# Patient Record
Sex: MALE | Race: WHITE | NOT HISPANIC OR LATINO | Employment: OTHER | URBAN - METROPOLITAN AREA
[De-identification: names, ages, dates, MRNs, and addresses within clinical notes are randomized per-mention and may not be internally consistent; named-entity substitution may affect disease eponyms.]

---

## 2022-03-11 ENCOUNTER — APPOINTMENT (OUTPATIENT)
Dept: GENERAL RADIOLOGY | Facility: HOSPITAL | Age: 77
End: 2022-03-11

## 2022-03-11 ENCOUNTER — HOSPITAL ENCOUNTER (EMERGENCY)
Facility: HOSPITAL | Age: 77
Discharge: NURSING, REHAB OR LONG-TERM CARE | End: 2022-03-12
Attending: EMERGENCY MEDICINE | Admitting: EMERGENCY MEDICINE

## 2022-03-11 DIAGNOSIS — Z00.00 NORMAL EXAM: Primary | ICD-10-CM

## 2022-03-11 PROCEDURE — 99283 EMERGENCY DEPT VISIT LOW MDM: CPT

## 2022-03-11 PROCEDURE — 73070 X-RAY EXAM OF ELBOW: CPT

## 2022-03-12 VITALS
OXYGEN SATURATION: 93 % | HEIGHT: 72 IN | RESPIRATION RATE: 18 BRPM | SYSTOLIC BLOOD PRESSURE: 160 MMHG | DIASTOLIC BLOOD PRESSURE: 92 MMHG | BODY MASS INDEX: 35.21 KG/M2 | HEART RATE: 60 BPM | TEMPERATURE: 98.2 F | WEIGHT: 260 LBS

## 2022-03-12 NOTE — ED NOTES
0724- S/W Ben at [facility] she called asking if pt still need a ride. I confirmed that he did, he stated that she did not have a  on an ETA.

## 2022-03-12 NOTE — ED NOTES
Patient waiting on taxi at this time. Attempted to call family to transport. No answer and most live in [place]. Patient did not meet medical nec. To travel back to facility [facility] trans states can  in am if not able to find ride.

## 2022-03-12 NOTE — ED PROVIDER NOTES
Subjective   Patient is a 76-year-old male who had a PICC line placed in his right arm for IV antibiotics.  He was at a rehab facility.  The patient apparently had pulled it out in the facilities concerned that the catheter may have broken a portion still in the patient's arm.  Patient offers no complaints.          Review of Systems  For arm pain chest pain shortness of breath or other complaint  No past medical history on file.    No Known Allergies    No past surgical history on file.    No family history on file.    Social History     Socioeconomic History   • Marital status:            Objective   Physical Exam  Strength exam shows the patient and the puncture site to his right upper arm from PICC line site.  Patient has no palpable foreign body.  Has 2+ pulses and is neurovascular tact  Procedures           ED Course          XR Elbow 2 View Right    Result Date: 3/11/2022  1. Negative for opaque foreign body.  Electronically Signed By-Nicci Jean-Baptiste MD On:3/11/2022 10:30 PM This report was finalized on [id number] by  Nicci Jean-Baptiste MD.                                            MDM  Number of Diagnoses or Management Options  Diagnosis management comments: X-ray shows no evidence of opaque foreign body.  Patient will be discharged.  They will see the MD for recheck as needed.       Amount and/or Complexity of Data Reviewed  Tests in the radiology section of CPT®: reviewed    Risk of Complications, Morbidity, and/or Mortality  Presenting problems: moderate  Diagnostic procedures: moderate  Management options: moderate    Patient Progress  Patient progress: stable      Final diagnoses:   Normal exam       ED Disposition  ED Disposition     ED Disposition   Discharge    Condition   Stable    Comment   --             No follow-up provider specified.       Medication List      No changes were made to your prescriptions during this visit.          Best Buckley MD  03/11/22 7725

## 2022-03-29 ENCOUNTER — APPOINTMENT (OUTPATIENT)
Dept: GENERAL RADIOLOGY | Facility: HOSPITAL | Age: 77
End: 2022-03-29

## 2022-03-29 ENCOUNTER — HOSPITAL ENCOUNTER (INPATIENT)
Facility: HOSPITAL | Age: 77
LOS: 8 days | Discharge: NURSING, REHAB OR LONG-TERM CARE | End: 2022-04-06
Attending: EMERGENCY MEDICINE | Admitting: INTERNAL MEDICINE

## 2022-03-29 ENCOUNTER — APPOINTMENT (OUTPATIENT)
Dept: CARDIOLOGY | Facility: HOSPITAL | Age: 77
End: 2022-03-29

## 2022-03-29 DIAGNOSIS — I50.9 ACUTE ON CHRONIC CONGESTIVE HEART FAILURE, UNSPECIFIED HEART FAILURE TYPE: Primary | ICD-10-CM

## 2022-03-29 DIAGNOSIS — N39.0 ACUTE UTI: ICD-10-CM

## 2022-03-29 DIAGNOSIS — Z20.822 LAB TEST NEGATIVE FOR COVID-19 VIRUS: ICD-10-CM

## 2022-03-29 PROBLEM — N17.9 ACUTE KIDNEY FAILURE: Status: ACTIVE | Noted: 2022-02-28

## 2022-03-29 PROBLEM — I63.9 CEREBROVASCULAR ACCIDENT: Status: ACTIVE | Noted: 2022-02-28

## 2022-03-29 PROBLEM — E11.21 TYPE 2 DIABETES MELLITUS WITH DIABETIC NEPHROPATHY: Status: ACTIVE | Noted: 2022-02-28

## 2022-03-29 LAB
ALBUMIN SERPL-MCNC: 3.1 G/DL (ref 3.5–5.2)
ALBUMIN/GLOB SERPL: 0.8 G/DL
ALP SERPL-CCNC: 88 U/L (ref 39–117)
ALT SERPL W P-5'-P-CCNC: 10 U/L (ref 1–41)
ANION GAP SERPL CALCULATED.3IONS-SCNC: 11 MMOL/L (ref 5–15)
APTT PPP: 32 SECONDS (ref 61–76.5)
AST SERPL-CCNC: 9 U/L (ref 1–40)
B PARAPERT DNA SPEC QL NAA+PROBE: NOT DETECTED
B PERT DNA SPEC QL NAA+PROBE: NOT DETECTED
BACTERIA UR QL AUTO: ABNORMAL /HPF
BASOPHILS # BLD AUTO: 0 10*3/MM3 (ref 0–0.2)
BASOPHILS NFR BLD AUTO: 0.5 % (ref 0–1.5)
BILIRUB SERPL-MCNC: 0.5 MG/DL (ref 0–1.2)
BILIRUB UR QL STRIP: NEGATIVE
BUN SERPL-MCNC: 25 MG/DL (ref 8–23)
BUN/CREAT SERPL: 19.2 (ref 7–25)
C PNEUM DNA NPH QL NAA+NON-PROBE: NOT DETECTED
CALCIUM SPEC-SCNC: 8.8 MG/DL (ref 8.6–10.5)
CHLORIDE SERPL-SCNC: 105 MMOL/L (ref 98–107)
CLARITY UR: CLEAR
CO2 SERPL-SCNC: 28 MMOL/L (ref 22–29)
COLOR UR: YELLOW
CREAT SERPL-MCNC: 1.3 MG/DL (ref 0.76–1.27)
DEPRECATED RDW RBC AUTO: 53.8 FL (ref 37–54)
EGFRCR SERPLBLD CKD-EPI 2021: 56.9 ML/MIN/1.73
EOSINOPHIL # BLD AUTO: 0.2 10*3/MM3 (ref 0–0.4)
EOSINOPHIL NFR BLD AUTO: 4.9 % (ref 0.3–6.2)
ERYTHROCYTE [DISTWIDTH] IN BLOOD BY AUTOMATED COUNT: 18.6 % (ref 12.3–15.4)
FLUAV SUBTYP SPEC NAA+PROBE: NOT DETECTED
FLUBV RNA ISLT QL NAA+PROBE: NOT DETECTED
GLOBULIN UR ELPH-MCNC: 3.7 GM/DL
GLUCOSE BLDC GLUCOMTR-MCNC: 101 MG/DL (ref 70–105)
GLUCOSE SERPL-MCNC: 111 MG/DL (ref 65–99)
GLUCOSE UR STRIP-MCNC: NEGATIVE MG/DL
HADV DNA SPEC NAA+PROBE: NOT DETECTED
HCOV 229E RNA SPEC QL NAA+PROBE: NOT DETECTED
HCOV HKU1 RNA SPEC QL NAA+PROBE: NOT DETECTED
HCOV NL63 RNA SPEC QL NAA+PROBE: NOT DETECTED
HCOV OC43 RNA SPEC QL NAA+PROBE: NOT DETECTED
HCT VFR BLD AUTO: 28.6 % (ref 37.5–51)
HCT VFR BLD AUTO: 29.4 % (ref 37.5–51)
HCT VFR BLD AUTO: 30.3 % (ref 37.5–51)
HGB BLD-MCNC: 9.3 G/DL (ref 13–17.7)
HGB BLD-MCNC: 9.7 G/DL (ref 13–17.7)
HGB BLD-MCNC: 9.7 G/DL (ref 13–17.7)
HGB UR QL STRIP.AUTO: ABNORMAL
HMPV RNA NPH QL NAA+NON-PROBE: NOT DETECTED
HOLD SPECIMEN: NORMAL
HOLD SPECIMEN: NORMAL
HPIV1 RNA ISLT QL NAA+PROBE: NOT DETECTED
HPIV2 RNA SPEC QL NAA+PROBE: NOT DETECTED
HPIV3 RNA NPH QL NAA+PROBE: NOT DETECTED
HPIV4 P GENE NPH QL NAA+PROBE: NOT DETECTED
HYALINE CASTS UR QL AUTO: ABNORMAL /LPF
INR PPP: 1.13 (ref 0.93–1.1)
KETONES UR QL STRIP: NEGATIVE
LEUKOCYTE ESTERASE UR QL STRIP.AUTO: ABNORMAL
LYMPHOCYTES # BLD AUTO: 1.2 10*3/MM3 (ref 0.7–3.1)
LYMPHOCYTES NFR BLD AUTO: 26.8 % (ref 19.6–45.3)
M PNEUMO IGG SER IA-ACNC: NOT DETECTED
MCH RBC QN AUTO: 27.4 PG (ref 26.6–33)
MCHC RBC AUTO-ENTMCNC: 33 G/DL (ref 31.5–35.7)
MCV RBC AUTO: 83.1 FL (ref 79–97)
MONOCYTES # BLD AUTO: 0.4 10*3/MM3 (ref 0.1–0.9)
MONOCYTES NFR BLD AUTO: 9.3 % (ref 5–12)
NEUTROPHILS NFR BLD AUTO: 2.6 10*3/MM3 (ref 1.7–7)
NEUTROPHILS NFR BLD AUTO: 58.5 % (ref 42.7–76)
NITRITE UR QL STRIP: POSITIVE
NRBC BLD AUTO-RTO: 0.1 /100 WBC (ref 0–0.2)
NT-PROBNP SERPL-MCNC: 4400 PG/ML (ref 0–1800)
PH UR STRIP.AUTO: 7 [PH] (ref 5–8)
PLATELET # BLD AUTO: 153 10*3/MM3 (ref 140–450)
PMV BLD AUTO: 8.2 FL (ref 6–12)
POTASSIUM SERPL-SCNC: 3.8 MMOL/L (ref 3.5–5.2)
PROT SERPL-MCNC: 6.8 G/DL (ref 6–8.5)
PROT UR QL STRIP: NEGATIVE
PROTHROMBIN TIME: 12.4 SECONDS (ref 9.6–11.7)
RBC # BLD AUTO: 3.54 10*6/MM3 (ref 4.14–5.8)
RBC # UR STRIP: ABNORMAL /HPF
REF LAB TEST METHOD: ABNORMAL
RHINOVIRUS RNA SPEC NAA+PROBE: NOT DETECTED
RSV RNA NPH QL NAA+NON-PROBE: NOT DETECTED
SARS-COV-2 RNA NPH QL NAA+NON-PROBE: NOT DETECTED
SODIUM SERPL-SCNC: 144 MMOL/L (ref 136–145)
SP GR UR STRIP: 1.01 (ref 1–1.03)
SQUAMOUS #/AREA URNS HPF: ABNORMAL /HPF
TROPONIN T SERPL-MCNC: 0.03 NG/ML (ref 0–0.03)
TROPONIN T SERPL-MCNC: 0.04 NG/ML (ref 0–0.03)
UROBILINOGEN UR QL STRIP: ABNORMAL
WBC # UR STRIP: ABNORMAL /HPF
WBC NRBC COR # BLD: 4.4 10*3/MM3 (ref 3.4–10.8)
WHOLE BLOOD HOLD SPECIMEN: NORMAL
WHOLE BLOOD HOLD SPECIMEN: NORMAL

## 2022-03-29 PROCEDURE — 93005 ELECTROCARDIOGRAM TRACING: CPT | Performed by: EMERGENCY MEDICINE

## 2022-03-29 PROCEDURE — 87086 URINE CULTURE/COLONY COUNT: CPT | Performed by: EMERGENCY MEDICINE

## 2022-03-29 PROCEDURE — 25010000002 ENOXAPARIN PER 10 MG: Performed by: NURSE PRACTITIONER

## 2022-03-29 PROCEDURE — 71045 X-RAY EXAM CHEST 1 VIEW: CPT

## 2022-03-29 PROCEDURE — G0378 HOSPITAL OBSERVATION PER HR: HCPCS

## 2022-03-29 PROCEDURE — 99222 1ST HOSP IP/OBS MODERATE 55: CPT | Performed by: INTERNAL MEDICINE

## 2022-03-29 PROCEDURE — 25010000002 MEROPENEM PER 100 MG: Performed by: EMERGENCY MEDICINE

## 2022-03-29 PROCEDURE — 85610 PROTHROMBIN TIME: CPT | Performed by: EMERGENCY MEDICINE

## 2022-03-29 PROCEDURE — 0202U NFCT DS 22 TRGT SARS-COV-2: CPT | Performed by: EMERGENCY MEDICINE

## 2022-03-29 PROCEDURE — 82962 GLUCOSE BLOOD TEST: CPT

## 2022-03-29 PROCEDURE — 87077 CULTURE AEROBIC IDENTIFY: CPT | Performed by: EMERGENCY MEDICINE

## 2022-03-29 PROCEDURE — 25010000002 FUROSEMIDE PER 20 MG: Performed by: EMERGENCY MEDICINE

## 2022-03-29 PROCEDURE — 85014 HEMATOCRIT: CPT | Performed by: NURSE PRACTITIONER

## 2022-03-29 PROCEDURE — 99233 SBSQ HOSP IP/OBS HIGH 50: CPT | Performed by: HOSPITALIST

## 2022-03-29 PROCEDURE — 84484 ASSAY OF TROPONIN QUANT: CPT | Performed by: EMERGENCY MEDICINE

## 2022-03-29 PROCEDURE — 80053 COMPREHEN METABOLIC PANEL: CPT | Performed by: EMERGENCY MEDICINE

## 2022-03-29 PROCEDURE — 85730 THROMBOPLASTIN TIME PARTIAL: CPT | Performed by: EMERGENCY MEDICINE

## 2022-03-29 PROCEDURE — 25010000002 FUROSEMIDE PER 20 MG: Performed by: NURSE PRACTITIONER

## 2022-03-29 PROCEDURE — 87186 SC STD MICRODIL/AGAR DIL: CPT | Performed by: EMERGENCY MEDICINE

## 2022-03-29 PROCEDURE — 81001 URINALYSIS AUTO W/SCOPE: CPT | Performed by: EMERGENCY MEDICINE

## 2022-03-29 PROCEDURE — 87102 FUNGUS ISOLATION CULTURE: CPT | Performed by: EMERGENCY MEDICINE

## 2022-03-29 PROCEDURE — 85018 HEMOGLOBIN: CPT | Performed by: NURSE PRACTITIONER

## 2022-03-29 PROCEDURE — 87181 SC STD AGAR DILUTION PER AGT: CPT | Performed by: EMERGENCY MEDICINE

## 2022-03-29 PROCEDURE — 63710000001 INSULIN GLARGINE PER 5 UNITS: Performed by: NURSE PRACTITIONER

## 2022-03-29 PROCEDURE — 36415 COLL VENOUS BLD VENIPUNCTURE: CPT | Performed by: EMERGENCY MEDICINE

## 2022-03-29 PROCEDURE — 85025 COMPLETE CBC W/AUTO DIFF WBC: CPT | Performed by: EMERGENCY MEDICINE

## 2022-03-29 PROCEDURE — 83880 ASSAY OF NATRIURETIC PEPTIDE: CPT | Performed by: EMERGENCY MEDICINE

## 2022-03-29 PROCEDURE — 99284 EMERGENCY DEPT VISIT MOD MDM: CPT

## 2022-03-29 PROCEDURE — 25010000002 CEFTRIAXONE PER 250 MG: Performed by: EMERGENCY MEDICINE

## 2022-03-29 PROCEDURE — 25010000002 FUROSEMIDE PER 20 MG: Performed by: HOSPITALIST

## 2022-03-29 RX ORDER — ACETAMINOPHEN 325 MG/1
650 TABLET ORAL EVERY 4 HOURS PRN
COMMUNITY

## 2022-03-29 RX ORDER — DILTIAZEM HYDROCHLORIDE EXTENDED-RELEASE TABLETS 180 MG/1
180 TABLET, EXTENDED RELEASE ORAL DAILY
COMMUNITY
End: 2022-04-06 | Stop reason: HOSPADM

## 2022-03-29 RX ORDER — ACETAMINOPHEN 160 MG/5ML
650 SOLUTION ORAL EVERY 4 HOURS PRN
Status: DISCONTINUED | OUTPATIENT
Start: 2022-03-29 | End: 2022-04-06 | Stop reason: HOSPADM

## 2022-03-29 RX ORDER — CARVEDILOL 6.25 MG/1
12.5 TABLET ORAL 2 TIMES DAILY WITH MEALS
Status: DISCONTINUED | OUTPATIENT
Start: 2022-03-29 | End: 2022-03-31

## 2022-03-29 RX ORDER — INSULIN GLARGINE 100 [IU]/ML
10 INJECTION, SOLUTION SUBCUTANEOUS DAILY
Status: DISCONTINUED | OUTPATIENT
Start: 2022-03-29 | End: 2022-04-06 | Stop reason: HOSPADM

## 2022-03-29 RX ORDER — FUROSEMIDE 10 MG/ML
60 INJECTION INTRAMUSCULAR; INTRAVENOUS EVERY 8 HOURS
Status: DISCONTINUED | OUTPATIENT
Start: 2022-03-29 | End: 2022-03-31

## 2022-03-29 RX ORDER — INSULIN GLARGINE 100 [IU]/ML
10 INJECTION, SOLUTION SUBCUTANEOUS EVERY MORNING
COMMUNITY
End: 2022-09-16

## 2022-03-29 RX ORDER — ALBUTEROL SULFATE 90 UG/1
2 AEROSOL, METERED RESPIRATORY (INHALATION) EVERY 4 HOURS PRN
COMMUNITY

## 2022-03-29 RX ORDER — ACETAMINOPHEN 650 MG/1
650 SUPPOSITORY RECTAL EVERY 4 HOURS PRN
Status: DISCONTINUED | OUTPATIENT
Start: 2022-03-29 | End: 2022-04-06 | Stop reason: HOSPADM

## 2022-03-29 RX ORDER — ASPIRIN 81 MG/1
324 TABLET, CHEWABLE ORAL ONCE
Status: COMPLETED | OUTPATIENT
Start: 2022-03-29 | End: 2022-03-29

## 2022-03-29 RX ORDER — BUMETANIDE 1 MG/1
1 TABLET ORAL 2 TIMES DAILY
COMMUNITY
End: 2022-04-06 | Stop reason: HOSPADM

## 2022-03-29 RX ORDER — FUROSEMIDE 10 MG/ML
40 INJECTION INTRAMUSCULAR; INTRAVENOUS 2 TIMES DAILY
COMMUNITY
End: 2022-04-06 | Stop reason: HOSPADM

## 2022-03-29 RX ORDER — SODIUM CHLORIDE 0.9 % (FLUSH) 0.9 %
10 SYRINGE (ML) INJECTION AS NEEDED
Status: DISCONTINUED | OUTPATIENT
Start: 2022-03-29 | End: 2022-04-06 | Stop reason: HOSPADM

## 2022-03-29 RX ORDER — ONDANSETRON 4 MG/1
4 TABLET, FILM COATED ORAL EVERY 6 HOURS PRN
Status: DISCONTINUED | OUTPATIENT
Start: 2022-03-29 | End: 2022-04-06 | Stop reason: HOSPADM

## 2022-03-29 RX ORDER — ISOSORBIDE MONONITRATE 30 MG/1
30 TABLET, EXTENDED RELEASE ORAL 2 TIMES DAILY
COMMUNITY
End: 2022-09-19 | Stop reason: HOSPADM

## 2022-03-29 RX ORDER — SODIUM CHLORIDE 0.9 % (FLUSH) 0.9 %
10 SYRINGE (ML) INJECTION EVERY 12 HOURS SCHEDULED
Status: DISCONTINUED | OUTPATIENT
Start: 2022-03-29 | End: 2022-04-06 | Stop reason: HOSPADM

## 2022-03-29 RX ORDER — ESCITALOPRAM OXALATE 10 MG/1
10 TABLET ORAL DAILY
Status: DISCONTINUED | OUTPATIENT
Start: 2022-03-29 | End: 2022-04-06 | Stop reason: HOSPADM

## 2022-03-29 RX ORDER — TAMSULOSIN HYDROCHLORIDE 0.4 MG/1
0.4 CAPSULE ORAL DAILY
Status: DISCONTINUED | OUTPATIENT
Start: 2022-03-29 | End: 2022-04-06 | Stop reason: HOSPADM

## 2022-03-29 RX ORDER — HYDRALAZINE HYDROCHLORIDE 50 MG/1
50 TABLET, FILM COATED ORAL EVERY 8 HOURS
COMMUNITY
End: 2022-04-06 | Stop reason: HOSPADM

## 2022-03-29 RX ORDER — ACETAMINOPHEN 325 MG/1
650 TABLET ORAL EVERY 4 HOURS PRN
Status: DISCONTINUED | OUTPATIENT
Start: 2022-03-29 | End: 2022-04-06 | Stop reason: HOSPADM

## 2022-03-29 RX ORDER — ESCITALOPRAM OXALATE 10 MG/1
10 TABLET ORAL DAILY
COMMUNITY

## 2022-03-29 RX ORDER — GABAPENTIN 300 MG/1
300 CAPSULE ORAL 3 TIMES DAILY
Status: DISCONTINUED | OUTPATIENT
Start: 2022-03-29 | End: 2022-04-06 | Stop reason: HOSPADM

## 2022-03-29 RX ORDER — FINASTERIDE 5 MG/1
5 TABLET, FILM COATED ORAL DAILY
COMMUNITY

## 2022-03-29 RX ORDER — TAMSULOSIN HYDROCHLORIDE 0.4 MG/1
1 CAPSULE ORAL DAILY
COMMUNITY

## 2022-03-29 RX ORDER — FINASTERIDE 5 MG/1
5 TABLET, FILM COATED ORAL DAILY
Status: DISCONTINUED | OUTPATIENT
Start: 2022-03-29 | End: 2022-04-06 | Stop reason: HOSPADM

## 2022-03-29 RX ORDER — ATORVASTATIN CALCIUM 20 MG/1
20 TABLET, FILM COATED ORAL NIGHTLY
Status: DISCONTINUED | OUTPATIENT
Start: 2022-03-29 | End: 2022-04-06 | Stop reason: HOSPADM

## 2022-03-29 RX ORDER — DILTIAZEM HYDROCHLORIDE 180 MG/1
180 CAPSULE, COATED, EXTENDED RELEASE ORAL
Status: DISCONTINUED | OUTPATIENT
Start: 2022-03-29 | End: 2022-03-31

## 2022-03-29 RX ORDER — FUROSEMIDE 10 MG/ML
40 INJECTION INTRAMUSCULAR; INTRAVENOUS ONCE
Status: COMPLETED | OUTPATIENT
Start: 2022-03-29 | End: 2022-03-29

## 2022-03-29 RX ORDER — CARVEDILOL 25 MG/1
25 TABLET ORAL 2 TIMES DAILY
COMMUNITY
End: 2022-09-19 | Stop reason: HOSPADM

## 2022-03-29 RX ORDER — FUROSEMIDE 10 MG/ML
40 INJECTION INTRAMUSCULAR; INTRAVENOUS EVERY 8 HOURS
Status: DISCONTINUED | OUTPATIENT
Start: 2022-03-29 | End: 2022-03-29

## 2022-03-29 RX ORDER — AMLODIPINE BESYLATE 5 MG/1
10 TABLET ORAL NIGHTLY
Status: DISCONTINUED | OUTPATIENT
Start: 2022-03-29 | End: 2022-03-31

## 2022-03-29 RX ORDER — ONDANSETRON 2 MG/ML
4 INJECTION INTRAMUSCULAR; INTRAVENOUS EVERY 6 HOURS PRN
Status: DISCONTINUED | OUTPATIENT
Start: 2022-03-29 | End: 2022-04-06 | Stop reason: HOSPADM

## 2022-03-29 RX ORDER — AMLODIPINE BESYLATE 10 MG/1
10 TABLET ORAL
COMMUNITY
End: 2022-04-06 | Stop reason: HOSPADM

## 2022-03-29 RX ORDER — HYDRALAZINE HYDROCHLORIDE 25 MG/1
50 TABLET, FILM COATED ORAL 3 TIMES DAILY
Status: DISCONTINUED | OUTPATIENT
Start: 2022-03-29 | End: 2022-04-01

## 2022-03-29 RX ORDER — ISOSORBIDE MONONITRATE 30 MG/1
30 TABLET, EXTENDED RELEASE ORAL
Status: DISCONTINUED | OUTPATIENT
Start: 2022-03-29 | End: 2022-04-06 | Stop reason: HOSPADM

## 2022-03-29 RX ORDER — GABAPENTIN 300 MG/1
300 CAPSULE ORAL 3 TIMES DAILY
COMMUNITY

## 2022-03-29 RX ORDER — FUROSEMIDE 10 MG/ML
60 INJECTION INTRAMUSCULAR; INTRAVENOUS EVERY 8 HOURS
Status: DISCONTINUED | OUTPATIENT
Start: 2022-03-29 | End: 2022-03-29

## 2022-03-29 RX ORDER — POLYETHYLENE GLYCOL 3350
17 POWDER (GRAM) MISCELLANEOUS NIGHTLY
COMMUNITY

## 2022-03-29 RX ORDER — SIMVASTATIN 40 MG
40 TABLET ORAL
COMMUNITY

## 2022-03-29 RX ADMIN — FUROSEMIDE 40 MG: 10 INJECTION, SOLUTION INTRAMUSCULAR; INTRAVENOUS at 11:44

## 2022-03-29 RX ADMIN — TAMSULOSIN HYDROCHLORIDE 0.4 MG: 0.4 CAPSULE ORAL at 11:44

## 2022-03-29 RX ADMIN — GABAPENTIN 300 MG: 300 CAPSULE ORAL at 11:44

## 2022-03-29 RX ADMIN — MEROPENEM 500 MG: 500 INJECTION, POWDER, FOR SOLUTION INTRAVENOUS at 17:00

## 2022-03-29 RX ADMIN — Medication 10 ML: at 11:43

## 2022-03-29 RX ADMIN — ISOSORBIDE MONONITRATE 30 MG: 30 TABLET, EXTENDED RELEASE ORAL at 17:01

## 2022-03-29 RX ADMIN — CARVEDILOL 12.5 MG: 6.25 TABLET, FILM COATED ORAL at 11:44

## 2022-03-29 RX ADMIN — ENOXAPARIN SODIUM 40 MG: 40 INJECTION SUBCUTANEOUS at 21:35

## 2022-03-29 RX ADMIN — HYDRALAZINE HYDROCHLORIDE 50 MG: 25 TABLET, FILM COATED ORAL at 21:36

## 2022-03-29 RX ADMIN — HYDRALAZINE HYDROCHLORIDE 50 MG: 25 TABLET, FILM COATED ORAL at 17:01

## 2022-03-29 RX ADMIN — GABAPENTIN 300 MG: 300 CAPSULE ORAL at 17:01

## 2022-03-29 RX ADMIN — HYDRALAZINE HYDROCHLORIDE 50 MG: 25 TABLET, FILM COATED ORAL at 11:44

## 2022-03-29 RX ADMIN — INSULIN GLARGINE 10 UNITS: 100 INJECTION, SOLUTION SUBCUTANEOUS at 11:44

## 2022-03-29 RX ADMIN — MEROPENEM 500 MG: 500 INJECTION, POWDER, FOR SOLUTION INTRAVENOUS at 11:51

## 2022-03-29 RX ADMIN — LIDOCAINE HYDROCHLORIDE: 20 SOLUTION ORAL; TOPICAL at 19:21

## 2022-03-29 RX ADMIN — GABAPENTIN 300 MG: 300 CAPSULE ORAL at 21:36

## 2022-03-29 RX ADMIN — ATORVASTATIN CALCIUM 20 MG: 20 TABLET, FILM COATED ORAL at 21:36

## 2022-03-29 RX ADMIN — DILTIAZEM HYDROCHLORIDE 180 MG: 180 CAPSULE, COATED, EXTENDED RELEASE ORAL at 11:43

## 2022-03-29 RX ADMIN — MEROPENEM 500 MG: 500 INJECTION, POWDER, FOR SOLUTION INTRAVENOUS at 05:37

## 2022-03-29 RX ADMIN — NITROGLYCERIN 1 INCH: 20 OINTMENT TOPICAL at 01:41

## 2022-03-29 RX ADMIN — MEROPENEM 500 MG: 500 INJECTION, POWDER, FOR SOLUTION INTRAVENOUS at 23:14

## 2022-03-29 RX ADMIN — Medication 10 ML: at 21:36

## 2022-03-29 RX ADMIN — ISOSORBIDE MONONITRATE 30 MG: 30 TABLET, EXTENDED RELEASE ORAL at 11:43

## 2022-03-29 RX ADMIN — CEFTRIAXONE 1 G: 10 INJECTION, POWDER, FOR SOLUTION INTRAVENOUS at 03:43

## 2022-03-29 RX ADMIN — FINASTERIDE 5 MG: 5 TABLET, FILM COATED ORAL at 11:44

## 2022-03-29 RX ADMIN — FUROSEMIDE 40 MG: 10 INJECTION, SOLUTION INTRAMUSCULAR; INTRAVENOUS at 01:42

## 2022-03-29 RX ADMIN — AMLODIPINE BESYLATE 10 MG: 5 TABLET ORAL at 21:36

## 2022-03-29 RX ADMIN — CARVEDILOL 12.5 MG: 6.25 TABLET, FILM COATED ORAL at 17:01

## 2022-03-29 RX ADMIN — ESCITALOPRAM OXALATE 10 MG: 10 TABLET ORAL at 11:43

## 2022-03-29 RX ADMIN — ASPIRIN 324 MG: 81 TABLET, CHEWABLE ORAL at 03:13

## 2022-03-29 RX ADMIN — FUROSEMIDE 60 MG: 10 INJECTION, SOLUTION INTRAMUSCULAR; INTRAVENOUS at 19:21

## 2022-03-30 LAB
HCT VFR BLD AUTO: 28.9 % (ref 37.5–51)
HGB BLD-MCNC: 9.1 G/DL (ref 13–17.7)
QT INTERVAL: 442 MS

## 2022-03-30 PROCEDURE — 99232 SBSQ HOSP IP/OBS MODERATE 35: CPT | Performed by: HOSPITALIST

## 2022-03-30 PROCEDURE — 97162 PT EVAL MOD COMPLEX 30 MIN: CPT

## 2022-03-30 PROCEDURE — 85014 HEMATOCRIT: CPT | Performed by: NURSE PRACTITIONER

## 2022-03-30 PROCEDURE — 25010000002 MEROPENEM PER 100 MG: Performed by: EMERGENCY MEDICINE

## 2022-03-30 PROCEDURE — 99232 SBSQ HOSP IP/OBS MODERATE 35: CPT | Performed by: INTERNAL MEDICINE

## 2022-03-30 PROCEDURE — G0378 HOSPITAL OBSERVATION PER HR: HCPCS

## 2022-03-30 PROCEDURE — 63710000001 INSULIN GLARGINE PER 5 UNITS: Performed by: NURSE PRACTITIONER

## 2022-03-30 PROCEDURE — 85018 HEMOGLOBIN: CPT | Performed by: NURSE PRACTITIONER

## 2022-03-30 PROCEDURE — 25010000002 FUROSEMIDE PER 20 MG: Performed by: HOSPITALIST

## 2022-03-30 PROCEDURE — 25010000002 ENOXAPARIN PER 10 MG: Performed by: NURSE PRACTITIONER

## 2022-03-30 RX ORDER — AMOXICILLIN 250 MG
2 CAPSULE ORAL 2 TIMES DAILY
Status: DISCONTINUED | OUTPATIENT
Start: 2022-03-30 | End: 2022-04-06 | Stop reason: HOSPADM

## 2022-03-30 RX ADMIN — GABAPENTIN 300 MG: 300 CAPSULE ORAL at 17:11

## 2022-03-30 RX ADMIN — ISOSORBIDE MONONITRATE 30 MG: 30 TABLET, EXTENDED RELEASE ORAL at 17:11

## 2022-03-30 RX ADMIN — MEROPENEM 500 MG: 500 INJECTION, POWDER, FOR SOLUTION INTRAVENOUS at 17:11

## 2022-03-30 RX ADMIN — Medication 10 ML: at 21:04

## 2022-03-30 RX ADMIN — ESCITALOPRAM OXALATE 10 MG: 10 TABLET ORAL at 08:48

## 2022-03-30 RX ADMIN — MEROPENEM 500 MG: 500 INJECTION, POWDER, FOR SOLUTION INTRAVENOUS at 23:30

## 2022-03-30 RX ADMIN — INSULIN GLARGINE 10 UNITS: 100 INJECTION, SOLUTION SUBCUTANEOUS at 09:46

## 2022-03-30 RX ADMIN — ATORVASTATIN CALCIUM 20 MG: 20 TABLET, FILM COATED ORAL at 21:04

## 2022-03-30 RX ADMIN — HYDRALAZINE HYDROCHLORIDE 50 MG: 25 TABLET, FILM COATED ORAL at 08:45

## 2022-03-30 RX ADMIN — FUROSEMIDE 60 MG: 10 INJECTION, SOLUTION INTRAMUSCULAR; INTRAVENOUS at 04:16

## 2022-03-30 RX ADMIN — ISOSORBIDE MONONITRATE 30 MG: 30 TABLET, EXTENDED RELEASE ORAL at 08:45

## 2022-03-30 RX ADMIN — MEROPENEM 500 MG: 500 INJECTION, POWDER, FOR SOLUTION INTRAVENOUS at 11:30

## 2022-03-30 RX ADMIN — FUROSEMIDE 60 MG: 10 INJECTION, SOLUTION INTRAMUSCULAR; INTRAVENOUS at 11:40

## 2022-03-30 RX ADMIN — TAMSULOSIN HYDROCHLORIDE 0.4 MG: 0.4 CAPSULE ORAL at 08:45

## 2022-03-30 RX ADMIN — HYDRALAZINE HYDROCHLORIDE 50 MG: 25 TABLET, FILM COATED ORAL at 17:11

## 2022-03-30 RX ADMIN — Medication 10 ML: at 08:48

## 2022-03-30 RX ADMIN — HYDRALAZINE HYDROCHLORIDE 50 MG: 25 TABLET, FILM COATED ORAL at 21:04

## 2022-03-30 RX ADMIN — CARVEDILOL 12.5 MG: 6.25 TABLET, FILM COATED ORAL at 17:11

## 2022-03-30 RX ADMIN — CARVEDILOL 12.5 MG: 6.25 TABLET, FILM COATED ORAL at 08:45

## 2022-03-30 RX ADMIN — FINASTERIDE 5 MG: 5 TABLET, FILM COATED ORAL at 08:45

## 2022-03-30 RX ADMIN — GABAPENTIN 300 MG: 300 CAPSULE ORAL at 21:04

## 2022-03-30 RX ADMIN — SENNOSIDES AND DOCUSATE SODIUM 2 TABLET: 50; 8.6 TABLET ORAL at 18:19

## 2022-03-30 RX ADMIN — DILTIAZEM HYDROCHLORIDE 180 MG: 180 CAPSULE, COATED, EXTENDED RELEASE ORAL at 08:45

## 2022-03-30 RX ADMIN — ENOXAPARIN SODIUM 40 MG: 40 INJECTION SUBCUTANEOUS at 21:04

## 2022-03-30 RX ADMIN — GABAPENTIN 300 MG: 300 CAPSULE ORAL at 08:45

## 2022-03-30 RX ADMIN — MEROPENEM 500 MG: 500 INJECTION, POWDER, FOR SOLUTION INTRAVENOUS at 04:16

## 2022-03-30 RX ADMIN — AMLODIPINE BESYLATE 10 MG: 5 TABLET ORAL at 21:04

## 2022-03-30 RX ADMIN — FUROSEMIDE 60 MG: 10 INJECTION, SOLUTION INTRAMUSCULAR; INTRAVENOUS at 21:03

## 2022-03-31 ENCOUNTER — APPOINTMENT (OUTPATIENT)
Dept: CARDIOLOGY | Facility: HOSPITAL | Age: 77
End: 2022-03-31

## 2022-03-31 ENCOUNTER — APPOINTMENT (OUTPATIENT)
Dept: GENERAL RADIOLOGY | Facility: HOSPITAL | Age: 77
End: 2022-03-31

## 2022-03-31 LAB
ANION GAP SERPL CALCULATED.3IONS-SCNC: 10 MMOL/L (ref 5–15)
ARTERIAL PATENCY WRIST A: POSITIVE
ATMOSPHERIC PRESS: ABNORMAL MM[HG]
BASE EXCESS BLDA CALC-SCNC: 5 MMOL/L (ref 0–3)
BASOPHILS # BLD AUTO: 0 10*3/MM3 (ref 0–0.2)
BASOPHILS NFR BLD AUTO: 0.6 % (ref 0–1.5)
BDY SITE: ABNORMAL
BH CV ECHO MEAS - ACS: 2.16 CM
BH CV ECHO MEAS - AO MAX PG: 4.5 MMHG
BH CV ECHO MEAS - AO MEAN PG: 2.46 MMHG
BH CV ECHO MEAS - AO ROOT DIAM: 3.4 CM
BH CV ECHO MEAS - AO V2 MAX: 106.6 CM/SEC
BH CV ECHO MEAS - AO V2 VTI: 23.5 CM
BH CV ECHO MEAS - AVA(I,D): 4.7 CM2
BH CV ECHO MEAS - EDV(CUBED): 134.7 ML
BH CV ECHO MEAS - EDV(MOD-SP4): 123.7 ML
BH CV ECHO MEAS - EF(MOD-SP4): 51.9 %
BH CV ECHO MEAS - ESV(CUBED): 58.6 ML
BH CV ECHO MEAS - ESV(MOD-SP4): 59.5 ML
BH CV ECHO MEAS - FS: 24.2 %
BH CV ECHO MEAS - IVS/LVPW: 1.16 CM
BH CV ECHO MEAS - IVSD: 1.87 CM
BH CV ECHO MEAS - LA DIMENSION(2D): 5.7 CM
BH CV ECHO MEAS - LV DIASTOLIC VOL/BSA (35-75): 46.6 CM2
BH CV ECHO MEAS - LV MASS(C)D: 418.7 GRAMS
BH CV ECHO MEAS - LV MAX PG: 3.8 MMHG
BH CV ECHO MEAS - LV MEAN PG: 2.45 MMHG
BH CV ECHO MEAS - LV SYSTOLIC VOL/BSA (12-30): 22.4 CM2
BH CV ECHO MEAS - LV V1 MAX: 98 CM/SEC
BH CV ECHO MEAS - LV V1 VTI: 26.6 CM
BH CV ECHO MEAS - LVIDD: 5.1 CM
BH CV ECHO MEAS - LVIDS: 3.9 CM
BH CV ECHO MEAS - LVOT AREA: 4.2 CM2
BH CV ECHO MEAS - LVOT DIAM: 2.3 CM
BH CV ECHO MEAS - LVPWD: 1.61 CM
BH CV ECHO MEAS - MV MAX PG: 6.6 MMHG
BH CV ECHO MEAS - MV MEAN PG: 3.4 MMHG
BH CV ECHO MEAS - MV V2 VTI: 20.5 CM
BH CV ECHO MEAS - MVA(VTI): 5.4 CM2
BH CV ECHO MEAS - PA V2 MAX: 66.6 CM/SEC
BH CV ECHO MEAS - RAP SYSTOLE: 3 MMHG
BH CV ECHO MEAS - RVDD: 2.6 CM
BH CV ECHO MEAS - RVSP: 14.9 MMHG
BH CV ECHO MEAS - SI(MOD-SP4): 24.2 ML/M2
BH CV ECHO MEAS - SV(LVOT): 111 ML
BH CV ECHO MEAS - SV(MOD-SP4): 64.2 ML
BH CV ECHO MEAS - TR MAX PG: 11.9 MMHG
BH CV ECHO MEAS - TR MAX VEL: 169.1 CM/SEC
BUN SERPL-MCNC: 36 MG/DL (ref 8–23)
BUN/CREAT SERPL: 19.7 (ref 7–25)
CALCIUM SPEC-SCNC: 8.5 MG/DL (ref 8.6–10.5)
CHLORIDE SERPL-SCNC: 106 MMOL/L (ref 98–107)
CO2 BLDA-SCNC: 34.5 MMOL/L (ref 22–29)
CO2 SERPL-SCNC: 29 MMOL/L (ref 22–29)
CREAT SERPL-MCNC: 1.83 MG/DL (ref 0.76–1.27)
D-LACTATE SERPL-SCNC: 0.7 MMOL/L (ref 0.5–2)
DEPRECATED RDW RBC AUTO: 56 FL (ref 37–54)
EGFRCR SERPLBLD CKD-EPI 2021: 37.8 ML/MIN/1.73
EOSINOPHIL # BLD AUTO: 0.1 10*3/MM3 (ref 0–0.4)
EOSINOPHIL NFR BLD AUTO: 2.9 % (ref 0.3–6.2)
ERYTHROCYTE [DISTWIDTH] IN BLOOD BY AUTOMATED COUNT: 19.3 % (ref 12.3–15.4)
GLUCOSE SERPL-MCNC: 147 MG/DL (ref 65–99)
HBA1C MFR BLD: 6.4 % (ref 3.5–5.6)
HCO3 BLDA-SCNC: 32.6 MMOL/L (ref 21–28)
HCT VFR BLD AUTO: 28.6 % (ref 37.5–51)
HEMODILUTION: NO
HGB BLD-MCNC: 9.4 G/DL (ref 13–17.7)
INHALED O2 CONCENTRATION: 40 %
LYMPHOCYTES # BLD AUTO: 1.1 10*3/MM3 (ref 0.7–3.1)
LYMPHOCYTES NFR BLD AUTO: 27.2 % (ref 19.6–45.3)
MAGNESIUM SERPL-MCNC: 2.2 MG/DL (ref 1.6–2.4)
MAXIMAL PREDICTED HEART RATE: 144 BPM
MCH RBC QN AUTO: 27.6 PG (ref 26.6–33)
MCHC RBC AUTO-ENTMCNC: 32.9 G/DL (ref 31.5–35.7)
MCV RBC AUTO: 83.7 FL (ref 79–97)
MODALITY: ABNORMAL
MONOCYTES # BLD AUTO: 0.5 10*3/MM3 (ref 0.1–0.9)
MONOCYTES NFR BLD AUTO: 12.3 % (ref 5–12)
NEUTROPHILS NFR BLD AUTO: 2.3 10*3/MM3 (ref 1.7–7)
NEUTROPHILS NFR BLD AUTO: 57 % (ref 42.7–76)
NRBC BLD AUTO-RTO: 0.2 /100 WBC (ref 0–0.2)
PCO2 BLDA: 62.9 MM HG (ref 35–48)
PH BLDA: 7.32 PH UNITS (ref 7.35–7.45)
PHOSPHATE SERPL-MCNC: 4.3 MG/DL (ref 2.5–4.5)
PLATELET # BLD AUTO: 122 10*3/MM3 (ref 140–450)
PMV BLD AUTO: 8.1 FL (ref 6–12)
PO2 BLDA: 81.4 MM HG (ref 83–108)
POTASSIUM SERPL-SCNC: 4.2 MMOL/L (ref 3.5–5.2)
RBC # BLD AUTO: 3.42 10*6/MM3 (ref 4.14–5.8)
SAO2 % BLDCOA: 94.5 % (ref 94–98)
SODIUM SERPL-SCNC: 145 MMOL/L (ref 136–145)
STRESS TARGET HR: 122 BPM
T4 FREE SERPL-MCNC: 1.2 NG/DL (ref 0.93–1.7)
TSH SERPL DL<=0.05 MIU/L-ACNC: 0.7 UIU/ML (ref 0.27–4.2)
WBC NRBC COR # BLD: 4.1 10*3/MM3 (ref 3.4–10.8)

## 2022-03-31 PROCEDURE — 93306 TTE W/DOPPLER COMPLETE: CPT

## 2022-03-31 PROCEDURE — 94799 UNLISTED PULMONARY SVC/PX: CPT

## 2022-03-31 PROCEDURE — 71046 X-RAY EXAM CHEST 2 VIEWS: CPT

## 2022-03-31 PROCEDURE — 84100 ASSAY OF PHOSPHORUS: CPT | Performed by: HOSPITALIST

## 2022-03-31 PROCEDURE — 83036 HEMOGLOBIN GLYCOSYLATED A1C: CPT | Performed by: HOSPITALIST

## 2022-03-31 PROCEDURE — 63710000001 INSULIN GLARGINE PER 5 UNITS: Performed by: NURSE PRACTITIONER

## 2022-03-31 PROCEDURE — 84443 ASSAY THYROID STIM HORMONE: CPT | Performed by: HOSPITALIST

## 2022-03-31 PROCEDURE — 80048 BASIC METABOLIC PNL TOTAL CA: CPT | Performed by: HOSPITALIST

## 2022-03-31 PROCEDURE — 25010000002 FUROSEMIDE PER 20 MG: Performed by: HOSPITALIST

## 2022-03-31 PROCEDURE — 83605 ASSAY OF LACTIC ACID: CPT | Performed by: HOSPITALIST

## 2022-03-31 PROCEDURE — G0378 HOSPITAL OBSERVATION PER HR: HCPCS

## 2022-03-31 PROCEDURE — 25010000002 CEFTRIAXONE SODIUM-DEXTROSE 1-3.74 GM-%(50ML) RECONSTITUTED SOLUTION: Performed by: INTERNAL MEDICINE

## 2022-03-31 PROCEDURE — 83735 ASSAY OF MAGNESIUM: CPT | Performed by: HOSPITALIST

## 2022-03-31 PROCEDURE — 99232 SBSQ HOSP IP/OBS MODERATE 35: CPT | Performed by: INTERNAL MEDICINE

## 2022-03-31 PROCEDURE — 93010 ELECTROCARDIOGRAM REPORT: CPT | Performed by: INTERNAL MEDICINE

## 2022-03-31 PROCEDURE — 99232 SBSQ HOSP IP/OBS MODERATE 35: CPT | Performed by: HOSPITALIST

## 2022-03-31 PROCEDURE — 25010000002 FUROSEMIDE PER 20 MG: Performed by: INTERNAL MEDICINE

## 2022-03-31 PROCEDURE — 94660 CPAP INITIATION&MGMT: CPT

## 2022-03-31 PROCEDURE — 25010000002 ENOXAPARIN PER 10 MG: Performed by: NURSE PRACTITIONER

## 2022-03-31 PROCEDURE — 84439 ASSAY OF FREE THYROXINE: CPT | Performed by: INTERNAL MEDICINE

## 2022-03-31 PROCEDURE — 85025 COMPLETE CBC W/AUTO DIFF WBC: CPT | Performed by: HOSPITALIST

## 2022-03-31 PROCEDURE — 93306 TTE W/DOPPLER COMPLETE: CPT | Performed by: INTERNAL MEDICINE

## 2022-03-31 PROCEDURE — 82803 BLOOD GASES ANY COMBINATION: CPT

## 2022-03-31 PROCEDURE — 93005 ELECTROCARDIOGRAM TRACING: CPT | Performed by: HOSPITALIST

## 2022-03-31 PROCEDURE — 36600 WITHDRAWAL OF ARTERIAL BLOOD: CPT

## 2022-03-31 PROCEDURE — 25010000002 SULFUR HEXAFLUORIDE MICROSPH 60.7-25 MG RECONSTITUTED SUSPENSION: Performed by: HOSPITALIST

## 2022-03-31 RX ORDER — FUROSEMIDE 40 MG/1
40 TABLET ORAL DAILY
Status: DISCONTINUED | OUTPATIENT
Start: 2022-03-31 | End: 2022-03-31

## 2022-03-31 RX ORDER — CEFTRIAXONE 1 G/50ML
1 INJECTION, SOLUTION INTRAVENOUS EVERY 24 HOURS
Status: DISCONTINUED | OUTPATIENT
Start: 2022-03-31 | End: 2022-03-31

## 2022-03-31 RX ADMIN — TAMSULOSIN HYDROCHLORIDE 0.4 MG: 0.4 CAPSULE ORAL at 09:16

## 2022-03-31 RX ADMIN — CEFTRIAXONE 1 G: 1 INJECTION, SOLUTION INTRAVENOUS at 17:52

## 2022-03-31 RX ADMIN — FINASTERIDE 5 MG: 5 TABLET, FILM COATED ORAL at 09:16

## 2022-03-31 RX ADMIN — FUROSEMIDE 40 MG: 40 TABLET ORAL at 09:16

## 2022-03-31 RX ADMIN — FUROSEMIDE 10 MG/HR: 10 INJECTION INTRAMUSCULAR; INTRAVENOUS at 18:24

## 2022-03-31 RX ADMIN — CARVEDILOL 12.5 MG: 6.25 TABLET, FILM COATED ORAL at 09:16

## 2022-03-31 RX ADMIN — FUROSEMIDE 60 MG: 10 INJECTION, SOLUTION INTRAMUSCULAR; INTRAVENOUS at 04:48

## 2022-03-31 RX ADMIN — DILTIAZEM HYDROCHLORIDE 180 MG: 180 CAPSULE, COATED, EXTENDED RELEASE ORAL at 09:15

## 2022-03-31 RX ADMIN — ENOXAPARIN SODIUM 40 MG: 40 INJECTION SUBCUTANEOUS at 20:40

## 2022-03-31 RX ADMIN — GABAPENTIN 300 MG: 300 CAPSULE ORAL at 16:47

## 2022-03-31 RX ADMIN — HYDRALAZINE HYDROCHLORIDE 50 MG: 25 TABLET, FILM COATED ORAL at 16:47

## 2022-03-31 RX ADMIN — ISOSORBIDE MONONITRATE 30 MG: 30 TABLET, EXTENDED RELEASE ORAL at 17:52

## 2022-03-31 RX ADMIN — ATORVASTATIN CALCIUM 20 MG: 20 TABLET, FILM COATED ORAL at 20:40

## 2022-03-31 RX ADMIN — ESCITALOPRAM OXALATE 10 MG: 10 TABLET ORAL at 09:16

## 2022-03-31 RX ADMIN — SULFUR HEXAFLUORIDE 3 ML: KIT at 14:44

## 2022-03-31 RX ADMIN — SENNOSIDES AND DOCUSATE SODIUM 2 TABLET: 50; 8.6 TABLET ORAL at 09:15

## 2022-03-31 RX ADMIN — GABAPENTIN 300 MG: 300 CAPSULE ORAL at 09:16

## 2022-03-31 RX ADMIN — HYDRALAZINE HYDROCHLORIDE 50 MG: 25 TABLET, FILM COATED ORAL at 09:15

## 2022-03-31 RX ADMIN — SENNOSIDES AND DOCUSATE SODIUM 2 TABLET: 50; 8.6 TABLET ORAL at 20:40

## 2022-03-31 RX ADMIN — GABAPENTIN 300 MG: 300 CAPSULE ORAL at 20:40

## 2022-03-31 RX ADMIN — ISOSORBIDE MONONITRATE 30 MG: 30 TABLET, EXTENDED RELEASE ORAL at 09:15

## 2022-03-31 RX ADMIN — INSULIN GLARGINE 10 UNITS: 100 INJECTION, SOLUTION SUBCUTANEOUS at 09:28

## 2022-03-31 RX ADMIN — Medication 10 ML: at 21:15

## 2022-03-31 RX ADMIN — Medication 10 ML: at 17:52

## 2022-04-01 ENCOUNTER — APPOINTMENT (OUTPATIENT)
Dept: CARDIOLOGY | Facility: HOSPITAL | Age: 77
End: 2022-04-01

## 2022-04-01 PROBLEM — I50.9 ACUTE ON CHRONIC CONGESTIVE HEART FAILURE, UNSPECIFIED HEART FAILURE TYPE: Status: ACTIVE | Noted: 2022-04-01

## 2022-04-01 LAB
ALBUMIN SERPL-MCNC: 3.3 G/DL (ref 3.5–5.2)
AMMONIA BLD-SCNC: 29 UMOL/L (ref 16–60)
ANION GAP SERPL CALCULATED.3IONS-SCNC: 11 MMOL/L (ref 5–15)
BACTERIA SPEC AEROBE CULT: ABNORMAL
BH CV LOWER VASCULAR LEFT COMMON FEMORAL AUGMENT: NORMAL
BH CV LOWER VASCULAR LEFT COMMON FEMORAL COMPETENT: NORMAL
BH CV LOWER VASCULAR LEFT COMMON FEMORAL COMPRESS: NORMAL
BH CV LOWER VASCULAR LEFT COMMON FEMORAL PHASIC: NORMAL
BH CV LOWER VASCULAR LEFT COMMON FEMORAL SPONT: NORMAL
BH CV LOWER VASCULAR LEFT DISTAL FEMORAL SPONT: NORMAL
BH CV LOWER VASCULAR LEFT GASTRONEMIUS COMPRESS: NORMAL
BH CV LOWER VASCULAR LEFT GREATER SAPH AK COMPRESS: NORMAL
BH CV LOWER VASCULAR LEFT GREATER SAPH BK COMPRESS: NORMAL
BH CV LOWER VASCULAR LEFT LESSER SAPH COMPRESS: NORMAL
BH CV LOWER VASCULAR LEFT MID FEMORAL AUGMENT: NORMAL
BH CV LOWER VASCULAR LEFT MID FEMORAL COMPETENT: NORMAL
BH CV LOWER VASCULAR LEFT MID FEMORAL COMPRESS: NORMAL
BH CV LOWER VASCULAR LEFT MID FEMORAL PHASIC: NORMAL
BH CV LOWER VASCULAR LEFT MID FEMORAL SPONT: NORMAL
BH CV LOWER VASCULAR LEFT PERONEAL COMPRESS: NORMAL
BH CV LOWER VASCULAR LEFT POPLITEAL AUGMENT: NORMAL
BH CV LOWER VASCULAR LEFT POPLITEAL COMPETENT: NORMAL
BH CV LOWER VASCULAR LEFT POPLITEAL COMPRESS: NORMAL
BH CV LOWER VASCULAR LEFT POPLITEAL PHASIC: NORMAL
BH CV LOWER VASCULAR LEFT POPLITEAL SPONT: NORMAL
BH CV LOWER VASCULAR LEFT POSTERIOR TIBIAL COMPRESS: NORMAL
BH CV LOWER VASCULAR LEFT PROXIMAL FEMORAL COMPRESS: NORMAL
BH CV LOWER VASCULAR LEFT SAPHENOFEMORAL JUNCTION COMPRESS: NORMAL
BH CV LOWER VASCULAR RIGHT COMMON FEMORAL AUGMENT: NORMAL
BH CV LOWER VASCULAR RIGHT COMMON FEMORAL COMPETENT: NORMAL
BH CV LOWER VASCULAR RIGHT COMMON FEMORAL COMPRESS: NORMAL
BH CV LOWER VASCULAR RIGHT COMMON FEMORAL PHASIC: NORMAL
BH CV LOWER VASCULAR RIGHT COMMON FEMORAL SPONT: NORMAL
BH CV LOWER VASCULAR RIGHT DISTAL FEMORAL SPONT: NORMAL
BH CV LOWER VASCULAR RIGHT GASTRONEMIUS COMPRESS: NORMAL
BH CV LOWER VASCULAR RIGHT GREATER SAPH AK COMPRESS: NORMAL
BH CV LOWER VASCULAR RIGHT GREATER SAPH BK COMPRESS: NORMAL
BH CV LOWER VASCULAR RIGHT LESSER SAPH COMPRESS: NORMAL
BH CV LOWER VASCULAR RIGHT MID FEMORAL AUGMENT: NORMAL
BH CV LOWER VASCULAR RIGHT MID FEMORAL COMPETENT: NORMAL
BH CV LOWER VASCULAR RIGHT MID FEMORAL COMPRESS: NORMAL
BH CV LOWER VASCULAR RIGHT MID FEMORAL PHASIC: NORMAL
BH CV LOWER VASCULAR RIGHT MID FEMORAL SPONT: NORMAL
BH CV LOWER VASCULAR RIGHT PERONEAL COMPRESS: NORMAL
BH CV LOWER VASCULAR RIGHT POPLITEAL AUGMENT: NORMAL
BH CV LOWER VASCULAR RIGHT POPLITEAL COMPETENT: NORMAL
BH CV LOWER VASCULAR RIGHT POPLITEAL COMPRESS: NORMAL
BH CV LOWER VASCULAR RIGHT POPLITEAL PHASIC: NORMAL
BH CV LOWER VASCULAR RIGHT POPLITEAL SPONT: NORMAL
BH CV LOWER VASCULAR RIGHT POSTERIOR TIBIAL COMPRESS: NORMAL
BH CV LOWER VASCULAR RIGHT PROXIMAL FEMORAL COMPRESS: NORMAL
BH CV LOWER VASCULAR RIGHT SAPHENOFEMORAL JUNCTION COMPRESS: NORMAL
BUN SERPL-MCNC: 33 MG/DL (ref 8–23)
BUN/CREAT SERPL: 21 (ref 7–25)
CALCIUM SPEC-SCNC: 8.7 MG/DL (ref 8.6–10.5)
CHLORIDE SERPL-SCNC: 105 MMOL/L (ref 98–107)
CO2 SERPL-SCNC: 30 MMOL/L (ref 22–29)
CREAT SERPL-MCNC: 1.57 MG/DL (ref 0.76–1.27)
DEPRECATED RDW RBC AUTO: 56.9 FL (ref 37–54)
EGFRCR SERPLBLD CKD-EPI 2021: 45.4 ML/MIN/1.73
ERYTHROCYTE [DISTWIDTH] IN BLOOD BY AUTOMATED COUNT: 19.2 % (ref 12.3–15.4)
GLUCOSE SERPL-MCNC: 98 MG/DL (ref 65–99)
HCT VFR BLD AUTO: 31.1 % (ref 37.5–51)
HGB BLD-MCNC: 10 G/DL (ref 13–17.7)
MAXIMAL PREDICTED HEART RATE: 144 BPM
MCH RBC QN AUTO: 26.9 PG (ref 26.6–33)
MCHC RBC AUTO-ENTMCNC: 32.1 G/DL (ref 31.5–35.7)
MCV RBC AUTO: 83.9 FL (ref 79–97)
PHOSPHATE SERPL-MCNC: 4.4 MG/DL (ref 2.5–4.5)
PLATELET # BLD AUTO: 157 10*3/MM3 (ref 140–450)
PMV BLD AUTO: 8.2 FL (ref 6–12)
POTASSIUM SERPL-SCNC: 3.8 MMOL/L (ref 3.5–5.2)
RBC # BLD AUTO: 3.71 10*6/MM3 (ref 4.14–5.8)
SODIUM SERPL-SCNC: 146 MMOL/L (ref 136–145)
STRESS TARGET HR: 122 BPM
WBC NRBC COR # BLD: 3.9 10*3/MM3 (ref 3.4–10.8)

## 2022-04-01 PROCEDURE — 63710000001 INSULIN GLARGINE PER 5 UNITS: Performed by: NURSE PRACTITIONER

## 2022-04-01 PROCEDURE — 93970 EXTREMITY STUDY: CPT

## 2022-04-01 PROCEDURE — 82140 ASSAY OF AMMONIA: CPT | Performed by: HOSPITALIST

## 2022-04-01 PROCEDURE — 92610 EVALUATE SWALLOWING FUNCTION: CPT

## 2022-04-01 PROCEDURE — 94799 UNLISTED PULMONARY SVC/PX: CPT

## 2022-04-01 PROCEDURE — 25010000002 MEROPENEM PER 100 MG: Performed by: HOSPITALIST

## 2022-04-01 PROCEDURE — 25010000002 FUROSEMIDE PER 20 MG: Performed by: INTERNAL MEDICINE

## 2022-04-01 PROCEDURE — 25010000002 ENOXAPARIN PER 10 MG: Performed by: NURSE PRACTITIONER

## 2022-04-01 PROCEDURE — 97530 THERAPEUTIC ACTIVITIES: CPT

## 2022-04-01 PROCEDURE — 99232 SBSQ HOSP IP/OBS MODERATE 35: CPT | Performed by: HOSPITALIST

## 2022-04-01 PROCEDURE — 85027 COMPLETE CBC AUTOMATED: CPT | Performed by: INTERNAL MEDICINE

## 2022-04-01 PROCEDURE — 80069 RENAL FUNCTION PANEL: CPT | Performed by: INTERNAL MEDICINE

## 2022-04-01 PROCEDURE — 94660 CPAP INITIATION&MGMT: CPT

## 2022-04-01 RX ORDER — HYDRALAZINE HYDROCHLORIDE 25 MG/1
25 TABLET, FILM COATED ORAL 3 TIMES DAILY
Status: DISCONTINUED | OUTPATIENT
Start: 2022-04-01 | End: 2022-04-06 | Stop reason: HOSPADM

## 2022-04-01 RX ORDER — ACETAMINOPHEN 325 MG/1
650 TABLET ORAL EVERY 6 HOURS PRN
COMMUNITY
End: 2022-08-16 | Stop reason: HOSPADM

## 2022-04-01 RX ADMIN — MEROPENEM 500 MG: 500 INJECTION, POWDER, FOR SOLUTION INTRAVENOUS at 00:17

## 2022-04-01 RX ADMIN — ATORVASTATIN CALCIUM 20 MG: 20 TABLET, FILM COATED ORAL at 22:12

## 2022-04-01 RX ADMIN — ISOSORBIDE MONONITRATE 30 MG: 30 TABLET, EXTENDED RELEASE ORAL at 09:34

## 2022-04-01 RX ADMIN — HYDRALAZINE HYDROCHLORIDE 50 MG: 25 TABLET, FILM COATED ORAL at 09:34

## 2022-04-01 RX ADMIN — HYDRALAZINE HYDROCHLORIDE 25 MG: 25 TABLET, FILM COATED ORAL at 16:45

## 2022-04-01 RX ADMIN — GABAPENTIN 300 MG: 300 CAPSULE ORAL at 16:45

## 2022-04-01 RX ADMIN — HYDRALAZINE HYDROCHLORIDE 25 MG: 25 TABLET, FILM COATED ORAL at 22:12

## 2022-04-01 RX ADMIN — Medication 10 ML: at 22:12

## 2022-04-01 RX ADMIN — MEROPENEM 500 MG: 500 INJECTION, POWDER, FOR SOLUTION INTRAVENOUS at 09:33

## 2022-04-01 RX ADMIN — FINASTERIDE 5 MG: 5 TABLET, FILM COATED ORAL at 09:34

## 2022-04-01 RX ADMIN — ENOXAPARIN SODIUM 40 MG: 40 INJECTION SUBCUTANEOUS at 22:12

## 2022-04-01 RX ADMIN — SENNOSIDES AND DOCUSATE SODIUM 2 TABLET: 50; 8.6 TABLET ORAL at 09:34

## 2022-04-01 RX ADMIN — MEROPENEM 500 MG: 500 INJECTION, POWDER, FOR SOLUTION INTRAVENOUS at 16:45

## 2022-04-01 RX ADMIN — ESCITALOPRAM OXALATE 10 MG: 10 TABLET ORAL at 09:34

## 2022-04-01 RX ADMIN — Medication 10 ML: at 09:35

## 2022-04-01 RX ADMIN — INSULIN GLARGINE 10 UNITS: 100 INJECTION, SOLUTION SUBCUTANEOUS at 09:47

## 2022-04-01 RX ADMIN — GABAPENTIN 300 MG: 300 CAPSULE ORAL at 09:34

## 2022-04-01 RX ADMIN — MEROPENEM 500 MG: 500 INJECTION, POWDER, FOR SOLUTION INTRAVENOUS at 22:30

## 2022-04-01 RX ADMIN — FUROSEMIDE 10 MG/HR: 10 INJECTION INTRAMUSCULAR; INTRAVENOUS at 16:45

## 2022-04-01 RX ADMIN — GABAPENTIN 300 MG: 300 CAPSULE ORAL at 22:12

## 2022-04-01 RX ADMIN — TAMSULOSIN HYDROCHLORIDE 0.4 MG: 0.4 CAPSULE ORAL at 09:34

## 2022-04-01 RX ADMIN — ISOSORBIDE MONONITRATE 30 MG: 30 TABLET, EXTENDED RELEASE ORAL at 17:41

## 2022-04-01 NOTE — PLAN OF CARE
Problem: Fall Injury Risk  Goal: Absence of Fall and Fall-Related Injury  Outcome: Ongoing, Progressing  Intervention: Identify and Manage Contributors  Recent Flowsheet Documentation  Taken 4/1/2022 1742 by Vira Torre RN  Medication Review/Management: medications reviewed  Self-Care Promotion: BADL personal objects within reach  Taken 4/1/2022 1502 by Vira Torre RN  Medication Review/Management: medications reviewed  Taken 4/1/2022 1312 by Vira Torre RN  Self-Care Promotion: BADL personal objects within reach  Taken 4/1/2022 1122 by Vira Torre RN  Medication Review/Management: medications reviewed  Self-Care Promotion: BADL personal objects within reach  Taken 4/1/2022 0954 by Vira Torre RN  Self-Care Promotion: BADL personal objects within reach  Taken 4/1/2022 0925 by Vira Torre RN  Self-Care Promotion: BADL personal objects within reach  Taken 4/1/2022 0711 by Vira Torre RN  Medication Review/Management: medications reviewed  Self-Care Promotion: BADL personal objects within reach  Intervention: Promote Injury-Free Environment  Recent Flowsheet Documentation  Taken 4/1/2022 1742 by Vira Torre RN  Safety Promotion/Fall Prevention: safety round/check completed  Taken 4/1/2022 1721 by Vira Torre RN  Safety Promotion/Fall Prevention: safety round/check completed  Taken 4/1/2022 1645 by Vira Torre RN  Safety Promotion/Fall Prevention:   safety round/check completed   room organization consistent   fall prevention program maintained   clutter free environment maintained  Taken 4/1/2022 1530 by Vira Torre RN  Safety Promotion/Fall Prevention: safety round/check completed  Taken 4/1/2022 1502 by Vira Torre RN  Safety Promotion/Fall Prevention:   safety round/check completed   room organization consistent  Taken 4/1/2022 1412 by Vira Torre RN  Safety Promotion/Fall Prevention: safety round/check  completed  Taken 4/1/2022 1312 by Vira Torre RN  Safety Promotion/Fall Prevention: safety round/check completed  Taken 4/1/2022 1122 by Vira Torre RN  Safety Promotion/Fall Prevention:   safety round/check completed   room organization consistent   fall prevention program maintained   clutter free environment maintained   assistive device/personal items within reach   activity supervised  Taken 4/1/2022 0954 by Vira Torre RN  Safety Promotion/Fall Prevention:   safety round/check completed   room organization consistent  Taken 4/1/2022 0925 by Vira Torre RN  Safety Promotion/Fall Prevention: safety round/check completed  Taken 4/1/2022 0711 by Vira Torre RN  Safety Promotion/Fall Prevention:   safety round/check completed   room organization consistent   nonskid shoes/slippers when out of bed   fall prevention program maintained   clutter free environment maintained   Goal Outcome Evaluation:  Plan of Care Reviewed With: patient        Progress: improving  Outcome Evaluation: Pt cntinues to be in bipap and 4LNC intermittently. Ordered SLP for risk for aspiration as pt been coughing after eating. External cath applied (flower) for urinary incontinence. A&Ox4--more alert today. Still on lasix drip and antibc. Noted skin tear on the posterior bilateral thigh--applied mepilex. PT seen pt today and transferred back t bed from chair. Safety maintained, will cont to monitor.

## 2022-04-01 NOTE — PROGRESS NOTES
[facility]        LOS:  LOS: 0 days   Patient Name: Shaji Junior  Age/Sex: 76 y.o. male  : 1945  MRN: [id number]    Day of Service: 22   Length of Stay: 0  Encounter Provider: MELISA Wilcox  Place of Service: [facility] CARDIOLOGY  Patient Care Team:  Naa Valverde MD as PCP - General (Internal Medicine)    Subjective:     Chief Complaint: f/u fluid overload, afib    Subjective: patient comfortable, no distress, intermittent bipap    Current Medications:   Scheduled Meds:atorvastatin, 20 mg, Oral, Nightly  enoxaparin, 40 mg, Subcutaneous, Q24H  escitalopram, 10 mg, Oral, Daily  finasteride, 5 mg, Oral, Daily  gabapentin, 300 mg, Oral, TID  hydrALAZINE, 25 mg, Oral, TID  insulin glargine, 10 Units, Subcutaneous, Daily  isosorbide mononitrate, 30 mg, Oral, BID - Nitrates  meropenem, 500 mg, Intravenous, Q8H  senna-docusate sodium, 2 tablet, Oral, BID  sodium chloride, 10 mL, Intravenous, Q12H  tamsulosin, 0.4 mg, Oral, Daily      Continuous Infusions:furosemide (LASIX) 100 mg in 100mL NS infusion, 10 mg/hr, Last Rate: 10 mg/hr (22 0940)  pharmacy, 1 each        Allergies:  No Known Allergies    Review of Systems   Unable to perform ROS: acuity of condition         Objective:     Temp:  [97.1 °F (36.2 °C)-98.3 °F (36.8 °C)] 98 °F (36.7 °C)  Heart Rate:  [41-71] 50  Resp:  [16-24] 22  BP: (105-154)/(68-91) 105/68     Intake/Output Summary (Last 24 hours) at 2022 1349  Last data filed at 2022 1038  Gross per 24 hour   Intake 660 ml   Output 750 ml   Net -90 ml     Body mass index is 36.63 kg/m².      22  0952 22  1419 22  1645   Weight: (!) 152 kg (335 lb) (!) 152 kg (335 lb) 123 kg (270 lb 1 oz)         General Appearance:    Alert, cooperative, in no acute distress                                Head: Atraumatic, normocephalic, PERRLA               Neck:   supple, no JVD   Lungs:     Clear to auscultation,respirations regular,  even and               unlabored    Heart:    irregular rhythm and normal rate, normal S1 and S2   Abdomen:     Normal bowel sounds,obese   Extremities:   Moves all extremities well, + edema, no cyanosis, no  redness   Pulses:   Pulses palpable and equal bilaterally   Skin:   No bleeding, bruising or rash   Neurologic:   Awake, alert, oriented x3         Lab Review:   Results from last 7 days   Lab Units 04/01/22  0925 03/31/22  0332 03/29/22  0113   SODIUM mmol/L 146* 145 144   POTASSIUM mmol/L 3.8 4.2 3.8   CHLORIDE mmol/L 105 106 105   CO2 mmol/L 30.0* 29.0 28.0   BUN mg/dL 33* 36* 25*   CREATININE mg/dL 1.57* 1.83* 1.30*   GLUCOSE mg/dL 98 147* 111*   CALCIUM mg/dL 8.7 8.5* 8.8   AST (SGOT) U/L  --   --  9   ALT (SGPT) U/L  --   --  10     Results from last 7 days   Lab Units 03/29/22  0507 03/29/22  0113   TROPONIN T ng/mL 0.035* 0.034*     Results from last 7 days   Lab Units 04/01/22  0925 03/31/22  0332   WBC 10*3/mm3 3.90 4.10   HEMOGLOBIN g/dL 10.0* 9.4*   HEMATOCRIT % 31.1* 28.6*   PLATELETS 10*3/mm3 157 122*     Results from last 7 days   Lab Units 03/29/22  0113   INR  1.13*   APTT seconds 32.0*     Results from last 7 days   Lab Units 03/31/22  0332   MAGNESIUM mg/dL 2.2           Invalid input(s): LDLCALC  Results from last 7 days   Lab Units 03/29/22  0113   PROBNP pg/mL 4,400.0*     Results from last 7 days   Lab Units 03/31/22  0332   TSH uIU/mL 0.696       Recent Radiology:  Imaging Results (Most Recent)     Procedure Component Value Units Date/Time    XR Chest PA & Lateral [[id number]] Collected: 03/31/22 1132     Updated: 03/31/22 1135    Narrative:      DATE OF EXAM:  3/31/2022 11:22 AM     PROCEDURE:  XR CHEST PA AND LATERAL-     INDICATIONS:  wheezing, decreased LOC; I50.9-Heart failure, unspecified; N39.0-Urinary  tract infection, site not specified; Z20.822-Contact with and  (suspected) exposure to covid-19       COMPARISON:  AP portable chest 3/29/2022.     TECHNIQUE:   Two radiologic views  of the chest.     FINDINGS:  The lateral image is degraded secondary to patient body habitus. Hazy  interstitial and alveolar disease is present in the lung bases, left  greater right, similar to the prior exam. Stable cardiac enlargement. No  definite pleural effusion. No pneumothorax. No acute osseous  abnormality.       Impression:       IMPRESSION:     1. Left greater than right basilar interstitial and alveolar infiltrates  are unchanged from 3/29/2022  2. Stable cardiomegaly.     Electronically Signed By-Barbara Beebe MD On:3/31/2022 11:33 AM  This report was finalized on [id number] by  Barbara Beebe MD.    XR Chest 1 View [[id number]] Collected: 03/29/22 0716     Updated: 03/29/22 0720    Narrative:      DATE OF EXAM:  3/29/2022 1:27 AM     PROCEDURE:  XR CHEST 1 VW-     INDICATIONS:  soa and cough today     COMPARISON:  No Comparisons Available     TECHNIQUE:   Single radiographic AP view of the chest was obtained.     FINDINGS:  Single frontal view chest reveals that the heart is enlarged. Superior  mediastinum is normal. There are mild diffuse infiltrates consistent  with chronic lung disease and bronchitis and mild congestive heart  failure. There is a suspicion of a small focal infiltrate in the left  lower lobe along and in the right lung base. There is a suspicion of a  small right basilar pleural effusion. No evidence of pneumothorax        Impression:         1. Cardiomegaly.  2. Bilateral diffuse mild infiltrates consistent with chronic lung  disease and bronchitis and mild congestive heart failure.  3. Suspicion of a small focal infiltrate left lower lobe along.  4. Suspicion of small infiltrate small pleural effusion right lung base     Electronically Signed By-Armando Patiño MD On:3/29/2022 7:18 AM  This report was finalized on [id number] by  Armando Patiño MD.          ECHOCARDIOGRAM:    Results for orders placed during the hospital encounter of 03/29/22    Adult  Transthoracic Echo Complete W/ Cont if Necessary Per Protocol    Interpretation Summary  · Left ventricular systolic function is normal.  · Left ventricular ejection fraction is 55 to 60%  · Left ventricular diastolic function was normal.        I reviewed the patient's new clinical results.    EKG:      Assessment:       CHF exacerbation (HCC)    Type 2 diabetes mellitus with diabetic nephropathy (HCC)    Acute on chronic congestive heart failure, unspecified heart failure type (HCC)    1.  Bradycardia     2.  Obstructive sleep apnea     3.  Bilateral leg edema / volume overload, HFpEF, chronic     4.  Renal insufficiency     5.  Atrial fibrillation       Plan:   Remains with bilateral lower extremity edema, renal managing diuresis  Renal following- lasix gtt, urine outpt- difficult to assess accurate I&O, incontinent episode earlier  afib with slow ventricular response- stopped coreg and diltiazem- recommend long term anticoagulation- not on a/c as outpt            MELISA Wilcox  04/01/22  13:49 EDT

## 2022-04-01 NOTE — THERAPY TREATMENT NOTE
"Subjective: Pt agreeable to therapeutic plan of care.  Pt repeatedly apologizing to therapist stating \"I'm so sorry I asked and asked for help and just couldn't hold it any more\" regarding urinating in chair.     Objective:     Bed mobility - Mod/Max A x 2 Sit to supine   Transfers -  Mod/Max A x2 sit to stand with RW  Ambulation -   Mod A x 2 8 steps to bed with RW    Pain: 0 VAS  Education: Provided education on importance of mobility and skilled verbal / tactile cueing throughout intervention.     Assessment: Shaji Junior presents with functional mobility impairments which indicate the need for skilled intervention. Tolerating session today without incident. Pt sitting up in b/s chair upon entry into room.  Pt repeatedly apologizing to therapist stating \"I'm so sorry I asked and asked for help and just couldn't hold it any more\" regarding urinating in chair.  Reassurance given to pt.  Sit to stand: mod/max A x 2 to stand with RW.  Min/Mod A for static standing balance while yolanda-care was performed.  Pt took 8 steps towards bed with Mod A x 2 with RW.  Mod/max A x 2 for sit to supine.  Recommend SNF.  Will continue to follow and progress as tolerated.     Plan/Recommendations:   Pt would benefit from Skilled Rehab placement at discharge from facility and requires no DME at discharge.   Pt desires Skilled Rehab placement at discharge. Pt cooperative; agreeable to therapeutic recommendations and plan of care.     Basic Mobility 6-click:  Rollin = Total, A lot = 2, A little = 3; 4 = None  Supine>Sit:   1 = Total, A lot = 2, A little = 3; 4 = None   Sit>Stand with arms:  1 = Total, A lot = 2, A little = 3; 4 = None  Bed>Chair:   1 = Total, A lot = 2, A little = 3; 4 = None  Ambulate in room:  1 = Total, A lot = 2, A little = 3; 4 = None  3-5 Steps with railin = Total, A lot = 2, A little = 3; 4 = None  Score: 11        Post-Tx Position: Supine with HOB Elevated, Staff Present, Alarms activated and " Call light and personal items within reach  PPE: gloves, surgical mask, eyewear protection

## 2022-04-01 NOTE — PLAN OF CARE
"Goal Outcome Evaluation:  Pt is a 75 y/o male who presents to [facility] d/t SOB. CXR revealed 1. Cardiomegaly.  2. Bilateral diffuse mild infiltrates consistent with chronic lung disease and bronchitis and mild congestive heart failure. 3. Suspicion of a small focal infiltrate left lower lobe along. 4. Suspicion of small infiltrate small pleural effusion right lung base. Pt alt between AVAPS and 4L NC. ST orders placed for swallowing d/t nursing staff reporting coughing with PO. Pt on 4L NC w/ O2 sats at 100 at start of eval.     Pt observed with trials of water by all presentations, puree, mechanical ground, and regular solids. All trials fed by SLP. Oral phase characterized by adequate mastication of both soft and regular solids. Adequate labial seal with no anterior loss. Timely oral transit. Pharyngeal phase characterized by a suspected timely swallow with no overt s/s of aspiration observed at any time. No oral pocketing/residue noted. O2 sats remained at  w/ all trials. Pt noted to make a \"grunting\" noise with and w/o PO. Recommend pt initiate a regular and thin liquid diet. ST will continue to follow to ensure diet tolerance and make further recs as indicated  "

## 2022-04-01 NOTE — PROGRESS NOTES
"[facility] PROGRESS NOTE     LOS: 0 days    Patient Care Team:  Naa Valverde MD as PCP - General (Internal Medicine)      Subjective     Patient was seen and examined this morning.  He was still on BiPAP.  Still has dyspnea but improved some.  Good urine output overnight    Objective     Vital Sign Min/Max for last 24 hours  Temp:  [97.1 °F (36.2 °C)-98.3 °F (36.8 °C)] 98 °F (36.7 °C)  Heart Rate:  [41-71] 50  Resp:  [16-24] 22  BP: (105-154)/(68-91) 105/68                       Flowsheet Rows    Flowsheet Row First Filed Value   Admission Height 182.9 cm (72\") Documented at 03/29/2022 0100   Admission Weight 118 kg (260 lb) Documented at 03/29/2022 0100          I/O this shift:  In: 240 [P.O.:240]  Out: -   I/O last 3 completed shifts:  In: 690 [P.O.:690]  Out: 1550 [Urine:1550]    Physical Exam:  Physical Exam    General Appearance: Morbidly obese, chronically ill-appearing, in mild distress   skin: warm and dry  HEENT: On BiPAP.  Oral mucosa moist  Neck: supple,   Lungs: Decreased breath sounds at bases  Heart: Bradycardic, normal S1 and S2  Abdomen: Morbidly obese, soft, nontender, nondistended  Extremities: 2+ bilateral lower extreme edema with venous stasis changes  Neuro: normal speech and mental status        LABS:  Lab Results   Component Value Date    CALCIUM 8.7 04/01/2022    PHOS 4.4 04/01/2022     Results from last 7 days   Lab Units 04/01/22  0925 03/31/22  0332 03/30/22  0806 03/29/22  1614 03/29/22  0113   MAGNESIUM mg/dL  --  2.2  --   --   --    SODIUM mmol/L 146* 145  --   --  144   POTASSIUM mmol/L 3.8 4.2  --   --  3.8   CHLORIDE mmol/L 105 106  --   --  105   CO2 mmol/L 30.0* 29.0  --   --  28.0   BUN mg/dL 33* 36*  --   --  25*   CREATININE mg/dL 1.57* 1.83*  --   --  1.30*   GLUCOSE mg/dL 98 147*  --   --  111*   CALCIUM mg/dL 8.7 8.5*  --   --  8.8   WBC 10*3/mm3 3.90 4.10  --   --  4.40   HEMOGLOBIN g/dL 10.0* 9.4* 9.1*   < > 9.7*   PLATELETS 10*3/mm3 157 122*  --   --  153   ALT " (SGPT) U/L  --   --   --   --  10   AST (SGOT) U/L  --   --   --   --  9    < > = values in this interval not displayed.     Lab Results   Component Value Date    TROPONINT 0.035 (C) 03/29/2022     Estimated Creatinine Clearance: 54.2 mL/min (A) (by C-G formula based on SCr of 1.57 mg/dL (H)).  Results from last 7 days   Lab Units 03/31/22  1153   PH, ARTERIAL pH units 7.322*   PO2 ART mm Hg 81.4*   PCO2, ARTERIAL mm Hg 62.9*   HCO3 ART mmol/L 32.6*     Brief Urine Lab Results  (Last result in the past 365 days)      Color   Clarity   Blood   Leuk Est   Nitrite   Protein   CREAT   Urine HCG        03/29/22 0258 Yellow   Clear   Trace   Small (1+)   Positive   Negative               WEIGHTS:     Wt Readings from Last 1 Encounters:   03/31/22 1645 123 kg (270 lb 1 oz)   03/31/22 1419 (!) 152 kg (335 lb)   03/29/22 0952 (!) 152 kg (335 lb)   03/29/22 0425 (!) 152 kg (335 lb 4.8 oz)   03/29/22 0100 118 kg (260 lb)       atorvastatin, 20 mg, Oral, Nightly  enoxaparin, 40 mg, Subcutaneous, Q24H  escitalopram, 10 mg, Oral, Daily  finasteride, 5 mg, Oral, Daily  gabapentin, 300 mg, Oral, TID  hydrALAZINE, 50 mg, Oral, TID  insulin glargine, 10 Units, Subcutaneous, Daily  isosorbide mononitrate, 30 mg, Oral, BID - Nitrates  meropenem, 500 mg, Intravenous, Q8H  senna-docusate sodium, 2 tablet, Oral, BID  sodium chloride, 10 mL, Intravenous, Q12H  tamsulosin, 0.4 mg, Oral, Daily      furosemide (LASIX) 100 mg in 100mL NS infusion, 10 mg/hr, Last Rate: 10 mg/hr (04/01/22 0940)  pharmacy, 1 each        Assessment/Plan       1.  Acute kidney injury on chronic renal disease stage IIIa  Patient seems to have underlying chronic kidney disease in setting of hypertension and diabetes  Creatinine is better at 1.5 mg/DL this morning.  Electrolytes okay her volume status still generous  2.  Fluid overload/Pulmonary hypertension?  3.  Hypertension with CKD.  Blood pressure on the low side  4.  Morbid obesity    5.  UTI.  Urine culture  growing ESBL Proteus mirabilis     Recs:  -Continue IV Lasix drip for diuresis  -I agree switching antibiotics to meropenem  -Increase hydralazine dose  -Monitor renal function while being diuresed        Max Stein MD  04/01/22  11:57 EDT

## 2022-04-01 NOTE — PLAN OF CARE
"Tolerating session today without incident. Pt sitting up in b/s chair upon entry into room.  Pt repeatedly apologizing to therapist stating \"I'm so sorry I asked and asked for help and just couldn't hold it any more\" regarding urinating in chair.  Reassurance given to pt.  Sit to stand: mod/max A x 2 to stand with RW.  Min/Mod A for static standing balance while yolanda-care was performed.  Pt took 8 steps towards bed with Mod A x 2 with RW.  Mod/max A x 2 for sit to supine.  Recommend SNF.  Will continue to follow and progress as tolerated.  "

## 2022-04-01 NOTE — PROGRESS NOTES
Daily Progress Note        CHF exacerbation (HCC)    Type 2 diabetes mellitus with diabetic nephropathy (HCC)      Assessment    acute/chronic hypercapnic respiratory insufficiency  ABG, pH 7.32/pCO2 63/PO2 81/HC03 32  Obstructive sleep apnea  Obesity hypoventilation syndrome     pulmonary edema and cardiomegaly  A. Fib    3/31/2022 Free T4 1.20    3/29/2022 urine culture Proteus Mirabilis      Acute kidney injury  Morbid obesity  Hypertension     3/31/2022 echo  EF 55 to 60%  RVSP 14.9 mmHg     Recommendations:    Titrate oxygen, noninvasive ventilation ,  AVAPS settings adjusted patient is comfortable  Lower extremity Dopplers are pending    Avoid sedatives  Keep the head of the bed above 35 degree      CT scan of the chest without contrast    Diuresis per renal     Lower extremity Dopplers  DVT prophylaxis Lovenox  Antibiotic Merrem, UTI          LOS: 0 days     Subjective         Objective     Vital signs for last 24 hours:  Vitals:    03/31/22 1700 03/31/22 1800 03/31/22 2217 04/01/22 0521   BP:  128/81 136/84 128/71   BP Location:  Right arm Right arm Right arm   Patient Position:  Lying Sitting Sitting   Pulse: 54 59 60 62   Resp:  22 18 16   Temp:  97.2 °F (36.2 °C) 97.1 °F (36.2 °C) 98.3 °F (36.8 °C)   TempSrc:  Axillary Oral Axillary   SpO2: 99% 100% 99% 100%   Weight:       Height:           Intake/Output last 3 shifts:  I/O last 3 completed shifts:  In: 1210 [P.O.:1210]  Out: 800 [Urine:800]  Intake/Output this shift:  I/O this shift:  In: 420 [P.O.:420]  Out: 750 [Urine:750]      Radiology  Imaging Results (Last 24 Hours)     Procedure Component Value Units Date/Time    XR Chest PA & Lateral [[id number]] Collected: 03/31/22 1132     Updated: 03/31/22 1135    Narrative:      DATE OF EXAM:  3/31/2022 11:22 AM     PROCEDURE:  XR CHEST PA AND LATERAL-     INDICATIONS:  wheezing, decreased LOC; I50.9-Heart failure, unspecified; N39.0-Urinary  tract infection, site not specified; Z20.822-Contact with  and  (suspected) exposure to covid-19       COMPARISON:  AP portable chest 3/29/2022.     TECHNIQUE:   Two radiologic views of the chest.     FINDINGS:  The lateral image is degraded secondary to patient body habitus. Hazy  interstitial and alveolar disease is present in the lung bases, left  greater right, similar to the prior exam. Stable cardiac enlargement. No  definite pleural effusion. No pneumothorax. No acute osseous  abnormality.       Impression:       IMPRESSION:     1. Left greater than right basilar interstitial and alveolar infiltrates  are unchanged from 3/29/2022  2. Stable cardiomegaly.     Electronically Signed By-Barbara Beebe MD On:3/31/2022 11:33 AM  This report was finalized on [id number] by  Barbara Beebe MD.          Labs:  Results from last 7 days   Lab Units 03/31/22  0332   WBC 10*3/mm3 4.10   HEMOGLOBIN g/dL 9.4*   HEMATOCRIT % 28.6*   PLATELETS 10*3/mm3 122*     Results from last 7 days   Lab Units 03/31/22  0332 03/29/22  0113   SODIUM mmol/L 145 144   POTASSIUM mmol/L 4.2 3.8   CHLORIDE mmol/L 106 105   CO2 mmol/L 29.0 28.0   BUN mg/dL 36* 25*   CREATININE mg/dL 1.83* 1.30*   CALCIUM mg/dL 8.5* 8.8   BILIRUBIN mg/dL  --  0.5   ALK PHOS U/L  --  88   ALT (SGPT) U/L  --  10   AST (SGOT) U/L  --  9   GLUCOSE mg/dL 147* 111*     Results from last 7 days   Lab Units 03/31/22  1153   PH, ARTERIAL pH units 7.322*   PO2 ART mm Hg 81.4*   PCO2, ARTERIAL mm Hg 62.9*   HCO3 ART mmol/L 32.6*     Results from last 7 days   Lab Units 03/29/22  0113   ALBUMIN g/dL 3.10*     Results from last 7 days   Lab Units 03/29/22  0507 03/29/22  0113   TROPONIN T ng/mL 0.035* 0.034*         Results from last 7 days   Lab Units 03/31/22  0332   MAGNESIUM mg/dL 2.2     Results from last 7 days   Lab Units 03/29/22  0113   INR  1.13*   APTT seconds 32.0*     Results from last 7 days   Lab Units 03/31/22  0332   TSH uIU/mL 0.696   FREE T4 ng/dL 1.20           Meds:   SCHEDULE  atorvastatin, 20 mg, Oral,  Nightly  enoxaparin, 40 mg, Subcutaneous, Q24H  escitalopram, 10 mg, Oral, Daily  finasteride, 5 mg, Oral, Daily  gabapentin, 300 mg, Oral, TID  hydrALAZINE, 50 mg, Oral, TID  insulin glargine, 10 Units, Subcutaneous, Daily  isosorbide mononitrate, 30 mg, Oral, BID - Nitrates  meropenem, 500 mg, Intravenous, Q8H  senna-docusate sodium, 2 tablet, Oral, BID  sodium chloride, 10 mL, Intravenous, Q12H  tamsulosin, 0.4 mg, Oral, Daily      Infusions  furosemide (LASIX) 100 mg in 100mL NS infusion, 10 mg/hr, Last Rate: 10 mg/hr (04/01/22 0533)  pharmacy, 1 each      PRNs  •  acetaminophen **OR** acetaminophen **OR** acetaminophen  •  ondansetron **OR** ondansetron  •  sodium chloride  •  sodium chloride    Physical Exam:  Physical Exam  Cardiovascular:      Heart sounds: Murmur heard.   Pulmonary:      Breath sounds: Rhonchi and rales present.   Musculoskeletal:      Right lower leg: Edema present.      Left lower leg: Edema present.         ROS  Review of Systems   Respiratory: Positive for shortness of breath.    Cardiovascular: Positive for palpitations and leg swelling.       I reviewed the patient's new clinical results    Electronically signed by MELISA Saleh

## 2022-04-01 NOTE — THERAPY EVALUATION
Acute Care - Speech Language Pathology   Swallow Initial Evaluation  [facility]     Patient Name: Shaji Junior  : 1945  MRN: [id number]  Today's Date: 2022               Admit Date: 3/29/2022    Visit Dx:     ICD-10-CM ICD-9-CM   1. Acute on chronic congestive heart failure, unspecified heart failure type (HCC)  I50.9 428.0   2. Acute UTI  N39.0 599.0   3. Lab test negative for COVID-19 virus  Z20.822 V01.79     Patient Active Problem List   Diagnosis   • CHF exacerbation (HCC)   • Cerebrovascular accident (HCC)   • Type 2 diabetes mellitus with diabetic nephropathy (HCC)   • Acute kidney failure (HCC)   • Congestive heart failure (HCC)     Past Medical History:   Diagnosis Date   • Acute kidney failure (HCC)    • Acute respiratory failure with hypoxia (HCC)    • Anemia    • Benign prostatic hyperplasia    • Bilateral primary osteoarthritis of knee    • Cardiomegaly    • Cardiomyopathy (HCC)    • Cellulitis and abscess of left leg    • Cerebral infarction (HCC)    • Cerebrovascular disease    • Chronic kidney disease    • Dementia (HCC)    • Diabetes mellitus (HCC)    • Essential hypertension    • GERD (gastroesophageal reflux disease)    • Gout    • Heart failure (HCC)    • Hyperlipidemia    • Morbid obesity (HCC)    • Myocardial infarction (HCC)    • Obstructive sleep apnea    • Obstructive uropathy    • Paroxysmal atrial fibrillation (HCC)    • Peptic ulcer    • Peripheral vascular disease (HCC)    • Pulmonary edema    • Venous insufficiency      No past surgical history on file.    SLP Recommendation and Plan  SLP Swallowing Diagnosis: swallow WFL (22 1100)  SLP Diet Recommendation: regular textures, thin liquids (22 1100)  Recommended Precautions and Strategies: upright posture during/after eating, small bites of food and sips of liquid, general aspiration precautions (22 1100)  SLP Rec. for Method of Medication Administration: meds whole, meds crushed, with pudding or applesauce,  with thin liquids, as tolerated (04/01/22 1100)     Monitor for Signs of Aspiration: yes, notify SLP if any concerns, gurgly voice, cough, right lower lobe infiltrates (04/01/22 1100)     Swallow Criteria for Skilled Therapeutic Interventions Met: demonstrates skilled criteria (04/01/22 1100)     Rehab Potential/Prognosis, Swallowing: good, to achieve stated therapy goals (04/01/22 1100)  Therapy Frequency (Swallow): PRN (04/01/22 1100)  Predicted Duration Therapy Intervention (Days): until discharge (04/01/22 1100)        Patient was not wearing a face mask during this therapy encounter. Therapist used appropriate personal protective equipment including mask, eye protection and gloves.  Mask used was standard procedure mask. Appropriate PPE was worn during the entire therapy session. Hand hygiene was completed before and after therapy session. Patient is not in enhanced droplet precautions.         SWALLOW EVALUATION (last 72 hours)     SLP Adult Swallow Evaluation     Row Name 04/01/22 1100       Rehab Evaluation    Document Type evaluation  -LF    Subjective Information no complaints  -LF    Patient Observations alert;cooperative;agree to therapy  -LF    Patient Effort good  -LF    Symptoms Noted During/After Treatment none  -LF            General Information    Patient Profile Reviewed yes  -LF    Pertinent History Of Current Problem Pt is a 75 y/o male who presents to [facility] d/t SOB. CXR revealed 1. Cardiomegaly.  2. Bilateral diffuse mild infiltrates consistent with chronic lung disease and bronchitis and mild congestive heart failure. 3. Suspicion of a small focal infiltrate left lower lobe along. 4. Suspicion of small infiltrate small pleural effusion right lung base. Pt alt between AVAPS and 4L NC. ST orders placed for swallowing d/t nursing staff reporting coughing with PO. Pt on 4L NC w/ O2 sats at 100 at start of eval.     -LF    Current Method of Nutrition regular textures;thin liquids  -LF    Prior Level  "of Function-Swallowing no diet consistency restrictions  -    Plans/Goals Discussed with patient;agreed upon  -LF            Oral Motor Structure and Function    Dentition Assessment natural, present and adequate  -LF    Secretion Management WNL/WFL  -LF    Mucosal Quality moist, healthy  -LF            Oral Musculature and Cranial Nerve Assessment    Oral Motor General Assessment WFL  -LF            General Eating/Swallowing Observations    Respiratory Support Currently in Use nasal cannula  -LF    Eating/Swallowing Skills fed by SLP  -    Positioning During Eating upright 90 degree;upright in bed  -LF    Utensils Used spoon;straw  -LF    Consistencies Trialed regular textures;mixed consistency;pureed;thin liquids;ice chips  -LF            Clinical Swallow Eval    Oral Prep Phase WFL  -LF    Oral Transit WFL  -LF    Oral Residue WFL  -LF    Pharyngeal Phase no overt signs/symptoms of pharyngeal impairment  -LF    Clinical Swallow Evaluation Summary Pt observed with trials of water by all presentations, puree, mechanical ground, and regular solids. All trials fed by SLP. Oral phase characterized by adequate mastication of both soft and regular solids. Adequate labial seal with no anterior loss. Timely oral transit. Pharyngeal phase characterized by a suspected timely swallow with no overt s/s of aspiration observed at any time. No oral pocketing/residue noted. O2 sats remained at  w/ all trials. Pt noted to make a \"grunting\" noise with and w/o PO. Recommend pt initiate a regular and thin liquid diet. ST will continue to follow to ensure diet tolerance and make further recs as indicated.  -LF            SLP Evaluation Clinical Impression    SLP Swallowing Diagnosis swallow WFL  -LF    Functional Impact risk of aspiration/pneumonia  -LF    Rehab Potential/Prognosis, Swallowing good, to achieve stated therapy goals  -LF    Swallow Criteria for Skilled Therapeutic Interventions Met demonstrates skilled " criteria  -            SLP Treatment Clinical Impressions    Care Plan Review evaluation/treatment results reviewed;risks/benefits reviewed;care plan/treatment goals reviewed;current/potential barriers reviewed;patient/other agree to care plan  -            Recommendations    Therapy Frequency (Swallow) PRN  -LF    Predicted Duration Therapy Intervention (Days) until discharge  -    SLP Diet Recommendation regular textures;thin liquids  -    Recommended Precautions and Strategies upright posture during/after eating;small bites of food and sips of liquid;general aspiration precautions  -LF    Oral Care Recommendations Oral Care BID/PRN;Oral Care before breakfast, after meals and PRN  -    SLP Rec. for Method of Medication Administration meds whole;meds crushed;with pudding or applesauce;with thin liquids;as tolerated  -    Monitor for Signs of Aspiration yes;notify SLP if any concerns;gurgly voice;cough;right lower lobe infiltrates  -            Swallow Goals (SLP)    Oral Nutrition/Hydration Goal Selection (SLP) oral nutrition/hydration, SLP goal 1;oral nutrition/hydration, SLP goal 2  -LF            Oral Nutrition/Hydration Goal 1 (SLP)    Oral Nutrition/Hydration Goal 1, SLP The pt will maximize swallow function for least restricitve PO diet, no complications associated with dysphagia, adequate PO intake, and demonstrating independent use of swallow compensations.  -LF    Time Frame (Oral Nutrition/Hydration Goal 1, SLP) by discharge  -LF            Oral Nutrition/Hydration Goal 2 (SLP)    Oral Nutrition/Hydration Goal 2, SLP The patient will participate in a full meal assessment to determine safety and adequacy of recommended diet, independent use of safe swallow compensations, and additional goals/recommendations to follow  -    Time Frame (Oral Nutrition/Hydration Goal 2, SLP) 2 days  -          User Key  (r) = Recorded By, (t) = Taken By, (c) = Cosigned By    Initials Name Effective Dates     Meryl Gustafson SLP 06/16/21 -                 EDUCATION  The patient has been educated in the following areas:   Dysphagia (Swallowing Impairment) Oral Care/Hydration.        SLP GOALS     Row Name 04/01/22 1100       Oral Nutrition/Hydration Goal 1 (SLP)    Oral Nutrition/Hydration Goal 1, SLP The pt will maximize swallow function for least restricitve PO diet, no complications associated with dysphagia, adequate PO intake, and demonstrating independent use of swallow compensations.  -LF    Time Frame (Oral Nutrition/Hydration Goal 1, SLP) by discharge  -LF            Oral Nutrition/Hydration Goal 2 (SLP)    Oral Nutrition/Hydration Goal 2, SLP The patient will participate in a full meal assessment to determine safety and adequacy of recommended diet, independent use of safe swallow compensations, and additional goals/recommendations to follow  -LF    Time Frame (Oral Nutrition/Hydration Goal 2, SLP) 2 days  -LF          User Key  (r) = Recorded By, (t) = Taken By, (c) = Cosigned By    Initials Name Provider Type    Meryl Gustafson SLP Speech and Language Pathologist                   Time Calculation:                TIMOTEO Krishnan  4/1/2022

## 2022-04-01 NOTE — PROGRESS NOTES
[facility] Medicine Services Daily Progress Note    Patient Name: Shaji Junior  : 1945  MRN: [id number]  Primary Care Physician:  Naa Valverde MD  Date of admission: 3/29/2022      Subjective      Chief Complaint: Shortness of breath    Patient Reports   3/29/2022: The patient complains of mild substernal pressure-like chest pain with no associated shortness of breath, nausea or sweats.  He denies any alleviating or exacerbating factors.  He just ate.  I spoke with Dr. Vargas and he recommended increasing the Lasix because of the patient's persistent severe fluid volume overload.  I spoke with the bedside nurse and she checked with the patient's nursing facility to verify that he is a full code.  3/30/2022: Patient complains of ongoing shortness of breath and swelling.  He denies any chest pain.  He reports that his chest pain went away but he is not sure if the GI cocktail was helpful.  He reports no bowel movement since admission.  3/31/2022: Pt reports ongoing SOA and diffuse swelling but no current c/o CXP. Pt is incontinent of urine per nursing staff.  2022: The patient was resting on BiPAP at the time of my visit.  He was arousable and denied complaints.  He felt that his shortness of breath was getting better.    ROS     All other review of systems negative except for shortness of breath.      Objective      Vitals:   Temp:  [97.1 °F (36.2 °C)-98.3 °F (36.8 °C)] 98.1 °F (36.7 °C)  Heart Rate:  [47-71] 59  Resp:  [16-24] 20  BP: (105-136)/(68-84) 127/80  Flow (L/min):  [4] 4    Physical Exam   Vital signs and nurses notes reviewed.  Morbidly obese gentleman lying in bed on AVAPS in no acute distress; sclera anicteric, mucous membranes moist, lungs with decreased air entry bilaterally; CV distant heart sounds; abdomen protuberant, soft, nontender, nondistended; bilateral upper and lower extremity anasarca is gradually improving; bilateral lower legs with thin scaly flaky  skin and bullous lesions.      Result Review    Result Review:  I have personally reviewed the results from the time of this admission to 4/1/2022 15:45 EDT and agree with these findings:  [x]  Laboratory  []  Microbiology  [x]  Radiology  [x]  EKG/Telemetry   [x]  Cardiology/Vascular   []  Pathology  [x]  Old records  []  Other:  Most notable findings are discussed in the assessment and plan.    Wounds (last 24 hours)     LDA Wound     Row Name 04/01/22 1122             Wound 04/01/22 1122 Left posterior thigh Pressure Injury    Wound - Properties Group Placement Date: 04/01/22  - Placement Time: 1122  -GK Side: Left  -GK Orientation: posterior  -GK Location: thigh  -GK Primary Wound Type: Pressure inj  -GK      Pressure Injury Stage 1  -GK      Dressing Appearance intact;dry  -GK      Closure Open to air  -GK      Base non-blanchable;pink  -GK      Drainage Amount none  -GK      Care, Wound cleansed with;soap and water  -GK      Dressing Care dressing applied;hydrocolloid  mepilex  -GK      Retired Wound - Properties Group Placement Date: 04/01/22  - Placement Time: 1122  -GK Side: Left  -GK Orientation: posterior  -GK Location: thigh  -GK Primary Wound Type: Pressure inj  -GK      Retired Wound - Properties Group Date first assessed: 04/01/22  - Time first assessed: 1122  -GK Side: Left  -GK Location: thigh  -GK Primary Wound Type: Pressure inj  -GK              Wound 04/01/22 1122 Right posterior thigh Pressure Injury    Wound - Properties Group Placement Date: 04/01/22  -GK Placement Time: 1122  -GK Side: Right  -GK Orientation: posterior  -GK Location: thigh  -GK Primary Wound Type: Pressure inj  -GK      Pressure Injury Stage 1  -GK      Dressing Appearance dry;intact  -GK      Closure Open to air  -GK      Base non-blanchable  -GK      Drainage Amount none  -GK      Care, Wound cleansed with;soap and water  -GK      Dressing Care dressing applied;hydrocolloid  mepilex  -GK      Retired Wound -  Properties Group Placement Date: 04/01/22  -GK Placement Time: 1122 -GK Side: Right  -GK Orientation: posterior  -GK Location: thigh  -GK Primary Wound Type: Pressure inj  -GK      Retired Wound - Properties Group Date first assessed: 04/01/22  -GK Time first assessed: 1122 -GK Side: Right  -GK Location: thigh  -GK Primary Wound Type: Pressure inj  -GK            User Key  (r) = Recorded By, (t) = Taken By, (c) = Cosigned By    Initials Name Provider Type    Vira Staton RN Registered Nurse                  Assessment/Plan      Brief Patient Summary:  Shaji Junior is a 76 y.o. male with a chief complaint of shortness of breath which has been worsening over the last several days.  The patient has limited knowledge of his medical history.  He is from [facility] with the patient reports he has been for 2 years.  From review of patient's medication list and physical exam the patient appears to have chronic lower extremity edema.  He is noted to be in atrial fibrillation with controlled ventricular response on admission without anticoagulation on his medication list however, the patient is on Cardizem.  I asked the patient who his primary care provider was and he said he just gets care at the nursing home and goes to the hospital.  He usually goes to [facility].  The best I can figure out the patient is not on oxygen chronically but was put on it at the [facility] recently secondary to his increasing shortness of breath.     Review of records: Patient was seen in the ER 3/11/2022 after he pulled out PICC line with concern for portion still in the patient's arm.  At that time x-ray showed no opaque foreign body.  Patient was discharged back to the [facility].     Current medications include:  atorvastatin, 20 mg, Oral, Nightly  enoxaparin, 40 mg, Subcutaneous, Q24H  escitalopram, 10 mg, Oral, Daily  finasteride, 5 mg, Oral, Daily  gabapentin, 300 mg, Oral, TID  hydrALAZINE, 25 mg, Oral, TID  insulin  glargine, 10 Units, Subcutaneous, Daily  isosorbide mononitrate, 30 mg, Oral, BID - Nitrates  meropenem, 500 mg, Intravenous, Q8H  senna-docusate sodium, 2 tablet, Oral, BID  sodium chloride, 10 mL, Intravenous, Q12H  tamsulosin, 0.4 mg, Oral, Daily       furosemide (LASIX) 100 mg in 100mL NS infusion, 10 mg/hr, Last Rate: 10 mg/hr (04/01/22 0927)  pharmacy, 1 each         Active Hospital Problems:  Active Hospital Problems    Diagnosis    • Acute on chronic congestive heart failure, unspecified heart failure type (HCC)    • CHF exacerbation (HCC)    • Type 2 diabetes mellitus with diabetic nephropathy (HCC)      Plan:     Acute on chronic diastolic congestive heart failure secondary to hypertensive heart disease  -Lasix 60 mg IV every 8 hours given but Cr david from 1.3 to 1.83  -proBNP 4400 with comparison 2100 May 2021  -Echocardiogram from outside facility showed EF 55%, normal LV size, no tricuspid regurgitation, aortic valve is normal in structure and function.  No mitral valve regurgitation.  Mitral valve grossly normal.   -Dr. Stein nephrology started the patient on a Lasix drip 3/31/2022    Acute renal failure due to prerenal azotemia from diuresis on chronic kidney disease stage IIIa secondary to hypertension and diabetes  -Creatinine 1.30 apparently is his baseline with comparison 1.27 May 2021  -Creatinine had worsened to 1.83 with diuresis but has improved to 1.57  -Nephrology managing diuresis     Acute on chronic anemia  Hemoglobin 9.7 with comparison 11.4 May 2021  -Hemoglobin stable at 10.0  -Monitor     Persistent atrial fibrillation with controlled ventricular response  -Patient was not on anticoagulation therapy prior to admission and would benefit from anticoagulation at discharge  -Continue Cardizem and Coreg  -EKG showing atrial fibrillation with controlled ventricular response     Acute urinary tract infection  -Urine culture grew ESBL producing Proteus mirabilis   -Continue IV  Merrem     Essential hypertension, chronic and controlled  -Continue Norvasc, Coreg, diltiazem, hydralazine and isosorbide     Dementia  Depression, chronic  -Continue Lexapro     BPH, chronic  -Continue Proscar and Flomax  -Patient is incontinent of urine     Diabetes type 2, chronic and uncontrolled with diabetic peripheral neuropathy  -Continue Lantus and gabapentin  -hemoglobin A1c 6.4  -Accu-Cheks and SSI     Hyperlipidemia, chronic  -Continue Zocor     DVT prophylaxis:  Medical DVT prophylaxis orders are present.    CODE STATUS:    Code Status (Patient has no pulse and is not breathing): CPR (Attempt to Resuscitate)  Medical Interventions (Patient has pulse or is breathing): Full Support      Disposition:  I expect patient to be discharged in 2-3 days if continuing to clinically improve.    This patient has been examined wearing appropriate Personal Protective Equipment and discussed with hospital infection control department. 04/01/22      Electronically signed by Mariela Guan MD, 04/01/22, 15:45 EDT.  [facility] Hospitalist Team

## 2022-04-01 NOTE — CASE MANAGEMENT/SOCIAL WORK
Continued Stay Note  KAYODE Camarena     Patient Name: Shaji Juniro  MRN: [id number]  Today's Date: 4/1/2022    Admit Date: 3/29/2022     Discharge Plan     Row Name 04/01/22 0857       Plan    Plan Comments d/c barriers: Renal and Pulm Following on Lasix drip and continous Bipap               Phone communication or documentation only - no physical contact with patient or family.             Jaycee Aviles RN

## 2022-04-01 NOTE — PLAN OF CARE
Problem: Fall Injury Risk  Goal: Absence of Fall and Fall-Related Injury  Outcome: Ongoing, Progressing  Intervention: Promote Injury-Free Environment  Recent Flowsheet Documentation  Taken 4/1/2022 0300 by Cheryl Ibrahim RN  Safety Promotion/Fall Prevention: safety round/check completed  Taken 4/1/2022 0100 by Cheryl Ibrahim RN  Safety Promotion/Fall Prevention: safety round/check completed  Taken 3/31/2022 2300 by Cheryl Ibrahim RN  Safety Promotion/Fall Prevention: safety round/check completed  Taken 3/31/2022 2100 by hCeryl Ibrahim RN  Safety Promotion/Fall Prevention: safety round/check completed  Taken 3/31/2022 1910 by Cheryl Ibrahim RN  Safety Promotion/Fall Prevention: safety round/check completed     Problem: Adult Inpatient Plan of Care  Goal: Plan of Care Review  Outcome: Ongoing, Progressing  Goal: Patient-Specific Goal (Individualized)  Outcome: Ongoing, Progressing  Goal: Absence of Hospital-Acquired Illness or Injury  Outcome: Ongoing, Progressing  Intervention: Identify and Manage Fall Risk  Recent Flowsheet Documentation  Taken 4/1/2022 0300 by Cheryl Ibrahim RN  Safety Promotion/Fall Prevention: safety round/check completed  Taken 4/1/2022 0100 by Cheryl Ibrahim RN  Safety Promotion/Fall Prevention: safety round/check completed  Taken 3/31/2022 2300 by Cheryl Ibrahim RN  Safety Promotion/Fall Prevention: safety round/check completed  Taken 3/31/2022 2100 by Cheryl Ibrahim RN  Safety Promotion/Fall Prevention: safety round/check completed  Taken 3/31/2022 1910 by Cheryl Ibrahim RN  Safety Promotion/Fall Prevention: safety round/check completed  Intervention: Prevent Skin Injury  Recent Flowsheet Documentation  Taken 3/31/2022 1910 by Cheryl Ibrahim RN  Skin Protection: adhesive use limited  Intervention: Prevent and Manage VTE (Venous Thromboembolism) Risk  Recent Flowsheet Documentation  Taken 3/31/2022 1910 by Cheryl Ibrahim RN  VTE Prevention/Management:   bilateral   dorsiflexion/plantar flexion  performed  Intervention: Prevent Infection  Recent Flowsheet Documentation  Taken 4/1/2022 0300 by Cheryl Ibrahim RN  Infection Prevention:   single patient room provided   rest/sleep promoted   hand hygiene promoted  Taken 4/1/2022 0100 by Cheryl Ibrahim RN  Infection Prevention:   single patient room provided   rest/sleep promoted   hand hygiene promoted  Taken 3/31/2022 2300 by Cheryl Ibrahim RN  Infection Prevention:   hand hygiene promoted   rest/sleep promoted   single patient room provided  Taken 3/31/2022 2100 by Cheryl Ibrahim RN  Infection Prevention:   single patient room provided   rest/sleep promoted   hand hygiene promoted  Taken 3/31/2022 1910 by Cheryl Ibrahim RN  Infection Prevention:   single patient room provided   rest/sleep promoted   hand hygiene promoted  Goal: Optimal Comfort and Wellbeing  Outcome: Ongoing, Progressing  Intervention: Provide Person-Centered Care  Recent Flowsheet Documentation  Taken 3/31/2022 1910 by Cheryl Ibrahim RN  Trust Relationship/Rapport:   care explained   questions answered   questions encouraged   thoughts/feelings acknowledged  Goal: Readiness for Transition of Care  Outcome: Ongoing, Progressing   Goal Outcome Evaluation:           Patient resting in the chair.  Patient has had periods of incontinence.  On Bipap, patient seems to be tolerating it well.   Patient is on a Furosemide drip.  Vitals stable.  Will continue to monitor.

## 2022-04-02 ENCOUNTER — APPOINTMENT (OUTPATIENT)
Dept: CT IMAGING | Facility: HOSPITAL | Age: 77
End: 2022-04-02

## 2022-04-02 LAB
ALBUMIN SERPL-MCNC: 3 G/DL (ref 3.5–5.2)
ANION GAP SERPL CALCULATED.3IONS-SCNC: 8 MMOL/L (ref 5–15)
BUN SERPL-MCNC: 28 MG/DL (ref 8–23)
BUN/CREAT SERPL: 21.4 (ref 7–25)
CALCIUM SPEC-SCNC: 8 MG/DL (ref 8.6–10.5)
CHLORIDE SERPL-SCNC: 105 MMOL/L (ref 98–107)
CO2 SERPL-SCNC: 33 MMOL/L (ref 22–29)
CREAT SERPL-MCNC: 1.31 MG/DL (ref 0.76–1.27)
EGFRCR SERPLBLD CKD-EPI 2021: 56.4 ML/MIN/1.73
GLUCOSE SERPL-MCNC: 97 MG/DL (ref 65–99)
PHOSPHATE SERPL-MCNC: 3.4 MG/DL (ref 2.5–4.5)
POTASSIUM SERPL-SCNC: 3.4 MMOL/L (ref 3.5–5.2)
QT INTERVAL: 488 MS
SODIUM SERPL-SCNC: 146 MMOL/L (ref 136–145)

## 2022-04-02 PROCEDURE — 94799 UNLISTED PULMONARY SVC/PX: CPT

## 2022-04-02 PROCEDURE — 94761 N-INVAS EAR/PLS OXIMETRY MLT: CPT

## 2022-04-02 PROCEDURE — 99232 SBSQ HOSP IP/OBS MODERATE 35: CPT | Performed by: INTERNAL MEDICINE

## 2022-04-02 PROCEDURE — 25010000002 ENOXAPARIN PER 10 MG: Performed by: NURSE PRACTITIONER

## 2022-04-02 PROCEDURE — 63710000001 INSULIN GLARGINE PER 5 UNITS: Performed by: NURSE PRACTITIONER

## 2022-04-02 PROCEDURE — 80069 RENAL FUNCTION PANEL: CPT | Performed by: INTERNAL MEDICINE

## 2022-04-02 PROCEDURE — 99233 SBSQ HOSP IP/OBS HIGH 50: CPT | Performed by: HOSPITALIST

## 2022-04-02 PROCEDURE — 25010000002 MEROPENEM PER 100 MG: Performed by: HOSPITALIST

## 2022-04-02 PROCEDURE — 25010000002 FUROSEMIDE PER 20 MG: Performed by: INTERNAL MEDICINE

## 2022-04-02 PROCEDURE — 94660 CPAP INITIATION&MGMT: CPT

## 2022-04-02 PROCEDURE — 71250 CT THORAX DX C-: CPT

## 2022-04-02 RX ORDER — POTASSIUM CHLORIDE 20 MEQ/1
20 TABLET, EXTENDED RELEASE ORAL ONCE
Status: COMPLETED | OUTPATIENT
Start: 2022-04-02 | End: 2022-04-02

## 2022-04-02 RX ADMIN — FINASTERIDE 5 MG: 5 TABLET, FILM COATED ORAL at 09:01

## 2022-04-02 RX ADMIN — ATORVASTATIN CALCIUM 20 MG: 20 TABLET, FILM COATED ORAL at 20:59

## 2022-04-02 RX ADMIN — MEROPENEM 500 MG: 500 INJECTION, POWDER, FOR SOLUTION INTRAVENOUS at 21:11

## 2022-04-02 RX ADMIN — MEROPENEM 500 MG: 500 INJECTION, POWDER, FOR SOLUTION INTRAVENOUS at 14:19

## 2022-04-02 RX ADMIN — ESCITALOPRAM OXALATE 10 MG: 10 TABLET ORAL at 09:01

## 2022-04-02 RX ADMIN — Medication 10 ML: at 21:01

## 2022-04-02 RX ADMIN — GABAPENTIN 300 MG: 300 CAPSULE ORAL at 17:05

## 2022-04-02 RX ADMIN — FUROSEMIDE 10 MG/HR: 10 INJECTION INTRAMUSCULAR; INTRAVENOUS at 03:21

## 2022-04-02 RX ADMIN — GABAPENTIN 300 MG: 300 CAPSULE ORAL at 20:59

## 2022-04-02 RX ADMIN — HYDRALAZINE HYDROCHLORIDE 25 MG: 25 TABLET, FILM COATED ORAL at 09:01

## 2022-04-02 RX ADMIN — INSULIN GLARGINE 10 UNITS: 100 INJECTION, SOLUTION SUBCUTANEOUS at 09:00

## 2022-04-02 RX ADMIN — HYDRALAZINE HYDROCHLORIDE 25 MG: 25 TABLET, FILM COATED ORAL at 21:00

## 2022-04-02 RX ADMIN — POTASSIUM CHLORIDE 20 MEQ: 20 TABLET, EXTENDED RELEASE ORAL at 20:59

## 2022-04-02 RX ADMIN — SENNOSIDES AND DOCUSATE SODIUM 2 TABLET: 50; 8.6 TABLET ORAL at 09:01

## 2022-04-02 RX ADMIN — HYDRALAZINE HYDROCHLORIDE 25 MG: 25 TABLET, FILM COATED ORAL at 17:05

## 2022-04-02 RX ADMIN — TAMSULOSIN HYDROCHLORIDE 0.4 MG: 0.4 CAPSULE ORAL at 09:00

## 2022-04-02 RX ADMIN — Medication 10 ML: at 09:01

## 2022-04-02 RX ADMIN — SENNOSIDES AND DOCUSATE SODIUM 2 TABLET: 50; 8.6 TABLET ORAL at 20:59

## 2022-04-02 RX ADMIN — FUROSEMIDE 10 MG/HR: 10 INJECTION INTRAMUSCULAR; INTRAVENOUS at 14:44

## 2022-04-02 RX ADMIN — GABAPENTIN 300 MG: 300 CAPSULE ORAL at 09:01

## 2022-04-02 RX ADMIN — ISOSORBIDE MONONITRATE 30 MG: 30 TABLET, EXTENDED RELEASE ORAL at 09:01

## 2022-04-02 RX ADMIN — ISOSORBIDE MONONITRATE 30 MG: 30 TABLET, EXTENDED RELEASE ORAL at 20:59

## 2022-04-02 RX ADMIN — MEROPENEM 500 MG: 500 INJECTION, POWDER, FOR SOLUTION INTRAVENOUS at 06:21

## 2022-04-02 RX ADMIN — ENOXAPARIN SODIUM 40 MG: 40 INJECTION SUBCUTANEOUS at 20:58

## 2022-04-02 NOTE — PROGRESS NOTES
Daily Progress Note        CHF exacerbation (HCC)    Type 2 diabetes mellitus with diabetic nephropathy (HCC)    Acute on chronic congestive heart failure, unspecified heart failure type (HCC)      Assessment  Acute/chronic hypercapnic respiratory insufficiency  -ABG, pH 7.32/pCO2 63/PO2 81/HC03 32    Obstructive sleep apnea  Obesity hypoventilation syndrome  Pulmonary edema and cardiomegaly  A. Fib  Acute kidney injury  Morbid obesity  Hypertension  Non-smoker    3/31/2022 Free T4 1.20    3/29/2022 urine culture Proteus Mirabilis      3/31/2022 echo  EF 55 to 60%  RVSP 14.9 mmHg     Recommendations:  CT chest without contrast today    Continue to titrate oxygen- Currently on 3L NC and AVAPS at at bedtime  Merrem for UTI-finishes 4/8  Lasix gtt  Electrolyte/Glycemic control  DVT Prophylaxis-Lovenox  Keep HOB elevated  Aspiration precaution  PT/SLP following          LOS: 1 day     Subjective       Patient reports feeling better today  Objective     Vital signs for last 24 hours:  Vitals:    04/01/22 2200 04/02/22 0231 04/02/22 0500 04/02/22 0659   BP: 137/77 161/92     BP Location: Left arm Left arm     Patient Position: Lying Lying     Pulse: 70 71     Resp: 18 20     Temp: 97.5 °F (36.4 °C) 98.3 °F (36.8 °C)     TempSrc: Oral Oral     SpO2: 95% 97%  99%   Weight:   (!) 162 kg (357 lb 12.9 oz)    Height:           Intake/Output last 3 shifts:  I/O last 3 completed shifts:  In: 1960 [P.O.:1512; I.V.:448]  Out: 3860 [Urine:3860]  Intake/Output this shift:  No intake/output data recorded.      Radiology  Imaging Results (Last 24 Hours)     ** No results found for the last 24 hours. **          Labs:  Results from last 7 days   Lab Units 04/01/22  0925   WBC 10*3/mm3 3.90   HEMOGLOBIN g/dL 10.0*   HEMATOCRIT % 31.1*   PLATELETS 10*3/mm3 157     Results from last 7 days   Lab Units 04/02/22  0525 03/31/22  0332 03/29/22  0113   SODIUM mmol/L 146*   < > 144   POTASSIUM mmol/L 3.4*   < > 3.8   CHLORIDE mmol/L 105   < >  105   CO2 mmol/L 33.0*   < > 28.0   BUN mg/dL 28*   < > 25*   CREATININE mg/dL 1.31*   < > 1.30*   CALCIUM mg/dL 8.0*   < > 8.8   BILIRUBIN mg/dL  --   --  0.5   ALK PHOS U/L  --   --  88   ALT (SGPT) U/L  --   --  10   AST (SGOT) U/L  --   --  9   GLUCOSE mg/dL 97   < > 111*    < > = values in this interval not displayed.     Results from last 7 days   Lab Units 03/31/22  1153   PH, ARTERIAL pH units 7.322*   PO2 ART mm Hg 81.4*   PCO2, ARTERIAL mm Hg 62.9*   HCO3 ART mmol/L 32.6*     Results from last 7 days   Lab Units 04/02/22  0525 04/01/22  0925 03/29/22  0113   ALBUMIN g/dL 3.00* 3.30* 3.10*     Results from last 7 days   Lab Units 03/29/22  0507 03/29/22  0113   TROPONIN T ng/mL 0.035* 0.034*         Results from last 7 days   Lab Units 03/31/22  0332   MAGNESIUM mg/dL 2.2     Results from last 7 days   Lab Units 03/29/22  0113   INR  1.13*   APTT seconds 32.0*     Results from last 7 days   Lab Units 03/31/22  0332   TSH uIU/mL 0.696   FREE T4 ng/dL 1.20           Meds:   SCHEDULE  atorvastatin, 20 mg, Oral, Nightly  enoxaparin, 40 mg, Subcutaneous, Q24H  escitalopram, 10 mg, Oral, Daily  finasteride, 5 mg, Oral, Daily  gabapentin, 300 mg, Oral, TID  hydrALAZINE, 25 mg, Oral, TID  insulin glargine, 10 Units, Subcutaneous, Daily  isosorbide mononitrate, 30 mg, Oral, BID - Nitrates  meropenem, 500 mg, Intravenous, Q8H  senna-docusate sodium, 2 tablet, Oral, BID  sodium chloride, 10 mL, Intravenous, Q12H  tamsulosin, 0.4 mg, Oral, Daily      Infusions  furosemide (LASIX) 100 mg in 100mL NS infusion, 10 mg/hr, Last Rate: 10 mg/hr (04/02/22 0321)  pharmacy, 1 each      PRNs  •  acetaminophen **OR** acetaminophen **OR** acetaminophen  •  ondansetron **OR** ondansetron  •  sodium chloride  •  sodium chloride    Physical Exam:  Physical Exam  General Appearance: Sleeping but easily aroused.  No distress noted.  Alert and oriented.  Obese.  HEENT:  Normocephalic, without obvious abnormality, Conjunctiva/corneas  clear,.  Normal external ear canals, Nares normal, no drainage     Neck:  Supple, symmetrical, trachea midline. No JVD.  Lungs /Chest wall:   Bilateral basal rhonchi, respirations unlabored symmetrical wall movement.     Heart:  Regular rate and rhythm, systolic murmur PMI left sternal border  Abdomen: Soft, non-tender, no masses, no organomegaly.    Extremities: Positive edema in BLE's, no clubbing or cyanosis        ROS  Review of Systems       Constitutional: Negative for chills, fever and malaise/fatigue.   HENT: Negative.    Eyes: Negative.    Cardiovascular: Negative.    Respiratory: Positive for cough and shortness of breath, but improving  Skin: Negative.    Musculoskeletal: Negative.    Gastrointestinal: Negative.    Genitourinary: Negative.    Neurological: Negative.    Psychiatric/Behavioral: Negative.      I have reviewed current clinicals.     Electronically signed by MELISA Martinez, 04/02/22, 9:59 AM EDT.     The NP scribed the note for me, I have examined the patient and reviewed and verified the above findings and and I formulated the assessment and plan as documented

## 2022-04-02 NOTE — PROGRESS NOTES
"[facility] PROGRESS NOTE     LOS: 1 day    Patient Care Team:  Naa Valverde MD as PCP - General (Internal Medicine)      Subjective     Patient was seen and examined this morning.  He was still on BiPAP.  Still has dyspnea but improved some.  Good urine output overnight    Objective     Vital Sign Min/Max for last 24 hours  Temp:  [97.5 °F (36.4 °C)-98.3 °F (36.8 °C)] 97.6 °F (36.4 °C)  Heart Rate:  [59-71] 71  Resp:  [18-24] 24  BP: (133-161)/(72-92) 160/87                       Flowsheet Rows    Flowsheet Row First Filed Value   Admission Height 182.9 cm (72\") Documented at 03/29/2022 0100   Admission Weight 118 kg (260 lb) Documented at 03/29/2022 0100          I/O this shift:  In: 740 [P.O.:740]  Out: 3200 [Urine:3200]  I/O last 3 completed shifts:  In: 1960 [P.O.:1512; I.V.:448]  Out: 3860 [Urine:3860]    Physical Exam:  Physical Exam    General Appearance: Morbidly obese, chronically ill-appearing, in mild distress   skin: warm and dry  HEENT: On BiPAP.  Oral mucosa moist  Neck: supple, +jvd  Lungs: Decreased breath sounds at bases with bibasilar rales  Heart: Bradycardic, normal S1 and S2  Abdomen: Morbidly obese, soft, nontender, nondistended  Extremities: 2+ bilateral lower extreme edema with venous stasis changes  Neuro: normal speech and mental status        LABS:  Lab Results   Component Value Date    CALCIUM 8.0 (L) 04/02/2022    PHOS 3.4 04/02/2022     Results from last 7 days   Lab Units 04/02/22  0525 04/01/22  0925 03/31/22  0332 03/30/22  0806 03/29/22  1614 03/29/22  0113   MAGNESIUM mg/dL  --   --  2.2  --   --   --    SODIUM mmol/L 146* 146* 145  --   --  144   POTASSIUM mmol/L 3.4* 3.8 4.2  --   --  3.8   CHLORIDE mmol/L 105 105 106  --   --  105   CO2 mmol/L 33.0* 30.0* 29.0  --   --  28.0   BUN mg/dL 28* 33* 36*  --   --  25*   CREATININE mg/dL 1.31* 1.57* 1.83*  --   --  1.30*   GLUCOSE mg/dL 97 98 147*  --   --  111*   CALCIUM mg/dL 8.0* 8.7 8.5*  --   --  8.8   WBC 10*3/mm3  --  " 3.90 4.10  --   --  4.40   HEMOGLOBIN g/dL  --  10.0* 9.4* 9.1*   < > 9.7*   PLATELETS 10*3/mm3  --  157 122*  --   --  153   ALT (SGPT) U/L  --   --   --   --   --  10   AST (SGOT) U/L  --   --   --   --   --  9    < > = values in this interval not displayed.     Lab Results   Component Value Date    TROPONINT 0.035 (C) 03/29/2022     Estimated Creatinine Clearance: 75.3 mL/min (A) (by C-G formula based on SCr of 1.31 mg/dL (H)).  Results from last 7 days   Lab Units 03/31/22  1153   PH, ARTERIAL pH units 7.322*   PO2 ART mm Hg 81.4*   PCO2, ARTERIAL mm Hg 62.9*   HCO3 ART mmol/L 32.6*     Brief Urine Lab Results  (Last result in the past 365 days)      Color   Clarity   Blood   Leuk Est   Nitrite   Protein   CREAT   Urine HCG        03/29/22 0258 Yellow   Clear   Trace   Small (1+)   Positive   Negative               WEIGHTS:     Wt Readings from Last 1 Encounters:   04/02/22 0500 (!) 162 kg (357 lb 12.9 oz)   03/31/22 1645 123 kg (270 lb 1 oz)   03/31/22 1419 (!) 152 kg (335 lb)   03/29/22 0952 (!) 152 kg (335 lb)   03/29/22 0425 (!) 152 kg (335 lb 4.8 oz)   03/29/22 0100 118 kg (260 lb)       atorvastatin, 20 mg, Oral, Nightly  enoxaparin, 40 mg, Subcutaneous, Q24H  escitalopram, 10 mg, Oral, Daily  finasteride, 5 mg, Oral, Daily  gabapentin, 300 mg, Oral, TID  hydrALAZINE, 25 mg, Oral, TID  insulin glargine, 10 Units, Subcutaneous, Daily  isosorbide mononitrate, 30 mg, Oral, BID - Nitrates  meropenem, 500 mg, Intravenous, Q8H  senna-docusate sodium, 2 tablet, Oral, BID  sodium chloride, 10 mL, Intravenous, Q12H  tamsulosin, 0.4 mg, Oral, Daily      furosemide (LASIX) 100 mg in 100mL NS infusion, 10 mg/hr, Last Rate: 10 mg/hr (04/02/22 3980)  pharmacy, 1 each        Assessment/Plan       1.  Acute kidney injury on chronic renal disease stage IIIa  Patient seems to have underlying chronic kidney disease in setting of hypertension and diabetes  Creatinine is better at 1.5 mg/DL this morning.  Electrolytes okay her  volume status still generous  2.  Fluid overload/Pulmonary hypertension?  3.  Hypertension with CKD.  Blood pressure on the low side  4.  Morbid obesity    5.  UTI.  Urine culture growing ESBL Proteus mirabilis     Recs:  -Continue IV Lasix drip for diuresis today  -I agree switching antibiotics to meropenem  -continue same hydralazine dose  -Monitor renal function while being diuresed        Faraz Petersen MD  04/02/22  15:17 EDT

## 2022-04-02 NOTE — CASE MANAGEMENT/SOCIAL WORK
Continued Stay Note  KAYODE Migue     Patient Name: Shaji Junior  MRN: [id number]  Today's Date: 4/2/2022    Admit Date: 3/29/2022     Discharge Plan     Row Name 04/02/22 1821       Plan    Plan Comments CM contacted by nursing re. how to get a copy of his guardian papers.  Pt's Guardian emailed them to cm and they were uploaded to his medical record.              Phone communication or documentation only - no physical contact with patient or family.          Cathy Sullivan RN

## 2022-04-02 NOTE — PROGRESS NOTES
LOS: 1 day   Admiting Physician- Mariela Guan MD    Reason For Followup:    Shortness of breath  Bilateral leg edema and generalized anasarca  Hypertension  Atrial fibrillation    Subjective     Shortness of breath.  No chest pain    Objective     Hemodynamics are stable    Review of Systems:   Review of Systems   Constitutional: Negative for chills and fever.   HENT: Negative for ear discharge and nosebleeds.    Eyes: Negative for discharge and redness.   Cardiovascular: Positive for leg swelling. Negative for chest pain, orthopnea, palpitations, paroxysmal nocturnal dyspnea and syncope.   Respiratory: Positive for shortness of breath. Negative for cough and wheezing.    Endocrine: Negative for heat intolerance.   Skin: Negative for rash.   Musculoskeletal: Positive for arthritis, back pain and joint pain. Negative for myalgias.   Gastrointestinal: Negative for abdominal pain, melena, nausea and vomiting.   Genitourinary: Negative for dysuria and hematuria.   Neurological: Negative for dizziness, light-headedness, numbness and tremors.   Psychiatric/Behavioral: Negative for depression. The patient is not nervous/anxious.          Vital Signs  Vitals:    04/02/22 0500 04/02/22 0659 04/02/22 0905 04/02/22 1125   BP:   133/72 160/87   BP Location:       Patient Position:       Pulse:   71 71   Resp:    24   Temp:    97.6 °F (36.4 °C)   TempSrc:       SpO2:  99% 96% 92%   Weight: (!) 162 kg (357 lb 12.9 oz)      Height:         Wt Readings from Last 1 Encounters:   04/02/22 (!) 162 kg (357 lb 12.9 oz)       Intake/Output Summary (Last 24 hours) at 4/2/2022 1454  Last data filed at 4/2/2022 1436  Gross per 24 hour   Intake 1890 ml   Output 6310 ml   Net -4420 ml     Physical Exam:  Constitutional:       Appearance: Well-developed.   Eyes:      General: No scleral icterus.        Right eye: No discharge.   HENT:      Head: Normocephalic and atraumatic.   Neck:      Thyroid: No thyromegaly.      Lymphadenopathy: No  cervical adenopathy.   Pulmonary:      Effort: Pulmonary effort is normal. No respiratory distress.      Breath sounds: Normal breath sounds. No wheezing. No rales.   Cardiovascular:      Normal rate. Irregularly irregular rhythm.      No gallop.   Edema:     Peripheral edema absent.   Abdominal:      Tenderness: There is no abdominal tenderness.   Skin:     Findings: No erythema or rash.   Neurological:      Mental Status: Alert and oriented to person, place, and time.         Results Review:   Lab Results (last 24 hours)     Procedure Component Value Units Date/Time    CANDIDA AURIS SCREEN - Swab, Axilla Right, Axilla Left and Groin [[id number]]  (Normal) Collected: 03/29/22 0557    Specimen: Swab from Axilla Right, Axilla Left and Groin Updated: 04/02/22 0617     Candida Auris Screen Culture No Candida auris isolated at 4 days    Renal Function Panel [[id number]]  (Abnormal) Collected: 04/02/22 0525    Specimen: Blood Updated: 04/02/22 0605     Glucose 97 mg/dL      BUN 28 mg/dL      Creatinine 1.31 mg/dL      Sodium 146 mmol/L      Potassium 3.4 mmol/L      Chloride 105 mmol/L      CO2 33.0 mmol/L      Calcium 8.0 mg/dL      Albumin 3.00 g/dL      Phosphorus 3.4 mg/dL      Anion Gap 8.0 mmol/L      BUN/Creatinine Ratio 21.4     eGFR 56.4 mL/min/1.73      Comment: National Kidney Foundation and American Society of Nephrology (ASN) Task Force recommended calculation based on the Chronic Kidney Disease Epidemiology Collaboration (CKD-EPI) equation refit without adjustment for race.       Narrative:      GFR Normal >60  Chronic Kidney Disease <60  Kidney Failure <15          Imaging Results (Last 72 Hours)     Procedure Component Value Units Date/Time    CT Chest Without Contrast Diagnostic [[id number]] Collected: 04/02/22 1141     Updated: 04/02/22 1148    Narrative:      CT CHEST WO CONTRAST DIAGNOSTIC-     Date of Exam: 4/2/2022 11:12 AM     Indication: Pneumonia, effusion or abscess suspected, xray  done;  I50.9-Heart failure, unspecified; N39.0-Urinary tract infection, site  not specified; Z20.822-Contact with and (suspected) exposure to  covid-19.     Comparison: Chest x-ray 3/31/2022     Technique: Serial and axial CT images of the chest were obtained.  Reconstructions in the coronal and sagittal planes were performed.   Automated exposure control and iterative reconstruction methods were  used.  Radiation audit for number of CT and nuclear cardiology exams  performed in the last year:  0.     FINDINGS:     Small-moderate dependent bilateral pleural effusions, larger on the  right are present. Adjacent compressive atelectasis. No pneumothorax.  Bilateral atelectasis. Small areas of ill-defined indeterminate airspace  disease amongst the atelectasis.. Detailed evaluation limited due to  motion. Some interlobular septal thickening is evident in the right  lung.     Coronary disease with calcifications are present. No pericardial  effusion. Main pulmonary arteries enlarged, 4.3 cm.     No evidence of acute process on noncontrast imaging of the included  upper abdomen. There is body wall edema noted. Bilateral gynecomastia is  present small soft tissue opacities. No acute osseous findings.       Impression:      Small-moderate bilateral pleural effusions with adjacent atelectasis.  Ill-defined airspace disease amongst the atelectasis may be due to  coexistent pneumonia     Pulmonary edema evident.     Enlargement of the main and central pulmonary arteries, suggesting  pulmonary hypertension     Coronary disease with advanced calcifications              Electronically Signed By-Ronan Wiggins On:4/2/2022 11:46 AM  This report was finalized on [id number] by  Ronan Wiggins, .    XR Chest PA & Lateral [[id number]] Collected: 03/31/22 1132     Updated: 03/31/22 1135    Narrative:      DATE OF EXAM:  3/31/2022 11:22 AM     PROCEDURE:  XR CHEST PA AND LATERAL-     INDICATIONS:  wheezing, decreased LOC; I50.9-Heart  failure, unspecified; N39.0-Urinary  tract infection, site not specified; Z20.822-Contact with and  (suspected) exposure to covid-19       COMPARISON:  AP portable chest 3/29/2022.     TECHNIQUE:   Two radiologic views of the chest.     FINDINGS:  The lateral image is degraded secondary to patient body habitus. Hazy  interstitial and alveolar disease is present in the lung bases, left  greater right, similar to the prior exam. Stable cardiac enlargement. No  definite pleural effusion. No pneumothorax. No acute osseous  abnormality.       Impression:       IMPRESSION:     1. Left greater than right basilar interstitial and alveolar infiltrates  are unchanged from 3/29/2022  2. Stable cardiomegaly.     Electronically Signed By-Barbara Beebe MD On:3/31/2022 11:33 AM  This report was finalized on [id number] by  Barbara Beebe MD.        ECG/EMG Results (most recent)     Procedure Component Value Units Date/Time    SCANNED - TELEMETRY   [[id number]] Resulted: 03/29/22     Updated: 03/29/22 1512    SCANNED - TELEMETRY   [[id number]] Resulted: 03/29/22     Updated: 03/29/22 1512    ECG 12 Lead [[id number]] Collected: 03/29/22 0105     Updated: 03/30/22 1039     QT Interval 442 ms     Narrative:      HEART RATE= 61  bpm  RR Interval= 986  ms  WV Interval=   ms  P Horizontal Axis=   deg  P Front Axis=   deg  QRSD Interval= 94  ms  QT Interval= 442  ms  QRS Axis= 13  deg  T Wave Axis= 119  deg  - ABNORMAL ECG -  Atrial fibrillation  Low voltage, extremity leads  No previous ECG available for comparison  Electronically Signed By: Bob Lawrence ([facility]) 30-Mar-2022 10:38:57  Date and Time of Study: 2022-03-29 01:05:24    SCANNED - TELEMETRY   [[id number]] Resulted: 03/29/22     Updated: 03/30/22 1125    SCANNED - TELEMETRY   [[id number]] Resulted: 03/29/22     Updated: 03/30/22 1135    SCANNED - TELEMETRY   [[id number]] Resulted: 03/29/22     Updated: 03/30/22 1151    SCANNED - TELEMETRY   [[id number]] Resulted: 03/29/22     Updated:  03/30/22 1151    SCANNED - TELEMETRY   [[id number]] Resulted: 03/29/22     Updated: 03/30/22 1156    SCANNED - TELEMETRY   [[id number]] Resulted: 03/29/22     Updated: 03/30/22 1156    SCANNED - TELEMETRY   [[id number]] Resulted: 03/29/22     Updated: 03/30/22 1221    SCANNED - TELEMETRY   [[id number]] Resulted: 03/29/22     Updated: 03/30/22 1304    SCANNED - TELEMETRY   [[id number]] Resulted: 03/29/22     Updated: 03/30/22 1335    SCANNED - TELEMETRY   [[id number]] Resulted: 03/29/22     Updated: 03/31/22 1049    SCANNED - TELEMETRY   [[id number]] Resulted: 03/29/22     Updated: 03/31/22 1156    SCANNED - TELEMETRY   [[id number]] Resulted: 03/29/22     Updated: 03/31/22 1318    Adult Transthoracic Echo Complete W/ Cont if Necessary Per Protocol [[id number]] Resulted: 03/31/22 1838     Updated: 03/31/22 1840     Target HR (85%) 122 bpm      Max. Pred. HR (100%) 144 bpm      ACS 2.16 cm      Ao root diam 3.4 cm      Ao pk ngoc 106.6 cm/sec      Ao V2 VTI 23.5 cm      YIMI(I,D) 4.7 cm2      EDV(cubed) 134.7 ml      EDV(MOD-sp4) 123.7 ml      EF(MOD-sp4) 51.9 %      ESV(cubed) 58.6 ml      ESV(MOD-sp4) 59.5 ml      IVS/LVPW 1.16 cm      LV mass(C)d 418.7 grams      LV V1 max PG 3.8 mmHg      LV V1 mean PG 2.45 mmHg      LV V1 max 98.0 cm/sec      LVPWd 1.61 cm      MV V2 VTI 20.5 cm      MVA(VTI) 5.4 cm2      PA V2 max 66.6 cm/sec      RAP systole 3.0 mmHg      RVIDd 2.6 cm      RVSP(TR) 14.9 mmHg      SI(MOD-sp4) 24.2 ml/m2      SV(LVOT) 111.0 ml      SV(MOD-sp4) 64.2 ml      TR max PG 11.9 mmHg      Ao max PG 4.5 mmHg      Ao mean PG 2.46 mmHg      FS 24.2 %      IVSd 1.87 cm      LA dimension(2D) 5.7 cm      LV V1 VTI 26.6 cm      LVIDd 5.1 cm      LVIDs 3.9 cm      LVOT area 4.2 cm2      LVOT diam 2.30 cm      MV max PG 6.6 mmHg      MV mean PG 3.4 mmHg      TR max ngoc 169.1 cm/sec      LV Wagoner Vol (BSA corrected) 46.6 cm2      LV Sys Vol (BSA corrected) 22.4 cm2     Narrative:      · Left ventricular systolic function is  normal.  · Left ventricular ejection fraction is 55 to 60%  · Left ventricular diastolic function was normal.       SCANNED EKG [[id number]] Resulted: 03/29/22     Updated: 04/01/22 1129    SCANNED EKG [[id number]] Resulted: 01/29/22     Updated: 04/01/22 1129    SCANNED - TELEMETRY   [[id number]] Resulted: 03/29/22     Updated: 04/01/22 1459    SCANNED - TELEMETRY   [[id number]] Resulted: 03/29/22     Updated: 04/01/22 1512    SCANNED - TELEMETRY   [[id number]] Resulted: 03/29/22     Updated: 04/01/22 1512    ECG 12 Lead [[id number]] Collected: 03/31/22 1908     Updated: 04/02/22 0649     QT Interval 488 ms     Narrative:      HEART RATE= 55  bpm  RR Interval= 1098  ms  AR Interval=   ms  P Horizontal Axis=   deg  P Front Axis=   deg  QRSD Interval= 84  ms  QT Interval= 488  ms  QRS Axis= 9  deg  T Wave Axis=   deg  - ABNORMAL ECG -  Atrial fibrillation  Low voltage, extremity and precordial leads  Nonspecific T abnormalities, lateral leads  When compared with ECG of 29-Mar-2022 1:05:24,  No significant change  Electronically Signed By: Duarte CanalesJOSELIN) 02-Apr-2022 06:48:57  Date and Time of Study: 2022-03-31 19:08:14        CBC    Results from last 7 days   Lab Units 04/01/22  0925 03/31/22  0332 03/30/22  0806 03/29/22  2323 03/29/22  1614 03/29/22  0113   WBC 10*3/mm3 3.90 4.10  --   --   --  4.40   HEMOGLOBIN g/dL 10.0* 9.4* 9.1* 9.7* 9.3* 9.7*   PLATELETS 10*3/mm3 157 122*  --   --   --  153     BMP   Results from last 7 days   Lab Units 04/02/22  0525 04/01/22  0925 03/31/22  0332 03/29/22  0113   SODIUM mmol/L 146* 146* 145 144   POTASSIUM mmol/L 3.4* 3.8 4.2 3.8   CHLORIDE mmol/L 105 105 106 105   CO2 mmol/L 33.0* 30.0* 29.0 28.0   BUN mg/dL 28* 33* 36* 25*   CREATININE mg/dL 1.31* 1.57* 1.83* 1.30*   GLUCOSE mg/dL 97 98 147* 111*   MAGNESIUM mg/dL  --   --  2.2  --    PHOSPHORUS mg/dL 3.4 4.4 4.3  --      CMP   Results from last 7 days   Lab Units 04/02/22  0525 04/01/22  0925 03/31/22  0332 03/29/22  0113    SODIUM mmol/L 146* 146* 145 144   POTASSIUM mmol/L 3.4* 3.8 4.2 3.8   CHLORIDE mmol/L 105 105 106 105   CO2 mmol/L 33.0* 30.0* 29.0 28.0   BUN mg/dL 28* 33* 36* 25*   CREATININE mg/dL 1.31* 1.57* 1.83* 1.30*   GLUCOSE mg/dL 97 98 147* 111*   ALBUMIN g/dL 3.00* 3.30*  --  3.10*   BILIRUBIN mg/dL  --   --   --  0.5   ALK PHOS U/L  --   --   --  88   AST (SGOT) U/L  --   --   --  9   ALT (SGPT) U/L  --   --   --  10   AMMONIA umol/L  --  29  --   --      Cardiac Studies:  Echo- Results for orders placed during the hospital encounter of 03/29/22    Adult Transthoracic Echo Complete W/ Cont if Necessary Per Protocol    Interpretation Summary  · Left ventricular systolic function is normal.  · Left ventricular ejection fraction is 55 to 60%  · Left ventricular diastolic function was normal.    Stress Myoview-  Cath-      Medication Review:   Scheduled Meds:atorvastatin, 20 mg, Oral, Nightly  enoxaparin, 40 mg, Subcutaneous, Q24H  escitalopram, 10 mg, Oral, Daily  finasteride, 5 mg, Oral, Daily  gabapentin, 300 mg, Oral, TID  hydrALAZINE, 25 mg, Oral, TID  insulin glargine, 10 Units, Subcutaneous, Daily  isosorbide mononitrate, 30 mg, Oral, BID - Nitrates  meropenem, 500 mg, Intravenous, Q8H  senna-docusate sodium, 2 tablet, Oral, BID  sodium chloride, 10 mL, Intravenous, Q12H  tamsulosin, 0.4 mg, Oral, Daily      Continuous Infusions:furosemide (LASIX) 100 mg in 100mL NS infusion, 10 mg/hr, Last Rate: 10 mg/hr (04/02/22 1444)  pharmacy, 1 each      PRN Meds:.•  acetaminophen **OR** acetaminophen **OR** acetaminophen  •  ondansetron **OR** ondansetron  •  sodium chloride  •  sodium chloride      Assessment/Plan   Patient Active Problem List   Diagnosis   • CHF exacerbation (HCC)   • Cerebrovascular accident (HCC)   • Type 2 diabetes mellitus with diabetic nephropathy (HCC)   • Acute kidney failure (HCC)   • Congestive heart failure (HCC)   • Acute on chronic congestive heart failure, unspecified heart failure type (HCC)      MDM:    1.  Shortness of breath:    His shortness of breath is slowly improving.  His echocardiogram showed preserved left ventricle function.  Etiology of his shortness of breath is multifactorial.  I believe patient is in volume overload.  Continue diuresis    2.  Chronic atrial fibrillation:    It appears that patient has probably chronic persistent atrial fibrillation.  Heart rate is reasonably well controlled.  I would like that patient Lovenox should be increased to therapeutic.    3.  Bilateral leg edema/generalized anasarca:    Patient put out 3200 from last 24 hours.  I would continue diuresis.  Creatinine is stable.    4.  Hypertension:    Blood pressure is controlled.    Gurpreet Vargas MD  04/02/22  14:54 EDT

## 2022-04-02 NOTE — PLAN OF CARE
Goal Outcome Evaluation:           Progress: improving  Outcome Evaluation: remains on lasix drip and 4L nasal cannula. adequate urine output. Patient tolerating diet. waiting on plan from cardiology. will continue to monitor.

## 2022-04-02 NOTE — PROGRESS NOTES
[facility] Medicine Services Daily Progress Note    Patient Name: Shaji Junior  : 1945  MRN: [id number]  Primary Care Physician:  Naa Valverde MD  Date of admission: 3/29/2022      Subjective      Chief Complaint: Shortness of breath    Patient Reports   3/29/2022: The patient complains of mild substernal pressure-like chest pain with no associated shortness of breath, nausea or sweats.  He denies any alleviating or exacerbating factors.  He just ate.  I spoke with Dr. Vargas and he recommended increasing the Lasix because of the patient's persistent severe fluid volume overload.  I spoke with the bedside nurse and she checked with the patient's nursing facility to verify that he is a full code.  3/30/2022: Patient complains of ongoing shortness of breath and swelling.  He denies any chest pain.  He reports that his chest pain went away but he is not sure if the GI cocktail was helpful.  He reports no bowel movement since admission.  3/31/2022: Pt reports ongoing SOA and diffuse swelling but no current c/o CXP. Pt is incontinent of urine per nursing staff.  2022: The patient was resting on BiPAP at the time of my visit.  He was arousable and denied complaints.  He felt that his shortness of breath was getting better.  2022: Patient reports ongoing shortness of breath but he feels that it has improved as has his swelling.    ROS     All other review of systems negative except for shortness of breath and swelling.    Objective      Vitals:   Temp:  [97.5 °F (36.4 °C)-98.3 °F (36.8 °C)] 98.3 °F (36.8 °C)  Heart Rate:  [50-71] 71  Resp:  [18-22] 20  BP: (105-161)/(68-92) 161/92  Flow (L/min):  [3-5] 3    Physical Exam   Vital signs and nurses notes reviewed.  Morbidly obese gentleman lying in bed on AVAPS in no acute distress; sclera anicteric, mucous membranes moist, lungs with decreased air entry bilaterally; CV distant heart sounds; abdomen protuberant, soft, nontender,  nondistended; bilateral upper and lower extremity anasarca is gradually improving; bilateral lower legs with thin scaly flaky skin and bullous lesions.**      Result Review    Result Review:  I have personally reviewed the results from the time of this admission to 4/2/2022 10:05 EDT and agree with these findings:  [x]  Laboratory  []  Microbiology  [x]  Radiology  [x]  EKG/Telemetry   [x]  Cardiology/Vascular   []  Pathology  [x]  Old records  []  Other:  Most notable findings are discussed in the assessment and plan.    Wounds (last 24 hours)     LDA Wound     Row Name 04/02/22 0400 04/02/22 0000 04/01/22 2000       Wound 04/01/22 1122 Left posterior thigh Pressure Injury    Wound - Properties Group Placement Date: 04/01/22 - Placement Time: 1122 - Side: Left  - Orientation: posterior  - Location: thigh  -GK Primary Wound Type: Pressure inj  -GK    Pressure Injury Stage -- -- --  appears to be shearing or moisture related w/o nonblanchable skin  -AS    Dressing Appearance -- dry;intact  -AS dry;intact  -AS    Closure Adhesive bandage  -AS Adhesive bandage  -AS Adhesive bandage  -AS    Base -- -- pink;moist  -AS    Periwound intact  -AS intact  -AS intact  -AS    Drainage Amount none  -AS none  -AS none  -AS    Dressing Care -- -- silicone;foam  -AS    Retired Wound - Properties Group Placement Date: 04/01/22 - Placement Time: 1122 - Side: Left  - Orientation: posterior  - Location: thigh  -GK Primary Wound Type: Pressure inj  -GK    Retired Wound - Properties Group Date first assessed: 04/01/22 - Time first assessed: 1122 - Side: Left  - Location: thigh  -GK Primary Wound Type: Pressure inj  -GK       Wound 04/01/22 1122 Right posterior thigh Pressure Injury    Wound - Properties Group Placement Date: 04/01/22 - Placement Time: 1122 - Side: Right  -GK Orientation: posterior  - Location: thigh  -GK Primary Wound Type: Pressure inj  -GK    Pressure Injury Stage -- -- O  appears to  be shearing or moisture related w/o nonblanchable skin  -AS    Dressing Appearance -- dry;intact  -AS dry;intact  -AS    Closure Adhesive bandage  -AS Adhesive bandage  -AS Open to air  -AS    Base -- -- moist;red  -AS    Periwound intact  -AS intact  -AS intact  -AS    Drainage Amount none  -AS none  -AS none  -AS    Dressing Care -- -- silicone;foam  -AS    Retired Wound - Properties Group Placement Date: 04/01/22 - Placement Time: 1122 - Side: Right  -GK Orientation: posterior  -GK Location: thigh  -GK Primary Wound Type: Pressure inj  -GK    Retired Wound - Properties Group Date first assessed: 04/01/22 - Time first assessed: 1122  - Side: Right  -GK Location: thigh  -GK Primary Wound Type: Pressure inj  -GK    Row Name 04/01/22 1122             Wound 04/01/22 1122 Left posterior thigh Pressure Injury    Wound - Properties Group Placement Date: 04/01/22 - Placement Time: 1122 - Side: Left  - Orientation: posterior  -GK Location: thigh  -GK Primary Wound Type: Pressure inj  -GK      Pressure Injury Stage 2  -GK      Dressing Appearance intact;dry  -GK      Closure Open to air  -GK      Base non-blanchable;pink  -GK      Drainage Amount none  -GK      Care, Wound cleansed with;soap and water  -GK      Dressing Care dressing applied;hydrocolloid  mepilex  -GK      Retired Wound - Properties Group Placement Date: 04/01/22 - Placement Time: 1122  - Side: Left  -GK Orientation: posterior  -GK Location: thigh  -GK Primary Wound Type: Pressure inj  -GK      Retired Wound - Properties Group Date first assessed: 04/01/22 - Time first assessed: 1122 -GK Side: Left  -GK Location: thigh  -GK Primary Wound Type: Pressure inj  -GK              Wound 04/01/22 1122 Right posterior thigh Pressure Injury    Wound - Properties Group Placement Date: 04/01/22 - Placement Time: 1122 - Side: Right  -GK Orientation: posterior  -GK Location: thigh  -GK Primary Wound Type: Pressure inj  -GK      Pressure  Injury Stage 2  -GK      Dressing Appearance dry;intact  -GK      Closure Open to air  -GK      Base non-blanchable  -GK      Drainage Amount none  -GK      Care, Wound cleansed with;soap and water  -GK      Dressing Care dressing applied;hydrocolloid  mepilex  -GK      Retired Wound - Properties Group Placement Date: 04/01/22  -GK Placement Time: 1122 -GK Side: Right  -GK Orientation: posterior  -GK Location: thigh  -GK Primary Wound Type: Pressure inj  -GK      Retired Wound - Properties Group Date first assessed: 04/01/22  -GK Time first assessed: 1122 -GK Side: Right  -GK Location: thigh  -GK Primary Wound Type: Pressure inj  -GK            User Key  (r) = Recorded By, (t) = Taken By, (c) = Cosigned By    Initials Name Provider Type    AS Ricki Holland RN Registered Nurse     Vira Torre RN Registered Nurse                  Assessment/Plan      Brief Patient Summary:  Shaji Junior is a 76 y.o. male with a chief complaint of shortness of breath which has been worsening over the last several days.  The patient has limited knowledge of his medical history.  He is from [facility] with the patient reports he has been for 2 years.  From review of patient's medication list and physical exam the patient appears to have chronic lower extremity edema.  He is noted to be in atrial fibrillation with controlled ventricular response on admission without anticoagulation on his medication list however, the patient is on Cardizem.  I asked the patient who his primary care provider was and he said he just gets care at the nursing home and goes to the hospital.  He usually goes to [facility].  [facility] to his increasing shortness of breath.     Review of records: Patient was seen in the ER 3/11/2022 after he pulled out PICC line with concern for portion still in the patient's arm.  At that time x-ray showed no opaque foreign body.  Patient was discharged  back to the [facility].     Current medications include:  atorvastatin, 20 mg, Oral, Nightly  enoxaparin, 40 mg, Subcutaneous, Q24H  escitalopram, 10 mg, Oral, Daily  finasteride, 5 mg, Oral, Daily  gabapentin, 300 mg, Oral, TID  hydrALAZINE, 25 mg, Oral, TID  insulin glargine, 10 Units, Subcutaneous, Daily  isosorbide mononitrate, 30 mg, Oral, BID - Nitrates  meropenem, 500 mg, Intravenous, Q8H  senna-docusate sodium, 2 tablet, Oral, BID  sodium chloride, 10 mL, Intravenous, Q12H  tamsulosin, 0.4 mg, Oral, Daily       furosemide (LASIX) 100 mg in 100mL NS infusion, 10 mg/hr, Last Rate: 10 mg/hr (04/02/22 0321)  pharmacy, 1 each         Active Hospital Problems:  Active Hospital Problems    Diagnosis    • Acute on chronic congestive heart failure, unspecified heart failure type (HCC)    • CHF exacerbation (HCC)    • Type 2 diabetes mellitus with diabetic nephropathy (HCC)      Plan:     Acute on chronic diastolic congestive heart failure secondary to hypertensive heart disease  -Lasix 60 mg IV every 8 hours given but Cr david from 1.3 to 1.83  -proBNP 4400 with comparison 2100 May 2021  -Echocardiogram from outside facility showed EF 55%, normal LV size, no tricuspid regurgitation, aortic valve is normal in structure and function.  No mitral valve regurgitation.  Mitral valve grossly normal.   -Dr. Stein nephrology started the patient on a Lasix drip 3/31/2022  -Intake and output not accurate because the patient has incontinence of urine  -There is a huge discrepancy between the bed weight and the standing weight possibly because the patient cannot let go of the standing weight scale when weighed; accuracy of each is questionable    Acute renal failure due to prerenal azotemia from diuresis on chronic kidney disease stage IIIa secondary to hypertension and diabetes  -Creatinine 1.30 apparently is his baseline with comparison 1.27 May 2021  -Creatinine had worsened to 1.83 with diuresis but has improved to 1.2 despite  diuresis  -Nephrology managing diuresis     Acute on chronic anemia  Hemoglobin 9.7 with comparison 11.4 May 2021  -Hemoglobin stable at 10.0  -Monitor     Persistent atrial fibrillation with controlled ventricular response  -Patient was not on anticoagulation therapy prior to admission and would benefit from anticoagulation; Lovenox will be changed to therapeutic dosing  -Continue Cardizem and Coreg  -EKG showing atrial fibrillation with controlled ventricular response     Acute urinary tract infection  -Urine culture grew ESBL producing Proteus mirabilis   -Continue IV Merrem     Essential hypertension, chronic and controlled  -Continue Norvasc, Coreg, diltiazem, hydralazine and isosorbide     Dementia  Depression, chronic  -Continue Lexapro     BPH, chronic  -Continue Proscar and Flomax  -Patient is incontinent of urine  -Check postvoid residual      Diabetes type 2, chronic and uncontrolled with diabetic peripheral neuropathy  -Continue Lantus and gabapentin  -hemoglobin A1c 6.4  -Accu-Cheks and SSI     Hyperlipidemia, chronic  -Continue Zocor     This patient is high risk.    DVT prophylaxis:  Medical DVT prophylaxis orders are present.    CODE STATUS:    Code Status (Patient has no pulse and is not breathing): CPR (Attempt to Resuscitate)  Medical Interventions (Patient has pulse or is breathing): Full Support  I spoke with the patient extensively about his CODE STATUS and his wishes for further care.   He reports his sister Michelle is his medical decision-maker in the event that he is unable to make decisions.  The nursing staff spoke with Michelle who reports the patient does not have any baseline dementia.  He is awake and alert and oriented x3 and is requesting full care including intervention if needed.    Disposition:  I expect patient to be discharged in 2-3 days if continuing to clinically improve.    This patient has been examined wearing appropriate Personal Protective Equipment and discussed with  [facility] infection control department. 04/02/22      Electronically signed by Mariela Guan MD, 04/02/22, 10:05 EDT.  [facility]ist [facility]

## 2022-04-03 LAB
ANION GAP SERPL CALCULATED.3IONS-SCNC: 10 MMOL/L (ref 5–15)
BACTERIA ISLT: NORMAL
BUN SERPL-MCNC: 25 MG/DL (ref 8–23)
BUN/CREAT SERPL: 20.8 (ref 7–25)
CALCIUM SPEC-SCNC: 8.5 MG/DL (ref 8.6–10.5)
CHLORIDE SERPL-SCNC: 101 MMOL/L (ref 98–107)
CO2 SERPL-SCNC: 34 MMOL/L (ref 22–29)
CREAT SERPL-MCNC: 1.2 MG/DL (ref 0.76–1.27)
EGFRCR SERPLBLD CKD-EPI 2021: 62.7 ML/MIN/1.73
GLUCOSE BLDC GLUCOMTR-MCNC: 110 MG/DL (ref 70–105)
GLUCOSE BLDC GLUCOMTR-MCNC: 110 MG/DL (ref 70–105)
GLUCOSE BLDC GLUCOMTR-MCNC: 147 MG/DL (ref 70–105)
GLUCOSE BLDC GLUCOMTR-MCNC: 99 MG/DL (ref 70–105)
GLUCOSE SERPL-MCNC: 110 MG/DL (ref 65–99)
POTASSIUM SERPL-SCNC: 3.5 MMOL/L (ref 3.5–5.2)
SODIUM SERPL-SCNC: 145 MMOL/L (ref 136–145)

## 2022-04-03 PROCEDURE — 25010000002 ENOXAPARIN PER 10 MG: Performed by: HOSPITALIST

## 2022-04-03 PROCEDURE — 94799 UNLISTED PULMONARY SVC/PX: CPT

## 2022-04-03 PROCEDURE — 25010000002 FUROSEMIDE PER 20 MG: Performed by: INTERNAL MEDICINE

## 2022-04-03 PROCEDURE — 94660 CPAP INITIATION&MGMT: CPT

## 2022-04-03 PROCEDURE — 99232 SBSQ HOSP IP/OBS MODERATE 35: CPT | Performed by: HOSPITALIST

## 2022-04-03 PROCEDURE — 63710000001 INSULIN GLARGINE PER 5 UNITS: Performed by: NURSE PRACTITIONER

## 2022-04-03 PROCEDURE — 82962 GLUCOSE BLOOD TEST: CPT

## 2022-04-03 PROCEDURE — 99232 SBSQ HOSP IP/OBS MODERATE 35: CPT | Performed by: INTERNAL MEDICINE

## 2022-04-03 PROCEDURE — 80048 BASIC METABOLIC PNL TOTAL CA: CPT | Performed by: INTERNAL MEDICINE

## 2022-04-03 PROCEDURE — 25010000002 MEROPENEM PER 100 MG: Performed by: HOSPITALIST

## 2022-04-03 RX ADMIN — MEROPENEM 500 MG: 500 INJECTION, POWDER, FOR SOLUTION INTRAVENOUS at 21:15

## 2022-04-03 RX ADMIN — MEROPENEM 500 MG: 500 INJECTION, POWDER, FOR SOLUTION INTRAVENOUS at 16:09

## 2022-04-03 RX ADMIN — GABAPENTIN 300 MG: 300 CAPSULE ORAL at 16:09

## 2022-04-03 RX ADMIN — ENOXAPARIN SODIUM 160 MG: 100 INJECTION SUBCUTANEOUS at 20:59

## 2022-04-03 RX ADMIN — ENOXAPARIN SODIUM 160 MG: 100 INJECTION SUBCUTANEOUS at 08:58

## 2022-04-03 RX ADMIN — Medication 10 ML: at 09:00

## 2022-04-03 RX ADMIN — ESCITALOPRAM OXALATE 10 MG: 10 TABLET ORAL at 08:59

## 2022-04-03 RX ADMIN — ISOSORBIDE MONONITRATE 30 MG: 30 TABLET, EXTENDED RELEASE ORAL at 18:34

## 2022-04-03 RX ADMIN — FINASTERIDE 5 MG: 5 TABLET, FILM COATED ORAL at 08:59

## 2022-04-03 RX ADMIN — GABAPENTIN 300 MG: 300 CAPSULE ORAL at 08:59

## 2022-04-03 RX ADMIN — ATORVASTATIN CALCIUM 20 MG: 20 TABLET, FILM COATED ORAL at 21:00

## 2022-04-03 RX ADMIN — FUROSEMIDE 10 MG/HR: 10 INJECTION INTRAMUSCULAR; INTRAVENOUS at 02:07

## 2022-04-03 RX ADMIN — HYDRALAZINE HYDROCHLORIDE 25 MG: 25 TABLET, FILM COATED ORAL at 08:59

## 2022-04-03 RX ADMIN — INSULIN GLARGINE 10 UNITS: 100 INJECTION, SOLUTION SUBCUTANEOUS at 09:00

## 2022-04-03 RX ADMIN — HYDRALAZINE HYDROCHLORIDE 25 MG: 25 TABLET, FILM COATED ORAL at 20:59

## 2022-04-03 RX ADMIN — MEROPENEM 500 MG: 500 INJECTION, POWDER, FOR SOLUTION INTRAVENOUS at 06:51

## 2022-04-03 RX ADMIN — SENNOSIDES AND DOCUSATE SODIUM 2 TABLET: 50; 8.6 TABLET ORAL at 08:59

## 2022-04-03 RX ADMIN — HYDRALAZINE HYDROCHLORIDE 25 MG: 25 TABLET, FILM COATED ORAL at 16:09

## 2022-04-03 RX ADMIN — TAMSULOSIN HYDROCHLORIDE 0.4 MG: 0.4 CAPSULE ORAL at 08:59

## 2022-04-03 RX ADMIN — SENNOSIDES AND DOCUSATE SODIUM 2 TABLET: 50; 8.6 TABLET ORAL at 20:59

## 2022-04-03 RX ADMIN — GABAPENTIN 300 MG: 300 CAPSULE ORAL at 20:59

## 2022-04-03 RX ADMIN — Medication 10 ML: at 21:00

## 2022-04-03 RX ADMIN — ISOSORBIDE MONONITRATE 30 MG: 30 TABLET, EXTENDED RELEASE ORAL at 08:59

## 2022-04-03 RX ADMIN — FUROSEMIDE 10 MG/HR: 10 INJECTION INTRAMUSCULAR; INTRAVENOUS at 23:45

## 2022-04-03 RX ADMIN — FUROSEMIDE 10 MG/HR: 10 INJECTION INTRAMUSCULAR; INTRAVENOUS at 16:11

## 2022-04-03 NOTE — PROGRESS NOTES
LOS: 2 days   Admiting Physician- Mariela Guan MD    Reason For Followup:    Shortness of breath  Bilateral leg edema and generalized anasarca  Hypertension  Atrial fibrillation    Subjective     Shortness of breath is much better.    Objective     Patient is in atrial fibrillation with heart rate of 77 bpm.  Blood pressure is slightly high    Review of Systems:   Review of Systems   Constitutional: Negative for chills and fever.   HENT: Negative for ear discharge and nosebleeds.    Eyes: Negative for discharge and redness.   Cardiovascular: Positive for leg swelling. Negative for chest pain, orthopnea, palpitations, paroxysmal nocturnal dyspnea and syncope.   Respiratory: Positive for shortness of breath. Negative for cough and wheezing.    Endocrine: Negative for heat intolerance.   Skin: Negative for rash.   Musculoskeletal: Positive for arthritis, back pain and joint pain. Negative for myalgias.   Gastrointestinal: Negative for abdominal pain, melena, nausea and vomiting.   Genitourinary: Negative for dysuria and hematuria.   Neurological: Negative for dizziness, light-headedness, numbness and tremors.   Psychiatric/Behavioral: Negative for depression. The patient is not nervous/anxious.          Vital Signs  Vitals:    04/02/22 2150 04/02/22 2325 04/03/22 0355 04/03/22 0500   BP: 163/86   157/88   BP Location: Right arm   Left arm   Patient Position: Sitting   Lying   Pulse: 68 70 74 77   Resp: 22 20 22   Temp: 98.2 °F (36.8 °C)   97 °F (36.1 °C)   TempSrc: Oral   Axillary   SpO2: 96% 97% 97% 97%   Weight:    (!) 162 kg (357 lb 2.3 oz)   Height:         Wt Readings from Last 1 Encounters:   04/03/22 (!) 162 kg (357 lb 2.3 oz)       Intake/Output Summary (Last 24 hours) at 4/3/2022 1206  Last data filed at 4/3/2022 0500  Gross per 24 hour   Intake 1303 ml   Output 4950 ml   Net -3647 ml     Physical Exam:  Constitutional:       Appearance: Well-developed.   Eyes:      General: No scleral icterus.         Right eye: No discharge.   HENT:      Head: Normocephalic and atraumatic.   Neck:      Thyroid: No thyromegaly.      Lymphadenopathy: No cervical adenopathy.   Pulmonary:      Effort: Pulmonary effort is normal. No respiratory distress.      Breath sounds: Normal breath sounds. No wheezing. No rales.   Cardiovascular:      Normal rate. Irregularly irregular rhythm.      No gallop.   Edema:     Peripheral edema absent.   Abdominal:      Tenderness: There is no abdominal tenderness.   Skin:     Findings: No erythema or rash.   Neurological:      Mental Status: Alert and oriented to person, place, and time.         Results Review:   Lab Results (last 24 hours)     Procedure Component Value Units Date/Time    POC Glucose Once [[id number]]  (Abnormal) Collected: 04/03/22 1159    Specimen: Blood Updated: 04/03/22 1200     Glucose 110 mg/dL      Comment: Serial Number: [id number]:  [id number]       POC Glucose Once [[id number]]  (Normal) Collected: 04/03/22 0711    Specimen: Blood Updated: 04/03/22 0712     Glucose 99 mg/dL      Comment: Serial Number: [id number]:  [id number]       CANDIDA AURIS SCREEN - Swab, Axilla Right, Axilla Left and Groin [[id number]]  (Normal) Collected: 03/29/22 0557    Specimen: Swab from Axilla Right, Axilla Left and Groin Updated: 04/03/22 0617     Candida Auris Screen Culture No Candida auris isolated at 5 days    Basic Metabolic Panel [[id number]]  (Abnormal) Collected: 04/03/22 0503    Specimen: Blood Updated: 04/03/22 0610     Glucose 110 mg/dL      BUN 25 mg/dL      Creatinine 1.20 mg/dL      Sodium 145 mmol/L      Potassium 3.5 mmol/L      Comment: Slight hemolysis detected by analyzer. Results may be affected.        Chloride 101 mmol/L      CO2 34.0 mmol/L      Calcium 8.5 mg/dL      BUN/Creatinine Ratio 20.8     Anion Gap 10.0 mmol/L      eGFR 62.7 mL/min/1.73      Comment: National Kidney Foundation and American Society of Nephrology (ASN) Task Force recommended calculation  based on the Chronic Kidney Disease Epidemiology Collaboration (CKD-EPI) equation refit without adjustment for race.       Narrative:      GFR Normal >60  Chronic Kidney Disease <60  Kidney Failure <15          Imaging Results (Last 72 Hours)     Procedure Component Value Units Date/Time    CT Chest Without Contrast Diagnostic [[id number]] Collected: 04/02/22 1141     Updated: 04/02/22 1148    Narrative:      CT CHEST WO CONTRAST DIAGNOSTIC-     Date of Exam: 4/2/2022 11:12 AM     Indication: Pneumonia, effusion or abscess suspected, xray done;  I50.9-Heart failure, unspecified; N39.0-Urinary tract infection, site  not specified; Z20.822-Contact with and (suspected) exposure to  covid-19.     Comparison: Chest x-ray 3/31/2022     Technique: Serial and axial CT images of the chest were obtained.  Reconstructions in the coronal and sagittal planes were performed.   Automated exposure control and iterative reconstruction methods were  used.  Radiation audit for number of CT and nuclear cardiology exams  performed in the last year:  0.     FINDINGS:     Small-moderate dependent bilateral pleural effusions, larger on the  right are present. Adjacent compressive atelectasis. No pneumothorax.  Bilateral atelectasis. Small areas of ill-defined indeterminate airspace  disease amongst the atelectasis.. Detailed evaluation limited due to  motion. Some interlobular septal thickening is evident in the right  lung.     Coronary disease with calcifications are present. No pericardial  effusion. Main pulmonary arteries enlarged, 4.3 cm.     No evidence of acute process on noncontrast imaging of the included  upper abdomen. There is body wall edema noted. Bilateral gynecomastia is  present small soft tissue opacities. No acute osseous findings.       Impression:      Small-moderate bilateral pleural effusions with adjacent atelectasis.  Ill-defined airspace disease amongst the atelectasis may be due to  coexistent pneumonia      Pulmonary edema evident.     Enlargement of the main and central pulmonary arteries, suggesting  pulmonary hypertension     Coronary disease with advanced calcifications              Electronically Signed By-Ronan Wiggins On:4/2/2022 11:46 AM  This report was finalized on [id number] by  Ronan Wiggins, .        ECG/EMG Results (most recent)     Procedure Component Value Units Date/Time    SCANNED - TELEMETRY   [[id number]] Resulted: 03/29/22     Updated: 03/29/22 1512    SCANNED - TELEMETRY   [[id number]] Resulted: 03/29/22     Updated: 03/29/22 1512    ECG 12 Lead [[id number]] Collected: 03/29/22 0105     Updated: 03/30/22 1039     QT Interval 442 ms     Narrative:      HEART RATE= 61  bpm  RR Interval= 986  ms  UT Interval=   ms  P Horizontal Axis=   deg  P Front Axis=   deg  QRSD Interval= 94  ms  QT Interval= 442  ms  QRS Axis= 13  deg  T Wave Axis= 119  deg  - ABNORMAL ECG -  Atrial fibrillation  Low voltage, extremity leads  No previous ECG available for comparison  Electronically Signed By: Bob Lawrence ([facility]) 30-Mar-2022 10:38:57  Date and Time of Study: 2022-03-29 01:05:24    SCANNED - TELEMETRY   [[id number]] Resulted: 03/29/22     Updated: 03/30/22 1125    SCANNED - TELEMETRY   [[id number]] Resulted: 03/29/22     Updated: 03/30/22 1135    SCANNED - TELEMETRY   [[id number]] Resulted: 03/29/22     Updated: 03/30/22 1151    SCANNED - TELEMETRY   [[id number]] Resulted: 03/29/22     Updated: 03/30/22 1151    SCANNED - TELEMETRY   [[id number]] Resulted: 03/29/22     Updated: 03/30/22 1156    SCANNED - TELEMETRY   [[id number]] Resulted: 03/29/22     Updated: 03/30/22 1156    SCANNED - TELEMETRY   [[id number]] Resulted: 03/29/22     Updated: 03/30/22 1221    SCANNED - TELEMETRY   [[id number]] Resulted: 03/29/22     Updated: 03/30/22 1304    SCANNED - TELEMETRY   [[id number]] Resulted: 03/29/22     Updated: 03/30/22 1335    SCANNED - TELEMETRY   [[id number]] Resulted: 03/29/22     Updated: 03/31/22 1049    SCANNED - TELEMETRY    [[id number]] Resulted: 03/29/22     Updated: 03/31/22 1156    SCANNED - TELEMETRY   [[id number]] Resulted: 03/29/22     Updated: 03/31/22 1318    Adult Transthoracic Echo Complete W/ Cont if Necessary Per Protocol [[id number]] Resulted: 03/31/22 1838     Updated: 03/31/22 1840     Target HR (85%) 122 bpm      Max. Pred. HR (100%) 144 bpm      ACS 2.16 cm      Ao root diam 3.4 cm      Ao pk ngoc 106.6 cm/sec      Ao V2 VTI 23.5 cm      YIMI(I,D) 4.7 cm2      EDV(cubed) 134.7 ml      EDV(MOD-sp4) 123.7 ml      EF(MOD-sp4) 51.9 %      ESV(cubed) 58.6 ml      ESV(MOD-sp4) 59.5 ml      IVS/LVPW 1.16 cm      LV mass(C)d 418.7 grams      LV V1 max PG 3.8 mmHg      LV V1 mean PG 2.45 mmHg      LV V1 max 98.0 cm/sec      LVPWd 1.61 cm      MV V2 VTI 20.5 cm      MVA(VTI) 5.4 cm2      PA V2 max 66.6 cm/sec      RAP systole 3.0 mmHg      RVIDd 2.6 cm      RVSP(TR) 14.9 mmHg      SI(MOD-sp4) 24.2 ml/m2      SV(LVOT) 111.0 ml      SV(MOD-sp4) 64.2 ml      TR max PG 11.9 mmHg      Ao max PG 4.5 mmHg      Ao mean PG 2.46 mmHg      FS 24.2 %      IVSd 1.87 cm      LA dimension(2D) 5.7 cm      LV V1 VTI 26.6 cm      LVIDd 5.1 cm      LVIDs 3.9 cm      LVOT area 4.2 cm2      LVOT diam 2.30 cm      MV max PG 6.6 mmHg      MV mean PG 3.4 mmHg      TR max ngoc 169.1 cm/sec      LV Wagoner Vol (BSA corrected) 46.6 cm2      LV Sys Vol (BSA corrected) 22.4 cm2     Narrative:      · Left ventricular systolic function is normal.  · Left ventricular ejection fraction is 55 to 60%  · Left ventricular diastolic function was normal.       SCANNED EKG [[id number]] Resulted: 03/29/22     Updated: 04/01/22 1129    SCANNED EKG [[id number]] Resulted: 01/29/22     Updated: 04/01/22 1129    SCANNED - TELEMETRY   [[id number]] Resulted: 03/29/22     Updated: 04/01/22 1459    SCANNED - TELEMETRY   [[id number]] Resulted: 03/29/22     Updated: 04/01/22 1512    SCANNED - TELEMETRY   [[id number]] Resulted: 03/29/22     Updated: 04/01/22 1512    ECG 12 Lead [[id number]]  Collected: 03/31/22 1908     Updated: 04/02/22 0649     QT Interval 488 ms     Narrative:      HEART RATE= 55  bpm  RR Interval= 1098  ms  TX Interval=   ms  P Horizontal Axis=   deg  P Front Axis=   deg  QRSD Interval= 84  ms  QT Interval= 488  ms  QRS Axis= 9  deg  T Wave Axis=   deg  - ABNORMAL ECG -  Atrial fibrillation  Low voltage, extremity and precordial leads  Nonspecific T abnormalities, lateral leads  When compared with ECG of 29-Mar-2022 1:05:24,  No significant change  Electronically Signed By: Duarte Canales (JOSELIN) 02-Apr-2022 06:48:57  Date and Time of Study: 2022-03-31 19:08:14        CBC    Results from last 7 days   Lab Units 04/01/22  0925 03/31/22 0332 03/30/22  0806 03/29/22  2323 03/29/22  1614 03/29/22  0113   WBC 10*3/mm3 3.90 4.10  --   --   --  4.40   HEMOGLOBIN g/dL 10.0* 9.4* 9.1* 9.7* 9.3* 9.7*   PLATELETS 10*3/mm3 157 122*  --   --   --  153     BMP   Results from last 7 days   Lab Units 04/03/22  0503 04/02/22  0525 04/01/22  0925 03/31/22  0332 03/29/22  0113   SODIUM mmol/L 145 146* 146* 145 144   POTASSIUM mmol/L 3.5 3.4* 3.8 4.2 3.8   CHLORIDE mmol/L 101 105 105 106 105   CO2 mmol/L 34.0* 33.0* 30.0* 29.0 28.0   BUN mg/dL 25* 28* 33* 36* 25*   CREATININE mg/dL 1.20 1.31* 1.57* 1.83* 1.30*   GLUCOSE mg/dL 110* 97 98 147* 111*   MAGNESIUM mg/dL  --   --   --  2.2  --    PHOSPHORUS mg/dL  --  3.4 4.4 4.3  --      CMP   Results from last 7 days   Lab Units 04/03/22  0503 04/02/22  0525 04/01/22 0925 03/31/22 0332 03/29/22  0113   SODIUM mmol/L 145 146* 146* 145 144   POTASSIUM mmol/L 3.5 3.4* 3.8 4.2 3.8   CHLORIDE mmol/L 101 105 105 106 105   CO2 mmol/L 34.0* 33.0* 30.0* 29.0 28.0   BUN mg/dL 25* 28* 33* 36* 25*   CREATININE mg/dL 1.20 1.31* 1.57* 1.83* 1.30*   GLUCOSE mg/dL 110* 97 98 147* 111*   ALBUMIN g/dL  --  3.00* 3.30*  --  3.10*   BILIRUBIN mg/dL  --   --   --   --  0.5   ALK PHOS U/L  --   --   --   --  88   AST (SGOT) U/L  --   --   --   --  9   ALT (SGPT) U/L  --   --    --   --  10   AMMONIA umol/L  --   --  29  --   --      Cardiac Studies:  Echo- Results for orders placed during the hospital encounter of 03/29/22    Adult Transthoracic Echo Complete W/ Cont if Necessary Per Protocol    Interpretation Summary  · Left ventricular systolic function is normal.  · Left ventricular ejection fraction is 55 to 60%  · Left ventricular diastolic function was normal.    Stress Myoview-  Cath-      Medication Review:   Scheduled Meds:atorvastatin, 20 mg, Oral, Nightly  enoxaparin, 1 mg/kg, Subcutaneous, Q12H  escitalopram, 10 mg, Oral, Daily  finasteride, 5 mg, Oral, Daily  gabapentin, 300 mg, Oral, TID  hydrALAZINE, 25 mg, Oral, TID  insulin glargine, 10 Units, Subcutaneous, Daily  isosorbide mononitrate, 30 mg, Oral, BID - Nitrates  meropenem, 500 mg, Intravenous, Q8H  senna-docusate sodium, 2 tablet, Oral, BID  sodium chloride, 10 mL, Intravenous, Q12H  tamsulosin, 0.4 mg, Oral, Daily      Continuous Infusions:furosemide (LASIX) 100 mg in 100mL NS infusion, 10 mg/hr, Last Rate: 10 mg/hr (04/03/22 0207)  pharmacy, 1 each  Pharmacy to Dose enoxaparin (LOVENOX),       PRN Meds:.•  acetaminophen **OR** acetaminophen **OR** acetaminophen  •  ondansetron **OR** ondansetron  •  Pharmacy to Dose enoxaparin (LOVENOX)  •  sodium chloride  •  sodium chloride      Assessment/Plan   Patient Active Problem List   Diagnosis   • CHF exacerbation (HCC)   • Cerebrovascular accident (HCC)   • Type 2 diabetes mellitus with diabetic nephropathy (HCC)   • Acute kidney failure (HCC)   • Congestive heart failure (HCC)   • Acute on chronic congestive heart failure, unspecified heart failure type (HCC)     MDM:    1.  Shortness of breath:    Shortness of breath is probably due to volume overload.  Patient volume status is slowly improving.    2.  Chronic atrial fibrillation:    Patient is in chronic atrial fibrillation.  Patient would need anticoagulation with Eliquis or Xarelto.    3.  Bilateral leg  edema/generalized anasarca:    Creatinine is stable and improving.  Continue diuresis    4.  Hypertension:    Patient is on hydralazine.  I would consider increasing hydralazine if needed if blood pressure is not well controlled.  Continue to observe    5.  Ischemic evaluation:    I would consider stress test after volume status is better.    Gurpreet Vargas MD  04/03/22  12:06 EDT

## 2022-04-03 NOTE — PROGRESS NOTES
[facility] Medicine Services Daily Progress Note    Patient Name: Shaji Junior  : 1945  MRN: [id number]  Primary Care Physician:  Naa Valverde MD  Date of admission: 3/29/2022      Subjective      Chief Complaint: Shortness of breath    Patient Reports   3/29/2022: The patient complains of mild substernal pressure-like chest pain with no associated shortness of breath, nausea or sweats.  He denies any alleviating or exacerbating factors.  He just ate.  I spoke with Dr. Vargas and he recommended increasing the Lasix because of the patient's persistent severe fluid volume overload.  I spoke with the bedside nurse and she checked with the patient's nursing facility to verify that he is a full code.  3/30/2022: Patient complains of ongoing shortness of breath and swelling.  He denies any chest pain.  He reports that his chest pain went away but he is not sure if the GI cocktail was helpful.  He reports no bowel movement since admission.  3/31/2022: Pt reports ongoing SOA and diffuse swelling but no current c/o CXP. Pt is incontinent of urine per nursing staff.  2022: The patient was resting on BiPAP at the time of my visit.  He was arousable and denied complaints.  He felt that his shortness of breath was getting better.  2022: Patient reports ongoing shortness of breath but he feels that it has improved as has his swelling.  4/3/2022: Pt reports ongoing improvement in his SOA and swelling. No other c/o at this time.    ROS     All other review of systems negative except for shortness of breath and swelling.    Objective      Vitals:   Temp:  [97 °F (36.1 °C)-99.2 °F (37.3 °C)] 97 °F (36.1 °C)  Heart Rate:  [65-77] 77  Resp:  [20-24] 22  BP: (144-163)/(75-88) 157/88  Flow (L/min):  [2-4] 4    Physical Exam   Vital signs and nurses notes reviewed.  Morbidly obese gentleman lying in bed on AVAPS in no acute distress; sclera anicteric, mucous membranes moist, lungs with decreased  air entry bilaterally; CV distant heart sounds; abdomen protuberant, soft, nontender, nondistended; bilateral upper and lower extremity anasarca is gradually improving; bilateral lower legs with thin scaly flaky skin and bullous lesions. Skin of feet and hands becoming wrinkled and loose as he is diuresing.      Result Review    Result Review:  I have personally reviewed the results from the time of this admission to 4/3/2022 12:27 EDT and agree with these findings:  [x]  Laboratory  []  Microbiology  [x]  Radiology  [x]  EKG/Telemetry   [x]  Cardiology/Vascular   []  Pathology  [x]  Old records  []  Other:  Most notable findings are discussed in the assessment and plan.    Wounds (last 24 hours)     LDA Wound     Row Name 04/03/22 0815 04/03/22 0400 04/03/22 0000       Wound 04/01/22 1122 Left posterior thigh Pressure Injury    Wound - Properties Group Placement Date: 04/01/22  - Placement Time: 1122 - Side: Left  - Orientation: posterior  -GK Location: thigh  -GK Primary Wound Type: Pressure inj  -GK    Dressing Appearance dry;intact  -JR -- --    Closure Adhesive bandage  -JR Adhesive bandage  -CK Adhesive bandage  -CK    Base dressing in place, unable to visualize  -JR pink;moist  -CK pink;moist  -CK    Periwound -- intact  -CK intact  -CK    Drainage Amount -- none  -CK none  -CK    Retired Wound - Properties Group Placement Date: 04/01/22  - Placement Time: 1122  - Side: Left  -GK Orientation: posterior  -GK Location: thigh  -GK Primary Wound Type: Pressure inj  -GK    Retired Wound - Properties Group Date first assessed: 04/01/22  - Time first assessed: 1122  - Side: Left  -GK Location: thigh  -GK Primary Wound Type: Pressure inj  -GK       Wound 04/01/22 1122 Right posterior thigh Pressure Injury    Wound - Properties Group Placement Date: 04/01/22  - Placement Time: 1122 - Side: Right  -GK Orientation: posterior  -GK Location: thigh  -GK Primary Wound Type: Pressure inj  -GK    Dressing  Appearance dry;intact  -JR -- --    Closure Adhesive bandage  -JR Adhesive bandage  -CK Adhesive bandage  -CK    Base dressing in place, unable to visualize  -JR pink;moist  -CK pink;moist  -CK    Periwound -- intact  -CK intact  -CK    Retired Wound - Properties Group Placement Date: 04/01/22 - Placement Time: 1122 -GK Side: Right  -GK Orientation: posterior  -GK Location: thigh  -GK Primary Wound Type: Pressure inj  -GK    Retired Wound - Properties Group Date first assessed: 04/01/22 -GK Time first assessed: 1122 -GK Side: Right  - Location: thigh  -GK Primary Wound Type: Pressure inj  -GK    Row Name 04/02/22 2000             Wound 04/01/22 1122 Left posterior thigh Pressure Injury    Wound - Properties Group Placement Date: 04/01/22 - Placement Time: 1122 - Side: Left  - Orientation: posterior  - Location: thigh  -GK Primary Wound Type: Pressure inj  -GK      Dressing Appearance dry;intact  -CK      Closure Adhesive bandage  -CK      Base pink;moist  -CK      Periwound intact  -CK      Drainage Amount none  -CK      Retired Wound - Properties Group Placement Date: 04/01/22 - Placement Time: 1122 -GK Side: Left  -GK Orientation: posterior  -GK Location: thigh  -GK Primary Wound Type: Pressure inj  -GK      Retired Wound - Properties Group Date first assessed: 04/01/22 - Time first assessed: 1122 -GK Side: Left  -GK Location: thigh  -GK Primary Wound Type: Pressure inj  -GK              Wound 04/01/22 1122 Right posterior thigh Pressure Injury    Wound - Properties Group Placement Date: 04/01/22 - Placement Time: 1122 -GK Side: Right  -GK Orientation: posterior  -GK Location: thigh  -GK Primary Wound Type: Pressure inj  -GK      Dressing Appearance dry;intact  -CK      Closure Adhesive bandage  -CK      Base pink;moist  -CK      Periwound intact  -CK      Retired Wound - Properties Group Placement Date: 04/01/22 - Placement Time: 1122 -GK Side: Right  -GK Orientation: posterior   -GK Location: thigh  -GK Primary Wound Type: Pressure inj  -GK      Retired Wound - Properties Group Date first assessed: 04/01/22  -GK Time first assessed: 1122 -GK Side: Right  -GK Location: thigh  -GK Primary Wound Type: Pressure inj  -GK            User Key  (r) = Recorded By, (t) = Taken By, (c) = Cosigned By    Initials Name Provider Type    Madhu Tucker RN Registered Nurse    Vira Staton RN Registered Nurse    Raiza Felipe RN Registered Nurse                  Assessment/Plan      Brief Patient Summary:  Shaji Junior is a 76 y.o. male with a chief complaint of shortness of breath which has been worsening over the last several days.  The patient has limited knowledge of his medical history.  He is from [facility] with the patient reports he has been for 2 years.  From review of patient's medication list and physical exam the patient appears to have chronic lower extremity edema.  He is noted to be in atrial fibrillation with controlled ventricular response on admission without anticoagulation on his medication list however, the patient is on Cardizem.  I asked the patient who his primary care provider was and he said he just gets care at the nursing home and goes to the hospital.  He usually goes to [facility].  The patient was put on it at the [facility] recently secondary to his increasing shortness of breath.     Review of records: Patient was seen in the ER 3/11/2022 after he pulled out PICC line with concern for portion still in the patient's arm.  At that time x-ray showed no opaque foreign body.  Patient was discharged back to the [facility].     Current medications include:  atorvastatin, 20 mg, Oral, Nightly  enoxaparin, 1 mg/kg, Subcutaneous, Q12H  escitalopram, 10 mg, Oral, Daily  finasteride, 5 mg, Oral, Daily  gabapentin, 300 mg, Oral, TID  hydrALAZINE, 25 mg, Oral, TID  insulin glargine, 10 Units, Subcutaneous, Daily  isosorbide mononitrate, 30 mg, Oral, BID -  Nitrates  meropenem, 500 mg, Intravenous, Q8H  senna-docusate sodium, 2 tablet, Oral, BID  sodium chloride, 10 mL, Intravenous, Q12H  tamsulosin, 0.4 mg, Oral, Daily       furosemide (LASIX) 100 mg in 100mL NS infusion, 10 mg/hr, Last Rate: 10 mg/hr (04/03/22 0207)  pharmacy, 1 each  Pharmacy to Dose enoxaparin (LOVENOX),          Active Hospital Problems:  Active Hospital Problems    Diagnosis    • Acute on chronic congestive heart failure, unspecified heart failure type (HCC)    • CHF exacerbation (HCC)    • Type 2 diabetes mellitus with diabetic nephropathy (HCC)      Plan:     Acute on chronic diastolic congestive heart failure secondary to hypertensive heart disease  -Lasix 60 mg IV every 8 hours given but Cr david from 1.3 to 1.83  -proBNP 4400 with comparison 2100 May 2021  -Echocardiogram from outside facility showed EF 55%, normal LV size, no tricuspid regurgitation, aortic valve is normal in structure and function.  No mitral valve regurgitation.  Mitral valve grossly normal.   -Dr. Stein nephrology started the patient on a Lasix drip 3/31/2022  -Intake and output not accurate because the patient has incontinence of urine  -There is a huge discrepancy between the bed weight and the standing weight possibly because the patient cannot let go of the standing weight scale when weighed; accuracy of each is questionable    Acute renal failure due to prerenal azotemia from diuresis on chronic kidney disease stage IIIa secondary to hypertension and diabetes  -Creatinine 1.30 apparently is his baseline with comparison 1.27 May 2021  -Creatinine had worsened to 1.83 with diuresis but has improved to 1.2 despite diuresis  -Nephrology managing diuresis     Acute on chronic anemia  Hemoglobin 9.7 with comparison 11.4 May 2021  -Hemoglobin stable at 10.0  -Monitor     Persistent atrial fibrillation with controlled ventricular response  -Patient was not on anticoagulation therapy prior to admission and would benefit from  anticoagulation; Lovenox will be changed to therapeutic dosing  -Continue Cardizem and Coreg  -EKG showing atrial fibrillation with controlled ventricular response     Acute urinary tract infection  -Urine culture grew ESBL producing Proteus mirabilis   -Continue IV Merrem     Essential hypertension, chronic and controlled  -Continue Norvasc, Coreg, diltiazem, hydralazine and isosorbide     Dementia  Depression, chronic  -Continue Lexapro     BPH, chronic  -Continue Proscar and Flomax  -Patient is incontinent of urine  -Check postvoid residual      Diabetes type 2, chronic and uncontrolled with diabetic peripheral neuropathy  -Continue Lantus and gabapentin  -hemoglobin A1c 6.4  -Accu-Cheks and SSI     Hyperlipidemia, chronic  -Continue Zocor     This patient is high risk.    DVT prophylaxis:  Medical DVT prophylaxis orders are present.    CODE STATUS:    Code Status (Patient has no pulse and is not breathing): CPR (Attempt to Resuscitate)  Medical Interventions (Patient has pulse or is breathing): Full Support  I spoke with the patient extensively about his CODE STATUS and his wishes for further care.   He reports his sister Michelle is his medical decision-maker in the event that he is unable to make decisions.  The nursing staff spoke with Michelle who reports the patient does not have any baseline dementia.  He is awake and alert and oriented x3 and is requesting full care including intervention if needed.    Disposition:  I expect patient to be discharged in 2-3 days if continuing to clinically improve.    This patient has been examined wearing appropriate Personal Protective Equipment and discussed with hospital infection control department. 04/03/22      Electronically signed by Mariela Guan MD, 04/03/22, 12:27 EDT.  [facility]ist Team

## 2022-04-03 NOTE — PROGRESS NOTES
Daily Progress Note        CHF exacerbation (HCC)    Type 2 diabetes mellitus with diabetic nephropathy (HCC)    Acute on chronic congestive heart failure, unspecified heart failure type (HCC)      Assessment  Acute/chronic hypercapnic respiratory insufficiency  -ABG, pH 7.32/pCO2 63/PO2 81/HC03 32    Obstructive sleep apnea  Obesity hypoventilation syndrome  Pulmonary edema and cardiomegaly  A. Fib  Acute kidney injury  Morbid obesity  Hypertension  Non-smoker    3/31/2022 Free T4 1.20    3/29/2022 urine culture Proteus Mirabilis      3/31/2022 echo  EF 55 to 60%  RVSP 14.9 mmHg     Recommendations:    Continue to titrate oxygen- Currently on 3.5L NC and AVAPS at at bedtime  Merrem for UTI-finishes 4/8  Lasix gtt  Electrolyte/Glycemic control  DVT Prophylaxis-Lovenox  Keep HOB elevated  Aspiration precaution  PT/SLP following     4/2/2022           LOS: 2 days     Subjective       Patient reports feeling better today  Objective     Vital signs for last 24 hours:  Vitals:    04/02/22 2150 04/02/22 2325 04/03/22 0355 04/03/22 0500   BP: 163/86   157/88   BP Location: Right arm   Left arm   Patient Position: Sitting   Lying   Pulse: 68 70 74 77   Resp: 22 20 22   Temp: 98.2 °F (36.8 °C)   97 °F (36.1 °C)   TempSrc: Oral   Axillary   SpO2: 96% 97% 97% 97%   Weight:    (!) 162 kg (357 lb 2.3 oz)   Height:           Intake/Output last 3 shifts:  I/O last 3 completed shifts:  In: 2491 [P.O.:1730; I.V.:761]  Out: 9100 [Urine:9100]  Intake/Output this shift:  No intake/output data recorded.      Radiology  Imaging Results (Last 24 Hours)     Procedure Component Value Units Date/Time    CT Chest Without Contrast Diagnostic [[id number]] Collected: 04/02/22 1141     Updated: 04/02/22 1148    Narrative:      CT CHEST WO CONTRAST DIAGNOSTIC-     Date of Exam: 4/2/2022 11:12 AM     Indication: Pneumonia, effusion or abscess suspected, xray done;  I50.9-Heart failure, unspecified; N39.0-Urinary tract infection, site  not  specified; Z20.822-Contact with and (suspected) exposure to  covid-19.     Comparison: Chest x-ray 3/31/2022     Technique: Serial and axial CT images of the chest were obtained.  Reconstructions in the coronal and sagittal planes were performed.   Automated exposure control and iterative reconstruction methods were  used.  Radiation audit for number of CT and nuclear cardiology exams  performed in the last year:  0.     FINDINGS:     Small-moderate dependent bilateral pleural effusions, larger on the  right are present. Adjacent compressive atelectasis. No pneumothorax.  Bilateral atelectasis. Small areas of ill-defined indeterminate airspace  disease amongst the atelectasis.. Detailed evaluation limited due to  motion. Some interlobular septal thickening is evident in the right  lung.     Coronary disease with calcifications are present. No pericardial  effusion. Main pulmonary arteries enlarged, 4.3 cm.     No evidence of acute process on noncontrast imaging of the included  upper abdomen. There is body wall edema noted. Bilateral gynecomastia is  present small soft tissue opacities. No acute osseous findings.       Impression:      Small-moderate bilateral pleural effusions with adjacent atelectasis.  Ill-defined airspace disease amongst the atelectasis may be due to  coexistent pneumonia     Pulmonary edema evident.     Enlargement of the main and central pulmonary arteries, suggesting  pulmonary hypertension     Coronary disease with advanced calcifications              Electronically Signed By-Ronan Wiggins On:4/2/2022 11:46 AM  This report was finalized on [id number] by  Ronan Wiggins, .          Labs:  Results from last 7 days   Lab Units 04/01/22  0925   WBC 10*3/mm3 3.90   HEMOGLOBIN g/dL 10.0*   HEMATOCRIT % 31.1*   PLATELETS 10*3/mm3 157     Results from last 7 days   Lab Units 04/03/22  0503 03/31/22  0332 03/29/22  0113   SODIUM mmol/L 145   < > 144   POTASSIUM mmol/L 3.5   < > 3.8   CHLORIDE mmol/L  101   < > 105   CO2 mmol/L 34.0*   < > 28.0   BUN mg/dL 25*   < > 25*   CREATININE mg/dL 1.20   < > 1.30*   CALCIUM mg/dL 8.5*   < > 8.8   BILIRUBIN mg/dL  --   --  0.5   ALK PHOS U/L  --   --  88   ALT (SGPT) U/L  --   --  10   AST (SGOT) U/L  --   --  9   GLUCOSE mg/dL 110*   < > 111*    < > = values in this interval not displayed.     Results from last 7 days   Lab Units 03/31/22  1153   PH, ARTERIAL pH units 7.322*   PO2 ART mm Hg 81.4*   PCO2, ARTERIAL mm Hg 62.9*   HCO3 ART mmol/L 32.6*     Results from last 7 days   Lab Units 04/02/22  0525 04/01/22  0925 03/29/22  0113   ALBUMIN g/dL 3.00* 3.30* 3.10*     Results from last 7 days   Lab Units 03/29/22  0507 03/29/22  0113   TROPONIN T ng/mL 0.035* 0.034*         Results from last 7 days   Lab Units 03/31/22  0332   MAGNESIUM mg/dL 2.2     Results from last 7 days   Lab Units 03/29/22  0113   INR  1.13*   APTT seconds 32.0*     Results from last 7 days   Lab Units 03/31/22  0332   TSH uIU/mL 0.696   FREE T4 ng/dL 1.20           Meds:   SCHEDULE  atorvastatin, 20 mg, Oral, Nightly  enoxaparin, 1 mg/kg, Subcutaneous, Q12H  escitalopram, 10 mg, Oral, Daily  finasteride, 5 mg, Oral, Daily  gabapentin, 300 mg, Oral, TID  hydrALAZINE, 25 mg, Oral, TID  insulin glargine, 10 Units, Subcutaneous, Daily  isosorbide mononitrate, 30 mg, Oral, BID - Nitrates  meropenem, 500 mg, Intravenous, Q8H  senna-docusate sodium, 2 tablet, Oral, BID  sodium chloride, 10 mL, Intravenous, Q12H  tamsulosin, 0.4 mg, Oral, Daily      Infusions  furosemide (LASIX) 100 mg in 100mL NS infusion, 10 mg/hr, Last Rate: 10 mg/hr (04/03/22 0207)  pharmacy, 1 each  Pharmacy to Dose enoxaparin (LOVENOX),       PRNs  •  acetaminophen **OR** acetaminophen **OR** acetaminophen  •  ondansetron **OR** ondansetron  •  Pharmacy to Dose enoxaparin (LOVENOX)  •  sodium chloride  •  sodium chloride    Physical Exam:  Physical Exam  General Appearance: Sleeping but easily aroused.  No distress noted.  Alert and  oriented.  Obese.  HEENT:  Normocephalic, without obvious abnormality, Conjunctiva/corneas clear,.  Normal external ear canals, Nares normal, no drainage     Neck:  Supple, symmetrical, trachea midline. No JVD.  Lungs /Chest wall:   Bilateral basal rhonchi, respirations unlabored symmetrical wall movement.     Heart:  Regular rate and rhythm, systolic murmur PMI left sternal border  Abdomen: Soft, non-tender, no masses, no organomegaly.    Extremities: Positive edema in BLE's, no clubbing or cyanosis        ROS  Review of Systems       Constitutional: Negative for chills, fever and malaise/fatigue.   HENT: Negative.    Eyes: Negative.    Cardiovascular: Negative.    Respiratory: Positive for cough and shortness of breath, but improving  Skin: Negative.    Musculoskeletal: Negative.    Gastrointestinal: Negative.    Genitourinary: Negative.    Neurological: Negative.    Psychiatric/Behavioral: Negative.      I have reviewed current clinicals.     Electronically signed by MELISA Martinez, 04/03/22, 10:02 AM EDT.     The NP scribed the note for me, I have examined the patient and reviewed and verified the above findings and and I formulated the assessment and plan as documented

## 2022-04-03 NOTE — PLAN OF CARE
Problem: Fall Injury Risk  Goal: Absence of Fall and Fall-Related Injury  Outcome: Ongoing, Progressing  Intervention: Promote Injury-Free Environment  Recent Flowsheet Documentation  Taken 4/3/2022 0200 by Raiza Hugo RN  Safety Promotion/Fall Prevention: safety round/check completed  Taken 4/3/2022 0000 by Raiza Hugo RN  Safety Promotion/Fall Prevention: safety round/check completed  Taken 4/2/2022 2200 by Raiza Hugo RN  Safety Promotion/Fall Prevention: safety round/check completed  Taken 4/2/2022 2000 by Raiza Hugo RN  Safety Promotion/Fall Prevention: safety round/check completed     Problem: Adult Inpatient Plan of Care  Goal: Absence of Hospital-Acquired Illness or Injury  Intervention: Prevent Infection  Recent Flowsheet Documentation  Taken 4/2/2022 2000 by Raiza Huog RN  Infection Prevention:   equipment surfaces disinfected   single patient room provided     Problem: Skin Injury Risk Increased  Goal: Skin Health and Integrity  Outcome: Ongoing, Progressing  Intervention: Optimize Skin Protection  Recent Flowsheet Documentation  Taken 4/3/2022 0000 by Raiza Hugo RN  Head of Bed (HOB) Positioning: HOB at 30-45 degrees  Taken 4/2/2022 2000 by Raiza Hugo RN  Pressure Reduction Techniques: weight shift assistance provided  Head of Bed (HOB) Positioning: HOB at 30-45 degrees  Pressure Reduction Devices: pressure-redistributing mattress utilized  Skin Protection: tubing/devices free from skin contact   Goal Outcome Evaluation:Pt doing fairly well on iv lasix infusion,elimination is good.On 4l/min O2 and sats okay.K+ supplemented.

## 2022-04-03 NOTE — PROGRESS NOTES
"[facility] PROGRESS NOTE     LOS: 2 days    Patient Care Team:  Naa Valverde MD as PCP - General (Internal Medicine)      Subjective     Less soa this am on o2 via nasal cannula, no soa at rest, still has mild morris but better today    Objective     Vital Sign Min/Max for last 24 hours  Temp:  [97 °F (36.1 °C)-99.2 °F (37.3 °C)] 98.2 °F (36.8 °C)  Heart Rate:  [65-77] 72  Resp:  [16-24] 16  BP: (127-163)/(62-88) 127/62                       Flowsheet Rows    Flowsheet Row First Filed Value   Admission Height 182.9 cm (72\") Documented at 03/29/2022 0100   Admission Weight 118 kg (260 lb) Documented at 03/29/2022 0100          I/O this shift:  In: 240 [P.O.:240]  Out: -   I/O last 3 completed shifts:  In: 2491 [P.O.:1730; I.V.:761]  Out: 9100 [Urine:9100]    Physical Exam:  Physical Exam    General Appearance: Morbidly obese, chronically ill-appearing, in mild distress   skin: warm and dry  HEENT: On BiPAP.  Oral mucosa moist  Neck: supple, +jvd  Lungs: Decreased breath sounds at bases with bibasilar rales  Heart: Bradycardic, normal S1 and S2  Abdomen: Morbidly obese, soft, nontender, nondistended  Extremities: 2+ bilateral lower extreme edema with venous stasis changes  Neuro: normal speech and mental status        LABS:  Lab Results   Component Value Date    CALCIUM 8.5 (L) 04/03/2022    PHOS 3.4 04/02/2022     Results from last 7 days   Lab Units 04/03/22  0503 04/02/22  0525 04/01/22  0925 03/31/22  0332 03/30/22  0806 03/29/22  1614 03/29/22  0113   MAGNESIUM mg/dL  --   --   --  2.2  --   --   --    SODIUM mmol/L 145 146* 146* 145  --   --  144   POTASSIUM mmol/L 3.5 3.4* 3.8 4.2  --   --  3.8   CHLORIDE mmol/L 101 105 105 106  --   --  105   CO2 mmol/L 34.0* 33.0* 30.0* 29.0  --   --  28.0   BUN mg/dL 25* 28* 33* 36*  --   --  25*   CREATININE mg/dL 1.20 1.31* 1.57* 1.83*  --   --  1.30*   GLUCOSE mg/dL 110* 97 98 147*  --   --  111*   CALCIUM mg/dL 8.5* 8.0* 8.7 8.5*  --   --  8.8   WBC 10*3/mm3  --   " --  3.90 4.10  --   --  4.40   HEMOGLOBIN g/dL  --   --  10.0* 9.4* 9.1*   < > 9.7*   PLATELETS 10*3/mm3  --   --  157 122*  --   --  153   ALT (SGPT) U/L  --   --   --   --   --   --  10   AST (SGOT) U/L  --   --   --   --   --   --  9    < > = values in this interval not displayed.     Lab Results   Component Value Date    TROPONINT 0.035 (C) 03/29/2022     Estimated Creatinine Clearance: 82.2 mL/min (by C-G formula based on SCr of 1.2 mg/dL).  Results from last 7 days   Lab Units 03/31/22  1153   PH, ARTERIAL pH units 7.322*   PO2 ART mm Hg 81.4*   PCO2, ARTERIAL mm Hg 62.9*   HCO3 ART mmol/L 32.6*     Brief Urine Lab Results  (Last result in the past 365 days)      Color   Clarity   Blood   Leuk Est   Nitrite   Protein   CREAT   Urine HCG        03/29/22 0258 Yellow   Clear   Trace   Small (1+)   Positive   Negative               WEIGHTS:     Wt Readings from Last 1 Encounters:   04/03/22 0500 (!) 162 kg (357 lb 2.3 oz)   04/02/22 0500 (!) 162 kg (357 lb 12.9 oz)   03/31/22 1645 123 kg (270 lb 1 oz)   03/31/22 1419 (!) 152 kg (335 lb)   03/29/22 0952 (!) 152 kg (335 lb)   03/29/22 0425 (!) 152 kg (335 lb 4.8 oz)   03/29/22 0100 118 kg (260 lb)       atorvastatin, 20 mg, Oral, Nightly  enoxaparin, 1 mg/kg, Subcutaneous, Q12H  escitalopram, 10 mg, Oral, Daily  finasteride, 5 mg, Oral, Daily  gabapentin, 300 mg, Oral, TID  hydrALAZINE, 25 mg, Oral, TID  insulin glargine, 10 Units, Subcutaneous, Daily  isosorbide mononitrate, 30 mg, Oral, BID - Nitrates  meropenem, 500 mg, Intravenous, Q8H  senna-docusate sodium, 2 tablet, Oral, BID  sodium chloride, 10 mL, Intravenous, Q12H  tamsulosin, 0.4 mg, Oral, Daily      furosemide (LASIX) 100 mg in 100mL NS infusion, 10 mg/hr, Last Rate: 10 mg/hr (04/03/22 0207)  pharmacy, 1 each  Pharmacy to Dose enoxaparin (LOVENOX),         Assessment/Plan       1.  Acute kidney injury on chronic renal disease stage IIIa  Patient seems to have underlying chronic kidney disease in setting  of hypertension and diabetes  Creatinine is better at 1.2 mg/DL this morning despite brisk diuresis, better than his chronic baseline renal function  2.  Fluid overload/Pulmonary hypertension  3.  Hypertension with CKD.  Blood pressure on the low side  4.  Morbid obesity    5.  UTI.  Urine culture growing ESBL Proteus mirabilis     Recs:  -Continue IV Lasix drip for diuresis again today, reeval daily  -continue same hydralazine dose  -Monitor renal function while being diuresed        Faraz Petersen MD  04/03/22  12:53 EDT   n/a

## 2022-04-04 LAB
ANION GAP SERPL CALCULATED.3IONS-SCNC: 8 MMOL/L (ref 5–15)
BASOPHILS # BLD AUTO: 0 10*3/MM3 (ref 0–0.2)
BASOPHILS NFR BLD AUTO: 0.3 % (ref 0–1.5)
BUN SERPL-MCNC: 21 MG/DL (ref 8–23)
BUN/CREAT SERPL: 20.6 (ref 7–25)
CALCIUM SPEC-SCNC: 8.8 MG/DL (ref 8.6–10.5)
CHLORIDE SERPL-SCNC: 96 MMOL/L (ref 98–107)
CO2 SERPL-SCNC: 40 MMOL/L (ref 22–29)
CREAT SERPL-MCNC: 1.02 MG/DL (ref 0.76–1.27)
DEPRECATED RDW RBC AUTO: 54.7 FL (ref 37–54)
EGFRCR SERPLBLD CKD-EPI 2021: 76.2 ML/MIN/1.73
EOSINOPHIL # BLD AUTO: 0.3 10*3/MM3 (ref 0–0.4)
EOSINOPHIL NFR BLD AUTO: 6.5 % (ref 0.3–6.2)
ERYTHROCYTE [DISTWIDTH] IN BLOOD BY AUTOMATED COUNT: 18.9 % (ref 12.3–15.4)
GLUCOSE BLDC GLUCOMTR-MCNC: 130 MG/DL (ref 70–105)
GLUCOSE BLDC GLUCOMTR-MCNC: 134 MG/DL (ref 70–105)
GLUCOSE BLDC GLUCOMTR-MCNC: 168 MG/DL (ref 70–105)
GLUCOSE SERPL-MCNC: 101 MG/DL (ref 65–99)
HCT VFR BLD AUTO: 30.9 % (ref 37.5–51)
HGB BLD-MCNC: 10.2 G/DL (ref 13–17.7)
LYMPHOCYTES # BLD AUTO: 1.7 10*3/MM3 (ref 0.7–3.1)
LYMPHOCYTES NFR BLD AUTO: 40.8 % (ref 19.6–45.3)
MAGNESIUM SERPL-MCNC: 1.9 MG/DL (ref 1.6–2.4)
MCH RBC QN AUTO: 27.4 PG (ref 26.6–33)
MCHC RBC AUTO-ENTMCNC: 32.9 G/DL (ref 31.5–35.7)
MCV RBC AUTO: 83.1 FL (ref 79–97)
MONOCYTES # BLD AUTO: 0.6 10*3/MM3 (ref 0.1–0.9)
MONOCYTES NFR BLD AUTO: 14.4 % (ref 5–12)
NEUTROPHILS NFR BLD AUTO: 1.6 10*3/MM3 (ref 1.7–7)
NEUTROPHILS NFR BLD AUTO: 38 % (ref 42.7–76)
NRBC BLD AUTO-RTO: 0.3 /100 WBC (ref 0–0.2)
PHOSPHATE SERPL-MCNC: 2.8 MG/DL (ref 2.5–4.5)
PLATELET # BLD AUTO: 174 10*3/MM3 (ref 140–450)
PMV BLD AUTO: 8.6 FL (ref 6–12)
POTASSIUM SERPL-SCNC: 3.3 MMOL/L (ref 3.5–5.2)
RBC # BLD AUTO: 3.72 10*6/MM3 (ref 4.14–5.8)
SODIUM SERPL-SCNC: 144 MMOL/L (ref 136–145)
WBC NRBC COR # BLD: 4.1 10*3/MM3 (ref 3.4–10.8)

## 2022-04-04 PROCEDURE — 94799 UNLISTED PULMONARY SVC/PX: CPT

## 2022-04-04 PROCEDURE — 94660 CPAP INITIATION&MGMT: CPT

## 2022-04-04 PROCEDURE — 84100 ASSAY OF PHOSPHORUS: CPT | Performed by: HOSPITALIST

## 2022-04-04 PROCEDURE — 25010000002 ENOXAPARIN PER 10 MG: Performed by: HOSPITALIST

## 2022-04-04 PROCEDURE — 99232 SBSQ HOSP IP/OBS MODERATE 35: CPT | Performed by: INTERNAL MEDICINE

## 2022-04-04 PROCEDURE — 92526 ORAL FUNCTION THERAPY: CPT

## 2022-04-04 PROCEDURE — 63710000001 INSULIN GLARGINE PER 5 UNITS: Performed by: NURSE PRACTITIONER

## 2022-04-04 PROCEDURE — 25010000002 FUROSEMIDE PER 20 MG: Performed by: INTERNAL MEDICINE

## 2022-04-04 PROCEDURE — 83735 ASSAY OF MAGNESIUM: CPT | Performed by: HOSPITALIST

## 2022-04-04 PROCEDURE — 25010000002 MEROPENEM PER 100 MG: Performed by: HOSPITALIST

## 2022-04-04 PROCEDURE — 80048 BASIC METABOLIC PNL TOTAL CA: CPT | Performed by: HOSPITALIST

## 2022-04-04 PROCEDURE — 82962 GLUCOSE BLOOD TEST: CPT

## 2022-04-04 PROCEDURE — 97530 THERAPEUTIC ACTIVITIES: CPT

## 2022-04-04 PROCEDURE — 97110 THERAPEUTIC EXERCISES: CPT

## 2022-04-04 PROCEDURE — 85025 COMPLETE CBC W/AUTO DIFF WBC: CPT | Performed by: HOSPITALIST

## 2022-04-04 PROCEDURE — 25010000002 MEROPENEM PER 100 MG: Performed by: INTERNAL MEDICINE

## 2022-04-04 RX ORDER — CARVEDILOL 6.25 MG/1
12.5 TABLET ORAL EVERY 12 HOURS SCHEDULED
Status: DISCONTINUED | OUTPATIENT
Start: 2022-04-04 | End: 2022-04-06 | Stop reason: HOSPADM

## 2022-04-04 RX ORDER — SPIRONOLACTONE 25 MG/1
25 TABLET ORAL
Status: DISCONTINUED | OUTPATIENT
Start: 2022-04-04 | End: 2022-04-05

## 2022-04-04 RX ORDER — POTASSIUM CHLORIDE 20 MEQ/1
40 TABLET, EXTENDED RELEASE ORAL ONCE
Status: COMPLETED | OUTPATIENT
Start: 2022-04-04 | End: 2022-04-04

## 2022-04-04 RX ADMIN — ISOSORBIDE MONONITRATE 30 MG: 30 TABLET, EXTENDED RELEASE ORAL at 09:10

## 2022-04-04 RX ADMIN — ISOSORBIDE MONONITRATE 30 MG: 30 TABLET, EXTENDED RELEASE ORAL at 18:31

## 2022-04-04 RX ADMIN — FINASTERIDE 5 MG: 5 TABLET, FILM COATED ORAL at 09:10

## 2022-04-04 RX ADMIN — SPIRONOLACTONE 25 MG: 25 TABLET, FILM COATED ORAL at 18:31

## 2022-04-04 RX ADMIN — GABAPENTIN 300 MG: 300 CAPSULE ORAL at 21:52

## 2022-04-04 RX ADMIN — HYDRALAZINE HYDROCHLORIDE 25 MG: 25 TABLET, FILM COATED ORAL at 16:49

## 2022-04-04 RX ADMIN — GABAPENTIN 300 MG: 300 CAPSULE ORAL at 16:49

## 2022-04-04 RX ADMIN — SENNOSIDES AND DOCUSATE SODIUM 2 TABLET: 50; 8.6 TABLET ORAL at 09:10

## 2022-04-04 RX ADMIN — FUROSEMIDE 10 MG/HR: 10 INJECTION INTRAMUSCULAR; INTRAVENOUS at 10:17

## 2022-04-04 RX ADMIN — MEROPENEM 500 MG: 500 INJECTION, POWDER, FOR SOLUTION INTRAVENOUS at 12:37

## 2022-04-04 RX ADMIN — ESCITALOPRAM OXALATE 10 MG: 10 TABLET ORAL at 09:10

## 2022-04-04 RX ADMIN — HYDRALAZINE HYDROCHLORIDE 25 MG: 25 TABLET, FILM COATED ORAL at 21:52

## 2022-04-04 RX ADMIN — Medication 10 ML: at 09:10

## 2022-04-04 RX ADMIN — APIXABAN 5 MG: 5 TABLET, FILM COATED ORAL at 21:51

## 2022-04-04 RX ADMIN — GABAPENTIN 300 MG: 300 CAPSULE ORAL at 09:10

## 2022-04-04 RX ADMIN — INSULIN GLARGINE 10 UNITS: 100 INJECTION, SOLUTION SUBCUTANEOUS at 09:08

## 2022-04-04 RX ADMIN — ENOXAPARIN SODIUM 160 MG: 100 INJECTION SUBCUTANEOUS at 09:09

## 2022-04-04 RX ADMIN — MEROPENEM 500 MG: 500 INJECTION, POWDER, FOR SOLUTION INTRAVENOUS at 18:31

## 2022-04-04 RX ADMIN — SENNOSIDES AND DOCUSATE SODIUM 2 TABLET: 50; 8.6 TABLET ORAL at 21:52

## 2022-04-04 RX ADMIN — TAMSULOSIN HYDROCHLORIDE 0.4 MG: 0.4 CAPSULE ORAL at 09:10

## 2022-04-04 RX ADMIN — HYDRALAZINE HYDROCHLORIDE 25 MG: 25 TABLET, FILM COATED ORAL at 09:10

## 2022-04-04 RX ADMIN — POTASSIUM CHLORIDE 40 MEQ: 20 TABLET, EXTENDED RELEASE ORAL at 13:43

## 2022-04-04 RX ADMIN — MEROPENEM 500 MG: 500 INJECTION, POWDER, FOR SOLUTION INTRAVENOUS at 06:15

## 2022-04-04 RX ADMIN — CARVEDILOL 12.5 MG: 6.25 TABLET, FILM COATED ORAL at 21:51

## 2022-04-04 RX ADMIN — CARVEDILOL 12.5 MG: 6.25 TABLET, FILM COATED ORAL at 04:03

## 2022-04-04 RX ADMIN — Medication 10 ML: at 21:52

## 2022-04-04 RX ADMIN — ATORVASTATIN CALCIUM 20 MG: 20 TABLET, FILM COATED ORAL at 21:51

## 2022-04-04 NOTE — THERAPY TREATMENT NOTE
Acute Care - Speech Language Pathology   Swallow Treatment Note KAYODE Migue     Patient Name: Shaji Junior  : 1945  MRN: [id number]  Today's Date: 2022               Admit Date: 3/29/2022  Patient was not wearing a face mask during this therapy encounter. Therapist used appropriate personal protective equipment including gown, mask and gloves.  Mask used was standard procedure mask. Appropriate PPE was worn during the entire therapy session. Hand hygiene was completed before and after therapy session. Patient is not in enhanced droplet precautions.             Visit Dx:     ICD-10-CM ICD-9-CM   1. Acute on chronic congestive heart failure, unspecified heart failure type (HCC)  I50.9 428.0   2. Acute UTI  N39.0 599.0   3. Lab test negative for COVID-19 virus  Z20.822 V01.79     Patient Active Problem List   Diagnosis   • CHF exacerbation (HCC)   • Cerebrovascular accident (HCC)   • Type 2 diabetes mellitus with diabetic nephropathy (HCC)   • Acute kidney failure (HCC)   • Congestive heart failure (HCC)   • Acute on chronic congestive heart failure, unspecified heart failure type (HCC)     Past Medical History:   Diagnosis Date   • Acute kidney failure (HCC)    • Acute respiratory failure with hypoxia (HCC)    • Anemia    • Benign prostatic hyperplasia    • Bilateral primary osteoarthritis of knee    • Cardiomegaly    • Cardiomyopathy (HCC)    • Cellulitis and abscess of left leg    • Cerebral infarction (HCC)    • Cerebrovascular disease    • Chronic kidney disease    • Dementia (HCC)    • Diabetes mellitus (HCC)    • Essential hypertension    • GERD (gastroesophageal reflux disease)    • Gout    • Heart failure (HCC)    • Hyperlipidemia    • Morbid obesity (HCC)    • Myocardial infarction (HCC)    • Obstructive sleep apnea    • Obstructive uropathy    • Paroxysmal atrial fibrillation (HCC)    • Peptic ulcer    • Peripheral vascular disease (HCC)    • Pulmonary edema    • Venous insufficiency      No past  surgical history on file.    SLP Recommendation and Plan              EDUCATION  The patient has been educated in the following areas:   Dysphagia (Swallowing Impairment) Oral Care/Hydration.        SLP GOALS     Row Name 04/04/22 1000          Oral Nutrition/Hydration Goal 1, SLP The pt will maximize swallow function for least restricitve PO diet, no complications associated with dysphagia, adequate PO intake, and demonstrating independent use of swallow compensations.  -CB    Time Frame (Oral Nutrition/Hydration Goal 1, SLP) by discharge  -CB              Oral Nutrition/Hydration Goal 2, SLP The patient will participate in a full meal assessment to determine safety and adequacy of recommended diet, independent use of safe swallow compensations, and additional goals/recommendations to follow  -CB    Time Frame (Oral Nutrition/Hydration Goal 2, SLP) 2 days  -CB    Barriers (Oral Nutrition/Hydration Goal 2, SLP) Patient was seen for dysphagia therapy at a meal. Patient was sitting upright in a recliner. Patient has his own natural teeth. Patient currently on regular diet. Patient tolerated regular consistency without difficulty. No s/s of aspiration or complications was observed.  Patient demonstrated adequate rotary chewing. Patient cleared between bites effectively. Noted patient did fatigue toward the end of his meal, however. Patient currently on 4L of oxygen at this time.  Recommended that he take a break during those times he gets fatigue if necessary. Patient tolerated thins via straw given successive swallows without any s/s of aspiration or complications. ST will continue to follow to assure safety and tolerance with least restrictive diet. ST will also encourage patient to coordinate respiration with swallowing to minimize fatigue during meals.  -CB          User Key  (r) = Recorded By, (t) = Taken By, (c) = Cosigned By    Initials Name Provider Type          Shanon Preciado SLP Speech and Language  Pathologist                   Time Calculation:                Shanon Cao, SLP  4/4/2022

## 2022-04-04 NOTE — PLAN OF CARE
Goal Outcome Evaluation:      Up in chair throughout shift. Pleasant and cooperative with care. Stable throughout shift.

## 2022-04-04 NOTE — PROGRESS NOTES
[facility] Medicine Services Daily Progress Note    Patient Name: Shaji Junior  : 1945  MRN: [id number]  Primary Care Physician:  Naa Valverde MD  Date of admission: 3/29/2022      Subjective      Chief Complaint: CHF exacerbation shortness of breath      Patient Reports he is doing fine.  Sitting in the chair.  Working with therapy.  Lasix drip was switched to IV.  Patient pulled out his IV today.    ROS negative except as above      Objective      Vitals:   Temp:  [97.5 °F (36.4 °C)-98.4 °F (36.9 °C)] 98.3 °F (36.8 °C)  Heart Rate:  [68-88] 81  Resp:  [18-20] 20  BP: (109-156)/(61-89) 135/82  Flow (L/min):  [4-5] 4    Physical Exam  Vitals reviewed.   Constitutional:       Comments: Sitting in the chair   HENT:      Head: Normocephalic.      Nose: Nose normal.      Mouth/Throat:      Mouth: Mucous membranes are moist.   Eyes:      Pupils: Pupils are equal, round, and reactive to light.   Cardiovascular:      Rate and Rhythm: Normal rate.      Pulses: Normal pulses.   Pulmonary:      Effort: Pulmonary effort is normal.   Abdominal:      General: Abdomen is flat.   Musculoskeletal:         General: Normal range of motion.      Cervical back: Normal range of motion.      Right lower leg: Edema present.      Left lower leg: Edema present.   Skin:     General: Skin is warm.      Capillary Refill: Capillary refill takes less than 2 seconds.      Comments: Patient has dry skin with some swelling   Neurological:      General: No focal deficit present.      Mental Status: He is alert and oriented to person, place, and time.   Psychiatric:         Mood and Affect: Mood normal.         Behavior: Behavior normal.             Result Review    Result Review:  I have personally reviewed the results from the time of this admission to 2022 17:02 EDT and agree with these findings:  [x]  Laboratory  []  Microbiology  []  Radiology  []  EKG/Telemetry   []  Cardiology/Vascular   []   Pathology  []  Old records  []  Other:  Most notable findings include: Potassium 3.3    Wounds (last 24 hours)     LDA Wound     Row Name 04/04/22 1215 04/04/22 0815 04/03/22 2000       Wound 04/01/22 1122 Left posterior thigh Pressure Injury    Wound - Properties Group Placement Date: 04/01/22  [facility] Placement Time: 1122  - Side: Left  - Orientation: posterior  - Location: thigh  - Primary Wound Type: Pressure inj  -GK    Pressure Injury Stage -- O  -DA --    Dressing Appearance -- --  MASD  -DA --    Closure CONSUELO  -DA CONSUELO  -DA --  -CK    Base dressing in place, unable to visualize  -DA dressing in place, unable to visualize  -DA --  -CK    Periwound intact  -DA intact  -DA --    Drainage Amount none  -DA none  -DA --    Retired Wound - Properties Group Placement Date: 04/01/22 - Placement Time: 1122  - Side: Left  - Orientation: posterior  - Location: thigh  - Primary Wound Type: Pressure inj  -GK    Retired Wound - Properties Group Date first assessed: 04/01/22 - Time first assessed: 1122  - Side: Left  - Location: thigh  - Primary Wound Type: Pressure inj  -GK       Wound 04/01/22 1122 Right posterior thigh Pressure Injury    Wound - Properties Group Placement Date: 04/01/22 - Placement Time: 1122 - Side: Right  - Orientation: posterior  - Location: thigh  - Primary Wound Type: Pressure inj  -GK    Pressure Injury Stage -- O  MASD  -DA --    Dressing Appearance -- dry;intact  -DA --    Closure Adhesive bandage  -DA Adhesive bandage  -DA --  -CK    Base dressing in place, unable to visualize  -DA dressing in place, unable to visualize  -DA --  -CK    Periwound intact  -DA intact  -DA --    Drainage Amount none  -DA none  -DA --    Retired Wound - Properties Group Placement Date: 04/01/22 - Placement Time: 1122 - Side: Right  - Orientation: posterior  - Location: thigh  -GK Primary Wound Type: Pressure inj  -GK    Retired Wound - Properties Group Date first assessed:  04/01/22  -GK Time first assessed: 1122 -GK Side: Right  -GK Location: thigh  -GK Primary Wound Type: Pressure inj  -GK          User Key  (r) = Recorded By, (t) = Taken By, (c) = Cosigned By    Initials Name Provider Type    Linh Leung RN Registered Nurse    Vira Staton RN Registered Nurse    Raiza Felipe RN Registered Nurse                     Assessment/Plan       Brief Patient Summary:  Shaji Junior is a 76 y.o. male with a chief complaint of shortness of breath which has been worsening over the last several days.  The patient has limited knowledge of his medical history.  He is from [facility] with the patient reports he has been for 2 years.  From review of patient's medication list and physical exam the patient appears to have chronic lower extremity edema.  He is noted to be in atrial fibrillation with controlled ventricular response on admission without anticoagulation on his medication list however, the patient is on Cardizem.  I asked the patient who his primary care provider was and he said he just gets care at the nursing home and goes to the hospital.  He usually goes to [facility].  The patient was put on it at the [facility] recently secondary to his increasing shortness of breath.     Review of records: Patient was seen in the ER 3/11/2022 after he pulled out PICC line with concern for portion still in the patient's arm.  At that time x-ray showed no opaque foreign body.  Patient was discharged back to the [facility].      Current medications include:  atorvastatin, 20 mg, Oral, Nightly  enoxaparin, 1 mg/kg, Subcutaneous, Q12H  escitalopram, 10 mg, Oral, Daily  finasteride, 5 mg, Oral, Daily  gabapentin, 300 mg, Oral, TID  hydrALAZINE, 25 mg, Oral, TID  insulin glargine, 10 Units, Subcutaneous, Daily  isosorbide mononitrate, 30 mg, Oral, BID - Nitrates  meropenem, 500 mg, Intravenous, Q8H  senna-docusate sodium, 2 tablet, Oral, BID  sodium chloride, 10 mL,  Intravenous, Q12H  tamsulosin, 0.4 mg, Oral, Daily        furosemide (LASIX) 100 mg in 100mL NS infusion, 10 mg/hr, Last Rate: 10 mg/hr (04/03/22 0207)  pharmacy, 1 each  Pharmacy to Dose enoxaparin (LOVENOX),            Active Hospital Problems:       Active Hospital Problems     Diagnosis     • Acute on chronic congestive heart failure, unspecified heart failure type (HCC)     • CHF exacerbation (HCC)     • Type 2 diabetes mellitus with diabetic nephropathy (HCC)        Plan:      Acute on chronic diastolic congestive heart failure secondary to hypertensive heart disease  -Lasix 60 mg IV every 8 hours given but Cr david from 1.3 to 1.83  -proBNP 4400 with comparison 2100 May 2021  -Echocardiogram from outside facility showed EF 55%, normal LV size, no tricuspid regurgitation, aortic valve is normal in structure and function.  No mitral valve regurgitation.  Mitral valve grossly normal.   -Dr. Stein nephrology started the patient on a Lasix drip 3/31/2022  -Intake and output not accurate because the patient has incontinence of urine  -Lasix per cardiology    Hypokalemia  Replete daily     Acute renal failure due to prerenal azotemia from diuresis on chronic kidney disease stage IIIa secondary to hypertension and diabetes  -Creatinine 1.30 apparently is his baseline with comparison 1.27 May 2021  -Creatinine had worsened to 1.83 with diuresis but has improved to 1.2 despite diuresis  -Nephrology managing diuresis     Acute on chronic anemia  Hemoglobin 9.7 with comparison 11.4 May 2021  -Hemoglobin stable at 10.0  -Monitor     Persistent atrial fibrillation with controlled ventricular response  -Patient was not on anticoagulation therapy prior to admission and would benefit from anticoagulation; Lovenox will be changed to therapeutic dosing  -Continue Cardizem and Coreg  -EKG showing atrial fibrillation with controlled ventricular response     Acute urinary tract infection  -Urine culture grew ESBL producing Proteus  mirabilis   -Continue IV Merrem     Essential hypertension, chronic and controlled  -Continue Norvasc, Coreg, diltiazem, hydralazine and isosorbide     Dementia  Depression, chronic  -Continue Lexapro     BPH, chronic  -Continue Proscar and Flomax  -Patient is incontinent of urine  -Check postvoid residual      Diabetes type 2, chronic and uncontrolled with diabetic peripheral neuropathy  -Continue Lantus and gabapentin  -hemoglobin A1c 6.4  -Accu-Cheks and SSI     Hyperlipidemia, chronic  -Continue Zocor      Continue PT OT patient will likely need ischemic work-up     DVT prophylaxis:  Medical DVT prophylaxis orders are present.     CODE STATUS:    Code Status (Patient has no pulse and is not breathing): CPR (Attempt to Resuscitate)  Medical Interventions (Patient has pulse or is breathing): Full Support    He is awake and alert and oriented x3 and is requesting full care including intervention if needed.     Disposition:  I expect patient to be discharged in 2-3 days if continuing to clinically improve.     This patient has been examined wearing appropriate Personal Protective Equipment and discussed with hospital infection control department.04/04/22      Electronically signed by Ba Banuelos MD, 04/04/22, 17:02 EDT.  [facility] Hospitalist Team

## 2022-04-04 NOTE — PLAN OF CARE
Problem: Fall Injury Risk  Goal: Absence of Fall and Fall-Related Injury  Outcome: Ongoing, Progressing  Intervention: Identify and Manage Contributors  Recent Flowsheet Documentation  Taken 4/3/2022 2000 by Raiza Hugo RN  Medication Review/Management: medications reviewed  Intervention: Promote Injury-Free Environment  Recent Flowsheet Documentation  Taken 4/4/2022 0400 by Raiza Hugo RN  Safety Promotion/Fall Prevention: safety round/check completed  Taken 4/4/2022 0300 by Raiza Hugo RN  Safety Promotion/Fall Prevention: safety round/check completed  Taken 4/4/2022 0000 by Raiza Hugo RN  Safety Promotion/Fall Prevention: safety round/check completed  Taken 4/3/2022 2000 by Raiza Hugo RN  Safety Promotion/Fall Prevention: safety round/check completed     Problem: Adult Inpatient Plan of Care  Goal: Absence of Hospital-Acquired Illness or Injury  Intervention: Prevent Skin Injury  Recent Flowsheet Documentation  Taken 4/4/2022 0300 by Raiza Hugo RN  Body Position: (up in chair,) other (see comments)  Taken 4/3/2022 2300 by Raiza Hugo RN  Body Position: (Pt up in chair,refuses to transfer to bed.Risk of pressure ulcers explained.) other (see comments)   Goal Outcome Evaluation:Pt progressing,remains on oxygen 4lts and lasix gtt.Had 7 beats ov Vtach,asymptomatic.Hospitalist informed,coreg started.Otherwise doing well with great output.

## 2022-04-04 NOTE — PROGRESS NOTES
"[facility] PROGRESS NOTE     LOS: 3 days    Patient Care Team:  Naa Valverde MD as PCP - General (Internal Medicine)      Subjective     Less soa this am on o2 via nasal cannula, no soa at rest    Objective     Vital Sign Min/Max for last 24 hours  Temp:  [97.5 °F (36.4 °C)-98.4 °F (36.9 °C)] 98 °F (36.7 °C)  Heart Rate:  [69-88] 75  Resp:  [16-20] 20  BP: (116-157)/(61-89) 116/61                       Flowsheet Rows    Flowsheet Row First Filed Value   Admission Height 182.9 cm (72\") Documented at 03/29/2022 0100   Admission Weight 118 kg (260 lb) Documented at 03/29/2022 0100          I/O this shift:  In: 100 [IV Piggyback:100]  Out: 1600 [Urine:1600]  I/O last 3 completed shifts:  In: 1165 [P.O.:840; I.V.:325]  Out: 56517 [Urine:16008]    Physical Exam:  Physical Exam    General Appearance: Morbidly obese, chronically ill-appearing, in mild distress   skin: warm and dry  HEENT: On BiPAP.  Oral mucosa moist  Neck: supple, +jvd  Lungs: Decreased breath sounds at bases with bibasilar rales  Heart: Bradycardic, normal S1 and S2  Abdomen: Morbidly obese, soft, nontender, nondistended  Extremities: 2+ bilateral lower extreme edema with venous stasis changes  Neuro: normal speech and mental status        LABS:  Lab Results   Component Value Date    CALCIUM 8.8 04/04/2022    PHOS 2.8 04/04/2022     Results from last 7 days   Lab Units 04/04/22  0424 04/03/22  0503 04/02/22  0525 04/01/22  0925 03/31/22  0332 03/29/22  1614 03/29/22  0113   MAGNESIUM mg/dL 1.9  --   --   --  2.2  --   --    SODIUM mmol/L 144 145 146* 146* 145  --  144   POTASSIUM mmol/L 3.3* 3.5 3.4* 3.8 4.2  --  3.8   CHLORIDE mmol/L 96* 101 105 105 106  --  105   CO2 mmol/L 40.0* 34.0* 33.0* 30.0* 29.0  --  28.0   BUN mg/dL 21 25* 28* 33* 36*  --  25*   CREATININE mg/dL 1.02 1.20 1.31* 1.57* 1.83*  --  1.30*   GLUCOSE mg/dL 101* 110* 97 98 147*  --  111*   CALCIUM mg/dL 8.8 8.5* 8.0* 8.7 8.5*  --  8.8   WBC 10*3/mm3 4.10  --   --  3.90 4.10  -- "  4.40   HEMOGLOBIN g/dL 10.2*  --   --  10.0* 9.4*   < > 9.7*   PLATELETS 10*3/mm3 174  --   --  157 122*  --  153   ALT (SGPT) U/L  --   --   --   --   --   --  10   AST (SGOT) U/L  --   --   --   --   --   --  9    < > = values in this interval not displayed.     Lab Results   Component Value Date    TROPONINT 0.035 (C) 03/29/2022     Estimated Creatinine Clearance: 96.7 mL/min (by C-G formula based on SCr of 1.02 mg/dL).  Results from last 7 days   Lab Units 03/31/22  1153   PH, ARTERIAL pH units 7.322*   PO2 ART mm Hg 81.4*   PCO2, ARTERIAL mm Hg 62.9*   HCO3 ART mmol/L 32.6*     Brief Urine Lab Results  (Last result in the past 365 days)      Color   Clarity   Blood   Leuk Est   Nitrite   Protein   CREAT   Urine HCG        03/29/22 0258 Yellow   Clear   Trace   Small (1+)   Positive   Negative               WEIGHTS:     Wt Readings from Last 1 Encounters:   04/03/22 0500 (!) 162 kg (357 lb 2.3 oz)   04/02/22 0500 (!) 162 kg (357 lb 12.9 oz)   03/31/22 1645 123 kg (270 lb 1 oz)   03/31/22 1419 (!) 152 kg (335 lb)   03/29/22 0952 (!) 152 kg (335 lb)   03/29/22 0425 (!) 152 kg (335 lb 4.8 oz)   03/29/22 0100 118 kg (260 lb)       apixaban, 5 mg, Oral, Q12H  atorvastatin, 20 mg, Oral, Nightly  carvedilol, 12.5 mg, Oral, Q12H  escitalopram, 10 mg, Oral, Daily  finasteride, 5 mg, Oral, Daily  gabapentin, 300 mg, Oral, TID  hydrALAZINE, 25 mg, Oral, TID  insulin glargine, 10 Units, Subcutaneous, Daily  isosorbide mononitrate, 30 mg, Oral, BID - Nitrates  meropenem, 500 mg, Intravenous, Q6H  potassium chloride, 40 mEq, Oral, Once  senna-docusate sodium, 2 tablet, Oral, BID  sodium chloride, 10 mL, Intravenous, Q12H  spironolactone, 25 mg, Oral, BID  tamsulosin, 0.4 mg, Oral, Daily      pharmacy, 1 each  Pharmacy to Dose enoxaparin (LOVENOX),         Assessment/Plan       1.  Acute kidney injury on chronic renal disease stage IIIa  Patient seems to have underlying chronic kidney disease in setting of hypertension and  diabetes  Creatinine is back to normal this morning despite brisk diuresis, better than his chronic baseline renal function; when at his true dry weight his creatinine will likely rise again  2.  Fluid overload/Pulmonary hypertension-developing contraction alkalosis, will stop lasix drip today and reeval in am  3.  Hypertension with CKD.  Blood pressure on the low side  4.  Morbid obesity    5.  UTI.  Urine culture growing ESBL Proteus mirabilis     Recs:  - reeval daily  -continue same hydralazine dose  -Monitor renal function while being diuresed  -add aldactone  -will decide on diuretic dose tomorrow, was on 1mg po bid prior to admission, will need more        Faraz Petersen MD  04/04/22  12:38 EDT

## 2022-04-04 NOTE — PLAN OF CARE
Goal Outcome Evaluation:         Assessment: Shaji Junior is markedly improved over his last rx. He was able to come to stand and march in place briefly today. He presents with functional mobility impairments which indicate the need for skilled intervention. Tolerating session today without incident. Will continue to follow and progress as tolerated.     Plan/Recommendations:   Pt would benefit from long term care at discharge from facility and requires no DME at discharge.   Pt desires LTC placement at discharge. Pt cooperative; agreeable to therapeutic recommendations and plan of care.

## 2022-04-04 NOTE — CASE MANAGEMENT/SOCIAL WORK
Continued Stay Note  KAYODE Camarena     Patient Name: Shaji Junior  MRN: [id number]  Today's Date: 4/4/2022    Admit Date: 3/29/2022     Discharge Plan     Row Name 04/04/22 1237       Plan    Plan D/C Plan : Return to [facility] , ok to return per facility and pt , no precert or PASRR required    Plan Comments Barrier to D/C pt is on a Lasix gtt and 4l of o2               Discharge Codes    No documentation.               Met with patient in room wearing PPE: mask, face shield/goggles  Maintained distance greater than six feet and spent less than 15 minutes in the room.          Rachel Go RN

## 2022-04-04 NOTE — PROGRESS NOTES
[facility] [place]        LOS:  LOS: 4 days   Patient Name: Shaji Junior  Age/Sex: 76 y.o. male  : 1945  MRN: [id number]    Day of Service: 22   Length of Stay: 4  Encounter Provider: Gurpreet Vargas MD  Place of Service: [facility] CARDIOLOGY  Patient Care Team:  Naa Valverde MD as PCP - General (Internal Medicine)    Subjective:     Chief Complaint: f/u fluid overload, afib    Subjective: patient comfortable, no distress, on Lasix gtt, UOP adequate, renal following, afib rates 60-70s. Blood pressure elevated    Current Medications:   Scheduled Meds:apixaban, 5 mg, Oral, Q12H  atorvastatin, 20 mg, Oral, Nightly  bumetanide, 2 mg, Oral, BID  carvedilol, 12.5 mg, Oral, Q12H  escitalopram, 10 mg, Oral, Daily  finasteride, 5 mg, Oral, Daily  gabapentin, 300 mg, Oral, TID  hydrALAZINE, 25 mg, Oral, TID  insulin glargine, 10 Units, Subcutaneous, Daily  isosorbide mononitrate, 30 mg, Oral, BID - Nitrates  meropenem, 500 mg, Intravenous, Q6H  senna-docusate sodium, 2 tablet, Oral, BID  sodium chloride, 10 mL, Intravenous, Q12H  spironolactone, 50 mg, Oral, BID  tamsulosin, 0.4 mg, Oral, Daily      Continuous Infusions:pharmacy, 1 each        Allergies:  No Known Allergies    Review of Systems   Constitutional: Negative for chills and fever.   HENT: Negative for ear discharge and nosebleeds.    Eyes: Negative for discharge and redness.   Cardiovascular: Positive for leg swelling. Negative for chest pain, orthopnea, palpitations, paroxysmal nocturnal dyspnea and syncope.   Respiratory: Positive for shortness of breath. Negative for cough and wheezing.    Endocrine: Negative for heat intolerance.   Skin: Negative for rash.   Musculoskeletal: Positive for arthritis, back pain and joint pain. Negative for myalgias.   Gastrointestinal: Negative for abdominal pain, melena, nausea and vomiting.   Genitourinary: Negative for dysuria and hematuria.   Neurological: Negative for  dizziness, light-headedness, numbness and tremors.   Psychiatric/Behavioral: Negative for depression. The patient is not nervous/anxious.          Objective:     Temp:  [97.3 °F (36.3 °C)-98.3 °F (36.8 °C)] 98.1 °F (36.7 °C)  Heart Rate:  [61-80] 72  Resp:  [12-20] 20  BP: (126-146)/(64-97) 146/83     Intake/Output Summary (Last 24 hours) at 4/5/2022 1658  Last data filed at 4/5/2022 1419  Gross per 24 hour   Intake 1200 ml   Output 1425 ml   Net -225 ml     Body mass index is 48.44 kg/m².      04/02/22  0500 04/03/22  0500 04/05/22  0525   Weight: (!) 162 kg (357 lb 12.9 oz) (new bed and no PCU weight) (!) 162 kg (357 lb 2.3 oz) (refused)     Physical exam:    Head: Atraumatic, normocephalic  Eyes: No conjunctival injection or redness noted.  No discharge  Neck: No JVD, no lymphadenopathy or carotid bruit  Chest: No obvious deformity noted  Lungs: Air entry is present in all lung zones.  Decreased breath sounds at the bases.  No crackles or rhonchi  Heart: Normal S1 and S2.  No pericardial rub or murmur  Neuro: Neurological exam was grossly intact.  No focal deficit.  Psychiatric: Patient appears to be emotionally stable.  No depression or anxiety noted  Extremities: Still leg edema noted    Lab Review:   Results from last 7 days   Lab Units 04/05/22 0337 04/04/22  0424   SODIUM mmol/L 142 144   POTASSIUM mmol/L 3.8 3.3*   CHLORIDE mmol/L 97* 96*   CO2 mmol/L 38.0* 40.0*   BUN mg/dL 20 21   CREATININE mg/dL 1.07 1.02   GLUCOSE mg/dL 94 101*   CALCIUM mg/dL 8.7 8.8         Results from last 7 days   Lab Units 04/04/22  0424 04/01/22  0925   WBC 10*3/mm3 4.10 3.90   HEMOGLOBIN g/dL 10.2* 10.0*   HEMATOCRIT % 30.9* 31.1*   PLATELETS 10*3/mm3 174 157         Results from last 7 days   Lab Units 04/05/22 0337 04/04/22  0424   MAGNESIUM mg/dL 1.9 1.9           Invalid input(s): LDLCALC  Results from last 7 days   Lab Units 04/05/22 0337   PROBNP pg/mL 4,124.0*     Results from last 7 days   Lab Units 03/31/22  0332    TSH uIU/mL 0.696       Recent Radiology:  Imaging Results (Most Recent)     Procedure Component Value Units Date/Time    CT Chest Without Contrast Diagnostic [[id number]] Collected: 04/02/22 1141     Updated: 04/02/22 1148    Narrative:      CT CHEST WO CONTRAST DIAGNOSTIC-     Date of Exam: 4/2/2022 11:12 AM     Indication: Pneumonia, effusion or abscess suspected, xray done;  I50.9-Heart failure, unspecified; N39.0-Urinary tract infection, site  not specified; Z20.822-Contact with and (suspected) exposure to  covid-19.     Comparison: Chest x-ray 3/31/2022     Technique: Serial and axial CT images of the chest were obtained.  Reconstructions in the coronal and sagittal planes were performed.   Automated exposure control and iterative reconstruction methods were  used.  Radiation audit for number of CT and nuclear cardiology exams  performed in the last year:  0.     FINDINGS:     Small-moderate dependent bilateral pleural effusions, larger on the  right are present. Adjacent compressive atelectasis. No pneumothorax.  Bilateral atelectasis. Small areas of ill-defined indeterminate airspace  disease amongst the atelectasis.. Detailed evaluation limited due to  motion. Some interlobular septal thickening is evident in the right  lung.     Coronary disease with calcifications are present. No pericardial  effusion. Main pulmonary arteries enlarged, 4.3 cm.     No evidence of acute process on noncontrast imaging of the included  upper abdomen. There is body wall edema noted. Bilateral gynecomastia is  present small soft tissue opacities. No acute osseous findings.       Impression:      Small-moderate bilateral pleural effusions with adjacent atelectasis.  Ill-defined airspace disease amongst the atelectasis may be due to  coexistent pneumonia     Pulmonary edema evident.     Enlargement of the main and central pulmonary arteries, suggesting  pulmonary hypertension     Coronary disease with advanced calcifications               Electronically Signed By-Ronan Wiggins On:4/2/2022 11:46 AM  This report was finalized on [id number] by  Ronan Wiggins, .    XR Chest PA & Lateral [[id number]] Collected: 03/31/22 1132     Updated: 03/31/22 1135    Narrative:      DATE OF EXAM:  3/31/2022 11:22 AM     PROCEDURE:  XR CHEST PA AND LATERAL-     INDICATIONS:  wheezing, decreased LOC; I50.9-Heart failure, unspecified; N39.0-Urinary  tract infection, site not specified; Z20.822-Contact with and  (suspected) exposure to covid-19       COMPARISON:  AP portable chest 3/29/2022.     TECHNIQUE:   Two radiologic views of the chest.     FINDINGS:  The lateral image is degraded secondary to patient body habitus. Hazy  interstitial and alveolar disease is present in the lung bases, left  greater right, similar to the prior exam. Stable cardiac enlargement. No  definite pleural effusion. No pneumothorax. No acute osseous  abnormality.       Impression:       IMPRESSION:     1. Left greater than right basilar interstitial and alveolar infiltrates  are unchanged from 3/29/2022  2. Stable cardiomegaly.     Electronically Signed By-Barbara Beebe MD On:3/31/2022 11:33 AM  This report was finalized on [id number] by  Barbara Beebe MD.    XR Chest 1 View [[id number]] Collected: 03/29/22 0716     Updated: 03/29/22 0720    Narrative:      DATE OF EXAM:  3/29/2022 1:27 AM     PROCEDURE:  XR CHEST 1 VW-     INDICATIONS:  soa and cough today     COMPARISON:  No Comparisons Available     TECHNIQUE:   Single radiographic AP view of the chest was obtained.     FINDINGS:  Single frontal view chest reveals that the heart is enlarged. Superior  mediastinum is normal. There are mild diffuse infiltrates consistent  with chronic lung disease and bronchitis and mild congestive heart  failure. There is a suspicion of a small focal infiltrate in the left  lower lobe along and in the right lung base. There is a suspicion of a  small right basilar pleural effusion. No evidence of  pneumothorax        Impression:         1. Cardiomegaly.  2. Bilateral diffuse mild infiltrates consistent with chronic lung  disease and bronchitis and mild congestive heart failure.  3. Suspicion of a small focal infiltrate left lower lobe along.  4. Suspicion of small infiltrate small pleural effusion right lung base     Electronically Signed By-Armando Patiño MD On:3/29/2022 7:18 AM  This report was finalized on [id number] by  Armando Patiño MD.          ECHOCARDIOGRAM:    Results for orders placed during the hospital encounter of 03/29/22    Adult Transthoracic Echo Complete W/ Cont if Necessary Per Protocol    Interpretation Summary  · Left ventricular systolic function is normal.  · Left ventricular ejection fraction is 55 to 60%  · Left ventricular diastolic function was normal.        I reviewed the patient's new clinical results.    EKG:      Assessment:       CHF exacerbation (HCC)    Type 2 diabetes mellitus with diabetic nephropathy (HCC)    Acute on chronic congestive heart failure, unspecified heart failure type (HCC)    1.  Bradycardia     2.  Obstructive sleep apnea     3.  Bilateral leg edema / volume overload, HFpEF, acute on chronic  - LVEF 55-60%     4.  Renal insufficiency     5.  Atrial fibrillation       Plan:   Remains with bilateral lower extremity edema, renal managing diuresis-- on IV Lasix gtt  On coreg  Off dilt- had bradycardia last week-- no bradycardia noted on telemetry, in afib with controlled rates  Recommend anticoagulation long term--- will stop lovenox and start BID Eliquis  Plan for stress test once volume status stabilized    Patient is seen and examined and findings are verified.  All data is reviewed by me personally.  Assessment and plan formulated by APC was done after discussion with attending.  I spent more than 50% of time in taking care of the patient.    Patient is feeling better.  Shortness of breath is improving.    Hemodynamics are stable.  Patient  is in atrial fibrillation.  Heart rate is well controlled.    Normal S1 and S2.  No pericardial rub or murmur.  Heart rate is irregular.  Leg edema noted    At this stage I will continue to diurese.  I would recommend to discontinue Lovenox and start Eliquis.  Patient would need ischemic evaluation as a outpatient.    Discussed with the patient.    Electronically signed by Gurpreet Vargas MD, 04/05/22, 4:58 PM EDT.              Gurpreet Vargas MD  04/05/22  16:58 EDT

## 2022-04-04 NOTE — PROGRESS NOTES
Daily Progress Note        CHF exacerbation (HCC)    Type 2 diabetes mellitus with diabetic nephropathy (HCC)    Acute on chronic congestive heart failure, unspecified heart failure type (HCC)      Assessment    Acute/chronic hypercapnic respiratory insufficiency  -ABG, pH 7.32/pCO2 63/PO2 81/HC03 32     Obstructive sleep apnea  Obesity hypoventilation syndrome  Pulmonary edema and cardiomegaly    Atrial fibrillation  Acute kidney injury  Morbid obesity  Hypertension  Non-smoker     3/31/2022 Free T4 1.20     3/29/2022 urine culture Proteus Mirabilis, ESBL    4/1/2022 bilateral lower extremity Doppler negative for DVT     3/31/2022 echo  EF 55 to 60%  RVSP 14.9 mmHg     Recommendations:     Continue to titrate oxygen- Currently on 4L NC and AVAPS at at bedtime    Merrem for UTI-finishes 4/8    Lasix gtt    Electrolyte/Glycemic control  DVT Prophylaxis-Lovenox    Aspiration precautions, elevate head of bed  PT/SLP following     4/2/2022             LOS: 3 days     Subjective         Objective     Vital signs for last 24 hours:  Vitals:    04/03/22 2250 04/04/22 0230 04/04/22 0255 04/04/22 0616   BP:  156/89  135/77   BP Location:  Left arm  Left arm   Patient Position:  Sitting  Sitting   Pulse: 80 88 71 69   Resp: 20 20  18   Temp:  98.2 °F (36.8 °C)  98.4 °F (36.9 °C)   TempSrc:  Oral  Oral   SpO2: 100% 100% 100% 100%   Weight:       Height:           Intake/Output last 3 shifts:  I/O last 3 completed shifts:  In: 2128 [P.O.:1490; I.V.:638]  Out: 9700 [Urine:9700]  Intake/Output this shift:  I/O this shift:  In: -   Out: 3200 [Urine:3200]      Radiology  Imaging Results (Last 24 Hours)     ** No results found for the last 24 hours. **          Labs:  Results from last 7 days   Lab Units 04/04/22  0424   WBC 10*3/mm3 4.10   HEMOGLOBIN g/dL 10.2*   HEMATOCRIT % 30.9*   PLATELETS 10*3/mm3 174     Results from last 7 days   Lab Units 04/04/22  0424 03/31/22  0332 03/29/22  0113   SODIUM mmol/L 144   < > 144   POTASSIUM  mmol/L 3.3*   < > 3.8   CHLORIDE mmol/L 96*   < > 105   CO2 mmol/L 40.0*   < > 28.0   BUN mg/dL 21   < > 25*   CREATININE mg/dL 1.02   < > 1.30*   CALCIUM mg/dL 8.8   < > 8.8   BILIRUBIN mg/dL  --   --  0.5   ALK PHOS U/L  --   --  88   ALT (SGPT) U/L  --   --  10   AST (SGOT) U/L  --   --  9   GLUCOSE mg/dL 101*   < > 111*    < > = values in this interval not displayed.     Results from last 7 days   Lab Units 03/31/22  1153   PH, ARTERIAL pH units 7.322*   PO2 ART mm Hg 81.4*   PCO2, ARTERIAL mm Hg 62.9*   HCO3 ART mmol/L 32.6*     Results from last 7 days   Lab Units 04/02/22  0525 04/01/22  0925 03/29/22  0113   ALBUMIN g/dL 3.00* 3.30* 3.10*     Results from last 7 days   Lab Units 03/29/22  0507 03/29/22  0113   TROPONIN T ng/mL 0.035* 0.034*         Results from last 7 days   Lab Units 04/04/22  0424   MAGNESIUM mg/dL 1.9     Results from last 7 days   Lab Units 03/29/22  0113   INR  1.13*   APTT seconds 32.0*     Results from last 7 days   Lab Units 03/31/22  0332   TSH uIU/mL 0.696   FREE T4 ng/dL 1.20           Meds:   SCHEDULE  atorvastatin, 20 mg, Oral, Nightly  carvedilol, 12.5 mg, Oral, Q12H  enoxaparin, 1 mg/kg, Subcutaneous, Q12H  escitalopram, 10 mg, Oral, Daily  finasteride, 5 mg, Oral, Daily  gabapentin, 300 mg, Oral, TID  hydrALAZINE, 25 mg, Oral, TID  insulin glargine, 10 Units, Subcutaneous, Daily  isosorbide mononitrate, 30 mg, Oral, BID - Nitrates  meropenem, 500 mg, Intravenous, Q8H  senna-docusate sodium, 2 tablet, Oral, BID  sodium chloride, 10 mL, Intravenous, Q12H  tamsulosin, 0.4 mg, Oral, Daily      Infusions  furosemide (LASIX) 100 mg in 100mL NS infusion, 10 mg/hr, Last Rate: 10 mg/hr (04/03/22 5324)  pharmacy, 1 each  Pharmacy to Dose enoxaparin (LOVENOX),       PRNs  •  acetaminophen **OR** acetaminophen **OR** acetaminophen  •  ondansetron **OR** ondansetron  •  Pharmacy to Dose enoxaparin (LOVENOX)  •  sodium chloride  •  sodium chloride    Physical Exam:  Physical Exam  Vitals  reviewed.   Pulmonary:      Breath sounds: Rhonchi present.   Musculoskeletal:         General: Swelling present.      Right lower leg: Edema present.      Left lower leg: Edema present.   Skin:     General: Skin is warm and dry.   Neurological:      Mental Status: He is alert and oriented to person, place, and time.         ROS  Review of Systems   Respiratory: Positive for shortness of breath.    Cardiovascular: Positive for leg swelling.     I have reviewed the patient's new clinical results    Electronically signed by MELISA Saleh

## 2022-04-04 NOTE — THERAPY TREATMENT NOTE
Subjective: Pt agreeable to therapeutic plan of care. RN notes pt is getting bath, but can work w/ PT to stand, etc, while bathing.     Objective:     Bed mobility - N/A or Not attempted.; up in chair  Transfers - Min-A w/ rw; sit to stand slowly but able to complete  Ambulation - 0 feet N/A or Not attempted.; did perform marching x 4 reps in place    Pain: 0 VAS  Education: Provided education on importance of mobility and skilled verbal / tactile cueing throughout intervention.     Assessment: Shaji Junior is markedly improved over his last rx. He was able to come to stand and march in place briefly today. He presents with functional mobility impairments which indicate the need for skilled intervention. Tolerating session today without incident. Will continue to follow and progress as tolerated.     Plan/Recommendations:   Pt would benefit from long term care at discharge from facility and requires no DME at discharge.   Pt desires LTC placement at discharge. Pt cooperative; agreeable to therapeutic recommendations and plan of care.     Basic Mobility 6-click:  Rollin = Total, A lot = 2, A little = 3; 4 = None  Supine>Sit:   1 = Total, A lot = 2, A little = 3; 4 = None   Sit>Stand with arms:  1 = Total, A lot = 2, A little = 3; 4 = None  Bed>Chair:   1 = Total, A lot = 2, A little = 3; 4 = None  Ambulate in room:  1 = Total, A lot = 2, A little = 3; 4 = None  3-5 Steps with railin = Total, A lot = 2, A little = 3; 4 = None  Score: 12    Modified [place]: N/A = No pre-op stroke/TIA    Post-Tx Position: Up in Chair, Staff Present and Call light and personal items within reach; alarm on; LEs elevated; nsg tech present  PPE: gloves, surgical mask, eyewear protection

## 2022-04-05 LAB
ANION GAP SERPL CALCULATED.3IONS-SCNC: 7 MMOL/L (ref 5–15)
BUN SERPL-MCNC: 20 MG/DL (ref 8–23)
BUN/CREAT SERPL: 18.7 (ref 7–25)
CALCIUM SPEC-SCNC: 8.7 MG/DL (ref 8.6–10.5)
CHLORIDE SERPL-SCNC: 97 MMOL/L (ref 98–107)
CO2 SERPL-SCNC: 38 MMOL/L (ref 22–29)
CREAT SERPL-MCNC: 1.07 MG/DL (ref 0.76–1.27)
EGFRCR SERPLBLD CKD-EPI 2021: 71.9 ML/MIN/1.73
GLUCOSE SERPL-MCNC: 94 MG/DL (ref 65–99)
MAGNESIUM SERPL-MCNC: 1.9 MG/DL (ref 1.6–2.4)
NT-PROBNP SERPL-MCNC: 4124 PG/ML (ref 0–1800)
PHOSPHATE SERPL-MCNC: 2.9 MG/DL (ref 2.5–4.5)
POTASSIUM SERPL-SCNC: 3.8 MMOL/L (ref 3.5–5.2)
SODIUM SERPL-SCNC: 142 MMOL/L (ref 136–145)

## 2022-04-05 PROCEDURE — 97530 THERAPEUTIC ACTIVITIES: CPT

## 2022-04-05 PROCEDURE — 99232 SBSQ HOSP IP/OBS MODERATE 35: CPT | Performed by: INTERNAL MEDICINE

## 2022-04-05 PROCEDURE — 94799 UNLISTED PULMONARY SVC/PX: CPT

## 2022-04-05 PROCEDURE — 97112 NEUROMUSCULAR REEDUCATION: CPT

## 2022-04-05 PROCEDURE — 94660 CPAP INITIATION&MGMT: CPT

## 2022-04-05 PROCEDURE — 25010000002 MEROPENEM PER 100 MG: Performed by: INTERNAL MEDICINE

## 2022-04-05 PROCEDURE — 83880 ASSAY OF NATRIURETIC PEPTIDE: CPT | Performed by: INTERNAL MEDICINE

## 2022-04-05 PROCEDURE — 63710000001 INSULIN GLARGINE PER 5 UNITS: Performed by: NURSE PRACTITIONER

## 2022-04-05 PROCEDURE — 83735 ASSAY OF MAGNESIUM: CPT | Performed by: INTERNAL MEDICINE

## 2022-04-05 PROCEDURE — 84100 ASSAY OF PHOSPHORUS: CPT | Performed by: INTERNAL MEDICINE

## 2022-04-05 PROCEDURE — 80048 BASIC METABOLIC PNL TOTAL CA: CPT | Performed by: INTERNAL MEDICINE

## 2022-04-05 RX ORDER — SPIRONOLACTONE 25 MG/1
50 TABLET ORAL
Status: DISCONTINUED | OUTPATIENT
Start: 2022-04-05 | End: 2022-04-06 | Stop reason: HOSPADM

## 2022-04-05 RX ORDER — BUMETANIDE 1 MG/1
2 TABLET ORAL 2 TIMES DAILY
Status: DISCONTINUED | OUTPATIENT
Start: 2022-04-05 | End: 2022-04-06 | Stop reason: HOSPADM

## 2022-04-05 RX ADMIN — ESCITALOPRAM OXALATE 10 MG: 10 TABLET ORAL at 08:20

## 2022-04-05 RX ADMIN — HYDRALAZINE HYDROCHLORIDE 25 MG: 25 TABLET, FILM COATED ORAL at 17:32

## 2022-04-05 RX ADMIN — MEROPENEM 500 MG: 500 INJECTION, POWDER, FOR SOLUTION INTRAVENOUS at 17:32

## 2022-04-05 RX ADMIN — APIXABAN 5 MG: 5 TABLET, FILM COATED ORAL at 08:20

## 2022-04-05 RX ADMIN — CARVEDILOL 12.5 MG: 6.25 TABLET, FILM COATED ORAL at 21:24

## 2022-04-05 RX ADMIN — ACETAMINOPHEN 650 MG: 325 TABLET ORAL at 00:03

## 2022-04-05 RX ADMIN — TAMSULOSIN HYDROCHLORIDE 0.4 MG: 0.4 CAPSULE ORAL at 08:19

## 2022-04-05 RX ADMIN — GABAPENTIN 300 MG: 300 CAPSULE ORAL at 08:19

## 2022-04-05 RX ADMIN — SENNOSIDES AND DOCUSATE SODIUM 2 TABLET: 50; 8.6 TABLET ORAL at 21:24

## 2022-04-05 RX ADMIN — Medication 10 ML: at 21:24

## 2022-04-05 RX ADMIN — MEROPENEM 500 MG: 500 INJECTION, POWDER, FOR SOLUTION INTRAVENOUS at 11:49

## 2022-04-05 RX ADMIN — APIXABAN 5 MG: 5 TABLET, FILM COATED ORAL at 21:24

## 2022-04-05 RX ADMIN — ACETAMINOPHEN 650 MG: 325 TABLET ORAL at 22:48

## 2022-04-05 RX ADMIN — SPIRONOLACTONE 50 MG: 25 TABLET, FILM COATED ORAL at 17:32

## 2022-04-05 RX ADMIN — ATORVASTATIN CALCIUM 20 MG: 20 TABLET, FILM COATED ORAL at 21:24

## 2022-04-05 RX ADMIN — MEROPENEM 500 MG: 500 INJECTION, POWDER, FOR SOLUTION INTRAVENOUS at 06:15

## 2022-04-05 RX ADMIN — GABAPENTIN 300 MG: 300 CAPSULE ORAL at 21:24

## 2022-04-05 RX ADMIN — MEROPENEM 500 MG: 500 INJECTION, POWDER, FOR SOLUTION INTRAVENOUS at 00:03

## 2022-04-05 RX ADMIN — FINASTERIDE 5 MG: 5 TABLET, FILM COATED ORAL at 08:20

## 2022-04-05 RX ADMIN — CARVEDILOL 12.5 MG: 6.25 TABLET, FILM COATED ORAL at 08:24

## 2022-04-05 RX ADMIN — GABAPENTIN 300 MG: 300 CAPSULE ORAL at 17:32

## 2022-04-05 RX ADMIN — ISOSORBIDE MONONITRATE 30 MG: 30 TABLET, EXTENDED RELEASE ORAL at 17:32

## 2022-04-05 RX ADMIN — SPIRONOLACTONE 25 MG: 25 TABLET, FILM COATED ORAL at 08:19

## 2022-04-05 RX ADMIN — BUMETANIDE 2 MG: 1 TABLET ORAL at 21:24

## 2022-04-05 RX ADMIN — HYDRALAZINE HYDROCHLORIDE 25 MG: 25 TABLET, FILM COATED ORAL at 08:20

## 2022-04-05 RX ADMIN — ACETAMINOPHEN 650 MG: 325 TABLET ORAL at 11:49

## 2022-04-05 RX ADMIN — HYDRALAZINE HYDROCHLORIDE 25 MG: 25 TABLET, FILM COATED ORAL at 21:24

## 2022-04-05 RX ADMIN — INSULIN GLARGINE 10 UNITS: 100 INJECTION, SOLUTION SUBCUTANEOUS at 08:25

## 2022-04-05 RX ADMIN — ISOSORBIDE MONONITRATE 30 MG: 30 TABLET, EXTENDED RELEASE ORAL at 08:20

## 2022-04-05 RX ADMIN — Medication 10 ML: at 08:21

## 2022-04-05 NOTE — PROGRESS NOTES
[facility] Medicine Services Daily Progress Note    Patient Name: Shaji Junior  : 1945  MRN: [id number]  Primary Care Physician:  Naa Valverde MD  Date of admission: 3/29/2022      Subjective      Chief Complaint: CHF exacerbation shortness of breath        Patient Reports he is doing fine.  Sitting in the chair.    Eating all his food.  Patient is on meropenem with 1 more day to go.  Hopefully he can be discharged tomorrow.  Cardiology is planning on outpatient work-up.     ROS negative except as above    Objective      Vitals:   Temp:  [97.3 °F (36.3 °C)-98.3 °F (36.8 °C)] 98.1 °F (36.7 °C)  Heart Rate:  [61-80] 72  Resp:  [12-20] 20  BP: (126-146)/(64-97) 146/83  Flow (L/min):  [2-3] 2    Physical Exam  Vitals reviewed.   Constitutional:       Comments: Sitting in the chair   HENT:      Head: Normocephalic.      Nose: Nose normal.      Mouth/Throat:      Mouth: Mucous membranes are moist.   Eyes:      Pupils: Pupils are equal, round, and reactive to light.   Cardiovascular:      Rate and Rhythm: Normal rate.      Pulses: Normal pulses.   Pulmonary:      Effort: Pulmonary effort is normal.   Abdominal:      General: Abdomen is flat.   Musculoskeletal:         General: Normal range of motion.      Cervical back: Normal range of motion.      Right lower leg: Edema present.      Left lower leg: Edema present.   Skin:     General: Skin is warm.      Capillary Refill: Capillary refill takes less than 2 seconds.      Comments: Patient has dry skin with some swelling   Neurological:      General: No focal deficit present.      Mental Status: He is alert and oriented to person, place, and time.   Psychiatric:         Mood and Affect: Mood normal.         Behavior: Behavior normal.        Result Review    Result Review:  I have personally reviewed the results from the time of this admission to 2022 16:54 EDT and agree with these findings:  [x]  Laboratory  []  Microbiology  []   Radiology  []  EKG/Telemetry   []  Cardiology/Vascular   []  Pathology  []  Old records  []  Other:  Most notable findings include: Renal function is stable after diuresis    Wounds (last 24 hours)     LDA Wound     Row Name 04/05/22 1600 04/05/22 1200 04/05/22 0800       Wound 04/01/22 1122 Left posterior thigh Pressure Injury    Wound - Properties Group Placement Date: 04/01/22 - Placement Time: 1122  - Side: Left  -GK Orientation: posterior  -GK Location: thigh  -GK Primary Wound Type: Pressure inj  -GK    Closure CONSUELO  -KB CONSUELO  -KB CONSUELO  -KB    Base dressing in place, unable to visualize  -KB dressing in place, unable to visualize  -KB dressing in place, unable to visualize  -KB    Periwound intact  -KB intact  -KB intact  -KB    Drainage Amount none  -KB none  -KB none  -KB    Retired Wound - Properties Group Placement Date: 04/01/22 - Placement Time: 1122  - Side: Left  -GK Orientation: posterior  -GK Location: thigh  -GK Primary Wound Type: Pressure inj  -GK    Retired Wound - Properties Group Date first assessed: 04/01/22 - Time first assessed: 1122  -GK Side: Left  -GK Location: thigh  -GK Primary Wound Type: Pressure inj  -GK       Wound 04/01/22 1122 Right posterior thigh Pressure Injury    Wound - Properties Group Placement Date: 04/01/22 - Placement Time: 1122 - Side: Right  -GK Orientation: posterior  -GK Location: thigh  -GK Primary Wound Type: Pressure inj  -GK    Closure Adhesive bandage  -KB Adhesive bandage  -KB Adhesive bandage  -KB    Base dressing in place, unable to visualize  -KB dressing in place, unable to visualize  -KB dressing in place, unable to visualize  -KB    Periwound intact  -KB intact  -KB intact  -KB    Drainage Amount none  -KB none  -KB none  -KB    Retired Wound - Properties Group Placement Date: 04/01/22 - Placement Time: 1122  - Side: Right  -GK Orientation: posterior  -GK Location: thigh  -GK Primary Wound Type: Pressure inj  -GK    Retired Wound -  Properties Group Date first assessed: 04/01/22  -GK Time first assessed: 1122 -GK Side: Right  -GK Location: thigh  -GK Primary Wound Type: Pressure inj  -GK          User Key  (r) = Recorded By, (t) = Taken By, (c) = Cosigned By    Initials Name Provider Type    Estella Philippe RN Registered Nurse    Vira Staton RN Registered Nurse                Assessment/Plan       Brief Patient Summary:  Shaji Junior is a 76 y.o. male with a chief complaint of shortness of breath which has been worsening over the last several days.  The patient has limited knowledge of his medical history.  He is from [facility] with the patient reports he has been for 2 years.  From review of patient's medication list and physical exam the patient appears to have chronic lower extremity edema.  He is noted to be in atrial fibrillation with controlled ventricular response on admission without anticoagulation on his medication list however, the patient is on Cardizem.  I asked the patient who his primary care provider was and he said he just gets care at the nursing home and goes to the hospital.  He usually goes to [facility].  The patient was put on it at the [facility] recently secondary to his increasing shortness of breath.     Review of records: Patient was seen in the ER 3/11/2022 after he pulled out PICC line with concern for portion still in the patient's arm.  At that time x-ray showed no opaque foreign body.  Patient was discharged back to the [facility].      Current medications include:  atorvastatin, 20 mg, Oral, Nightly  enoxaparin, 1 mg/kg, Subcutaneous, Q12H  escitalopram, 10 mg, Oral, Daily  finasteride, 5 mg, Oral, Daily  gabapentin, 300 mg, Oral, TID  hydrALAZINE, 25 mg, Oral, TID  insulin glargine, 10 Units, Subcutaneous, Daily  isosorbide mononitrate, 30 mg, Oral, BID - Nitrates  meropenem, 500 mg, Intravenous, Q8H  senna-docusate sodium, 2 tablet, Oral, BID  sodium chloride, 10 mL, Intravenous,  Q12H  tamsulosin, 0.4 mg, Oral, Daily        furosemide (LASIX) 100 mg in 100mL NS infusion, 10 mg/hr, Last Rate: 10 mg/hr (04/03/22 0207)  pharmacy, 1 each  Pharmacy to Dose enoxaparin (LOVENOX),            Active Hospital Problems:          Active Hospital Problems     Diagnosis     • Acute on chronic congestive heart failure, unspecified heart failure type (HCC)     • CHF exacerbation (HCC)     • Type 2 diabetes mellitus with diabetic nephropathy (HCC)        Plan:      Acute on chronic diastolic congestive heart failure secondary to hypertensive heart disease  -Lasix 60 mg IV every 8 hours given but Cr david from 1.3 to 1.83  -proBNP 4400 with comparison 2100 May 2021  -Echocardiogram from outside facility showed EF 55%, normal LV size, no tricuspid regurgitation, aortic valve is normal in structure and function.  No mitral valve regurgitation.  Mitral valve grossly normal.   -Dr. Stein nephrology started the patient on a Lasix drip 3/31/2022 but patient has been since switched off  -Intake and output not accurate because the patient has incontinence of urine  -Lasix per cardiology     Hypokalemia  Replete daily     Acute renal failure due to prerenal azotemia from diuresis on chronic kidney disease stage IIIa secondary to hypertension and diabetes  -Creatinine 1.30 apparently is his baseline with comparison 1.27 May 2021  -Creatinine had worsened to 1.83 with diuresis but has improved to 1.2 despite diuresis  -Nephrology managing diuresis     Acute on chronic anemia  Hemoglobin 9.7 with comparison 11.4 May 2021  -Hemoglobin stable at 10.0  -Monitor     Persistent atrial fibrillation with controlled ventricular response  -Patient was not on anticoagulation therapy prior to admission and would benefit from anticoagulation; Lovenox will be changed to therapeutic dosing  -Continue Cardizem and Coreg  -EKG showing atrial fibrillation with controlled ventricular response, now resolved     Acute urinary tract  infection  -Urine culture grew ESBL producing Proteus mirabilis   -Continue IV Merrem 1 more day     Essential hypertension, chronic and controlled  -Continue Norvasc, Coreg, diltiazem, hydralazine and isosorbide     Dementia  Depression, chronic  -Continue Lexapro     BPH, chronic  -Continue Proscar and Flomax  -Patient is incontinent of urine  -Check postvoid residual      Diabetes type 2, chronic and uncontrolled with diabetic peripheral neuropathy  -Continue Lantus and gabapentin  -hemoglobin A1c 6.4  -Accu-Cheks and SSI     Hyperlipidemia, chronic  -Continue Zocor      Continue PT OT     Per cardiology, outpatient ischemic work-up  Will discharge tomorrow back to facility after meropenem is complete     DVT prophylaxis:  Medical DVT prophylaxis orders are present.     CODE STATUS:    Code Status (Patient has no pulse and is not breathing): CPR (Attempt to Resuscitate)  Medical Interventions (Patient has pulse or is breathing): Full Support     He is awake and alert and oriented x3 and is requesting full care including intervention if needed.     Disposition:  I expect patient to be discharged tomorrow if stableThis patient has been examined wearing appropriate Personal Protective Equipment and discussed with hospital infection control department.  04/05/22      Electronically signed by Ba Banuelos MD, 04/05/22, 16:54 EDT.  [facility]ist Team

## 2022-04-05 NOTE — PROGRESS NOTES
Cardiology [place]        LOS:  LOS: 4 days   Patient Name: Shaji Junior  Age/Sex: 76 y.o. male  : 1945  MRN: [id number]    Day of Service: 22   Length of Stay: 4  Encounter Provider: Gurpreet Vargas MD  Place of Service: [facility] CARDIOLOGY  Patient Care Team:  Naa Valverde MD as PCP - General (Internal Medicine)    Subjective:     Chief Complaint: f/u fluid overload, afib    Subjective: patient comfortable, no distress, off Lasix gtt. He is sitting up in the chair on supplemental O2. No acute complaints.     Current Medications:   Scheduled Meds:apixaban, 5 mg, Oral, Q12H  atorvastatin, 20 mg, Oral, Nightly  bumetanide, 2 mg, Oral, BID  carvedilol, 12.5 mg, Oral, Q12H  escitalopram, 10 mg, Oral, Daily  finasteride, 5 mg, Oral, Daily  gabapentin, 300 mg, Oral, TID  hydrALAZINE, 25 mg, Oral, TID  insulin glargine, 10 Units, Subcutaneous, Daily  isosorbide mononitrate, 30 mg, Oral, BID - Nitrates  meropenem, 500 mg, Intravenous, Q6H  senna-docusate sodium, 2 tablet, Oral, BID  sodium chloride, 10 mL, Intravenous, Q12H  spironolactone, 50 mg, Oral, BID  tamsulosin, 0.4 mg, Oral, Daily      Continuous Infusions:pharmacy, 1 each        Allergies:  No Known Allergies    Review of Systems   Constitutional: Negative for chills and fever.   HENT: Negative for ear discharge and nosebleeds.    Eyes: Negative for discharge and redness.   Cardiovascular: Positive for leg swelling. Negative for chest pain, orthopnea, palpitations, paroxysmal nocturnal dyspnea and syncope.   Respiratory: Positive for shortness of breath. Negative for cough and wheezing.    Endocrine: Negative for heat intolerance.   Skin: Negative for rash.   Musculoskeletal: Positive for arthritis, back pain and joint pain. Negative for myalgias.   Gastrointestinal: Negative for abdominal pain, melena, nausea and vomiting.   Genitourinary: Negative for dysuria and hematuria.   Neurological: Negative for  dizziness, light-headedness, numbness and tremors.   Psychiatric/Behavioral: Negative for depression. The patient is not nervous/anxious.          Objective:     Temp:  [97.3 °F (36.3 °C)-98.3 °F (36.8 °C)] 98.1 °F (36.7 °C)  Heart Rate:  [61-80] 72  Resp:  [12-20] 20  BP: (126-146)/(64-97) 146/83     Intake/Output Summary (Last 24 hours) at 4/5/2022 1701  Last data filed at 4/5/2022 1419  Gross per 24 hour   Intake 1200 ml   Output 425 ml   Net 775 ml     Body mass index is 48.44 kg/m².      04/02/22  0500 04/03/22  0500 04/05/22  0525   Weight: (!) 162 kg (357 lb 12.9 oz) (new bed and no PCU weight) (!) 162 kg (357 lb 2.3 oz) (refused)     Constitutional:       Appearance: Well-developed.   Eyes:      General: No scleral icterus.        Right eye: No discharge.   HENT:      Head: Normocephalic and atraumatic.   Neck:      Thyroid: No thyromegaly.      Lymphadenopathy: No cervical adenopathy.   Pulmonary:      Effort: Pulmonary effort is normal. No respiratory distress.      Breath sounds: Normal breath sounds. No wheezing. No rales.   Cardiovascular:      Normal rate. Irregularly irregular rhythm.      No gallop.   Edema:     Peripheral edema present.  Abdominal:      Tenderness: There is no abdominal tenderness.   Skin:     Findings: No erythema or rash.   Neurological:      Mental Status: Alert and oriented to person, place, and time.           Lab Review:   Results from last 7 days   Lab Units 04/05/22  0337 04/04/22  0424   SODIUM mmol/L 142 144   POTASSIUM mmol/L 3.8 3.3*   CHLORIDE mmol/L 97* 96*   CO2 mmol/L 38.0* 40.0*   BUN mg/dL 20 21   CREATININE mg/dL 1.07 1.02   GLUCOSE mg/dL 94 101*   CALCIUM mg/dL 8.7 8.8         Results from last 7 days   Lab Units 04/04/22  0424 04/01/22  0925   WBC 10*3/mm3 4.10 3.90   HEMOGLOBIN g/dL 10.2* 10.0*   HEMATOCRIT % 30.9* 31.1*   PLATELETS 10*3/mm3 174 157         Results from last 7 days   Lab Units 04/05/22  0337 04/04/22  0424   MAGNESIUM mg/dL 1.9 1.9            Invalid input(s): LDLCALC  Results from last 7 days   Lab Units 04/05/22  0337   PROBNP pg/mL 4,124.0*     Results from last 7 days   Lab Units 03/31/22  0332   TSH uIU/mL 0.696       Recent Radiology:  Imaging Results (Most Recent)     Procedure Component Value Units Date/Time    CT Chest Without Contrast Diagnostic [[id number]] Collected: 04/02/22 1141     Updated: 04/02/22 1148    Narrative:      CT CHEST WO CONTRAST DIAGNOSTIC-     Date of Exam: 4/2/2022 11:12 AM     Indication: Pneumonia, effusion or abscess suspected, xray done;  I50.9-Heart failure, unspecified; N39.0-Urinary tract infection, site  not specified; Z20.822-Contact with and (suspected) exposure to  covid-19.     Comparison: Chest x-ray 3/31/2022     Technique: Serial and axial CT images of the chest were obtained.  Reconstructions in the coronal and sagittal planes were performed.   Automated exposure control and iterative reconstruction methods were  used.  Radiation audit for number of CT and nuclear cardiology exams  performed in the last year:  0.     FINDINGS:     Small-moderate dependent bilateral pleural effusions, larger on the  right are present. Adjacent compressive atelectasis. No pneumothorax.  Bilateral atelectasis. Small areas of ill-defined indeterminate airspace  disease amongst the atelectasis.. Detailed evaluation limited due to  motion. Some interlobular septal thickening is evident in the right  lung.     Coronary disease with calcifications are present. No pericardial  effusion. Main pulmonary arteries enlarged, 4.3 cm.     No evidence of acute process on noncontrast imaging of the included  upper abdomen. There is body wall edema noted. Bilateral gynecomastia is  present small soft tissue opacities. No acute osseous findings.       Impression:      Small-moderate bilateral pleural effusions with adjacent atelectasis.  Ill-defined airspace disease amongst the atelectasis may be due to  coexistent pneumonia     Pulmonary  edema evident.     Enlargement of the main and central pulmonary arteries, suggesting  pulmonary hypertension     Coronary disease with advanced calcifications              Electronically Signed By-Ronan Wiggins On:4/2/2022 11:46 AM  This report was finalized on [id number] by  Ronan Wiggins, .    XR Chest PA & Lateral [[id number]] Collected: 03/31/22 1132     Updated: 03/31/22 1135    Narrative:      DATE OF EXAM:  3/31/2022 11:22 AM     PROCEDURE:  XR CHEST PA AND LATERAL-     INDICATIONS:  wheezing, decreased LOC; I50.9-Heart failure, unspecified; N39.0-Urinary  tract infection, site not specified; Z20.822-Contact with and  (suspected) exposure to covid-19       COMPARISON:  AP portable chest 3/29/2022.     TECHNIQUE:   Two radiologic views of the chest.     FINDINGS:  The lateral image is degraded secondary to patient body habitus. Hazy  interstitial and alveolar disease is present in the lung bases, left  greater right, similar to the prior exam. Stable cardiac enlargement. No  definite pleural effusion. No pneumothorax. No acute osseous  abnormality.       Impression:       IMPRESSION:     1. Left greater than right basilar interstitial and alveolar infiltrates  are unchanged from 3/29/2022  2. Stable cardiomegaly.     Electronically Signed By-Barbara Beebe MD On:3/31/2022 11:33 AM  This report was finalized on [id number] by  Barbara Beebe MD.    XR Chest 1 View [[id number]] Collected: 03/29/22 0716     Updated: 03/29/22 0720    Narrative:      DATE OF EXAM:  3/29/2022 1:27 AM     PROCEDURE:  XR CHEST 1 VW-     INDICATIONS:  soa and cough today     COMPARISON:  No Comparisons Available     TECHNIQUE:   Single radiographic AP view of the chest was obtained.     FINDINGS:  Single frontal view chest reveals that the heart is enlarged. Superior  mediastinum is normal. There are mild diffuse infiltrates consistent  with chronic lung disease and bronchitis and mild congestive heart  failure. There is a suspicion  of a small focal infiltrate in the left  lower lobe along and in the right lung base. There is a suspicion of a  small right basilar pleural effusion. No evidence of pneumothorax        Impression:         1. Cardiomegaly.  2. Bilateral diffuse mild infiltrates consistent with chronic lung  disease and bronchitis and mild congestive heart failure.  3. Suspicion of a small focal infiltrate left lower lobe along.  4. Suspicion of small infiltrate small pleural effusion right lung base     Electronically Signed By-Armando Patiño MD On:3/29/2022 7:18 AM  This report was finalized on [id number] by  Armando Patiño MD.          ECHOCARDIOGRAM:    Results for orders placed during the hospital encounter of 03/29/22    Adult Transthoracic Echo Complete W/ Cont if Necessary Per Protocol    Interpretation Summary  · Left ventricular systolic function is normal.  · Left ventricular ejection fraction is 55 to 60%  · Left ventricular diastolic function was normal.        I reviewed the patient's new clinical results.    EKG:      Assessment:       CHF exacerbation (HCC)    Type 2 diabetes mellitus with diabetic nephropathy (HCC)    Acute on chronic congestive heart failure, unspecified heart failure type (HCC)    1.  Bradycardia  - HR 60s.  No profound pauses or bradycardia noted     2.  Obstructive sleep apnea     3.  Bilateral leg edema / volume overload, HFpEF, acute on chronic  - LVEF 55-60%     4.  Renal insufficiency  -creatinine improved     5.  Atrial fibrillation       Plan:   Lower extremity edema. Off lasix gtt- contraction alkalosis improving  Remains in Afib, rates 60s. No profound bradycardia  Needs long term anticoagulation.  On Eliquis  Creatinine improved, renal following    Patient is seen and examined and findings are verified.  All data is reviewed by me personally.  Assessment and plan formulated by APC was done after discussion with attending.  I spent more than 50% of time in taking care of  the patient.    Patient is sitting up in chair.  No chest pain.  Mild shortness of breath    Patient is in atrial fibrillation.  Rate is controlled.  Blood pressure is stable.    Normal S1 and S2.  Heart rate is irregular.  Abdominal exam is benign.  Decreased breath sound.  Trace leg edema.    At this stage, I would recommend to continue Eliquis.  I would consider stress test as a outpatient.  But patient would need oral diuretics.  Bumex has been started.  We shall follow    Discussed with the patient    Electronically signed by Gurpreet Vargas MD, 04/05/22, 5:01 PM EDT.            Gurpreet Vargas MD  04/05/22  17:01 EDT

## 2022-04-05 NOTE — THERAPY TREATMENT NOTE
Subjective: Pt agreeable to therapeutic plan of care.    Objective:     Transfers - Max-A of 1 for sit<>stand  Ambulation - 5 feet Max-A and with rolling walker   Pt stood with walker and PT cued pt for erect standing posture with bilateral feet flat and stable base of support; pt did a few reps of marching in place and lateral weight shifting    Pain: Pt c/o bilateral heel pain that was an 8/10 in am; nsg aware    Education: Provided education on importance of mobility and skilled verbal / tactile cueing throughout intervention.     Assessment: Shaji Junior presents with functional mobility impairments which indicate the need for skilled intervention. Tolerating session today without incident. Will continue to follow and progress as tolerated.     Plan/Recommendations:   Pt would benefit from extended care facility at discharge from facility and requires no DME at discharge.   Pt desires return to [facility]  at discharge. Pt cooperative; agreeable to therapeutic recommendations and plan of care.     Basic Mobility 6-click:  Rollin = Total, A lot = 2, A little = 3; 4 = None  Supine>Sit:   1 = Total, A lot = 2, A little = 3; 4 = None   Sit>Stand with arms:  1 = Total, A lot = 2, A little = 3; 4 = None  Bed>Chair:   1 = Total, A lot = 2, A little = 3; 4 = None  Ambulate in room:  1 = Total, A lot = 2, A little = 3; 4 = None  3-5 Steps with railin = Total, A lot = 2, A little = 3; 4 = None  Score: 10    Modified [place]: 4 = Moderately severe disability (Unable to attend to own bodily needs without assistance, and unable to walk unassisted)     Post-Tx Position: Up in Chair, Alarms activated and Call light and personal items within reach  PPE: gloves, surgical mask, eyewear protection

## 2022-04-05 NOTE — NURSING NOTE
Pt has been sitting up in a chair at bedside since prior to RN getting here since 7a states he is most comfortable there, pt is tolerating IV antibiotics well and PT is at bedside

## 2022-04-05 NOTE — PROGRESS NOTES
"[facility] PROGRESS NOTE     LOS: 4 days    Patient Care Team:  Naa Valverde MD as PCP - General (Internal Medicine)      Subjective      up in chair,  no soa at rest    Objective     Vital Sign Min/Max for last 24 hours  Temp:  [97.3 °F (36.3 °C)-98.3 °F (36.8 °C)] 97.7 °F (36.5 °C)  Heart Rate:  [61-81] 80  Resp:  [12-20] 18  BP: (109-140)/(64-97) 128/64                       Flowsheet Rows    Flowsheet Row First Filed Value   Admission Height 182.9 cm (72\") Documented at 03/29/2022 0100   Admission Weight 118 kg (260 lb) Documented at 03/29/2022 0100          No intake/output data recorded.  I/O last 3 completed shifts:  In: 820 [P.O.:720; IV Piggyback:100]  Out: 8425 [Urine:8425]    Physical Exam:  Physical Exam    General Appearance: Morbidly obese, chronically ill-appearing, in mild distress   skin: warm and dry  HEENT: On BiPAP.  Oral mucosa moist  Neck: supple, less jvd  Lungs: Decreased breath sounds at bases with bibasilar rales  Heart: Bradycardic, normal S1 and S2  Abdomen: Morbidly obese, soft, nontender, nondistended  Extremities: 1+ bilateral lower extreme edema with venous stasis changes  Neuro: normal speech and mental status        LABS:  Lab Results   Component Value Date    CALCIUM 8.7 04/05/2022    PHOS 2.9 04/05/2022     Results from last 7 days   Lab Units 04/05/22  0337 04/04/22  0424 04/03/22  0503 04/02/22  0525 04/01/22  0925 03/31/22  0332   MAGNESIUM mg/dL 1.9 1.9  --   --   --  2.2   SODIUM mmol/L 142 144 145   < > 146* 145   POTASSIUM mmol/L 3.8 3.3* 3.5   < > 3.8 4.2   CHLORIDE mmol/L 97* 96* 101   < > 105 106   CO2 mmol/L 38.0* 40.0* 34.0*   < > 30.0* 29.0   BUN mg/dL 20 21 25*   < > 33* 36*   CREATININE mg/dL 1.07 1.02 1.20   < > 1.57* 1.83*   GLUCOSE mg/dL 94 101* 110*   < > 98 147*   CALCIUM mg/dL 8.7 8.8 8.5*   < > 8.7 8.5*   WBC 10*3/mm3  --  4.10  --   --  3.90 4.10   HEMOGLOBIN g/dL  --  10.2*  --   --  10.0* 9.4*   PLATELETS 10*3/mm3  --  174  --   --  157 122*    < " > = values in this interval not displayed.     Lab Results   Component Value Date    TROPONINT 0.035 (C) 03/29/2022     Estimated Creatinine Clearance: 92.2 mL/min (by C-G formula based on SCr of 1.07 mg/dL).  Results from last 7 days   Lab Units 03/31/22  1153   PH, ARTERIAL pH units 7.322*   PO2 ART mm Hg 81.4*   PCO2, ARTERIAL mm Hg 62.9*   HCO3 ART mmol/L 32.6*     Brief Urine Lab Results  (Last result in the past 365 days)      Color   Clarity   Blood   Leuk Est   Nitrite   Protein   CREAT   Urine HCG        03/29/22 0258 Yellow   Clear   Trace   Small (1+)   Positive   Negative               WEIGHTS:     Wt Readings from Last 1 Encounters:   04/03/22 0500 (!) 162 kg (357 lb 2.3 oz)   04/02/22 0500 (!) 162 kg (357 lb 12.9 oz)   03/31/22 1645 123 kg (270 lb 1 oz)   03/31/22 1419 (!) 152 kg (335 lb)   03/29/22 0952 (!) 152 kg (335 lb)   03/29/22 0425 (!) 152 kg (335 lb 4.8 oz)   03/29/22 0100 118 kg (260 lb)       apixaban, 5 mg, Oral, Q12H  atorvastatin, 20 mg, Oral, Nightly  carvedilol, 12.5 mg, Oral, Q12H  escitalopram, 10 mg, Oral, Daily  finasteride, 5 mg, Oral, Daily  gabapentin, 300 mg, Oral, TID  hydrALAZINE, 25 mg, Oral, TID  insulin glargine, 10 Units, Subcutaneous, Daily  isosorbide mononitrate, 30 mg, Oral, BID - Nitrates  meropenem, 500 mg, Intravenous, Q6H  senna-docusate sodium, 2 tablet, Oral, BID  sodium chloride, 10 mL, Intravenous, Q12H  spironolactone, 25 mg, Oral, BID  tamsulosin, 0.4 mg, Oral, Daily      pharmacy, 1 each        Assessment/Plan       1.  Acute kidney injury on chronic renal disease stage IIIa  Creatinine is back to normal this morning despite brisk diuresis, better than his chronic baseline renal function; when at his true dry weight his creatinine will likely rise again  2.  Fluid overload/Pulmonary hypertension-developing contraction alkalosis, off lasix drip  3.  Hypertension with CKD.  Blood pressure on the low side  4.  Morbid obesity    5.  UTI.  Urine culture growing  ESBL Proteus mirabilis     Recs:  - reeval daily  -continue same hydralazine dose  -Monitor renal function while being diuresed  -continue aldactone  -resume po diuretics  -monitor alkalosis        Faraz Petersen MD  04/05/22  13:01 EDT

## 2022-04-05 NOTE — PROGRESS NOTES
Daily Progress Note        CHF exacerbation (HCC)    Type 2 diabetes mellitus with diabetic nephropathy (HCC)    Acute on chronic congestive heart failure, unspecified heart failure type (HCC)      Assessment    Acute/chronic hypercapnic respiratory insufficiency  -ABG, pH 7.32/pCO2 63/PO2 81/HC03 32     Obstructive sleep apnea  Obesity hypoventilation syndrome  Pulmonary edema and cardiomegaly    Atrial fibrillation  Acute kidney injury  Morbid obesity  Hypertension  Non-smoker     3/31/2022 Free T4 1.20     3/29/2022 urine culture Proteus Mirabilis, ESBL    4/1/2022 bilateral lower extremity Doppler negative for DVT     3/31/2022 echo  EF 55 to 60%  RVSP 14.9 mmHg     Recommendations:     Continue to titrate oxygen- Currently on 2L NC and AVAPS at at bedtime    Merrem for UTI-finishes 4/8    Off  Lasix gtt    Electrolyte/Glycemic control  Anticoagulation Eliquis  Diuresis per nephrology    Aspiration precautions, elevate head of bed  PT/SLP following     4/2/2022             LOS: 4 days     Subjective         Objective     Vital signs for last 24 hours:  Vitals:    04/05/22 0000 04/05/22 0202 04/05/22 0302 04/05/22 0525   BP:  126/97  136/75   BP Location:  Left arm  Left arm   Patient Position:  Sitting  Lying   Pulse: 73 67 74 68   Resp:  16  18   Temp:  98.3 °F (36.8 °C)  98.2 °F (36.8 °C)   TempSrc:  Oral  Oral   SpO2: 100% 100% 100% 100%   Weight:       Height:           Intake/Output last 3 shifts:  I/O last 3 completed shifts:  In: 1145 [P.O.:720; I.V.:325; IV Piggyback:100]  Out: 09532 [Urine:54973]  Intake/Output this shift:  I/O this shift:  In: 240 [P.O.:240]  Out: 425 [Urine:425]      Radiology  Imaging Results (Last 24 Hours)     ** No results found for the last 24 hours. **          Labs:  Results from last 7 days   Lab Units 04/04/22  0424   WBC 10*3/mm3 4.10   HEMOGLOBIN g/dL 10.2*   HEMATOCRIT % 30.9*   PLATELETS 10*3/mm3 174     Results from last 7 days   Lab Units 04/05/22  0337   SODIUM mmol/L  142   POTASSIUM mmol/L 3.8   CHLORIDE mmol/L 97*   CO2 mmol/L 38.0*   BUN mg/dL 20   CREATININE mg/dL 1.07   CALCIUM mg/dL 8.7   GLUCOSE mg/dL 94     Results from last 7 days   Lab Units 03/31/22  1153   PH, ARTERIAL pH units 7.322*   PO2 ART mm Hg 81.4*   PCO2, ARTERIAL mm Hg 62.9*   HCO3 ART mmol/L 32.6*     Results from last 7 days   Lab Units 04/02/22  0525 04/01/22  0925   ALBUMIN g/dL 3.00* 3.30*             Results from last 7 days   Lab Units 04/05/22  0337   MAGNESIUM mg/dL 1.9         Results from last 7 days   Lab Units 03/31/22  0332   TSH uIU/mL 0.696   FREE T4 ng/dL 1.20           Meds:   SCHEDULE  apixaban, 5 mg, Oral, Q12H  atorvastatin, 20 mg, Oral, Nightly  carvedilol, 12.5 mg, Oral, Q12H  escitalopram, 10 mg, Oral, Daily  finasteride, 5 mg, Oral, Daily  gabapentin, 300 mg, Oral, TID  hydrALAZINE, 25 mg, Oral, TID  insulin glargine, 10 Units, Subcutaneous, Daily  isosorbide mononitrate, 30 mg, Oral, BID - Nitrates  meropenem, 500 mg, Intravenous, Q6H  senna-docusate sodium, 2 tablet, Oral, BID  sodium chloride, 10 mL, Intravenous, Q12H  spironolactone, 25 mg, Oral, BID  tamsulosin, 0.4 mg, Oral, Daily      Infusions  pharmacy, 1 each  Pharmacy to Dose enoxaparin (LOVENOX),       PRNs  •  acetaminophen **OR** acetaminophen **OR** acetaminophen  •  ondansetron **OR** ondansetron  •  Pharmacy to Dose enoxaparin (LOVENOX)  •  sodium chloride  •  sodium chloride    Physical Exam:  Physical Exam  Vitals reviewed.   Pulmonary:      Breath sounds: Rhonchi present.   Musculoskeletal:         General: Swelling present.      Right lower leg: Edema present.      Left lower leg: Edema present.   Skin:     General: Skin is warm and dry.   Neurological:      Mental Status: He is alert and oriented to person, place, and time.         ROS  Review of Systems   Respiratory: Positive for shortness of breath.    Cardiovascular: Positive for leg swelling.     I have reviewed the patient's new clinical  results    Electronically signed by MELISA Saleh

## 2022-04-05 NOTE — PLAN OF CARE
Transfers - Max-A of 1 for sit<>stand  Ambulation - 5 feet Max-A and with rolling walker   Pt stood with walker and PT cued pt for erect standing posture with bilateral feet flat and stable base of support; pt did a few reps of marching in place and lateral weight shifting      Plan/Recommendations:   Pt would benefit from extended care facility at discharge from facility and requires no DME at discharge.   Pt desires return to [facility]  at discharge. Pt cooperative; agreeable to therapeutic recommendations and plan of care.

## 2022-04-05 NOTE — PLAN OF CARE
Goal Outcome Evaluation:           Progress: improving  Outcome Evaluation: Patient up in chair the entire night.  No c/o SOB.

## 2022-04-06 VITALS
OXYGEN SATURATION: 100 % | TEMPERATURE: 97.7 F | HEART RATE: 72 BPM | DIASTOLIC BLOOD PRESSURE: 78 MMHG | HEIGHT: 72 IN | BODY MASS INDEX: 42.66 KG/M2 | WEIGHT: 315 LBS | SYSTOLIC BLOOD PRESSURE: 127 MMHG | RESPIRATION RATE: 20 BRPM

## 2022-04-06 LAB
ANION GAP SERPL CALCULATED.3IONS-SCNC: 10 MMOL/L (ref 5–15)
BUN SERPL-MCNC: 22 MG/DL (ref 8–23)
BUN/CREAT SERPL: 21.2 (ref 7–25)
CALCIUM SPEC-SCNC: 9 MG/DL (ref 8.6–10.5)
CHLORIDE SERPL-SCNC: 98 MMOL/L (ref 98–107)
CO2 SERPL-SCNC: 34 MMOL/L (ref 22–29)
CREAT SERPL-MCNC: 1.04 MG/DL (ref 0.76–1.27)
EGFRCR SERPLBLD CKD-EPI 2021: 74.4 ML/MIN/1.73
GLUCOSE SERPL-MCNC: 95 MG/DL (ref 65–99)
MAGNESIUM SERPL-MCNC: 1.9 MG/DL (ref 1.6–2.4)
PHOSPHATE SERPL-MCNC: 3 MG/DL (ref 2.5–4.5)
POTASSIUM SERPL-SCNC: 4 MMOL/L (ref 3.5–5.2)
SODIUM SERPL-SCNC: 142 MMOL/L (ref 136–145)

## 2022-04-06 PROCEDURE — 80048 BASIC METABOLIC PNL TOTAL CA: CPT | Performed by: INTERNAL MEDICINE

## 2022-04-06 PROCEDURE — 97110 THERAPEUTIC EXERCISES: CPT

## 2022-04-06 PROCEDURE — 94799 UNLISTED PULMONARY SVC/PX: CPT

## 2022-04-06 PROCEDURE — 97530 THERAPEUTIC ACTIVITIES: CPT

## 2022-04-06 PROCEDURE — 94660 CPAP INITIATION&MGMT: CPT

## 2022-04-06 PROCEDURE — 84100 ASSAY OF PHOSPHORUS: CPT | Performed by: INTERNAL MEDICINE

## 2022-04-06 PROCEDURE — 92526 ORAL FUNCTION THERAPY: CPT

## 2022-04-06 PROCEDURE — 83735 ASSAY OF MAGNESIUM: CPT | Performed by: INTERNAL MEDICINE

## 2022-04-06 PROCEDURE — 99239 HOSP IP/OBS DSCHRG MGMT >30: CPT | Performed by: INTERNAL MEDICINE

## 2022-04-06 PROCEDURE — 63710000001 INSULIN GLARGINE PER 5 UNITS: Performed by: NURSE PRACTITIONER

## 2022-04-06 RX ORDER — BUMETANIDE 2 MG/1
2 TABLET ORAL 2 TIMES DAILY
Start: 2022-04-06 | End: 2022-08-16 | Stop reason: HOSPADM

## 2022-04-06 RX ORDER — SPIRONOLACTONE 50 MG/1
50 TABLET, FILM COATED ORAL DAILY
Start: 2022-04-06 | End: 2022-08-16 | Stop reason: HOSPADM

## 2022-04-06 RX ORDER — HYDRALAZINE HYDROCHLORIDE 25 MG/1
25 TABLET, FILM COATED ORAL 3 TIMES DAILY
Start: 2022-04-06 | End: 2022-09-16

## 2022-04-06 RX ADMIN — ISOSORBIDE MONONITRATE 30 MG: 30 TABLET, EXTENDED RELEASE ORAL at 10:06

## 2022-04-06 RX ADMIN — BUMETANIDE 2 MG: 1 TABLET ORAL at 10:06

## 2022-04-06 RX ADMIN — SENNOSIDES AND DOCUSATE SODIUM 2 TABLET: 50; 8.6 TABLET ORAL at 10:06

## 2022-04-06 RX ADMIN — Medication 10 ML: at 10:07

## 2022-04-06 RX ADMIN — SPIRONOLACTONE 50 MG: 25 TABLET, FILM COATED ORAL at 10:06

## 2022-04-06 RX ADMIN — ESCITALOPRAM OXALATE 10 MG: 10 TABLET ORAL at 10:05

## 2022-04-06 RX ADMIN — TAMSULOSIN HYDROCHLORIDE 0.4 MG: 0.4 CAPSULE ORAL at 10:05

## 2022-04-06 RX ADMIN — APIXABAN 5 MG: 5 TABLET, FILM COATED ORAL at 10:06

## 2022-04-06 RX ADMIN — HYDRALAZINE HYDROCHLORIDE 25 MG: 25 TABLET, FILM COATED ORAL at 10:05

## 2022-04-06 RX ADMIN — GABAPENTIN 300 MG: 300 CAPSULE ORAL at 10:05

## 2022-04-06 RX ADMIN — INSULIN GLARGINE 10 UNITS: 100 INJECTION, SOLUTION SUBCUTANEOUS at 10:06

## 2022-04-06 RX ADMIN — CARVEDILOL 12.5 MG: 6.25 TABLET, FILM COATED ORAL at 10:06

## 2022-04-06 RX ADMIN — FINASTERIDE 5 MG: 5 TABLET, FILM COATED ORAL at 10:06

## 2022-04-06 NOTE — PROGRESS NOTES
"[facility] PROGRESS NOTE     LOS: 5 days    Patient Care Team:  Naa Valverde MD as PCP - General (Internal Medicine)      Subjective      up in recliner,  no soa at rest, still has morris    Objective     Vital Sign Min/Max for last 24 hours  Temp:  [97.7 °F (36.5 °C)-98.4 °F (36.9 °C)] 97.8 °F (36.6 °C)  Heart Rate:  [65-76] 66  Resp:  [18-20] 20  BP: (133-146)/(65-91) 142/65                       Flowsheet Rows    Flowsheet Row First Filed Value   Admission Height 182.9 cm (72\") Documented at 03/29/2022 0100   Admission Weight 118 kg (260 lb) Documented at 03/29/2022 0100          I/O this shift:  In: 720 [P.O.:720]  Out: -   I/O last 3 completed shifts:  In: 1680 [P.O.:1680]  Out: 1875 [Urine:1875]    Physical Exam:  Physical Exam    General Appearance: Morbidly obese, chronically ill-appearing, cheerful today in no distress  skin: warm and dry  HEENT: On BiPAP.  Oral mucosa moist  Neck: supple, less jvd  Lungs: Decreased breath sounds at bases with bibasilar rales  Heart: Bradycardic, normal S1 and S2  Abdomen: Morbidly obese, soft, nontender, nondistended  Extremities: 1+ bilateral lower extreme edema with venous stasis changes  Neuro: normal speech and mental status        LABS:  Lab Results   Component Value Date    CALCIUM 9.0 04/06/2022    PHOS 3.0 04/06/2022     Results from last 7 days   Lab Units 04/06/22  0431 04/05/22  0337 04/04/22  0424 04/02/22  0525 04/01/22  0925 03/31/22  0332   MAGNESIUM mg/dL 1.9 1.9 1.9  --   --  2.2   SODIUM mmol/L 142 142 144   < > 146* 145   POTASSIUM mmol/L 4.0 3.8 3.3*   < > 3.8 4.2   CHLORIDE mmol/L 98 97* 96*   < > 105 106   CO2 mmol/L 34.0* 38.0* 40.0*   < > 30.0* 29.0   BUN mg/dL 22 20 21   < > 33* 36*   CREATININE mg/dL 1.04 1.07 1.02   < > 1.57* 1.83*   GLUCOSE mg/dL 95 94 101*   < > 98 147*   CALCIUM mg/dL 9.0 8.7 8.8   < > 8.7 8.5*   WBC 10*3/mm3  --   --  4.10  --  3.90 4.10   HEMOGLOBIN g/dL  --   --  10.2*  --  10.0* 9.4*   PLATELETS 10*3/mm3  --   --  " 174  --  157 122*    < > = values in this interval not displayed.     Lab Results   Component Value Date    TROPONINT 0.035 (C) 03/29/2022     Estimated Creatinine Clearance: 94.9 mL/min (by C-G formula based on SCr of 1.04 mg/dL).  Results from last 7 days   Lab Units 03/31/22  1153   PH, ARTERIAL pH units 7.322*   PO2 ART mm Hg 81.4*   PCO2, ARTERIAL mm Hg 62.9*   HCO3 ART mmol/L 32.6*     Brief Urine Lab Results  (Last result in the past 365 days)      Color   Clarity   Blood   Leuk Est   Nitrite   Protein   CREAT   Urine HCG        03/29/22 0258 Yellow   Clear   Trace   Small (1+)   Positive   Negative               WEIGHTS:     Wt Readings from Last 1 Encounters:   04/03/22 0500 (!) 162 kg (357 lb 2.3 oz)   04/02/22 0500 (!) 162 kg (357 lb 12.9 oz)   03/31/22 1645 123 kg (270 lb 1 oz)   03/31/22 1419 (!) 152 kg (335 lb)   03/29/22 0952 (!) 152 kg (335 lb)   03/29/22 0425 (!) 152 kg (335 lb 4.8 oz)   03/29/22 0100 118 kg (260 lb)       apixaban, 5 mg, Oral, Q12H  atorvastatin, 20 mg, Oral, Nightly  bumetanide, 2 mg, Oral, BID  carvedilol, 12.5 mg, Oral, Q12H  escitalopram, 10 mg, Oral, Daily  finasteride, 5 mg, Oral, Daily  gabapentin, 300 mg, Oral, TID  hydrALAZINE, 25 mg, Oral, TID  insulin glargine, 10 Units, Subcutaneous, Daily  isosorbide mononitrate, 30 mg, Oral, BID - Nitrates  senna-docusate sodium, 2 tablet, Oral, BID  sodium chloride, 10 mL, Intravenous, Q12H  spironolactone, 50 mg, Oral, BID  tamsulosin, 0.4 mg, Oral, Daily      pharmacy, 1 each        Assessment/Plan       1.  Acute kidney injury on chronic renal disease stage IIIa  Creatinine is back to normal despite brisk diuresis, better than his chronic baseline renal function; when at his true dry weight his creatinine will likely rise again but it has not yet  2.  Fluid overload/Pulmonary hypertension-contraction alkalosis is resolving, off lasix drip  3.  Hypertension with CKD.  Blood pressure on the low side  4.  Morbid obesity    5.  UTI.   Urine culture growing ESBL Proteus mirabilis     Recs:  - reeval daily  -continue same hydralazine dose  -Monitor renal function while being diuresed  -continue aldactone and current bumex dose  -monitor alkalosis        Faraz Petersen MD  04/06/22  13:19 EDT

## 2022-04-06 NOTE — PLAN OF CARE
"Goal Outcome Evaluation:              Outcome Evaluation: Pt resting on and off through shift. Pt cheerful and vitals stable. Pt did not want to lie in bed. Pt up in recliner with waffle cusion and inccontinence pads. Pt has concerns of \"gout pain\" and would like to speak with MD about pain management due to pain. Pain increased while having legs elevated but improved with letting legs down in recliner. Pt able to make needs known. Will continue to monitor.  "

## 2022-04-06 NOTE — THERAPY DISCHARGE NOTE
Acute Care - Speech Language Pathology   Swallow Treatment Note/Discharge  [place]     Patient Name: Shaji Junior  : 1945  MRN: [id number]  Today's Date: 2022               Admit Date: 3/29/2022    Visit Dx:    ICD-10-CM ICD-9-CM   1. Acute on chronic congestive heart failure, unspecified heart failure type (HCC)  I50.9 428.0   2. Acute UTI  N39.0 599.0   3. Lab test negative for COVID-19 virus  Z20.822 V01.79     Patient Active Problem List   Diagnosis   • CHF exacerbation (HCC)   • Cerebrovascular accident (HCC)   • Type 2 diabetes mellitus with diabetic nephropathy (HCC)   • Acute kidney failure (HCC)   • Congestive heart failure (HCC)   • Acute on chronic congestive heart failure, unspecified heart failure type (HCC)     Past Medical History:   Diagnosis Date   • Acute kidney failure (HCC)    • Acute respiratory failure with hypoxia (HCC)    • Anemia    • Benign prostatic hyperplasia    • Bilateral primary osteoarthritis of knee    • Cardiomegaly    • Cardiomyopathy (HCC)    • Cellulitis and abscess of left leg    • Cerebral infarction (HCC)    • Cerebrovascular disease    • Chronic kidney disease    • Dementia (HCC)    • Diabetes mellitus (HCC)    • Essential hypertension    • GERD (gastroesophageal reflux disease)    • Gout    • Heart failure (HCC)    • Hyperlipidemia    • Morbid obesity (HCC)    • Myocardial infarction (HCC)    • Obstructive sleep apnea    • Obstructive uropathy    • Paroxysmal atrial fibrillation (HCC)    • Peptic ulcer    • Peripheral vascular disease (HCC)    • Pulmonary edema    • Venous insufficiency      No past surgical history on file.    SLP Recommendation and Plan  SLP Swallowing Diagnosis: swallow WFL (22 1400)  SLP Diet Recommendation: regular textures, thin liquids (22 1400)     Monitor for Signs of Aspiration: yes, notify SLP if any concerns, gurgly voice, cough, right lower lobe infiltrates (22 1400)  Recommended Diagnostics: No further SLP  services recommended (04/06/22 1400)  Swallow Criteria for Skilled Therapeutic Interventions Met: no problems identified which require skilled intervention (04/06/22 1400)                  PPE: surgical mask, gloves, eye protection                       Plan for Continued Treatment (SLP): treatment no longer indicated as all goals met (04/06/22 1400)         SWALLOW EVALUATION (last 72 hours)     SLP Adult Swallow Evaluation     Row Name 04/06/22 1400       Rehab Evaluation    Document Type therapy note (daily note)  -EC    Patient Observations alert;cooperative;agree to therapy  -EC    Patient Effort excellent  -EC    Comment Meal assessment completed this date to ensure continued diet tolerance. Pt currently on a reuglar consistency diet. Pt sitting up in chair upon entry feeding himself lunch. No anterior spilage noted and oral transit WFL for solids and liquids. No overt clinical s/s of aspiration demonstrated anytime w/sandwich, multiple consecutive sips of thin soda or soup. Pt has no c/o swallow difficulty. Education completed re: overt s/s of aspiration and general safe swallow strategies. ST to sign off as no further skilled dysphagia therapy appears indicated at this time. Please re-consult if needed. Thank you.  -EC    Symptoms Noted During/After Treatment none  -EC            SLP Evaluation Clinical Impression    SLP Swallowing Diagnosis swallow WFL  -EC    Functional Impact no impact on function  -EC    Swallow Criteria for Skilled Therapeutic Interventions Met no problems identified which require skilled intervention  -EC            SLP Treatment Clinical Impressions    Plan for Continued Treatment (SLP) treatment no longer indicated as all goals met  -EC    Care Plan Review evaluation/treatment results reviewed;patient/other agree to care plan  -EC            Recommendations    SLP Diet Recommendation regular textures;thin liquids  -EC    Recommended Diagnostics No further SLP services recommended   -EC    Recommended Precautions and Strategies upright posture during/after eating;small bites of food and sips of liquid;general aspiration precautions  -EC    Oral Care Recommendations Oral Care BID/PRN;Oral Care before breakfast, after meals and PRN  -EC    SLP Rec. for Method of Medication Administration meds whole;meds crushed;with pudding or applesauce;with thin liquids;as tolerated  -EC    Monitor for Signs of Aspiration yes;notify SLP if any concerns;gurgly voice;cough;right lower lobe infiltrates  -EC            Swallow Goals (SLP)    Oral Nutrition/Hydration Goal Selection (SLP) oral nutrition/hydration, SLP goal 1;oral nutrition/hydration, SLP goal 2  -EC            Oral Nutrition/Hydration Goal 1 (SLP)    Oral Nutrition/Hydration Goal 1, SLP The pt will maximize swallow function for least restricitve PO diet, no complications associated with dysphagia, adequate PO intake, and demonstrating independent use of swallow compensations.  -EC    Time Frame (Oral Nutrition/Hydration Goal 1, SLP) by discharge  -EC    Progress/Outcomes (Oral Nutrition/Hydration Goal 1, SLP) goal met  -EC            Oral Nutrition/Hydration Goal 2 (SLP)    Oral Nutrition/Hydration Goal 2, SLP The patient will participate in a full meal assessment to determine safety and adequacy of recommended diet, independent use of safe swallow compensations, and additional goals/recommendations to follow  -EC    Time Frame (Oral Nutrition/Hydration Goal 2, SLP) 2 days  -EC    Progress/Outcomes (Oral Nutrition/Hydration Goal 2, SLP) goal met  -EC          User Key  (r) = Recorded By, (t) = Taken By, (c) = Cosigned By    Initials Name Effective Dates    EC Leonela Schwarz 06/16/21 -                 EDUCATION  The patient has been educated in the following areas:   Dysphagia (Swallowing Impairment).         SLP GOALS     Row Name 04/06/22 1400 04/04/22 1000          Oral Nutrition/Hydration Goal 1 (SLP)    Oral Nutrition/Hydration Goal 1, SLP  The pt will maximize swallow function for least restricitve PO diet, no complications associated with dysphagia, adequate PO intake, and demonstrating independent use of swallow compensations.  -EC The pt will maximize swallow function for least restricitve PO diet, no complications associated with dysphagia, adequate PO intake, and demonstrating independent use of swallow compensations.  -CB     Time Frame (Oral Nutrition/Hydration Goal 1, SLP) by discharge  -EC by discharge  -CB     Progress/Outcomes (Oral Nutrition/Hydration Goal 1, SLP) goal met  -EC --            Oral Nutrition/Hydration Goal 2 (SLP)    Oral Nutrition/Hydration Goal 2, SLP The patient will participate in a full meal assessment to determine safety and adequacy of recommended diet, independent use of safe swallow compensations, and additional goals/recommendations to follow  -EC The patient will participate in a full meal assessment to determine safety and adequacy of recommended diet, independent use of safe swallow compensations, and additional goals/recommendations to follow  -CB     Time Frame (Oral Nutrition/Hydration Goal 2, SLP) 2 days  -EC 2 days  -CB     Barriers (Oral Nutrition/Hydration Goal 2, SLP) -- Patient was seen for dysphagia therapy at a meal. Patient was sitting upright in a recliner. Patient has his own natural teeth. Patient currently on regular diet. Patient tolerated regular consistency without difficulty. Patient demonstrated adequate rotary chewing. Patient cleared between bites effectively. Noted patient did fatigue toward the end of his meal, however. Patient currently on 4L of oxygen at this time.  Recommended that he take a break during those times he gets fatigue if necessary. Patient tolerated thins via straw given successive swallows without any s/s of aspiration or complications. ST will continue to follow to assure safety and tolerance with least restrictive diet. ST will also assure patient encourages patient  to coordinate respiration with swallowing to minimize fatigue during meals.  -CB     Progress/Outcomes (Oral Nutrition/Hydration Goal 2, SLP) goal met  -EC --           User Key  (r) = Recorded By, (t) = Taken By, (c) = Cosigned By    Initials Name Provider Type    EC Leonela Schwarz Speech and Language Pathologist    CB Shanon Cao, SLP Speech and Language Pathologist                     Time Calculation:                     Leonela Schwarz  4/6/2022

## 2022-04-06 NOTE — PLAN OF CARE
Goal Outcome Evaluation:               Meal assessment completed this date to ensure continued diet tolerance. Pt currently on a reuglar consistency diet. Pt sitting up in chair upon entry feeding himself lunch. No anterior spilage noted and oral transit WFL for solids and liquids. No overt clinical s/s of aspiration demonstrated anytime w/sandwich, multiple consecutive sips of thin soda or soup. Pt has no c/o swallow difficulty. Education completed re: overt s/s of aspiration and general safe swallow strategies. ST to sign off as no further skilled dysphagia therapy appears indicated at this time. Please re-consult if needed. Thank you.

## 2022-04-06 NOTE — PLAN OF CARE
Goal Outcome Evaluation:         Assessment: Shaji Junior presents with functional mobility impairments which indicate the need for skilled intervention. Tolerating session today without incident. Will continue to follow and progress as tolerated.     Plan/Recommendations:   Pt would benefit from LTC placement at discharge from facility and requires no DME at discharge.   Pt desires LTC placement at discharge. Pt cooperative; agreeable to therapeutic recommendations and plan of care.

## 2022-04-06 NOTE — PLAN OF CARE
Goal Outcome Evaluation:    Patient being discharged back to [facility]. VSS, resting comfortably in the chair most of the shift.

## 2022-04-06 NOTE — THERAPY TREATMENT NOTE
Subjective: Pt agreeable to therapeutic plan of care.    Objective:     Bed mobility - N/A or Not attempted.; in chair when PT arrived  Transfers - Mod-A and with rolling walker; needs multiple cues to push up from chair; does well when pushes up from chair, but rises slowly and then transfers hands up to rw   Ambulation - 0 feet N/A or Not attempted.     Therapeutic exercise:  Marching in standing x r reps x 2 sets w/ seated rest  btwn sets; laq and ankle DF/PF in sitting. Needs aarom to complete hip flexion in sitting x 10.  All others arom x 10 reps in sitting.    Pain: 4 VAS  Education: Provided education on importance of mobility and skilled verbal / tactile cueing throughout intervention.     Assessment: Shaji Junior presents with functional mobility impairments which indicate the need for skilled intervention. Tolerating session today without incident. Will continue to follow and progress as tolerated.     Plan/Recommendations:   Pt would benefit from LTC placement at discharge from facility and requires no DME at discharge.   Pt desires LTC placement at discharge. Pt cooperative; agreeable to therapeutic recommendations and plan of care.     Basic Mobility 6-click:  Rollin = Total, A lot = 2, A little = 3; 4 = None  Supine>Sit:   1 = Total, A lot = 2, A little = 3; 4 = None   Sit>Stand with arms:  1 = Total, A lot = 2, A little = 3; 4 = None  Bed>Chair:   1 = Total, A lot = 2, A little = 3; 4 = None  Ambulate in room:  1 = Total, A lot = 2, A little = 3; 4 = None  3-5 Steps with railin = Total, A lot = 2, A little = 3; 4 = None  Score: 10    Modified [place]: N/A = No pre-op stroke/TIA    Post-Tx Position: Up in Chair, Alarms activated and Call light and personal items within reach; eating breakfast  PPE: gloves, surgical mask, eyewear protection

## 2022-04-06 NOTE — PROGRESS NOTES
Daily Progress Note        CHF exacerbation (HCC)    Type 2 diabetes mellitus with diabetic nephropathy (HCC)    Acute on chronic congestive heart failure, unspecified heart failure type (HCC)      Assessment    Acute/chronic hypercapnic respiratory insufficiency  -ABG, pH 7.32/pCO2 63/PO2 81/HC03 32     Obstructive sleep apnea  Obesity hypoventilation syndrome  Pulmonary edema and cardiomegaly    Atrial fibrillation  Acute kidney injury  Morbid obesity  Hypertension  Non-smoker     3/31/2022 Free T4 1.20     3/29/2022 urine culture Proteus Mirabilis, ESBL    4/1/2022 bilateral lower extremity Doppler negative for DVT     3/31/2022 echo  EF 55 to 60%  RVSP 14.9 mmHg     Recommendations:     Continue to titrate oxygen- Currently on 2L NC and AVAPS at bedtime    Off  Lasix gtt    Electrolyte/Glycemic control  Anticoagulation Eliquis  Diuresis per nephrology  -Bumex 2 mg twice daily and Aldactone 50 mg twice daily    Aspiration precautions, elevate head of bed  PT/SLP following    Completed 6 doses of Merrem     4/2/2022             LOS: 5 days     Subjective         Objective     Vital signs for last 24 hours:  Vitals:    04/05/22 2321 04/06/22 0141 04/06/22 0302 04/06/22 0510   BP:  136/91  134/79   BP Location:  Left arm  Left arm   Patient Position:  Sitting  Sitting   Pulse: 65 69 75 66   Resp: 18 18  18   Temp:  98 °F (36.7 °C)  98.4 °F (36.9 °C)   TempSrc:  Oral  Oral   SpO2: 100% 100% 100% 98%   Weight:       Height:           Intake/Output last 3 shifts:  I/O last 3 completed shifts:  In: 2020 [P.O.:1920; IV Piggyback:100]  Out: 3025 [Urine:3025]  Intake/Output this shift:  I/O this shift:  In: -   Out: 1450 [Urine:1450]      Radiology  Imaging Results (Last 24 Hours)     ** No results found for the last 24 hours. **          Labs:  Results from last 7 days   Lab Units 04/04/22  0424   WBC 10*3/mm3 4.10   HEMOGLOBIN g/dL 10.2*   HEMATOCRIT % 30.9*   PLATELETS 10*3/mm3 174     Results from last 7 days   Lab  Units 04/06/22  0431   SODIUM mmol/L 142   POTASSIUM mmol/L 4.0   CHLORIDE mmol/L 98   CO2 mmol/L 34.0*   BUN mg/dL 22   CREATININE mg/dL 1.04   CALCIUM mg/dL 9.0   GLUCOSE mg/dL 95     Results from last 7 days   Lab Units 03/31/22  1153   PH, ARTERIAL pH units 7.322*   PO2 ART mm Hg 81.4*   PCO2, ARTERIAL mm Hg 62.9*   HCO3 ART mmol/L 32.6*     Results from last 7 days   Lab Units 04/02/22  0525 04/01/22  0925   ALBUMIN g/dL 3.00* 3.30*             Results from last 7 days   Lab Units 04/06/22  0431   MAGNESIUM mg/dL 1.9         Results from last 7 days   Lab Units 03/31/22  0332   TSH uIU/mL 0.696   FREE T4 ng/dL 1.20           Meds:   SCHEDULE  apixaban, 5 mg, Oral, Q12H  atorvastatin, 20 mg, Oral, Nightly  bumetanide, 2 mg, Oral, BID  carvedilol, 12.5 mg, Oral, Q12H  escitalopram, 10 mg, Oral, Daily  finasteride, 5 mg, Oral, Daily  gabapentin, 300 mg, Oral, TID  hydrALAZINE, 25 mg, Oral, TID  insulin glargine, 10 Units, Subcutaneous, Daily  isosorbide mononitrate, 30 mg, Oral, BID - Nitrates  senna-docusate sodium, 2 tablet, Oral, BID  sodium chloride, 10 mL, Intravenous, Q12H  spironolactone, 50 mg, Oral, BID  tamsulosin, 0.4 mg, Oral, Daily      Infusions  pharmacy, 1 each      PRNs  •  acetaminophen **OR** acetaminophen **OR** acetaminophen  •  ondansetron **OR** ondansetron  •  sodium chloride  •  sodium chloride    Physical Exam:  Physical Exam  Vitals reviewed.   Pulmonary:      Breath sounds: Rhonchi present.   Musculoskeletal:         General: Swelling present.      Right lower leg: Edema present.      Left lower leg: Edema present.   Skin:     General: Skin is warm and dry.   Neurological:      Mental Status: He is alert and oriented to person, place, and time.         ROS  Review of Systems   Respiratory: Positive for shortness of breath.    Cardiovascular: Positive for leg swelling.     I have reviewed the patient's new clinical results    Electronically signed by MELISA Saleh

## 2022-04-06 NOTE — DISCHARGE SUMMARY
[facility] Medicine Services  DISCHARGE SUMMARY    Patient Name: Shaji Junior  : 1945  MRN: [id number]    Discharge condition: Stabilized  Date of Admission: 3/29/2022  Discharge Diagnosis: CHF exacerbation  Date of Discharge:  2022  Primary Care Physician: Naa Valverde MD      Presenting Problem:   Acute UTI [N39.0]  CHF exacerbation (HCC) [I50.9]  Acute on chronic congestive heart failure, unspecified heart failure type (HCC) [I50.9]  Lab test negative for COVID-19 virus [Z20.822]    Active and Resolved Hospital Problems:  Active Hospital Problems    Diagnosis POA   • Acute on chronic congestive heart failure, unspecified heart failure type (HCC) [I50.9] Yes   • CHF exacerbation (HCC) [I50.9] Yes   • Type 2 diabetes mellitus with diabetic nephropathy (HCC) [E11.21] Yes      Resolved Hospital Problems   No resolved problems to display.         Hospital Course     Hospital Course:  Shaji Junior is a 76 y.o. male who presented to the hospital from skilled nursing facility with renal failure and CHF exacerbation.  The patient was severely fluid overloaded hence cardiology and nephrology were consulted for fluid management.  He also had a urinary tract infection which grew ESBL Proteus mirabilis.  Patient was treated with IV antibiotics per infectious diseases.  There were plans to do ischemic work-up after he was euvolemic but this was deferred by cardiology for outpatient.  Once the patient's fluid status improved and he was euvolemic he was sent back to his nursing facility after finishing his ESBL treatment with IV antibiotics in house.  Patient discharged in stable condition with stable vitals.      Reasons For Change In Medications and Indications for New Medications:      Day of Discharge     Vital Signs:  Temp:  [97.7 °F (36.5 °C)-98.4 °F (36.9 °C)] 97.8 °F (36.6 °C)  Heart Rate:  [65-76] 66  Resp:  [18-20] 20  BP: (133-142)/(65-91) 142/65    Physical  Exam:  Physical Exam  Vitals reviewed.   Constitutional:       Comments: Sitting in the chair no distress room air   HENT:      Head: Normocephalic.      Nose: Nose normal.      Mouth/Throat:      Mouth: Mucous membranes are moist.   Cardiovascular:      Rate and Rhythm: Normal rate.      Pulses: Normal pulses.   Pulmonary:      Effort: Pulmonary effort is normal.   Abdominal:      General: Abdomen is flat.   Musculoskeletal:         General: Normal range of motion.      Cervical back: Normal range of motion.      Right lower leg: Edema present.      Left lower leg: Edema present.   Skin:     General: Skin is warm.   Neurological:      General: No focal deficit present.      Mental Status: He is alert and oriented to person, place, and time.   Psychiatric:         Mood and Affect: Mood normal.         Behavior: Behavior normal.            Pertinent  and/or Most Recent Results     LAB RESULTS:      Lab 04/04/22  0424 04/01/22  0925 03/31/22  1204 03/31/22  0332   WBC 4.10 3.90  --  4.10   HEMOGLOBIN 10.2* 10.0*  --  9.4*   HEMATOCRIT 30.9* 31.1*  --  28.6*   PLATELETS 174 157  --  122*   NEUTROS ABS 1.60*  --   --  2.30   LYMPHS ABS 1.70  --   --  1.10   MONOS ABS 0.60  --   --  0.50   EOS ABS 0.30  --   --  0.10   MCV 83.1 83.9  --  83.7   LACTATE  --   --  0.7  --          Lab 04/06/22  0431 04/05/22  0337 04/04/22  0424 04/03/22  0503 04/02/22  0525 04/01/22  0925 03/31/22  0332   SODIUM 142 142 144 145 146* 146* 145   POTASSIUM 4.0 3.8 3.3* 3.5 3.4* 3.8 4.2   CHLORIDE 98 97* 96* 101 105 105 106   CO2 34.0* 38.0* 40.0* 34.0* 33.0* 30.0* 29.0   ANION GAP 10.0 7.0 8.0 10.0 8.0 11.0 10.0   BUN 22 20 21 25* 28* 33* 36*   CREATININE 1.04 1.07 1.02 1.20 1.31* 1.57* 1.83*   EGFR 74.4 71.9 76.2 62.7 56.4* 45.4* 37.8*   GLUCOSE 95 94 101* 110* 97 98 147*   CALCIUM 9.0 8.7 8.8 8.5* 8.0* 8.7 8.5*   MAGNESIUM 1.9 1.9 1.9  --   --   --  2.2   PHOSPHORUS 3.0 2.9 2.8  --  3.4 4.4 4.3   HEMOGLOBIN A1C  --   --   --   --   --   --   6.4*   TSH  --   --   --   --   --   --  0.696         Lab 04/02/22  0525 04/01/22  0925   ALBUMIN 3.00* 3.30*         Lab 04/05/22  0337   PROBNP 4,124.0*                 Lab 03/31/22  1153   PH, ARTERIAL 7.322*   PCO2, ARTERIAL 62.9*   PO2 ART 81.4*   O2 SATURATION ART 94.5   FIO2 40   HCO3 ART 32.6*   BASE EXCESS ART 5.0*     Brief Urine Lab Results  (Last result in the past 365 days)      Color   Clarity   Blood   Leuk Est   Nitrite   Protein   CREAT   Urine HCG        03/29/22 0258 Yellow   Clear   Trace   Small (1+)   Positive   Negative               Microbiology Results (last 10 days)     Procedure Component Value - Date/Time    CANDIDA AURIS SCREEN - Swab, Axilla Right, Axilla Left and Groin [[id number]]  (Normal) Collected: 03/29/22 0557    Lab Status: Final result Specimen: Swab from Axilla Right, Axilla Left and Groin Updated: 04/03/22 0617     Candida Auris Screen Culture No Candida auris isolated at 5 days    Urine Culture - Urine, Urine, Clean Catch [[id number]]  (Abnormal)  (Susceptibility) Collected: 03/29/22 0258    Lab Status: Final result Specimen: Urine, Clean Catch Updated: 04/01/22 0948     Urine Culture >100,000 CFU/mL Proteus mirabilis ESBL     Comment: Consider infectious disease consult.  Susceptibility results may not correlate to clinical outcomes.       Susceptibility      Proteus mirabilis ESBL      GAYLA      Ertapenem Susceptible      Gentamicin Susceptible      Levofloxacin Resistant     Meropenem Susceptible      Nitrofurantoin Resistant     Piperacillin + Tazobactam Susceptible      Trimethoprim + Sulfamethoxazole Susceptible                           Respiratory Panel PCR w/COVID-19(SARS-CoV-2) MEÑO/MARCOS/JOSELIN/PAD/COR/MAD/WILBERT In-House, NP Swab in UTM/VTM, 3-4 HR TAT - Swab, Nasopharynx [[id number]]  (Normal) Collected: 03/29/22 0141    Lab Status: Final result Specimen: Swab from Nasopharynx Updated: 03/29/22 0242     ADENOVIRUS, PCR Not Detected     Coronavirus 229E Not Detected      Coronavirus HKU1 Not Detected     Coronavirus NL63 Not Detected     Coronavirus OC43 Not Detected     COVID19 Not Detected     Human Metapneumovirus Not Detected     Human Rhinovirus/Enterovirus Not Detected     Influenza A PCR Not Detected     Influenza B PCR Not Detected     Parainfluenza Virus 1 Not Detected     Parainfluenza Virus 2 Not Detected     Parainfluenza Virus 3 Not Detected     Parainfluenza Virus 4 Not Detected     RSV, PCR Not Detected     Bordetella pertussis pcr Not Detected     Bordetella parapertussis PCR Not Detected     Chlamydophila pneumoniae PCR Not Detected     Mycoplasma pneumo by PCR Not Detected    Narrative:      In the setting of a positive respiratory panel with a viral infection PLUS a negative procalcitonin without other underlying concern for bacterial infection, consider observing off antibiotics or discontinuation of antibiotics and continue supportive care. If the respiratory panel is positive for atypical bacterial infection (Bordetella pertussis, Chlamydophila pneumoniae, or Mycoplasma pneumoniae), consider antibiotic de-escalation to target atypical bacterial infection.          XR Elbow 2 View Right    Result Date: 3/11/2022  Impression: 1. Negative for opaque foreign body.  Electronically Signed By-Nicci Jean-Baptiste MD On:3/11/2022 10:30 PM This report was finalized on [id number] by  Nicci Jean-Baptiste MD.    CT Chest Without Contrast Diagnostic    Result Date: 4/2/2022  Impression: Small-moderate bilateral pleural effusions with adjacent atelectasis. Ill-defined airspace disease amongst the atelectasis may be due to coexistent pneumonia  Pulmonary edema evident.  Enlargement of the main and central pulmonary arteries, suggesting pulmonary hypertension  Coronary disease with advanced calcifications     Electronically Signed By-Ronan Wiggins On:4/2/2022 11:46 AM This report was finalized on [id number] by  Ronan Wiggins, .    XR Chest 1 View    Result Date: 3/29/2022  Impression:   1. Cardiomegaly. 2. Bilateral diffuse mild infiltrates consistent with chronic lung disease and bronchitis and mild congestive heart failure. 3. Suspicion of a small focal infiltrate left lower lobe along. 4. Suspicion of small infiltrate small pleural effusion right lung base  Electronically Signed By-Armando Patiño MD On:3/29/2022 7:18 AM This report was finalized on [id number] by  Armando Patiño MD.    XR Chest PA & Lateral    Result Date: 3/31/2022  Impression:  IMPRESSION:  1. Left greater than right basilar interstitial and alveolar infiltrates are unchanged from 3/29/2022 2. Stable cardiomegaly.  Electronically Signed By-Barbara Beebe MD On:3/31/2022 11:33 AM This report was finalized on [id number] by  Barbara Beebe MD.      Results for orders placed during the hospital encounter of 03/29/22    Duplex Venous Lower Extremity - Bilateral CAR    Interpretation Summary  · Normal bilateral lower extremity venous duplex scan.      Results for orders placed during the hospital encounter of 03/29/22    Duplex Venous Lower Extremity - Bilateral CAR    Interpretation Summary  · Normal bilateral lower extremity venous duplex scan.      Results for orders placed during the hospital encounter of 03/29/22    Adult Transthoracic Echo Complete W/ Cont if Necessary Per Protocol    Interpretation Summary  · Left ventricular systolic function is normal.  · Left ventricular ejection fraction is 55 to 60%  · Left ventricular diastolic function was normal.      Labs Pending at Discharge:      Procedures Performed           Consults:   Consults     Date and Time Order Name Status Description    3/31/2022 11:00 AM Inpatient Nephrology Consult Completed     3/31/2022  9:54 AM Inpatient Nephrology Consult Completed     3/31/2022  9:53 AM Inpatient Pulmonology Consult      3/29/2022  3:20 AM Inpatient Cardiology Consult Completed             Discharge Details        Discharge Medications      New Medications       Instructions Start Date   apixaban 5 MG tablet tablet  Commonly known as: ELIQUIS   5 mg, Oral, Every 12 Hours Scheduled      spironolactone 50 MG tablet  Commonly known as: ALDACTONE   50 mg, Oral, Daily         Changes to Medications      Instructions Start Date   bumetanide 2 MG tablet  Commonly known as: BUMEX  What changed:   medication strength  how much to take   2 mg, Oral, 2 Times Daily      hydrALAZINE 25 MG tablet  Commonly known as: APRESOLINE  What changed:   medication strength  how much to take  when to take this   25 mg, Oral, 3 Times Daily         Continue These Medications      Instructions Start Date   acetaminophen 325 MG tablet  Commonly known as: TYLENOL   650 mg, Oral, Every 4 Hours PRN      acetaminophen 325 MG tablet  Commonly known as: TYLENOL   650 mg, Oral, Every 6 Hours PRN      albuterol sulfate  (90 Base) MCG/ACT inhaler  Commonly known as: PROVENTIL HFA;VENTOLIN HFA;PROAIR HFA   2 puffs, Inhalation, Every 4 Hours PRN      carvedilol 12.5 MG tablet  Commonly known as: COREG   12.5 mg, Oral, 2 Times Daily      escitalopram 10 MG tablet  Commonly known as: LEXAPRO   10 mg, Oral, Daily      finasteride 5 MG tablet  Commonly known as: PROSCAR   5 mg, Oral, Daily      gabapentin 300 MG capsule  Commonly known as: NEURONTIN   300 mg, Oral, 3 Times Daily      insulin glargine 100 UNIT/ML injection  Commonly known as: LANTUS SEMGLEE   10 Units, Subcutaneous, Every Morning, Hold for glucose less than 110      isosorbide mononitrate 30 MG 24 hr tablet  Commonly known as: IMDUR   30 mg, Oral, 2 Times Daily      Polyethylene Glycol 3350 powder   17 g, Oral, Nightly      simvastatin 40 MG tablet  Commonly known as: ZOCOR   40 mg, Oral, Every Night at Bedtime      tamsulosin 0.4 MG capsule 24 hr capsule  Commonly known as: FLOMAX   1 capsule, Oral, Daily         Stop These Medications    amLODIPine 10 MG tablet  Commonly known as: NORVASC     dilTIAZem  MG 24 hr tablet  Commonly known  as: CARDIZEM LA     furosemide 10 MG/ML injection  Commonly known as: LASIX            No Known Allergies      Discharge Disposition:   Skilled Nursing Facility (DC - External)    Diet:  Hospital:  Diet Order   Procedures   • Diet Cardiac, Diabetic/Consistent Carbs; 2gm Na+; Diabetic - Consistent Carb; Fluid Restriction; 2000cc/day         Discharge Activity:         CODE STATUS:  Code Status and Medical Interventions:   Ordered at: 03/29/22 1522     Code Status (Patient has no pulse and is not breathing):    CPR (Attempt to Resuscitate)     Medical Interventions (Patient has pulse or is breathing):    Full Support                 Time spent on Discharge including face to face service:  55 minutes    This patient has been examined wearing appropriate Personal Protective Equipment and discussed with Patient and staff. 04/06/22      Signature: Ba Banuelos MD

## 2022-04-07 NOTE — CASE MANAGEMENT/SOCIAL WORK
Case Management Discharge Note      Final Note: Anay pt's brother picked pt up and transported him to [facility] Post Acute Provider List?: N/A    Selected Continued Care - Discharged on 4/6/2022 Admission date: 3/29/2022 - Discharge disposition: Skilled Nursing Facility (DC - External)    Destination Coordination complete.    Service Provider Selected Services Address Phone Fax Patient Preferred    [facility] [place]  Skilled Nursing [facility] [place] [address] [phone] --                      Transportation Services  Private: Car    Final Discharge Disposition Code: 04 - intermediate care facility

## 2022-04-27 ENCOUNTER — APPOINTMENT (OUTPATIENT)
Dept: GENERAL RADIOLOGY | Facility: HOSPITAL | Age: 77
End: 2022-04-27

## 2022-04-27 ENCOUNTER — HOSPITAL ENCOUNTER (EMERGENCY)
Facility: HOSPITAL | Age: 77
Discharge: NURSING, REHAB OR LONG-TERM CARE | End: 2022-04-27
Attending: EMERGENCY MEDICINE | Admitting: EMERGENCY MEDICINE

## 2022-04-27 VITALS
BODY MASS INDEX: 42.66 KG/M2 | OXYGEN SATURATION: 97 % | DIASTOLIC BLOOD PRESSURE: 106 MMHG | SYSTOLIC BLOOD PRESSURE: 159 MMHG | RESPIRATION RATE: 16 BRPM | TEMPERATURE: 97.9 F | HEIGHT: 72 IN | WEIGHT: 315 LBS | HEART RATE: 72 BPM

## 2022-04-27 DIAGNOSIS — R07.9 CHEST PAIN, UNSPECIFIED TYPE: Primary | ICD-10-CM

## 2022-04-27 LAB
ANION GAP SERPL CALCULATED.3IONS-SCNC: 12 MMOL/L (ref 5–15)
ANISOCYTOSIS BLD QL: ABNORMAL
BUN SERPL-MCNC: 23 MG/DL (ref 8–23)
BUN/CREAT SERPL: 20.4 (ref 7–25)
CALCIUM SPEC-SCNC: 8.8 MG/DL (ref 8.6–10.5)
CHLORIDE SERPL-SCNC: 104 MMOL/L (ref 98–107)
CO2 SERPL-SCNC: 27 MMOL/L (ref 22–29)
CREAT SERPL-MCNC: 1.13 MG/DL (ref 0.76–1.27)
DEPRECATED RDW RBC AUTO: 55.6 FL (ref 37–54)
EGFRCR SERPLBLD CKD-EPI 2021: 66.9 ML/MIN/1.73
EOSINOPHIL # BLD MANUAL: 0.08 10*3/MM3 (ref 0–0.4)
EOSINOPHIL NFR BLD MANUAL: 2 % (ref 0.3–6.2)
ERYTHROCYTE [DISTWIDTH] IN BLOOD BY AUTOMATED COUNT: 19 % (ref 12.3–15.4)
GLUCOSE SERPL-MCNC: 89 MG/DL (ref 65–99)
HCT VFR BLD AUTO: 33 % (ref 37.5–51)
HGB BLD-MCNC: 10.5 G/DL (ref 13–17.7)
HOLD SPECIMEN: NORMAL
LARGE PLATELETS: ABNORMAL
LYMPHOCYTES # BLD MANUAL: 1.55 10*3/MM3 (ref 0.7–3.1)
LYMPHOCYTES NFR BLD MANUAL: 12 % (ref 5–12)
MCH RBC QN AUTO: 26.2 PG (ref 26.6–33)
MCHC RBC AUTO-ENTMCNC: 31.9 G/DL (ref 31.5–35.7)
MCV RBC AUTO: 82.1 FL (ref 79–97)
METAMYELOCYTES NFR BLD MANUAL: 2 % (ref 0–0)
MICROCYTES BLD QL: ABNORMAL
MONOCYTES # BLD: 0.5 10*3/MM3 (ref 0.1–0.9)
NEUTROPHILS # BLD AUTO: 1.97 10*3/MM3 (ref 1.7–7)
NEUTROPHILS NFR BLD MANUAL: 47 % (ref 42.7–76)
NRBC SPEC MANUAL: 1 /100 WBC (ref 0–0.2)
PLATELET # BLD AUTO: 158 10*3/MM3 (ref 140–450)
PMV BLD AUTO: 8.1 FL (ref 6–12)
POTASSIUM SERPL-SCNC: 4.1 MMOL/L (ref 3.5–5.2)
RBC # BLD AUTO: 4.02 10*6/MM3 (ref 4.14–5.8)
SCAN SLIDE: NORMAL
SMALL PLATELETS BLD QL SMEAR: ADEQUATE
SODIUM SERPL-SCNC: 143 MMOL/L (ref 136–145)
TROPONIN T SERPL-MCNC: 0.03 NG/ML (ref 0–0.03)
VARIANT LYMPHS NFR BLD MANUAL: 33 % (ref 19.6–45.3)
VARIANT LYMPHS NFR BLD MANUAL: 4 % (ref 0–5)
WBC MORPH BLD: NORMAL
WBC NRBC COR # BLD: 4.2 10*3/MM3 (ref 3.4–10.8)
WHOLE BLOOD HOLD SPECIMEN: NORMAL

## 2022-04-27 PROCEDURE — 84484 ASSAY OF TROPONIN QUANT: CPT | Performed by: EMERGENCY MEDICINE

## 2022-04-27 PROCEDURE — 85007 BL SMEAR W/DIFF WBC COUNT: CPT | Performed by: EMERGENCY MEDICINE

## 2022-04-27 PROCEDURE — 99284 EMERGENCY DEPT VISIT MOD MDM: CPT

## 2022-04-27 PROCEDURE — 36415 COLL VENOUS BLD VENIPUNCTURE: CPT

## 2022-04-27 PROCEDURE — 80048 BASIC METABOLIC PNL TOTAL CA: CPT | Performed by: EMERGENCY MEDICINE

## 2022-04-27 PROCEDURE — 71045 X-RAY EXAM CHEST 1 VIEW: CPT

## 2022-04-27 PROCEDURE — 93005 ELECTROCARDIOGRAM TRACING: CPT | Performed by: EMERGENCY MEDICINE

## 2022-04-27 PROCEDURE — 85025 COMPLETE CBC W/AUTO DIFF WBC: CPT | Performed by: EMERGENCY MEDICINE

## 2022-04-27 PROCEDURE — 93005 ELECTROCARDIOGRAM TRACING: CPT

## 2022-04-27 PROCEDURE — 99283 EMERGENCY DEPT VISIT LOW MDM: CPT

## 2022-04-27 RX ORDER — SODIUM CHLORIDE 0.9 % (FLUSH) 0.9 %
10 SYRINGE (ML) INJECTION AS NEEDED
Status: DISCONTINUED | OUTPATIENT
Start: 2022-04-27 | End: 2022-04-27 | Stop reason: HOSPADM

## 2022-05-01 LAB — QT INTERVAL: 401 MS

## 2022-08-13 ENCOUNTER — HOSPITAL ENCOUNTER (OUTPATIENT)
Facility: HOSPITAL | Age: 77
Setting detail: OBSERVATION
Discharge: HOME | End: 2022-08-16
Attending: EMERGENCY MEDICINE | Admitting: HOSPITALIST

## 2022-08-13 DIAGNOSIS — N17.9 AKI (ACUTE KIDNEY INJURY): Primary | ICD-10-CM

## 2022-08-13 DIAGNOSIS — T83.011A MALFUNCTION OF FOLEY CATHETER, INITIAL ENCOUNTER: ICD-10-CM

## 2022-08-13 DIAGNOSIS — N39.0 ACUTE UTI: ICD-10-CM

## 2022-08-13 PROCEDURE — 99284 EMERGENCY DEPT VISIT MOD MDM: CPT

## 2022-08-14 PROBLEM — I48.91 ATRIAL FIBRILLATION: Status: ACTIVE | Noted: 2022-08-14

## 2022-08-14 PROBLEM — N17.9 AKI (ACUTE KIDNEY INJURY): Status: ACTIVE | Noted: 2022-08-14

## 2022-08-14 LAB
ANION GAP SERPL CALCULATED.3IONS-SCNC: 10 MMOL/L (ref 5–15)
ANION GAP SERPL CALCULATED.3IONS-SCNC: 12 MMOL/L (ref 5–15)
BACTERIA UR QL AUTO: ABNORMAL /HPF
BASOPHILS # BLD AUTO: 0 10*3/MM3 (ref 0–0.2)
BASOPHILS # BLD AUTO: 0 10*3/MM3 (ref 0–0.2)
BASOPHILS NFR BLD AUTO: 0.5 % (ref 0–1.5)
BASOPHILS NFR BLD AUTO: 0.6 % (ref 0–1.5)
BILIRUB UR QL STRIP: NEGATIVE
BUN SERPL-MCNC: 61 MG/DL (ref 8–23)
BUN SERPL-MCNC: 62 MG/DL (ref 8–23)
BUN/CREAT SERPL: 31 (ref 7–25)
BUN/CREAT SERPL: 32.1 (ref 7–25)
CALCIUM SPEC-SCNC: 8.6 MG/DL (ref 8.6–10.5)
CALCIUM SPEC-SCNC: 9.1 MG/DL (ref 8.6–10.5)
CHLORIDE SERPL-SCNC: 106 MMOL/L (ref 98–107)
CHLORIDE SERPL-SCNC: 106 MMOL/L (ref 98–107)
CHOLEST SERPL-MCNC: 85 MG/DL (ref 0–200)
CLARITY UR: CLEAR
CO2 SERPL-SCNC: 26 MMOL/L (ref 22–29)
CO2 SERPL-SCNC: 27 MMOL/L (ref 22–29)
COLOR UR: YELLOW
CREAT SERPL-MCNC: 1.93 MG/DL (ref 0.76–1.27)
CREAT SERPL-MCNC: 1.97 MG/DL (ref 0.76–1.27)
DEPRECATED RDW RBC AUTO: 56.9 FL (ref 37–54)
DEPRECATED RDW RBC AUTO: 56.9 FL (ref 37–54)
EGFRCR SERPLBLD CKD-EPI 2021: 34.4 ML/MIN/1.73
EGFRCR SERPLBLD CKD-EPI 2021: 35.2 ML/MIN/1.73
EOSINOPHIL # BLD AUTO: 0.4 10*3/MM3 (ref 0–0.4)
EOSINOPHIL # BLD AUTO: 0.5 10*3/MM3 (ref 0–0.4)
EOSINOPHIL NFR BLD AUTO: 6.2 % (ref 0.3–6.2)
EOSINOPHIL NFR BLD AUTO: 6.5 % (ref 0.3–6.2)
ERYTHROCYTE [DISTWIDTH] IN BLOOD BY AUTOMATED COUNT: 19.6 % (ref 12.3–15.4)
ERYTHROCYTE [DISTWIDTH] IN BLOOD BY AUTOMATED COUNT: 20 % (ref 12.3–15.4)
GLUCOSE BLDC GLUCOMTR-MCNC: 113 MG/DL (ref 70–105)
GLUCOSE BLDC GLUCOMTR-MCNC: 123 MG/DL (ref 70–105)
GLUCOSE BLDC GLUCOMTR-MCNC: 126 MG/DL (ref 70–105)
GLUCOSE BLDC GLUCOMTR-MCNC: 92 MG/DL (ref 70–105)
GLUCOSE SERPL-MCNC: 100 MG/DL (ref 65–99)
GLUCOSE SERPL-MCNC: 114 MG/DL (ref 65–99)
GLUCOSE UR STRIP-MCNC: NEGATIVE MG/DL
HCT VFR BLD AUTO: 34.1 % (ref 37.5–51)
HCT VFR BLD AUTO: 35.6 % (ref 37.5–51)
HDLC SERPL-MCNC: 32 MG/DL (ref 40–60)
HGB BLD-MCNC: 10.9 G/DL (ref 13–17.7)
HGB BLD-MCNC: 11.2 G/DL (ref 13–17.7)
HGB UR QL STRIP.AUTO: ABNORMAL
HYALINE CASTS UR QL AUTO: ABNORMAL /LPF
KETONES UR QL STRIP: NEGATIVE
LDLC SERPL CALC-MCNC: 40 MG/DL (ref 0–100)
LDLC/HDLC SERPL: 1.31 {RATIO}
LEUKOCYTE ESTERASE UR QL STRIP.AUTO: ABNORMAL
LYMPHOCYTES # BLD AUTO: 1.8 10*3/MM3 (ref 0.7–3.1)
LYMPHOCYTES # BLD AUTO: 2.2 10*3/MM3 (ref 0.7–3.1)
LYMPHOCYTES NFR BLD AUTO: 25.1 % (ref 19.6–45.3)
LYMPHOCYTES NFR BLD AUTO: 32.2 % (ref 19.6–45.3)
MAGNESIUM SERPL-MCNC: 2.1 MG/DL (ref 1.6–2.4)
MCH RBC QN AUTO: 25.7 PG (ref 26.6–33)
MCH RBC QN AUTO: 26.3 PG (ref 26.6–33)
MCHC RBC AUTO-ENTMCNC: 31.4 G/DL (ref 31.5–35.7)
MCHC RBC AUTO-ENTMCNC: 31.9 G/DL (ref 31.5–35.7)
MCV RBC AUTO: 81.8 FL (ref 79–97)
MCV RBC AUTO: 82.5 FL (ref 79–97)
MONOCYTES # BLD AUTO: 0.7 10*3/MM3 (ref 0.1–0.9)
MONOCYTES # BLD AUTO: 0.9 10*3/MM3 (ref 0.1–0.9)
MONOCYTES NFR BLD AUTO: 12.6 % (ref 5–12)
MONOCYTES NFR BLD AUTO: 9.6 % (ref 5–12)
NEUTROPHILS NFR BLD AUTO: 3.3 10*3/MM3 (ref 1.7–7)
NEUTROPHILS NFR BLD AUTO: 4.3 10*3/MM3 (ref 1.7–7)
NEUTROPHILS NFR BLD AUTO: 48.2 % (ref 42.7–76)
NEUTROPHILS NFR BLD AUTO: 58.5 % (ref 42.7–76)
NITRITE UR QL STRIP: POSITIVE
NRBC BLD AUTO-RTO: 0 /100 WBC (ref 0–0.2)
NRBC BLD AUTO-RTO: 0.1 /100 WBC (ref 0–0.2)
PH UR STRIP.AUTO: <=5 [PH] (ref 5–8)
PLATELET # BLD AUTO: 228 10*3/MM3 (ref 140–450)
PLATELET # BLD AUTO: 246 10*3/MM3 (ref 140–450)
PMV BLD AUTO: 7.3 FL (ref 6–12)
PMV BLD AUTO: 7.9 FL (ref 6–12)
POTASSIUM SERPL-SCNC: 4.1 MMOL/L (ref 3.5–5.2)
POTASSIUM SERPL-SCNC: 4.6 MMOL/L (ref 3.5–5.2)
PROT UR QL STRIP: NEGATIVE
RBC # BLD AUTO: 4.14 10*6/MM3 (ref 4.14–5.8)
RBC # BLD AUTO: 4.35 10*6/MM3 (ref 4.14–5.8)
RBC # UR STRIP: ABNORMAL /HPF
REF LAB TEST METHOD: ABNORMAL
SARS-COV-2 RNA PNL SPEC NAA+PROBE: NOT DETECTED
SODIUM SERPL-SCNC: 143 MMOL/L (ref 136–145)
SODIUM SERPL-SCNC: 144 MMOL/L (ref 136–145)
SP GR UR STRIP: 1.01 (ref 1–1.03)
SQUAMOUS #/AREA URNS HPF: ABNORMAL /HPF
TRIGL SERPL-MCNC: 55 MG/DL (ref 0–150)
UROBILINOGEN UR QL STRIP: ABNORMAL
VLDLC SERPL-MCNC: 13 MG/DL (ref 5–40)
WBC # UR STRIP: ABNORMAL /HPF
WBC NRBC COR # BLD: 6.8 10*3/MM3 (ref 3.4–10.8)
WBC NRBC COR # BLD: 7.3 10*3/MM3 (ref 3.4–10.8)

## 2022-08-14 PROCEDURE — 51798 US URINE CAPACITY MEASURE: CPT

## 2022-08-14 PROCEDURE — G0378 HOSPITAL OBSERVATION PER HR: HCPCS

## 2022-08-14 PROCEDURE — 83036 HEMOGLOBIN GLYCOSYLATED A1C: CPT | Performed by: NURSE PRACTITIONER

## 2022-08-14 PROCEDURE — 51702 INSERT TEMP BLADDER CATH: CPT

## 2022-08-14 PROCEDURE — 63710000001 INSULIN GLARGINE PER 5 UNITS: Performed by: INTERNAL MEDICINE

## 2022-08-14 PROCEDURE — 87186 SC STD MICRODIL/AGAR DIL: CPT | Performed by: EMERGENCY MEDICINE

## 2022-08-14 PROCEDURE — 87077 CULTURE AEROBIC IDENTIFY: CPT | Performed by: EMERGENCY MEDICINE

## 2022-08-14 PROCEDURE — 87040 BLOOD CULTURE FOR BACTERIA: CPT | Performed by: NURSE PRACTITIONER

## 2022-08-14 PROCEDURE — 36415 COLL VENOUS BLD VENIPUNCTURE: CPT | Performed by: NURSE PRACTITIONER

## 2022-08-14 PROCEDURE — 81001 URINALYSIS AUTO W/SCOPE: CPT | Performed by: EMERGENCY MEDICINE

## 2022-08-14 PROCEDURE — 85025 COMPLETE CBC W/AUTO DIFF WBC: CPT | Performed by: NURSE PRACTITIONER

## 2022-08-14 PROCEDURE — 82962 GLUCOSE BLOOD TEST: CPT

## 2022-08-14 PROCEDURE — 83735 ASSAY OF MAGNESIUM: CPT | Performed by: NURSE PRACTITIONER

## 2022-08-14 PROCEDURE — 80061 LIPID PANEL: CPT | Performed by: NURSE PRACTITIONER

## 2022-08-14 PROCEDURE — 87086 URINE CULTURE/COLONY COUNT: CPT | Performed by: EMERGENCY MEDICINE

## 2022-08-14 PROCEDURE — 80048 BASIC METABOLIC PNL TOTAL CA: CPT | Performed by: NURSE PRACTITIONER

## 2022-08-14 PROCEDURE — 63710000001 ONDANSETRON PER 8 MG: Performed by: NURSE PRACTITIONER

## 2022-08-14 PROCEDURE — 80048 BASIC METABOLIC PNL TOTAL CA: CPT | Performed by: EMERGENCY MEDICINE

## 2022-08-14 PROCEDURE — 85025 COMPLETE CBC W/AUTO DIFF WBC: CPT | Performed by: EMERGENCY MEDICINE

## 2022-08-14 PROCEDURE — 87635 SARS-COV-2 COVID-19 AMP PRB: CPT | Performed by: EMERGENCY MEDICINE

## 2022-08-14 PROCEDURE — 96361 HYDRATE IV INFUSION ADD-ON: CPT

## 2022-08-14 PROCEDURE — 99220 PR INITIAL OBSERVATION CARE/DAY 70 MINUTES: CPT | Performed by: INTERNAL MEDICINE

## 2022-08-14 PROCEDURE — 25010000002 MEROPENEM PER 100 MG: Performed by: EMERGENCY MEDICINE

## 2022-08-14 PROCEDURE — 25010000002 MEROPENEM PER 100 MG: Performed by: NURSE PRACTITIONER

## 2022-08-14 PROCEDURE — 96365 THER/PROPH/DIAG IV INF INIT: CPT

## 2022-08-14 RX ORDER — NITROGLYCERIN 0.4 MG/1
0.4 TABLET SUBLINGUAL
Status: DISCONTINUED | OUTPATIENT
Start: 2022-08-14 | End: 2022-08-16 | Stop reason: HOSPADM

## 2022-08-14 RX ORDER — TAMSULOSIN HYDROCHLORIDE 0.4 MG/1
0.4 CAPSULE ORAL DAILY
Status: DISCONTINUED | OUTPATIENT
Start: 2022-08-14 | End: 2022-08-16 | Stop reason: HOSPADM

## 2022-08-14 RX ORDER — MAGNESIUM SULFATE 1 G/100ML
1 INJECTION INTRAVENOUS AS NEEDED
Status: DISCONTINUED | OUTPATIENT
Start: 2022-08-14 | End: 2022-08-16 | Stop reason: HOSPADM

## 2022-08-14 RX ORDER — ALUMINA, MAGNESIA, AND SIMETHICONE 2400; 2400; 240 MG/30ML; MG/30ML; MG/30ML
15 SUSPENSION ORAL EVERY 6 HOURS PRN
Status: DISCONTINUED | OUTPATIENT
Start: 2022-08-14 | End: 2022-08-16 | Stop reason: HOSPADM

## 2022-08-14 RX ORDER — ISOSORBIDE MONONITRATE 30 MG/1
30 TABLET, EXTENDED RELEASE ORAL
Status: DISCONTINUED | OUTPATIENT
Start: 2022-08-14 | End: 2022-08-16 | Stop reason: HOSPADM

## 2022-08-14 RX ORDER — ACETAMINOPHEN 160 MG/5ML
650 SOLUTION ORAL EVERY 4 HOURS PRN
Status: DISCONTINUED | OUTPATIENT
Start: 2022-08-14 | End: 2022-08-16 | Stop reason: HOSPADM

## 2022-08-14 RX ORDER — ACETAMINOPHEN 325 MG/1
650 TABLET ORAL EVERY 4 HOURS PRN
Status: DISCONTINUED | OUTPATIENT
Start: 2022-08-14 | End: 2022-08-16 | Stop reason: HOSPADM

## 2022-08-14 RX ORDER — SODIUM CHLORIDE 0.9 % (FLUSH) 0.9 %
10 SYRINGE (ML) INJECTION AS NEEDED
Status: DISCONTINUED | OUTPATIENT
Start: 2022-08-14 | End: 2022-08-16 | Stop reason: HOSPADM

## 2022-08-14 RX ORDER — ACETAMINOPHEN 325 MG/1
650 TABLET ORAL EVERY 4 HOURS PRN
Status: DISCONTINUED | OUTPATIENT
Start: 2022-08-14 | End: 2022-08-14 | Stop reason: SDUPTHER

## 2022-08-14 RX ORDER — ACETAMINOPHEN 325 MG/1
650 TABLET ORAL EVERY 6 HOURS PRN
Status: DISCONTINUED | OUTPATIENT
Start: 2022-08-14 | End: 2022-08-14 | Stop reason: SDUPTHER

## 2022-08-14 RX ORDER — POTASSIUM CHLORIDE 1.5 G/1.77G
40 POWDER, FOR SOLUTION ORAL AS NEEDED
Status: DISCONTINUED | OUTPATIENT
Start: 2022-08-14 | End: 2022-08-16 | Stop reason: HOSPADM

## 2022-08-14 RX ORDER — ESCITALOPRAM OXALATE 10 MG/1
10 TABLET ORAL DAILY
Status: DISCONTINUED | OUTPATIENT
Start: 2022-08-14 | End: 2022-08-16 | Stop reason: HOSPADM

## 2022-08-14 RX ORDER — NICOTINE POLACRILEX 4 MG
15 LOZENGE BUCCAL
Status: DISCONTINUED | OUTPATIENT
Start: 2022-08-14 | End: 2022-08-16 | Stop reason: HOSPADM

## 2022-08-14 RX ORDER — INSULIN LISPRO 100 [IU]/ML
0-9 INJECTION, SOLUTION INTRAVENOUS; SUBCUTANEOUS
Status: DISCONTINUED | OUTPATIENT
Start: 2022-08-14 | End: 2022-08-16 | Stop reason: HOSPADM

## 2022-08-14 RX ORDER — ONDANSETRON 4 MG/1
4 TABLET, FILM COATED ORAL EVERY 6 HOURS PRN
Status: DISCONTINUED | OUTPATIENT
Start: 2022-08-14 | End: 2022-08-16 | Stop reason: HOSPADM

## 2022-08-14 RX ORDER — ALBUTEROL SULFATE 2.5 MG/3ML
2.5 SOLUTION RESPIRATORY (INHALATION) EVERY 6 HOURS PRN
Status: DISCONTINUED | OUTPATIENT
Start: 2022-08-14 | End: 2022-08-16 | Stop reason: HOSPADM

## 2022-08-14 RX ORDER — FINASTERIDE 5 MG/1
5 TABLET, FILM COATED ORAL DAILY
Status: DISCONTINUED | OUTPATIENT
Start: 2022-08-14 | End: 2022-08-16 | Stop reason: HOSPADM

## 2022-08-14 RX ORDER — ONDANSETRON 2 MG/ML
4 INJECTION INTRAMUSCULAR; INTRAVENOUS EVERY 6 HOURS PRN
Status: DISCONTINUED | OUTPATIENT
Start: 2022-08-14 | End: 2022-08-16 | Stop reason: HOSPADM

## 2022-08-14 RX ORDER — ATORVASTATIN CALCIUM 20 MG/1
20 TABLET, FILM COATED ORAL DAILY
Status: DISCONTINUED | OUTPATIENT
Start: 2022-08-14 | End: 2022-08-16 | Stop reason: HOSPADM

## 2022-08-14 RX ORDER — GABAPENTIN 300 MG/1
300 CAPSULE ORAL 3 TIMES DAILY
Status: DISCONTINUED | OUTPATIENT
Start: 2022-08-14 | End: 2022-08-16 | Stop reason: HOSPADM

## 2022-08-14 RX ORDER — DEXTROSE MONOHYDRATE 25 G/50ML
25 INJECTION, SOLUTION INTRAVENOUS
Status: DISCONTINUED | OUTPATIENT
Start: 2022-08-14 | End: 2022-08-16 | Stop reason: HOSPADM

## 2022-08-14 RX ORDER — HYDRALAZINE HYDROCHLORIDE 25 MG/1
25 TABLET, FILM COATED ORAL 3 TIMES DAILY
Status: DISCONTINUED | OUTPATIENT
Start: 2022-08-14 | End: 2022-08-16 | Stop reason: HOSPADM

## 2022-08-14 RX ORDER — INSULIN GLARGINE 100 [IU]/ML
10 INJECTION, SOLUTION SUBCUTANEOUS EVERY MORNING
Status: DISCONTINUED | OUTPATIENT
Start: 2022-08-14 | End: 2022-08-16 | Stop reason: HOSPADM

## 2022-08-14 RX ORDER — SODIUM CHLORIDE 9 MG/ML
75 INJECTION, SOLUTION INTRAVENOUS CONTINUOUS
Status: DISPENSED | OUTPATIENT
Start: 2022-08-14 | End: 2022-08-15

## 2022-08-14 RX ORDER — ACETAMINOPHEN 650 MG/1
650 SUPPOSITORY RECTAL EVERY 4 HOURS PRN
Status: DISCONTINUED | OUTPATIENT
Start: 2022-08-14 | End: 2022-08-16 | Stop reason: HOSPADM

## 2022-08-14 RX ORDER — CARVEDILOL 6.25 MG/1
12.5 TABLET ORAL 2 TIMES DAILY
Status: DISCONTINUED | OUTPATIENT
Start: 2022-08-14 | End: 2022-08-16 | Stop reason: HOSPADM

## 2022-08-14 RX ORDER — OLANZAPINE 10 MG/2ML
1 INJECTION, POWDER, LYOPHILIZED, FOR SOLUTION INTRAMUSCULAR AS NEEDED
Status: DISCONTINUED | OUTPATIENT
Start: 2022-08-14 | End: 2022-08-16 | Stop reason: HOSPADM

## 2022-08-14 RX ORDER — SODIUM CHLORIDE 0.9 % (FLUSH) 0.9 %
10 SYRINGE (ML) INJECTION EVERY 12 HOURS SCHEDULED
Status: DISCONTINUED | OUTPATIENT
Start: 2022-08-14 | End: 2022-08-16 | Stop reason: HOSPADM

## 2022-08-14 RX ORDER — SACCHAROMYCES BOULARDII 250 MG
250 CAPSULE ORAL 2 TIMES DAILY
Status: DISCONTINUED | OUTPATIENT
Start: 2022-08-14 | End: 2022-08-16 | Stop reason: HOSPADM

## 2022-08-14 RX ORDER — CHOLECALCIFEROL (VITAMIN D3) 125 MCG
5 CAPSULE ORAL NIGHTLY PRN
Status: DISCONTINUED | OUTPATIENT
Start: 2022-08-14 | End: 2022-08-16 | Stop reason: HOSPADM

## 2022-08-14 RX ORDER — MAGNESIUM SULFATE HEPTAHYDRATE 40 MG/ML
2 INJECTION, SOLUTION INTRAVENOUS AS NEEDED
Status: DISCONTINUED | OUTPATIENT
Start: 2022-08-14 | End: 2022-08-16 | Stop reason: HOSPADM

## 2022-08-14 RX ORDER — CEPHALEXIN 250 MG/1
250 CAPSULE ORAL EVERY MORNING
COMMUNITY
Start: 2022-06-06 | End: 2022-08-16 | Stop reason: HOSPADM

## 2022-08-14 RX ORDER — SACCHAROMYCES BOULARDII 250 MG
250 CAPSULE ORAL 2 TIMES DAILY
COMMUNITY

## 2022-08-14 RX ORDER — POTASSIUM CHLORIDE 20 MEQ/1
40 TABLET, EXTENDED RELEASE ORAL AS NEEDED
Status: DISCONTINUED | OUTPATIENT
Start: 2022-08-14 | End: 2022-08-16 | Stop reason: HOSPADM

## 2022-08-14 RX ORDER — CALCIUM CARBONATE 200(500)MG
2 TABLET,CHEWABLE ORAL 2 TIMES DAILY PRN
Status: DISCONTINUED | OUTPATIENT
Start: 2022-08-14 | End: 2022-08-16 | Stop reason: HOSPADM

## 2022-08-14 RX ADMIN — Medication 10 ML: at 09:00

## 2022-08-14 RX ADMIN — HYDRALAZINE HYDROCHLORIDE 25 MG: 25 TABLET, FILM COATED ORAL at 21:00

## 2022-08-14 RX ADMIN — HYDRALAZINE HYDROCHLORIDE 25 MG: 25 TABLET, FILM COATED ORAL at 17:28

## 2022-08-14 RX ADMIN — Medication 250 MG: at 13:56

## 2022-08-14 RX ADMIN — TAMSULOSIN HYDROCHLORIDE 0.4 MG: 0.4 CAPSULE ORAL at 13:57

## 2022-08-14 RX ADMIN — APIXABAN 5 MG: 5 TABLET, FILM COATED ORAL at 21:01

## 2022-08-14 RX ADMIN — GABAPENTIN 300 MG: 300 CAPSULE ORAL at 21:01

## 2022-08-14 RX ADMIN — Medication 250 MG: at 21:01

## 2022-08-14 RX ADMIN — SODIUM CHLORIDE 75 ML/HR: 9 INJECTION, SOLUTION INTRAVENOUS at 14:00

## 2022-08-14 RX ADMIN — FINASTERIDE 5 MG: 5 TABLET, FILM COATED ORAL at 13:57

## 2022-08-14 RX ADMIN — ISOSORBIDE MONONITRATE 30 MG: 30 TABLET, EXTENDED RELEASE ORAL at 13:56

## 2022-08-14 RX ADMIN — ONDANSETRON HYDROCHLORIDE 4 MG: 4 TABLET, FILM COATED ORAL at 21:00

## 2022-08-14 RX ADMIN — CARVEDILOL 12.5 MG: 6.25 TABLET, FILM COATED ORAL at 13:56

## 2022-08-14 RX ADMIN — GABAPENTIN 300 MG: 300 CAPSULE ORAL at 17:28

## 2022-08-14 RX ADMIN — MEROPENEM 1 G: 1 INJECTION, POWDER, FOR SOLUTION INTRAVENOUS at 04:41

## 2022-08-14 RX ADMIN — INSULIN GLARGINE 10 UNITS: 100 INJECTION, SOLUTION SUBCUTANEOUS at 13:56

## 2022-08-14 RX ADMIN — CARVEDILOL 12.5 MG: 6.25 TABLET, FILM COATED ORAL at 21:00

## 2022-08-14 RX ADMIN — APIXABAN 5 MG: 5 TABLET, FILM COATED ORAL at 13:57

## 2022-08-14 RX ADMIN — ATORVASTATIN CALCIUM 20 MG: 20 TABLET, FILM COATED ORAL at 13:56

## 2022-08-14 RX ADMIN — MEROPENEM 1 G: 1 INJECTION, POWDER, FOR SOLUTION INTRAVENOUS at 17:28

## 2022-08-14 RX ADMIN — Medication 5 MG: at 21:00

## 2022-08-14 RX ADMIN — SODIUM CHLORIDE 1000 ML: 9 INJECTION, SOLUTION INTRAVENOUS at 04:40

## 2022-08-14 RX ADMIN — ESCITALOPRAM OXALATE 10 MG: 10 TABLET ORAL at 13:57

## 2022-08-14 NOTE — ED PROVIDER NOTES
Subjective   77-year-old male complains of pain at the tip of his penis intermittently for 2 weeks.  Patient states this worsened after he had a colonoscopy several weeks ago.  Patient is unsure why his colonoscopy was done or what the results were.  Patient denies any fever, no abdominal or flank pain or groin pain.  Patient is a poor historian with a diagnosis of dementia per chart review.          Review of Systems   Unable to perform ROS: Dementia       Past Medical History:   Diagnosis Date   • Acute kidney failure (HCC)    • Acute respiratory failure with hypoxia (HCC)    • Anemia    • Benign prostatic hyperplasia    • Bilateral primary osteoarthritis of knee    • Cardiomegaly    • Cardiomyopathy (HCC)    • Cellulitis and abscess of left leg    • Cerebral infarction (HCC)    • Cerebrovascular disease    • Chronic kidney disease    • Dementia (HCC)    • Diabetes mellitus (HCC)    • Essential hypertension    • GERD (gastroesophageal reflux disease)    • Gout    • Heart failure (HCC)    • Hyperlipidemia    • Morbid obesity (HCC)    • Myocardial infarction (HCC)    • Obstructive sleep apnea    • Obstructive uropathy    • Paroxysmal atrial fibrillation (HCC)    • Peptic ulcer    • Peripheral vascular disease (HCC)    • Pulmonary edema    • Venous insufficiency        No Known Allergies    No past surgical history on file.    Family History   Problem Relation Age of Onset   • Diabetes Father        Social History     Socioeconomic History   • Marital status:    • Number of children: 0   Tobacco Use   • Smoking status: Never Smoker   Vaping Use   • Vaping Use: Never used   Substance and Sexual Activity   • Alcohol use: Not Currently   • Drug use: Never   • Sexual activity: Defer           Objective   Physical Exam  Constitutional:       Comments: 77-year-old male alert and appropriate in no acute distress   HENT:      Head: Normocephalic and atraumatic.      Mouth/Throat:      Mouth: Mucous membranes are  moist.      Pharynx: Oropharynx is clear.   Eyes:      Conjunctiva/sclera: Conjunctivae normal.      Pupils: Pupils are equal, round, and reactive to light.   Cardiovascular:      Rate and Rhythm: Rhythm irregular.   Pulmonary:      Effort: Pulmonary effort is normal.      Breath sounds: Normal breath sounds.   Abdominal:      General: Bowel sounds are normal. There is no distension.      Palpations: Abdomen is soft.      Tenderness: There is no abdominal tenderness.   Genitourinary:     Comments: Penis with normal inspection, there is a Motley catheter in place, otherwise, normal  inspection, there is no obvious irritation or bleeding at the glans, urine in the bag is mildly cloudy but nonbloody  Musculoskeletal:      Comments: Trace edema bilateral lower extremities with venous stasis dermatitis   Skin:     General: Skin is warm and dry.      Capillary Refill: Capillary refill takes less than 2 seconds.   Neurological:      General: No focal deficit present.      Mental Status: He is disoriented.   Psychiatric:         Mood and Affect: Mood normal.         Behavior: Behavior normal.         Procedures           ED Course                                           MDM  Number of Diagnoses or Management Options  Acute UTI  MAKI (acute kidney injury) ([facility])  Malfunction of Motley catheter, initial encounter ([facility])  Diagnosis management comments: Results for orders placed or performed during the hospital encounter of 08/13/22  -Basic Metabolic Panel:   Specimen: Arm, Left; Blood       Result                      Value             Ref Range           Glucose                     114 (H)           65 - 99 mg/dL       BUN                         62 (H)            8 - 23 mg/dL        Creatinine                  1.93 (H)          0.76 - 1.27 *       Sodium                      144               136 - 145 mm*       Potassium                   4.6               3.5 - 5.2 mm*       Chloride                    106               98  - 107 mmo*       CO2                         26.0              22.0 - 29.0 *       Calcium                     9.1               8.6 - 10.5 m*       BUN/Creatinine Ratio        32.1 (H)          7.0 - 25.0          Anion Gap                   12.0              5.0 - 15.0 m*       eGFR                        35.2 (L)          >60.0 mL/min*  -Urinalysis With Culture If Indicated - Urine, Catheter:   Specimen: Urine, Catheter       Result                      Value             Ref Range           Color, UA                   Yellow            Yellow, Straw       Appearance, UA              Clear             Clear               pH, UA                      <=5.0             5.0 - 8.0           Specific [place], UA        1.011             1.005 - 1.030       Glucose, UA                 Negative          Negative            Ketones, UA                 Negative          Negative            Bilirubin, UA               Negative          Negative            Blood, UA                                     Negative        Large (3+) (A)       Protein, UA                 Negative          Negative            Leuk Esterase, UA                             Negative        Moderate (2+) (A)       Nitrite, UA                 Positive (A)      Negative            Urobilinogen, UA            0.2 E.U./dL       0.2 - 1.0 E.*  -CBC Auto Differential:   Specimen: Arm, Left; Blood       Result                      Value             Ref Range           WBC                         7.30              3.40 - 10.80*       RBC                         4.35              4.14 - 5.80 *       Hemoglobin                  11.2 (L)          13.0 - 17.7 *       Hematocrit                  35.6 (L)          37.5 - 51.0 %       MCV                         81.8              79.0 - 97.0 *       MCH                         25.7 (L)          26.6 - 33.0 *       MCHC                        31.4 (L)          31.5 - 35.7 *       RDW                         20.0 (H)           12.3 - 15.4 %       RDW-SD                      56.9 (H)          37.0 - 54.0 *       MPV                         7.9               6.0 - 12.0 fL       Platelets                   246               140 - 450 10*       Neutrophil %                58.5              42.7 - 76.0 %       Lymphocyte %                25.1              19.6 - 45.3 %       Monocyte %                  9.6               5.0 - 12.0 %        Eosinophil %                6.2               0.3 - 6.2 %         Basophil %                  0.6               0.0 - 1.5 %         Neutrophils, Absolute       4.30              1.70 - 7.00 *       Lymphocytes, Absolute       1.80              0.70 - 3.10 *       Monocytes, Absolute         0.70              0.10 - 0.90 *       Eosinophils, Absolute       0.50 (H)          0.00 - 0.40 *       Basophils, Absolute         0.00              0.00 - 0.20 *       nRBC                        0.0               0.0 - 0.2 /1*  -Urinalysis, Microscopic Only - Urine, Catheter:   Specimen: Urine, Catheter       Result                      Value             Ref Range           RBC, UA                                       None Seen /H*   Too Numerous to Count (A)       WBC, UA                     21-30 (A)         None Seen /H*       Bacteria, UA                2+ (A)            None Seen /H*       Squamous Epithelial Ce*     0-2               None Seen, 0*       Hyaline Casts, UA           3-6               None Seen /L*       Methodology                                                   Automated Microscopy    Per nursing, patient's Motley catheter appeared to be malposition when it was exchanged with suspicion of placement in the prostatic urethra.  With replacement of Motley catheter, patient had 500 cc of urine returned with significant improvement in pain.  Upon reevaluation, patient is resting comfortably and tells me he still has some pain intermittently but it is improved.  Patient has a fairly significant  acute kidney injury, will hydrate, treat UTI, observe       Amount and/or Complexity of Data Reviewed  Clinical lab tests: ordered and reviewed  Tests in the medicine section of CPT®: reviewed and ordered  Decide to obtain previous medical records or to obtain history from someone other than the patient: yes        Final diagnoses:   MAKI (acute kidney injury) (HCC)   Acute UTI   Malfunction of Motley catheter, initial encounter (HCC)       ED Disposition  ED Disposition     ED Disposition   Decision to Admit    Condition   --    Comment   Level of Care: Telemetry [5]   Admitting Physician: LUKE EASTON [[id number]]               No follow-up provider specified.       Medication List      No changes were made to your prescriptions during this visit.          Bob Lawrence MD  08/14/22 0326

## 2022-08-14 NOTE — H&P
[facility] Medicine Services      Patient Name: Shaji Junior  : 1945  MRN: [id number]  Primary Care Physician:  Naa Valverde MD  Date of admission: 2022      Subjective      Chief Complaint: Pain to penis    Patient alert and oriented x2 (self, time).  No family at bedside.  HPI obtained from ED providers notes with additional editing.  History of Present Illness: Shaji Junior is a 77 y.o. male with past medical history of diabetes mellitus type 2, A. fib, CVA who presented to [facility] on 2022 complaining pain at the tip of his penis intermittently for 2 weeks.  Patient states this worsened after he had a colonoscopy several weeks ago.  Patient is unsure why his colonoscopy was done or what the results were.  Patient denies any fever, no abdominal or flank pain or groin pain.  Patient is a poor historian with a diagnosis of dementia per chart review.    In the ED, urinalysis showed positive nitrite, bacteria.  All labs unremarkable except creatinine 1.93, BUN 62, hemoglobin 11.2, hematocrit 35.6.  All vital signs unremarkable.  Patient received Merrem, IV fluid bolus in the ED.  Motley catheter replaced in the ED, 500 mL out.  Patient admitted to hospitalist service for further evaluation and treatment.    Review of Systems   Unable to perform ROS: dementia       Personal History     Past Medical History:   Diagnosis Date   • Acute kidney failure (HCC)    • Acute respiratory failure with hypoxia (HCC)    • Anemia    • Benign prostatic hyperplasia    • Bilateral primary osteoarthritis of knee    • Cardiomegaly    • Cardiomyopathy (HCC)    • Cellulitis and abscess of left leg    • Cerebral infarction (HCC)    • Cerebrovascular disease    • Chronic kidney disease    • Dementia (HCC)    • Diabetes mellitus (HCC)    • Essential hypertension    • GERD (gastroesophageal reflux disease)    • Gout    • Heart failure (HCC)    • Hyperlipidemia    • Morbid obesity (HCC)     • Myocardial infarction (HCC)    • Obstructive sleep apnea    • Obstructive uropathy    • Paroxysmal atrial fibrillation (HCC)    • Peptic ulcer    • Peripheral vascular disease (HCC)    • Pulmonary edema    • Venous insufficiency        History reviewed. No pertinent surgical history.    Family History: family history includes Diabetes in his father. Otherwise pertinent FHx was reviewed and not pertinent to current issue.    Social History:  reports that he has never smoked. He does not have any smokeless tobacco history on file. He reports previous alcohol use. He reports that he does not use drugs.    Home Medications:  Prior to Admission Medications     Prescriptions Last Dose Informant Patient Reported? Taking?    acetaminophen (TYLENOL) 325 MG tablet   Yes No    Take 650 mg by mouth Every 4 (Four) Hours As Needed for Mild Pain .    acetaminophen (TYLENOL) 325 MG tablet   Yes No    Take 650 mg by mouth Every 6 (Six) Hours As Needed for Mild Pain .    albuterol sulfate  (90 Base) MCG/ACT inhaler   Yes No    Inhale 2 puffs Every 4 (Four) Hours As Needed (Cough).    apixaban (ELIQUIS) 5 MG tablet tablet   No No    Take 1 tablet by mouth Every 12 (Twelve) Hours. Indications: Atrial Fibrillation    bumetanide (BUMEX) 2 MG tablet   No No    Take 1 tablet by mouth 2 (Two) Times a Day.    carvedilol (COREG) 12.5 MG tablet   Yes No    Take 12.5 mg by mouth 2 (Two) Times a Day.    escitalopram (LEXAPRO) 10 MG tablet   Yes No    Take 10 mg by mouth Daily.    finasteride (PROSCAR) 5 MG tablet   Yes No    Take 5 mg by mouth Daily.    gabapentin (NEURONTIN) 300 MG capsule   Yes No    Take 300 mg by mouth 3 (Three) Times a Day.    hydrALAZINE (APRESOLINE) 25 MG tablet   No No    Take 1 tablet by mouth 3 (Three) Times a Day.    insulin glargine (LANTUS, SEMGLEE) 100 UNIT/ML injection   Yes No    Inject 10 Units under the skin into the appropriate area as directed Every Morning. Hold for glucose less than 110     isosorbide mononitrate (IMDUR) 30 MG 24 hr tablet   Yes No    Take 30 mg by mouth 2 (Two) Times a Day.    Polyethylene Glycol 3350 powder   Yes No    Take 17 g by mouth Every Night.    simvastatin (ZOCOR) 40 MG tablet   Yes No    Take 40 mg by mouth every night at bedtime.    spironolactone (ALDACTONE) 50 MG tablet   No No    Take 1 tablet by mouth Daily.    tamsulosin (FLOMAX) 0.4 MG capsule 24 hr capsule   Yes No    Take 1 capsule by mouth Daily.            Allergies:  No Known Allergies    Objective      Vitals:   Temp:  [98.4 °F (36.9 °C)] 98.4 °F (36.9 °C)  Heart Rate:  [51-86] 51  Resp:  [16] 16  BP: (127-153)/(76-89) 153/89    Physical Exam  Vitals and nursing note reviewed.   Constitutional:       Appearance: Normal appearance. He is obese.   HENT:      Head: Normocephalic.      Right Ear: External ear normal.      Left Ear: External ear normal.      Nose: Nose normal.      Mouth/Throat:      Pharynx: Oropharynx is clear.   Eyes:      Extraocular Movements: Extraocular movements intact.   Cardiovascular:      Rate and Rhythm: Normal rate and regular rhythm.      Pulses: Normal pulses.      Heart sounds: Normal heart sounds.   Pulmonary:      Effort: Pulmonary effort is normal.      Breath sounds: Normal breath sounds.   Abdominal:      General: Bowel sounds are normal.      Palpations: Abdomen is soft.   Genitourinary:     Comments: F/c draining yellow urine  Musculoskeletal:         General: Normal range of motion.      Cervical back: Normal range of motion.      Right lower leg: Edema present.      Left lower leg: Edema present.      Comments: Bilateral lower extremity edema   Skin:     General: Skin is warm and dry.      Comments: Dark discoloration to bilateral lower extremities   Neurological:      Mental Status: He is alert. He is disoriented.   Psychiatric:         Mood and Affect: Mood normal.         Behavior: Behavior normal.         Result Review    Result Review:  I have personally reviewed the  results from the time of this admission to 8/14/2022 07:15 EDT and agree with these findings:  [x]  Laboratory  [x]  Microbiology  []  Radiology  []  EKG/Telemetry   []  Cardiology/Vascular   []  Pathology  []  Old records  []  Other:  Most notable findings include: as above      Assessment & Plan        Active Hospital Problems:  Active Hospital Problems    Diagnosis    • **MAKI (acute kidney injury) (HCC)    • Atrial fibrillation (HCC)    • Type 2 diabetes mellitus with diabetic nephropathy (HCC)    • Cerebrovascular accident (HCC)      Plan:     MAKI (acute kidney injury)  -Creatinine 1.93, baseline WNL  -GFR 35.2, baseline WNL  -BUN 62  -Motley catheter replaced in the ED, 500 mL out  -Per nursing, patient's Motley catheter appeared to be malposition when it was exchanged with suspicion of placement in the prostatic urethra.  With replacement of Motley catheter, patient had 500 cc of urine returned with significant improvement in pain.  -IV fluid bolus given in the ED  -Consider urology evaluation if needed    Acute UTI  -Urinalysis reviewed  -Urine cultures pending  - History of ESBL Proteus UTI.  -Blood cultures  -Merrem given in the ED, continue    Hx of Cerebrovascular accident   -No residual deficits noted on exam    Atrial fibrillation  -Echo on 3/21/2022, EF 55 to 60%, left ventricular systolic function normal, left ventricular diastolic function normal    Type 2 diabetes mellitus with diabetic nephropathy  -Glucose 114  -Hemoglobin A1c ordered  -Accu-Cheks and sliding scale insulin    DVT prophylaxis:  No DVT prophylaxis order currently exists.    CODE STATUS:       Admission Status:  I believe this patient meets observation status.    I discussed the patient's findings and my recommendations with patient.    This patient has been examined wearing appropriate Personal Protective Equipment. 08/14/22      Signature: Electronically signed by MELISA Hughes, 08/14/22, 6:37 AM EDT.   77 y.o. male with past  medical history of diabetes mellitus type 2, A. fib, CVA who presented to [facility] on 8/13/2022 complaining pain at the tip of his penis intermittently for 2 weeks.  Patient states this worsened after he had a colonoscopy several weeks ago.  Patient is unsure why his colonoscopy was done or what the results were.  Patient denies any fever, no abdominal or flank pain or groin pain.  Patient is a poor historian with a diagnosis of dementia per chart review.    In the ED, urinalysis showed positive nitrite, bacteria.  All labs unremarkable except creatinine 1.93, BUN 62, hemoglobin 11.2, hematocrit 35.6.  All vital signs unremarkable.  Patient received Merrem, IV fluid bolus in the ED.  Motley catheter replaced in the ED, 500 mL out.  Patient admitted to hospitalist service for further evaluation and treatment.      On exam  GENERAL APPEARANCE: Well developed, well nourished, alert and cooperative, and appears to be in no acute distress.  HEAD: normocephalic.  EYES: PERRL, EOMI. vision intact grossly.  EARS: Intact hearing.  No gross abnormalities.  NOSE: No nasal discharge.  THROAT: Clear   NECK: Neck supple, non-tender without lymphadenopathy, masses or thyromegaly.  CARDIAC: Normal S1 and S2. No S3, S4 or murmurs. Rhythm is regular. There is no peripheral edema, cyanosis or pallor. Extremities are warm and well perfused. Capillary refill is less than 2 seconds. No carotid bruits.  LUNGS: Clear to auscultation and percussion without rales, rhonchi, wheezing or diminished breath sounds.  ABDOMEN: Positive bowel sounds. Soft, nondistended, nontender. No guarding or rebound. No masses.  Motley catheter in place with yellow urine    MUSKULOSKELETAL: No deformity or swelling   BACK: No abnormalities noted     EXTREMITIES: Lower extremity edema  LOWER EXTREMITY: Moderate bilateral lower extremity edema NEUROLOGICAL: CN II-XII intact. Strength and sensation symmetric and intact throughout. Reflexes 2+ throughout.  Cerebellar testing normal.  SKIN: Skin normal color, texture and turgor with no lesions or eruptions.  PSYCHIATRIC: Alert cooperative not suicidal     Assessment/plan    MAKI (acute kidney injury)  -Creatinine 1.93, baseline WNL  -GFR 35.2, baseline WNL  -BUN 62  - Possible effect of diuretic/obstructive uropathy  -Motley catheter replaced in the ED, 500 mL out  -Per nursing, patient's Motley catheter appeared to be malposition when it was exchanged with suspicion of placement in the prostatic urethra.  With replacement of Motley catheter, patient had 500 cc of urine returned with significant improvement in pain.  -Continue gentle hydration-IV fluid bolus given in the ED  -Hold diuretics with Bumex/Aldactone until renal function improves.,  Restart as soon as patient cannot tolerate  -Previous echo in March was showing normal values    Pain at the tip of the penis.  No clinical findings on exam  Consider urology evaluation if needed      Acute UTI  -Urinalysis reviewed  -Urine cultures pending  -Blood cultures  -Merrem given in the ED, continue    Hx of Cerebrovascular accident   -No residual deficits noted on exam  Resume Eliquis    Atrial fibrillation  -Echo on 3/21/2022, EF 55 to 60%, left ventricular systolic function normal, left ventricular diastolic function normal  Resume Eliquis and Coreg    Type 2 diabetes mellitus with diabetic nephropathy  -Glucose 114  -Hemoglobin A1c ordered  -Accu-Cheks and sliding scale insulin      Electronically signed by Dmitri Rangel MD, 08/14/22, 12:04 PM EDT.  [facility] Hospitalist Team

## 2022-08-14 NOTE — PLAN OF CARE
Problem: Fall Injury Risk  Goal: Absence of Fall and Fall-Related Injury  Outcome: Ongoing, Progressing  Intervention: Promote Injury-Free Environment  Recent Flowsheet Documentation  Taken 8/14/2022 1615 by Flavia Boudreaux RN  Safety Promotion/Fall Prevention:   assistive device/personal items within reach   clutter free environment maintained   fall prevention program maintained   nonskid shoes/slippers when out of bed   room organization consistent   safety round/check completed  Taken 8/14/2022 1600 by Flavia Boudreaux RN  Safety Promotion/Fall Prevention: safety round/check completed  Taken 8/14/2022 1400 by Flavia Boudreaux RN  Safety Promotion/Fall Prevention: safety round/check completed  Taken 8/14/2022 1215 by Flavia Boudreaux RN  Safety Promotion/Fall Prevention:   assistive device/personal items within reach   clutter free environment maintained   fall prevention program maintained   nonskid shoes/slippers when out of bed   room organization consistent   safety round/check completed  Taken 8/14/2022 1200 by Flavia Boudreaux RN  Safety Promotion/Fall Prevention: safety round/check completed  Taken 8/14/2022 1000 by Flavia Boudreaux RN  Safety Promotion/Fall Prevention: safety round/check completed  Taken 8/14/2022 0850 by Flavia Boudreaux RN  Safety Promotion/Fall Prevention:   assistive device/personal items within reach   clutter free environment maintained   fall prevention program maintained   nonskid shoes/slippers when out of bed   room organization consistent   safety round/check completed     Problem: Pain Acute  Goal: Acceptable Pain Control and Functional Ability  Outcome: Ongoing, Progressing     Problem: Adult Inpatient Plan of Care  Goal: Plan of Care Review  Outcome: Ongoing, Progressing  Goal: Patient-Specific Goal (Individualized)  Outcome: Ongoing, Progressing  Goal: Absence of Hospital-Acquired Illness or Injury  Outcome: Ongoing, Progressing  Intervention: Identify and  Manage Fall Risk  Recent Flowsheet Documentation  Taken 8/14/2022 1615 by Flavia Boudreaux RN  Safety Promotion/Fall Prevention:   assistive device/personal items within reach   clutter free environment maintained   fall prevention program maintained   nonskid shoes/slippers when out of bed   room organization consistent   safety round/check completed  Taken 8/14/2022 1600 by Flavia Boudreaux RN  Safety Promotion/Fall Prevention: safety round/check completed  Taken 8/14/2022 1400 by Flavia Boudreaux RN  Safety Promotion/Fall Prevention: safety round/check completed  Taken 8/14/2022 1215 by Flavia Boudreaux RN  Safety Promotion/Fall Prevention:   assistive device/personal items within reach   clutter free environment maintained   fall prevention program maintained   nonskid shoes/slippers when out of bed   room organization consistent   safety round/check completed  Taken 8/14/2022 1200 by Flavia Boudreaux RN  Safety Promotion/Fall Prevention: safety round/check completed  Taken 8/14/2022 1000 by Flavia Boudreaux RN  Safety Promotion/Fall Prevention: safety round/check completed  Taken 8/14/2022 0850 by Flavia Boudreaux RN  Safety Promotion/Fall Prevention:   assistive device/personal items within reach   clutter free environment maintained   fall prevention program maintained   nonskid shoes/slippers when out of bed   room organization consistent   safety round/check completed  Intervention: Prevent Skin Injury  Recent Flowsheet Documentation  Taken 8/14/2022 1615 by Flavia Boudreaux RN  Body Position: weight shifting  Skin Protection:   adhesive use limited   tubing/devices free from skin contact  Taken 8/14/2022 1215 by Flavia Boudreaux RN  Body Position: weight shifting  Skin Protection:   adhesive use limited   tubing/devices free from skin contact  Taken 8/14/2022 0850 by Flavia Boudreaux RN  Body Position: weight shifting  Skin Protection:   adhesive use limited   tubing/devices free from  skin contact  Intervention: Prevent and Manage VTE (Venous Thromboembolism) Risk  Recent Flowsheet Documentation  Taken 8/14/2022 1615 by Flavia Boudreaux RN  Activity Management:   activity adjusted per tolerance   activity encouraged  Taken 8/14/2022 1215 by Flavia Boudreaux RN  Activity Management:   activity adjusted per tolerance   activity encouraged  Taken 8/14/2022 0850 by Flavia Boudreaux RN  Activity Management:   activity adjusted per tolerance   activity encouraged  Intervention: Prevent Infection  Recent Flowsheet Documentation  Taken 8/14/2022 1615 by Flavia Boudreaux RN  Infection Prevention: hand hygiene promoted  Taken 8/14/2022 1215 by Flavia Boudreaux RN  Infection Prevention: hand hygiene promoted  Taken 8/14/2022 0850 by Flavia Boudreaux RN  Infection Prevention: hand hygiene promoted  Goal: Optimal Comfort and Wellbeing  Outcome: Ongoing, Progressing  Intervention: Provide Person-Centered Care  Recent Flowsheet Documentation  Taken 8/14/2022 0850 by Flavia Boudreaux RN  Trust Relationship/Rapport:   care explained   questions encouraged  Goal: Readiness for Transition of Care  Outcome: Ongoing, Progressing  Intervention: Mutually Develop Transition Plan  Recent Flowsheet Documentation  Taken 8/14/2022 1122 by Flavia Boudreaux RN  Transportation Anticipated: family or friend will provide  Patient/Family Anticipated Services at Transition: none  Patient/Family Anticipates Transition to: home with family  Taken 8/14/2022 1121 by Flavia Boudreaux RN  Equipment Currently Used at Home: walker, rolling     Problem: Skin Injury Risk Increased  Goal: Skin Health and Integrity  Outcome: Ongoing, Progressing  Intervention: Optimize Skin Protection  Recent Flowsheet Documentation  Taken 8/14/2022 1615 by Flavia Boudreaux RN  Pressure Reduction Techniques: frequent weight shift encouraged  Head of Bed (HOB) Positioning: HOB at 30-45 degrees  Pressure Reduction Devices:  pressure-redistributing mattress utilized  Skin Protection:   adhesive use limited   tubing/devices free from skin contact  Taken 8/14/2022 1215 by Flavia Boudreaux RN  Pressure Reduction Techniques: frequent weight shift encouraged  Head of Bed (HOB) Positioning: HOB at 30-45 degrees  Pressure Reduction Devices: pressure-redistributing mattress utilized  Skin Protection:   adhesive use limited   tubing/devices free from skin contact  Taken 8/14/2022 0850 by Flavia Boudreaux RN  Pressure Reduction Techniques: frequent weight shift encouraged  Head of Bed (HOB) Positioning: HOB at 30-45 degrees  Pressure Reduction Devices: pressure-redistributing mattress utilized  Skin Protection:   adhesive use limited   tubing/devices free from skin contact   Goal Outcome Evaluation:      The patient arrived to [facility] from the ED and a complete head to toe assessment was completed as well as a two nurse skin check off and an admission assessment. The patient is on room air. The patient is in a-fib but is controlled. The patient did not have any complaints. Will continue to monitor.

## 2022-08-15 LAB
ALBUMIN SERPL-MCNC: 2.6 G/DL (ref 3.5–5.2)
ALBUMIN/GLOB SERPL: 0.7 G/DL
ALP SERPL-CCNC: 106 U/L (ref 39–117)
ALT SERPL W P-5'-P-CCNC: 21 U/L (ref 1–41)
ANION GAP SERPL CALCULATED.3IONS-SCNC: 8 MMOL/L (ref 5–15)
AST SERPL-CCNC: 18 U/L (ref 1–40)
BILIRUB SERPL-MCNC: 0.2 MG/DL (ref 0–1.2)
BUN SERPL-MCNC: 48 MG/DL (ref 8–23)
BUN/CREAT SERPL: 29.8 (ref 7–25)
CALCIUM SPEC-SCNC: 8.6 MG/DL (ref 8.6–10.5)
CHLORIDE SERPL-SCNC: 107 MMOL/L (ref 98–107)
CO2 SERPL-SCNC: 22 MMOL/L (ref 22–29)
CREAT SERPL-MCNC: 1.61 MG/DL (ref 0.76–1.27)
EGFRCR SERPLBLD CKD-EPI 2021: 43.8 ML/MIN/1.73
GLOBULIN UR ELPH-MCNC: 3.9 GM/DL
GLUCOSE BLDC GLUCOMTR-MCNC: 107 MG/DL (ref 70–105)
GLUCOSE BLDC GLUCOMTR-MCNC: 114 MG/DL (ref 70–105)
GLUCOSE BLDC GLUCOMTR-MCNC: 227 MG/DL (ref 70–105)
GLUCOSE BLDC GLUCOMTR-MCNC: 92 MG/DL (ref 70–105)
GLUCOSE SERPL-MCNC: 93 MG/DL (ref 65–99)
HBA1C MFR BLD: 6.9 % (ref 3.5–5.6)
POTASSIUM SERPL-SCNC: 4.4 MMOL/L (ref 3.5–5.2)
PROT SERPL-MCNC: 6.5 G/DL (ref 6–8.5)
SODIUM SERPL-SCNC: 137 MMOL/L (ref 136–145)

## 2022-08-15 PROCEDURE — 99225 PR SBSQ OBSERVATION CARE/DAY 25 MINUTES: CPT | Performed by: HOSPITALIST

## 2022-08-15 PROCEDURE — 96366 THER/PROPH/DIAG IV INF ADDON: CPT

## 2022-08-15 PROCEDURE — 87102 FUNGUS ISOLATION CULTURE: CPT | Performed by: HOSPITALIST

## 2022-08-15 PROCEDURE — 82962 GLUCOSE BLOOD TEST: CPT

## 2022-08-15 PROCEDURE — 63710000001 INSULIN GLARGINE PER 5 UNITS: Performed by: INTERNAL MEDICINE

## 2022-08-15 PROCEDURE — 80053 COMPREHEN METABOLIC PANEL: CPT | Performed by: INTERNAL MEDICINE

## 2022-08-15 PROCEDURE — 25010000002 MEROPENEM PER 100 MG: Performed by: NURSE PRACTITIONER

## 2022-08-15 PROCEDURE — 97162 PT EVAL MOD COMPLEX 30 MIN: CPT

## 2022-08-15 PROCEDURE — G0378 HOSPITAL OBSERVATION PER HR: HCPCS

## 2022-08-15 PROCEDURE — 97166 OT EVAL MOD COMPLEX 45 MIN: CPT

## 2022-08-15 RX ORDER — SODIUM CHLORIDE 9 MG/ML
75 INJECTION, SOLUTION INTRAVENOUS CONTINUOUS
Status: DISCONTINUED | OUTPATIENT
Start: 2022-08-15 | End: 2022-08-16 | Stop reason: HOSPADM

## 2022-08-15 RX ADMIN — ESCITALOPRAM OXALATE 10 MG: 10 TABLET ORAL at 09:33

## 2022-08-15 RX ADMIN — CARVEDILOL 12.5 MG: 6.25 TABLET, FILM COATED ORAL at 09:33

## 2022-08-15 RX ADMIN — FINASTERIDE 5 MG: 5 TABLET, FILM COATED ORAL at 09:33

## 2022-08-15 RX ADMIN — INSULIN GLARGINE 10 UNITS: 100 INJECTION, SOLUTION SUBCUTANEOUS at 07:57

## 2022-08-15 RX ADMIN — Medication 250 MG: at 09:33

## 2022-08-15 RX ADMIN — MEROPENEM 1 G: 1 INJECTION, POWDER, FOR SOLUTION INTRAVENOUS at 22:51

## 2022-08-15 RX ADMIN — HYDRALAZINE HYDROCHLORIDE 25 MG: 25 TABLET, FILM COATED ORAL at 09:33

## 2022-08-15 RX ADMIN — GABAPENTIN 300 MG: 300 CAPSULE ORAL at 20:07

## 2022-08-15 RX ADMIN — MEROPENEM 1 G: 1 INJECTION, POWDER, FOR SOLUTION INTRAVENOUS at 06:08

## 2022-08-15 RX ADMIN — TAMSULOSIN HYDROCHLORIDE 0.4 MG: 0.4 CAPSULE ORAL at 09:33

## 2022-08-15 RX ADMIN — MEROPENEM 1 G: 1 INJECTION, POWDER, FOR SOLUTION INTRAVENOUS at 15:41

## 2022-08-15 RX ADMIN — Medication 250 MG: at 20:06

## 2022-08-15 RX ADMIN — Medication 10 ML: at 09:34

## 2022-08-15 RX ADMIN — ATORVASTATIN CALCIUM 20 MG: 20 TABLET, FILM COATED ORAL at 09:33

## 2022-08-15 RX ADMIN — CARVEDILOL 12.5 MG: 6.25 TABLET, FILM COATED ORAL at 20:06

## 2022-08-15 RX ADMIN — ISOSORBIDE MONONITRATE 30 MG: 30 TABLET, EXTENDED RELEASE ORAL at 09:33

## 2022-08-15 RX ADMIN — APIXABAN 5 MG: 5 TABLET, FILM COATED ORAL at 09:33

## 2022-08-15 RX ADMIN — SODIUM CHLORIDE 75 ML/HR: 9 INJECTION, SOLUTION INTRAVENOUS at 12:44

## 2022-08-15 RX ADMIN — GABAPENTIN 300 MG: 300 CAPSULE ORAL at 09:33

## 2022-08-15 RX ADMIN — HYDRALAZINE HYDROCHLORIDE 25 MG: 25 TABLET, FILM COATED ORAL at 20:06

## 2022-08-15 RX ADMIN — GABAPENTIN 300 MG: 300 CAPSULE ORAL at 15:41

## 2022-08-15 RX ADMIN — Medication 10 ML: at 20:07

## 2022-08-15 RX ADMIN — HYDRALAZINE HYDROCHLORIDE 25 MG: 25 TABLET, FILM COATED ORAL at 15:41

## 2022-08-15 RX ADMIN — APIXABAN 5 MG: 5 TABLET, FILM COATED ORAL at 20:06

## 2022-08-15 NOTE — PLAN OF CARE
Goal Outcome Evaluation:  Plan of Care Reviewed With: patient  Pt presents as a 76 y/o M admitted to [facility] with penis pain and diagnosed with a UTI. Pt has been living at [facility]  for ~ 6 months. PMHx: DM with neuropathy, hx MI, CKD,a-fib, CVA, CKD. At baseline, pt reports he ambulates with a rolling walker.  PER PT Eval, pt requires mod assist for bed mobility and min assist for transfers with ambulation of 8 feet with r. Wx with mod assist.  PT recommendation is return to Skilled Nursing Facility.  PT will follow.

## 2022-08-15 NOTE — PROGRESS NOTES
[facility] Medicine Services Daily Progress Note    Patient Name: Shaji Junior  : 1945  MRN: [id number]  Primary Care Physician:  Naa Valverde MD  Date of admission: 2022      Subjective      Chief Complaint: groin pain      Patient Reports     8/15/22: OOB to chair.  Motley catheter in place    Review of Systems   All other systems reviewed and are negative.         Objective      Vitals:   Temp:  [97.4 °F (36.3 °C)-98.1 °F (36.7 °C)] 97.4 °F (36.3 °C)  Heart Rate:  [54-70] 66  Resp:  [9-24] 17  BP: (116-151)/(71-91) 132/71    Physical Exam  HENT:      Head: Normocephalic.      Nose: Nose normal.   Eyes:      Extraocular Movements: Extraocular movements intact.      Pupils: Pupils are equal, round, and reactive to light.   Cardiovascular:      Rate and Rhythm: Normal rate and regular rhythm.   Pulmonary:      Effort: Pulmonary effort is normal.   Abdominal:      General: Bowel sounds are normal.   Genitourinary:     Comments: Motley intact  Musculoskeletal:         General: Normal range of motion.      Cervical back: Normal range of motion.   Skin:     General: Skin is warm.   Neurological:      General: No focal deficit present.      Mental Status: He is alert.   Psychiatric:         Mood and Affect: Mood normal.           Result Review    Result Review:  I have personally reviewed the results from the time of this admission to 8/15/2022 14:19 EDT and agree with these findings:  [x]  Laboratory  []  Microbiology  [x]  Radiology  []  EKG/Telemetry   []  Cardiology/Vascular   []  Pathology  [x]  Old records  []  Other:            Assessment & Plan      Brief Patient Summary:        apixaban, 5 mg, Oral, Q12H  atorvastatin, 20 mg, Oral, Daily  carvedilol, 12.5 mg, Oral, BID  escitalopram, 10 mg, Oral, Daily  finasteride, 5 mg, Oral, Daily  gabapentin, 300 mg, Oral, TID  hydrALAZINE, 25 mg, Oral, TID  insulin glargine, 10 Units, Subcutaneous, QAM  insulin lispro, 0-9 Units,  Subcutaneous, TID AC  isosorbide mononitrate, 30 mg, Oral, Q24H  meropenem, 1 g, Intravenous, Q8H  saccharomyces boulardii, 250 mg, Oral, BID  sodium chloride, 10 mL, Intravenous, Q12H  tamsulosin, 0.4 mg, Oral, Daily       Pharmacy to Dose meropenem (MERREM),   sodium chloride, 75 mL/hr, Last Rate: 75 mL/hr (08/15/22 1244)         Active Hospital Problems:  Active Hospital Problems    Diagnosis    • **MAKI (acute kidney injury) (HCC)    • Atrial fibrillation (HCC)    • Type 2 diabetes mellitus with diabetic nephropathy (HCC)    • Cerebrovascular accident (HCC)      MAKI:  -s/p Motley cathter  -Nephrology consulted    Urinary retention due to BPH:  -Motley catheter placed  -Consulted urology    UTI:  -Continue meropenem  -History of ESBL Proteus UTI    History of CVA:  -Continue aspirin and statin    Atrial fibrillation:  -Continue Coreg and Eliquis    Type 2 diabetes mellitus with diabetic nephropathy:  -Continue Lantus and ISS  -Continue gabapentin    Depression/anxiety:  -Lexapro      DVT prophylaxis:  Medical DVT prophylaxis orders are present.    CODE STATUS:         Disposition:  I expect patient to be discharged defer    This patient has been examined wearing appropriate Personal Protective Equipment and discussed with nursing. 08/15/22      Electronically signed by Will Browne DO, 08/15/22, 14:19 EDT.  [facility] Hospitalist Team

## 2022-08-15 NOTE — CONSULTS
[facility] CONSULT NOTE    Referring Provider: Dr. Will Renee  Reason for Consultation: Acute kidney injury    Chief complaint .  Pain in penis    History of present illness:    Patient is 77-year-old male with a history of Morbid obesity, pulmonary hypertension, diabetes, CVA present to the hospital for pain at the tip of penis for 2 weeks which was getting worse.  Patient also had urinary hesitancy and frequency.  As per patient he underwent colonoscopy a few days ago and his symptoms worsened after that.  Patient denied any fever, dysuria, abdominal pain, nausea, vomiting, diarrhea.  Patient overall is not very good historian.  As per report Motley catheter were replaced which drained about 500 mL of urine.  His creatinine was 1.9 mg/DL which prompted renal consult.  Patient has previous history of acute kidney injury related to diuretics.    History  Past Medical History:   Diagnosis Date   • Acute kidney failure (HCC)    • Acute respiratory failure with hypoxia (HCC)    • Anemia    • Benign prostatic hyperplasia    • Bilateral primary osteoarthritis of knee    • Cardiomegaly    • Cardiomyopathy (HCC)    • Cellulitis and abscess of left leg    • Cerebral infarction (HCC)    • Cerebrovascular disease    • Chronic kidney disease    • Dementia (HCC)    • Diabetes mellitus (HCC)    • Essential hypertension    • GERD (gastroesophageal reflux disease)    • Gout    • Heart failure (HCC)    • Hyperlipidemia    • Morbid obesity (HCC)    • Myocardial infarction (HCC)    • Obstructive sleep apnea    • Obstructive uropathy    • Paroxysmal atrial fibrillation (HCC)    • Peptic ulcer    • Peripheral vascular disease (HCC)    • Pulmonary edema    • Venous insufficiency      History reviewed. No pertinent surgical history.  Social History     Tobacco Use   • Smoking status: Never Smoker   Vaping Use   • Vaping Use: Never used   Substance Use Topics   • Alcohol use: Not Currently   • Drug use: Never     Family History    Problem Relation Age of Onset   • Diabetes Father        Review of Systems  ROS  As per HPI  Objective     Vital Signs  Temp:  [97.8 °F (36.6 °C)-98.2 °F (36.8 °C)] 98 °F (36.7 °C)  Heart Rate:  [52-70] 63  Resp:  [9-24] 24  BP: (116-161)/(80-91) 130/82    No intake/output data recorded.  I/O last 3 completed shifts:  In: 1901.3 [P.O.:540; I.V.:261.3; IV Piggyback:1100]  Out: 1950 [Urine:1950]    Physical Exam:  Physical Exam    General Appearance: alert, oriented x 3,Morbidly obese   Skin: warm and dry  HEENT: oral mucosa normal, nonicteric sclera  Neck: supple, no JVD  Lungs: CTA  Heart: RRR, normal S1 and S2  Abdomen: soft, nontender, nondistended  : no palpable bladder  Extremities: Trace edema, no cyanosis or clubbing  Neuro: normal speech and mental status     Results Review:   I reviewed the patient's new clinical results.    Lab Results   Component Value Date    CALCIUM 8.6 08/14/2022    PHOS 3.0 04/06/2022     Results from last 7 days   Lab Units 08/14/22  0516 08/14/22  0102   MAGNESIUM mg/dL  --  2.1   SODIUM mmol/L 143 144   POTASSIUM mmol/L 4.1 4.6   CHLORIDE mmol/L 106 106   CO2 mmol/L 27.0 26.0   BUN mg/dL 61* 62*   CREATININE mg/dL 1.97* 1.93*   GLUCOSE mg/dL 100* 114*   CALCIUM mg/dL 8.6 9.1   WBC 10*3/mm3 6.80 7.30   HEMOGLOBIN g/dL 10.9* 11.2*   PLATELETS 10*3/mm3 228 246     Lab Results   Component Value Date    TROPONINT 0.025 04/27/2022     Estimated Creatinine Clearance: 44.9 mL/min (A) (by C-G formula based on SCr of 1.97 mg/dL (H)).No results found for: URICACID    Brief Urine Lab Results  (Last result in the past 365 days)      Color   Clarity   Blood   Leuk Est   Nitrite   Protein   CREAT   Urine HCG        08/14/22 0148 Yellow   Clear   Large (3+)   Moderate (2+)   Positive   Negative                 Prior to Admission medications    Medication Sig Start Date End Date Taking? Authorizing Provider   apixaban (ELIQUIS) 5 MG tablet tablet Take 1 tablet by mouth Every 12 (Twelve) Hours.  Indications: Atrial Fibrillation 4/6/22  Yes Ba Banuelos MD   bumetanide (BUMEX) 2 MG tablet Take 1 tablet by mouth 2 (Two) Times a Day. 4/6/22  Yes Ba Banuelos MD   carvedilol (COREG) 12.5 MG tablet Take 12.5 mg by mouth 2 (Two) Times a Day.   Yes Michelle Esquivel MD   cephalexin (KEFLEX) 250 MG capsule Take 250 mg by mouth Every Morning. 6/6/22 9/8/22 Yes Michelle Esquivel MD   escitalopram (LEXAPRO) 10 MG tablet Take 10 mg by mouth Daily.   Yes Michelle Esquivel MD   finasteride (PROSCAR) 5 MG tablet Take 5 mg by mouth Daily.   Yes Michelle Esquivel MD   gabapentin (NEURONTIN) 300 MG capsule Take 300 mg by mouth 3 (Three) Times a Day.   Yes Michelle Esquivel MD   hydrALAZINE (APRESOLINE) 25 MG tablet Take 1 tablet by mouth 3 (Three) Times a Day. 4/6/22  Yes Ba Banuelos MD   insulin glargine (LANTUS, SEMGLEE) 100 UNIT/ML injection Inject 10 Units under the skin into the appropriate area as directed Every Morning. Hold for glucose less than 110   Yes Michelle Esquivel MD   isosorbide mononitrate (IMDUR) 30 MG 24 hr tablet Take 30 mg by mouth 2 (Two) Times a Day.   Yes Michelle Esquivel MD   Polyethylene Glycol 3350 powder Take 17 g by mouth Every Night.   Yes Michelle Esquivel MD   saccharomyces boulardii (FLORASTOR) 250 MG capsule Take 250 mg by mouth 2 (Two) Times a Day.   Yes Michelle Esquivel MD   simvastatin (ZOCOR) 40 MG tablet Take 40 mg by mouth every night at bedtime.   Yes Michelle Esquivel MD   spironolactone (ALDACTONE) 50 MG tablet Take 1 tablet by mouth Daily. 4/6/22  Yes Ba Banuelos MD   tamsulosin (FLOMAX) 0.4 MG capsule 24 hr capsule Take 1 capsule by mouth Daily.   Yes Michelle Esquivel MD   acetaminophen (TYLENOL) 325 MG tablet Take 650 mg by mouth Every 4 (Four) Hours As Needed for Mild Pain .    Michelle Esquivel MD   acetaminophen (TYLENOL) 325 MG tablet Take 650 mg by mouth Every 6 (Six) Hours As Needed for Mild Pain .    Provider,  MD Michelle   albuterol sulfate  (90 Base) MCG/ACT inhaler Inhale 2 puffs Every 4 (Four) Hours As Needed (Cough).    Provider, MD Michelle       apixaban, 5 mg, Oral, Q12H  atorvastatin, 20 mg, Oral, Daily  carvedilol, 12.5 mg, Oral, BID  escitalopram, 10 mg, Oral, Daily  finasteride, 5 mg, Oral, Daily  gabapentin, 300 mg, Oral, TID  hydrALAZINE, 25 mg, Oral, TID  insulin glargine, 10 Units, Subcutaneous, QAM  insulin lispro, 0-9 Units, Subcutaneous, TID AC  isosorbide mononitrate, 30 mg, Oral, Q24H  meropenem, 1 g, Intravenous, Q12H  saccharomyces boulardii, 250 mg, Oral, BID  sodium chloride, 10 mL, Intravenous, Q12H  tamsulosin, 0.4 mg, Oral, Daily      Pharmacy to Dose meropenem (MERREM),         Assessment & Plan     1.Acute kidney injury  Secondary to obstruction or diuretics.  Patient's baseline creatinine is around 1.0-1.1 mg/DL but increased to 1.9 mg/DL but stable since yesterday.  Patient has history of acute kidney injury in the past related to diuretics  Electrolytes, volume status okay.  Motley catheter in place  2.  Urinary retention.  Motley catheter replaced  3.  UTI.  Patient on IV meropenem  4.  Hypertension.Blood pressure well controlled    Recs:  -Keep off diuretics  -Start gentle IV hydration  -Continue current and hypertensives  -Surveillance labs  -Follow urine culture    I discussed the patients findings and my recommendations with patient and nursing staff    Max Stein MD  08/15/22  12:18 EDT

## 2022-08-15 NOTE — PLAN OF CARE
Goal Outcome Evaluation:  Outcome Evaluation: Pt is a 78 y/o M admitted for MAKI, acute UTI, and in A-fib. PMHx: DM II, A-fib, CVA, dementia.  Pt has been a resident at [facility] for ~6-7 months.  Pt reports he uses RW and wheelchair and completes ADL routines and transfers with little assistance. Pt t/f from supine to sitting on EOB with MOD A.  Pt required dep to don socks.  Pt t/f from sitting to standing with MIN A using RW.  Pt required DEP for perineal hygiene.  Pt t/f from bed <> chair using RW with MIN A-CGA.  Pt presents with deficits in self care, transfers, balance, increased falls risk, strength, and activity tolerance indicating need for skilled OT services.  OT recommending return to SNF with skilled therapy services.

## 2022-08-15 NOTE — PLAN OF CARE
Problem: Fall Injury Risk  Goal: Absence of Fall and Fall-Related Injury  Intervention: Promote Injury-Free Environment  Recent Flowsheet Documentation  Taken 8/15/2022 1400 by Jenelle Miller RN  Safety Promotion/Fall Prevention: safety round/check completed  Taken 8/15/2022 1200 by Jenelle Miller, RN  Safety Promotion/Fall Prevention:   safety round/check completed   assistive device/personal items within reach   clutter free environment maintained   fall prevention program maintained  Taken 8/15/2022 1000 by Jenelle Miller, RN  Safety Promotion/Fall Prevention: safety round/check completed  Taken 8/15/2022 0835 by Jenelle Miller, JERICA  Safety Promotion/Fall Prevention:   safety round/check completed   fall prevention program maintained   Goal Outcome Evaluation:

## 2022-08-15 NOTE — THERAPY EVALUATION
Patient Name: Shaji Junior  : 1945    MRN: [id number]                              Today's Date: 8/15/2022       Admit Date: 2022    Visit Dx:     ICD-10-CM ICD-9-CM   1. MAKI (acute kidney injury) ([facility])  N17.9 584.9   2. Acute UTI  N39.0 599.0   3. Malfunction of Motley catheter, initial encounter ([facility])  T83.011A 996.31     Patient Active Problem List   Diagnosis   • CHF exacerbation (HCC)   • Cerebrovascular accident (HCC)   • Type 2 diabetes mellitus with diabetic nephropathy (HCC)   • Acute kidney failure (HCC)   • Congestive heart failure (HCC)   • Acute on chronic congestive heart failure, unspecified heart failure type (HCC)   • MAKI (acute kidney injury) ([facility])   • Atrial fibrillation ([facility])     Past Medical History:   Diagnosis Date   • Acute kidney failure (HCC)    • Acute respiratory failure with hypoxia (HCC)    • Anemia    • Benign prostatic hyperplasia    • Bilateral primary osteoarthritis of knee    • Cardiomegaly    • Cardiomyopathy (HCC)    • Cellulitis and abscess of left leg    • Cerebral infarction (HCC)    • Cerebrovascular disease    • Chronic kidney disease    • Dementia (HCC)    • Diabetes mellitus (HCC)    • Essential hypertension    • GERD (gastroesophageal reflux disease)    • Gout    • Heart failure (HCC)    • Hyperlipidemia    • Morbid obesity (HCC)    • Myocardial infarction (HCC)    • Obstructive sleep apnea    • Obstructive uropathy    • Paroxysmal atrial fibrillation (HCC)    • Peptic ulcer    • Peripheral vascular disease (HCC)    • Pulmonary edema    • Venous insufficiency      History reviewed. No pertinent surgical history.   General Information     Row Name 08/15/22 1331          Physical Therapy Time and Intention    Document Type evaluation  -BR     Mode of Treatment co-treatment;physical therapy;occupational therapy  -BR     Row Name 08/15/22 1337          General Information    Prior Level of Function min assist:;transfer;gait  -BR     Existing  Precautions/Restrictions fall  -BR     Row Name 08/15/22 1331          Living Environment    People in Home facility resident  -BR     Row Name 08/15/22 1331          Cognition    Orientation Status (Cognition) disoriented to;situation  -BR     Row Name 08/15/22 1331          Safety Issues, Functional Mobility    Impairments Affecting Function (Mobility) balance;coordination;endurance/activity tolerance;cognition;strength  -BR           User Key  (r) = Recorded By, (t) = Taken By, (c) = Cosigned By    Initials Name Provider Type    Amber Spivey PT Physical Therapist               Mobility     Row Name 08/15/22 1333          Bed Mobility    Bed Mobility bed mobility (all) activities  -BR     All Activities, [place] (Bed Mobility) moderate assist (50% patient effort)  -BR     Row Name 08/15/22 1333          Sit-Stand Transfer    Sit-Stand [place] (Transfers) minimum assist (75% patient effort)  -BR     Assistive Device (Sit-Stand Transfers) walker, front-wheeled  -BR     Row Name 08/15/22 1333          Gait/Stairs (Locomotion)    [place] Level (Gait) moderate assist (50% patient effort)  -BR     Assistive Device (Gait) walker, front-wheeled  -BR     Distance in Feet (Gait) 6  -BR     Deviations/Abnormal Patterns (Gait) weight shifting decreased;troy decreased;base of support, wide  -BR     Bilateral Gait Deviations forward flexed posture;heel strike decreased  -BR           User Key  (r) = Recorded By, (t) = Taken By, (c) = Cosigned By    Initials Name Provider Type    Amber Spivey PT Physical Therapist               Obj/Interventions     Row Name 08/15/22 1334          Range of Motion Comprehensive    General Range of Motion bilateral lower extremity ROM WFL  -BR     Row Name 08/15/22 1334          Strength Comprehensive (MMT)    Comment, General Manual Muscle Testing (MMT) Assessment grossly 3/5 BLE  -BR     Row Name 08/15/22 1334          Balance    Balance Assessment sitting  static balance;standing static balance  -BR     Static Sitting Balance supervision  -BR     Position, Sitting Balance sitting edge of bed  -BR     Static Standing Balance minimal assist  -BR     Position/Device Used, Standing Balance walker, rolling  -BR           User Key  (r) = Recorded By, (t) = Taken By, (c) = Cosigned By    Initials Name Provider Type    Amber Spivey, PT Physical Therapist               Goals/Plan     Row Name 08/15/22 1342          Bed Mobility Goal 1 (PT)    Activity/Assistive Device (Bed Mobility Goal 1, PT) bed mobility activities, all  -BR     [place] Level/Cues Needed (Bed Mobility Goal 1, PT) contact guard required  -BR     Time Frame (Bed Mobility Goal 1, PT) long term goal (LTG);2 weeks  -BR     Row Name 08/15/22 1342          Transfer Goal 1 (PT)    Activity/Assistive Device (Transfer Goal 1, PT) transfers, all  -BR     [place] Level/Cues Needed (Transfer Goal 1, PT) contact guard required  -BR     Time Frame (Transfer Goal 1, PT) long term goal (LTG);2 weeks  -BR     Row Name 08/15/22 1342          Gait Training Goal 1 (PT)    Activity/Assistive Device (Gait Training Goal 1, PT) gait (walking locomotion);walker, rolling  -BR     [place] Level (Gait Training Goal 1, PT) contact guard required  -BR     Distance (Gait Training Goal 1, PT) 25  -BR     Time Frame (Gait Training Goal 1, PT) long term goal (LTG);2 weeks  -BR     Row Name 08/15/22 1342          Therapy Assessment/Plan (PT)    Planned Therapy Interventions (PT) balance training;bed mobility training;neuromuscular re-education;transfer training;strengthening;gait training  -BR           User Key  (r) = Recorded By, (t) = Taken By, (c) = Cosigned By    Initials Name Provider Type    Amber Spivey, PT Physical Therapist               Clinical Impression     Row Name 08/15/22 1337          Pain    Pretreatment Pain Rating 5/10  -BR     Posttreatment Pain Rating 5/10  -BR     Pain Location -  Side/Orientation Bilateral  -BR     Pain Location - foot  -BR     Row Name 08/15/22 1341 08/15/22 1335       Plan of Care Review    Plan of Care Reviewed With -- patient  -BR    Outcome Evaluation Pt presents as a 78 y/o M admitted to [facility] with penis pain and diagnosed with a UTI. Pt has been living at [facility]  for ~ 6 months. PMHx: DM with neuropathy, hx MI, CKD,a-fib, CVA, CKD. At baseline, pt reports he ambulates with a rolling walker.  PER PT Eval, pt requires mod assist for bed mobility and min assist for transfers with ambulation of 8 feet with r. Wx with mod assist.  PT recommendation is return to Skilled Nursing Facility.  PT will follow.  -BR --    Row Name 08/15/22 9989          Therapy Assessment/Plan (PT)    Rehab Potential (PT) good, to achieve stated therapy goals  -BR     Criteria for Skilled Interventions Met (PT) yes  -BR     Therapy Frequency (PT) 3 times/wk  -BR     Predicted Duration of Therapy Intervention (PT) until D/C  -BR     Row Name 08/15/22 1335          Vital Signs    O2 Delivery Pre Treatment room air  -BR     Row Name 08/15/22 1335          Positioning and Restraints    Pre-Treatment Position in bed  -BR     Post Treatment Position chair  -BR     In Chair notified nsg;call light within reach;encouraged to call for assist;exit alarm on  -BR           User Key  (r) = Recorded By, (t) = Taken By, (c) = Cosigned By    Initials Name Provider Type    BR Amber Gandhi, PT Physical Therapist               Outcome Measures     Row Name 08/15/22 1342 08/15/22 0874       How much help from another person do you currently need...    Turning from your back to your side while in flat bed without using bedrails? 3  -BR 3  -EH    Moving from lying on back to sitting on the side of a flat bed without bedrails? 3  -BR 2  -EH    Moving to and from a bed to a chair (including a wheelchair)? 3  -BR 2  -EH    Standing up from a chair using your arms (e.g., wheelchair, bedside chair)? 3  -BR 2  -EH     Climbing 3-5 steps with a railing? 1  -BR 2  -EH    To walk in hospital room? 2  -BR 2  -EH    AM-PAC 6 Clicks Score (PT) 15  -BR 13  -EH    Highest level of mobility 4 --> Transferred to chair/commode  -BR 4 --> Transferred to chair/commode  -EH          User Key  (r) = Recorded By, (t) = Taken By, (c) = Cosigned By    Initials Name Provider Type     Jenelle Miller, RN Registered Nurse    Amber Spivey, LINDA Physical Therapist                             Physical Therapy Education                 Title: PT OT SLP Therapies (In Progress)     Topic: Physical Therapy (In Progress)     Point: Mobility training (Done)     Learning Progress Summary           Patient Acceptance, E, VU,DU,NR by BR at 8/15/2022 1343                   Point: Home exercise program (Not Started)     Learner Progress:  Not documented in this visit.          Point: Body mechanics (Done)     Learning Progress Summary           Patient Acceptance, E, VU,DU,NR by BR at 8/15/2022 1343                   Point: Precautions (Done)     Learning Progress Summary           Patient Acceptance, E, VU,DU,NR by BR at 8/15/2022 1343                               User Key     Initials Effective Dates Name Provider Type Discipline    MAIN 02/01/22 -  Amber Gandhi, LINDA Physical Therapist PT              PT Recommendation and Plan  Planned Therapy Interventions (PT): balance training, bed mobility training, neuromuscular re-education, transfer training, strengthening, gait training  Plan of Care Reviewed With: patient  Outcome Evaluation: Pt presents as a 76 y/o M admitted to [facility] with penis pain and diagnosed with a UTI. Pt has been living at [facility]  for ~ 6 months. PMHx: DM with neuropathy, hx MI, CKD,a-fib, CVA, CKD. At baseline, pt reports he ambulates with a rolling walker.  PER PT Eval, pt requires mod assist for bed mobility and min assist for transfers with ambulation of 8 feet with r. Wx with mod assist.  PT recommendation is return to Skilled  Nursing Facility.  PT will follow.     Time Calculation:    PT Charges     Row Name 08/15/22 1343             Time Calculation    Start Time 1005  -BR      Stop Time 1035  -BR      Time Calculation (min) 30 min  -BR      PT Received On 08/15/22  -BR      PT - Next Appointment 08/17/22  -BR      PT Goal Re-Cert Due Date 08/29/22  -BR              Time Calculation- PT    Total Timed Code Minutes- PT 30 minute(s)  -BR            User Key  (r) = Recorded By, (t) = Taken By, (c) = Cosigned By    Initials Name Provider Type    BR Amber Gandhi, PT Physical Therapist              Therapy Charges for Today     Code Description Service Date Service Provider Modifiers Qty    27696525922 HC PT EVAL MOD COMPLEXITY 4 8/15/2022 Amber Gandhi, PT GP 1          PT G-Codes  AM-PAC 6 Clicks Score (PT): 15    Amber Gandhi, PT  8/15/2022

## 2022-08-15 NOTE — THERAPY EVALUATION
Patient Name: Shaji Junior  : 1945    MRN: [id number]                              Today's Date: 8/15/2022       Admit Date: 2022    Visit Dx:     ICD-10-CM ICD-9-CM   1. MAKI (acute kidney injury) ([facility])  N17.9 584.9   2. Acute UTI  N39.0 599.0   3. Malfunction of Motley catheter, initial encounter ([facility])  T83.011A 996.31     Patient Active Problem List   Diagnosis   • CHF exacerbation ([facility])   • Cerebrovascular accident (HCC)   • Type 2 diabetes mellitus with diabetic nephropathy (HCC)   • Acute kidney failure (HCC)   • Congestive heart failure (HCC)   • Acute on chronic congestive heart failure, unspecified heart failure type (HCC)   • MAKI (acute kidney injury) ([facility])   • Atrial fibrillation ([facility])     Past Medical History:   Diagnosis Date   • Acute kidney failure (HCC)    • Acute respiratory failure with hypoxia (HCC)    • Anemia    • Benign prostatic hyperplasia    • Bilateral primary osteoarthritis of knee    • Cardiomegaly    • Cardiomyopathy (HCC)    • Cellulitis and abscess of left leg    • Cerebral infarction (HCC)    • Cerebrovascular disease    • Chronic kidney disease    • Dementia (HCC)    • Diabetes mellitus (HCC)    • Essential hypertension    • GERD (gastroesophageal reflux disease)    • Gout    • Heart failure (HCC)    • Hyperlipidemia    • Morbid obesity (HCC)    • Myocardial infarction (HCC)    • Obstructive sleep apnea    • Obstructive uropathy    • Paroxysmal atrial fibrillation (HCC)    • Peptic ulcer    • Peripheral vascular disease (HCC)    • Pulmonary edema    • Venous insufficiency      History reviewed. No pertinent surgical history.   General Information     Row Name 08/15/22 5291          General Information    Prior Level of Function min assist:;ADL's;transfer;gait  -MK     Existing Precautions/Restrictions fall  -MK     Barriers to Rehab medically complex;previous functional deficit  -MK     Row Name 08/15/22 7011          Living Environment    People in Home facility resident   -     Row Name 08/15/22 1355          Cognition    Orientation Status (Cognition) oriented x 3  -     Row Name 08/15/22 1355          Safety Issues, Functional Mobility    Impairments Affecting Function (Mobility) balance;coordination;endurance/activity tolerance;cognition;strength  -           User Key  (r) = Recorded By, (t) = Taken By, (c) = Cosigned By    Initials Name Provider Type     Ben Parikh, ZBIGNIEW Occupational Therapist                 Mobility/ADL's     Row Name 08/15/22 1404          Bed Mobility    Bed Mobility bed mobility (all) activities  -     All Activities, [place] (Bed Mobility) moderate assist (50% patient effort)  -     Row Name 08/15/22 1404          Transfers    Transfers sit-stand transfer;bed-chair transfer  -     Bed-Chair [place] (Transfers) minimum assist (75% patient effort)  -     Assistive Device (Bed-Chair Transfers) walker, front-wheeled  -     Sit-Stand [place] (Transfers) minimum assist (75% patient effort)  -[facility] Name 08/15/22 1404          Sit-Stand Transfer    Assistive Device (Sit-Stand Transfers) walker, front-wheeled  -[facility] Name 08/15/22 1404          Activities of Daily Living    BADL Assessment/Intervention lower body dressing  -     Row Name 08/15/22 1404          Lower Body Dressing Assessment/Training    [place] Level (Lower Body Dressing) socks;dependent (less than 25% patient effort)  -           User Key  (r) = Recorded By, (t) = Taken By, (c) = Cosigned By    Initials Name Provider Type     Ben Parikh, ZBIGNIEW Occupational Therapist               Obj/Interventions     Row Name 08/15/22 1411          Range of Motion Comprehensive    General Range of Motion bilateral upper extremity ROM WNL  -     Row Name 08/15/22 1411          Strength Comprehensive (MMT)    General Manual Muscle Testing (MMT) Assessment upper extremity strength deficits identified  -     Comment, General Manual Muscle Testing (MMT)  Assessment BUE strength grossly 4/5  -           User Key  (r) = Recorded By, (t) = Taken By, (c) = Cosigned By    Initials Name Provider Type    Ben Stringer OT Occupational Therapist               Goals/Plan     Row Name 08/15/22 1434          Bathing Goal 1 (OT)    Activity/Device (Bathing Goal 1, OT) bathing skills, all  -MK     [place] Level/Cues Needed (Bathing Goal 1, OT) minimum assist (75% or more patient effort)  -     Time Frame (Bathing Goal 1, OT) 2 weeks  -MK     Row Name 08/15/22 1434          Dressing Goal 1 (OT)    Activity/Device (Dressing Goal 1, OT) dressing skills, all  -     [place]/Cues Needed (Dressing Goal 1, OT) minimum assist (75% or more patient effort)  -     Time Frame (Dressing Goal 1, OT) 2 weeks  -MK     Row Name 08/15/22 1434          Toileting Goal 1 (OT)    Activity/Device (Toileting Goal 1, OT) toileting skills, all  -     [place] Level/Cues Needed (Toileting Goal 1, OT) minimum assist (75% or more patient effort)  -     Time Frame (Toileting Goal 1, OT) 2 weeks  -MK     Row Name 08/15/22 1434          Therapy Assessment/Plan (OT)    Planned Therapy Interventions (OT) activity tolerance training;functional balance retraining;BADL retraining;neuromuscular control/coordination retraining;patient/caregiver education/training;transfer/mobility retraining;strengthening exercise  -           User Key  (r) = Recorded By, (t) = Taken By, (c) = Cosigned By    Initials Name Provider Type    Ben Stringer OT Occupational Therapist               Clinical Impression     Row Name 08/15/22 1412          Pain Assessment    Pretreatment Pain Rating 5/10  -     Posttreatment Pain Rating 5/10  -     Pain Location - Side/Orientation Bilateral  -     Pain Location - foot  -     Pain Intervention(s) Repositioned;Therapeutic presence  -MK     Row Name 08/15/22 1412          Plan of Care Review    Plan of Care Reviewed With patient  -     Outcome  Evaluation Pt is a 76 y/o M admitted for MAKI, acute UTI, and in A-fib. PMHx: DM II, A-fib, CVA, dementia.  Pt has been a resident at [facility] for ~6-7 months.  Pt reports he uses RW and wheelchair and completes ADL routines and transfers with little assistance. Pt t/f from supine to sitting on EOB with MOD A.  Pt required dep to don socks.  Pt t/f from sitting to standing with MIN A using RW.  Pt required DEP for perineal hygiene.  Pt t/f from bed <> chair using RW with MIN A-CGA.  Pt presents with deficits in self care, transfers, balance, increased falls risk, strength, and activity tolerance indicating need for skilled OT services.  OT recommending return to SNF with skilled therapy services.  -     Row Name 08/15/22 1412          Therapy Assessment/Plan (OT)    Therapy Frequency (OT) 3 times/wk  -     Predicted Duration of Therapy Intervention (OT) until D/C  -     Row Name 08/15/22 1412          Therapy Plan Review/Discharge Plan (OT)    Anticipated Discharge Disposition (OT) skilled nursing facility  -     Row Name 08/15/22 1412          Positioning and Restraints    Pre-Treatment Position in bed  -     Post Treatment Position chair  -MK     In Chair notified nsg;sitting;call light within reach;encouraged to call for assist;exit alarm on  -           User Key  (r) = Recorded By, (t) = Taken By, (c) = Cosigned By    Initials Name Provider Type     Ben Parikh, OT Occupational Therapist               Outcome Measures     Row Name 08/15/22 8044          How much help from another is currently needed...    Putting on and taking off regular lower body clothing? 1  -MK     Bathing (including washing, rinsing, and drying) 2  -MK     Toileting (which includes using toilet bed pan or urinal) 2  -MK     Putting on and taking off regular upper body clothing 3  -MK     Taking care of personal grooming (such as brushing teeth) 3  -MK     Eating meals 4  -MK     AM-PAC 6 Clicks Score (OT) 15  -     Row  Name 08/15/22 1342 08/15/22 0835       How much help from another person do you currently need...    Turning from your back to your side while in flat bed without using bedrails? 3  -BR 3  -EH    Moving from lying on back to sitting on the side of a flat bed without bedrails? 3  -BR 2  -EH    Moving to and from a bed to a chair (including a wheelchair)? 3  -BR 2  -EH    Standing up from a chair using your arms (e.g., wheelchair, bedside chair)? 3  -BR 2  -EH    Climbing 3-5 steps with a railing? 1  -BR 2  -EH    To walk in hospital room? 2  -BR 2  -EH    AM-PAC 6 Clicks Score (PT) 15  -BR 13  -EH    Highest level of mobility 4 --> Transferred to chair/commode  -BR 4 --> Transferred to chair/commode  -EH    Row Name 08/15/22 1434          Functional Assessment    Outcome Measure Options AM-PAC 6 Clicks Daily Activity (OT)  -           User Key  (r) = Recorded By, (t) = Taken By, (c) = Cosigned By    Initials Name Provider Type     Jenelle Miller, RN Registered Nurse    Amber Spivey, PT Physical Therapist    Ben Stringer, OT Occupational Therapist                Occupational Therapy Education                 Title: PT OT SLP Therapies (In Progress)     Topic: Occupational Therapy (In Progress)     Point: ADL training (Done)     Description:   Instruct learner(s) on proper safety adaptation and remediation techniques during self care or transfers.   Instruct in proper use of assistive devices.              Learning Progress Summary           Patient Acceptance, E, VU by OFELIA at 8/15/2022 1435                   Point: Home exercise program (Not Started)     Description:   Instruct learner(s) on appropriate technique for monitoring, assisting and/or progressing therapeutic exercises/activities.              Learner Progress:  Not documented in this visit.          Point: Precautions (Not Started)     Description:   Instruct learner(s) on prescribed precautions during self-care and functional transfers.               Learner Progress:  Not documented in this visit.          Point: Body mechanics (Not Started)     Description:   Instruct learner(s) on proper positioning and spine alignment during self-care, functional mobility activities and/or exercises.              Learner Progress:  Not documented in this visit.                      User Key     Initials Effective Dates Name Provider Type Discipline     02/17/22 -  Ben Parikh OT Occupational Therapist OT              OT Recommendation and Plan  Planned Therapy Interventions (OT): activity tolerance training, functional balance retraining, BADL retraining, neuromuscular control/coordination retraining, patient/caregiver education/training, transfer/mobility retraining, strengthening exercise  Therapy Frequency (OT): 3 times/wk  Plan of Care Review  Plan of Care Reviewed With: patient  Outcome Evaluation: Pt is a 78 y/o M admitted for MAKI, acute UTI, and in A-fib. PMHx: DM II, A-fib, CVA, dementia.  Pt has been a resident at [facility] for ~6-7 months.  Pt reports he uses RW and wheelchair and completes ADL routines and transfers with little assistance. Pt t/f from supine to sitting on EOB with MOD A.  Pt required dep to don socks.  Pt t/f from sitting to standing with MIN A using RW.  Pt required DEP for perineal hygiene.  Pt t/f from bed <> chair using RW with MIN A-CGA.  Pt presents with deficits in self care, transfers, balance, increased falls risk, strength, and activity tolerance indicating need for skilled OT services.  OT recommending return to SNF with skilled therapy services.     Time Calculation:    Time Calculation- OT     Row Name 08/15/22 1435             Time Calculation- OT    OT Start Time 1005  -      OT Stop Time 1028  -      OT Time Calculation (min) 23 min  -      Total Timed Code Minutes- OT 0 minute(s)  -      OT Received On 08/15/22  -      OT - Next Appointment 08/17/22  -      OT Goal Re-Cert Due Date 08/29/22  -             User Key  (r) = Recorded By, (t) = Taken By, (c) = Cosigned By    Initials Name Provider Type     Ben Parikh OT Occupational Therapist              Therapy Charges for Today     Code Description Service Date Service Provider Modifiers Qty    [id number]  OT EVAL MOD COMPLEXITY 4 8/15/2022 Ben Parikh OT GO 1               Ben Parikh OT  8/15/2022

## 2022-08-16 ENCOUNTER — READMISSION MANAGEMENT (OUTPATIENT)
Dept: CALL CENTER | Facility: HOSPITAL | Age: 77
End: 2022-08-16

## 2022-08-16 VITALS
SYSTOLIC BLOOD PRESSURE: 152 MMHG | HEIGHT: 72 IN | RESPIRATION RATE: 18 BRPM | WEIGHT: 315 LBS | BODY MASS INDEX: 42.66 KG/M2 | HEART RATE: 67 BPM | DIASTOLIC BLOOD PRESSURE: 85 MMHG | OXYGEN SATURATION: 96 % | TEMPERATURE: 98.2 F

## 2022-08-16 LAB
ALBUMIN SERPL-MCNC: 2.5 G/DL (ref 3.5–5.2)
ANION GAP SERPL CALCULATED.3IONS-SCNC: 9 MMOL/L (ref 5–15)
BACTERIA SPEC AEROBE CULT: ABNORMAL
BASOPHILS # BLD AUTO: 0 10*3/MM3 (ref 0–0.2)
BASOPHILS NFR BLD AUTO: 0.4 % (ref 0–1.5)
BUN SERPL-MCNC: 40 MG/DL (ref 8–23)
BUN/CREAT SERPL: 27.8 (ref 7–25)
CALCIUM SPEC-SCNC: 8.4 MG/DL (ref 8.6–10.5)
CHLORIDE SERPL-SCNC: 109 MMOL/L (ref 98–107)
CO2 SERPL-SCNC: 23 MMOL/L (ref 22–29)
CREAT SERPL-MCNC: 1.44 MG/DL (ref 0.76–1.27)
DEPRECATED RDW RBC AUTO: 56 FL (ref 37–54)
EGFRCR SERPLBLD CKD-EPI 2021: 50 ML/MIN/1.73
EOSINOPHIL # BLD AUTO: 0.7 10*3/MM3 (ref 0–0.4)
EOSINOPHIL NFR BLD AUTO: 12.7 % (ref 0.3–6.2)
ERYTHROCYTE [DISTWIDTH] IN BLOOD BY AUTOMATED COUNT: 19.3 % (ref 12.3–15.4)
GLUCOSE BLDC GLUCOMTR-MCNC: 118 MG/DL (ref 70–105)
GLUCOSE BLDC GLUCOMTR-MCNC: 96 MG/DL (ref 70–105)
GLUCOSE SERPL-MCNC: 91 MG/DL (ref 65–99)
HCT VFR BLD AUTO: 31.6 % (ref 37.5–51)
HGB BLD-MCNC: 10 G/DL (ref 13–17.7)
LYMPHOCYTES # BLD AUTO: 1.9 10*3/MM3 (ref 0.7–3.1)
LYMPHOCYTES NFR BLD AUTO: 37.9 % (ref 19.6–45.3)
MAGNESIUM SERPL-MCNC: 2.1 MG/DL (ref 1.6–2.4)
MCH RBC QN AUTO: 26.2 PG (ref 26.6–33)
MCHC RBC AUTO-ENTMCNC: 31.6 G/DL (ref 31.5–35.7)
MCV RBC AUTO: 83.1 FL (ref 79–97)
MONOCYTES # BLD AUTO: 0.7 10*3/MM3 (ref 0.1–0.9)
MONOCYTES NFR BLD AUTO: 12.8 % (ref 5–12)
NEUTROPHILS NFR BLD AUTO: 1.9 10*3/MM3 (ref 1.7–7)
NEUTROPHILS NFR BLD AUTO: 36.2 % (ref 42.7–76)
NRBC BLD AUTO-RTO: 0.2 /100 WBC (ref 0–0.2)
PHOSPHATE SERPL-MCNC: 3.6 MG/DL (ref 2.5–4.5)
PLATELET # BLD AUTO: 226 10*3/MM3 (ref 140–450)
PMV BLD AUTO: 7.9 FL (ref 6–12)
POTASSIUM SERPL-SCNC: 4.3 MMOL/L (ref 3.5–5.2)
RBC # BLD AUTO: 3.81 10*6/MM3 (ref 4.14–5.8)
SODIUM SERPL-SCNC: 141 MMOL/L (ref 136–145)
WBC NRBC COR # BLD: 5.1 10*3/MM3 (ref 3.4–10.8)

## 2022-08-16 PROCEDURE — 25010000002 MEROPENEM PER 100 MG: Performed by: NURSE PRACTITIONER

## 2022-08-16 PROCEDURE — 85025 COMPLETE CBC W/AUTO DIFF WBC: CPT | Performed by: NURSE PRACTITIONER

## 2022-08-16 PROCEDURE — 63710000001 INSULIN GLARGINE PER 5 UNITS: Performed by: INTERNAL MEDICINE

## 2022-08-16 PROCEDURE — G0378 HOSPITAL OBSERVATION PER HR: HCPCS

## 2022-08-16 PROCEDURE — 80069 RENAL FUNCTION PANEL: CPT | Performed by: INTERNAL MEDICINE

## 2022-08-16 PROCEDURE — 99217 PR OBSERVATION CARE DISCHARGE MANAGEMENT: CPT | Performed by: HOSPITALIST

## 2022-08-16 PROCEDURE — 96361 HYDRATE IV INFUSION ADD-ON: CPT

## 2022-08-16 PROCEDURE — 36415 COLL VENOUS BLD VENIPUNCTURE: CPT | Performed by: NURSE PRACTITIONER

## 2022-08-16 PROCEDURE — 83735 ASSAY OF MAGNESIUM: CPT | Performed by: NURSE PRACTITIONER

## 2022-08-16 PROCEDURE — 82962 GLUCOSE BLOOD TEST: CPT

## 2022-08-16 PROCEDURE — 96366 THER/PROPH/DIAG IV INF ADDON: CPT

## 2022-08-16 RX ORDER — CEFDINIR 300 MG/1
300 CAPSULE ORAL 2 TIMES DAILY
Qty: 10 CAPSULE | Refills: 0
Start: 2022-08-16 | End: 2022-08-21

## 2022-08-16 RX ADMIN — MEROPENEM 1 G: 1 INJECTION, POWDER, FOR SOLUTION INTRAVENOUS at 08:02

## 2022-08-16 RX ADMIN — GABAPENTIN 300 MG: 300 CAPSULE ORAL at 07:58

## 2022-08-16 RX ADMIN — ISOSORBIDE MONONITRATE 30 MG: 30 TABLET, EXTENDED RELEASE ORAL at 07:58

## 2022-08-16 RX ADMIN — APIXABAN 5 MG: 5 TABLET, FILM COATED ORAL at 07:57

## 2022-08-16 RX ADMIN — ATORVASTATIN CALCIUM 20 MG: 20 TABLET, FILM COATED ORAL at 07:57

## 2022-08-16 RX ADMIN — Medication 10 ML: at 08:03

## 2022-08-16 RX ADMIN — CARVEDILOL 12.5 MG: 6.25 TABLET, FILM COATED ORAL at 07:59

## 2022-08-16 RX ADMIN — HYDRALAZINE HYDROCHLORIDE 25 MG: 25 TABLET, FILM COATED ORAL at 16:24

## 2022-08-16 RX ADMIN — FINASTERIDE 5 MG: 5 TABLET, FILM COATED ORAL at 07:56

## 2022-08-16 RX ADMIN — TAMSULOSIN HYDROCHLORIDE 0.4 MG: 0.4 CAPSULE ORAL at 07:57

## 2022-08-16 RX ADMIN — GABAPENTIN 300 MG: 300 CAPSULE ORAL at 16:24

## 2022-08-16 RX ADMIN — MEROPENEM 1 G: 1 INJECTION, POWDER, FOR SOLUTION INTRAVENOUS at 14:43

## 2022-08-16 RX ADMIN — INSULIN GLARGINE 10 UNITS: 100 INJECTION, SOLUTION SUBCUTANEOUS at 07:54

## 2022-08-16 RX ADMIN — Medication 250 MG: at 07:56

## 2022-08-16 RX ADMIN — SODIUM CHLORIDE 75 ML/HR: 9 INJECTION, SOLUTION INTRAVENOUS at 04:34

## 2022-08-16 RX ADMIN — HYDRALAZINE HYDROCHLORIDE 25 MG: 25 TABLET, FILM COATED ORAL at 07:58

## 2022-08-16 RX ADMIN — ESCITALOPRAM OXALATE 10 MG: 10 TABLET ORAL at 07:59

## 2022-08-16 NOTE — PLAN OF CARE
Goal Outcome Evaluation:  Plan of Care Reviewed With: patient        Progress: no change  Outcome Evaluation: Pt up in chair for most of evening. A&O. Sleeping abed at present. No c/o discomfort. F/C in place. Urology to see today. Possible bedside cysto. Will continue to monitor.

## 2022-08-16 NOTE — DISCHARGE PLACEMENT REQUEST
"Cyrus Junior (77 y.o. Male)             Date of Birth   1945    Social Security Number       Address   [address] [place] [address] [place] [address]    Home Phone   [phone]    MRN   [id number]       Jew   None    Marital Status                               Admission Date   8/13/22    Admission Type   Emergency    Admitting Provider   Will Browne DO    Attending Provider   Will Browne DO    Department, Room/Bed   [facility] 2B MEDICAL INPATIENT, 228/1       Discharge Date       Discharge Disposition   Home or Self Care    Discharge Destination                               Attending Provider: Will Browne DO    Allergies: No Known Allergies    Isolation: Contact   Infection: ESBL (04/01/22), Adelina Auris (rule out) (08/15/22)   Code Status: Prior   Advance Care Planning Activity    Ht: 182.9 cm (72\")   Wt: 145 kg (318 lb 12.6 oz)    Admission Cmt: None   Principal Problem: MAKI (acute kidney injury) (HCC) [N17.9]                 Active Insurance as of 8/13/2022     Primary Coverage     Payor Plan Insurance Group Employer/Plan Group    [facility]      Payor Plan Address Payor Plan Phone Number Payor Plan Fax Number Effective Dates    [address] [phone]  1/1/2022 - None Entered    [facility] [address]       Subscriber Name Subscriber Birth Date Member ID       CYRUS JUNIOR 1945 [id number]           Secondary Coverage     Payor Plan Insurance Group Employer/Plan Group    [place]      Payor Plan Address Payor Plan Phone Number Payor Plan Fax Number Effective Dates    [address]   1/1/2022 - None Entered    [place] [address]       Subscriber Name Subscriber Birth Date Member ID       CYRUS JUNIOR 1945 [id number]                 Emergency Contacts      (Rel.) Home Phone Work Phone Mobile Phone    RICHAR MONROE (Sister) [phone] -- -- "    CAMERON RAMIREZ (Brother) [phone] -- --

## 2022-08-16 NOTE — PROGRESS NOTES
[facility] Medicine Services Daily Progress Note    Patient Name: Shaji Junior  : 1945  MRN: [id number]  Primary Care Physician:  Naa Valverde MD  Date of admission: 2022      Subjective      Chief Complaint: groin pain      Patient Reports     8/15/22: OOB to chair.  Motley catheter in place    22: f/u with nephrology and urology in 2 weeks    Review of Systems   All other systems reviewed and are negative.         Objective      Vitals:   Temp:  [97.6 °F (36.4 °C)-98.2 °F (36.8 °C)] 98.2 °F (36.8 °C)  Heart Rate:  [62-70] 67  Resp:  [18] 18  BP: (140-152)/(81-92) 152/85    Physical Exam  HENT:      Head: Normocephalic.      Nose: Nose normal.   Eyes:      Extraocular Movements: Extraocular movements intact.      Pupils: Pupils are equal, round, and reactive to light.   Cardiovascular:      Rate and Rhythm: Normal rate and regular rhythm.   Pulmonary:      Effort: Pulmonary effort is normal.   Abdominal:      General: Bowel sounds are normal.   Genitourinary:     Comments: Motley intact  Musculoskeletal:         General: Normal range of motion.      Cervical back: Normal range of motion.   Skin:     General: Skin is warm.   Neurological:      General: No focal deficit present.      Mental Status: He is alert.   Psychiatric:         Mood and Affect: Mood normal.           Result Review    Result Review:  I have personally reviewed the results from the time of this admission to 2022 15:22 EDT and agree with these findings:  [x]  Laboratory  []  Microbiology  [x]  Radiology  []  EKG/Telemetry   []  Cardiology/Vascular   []  Pathology  [x]  Old records  []  Other:            Assessment & Plan      Brief Patient Summary:        apixaban, 5 mg, Oral, Q12H  atorvastatin, 20 mg, Oral, Daily  carvedilol, 12.5 mg, Oral, BID  escitalopram, 10 mg, Oral, Daily  finasteride, 5 mg, Oral, Daily  gabapentin, 300 mg, Oral, TID  hydrALAZINE, 25 mg, Oral, TID  insulin glargine, 10  Units, Subcutaneous, QAM  insulin lispro, 0-9 Units, Subcutaneous, TID AC  isosorbide mononitrate, 30 mg, Oral, Q24H  meropenem, 1 g, Intravenous, Q8H  saccharomyces boulardii, 250 mg, Oral, BID  sodium chloride, 10 mL, Intravenous, Q12H  tamsulosin, 0.4 mg, Oral, Daily       Pharmacy to Dose meropenem (MERREM),   sodium chloride, 75 mL/hr, Last Rate: 75 mL/hr (08/16/22 1405)         Active Hospital Problems:  Active Hospital Problems    Diagnosis    • **MAKI (acute kidney injury) (HCC)    • Atrial fibrillation (HCC)    • Type 2 diabetes mellitus with diabetic nephropathy (HCC)    • Cerebrovascular accident (HCC)      MAKI:  -s/p Motley cathter  -Nephrology consulted    Urinary retention due to BPH:  -Motley catheter placed  -Consulted urology    UTI:  -Continue meropenem  -History of ESBL Proteus UTI    History of CVA:  -Continue aspirin and statin    Atrial fibrillation:  -Continue Coreg and Eliquis    Type 2 diabetes mellitus with diabetic nephropathy:  -Continue Lantus and ISS  -Continue gabapentin    Depression/anxiety:  -Lexapro      DVT prophylaxis:  Medical DVT prophylaxis orders are present.    CODE STATUS:         Disposition:  I expect patient to be discharged defer    This patient has been examined wearing appropriate Personal Protective Equipment and discussed with nursing. 08/16/22      Electronically signed by Will Browne DO, 08/16/22, 15:22 EDT.  [facility] Hospitalist Team

## 2022-08-16 NOTE — CASE MANAGEMENT/SOCIAL WORK
Discharge Planning Assessment  [facility]     Patient Name: Shaji Junior  MRN: [id number]  Today's Date: 8/16/2022    Admit Date: 8/13/2022     Discharge Needs Assessment     Row Name 08/16/22 1308       Living Environment    People in Home facility resident    Name(s) of People in Home From [facility]    Current Living Arrangements home    Primary Care Provided by other (see comments)  facility staff    Provides Primary Care For no one, unable/limited ability to care for self    Family Caregiver if Needed sibling(s)    Family Caregiver Names sister Kenzie Martinez    Quality of Family Relationships supportive    Able to Return to Prior Arrangements yes       Resource/Environmental Concerns    Resource/Environmental Concerns none    Transportation Concerns none       Transition Planning    Patient/Family Anticipates Transition to home;long-term care facility    Patient/Family Anticipated Services at Transition none    Transportation Anticipated family or friend will provide       Discharge Needs Assessment    Equipment Currently Used at Home walker, rolling    Concerns to be Addressed discharge planning    Anticipated Changes Related to Illness none    Equipment Needed After Discharge none    Patient's Choice of Community Agency(s) Patient from [facility]               Discharge Plan     Row Name 08/16/22 1310       Plan    Plan DC Plan: Return to [facility]. Ok to return per patient and facility liaison    Patient/Family in Agreement with Plan yes    Plan Comments Met with patient. PCP verified. The patient reports that he has been at [facility] since October of last year and plans to return at discharge.The patient denies discharge needs but is unsure of who will transport at discharge.              Continued Care and Services - Admitted Since 8/13/2022     Destination     Service Provider Request Status Selected Services Address Phone Fax Patient Preferred    [facility] AND REHAB [place]  Pending - Request Sent N[address]A [address]  [place] [facility] [place] [address] [phone] --              Expected Discharge Date and Time     Expected Discharge Date Expected Discharge Time    Aug 16, 2022          Demographic Summary     Row Name 08/16/22 1307       General Information    Admission Type observation    Arrived From emergency department    Referral Source admission list    Reason for Consult discharge planning    Preferred Language English       Contact Information    Permission Granted to Share Info With                Functional Status     Row Name 08/16/22 1307       Functional Status    Usual Activity Tolerance poor    Current Activity Tolerance poor       Functional Status, IADL    Medications completely dependent    Meal Preparation completely dependent    Housekeeping completely dependent    Laundry completely dependent    Shopping completely dependent       Mental Status    General Appearance WDL WDL         Met with patient in room wearing PPE: mask/googles    Maintained distance greater than 6 feet and spent less than 15 minutes in the room.    Isamar Lincoln RN     Office Phone: [phone]  Office Cell: [phone]

## 2022-08-16 NOTE — NURSING NOTE
Spoke with Dr. Fisher. OK for discharge back to snf since has no further pain/discomfort. Will see this evening on rounds if still here.

## 2022-08-16 NOTE — CASE MANAGEMENT/SOCIAL WORK
Case Management/Social Work    Patient Name:  Shaji Junior  YOB: 1945  MRN: [id number]  Admit Date:  8/13/2022        LISA called [facility] [employer] ([phone]) and spoke with Erin. Trip ID number is [id number]. Transportation was arranged from [facility] to [facility] ([address] [place] [address] [place] [address]). It can take up to 3 hours from now ([phone]) to secure a provider. Upon arrival,  will call nurse's station ([phone]). JM made aware.        Electronically signed by:  Ben Mcdaniel CMA  08/16/22 15:30 EDT

## 2022-08-16 NOTE — PROGRESS NOTES
"[facility] PROGRESS NOTE     LOS: 0 days    Patient Care Team:  Naa Valverde MD as PCP - General (Internal Medicine)      Subjective     Patient resting comfortably.  Pain has improved.  Denies any chest pain, shortness of breath, nausea or vomiting    Objective     Vital Sign Min/Max for last 24 hours  Temp:  [97.4 °F (36.3 °C)-98.2 °F (36.8 °C)] 98.2 °F (36.8 °C)  Heart Rate:  [62-70] 67  Resp:  [17-18] 18  BP: (132-152)/(71-92) 152/85                       Flowsheet Rows    Flowsheet Row First Filed Value   Admission Height 182.9 cm (72\") Documented at 08/13/2022 2308   Admission Weight 145 kg (320 lb) Documented at 08/13/2022 2308          I/O this shift:  In: 100 [IV Piggyback:100]  Out: -   I/O last 3 completed shifts:  In: 1760 [P.O.:660; I.V.:1000; IV Piggyback:100]  Out: 2400 [Urine:2400]    Physical Exam:  Physical Exam    General Appearance: alert, oriented x 3, no acute distress   Skin: warm and dry  HEENT: oral mucosa normal, nonicteric sclera  Neck: supple, no JVD  Lungs: CTA  Heart: RRR, normal S1 and S2  Abdomen: soft, nontender, nondistended  : no palpable bladder  Extremities:Trace edema, no cyanosis or clubbing  Neuro: normal speech and mental status        LABS:  Lab Results   Component Value Date    CALCIUM 8.4 (L) 08/16/2022    PHOS 3.6 08/16/2022     Results from last 7 days   Lab Units 08/16/22  0404 08/15/22  1214 08/14/22  0516 08/14/22  0102   MAGNESIUM mg/dL 2.1  --   --  2.1   SODIUM mmol/L 141 137 143 144   POTASSIUM mmol/L 4.3 4.4 4.1 4.6   CHLORIDE mmol/L 109* 107 106 106   CO2 mmol/L 23.0 22.0 27.0 26.0   BUN mg/dL 40* 48* 61* 62*   CREATININE mg/dL 1.44* 1.61* 1.97* 1.93*   GLUCOSE mg/dL 91 93 100* 114*   CALCIUM mg/dL 8.4* 8.6 8.6 9.1   WBC 10*3/mm3 5.10  --  6.80 7.30   HEMOGLOBIN g/dL 10.0*  --  10.9* 11.2*   PLATELETS 10*3/mm3 226  --  228 246   ALT (SGPT) U/L  --  21  --   --    AST (SGOT) U/L  --  18  --   --      Lab Results   Component Value Date    TROPONINT " 0.025 04/27/2022     Estimated Creatinine Clearance: 63.8 mL/min (A) (by C-G formula based on SCr of 1.44 mg/dL (H)).      Brief Urine Lab Results  (Last result in the past 365 days)      Color   Clarity   Blood   Leuk Est   Nitrite   Protein   CREAT   Urine HCG        08/14/22 0148 Yellow   Clear   Large (3+)   Moderate (2+)   Positive   Negative               WEIGHTS:     Wt Readings from Last 1 Encounters:   08/16/22 0438 (!) 145 kg (318 lb 12.6 oz)   08/14/22 0828 136 kg (299 lb)   08/13/22 2308 (!) 145 kg (320 lb)       apixaban, 5 mg, Oral, Q12H  atorvastatin, 20 mg, Oral, Daily  carvedilol, 12.5 mg, Oral, BID  escitalopram, 10 mg, Oral, Daily  finasteride, 5 mg, Oral, Daily  gabapentin, 300 mg, Oral, TID  hydrALAZINE, 25 mg, Oral, TID  insulin glargine, 10 Units, Subcutaneous, QAM  insulin lispro, 0-9 Units, Subcutaneous, TID AC  isosorbide mononitrate, 30 mg, Oral, Q24H  meropenem, 1 g, Intravenous, Q8H  saccharomyces boulardii, 250 mg, Oral, BID  sodium chloride, 10 mL, Intravenous, Q12H  tamsulosin, 0.4 mg, Oral, Daily      Pharmacy to Dose meropenem (MERREM),   sodium chloride, 75 mL/hr, Last Rate: 75 mL/hr (08/16/22 0754)        Assessment & Plan       1.Acute kidney injury  Secondary to obstruction or diuretics.  Patient's baseline creatinine is around 1.0-1.1 mg/DL.Motley catheter was replaced and patient IV fluids.  Creatinine decreasing,1.44 mg/DL.  Electrolytes, volume status okay.    2.  Urinary retention.  Motley catheter replaced  3.  UTI.  Patient on IV meropenem  4.  Hypertension.Blood pressure well controlled     Recs:  -Continue gentle IV hydration  -Continue current and hypertensives  -Urology to evaluate  -Okay to discharge from renal standpoint      Max Stein MD  08/16/22  08:28 EDT

## 2022-08-16 NOTE — PLAN OF CARE
Goal Outcome Evaluation:  Plan of Care Reviewed With: patient        Progress: no change  Outcome Evaluation: Up to chair this AM. Alert & oriented with forgetfulness. F/C remains in place with no pain/discomfort. OK to d/c per urology & nephro. Remains risk for falls & skin injury.

## 2022-08-17 NOTE — OUTREACH NOTE
Prep Survey    Flowsheet Row Responses   [facility]olic facility patient discharged from? [facility]   Is LACE score < 7 ? No   Emergency Room discharge w/ pulse ox? No   Eligibility Not Eligible   What are the reasons patient is not eligible? [facility]   Does the patient have one of the following disease processes/diagnoses(primary or secondary)? Other   Prep survey completed? Yes          STEVE CHAUDHARY - Registered Nurse

## 2022-08-17 NOTE — CASE MANAGEMENT/SOCIAL WORK
Case Management Discharge Note      Final Note: Anay         Selected Continued Care - Discharged on 8/16/2022 Admission date: 8/13/2022 - Discharge disposition: Home or Self Care    Destination Coordination complete.    Service Provider Selected Services Address Phone Fax Patient Preferred    [facility] AND [facility] [place] [address] [phone] --         Transportation Services  Taxi: other (through [facility])    Final Discharge Disposition Code: 03 - skilled nursing facility (SNF)

## 2022-08-19 LAB
BACTERIA SPEC AEROBE CULT: NORMAL
BACTERIA SPEC AEROBE CULT: NORMAL

## 2022-08-19 NOTE — DISCHARGE SUMMARY
[facility] Medicine Services  DISCHARGE SUMMARY    Patient Name: Shaji Junior  : 1945  MRN: [id number]    Date of Admission: 2022  Date of Discharge:  2022  Primary Care Physician: Naa Valverde MD      Presenting Problem:   Acute UTI [N39.0]  MAKI (acute kidney injury) ([facility]) [N17.9]  Malfunction of Motley catheter, initial encounter ([facility]) [T83.011A]    Active and Resolved Hospital Problems:  Active Hospital Problems    Diagnosis POA   • **MAKI (acute kidney injury) ([facility]) [N17.9] Yes     Priority: Medium   • Atrial fibrillation ([facility]) [I48.91] Unknown     Priority: Medium   • Type 2 diabetes mellitus with diabetic nephropathy ([facility]) [E11.21] Yes     Priority: Medium   • Cerebrovascular accident ([facility]) [I63.9] Yes     Priority: Medium      Resolved Hospital Problems   No resolved problems to display.         Hospital Course     Hospital Course:    The patient is a 77-year-old female with history of type 2 diabetes mellitus, atrial fibrillation and CVA that presented to the emergency room on 2022 complaining of 2 weeks of groin pain which started after he had recent colonoscopy.    ED work-up revealed BUN 62, creatinine 1.93 and Motley catheter was replaced in the ED with 500 cc of urine out.    The patient was placed on observation.  He was evaluated by nephrologist.  MAKI was thought to have been due to excessive diuresis.  He was given gentle IV fluid hydration which improved his renal function.  He was discharged to [place] on 2022.  He will need to follow-up with nephrology and urology in 2 weeks.  Bumex and Aldactone have been discontinued on discharge.      DISCHARGE Follow Up Recommendations for labs and diagnostics:       Reasons For Change In Medications and Indications for New Medications:      Day of Discharge     Vital Signs:       Physical Exam:    HENT:      Head: Normocephalic.      Nose: Nose normal.   Eyes:      Extraocular Movements:  Extraocular movements intact.      Pupils: Pupils are equal, round, and reactive to light.   Cardiovascular:      Rate and Rhythm: Normal rate and regular rhythm.   Pulmonary:      Effort: Pulmonary effort is normal.   Abdominal:      General: Bowel sounds are normal.   Genitourinary:     Comments: Motley intact  Musculoskeletal:         General: Normal range of motion.      Cervical back: Normal range of motion.   Skin:     General: Skin is warm.   Neurological:      General: No focal deficit present.      Mental Status: He is alert.   Psychiatric:         Mood and Affect: Mood normal.          Pertinent  and/or Most Recent Results     LAB RESULTS:      Lab 08/16/22  0404 08/14/22  0516 08/14/22  0102   WBC 5.10 6.80 7.30   HEMOGLOBIN 10.0* 10.9* 11.2*   HEMATOCRIT 31.6* 34.1* 35.6*   PLATELETS 226 228 246   NEUTROS ABS 1.90 3.30 4.30   LYMPHS ABS 1.90 2.20 1.80   MONOS ABS 0.70 0.90 0.70   EOS ABS 0.70* 0.40 0.50*   MCV 83.1 82.5 81.8         Lab 08/16/22  0404 08/15/22  1214 08/14/22  0516 08/14/22  0102   SODIUM 141 137 143 144   POTASSIUM 4.3 4.4 4.1 4.6   CHLORIDE 109* 107 106 106   CO2 23.0 22.0 27.0 26.0   ANION GAP 9.0 8.0 10.0 12.0   BUN 40* 48* 61* 62*   CREATININE 1.44* 1.61* 1.97* 1.93*   EGFR 50.0* 43.8* 34.4* 35.2*   GLUCOSE 91 93 100* 114*   CALCIUM 8.4* 8.6 8.6 9.1   MAGNESIUM 2.1  --   --  2.1   PHOSPHORUS 3.6  --   --   --    HEMOGLOBIN A1C  --   --  6.9*  --          Lab 08/16/22  0404 08/15/22  1214   TOTAL PROTEIN  --  6.5   ALBUMIN 2.50* 2.60*   GLOBULIN  --  3.9   ALT (SGPT)  --  21   AST (SGOT)  --  18   BILIRUBIN  --  0.2   ALK PHOS  --  106             Lab 08/14/22  0102   CHOLESTEROL 85   LDL CHOL 40   HDL CHOL 32*   TRIGLYCERIDES 55             Brief Urine Lab Results  (Last result in the past 365 days)      Color   Clarity   Blood   Leuk Est   Nitrite   Protein   CREAT   Urine HCG        08/14/22 0148 Yellow   Clear   Large (3+)   Moderate (2+)   Positive   Negative                Microbiology Results (last 10 days)     Procedure Component Value - Date/Time    CANDIDA AURIS SCREEN - Swab, Axilla Right, Axilla Left and Groin [[id number]]  (Normal) Collected: 08/15/22 1205    Lab Status: Preliminary result Specimen: Swab from Axilla Right, Axilla Left and Groin Updated: 08/19/22 1347     Candida Auris Screen Culture No Candida auris isolated at 4 days    Blood Culture - Blood, Arm, Left [[id number]]  (Normal) Collected: 08/14/22 0844    Lab Status: Final result Specimen: Blood from Arm, Left Updated: 08/19/22 0918     Blood Culture No growth at 5 days    Blood Culture - Blood, Arm, Right [[id number]]  (Normal) Collected: 08/14/22 0838    Lab Status: Final result Specimen: Blood from Arm, Right Updated: 08/19/22 0918     Blood Culture No growth at 5 days    COVID-19,CEPHEID/WENDIE,COR/JOSELIN/PAD/KAVON IN-HOUSE(OR EMERGENT/ADD-ON),NP SWAB IN TRANSPORT MEDIA 3-4 HR TAT, RT-PCR - Swab, Nasopharynx [[id number]]  (Normal) Collected: 08/14/22 0446    Lab Status: Final result Specimen: Swab from Nasopharynx Updated: 08/14/22 0541     COVID19 Not Detected    Narrative:      Fact sheet for providers: https://www.fda.gov/media/666791/download     Fact sheet for patients: https://www.fda.gov/media/588654/download  Fact sheet for providers: https://www.fda.gov/media/253539/download    Fact sheet for patients: https://www.fda.gov/media/594703/download    Test performed by PCR.    Urine Culture - Urine, Urine, Catheter [[id number]]  (Abnormal)  (Susceptibility) Collected: 08/14/22 0148    Lab Status: Final result Specimen: Urine, Catheter Updated: 08/16/22 1212     Urine Culture >100,000 CFU/mL Providencia stuartii    Narrative:          Colonization of the urinary tract without infection is common. Treatment is discouraged unless the patient is symptomatic, pregnant, or undergoing an invasive urologic procedure.    Susceptibility      Providencia stuartii      GAYLA      Amikacin Susceptible      Ampicillin Resistant      Ampicillin + Sulbactam Resistant     Cefazolin Resistant     Cefepime Susceptible      Ceftazidime Susceptible      Ceftriaxone Susceptible      Gentamicin Resistant     Levofloxacin Resistant     Nitrofurantoin Resistant     Piperacillin + Tazobactam Susceptible      Tobramycin Resistant     Trimethoprim + Sulfamethoxazole Susceptible                                      Results for orders placed during the hospital encounter of 03/29/22    Duplex Venous Lower Extremity - Bilateral CAR    Interpretation Summary  · Normal bilateral lower extremity venous duplex scan.      Results for orders placed during the hospital encounter of 03/29/22    Duplex Venous Lower Extremity - Bilateral CAR    Interpretation Summary  · Normal bilateral lower extremity venous duplex scan.      Results for orders placed during the hospital encounter of 03/29/22    Adult Transthoracic Echo Complete W/ Cont if Necessary Per Protocol    Interpretation Summary  · Left ventricular systolic function is normal.  · Left ventricular ejection fraction is 55 to 60%  · Left ventricular diastolic function was normal.      Labs Pending at Discharge:  Pending Labs     Order Current Status    CANDIDA AURIS SCREEN - Swab, Axilla Right, Axilla Left and Groin Preliminary result          Procedures Performed           Consults:   Consults     Date and Time Order Name Status Description    8/15/2022  7:40 AM Inpatient Nephrology Consult Completed             Discharge Details        Discharge Medications      New Medications      Instructions Start Date   cefdinir 300 MG capsule  Commonly known as: OMNICEF   300 mg, Oral, 2 Times Daily         Changes to Medications      Instructions Start Date   acetaminophen 325 MG tablet  Commonly known as: TYLENOL  What changed: Another medication with the same name was removed. Continue taking this medication, and follow the directions you see here.   650 mg, Oral, Every 4 Hours PRN         Continue These Medications       Instructions Start Date   albuterol sulfate  (90 Base) MCG/ACT inhaler  Commonly known as: PROVENTIL HFA;VENTOLIN HFA;PROAIR HFA   2 puffs, Inhalation, Every 4 Hours PRN      apixaban 5 MG tablet tablet  Commonly known as: ELIQUIS   5 mg, Oral, Every 12 Hours Scheduled      carvedilol 12.5 MG tablet  Commonly known as: COREG   12.5 mg, Oral, 2 Times Daily      escitalopram 10 MG tablet  Commonly known as: LEXAPRO   10 mg, Oral, Daily      finasteride 5 MG tablet  Commonly known as: PROSCAR   5 mg, Oral, Daily      gabapentin 300 MG capsule  Commonly known as: NEURONTIN   300 mg, Oral, 3 Times Daily      hydrALAZINE 25 MG tablet  Commonly known as: APRESOLINE   25 mg, Oral, 3 Times Daily      insulin glargine 100 UNIT/ML injection  Commonly known as: LANTUS, SEMGLEE   10 Units, Subcutaneous, Every Morning, Hold for glucose less than 110      isosorbide mononitrate 30 MG 24 hr tablet  Commonly known as: IMDUR   30 mg, Oral, 2 Times Daily      Polyethylene Glycol 3350 powder   17 g, Oral, Nightly      saccharomyces boulardii 250 MG capsule  Commonly known as: FLORASTOR   250 mg, Oral, 2 Times Daily      simvastatin 40 MG tablet  Commonly known as: ZOCOR   40 mg, Oral, Every Night at Bedtime      tamsulosin 0.4 MG capsule 24 hr capsule  Commonly known as: FLOMAX   1 capsule, Oral, Daily         Stop These Medications    bumetanide 2 MG tablet  Commonly known as: BUMEX     cephalexin 250 MG capsule  Commonly known as: KEFLEX     spironolactone 50 MG tablet  Commonly known as: ALDACTONE            No Known Allergies      Discharge Disposition:   Home or Self Care    Diet:  Hospital:No active diet order        Discharge Activity:   Activity Instructions    Activity as tolerated           Discharge Condition: stable      CODE STATUS:  There are no questions and answers to display.         No future appointments.    Additional Instructions for the Follow-ups that You Need to Schedule     Discharge Follow-up with  PCP   As directed       Currently Documented PCP:    Naa Valverde MD    PCP Phone Number:    [phone]     Follow Up Details: 2 weeks         Discharge Follow-up with Specified Provider: Dr. Stein, Nephrology; 2 Weeks   As directed      To: Dr. Stein, Nephrology    Follow Up: 2 Weeks    Follow Up Details: MAKI         Discharge Follow-up with Specified Provider: Urology, Dr. Fisher; 2 Weeks   As directed      To: UrologyDr. Fisher    Follow Up: 2 Weeks    Follow Up Details: gardner cathter               Time spent on Discharge including face to face service: 15 minutes    This patient has been examined wearing appropriate Personal Protective Equipment and discussed with nursing. 08/19/22      Signature: Electronically signed by Will Browne DO, 08/19/22, 4:56 PM EDT.

## 2022-08-20 LAB — BACTERIA ISLT: NORMAL

## 2022-09-16 ENCOUNTER — APPOINTMENT (OUTPATIENT)
Dept: GENERAL RADIOLOGY | Facility: HOSPITAL | Age: 77
End: 2022-09-16

## 2022-09-16 ENCOUNTER — HOSPITAL ENCOUNTER (INPATIENT)
Facility: HOSPITAL | Age: 77
LOS: 3 days | Discharge: NURSING, REHAB OR LONG-TERM CARE | End: 2022-09-19
Attending: EMERGENCY MEDICINE | Admitting: STUDENT IN AN ORGANIZED HEALTH CARE EDUCATION/TRAINING PROGRAM

## 2022-09-16 DIAGNOSIS — R07.9 CHEST PAIN, UNSPECIFIED TYPE: Primary | ICD-10-CM

## 2022-09-16 DIAGNOSIS — R06.02 SHORTNESS OF BREATH: ICD-10-CM

## 2022-09-16 DIAGNOSIS — E66.01 CLASS 3 SEVERE OBESITY WITH SERIOUS COMORBIDITY AND BODY MASS INDEX (BMI) OF 40.0 TO 44.9 IN ADULT, UNSPECIFIED OBESITY TYPE: ICD-10-CM

## 2022-09-16 DIAGNOSIS — I10 HYPERTENSION, UNSPECIFIED TYPE: ICD-10-CM

## 2022-09-16 DIAGNOSIS — I50.9 ACUTE ON CHRONIC CONGESTIVE HEART FAILURE, UNSPECIFIED HEART FAILURE TYPE: ICD-10-CM

## 2022-09-16 PROBLEM — N39.0 ACUTE UTI (URINARY TRACT INFECTION): Status: ACTIVE | Noted: 2022-09-16

## 2022-09-16 PROBLEM — D64.9 NORMOCYTIC NORMOCHROMIC ANEMIA: Chronic | Status: ACTIVE | Noted: 2022-09-16

## 2022-09-16 PROBLEM — E11.21 TYPE 2 DIABETES MELLITUS WITH DIABETIC NEPHROPATHY: Chronic | Status: ACTIVE | Noted: 2022-02-28

## 2022-09-16 PROBLEM — N40.0 BPH WITHOUT OBSTRUCTION/LOWER URINARY TRACT SYMPTOMS: Chronic | Status: ACTIVE | Noted: 2022-09-16

## 2022-09-16 PROBLEM — I63.9 CEREBROVASCULAR ACCIDENT: Chronic | Status: ACTIVE | Noted: 2022-02-28

## 2022-09-16 PROBLEM — N18.30 CKD (CHRONIC KIDNEY DISEASE), STAGE III: Chronic | Status: ACTIVE | Noted: 2022-09-16

## 2022-09-16 PROBLEM — I48.91 ATRIAL FIBRILLATION: Chronic | Status: ACTIVE | Noted: 2022-08-14

## 2022-09-16 LAB
ALBUMIN SERPL-MCNC: 3.1 G/DL (ref 3.5–5.2)
ALBUMIN/GLOB SERPL: 0.8 G/DL
ALP SERPL-CCNC: 104 U/L (ref 39–117)
ALT SERPL W P-5'-P-CCNC: 8 U/L (ref 1–41)
ANION GAP SERPL CALCULATED.3IONS-SCNC: 10 MMOL/L (ref 5–15)
ANION GAP SERPL CALCULATED.3IONS-SCNC: 8 MMOL/L (ref 5–15)
AST SERPL-CCNC: 16 U/L (ref 1–40)
BACTERIA UR QL AUTO: ABNORMAL /HPF
BASOPHILS # BLD AUTO: 0 10*3/MM3 (ref 0–0.2)
BASOPHILS # BLD AUTO: 0 10*3/MM3 (ref 0–0.2)
BASOPHILS NFR BLD AUTO: 0.2 % (ref 0–1.5)
BASOPHILS NFR BLD AUTO: 1.1 % (ref 0–1.5)
BILIRUB SERPL-MCNC: 0.4 MG/DL (ref 0–1.2)
BILIRUB UR QL STRIP: NEGATIVE
BUN SERPL-MCNC: 29 MG/DL (ref 8–23)
BUN SERPL-MCNC: 30 MG/DL (ref 8–23)
BUN/CREAT SERPL: 22 (ref 7–25)
BUN/CREAT SERPL: 22.4 (ref 7–25)
CALCIUM SPEC-SCNC: 8.2 MG/DL (ref 8.6–10.5)
CALCIUM SPEC-SCNC: 8.3 MG/DL (ref 8.6–10.5)
CHLORIDE SERPL-SCNC: 106 MMOL/L (ref 98–107)
CHLORIDE SERPL-SCNC: 110 MMOL/L (ref 98–107)
CHOLEST SERPL-MCNC: 84 MG/DL (ref 0–200)
CLARITY UR: ABNORMAL
CO2 SERPL-SCNC: 27 MMOL/L (ref 22–29)
CO2 SERPL-SCNC: 27 MMOL/L (ref 22–29)
COLOR UR: YELLOW
CREAT SERPL-MCNC: 1.32 MG/DL (ref 0.76–1.27)
CREAT SERPL-MCNC: 1.34 MG/DL (ref 0.76–1.27)
DEPRECATED RDW RBC AUTO: 61.7 FL (ref 37–54)
DEPRECATED RDW RBC AUTO: 63 FL (ref 37–54)
EGFRCR SERPLBLD CKD-EPI 2021: 54.6 ML/MIN/1.73
EGFRCR SERPLBLD CKD-EPI 2021: 55.6 ML/MIN/1.73
EOSINOPHIL # BLD AUTO: 0.4 10*3/MM3 (ref 0–0.4)
EOSINOPHIL # BLD AUTO: 0.4 10*3/MM3 (ref 0–0.4)
EOSINOPHIL NFR BLD AUTO: 7.5 % (ref 0.3–6.2)
EOSINOPHIL NFR BLD AUTO: 8.6 % (ref 0.3–6.2)
ERYTHROCYTE [DISTWIDTH] IN BLOOD BY AUTOMATED COUNT: 20.3 % (ref 12.3–15.4)
ERYTHROCYTE [DISTWIDTH] IN BLOOD BY AUTOMATED COUNT: 20.5 % (ref 12.3–15.4)
GLOBULIN UR ELPH-MCNC: 3.8 GM/DL
GLUCOSE BLDC GLUCOMTR-MCNC: 106 MG/DL (ref 70–105)
GLUCOSE BLDC GLUCOMTR-MCNC: 111 MG/DL (ref 70–105)
GLUCOSE BLDC GLUCOMTR-MCNC: 133 MG/DL (ref 70–105)
GLUCOSE BLDC GLUCOMTR-MCNC: 146 MG/DL (ref 70–105)
GLUCOSE SERPL-MCNC: 108 MG/DL (ref 65–99)
GLUCOSE SERPL-MCNC: 117 MG/DL (ref 65–99)
GLUCOSE UR STRIP-MCNC: NEGATIVE MG/DL
HBA1C MFR BLD: 6 % (ref 3.5–5.6)
HCT VFR BLD AUTO: 31 % (ref 37.5–51)
HCT VFR BLD AUTO: 32 % (ref 37.5–51)
HDLC SERPL-MCNC: 33 MG/DL (ref 40–60)
HGB BLD-MCNC: 9.6 G/DL (ref 13–17.7)
HGB BLD-MCNC: 9.9 G/DL (ref 13–17.7)
HGB UR QL STRIP.AUTO: NEGATIVE
HYALINE CASTS UR QL AUTO: ABNORMAL /LPF
KETONES UR QL STRIP: NEGATIVE
LDLC SERPL CALC-MCNC: 40 MG/DL (ref 0–100)
LDLC/HDLC SERPL: 1.32 {RATIO}
LEUKOCYTE ESTERASE UR QL STRIP.AUTO: ABNORMAL
LYMPHOCYTES # BLD AUTO: 1.4 10*3/MM3 (ref 0.7–3.1)
LYMPHOCYTES # BLD AUTO: 1.4 10*3/MM3 (ref 0.7–3.1)
LYMPHOCYTES NFR BLD AUTO: 29.4 % (ref 19.6–45.3)
LYMPHOCYTES NFR BLD AUTO: 29.8 % (ref 19.6–45.3)
MCH RBC QN AUTO: 26.6 PG (ref 26.6–33)
MCH RBC QN AUTO: 26.8 PG (ref 26.6–33)
MCHC RBC AUTO-ENTMCNC: 30.9 G/DL (ref 31.5–35.7)
MCHC RBC AUTO-ENTMCNC: 30.9 G/DL (ref 31.5–35.7)
MCV RBC AUTO: 86.2 FL (ref 79–97)
MCV RBC AUTO: 86.8 FL (ref 79–97)
MONOCYTES # BLD AUTO: 0.6 10*3/MM3 (ref 0.1–0.9)
MONOCYTES # BLD AUTO: 0.6 10*3/MM3 (ref 0.1–0.9)
MONOCYTES NFR BLD AUTO: 11.8 % (ref 5–12)
MONOCYTES NFR BLD AUTO: 13.1 % (ref 5–12)
NEUTROPHILS NFR BLD AUTO: 2.2 10*3/MM3 (ref 1.7–7)
NEUTROPHILS NFR BLD AUTO: 2.4 10*3/MM3 (ref 1.7–7)
NEUTROPHILS NFR BLD AUTO: 47.8 % (ref 42.7–76)
NEUTROPHILS NFR BLD AUTO: 50.7 % (ref 42.7–76)
NITRITE UR QL STRIP: POSITIVE
NRBC BLD AUTO-RTO: 0 /100 WBC (ref 0–0.2)
NRBC BLD AUTO-RTO: 0.2 /100 WBC (ref 0–0.2)
NT-PROBNP SERPL-MCNC: 7936 PG/ML (ref 0–1800)
PH UR STRIP.AUTO: 8 [PH] (ref 5–8)
PLATELET # BLD AUTO: 212 10*3/MM3 (ref 140–450)
PLATELET # BLD AUTO: 214 10*3/MM3 (ref 140–450)
PMV BLD AUTO: 7.3 FL (ref 6–12)
PMV BLD AUTO: 7.6 FL (ref 6–12)
POTASSIUM SERPL-SCNC: 4 MMOL/L (ref 3.5–5.2)
POTASSIUM SERPL-SCNC: 4.4 MMOL/L (ref 3.5–5.2)
PROCALCITONIN SERPL-MCNC: 0.04 NG/ML (ref 0–0.25)
PROT SERPL-MCNC: 6.9 G/DL (ref 6–8.5)
PROT UR QL STRIP: ABNORMAL
RBC # BLD AUTO: 3.58 10*6/MM3 (ref 4.14–5.8)
RBC # BLD AUTO: 3.72 10*6/MM3 (ref 4.14–5.8)
RBC # UR STRIP: ABNORMAL /HPF
REF LAB TEST METHOD: ABNORMAL
SARS-COV-2 RNA PNL SPEC NAA+PROBE: NOT DETECTED
SODIUM SERPL-SCNC: 143 MMOL/L (ref 136–145)
SODIUM SERPL-SCNC: 145 MMOL/L (ref 136–145)
SP GR UR STRIP: 1.02 (ref 1–1.03)
SQUAMOUS #/AREA URNS HPF: ABNORMAL /HPF
TRI-PHOS CRY URNS QL MICRO: ABNORMAL /HPF
TRIGL SERPL-MCNC: 38 MG/DL (ref 0–150)
TROPONIN T SERPL-MCNC: 0.02 NG/ML (ref 0–0.03)
TROPONIN T SERPL-MCNC: 0.03 NG/ML (ref 0–0.03)
UROBILINOGEN UR QL STRIP: ABNORMAL
VLDLC SERPL-MCNC: 11 MG/DL (ref 5–40)
WBC # UR STRIP: ABNORMAL /HPF
WBC NRBC COR # BLD: 4.6 10*3/MM3 (ref 3.4–10.8)
WBC NRBC COR # BLD: 4.8 10*3/MM3 (ref 3.4–10.8)
YEAST URNS QL MICRO: ABNORMAL /HPF

## 2022-09-16 PROCEDURE — 71045 X-RAY EXAM CHEST 1 VIEW: CPT

## 2022-09-16 PROCEDURE — 85025 COMPLETE CBC W/AUTO DIFF WBC: CPT | Performed by: PHYSICIAN ASSISTANT

## 2022-09-16 PROCEDURE — 87102 FUNGUS ISOLATION CULTURE: CPT | Performed by: STUDENT IN AN ORGANIZED HEALTH CARE EDUCATION/TRAINING PROGRAM

## 2022-09-16 PROCEDURE — 84484 ASSAY OF TROPONIN QUANT: CPT | Performed by: NURSE PRACTITIONER

## 2022-09-16 PROCEDURE — 36415 COLL VENOUS BLD VENIPUNCTURE: CPT | Performed by: PHYSICIAN ASSISTANT

## 2022-09-16 PROCEDURE — 83880 ASSAY OF NATRIURETIC PEPTIDE: CPT | Performed by: NURSE PRACTITIONER

## 2022-09-16 PROCEDURE — 87635 SARS-COV-2 COVID-19 AMP PRB: CPT | Performed by: NURSE PRACTITIONER

## 2022-09-16 PROCEDURE — 84484 ASSAY OF TROPONIN QUANT: CPT | Performed by: PHYSICIAN ASSISTANT

## 2022-09-16 PROCEDURE — 81001 URINALYSIS AUTO W/SCOPE: CPT | Performed by: NURSE PRACTITIONER

## 2022-09-16 PROCEDURE — 80053 COMPREHEN METABOLIC PANEL: CPT | Performed by: NURSE PRACTITIONER

## 2022-09-16 PROCEDURE — 87077 CULTURE AEROBIC IDENTIFY: CPT | Performed by: NURSE PRACTITIONER

## 2022-09-16 PROCEDURE — 99254 IP/OBS CNSLTJ NEW/EST MOD 60: CPT | Performed by: INTERNAL MEDICINE

## 2022-09-16 PROCEDURE — 82962 GLUCOSE BLOOD TEST: CPT

## 2022-09-16 PROCEDURE — 85025 COMPLETE CBC W/AUTO DIFF WBC: CPT | Performed by: NURSE PRACTITIONER

## 2022-09-16 PROCEDURE — 63710000001 INSULIN GLARGINE PER 5 UNITS: Performed by: INTERNAL MEDICINE

## 2022-09-16 PROCEDURE — 99285 EMERGENCY DEPT VISIT HI MDM: CPT

## 2022-09-16 PROCEDURE — 80061 LIPID PANEL: CPT | Performed by: PHYSICIAN ASSISTANT

## 2022-09-16 PROCEDURE — 84145 PROCALCITONIN (PCT): CPT | Performed by: PHYSICIAN ASSISTANT

## 2022-09-16 PROCEDURE — P9612 CATHETERIZE FOR URINE SPEC: HCPCS

## 2022-09-16 PROCEDURE — 87086 URINE CULTURE/COLONY COUNT: CPT | Performed by: NURSE PRACTITIONER

## 2022-09-16 PROCEDURE — 25010000002 HYDRALAZINE PER 20 MG: Performed by: PHYSICIAN ASSISTANT

## 2022-09-16 PROCEDURE — 25010000002 FUROSEMIDE PER 20 MG: Performed by: PHYSICIAN ASSISTANT

## 2022-09-16 PROCEDURE — 25010000002 CEFTRIAXONE PER 250 MG: Performed by: PHYSICIAN ASSISTANT

## 2022-09-16 PROCEDURE — 93005 ELECTROCARDIOGRAM TRACING: CPT | Performed by: NURSE PRACTITIONER

## 2022-09-16 PROCEDURE — 87186 SC STD MICRODIL/AGAR DIL: CPT | Performed by: NURSE PRACTITIONER

## 2022-09-16 PROCEDURE — 25010000002 FUROSEMIDE PER 20 MG: Performed by: NURSE PRACTITIONER

## 2022-09-16 PROCEDURE — 83036 HEMOGLOBIN GLYCOSYLATED A1C: CPT | Performed by: PHYSICIAN ASSISTANT

## 2022-09-16 RX ORDER — HYDRALAZINE HYDROCHLORIDE 50 MG/1
50 TABLET, FILM COATED ORAL 3 TIMES DAILY
COMMUNITY
End: 2022-09-19 | Stop reason: HOSPADM

## 2022-09-16 RX ORDER — ACETAMINOPHEN 160 MG/5ML
650 SOLUTION ORAL EVERY 4 HOURS PRN
Status: DISCONTINUED | OUTPATIENT
Start: 2022-09-16 | End: 2022-09-19 | Stop reason: HOSPADM

## 2022-09-16 RX ORDER — FUROSEMIDE 10 MG/ML
80 INJECTION INTRAMUSCULAR; INTRAVENOUS ONCE
Status: COMPLETED | OUTPATIENT
Start: 2022-09-16 | End: 2022-09-16

## 2022-09-16 RX ORDER — SODIUM CHLORIDE 0.9 % (FLUSH) 0.9 %
10 SYRINGE (ML) INJECTION AS NEEDED
Status: DISCONTINUED | OUTPATIENT
Start: 2022-09-16 | End: 2022-09-19 | Stop reason: HOSPADM

## 2022-09-16 RX ORDER — INSULIN GLARGINE 100 [IU]/ML
10 INJECTION, SOLUTION SUBCUTANEOUS NIGHTLY
COMMUNITY

## 2022-09-16 RX ORDER — POTASSIUM CHLORIDE 1.5 G/1.77G
40 POWDER, FOR SOLUTION ORAL AS NEEDED
Status: DISCONTINUED | OUTPATIENT
Start: 2022-09-16 | End: 2022-09-19 | Stop reason: HOSPADM

## 2022-09-16 RX ORDER — HYDRALAZINE HYDROCHLORIDE 20 MG/ML
10 INJECTION INTRAMUSCULAR; INTRAVENOUS EVERY 6 HOURS PRN
Status: DISCONTINUED | OUTPATIENT
Start: 2022-09-16 | End: 2022-09-19 | Stop reason: HOSPADM

## 2022-09-16 RX ORDER — MAGNESIUM SULFATE HEPTAHYDRATE 40 MG/ML
4 INJECTION, SOLUTION INTRAVENOUS AS NEEDED
Status: DISCONTINUED | OUTPATIENT
Start: 2022-09-16 | End: 2022-09-19 | Stop reason: HOSPADM

## 2022-09-16 RX ORDER — POTASSIUM CHLORIDE 20 MEQ/1
40 TABLET, EXTENDED RELEASE ORAL AS NEEDED
Status: DISCONTINUED | OUTPATIENT
Start: 2022-09-16 | End: 2022-09-19 | Stop reason: HOSPADM

## 2022-09-16 RX ORDER — HYDRALAZINE HYDROCHLORIDE 25 MG/1
25 TABLET, FILM COATED ORAL 3 TIMES DAILY
Status: DISCONTINUED | OUTPATIENT
Start: 2022-09-16 | End: 2022-09-16

## 2022-09-16 RX ORDER — POTASSIUM CHLORIDE 7.45 MG/ML
10 INJECTION INTRAVENOUS
Status: DISCONTINUED | OUTPATIENT
Start: 2022-09-16 | End: 2022-09-19 | Stop reason: HOSPADM

## 2022-09-16 RX ORDER — INSULIN GLARGINE 100 [IU]/ML
10 INJECTION, SOLUTION SUBCUTANEOUS NIGHTLY
Status: DISCONTINUED | OUTPATIENT
Start: 2022-09-16 | End: 2022-09-19 | Stop reason: HOSPADM

## 2022-09-16 RX ORDER — ESCITALOPRAM OXALATE 10 MG/1
10 TABLET ORAL DAILY
Status: DISCONTINUED | OUTPATIENT
Start: 2022-09-16 | End: 2022-09-19 | Stop reason: HOSPADM

## 2022-09-16 RX ORDER — ATORVASTATIN CALCIUM 20 MG/1
20 TABLET, FILM COATED ORAL NIGHTLY
Status: DISCONTINUED | OUTPATIENT
Start: 2022-09-16 | End: 2022-09-19 | Stop reason: HOSPADM

## 2022-09-16 RX ORDER — ALUMINA, MAGNESIA, AND SIMETHICONE 2400; 2400; 240 MG/30ML; MG/30ML; MG/30ML
15 SUSPENSION ORAL EVERY 6 HOURS PRN
Status: DISCONTINUED | OUTPATIENT
Start: 2022-09-16 | End: 2022-09-19 | Stop reason: HOSPADM

## 2022-09-16 RX ORDER — INSULIN LISPRO 100 [IU]/ML
0-9 INJECTION, SOLUTION INTRAVENOUS; SUBCUTANEOUS
Status: DISCONTINUED | OUTPATIENT
Start: 2022-09-16 | End: 2022-09-19 | Stop reason: HOSPADM

## 2022-09-16 RX ORDER — INSULIN GLARGINE 100 [IU]/ML
10 INJECTION, SOLUTION SUBCUTANEOUS EVERY MORNING
Status: DISCONTINUED | OUTPATIENT
Start: 2022-09-16 | End: 2022-09-16

## 2022-09-16 RX ORDER — CHOLECALCIFEROL (VITAMIN D3) 125 MCG
5 CAPSULE ORAL NIGHTLY PRN
Status: DISCONTINUED | OUTPATIENT
Start: 2022-09-16 | End: 2022-09-19 | Stop reason: HOSPADM

## 2022-09-16 RX ORDER — MAGNESIUM SULFATE HEPTAHYDRATE 40 MG/ML
2 INJECTION, SOLUTION INTRAVENOUS AS NEEDED
Status: DISCONTINUED | OUTPATIENT
Start: 2022-09-16 | End: 2022-09-19 | Stop reason: HOSPADM

## 2022-09-16 RX ORDER — NICOTINE POLACRILEX 4 MG
15 LOZENGE BUCCAL
Status: DISCONTINUED | OUTPATIENT
Start: 2022-09-16 | End: 2022-09-19 | Stop reason: HOSPADM

## 2022-09-16 RX ORDER — FINASTERIDE 5 MG/1
5 TABLET, FILM COATED ORAL DAILY
Status: DISCONTINUED | OUTPATIENT
Start: 2022-09-16 | End: 2022-09-19 | Stop reason: HOSPADM

## 2022-09-16 RX ORDER — CLONIDINE HYDROCHLORIDE 0.1 MG/1
0.1 TABLET ORAL ONCE
Status: COMPLETED | OUTPATIENT
Start: 2022-09-16 | End: 2022-09-16

## 2022-09-16 RX ORDER — ISOSORBIDE MONONITRATE 30 MG/1
30 TABLET, EXTENDED RELEASE ORAL 2 TIMES DAILY
Status: DISCONTINUED | OUTPATIENT
Start: 2022-09-16 | End: 2022-09-17

## 2022-09-16 RX ORDER — HYDRALAZINE HYDROCHLORIDE 25 MG/1
50 TABLET, FILM COATED ORAL 3 TIMES DAILY
Status: DISCONTINUED | OUTPATIENT
Start: 2022-09-16 | End: 2022-09-17

## 2022-09-16 RX ORDER — OLANZAPINE 10 MG/2ML
1 INJECTION, POWDER, LYOPHILIZED, FOR SOLUTION INTRAMUSCULAR AS NEEDED
Status: DISCONTINUED | OUTPATIENT
Start: 2022-09-16 | End: 2022-09-19 | Stop reason: HOSPADM

## 2022-09-16 RX ORDER — SODIUM CHLORIDE 0.9 % (FLUSH) 0.9 %
10 SYRINGE (ML) INJECTION EVERY 12 HOURS SCHEDULED
Status: DISCONTINUED | OUTPATIENT
Start: 2022-09-16 | End: 2022-09-19 | Stop reason: HOSPADM

## 2022-09-16 RX ORDER — ACETAMINOPHEN 325 MG/1
650 TABLET ORAL EVERY 4 HOURS PRN
Status: DISCONTINUED | OUTPATIENT
Start: 2022-09-16 | End: 2022-09-19 | Stop reason: HOSPADM

## 2022-09-16 RX ORDER — HYDRALAZINE HYDROCHLORIDE 25 MG/1
25 TABLET, FILM COATED ORAL ONCE
Status: DISCONTINUED | OUTPATIENT
Start: 2022-09-16 | End: 2022-09-17

## 2022-09-16 RX ORDER — ONDANSETRON 4 MG/1
4 TABLET, FILM COATED ORAL EVERY 6 HOURS PRN
Status: DISCONTINUED | OUTPATIENT
Start: 2022-09-16 | End: 2022-09-19 | Stop reason: HOSPADM

## 2022-09-16 RX ORDER — GABAPENTIN 300 MG/1
300 CAPSULE ORAL 3 TIMES DAILY
Status: DISCONTINUED | OUTPATIENT
Start: 2022-09-16 | End: 2022-09-19 | Stop reason: HOSPADM

## 2022-09-16 RX ORDER — ONDANSETRON 2 MG/ML
4 INJECTION INTRAMUSCULAR; INTRAVENOUS EVERY 6 HOURS PRN
Status: DISCONTINUED | OUTPATIENT
Start: 2022-09-16 | End: 2022-09-19 | Stop reason: HOSPADM

## 2022-09-16 RX ORDER — NITROGLYCERIN 0.4 MG/1
0.4 TABLET SUBLINGUAL
Status: DISCONTINUED | OUTPATIENT
Start: 2022-09-16 | End: 2022-09-19 | Stop reason: HOSPADM

## 2022-09-16 RX ORDER — TAMSULOSIN HYDROCHLORIDE 0.4 MG/1
0.4 CAPSULE ORAL DAILY
Status: DISCONTINUED | OUTPATIENT
Start: 2022-09-16 | End: 2022-09-19 | Stop reason: HOSPADM

## 2022-09-16 RX ORDER — CARVEDILOL 6.25 MG/1
12.5 TABLET ORAL 2 TIMES DAILY WITH MEALS
Status: DISCONTINUED | OUTPATIENT
Start: 2022-09-16 | End: 2022-09-19 | Stop reason: HOSPADM

## 2022-09-16 RX ORDER — ACETAMINOPHEN 650 MG/1
650 SUPPOSITORY RECTAL EVERY 4 HOURS PRN
Status: DISCONTINUED | OUTPATIENT
Start: 2022-09-16 | End: 2022-09-19 | Stop reason: HOSPADM

## 2022-09-16 RX ORDER — FUROSEMIDE 10 MG/ML
40 INJECTION INTRAMUSCULAR; INTRAVENOUS EVERY 12 HOURS
Status: DISCONTINUED | OUTPATIENT
Start: 2022-09-16 | End: 2022-09-18

## 2022-09-16 RX ORDER — DEXTROSE MONOHYDRATE 25 G/50ML
25 INJECTION, SOLUTION INTRAVENOUS
Status: DISCONTINUED | OUTPATIENT
Start: 2022-09-16 | End: 2022-09-19 | Stop reason: HOSPADM

## 2022-09-16 RX ADMIN — CEFTRIAXONE 2 G: 2 INJECTION, POWDER, FOR SOLUTION INTRAMUSCULAR; INTRAVENOUS at 04:47

## 2022-09-16 RX ADMIN — HYDRALAZINE HYDROCHLORIDE 10 MG: 20 INJECTION INTRAMUSCULAR; INTRAVENOUS at 04:45

## 2022-09-16 RX ADMIN — TAMSULOSIN HYDROCHLORIDE 0.4 MG: 0.4 CAPSULE ORAL at 18:19

## 2022-09-16 RX ADMIN — FUROSEMIDE 80 MG: 10 INJECTION, SOLUTION INTRAMUSCULAR; INTRAVENOUS at 03:15

## 2022-09-16 RX ADMIN — ESCITALOPRAM OXALATE 10 MG: 10 TABLET ORAL at 18:19

## 2022-09-16 RX ADMIN — ISOSORBIDE MONONITRATE 30 MG: 30 TABLET, EXTENDED RELEASE ORAL at 21:55

## 2022-09-16 RX ADMIN — NITROGLYCERIN 1 INCH: 20 OINTMENT TOPICAL at 03:02

## 2022-09-16 RX ADMIN — GABAPENTIN 300 MG: 300 CAPSULE ORAL at 21:55

## 2022-09-16 RX ADMIN — CARVEDILOL 12.5 MG: 6.25 TABLET, FILM COATED ORAL at 18:19

## 2022-09-16 RX ADMIN — HYDRALAZINE HYDROCHLORIDE 50 MG: 25 TABLET, FILM COATED ORAL at 18:19

## 2022-09-16 RX ADMIN — CLONIDINE HYDROCHLORIDE 0.1 MG: 0.1 TABLET ORAL at 03:48

## 2022-09-16 RX ADMIN — FUROSEMIDE 40 MG: 10 INJECTION, SOLUTION INTRAMUSCULAR; INTRAVENOUS at 18:19

## 2022-09-16 RX ADMIN — INSULIN GLARGINE 10 UNITS: 100 INJECTION, SOLUTION SUBCUTANEOUS at 22:00

## 2022-09-16 RX ADMIN — APIXABAN 5 MG: 5 TABLET, FILM COATED ORAL at 18:19

## 2022-09-16 RX ADMIN — ATORVASTATIN CALCIUM 20 MG: 20 TABLET, FILM COATED ORAL at 21:55

## 2022-09-16 RX ADMIN — Medication 10 ML: at 21:55

## 2022-09-16 RX ADMIN — FINASTERIDE 5 MG: 5 TABLET, FILM COATED ORAL at 18:19

## 2022-09-16 RX ADMIN — Medication 10 ML: at 18:19

## 2022-09-16 RX ADMIN — CLONIDINE HYDROCHLORIDE 0.1 MG: 0.1 TABLET ORAL at 03:02

## 2022-09-16 NOTE — ED PROVIDER NOTES
Subjective   History of Present Illness  Patient is a 77-year-old gentleman who comes in with chest pain and shortness of breath.  He has a history of cardiomegaly cardiomyopathy CVA chronic kidney disease diabetes hypertension hyperlipidemia morbid obesity atrial fibrillation peripheral vascular disease and pulmonary edema.    He states that he lives at the nursing home and he has had shortness of breath some chest discomfort over the last 24 hours.-He denies cough congestion fever chills        Review of Systems   Constitutional: Negative for chills, fatigue and fever.   HENT: Negative for congestion, tinnitus and trouble swallowing.    Eyes: Negative for photophobia, discharge and redness.   Respiratory: Positive for chest tightness and shortness of breath. Negative for cough.    Cardiovascular: Positive for palpitations. Negative for chest pain.   Gastrointestinal: Negative for abdominal pain, diarrhea, nausea and vomiting.   Genitourinary: Negative for dysuria, frequency and urgency.   Musculoskeletal: Negative for back pain, joint swelling and myalgias.   Skin: Negative for rash.   Neurological: Negative for dizziness and headaches.   Psychiatric/Behavioral: Negative for confusion.   All other systems reviewed and are negative.      Past Medical History:   Diagnosis Date   • Acute kidney failure (HCC)    • Acute respiratory failure with hypoxia (HCC)    • Anemia    • Benign prostatic hyperplasia    • Bilateral primary osteoarthritis of knee    • Cardiomegaly    • Cardiomyopathy (HCC)    • Cellulitis and abscess of left leg    • Cerebral infarction (HCC)    • Cerebrovascular disease    • Chronic kidney disease    • Dementia (HCC)    • Diabetes mellitus (HCC)    • Essential hypertension    • GERD (gastroesophageal reflux disease)    • Gout    • Heart failure (HCC)    • Hyperlipidemia    • Morbid obesity (HCC)    • Myocardial infarction (HCC)    • Obstructive sleep apnea    • Obstructive uropathy    • Paroxysmal  atrial fibrillation (HCC)    • Peptic ulcer    • Peripheral vascular disease (HCC)    • Pulmonary edema    • Venous insufficiency        No Known Allergies    History reviewed. No pertinent surgical history.    Family History   Problem Relation Age of Onset   • Diabetes Father        Social History     Socioeconomic History   • Marital status:    • Number of children: 0   Tobacco Use   • Smoking status: Never Smoker   Vaping Use   • Vaping Use: Never used   Substance and Sexual Activity   • Alcohol use: Not Currently   • Drug use: Never   • Sexual activity: Defer           Objective   Physical Exam  Vitals reviewed.   Constitutional:       Appearance: He is well-developed. He is obese. He is ill-appearing.   HENT:      Head: Normocephalic and atraumatic.      Mouth/Throat:      Mouth: Mucous membranes are moist.      Pharynx: Oropharynx is clear.   Eyes:      Conjunctiva/sclera: Conjunctivae normal.      Pupils: Pupils are equal, round, and reactive to light.   Cardiovascular:      Rate and Rhythm: Normal rate and regular rhythm.      Pulses: Normal pulses.      Heart sounds: Normal heart sounds.   Pulmonary:      Effort: Pulmonary effort is normal.      Breath sounds: Examination of the right-lower field reveals decreased breath sounds and rales. Examination of the left-lower field reveals decreased breath sounds and rales. Decreased breath sounds and rales present. No wheezing.   Abdominal:      General: Bowel sounds are normal.      Palpations: Abdomen is soft.   Musculoskeletal:         General: Normal range of motion.      Cervical back: Normal range of motion and neck supple.      Right lower leg: Tenderness present.      Left lower leg: Tenderness present.   Skin:     General: Skin is warm and dry.      Capillary Refill: Capillary refill takes 2 to 3 seconds.   Neurological:      General: No focal deficit present.      Mental Status: He is alert and oriented to person, place, and time.      GCS: GCS  "eye subscore is 4. GCS verbal subscore is 5. GCS motor subscore is 6.      Cranial Nerves: Cranial nerves are intact.   Psychiatric:         Attention and Perception: Attention normal.         Mood and Affect: Mood normal.         Speech: Speech normal.         Behavior: Behavior normal. Behavior is cooperative.         Procedures         EKG shows atrial fibrillation with nonspecific T wave abnormalities in the lateral leads it was reviewed by myself and read by Dr. Lawrence was compared to previous dated 4/27/2022  ED Course  ED Course as of 09/16/22 0343   Fri Sep 16, 2022   0338 She was discussed with Nino the physician assistant with an hospitalist and will be admitted to Dr. Horvath [KW]      ED Course User Index  [KW] Gabriela Mendez, APRN      BP (!) 207/120   Pulse 64   Temp 97.5 °F (36.4 °C)   Resp 18   Ht 182.9 cm (72\")   Wt (!) 144 kg (318 lb)   SpO2 96%   BMI 43.13 kg/m²   Labs Reviewed   COMPREHENSIVE METABOLIC PANEL - Abnormal; Notable for the following components:       Result Value    Glucose 108 (*)     BUN 30 (*)     Creatinine 1.34 (*)     Calcium 8.3 (*)     Albumin 3.10 (*)     eGFR 54.6 (*)     All other components within normal limits    Narrative:     GFR Normal >60  Chronic Kidney Disease <60  Kidney Failure <15     BNP (IN-HOUSE) - Abnormal; Notable for the following components:    proBNP 7,936.0 (*)     All other components within normal limits    Narrative:     Among patients with dyspnea, NT-proBNP is highly sensitive for the detection of acute congestive heart failure. In addition NT-proBNP of <300 pg/ml effectively rules out acute congestive heart failure with 99% negative predictive value.    Results may be falsely decreased if patient taking Biotin.     CBC WITH AUTO DIFFERENTIAL - Abnormal; Notable for the following components:    RBC 3.72 (*)     Hemoglobin 9.9 (*)     Hematocrit 32.0 (*)     MCHC 30.9 (*)     RDW 20.5 (*)     RDW-SD 63.0 (*)     Eosinophil % 7.5 (*)     " All other components within normal limits   URINALYSIS W/ CULTURE IF INDICATED - Abnormal; Notable for the following components:    Appearance, UA Turbid (*)     Protein,  mg/dL (2+) (*)     Leuk Esterase, UA Large (3+) (*)     Nitrite, UA Positive (*)     All other components within normal limits    Narrative:     In absence of clinical symptoms, the presence of pyuria, bacteria, and/or nitrites on the urinalysis result does not correlate with infection.   URINALYSIS, MICROSCOPIC ONLY - Abnormal; Notable for the following components:    RBC, UA 0-2 (*)     WBC, UA 13-20 (*)     Bacteria, UA 4+ (*)     All other components within normal limits   COVID-19,CEPHEID/WENDIE,COR/JOSELIN/PAD/KAVON IN-HOUSE,NP SWAB IN TRANSPORT MEDIA 3-4 HR TAT, RT-PCR - Normal    Narrative:     Fact sheet for providers: https://www.fda.gov/media/452840/download     Fact sheet for patients: https://www.fda.gov/media/897160/download  Fact sheet for providers: https://www.fda.gov/media/847377/download    Fact sheet for patients: https://www.fda.gov/media/527154/download    Test performed by PCR.   TROPONIN (IN-HOUSE) - Normal    Narrative:     Troponin T Reference Range:  <= 0.03 ng/mL-   Negative for AMI  >0.03 ng/mL-     Abnormal for myocardial necrosis.  Clinicians would have to utilize clinical acumen, EKG, Troponin and serial changes to determine if it is an Acute Myocardial Infarction or myocardial injury due to an underlying chronic condition.       Results may be falsely decreased if patient taking Biotin.     URINE CULTURE   CBC AND DIFFERENTIAL    Narrative:     The following orders were created for panel order CBC & Differential.  Procedure                               Abnormality         Status                     ---------                               -----------         ------                     CBC Auto Differential[[id number]]        Abnormal            Final result                 Please view results for these tests on the  individual orders.     Medications   sodium chloride 0.9 % flush 10 mL (has no administration in time range)   cloNIDine (CATAPRES) tablet 0.1 mg (has no administration in time range)   cloNIDine (CATAPRES) tablet 0.1 mg (0.1 mg Oral Given 9/16/22 0302)   nitroglycerin (NITROSTAT) ointment 1 inch (1 inch Topical Given 9/16/22 0302)   furosemide (LASIX) injection 80 mg (80 mg Intravenous Given 9/16/22 0315)                                          MDM  Number of Diagnoses or Management Options  Diagnosis management comments: Chart review shows an echo from 3/31/2022 with Dr. Vargas  Interpretation Summary    · Left ventricular systolic function is normal.  · Left ventricular ejection fraction is 55 to 60%  · Left ventricular diastolic function was normal.    Last Admission 8/13/2022  Hospital Course:     The patient is a 77-year-old female with history of type 2 diabetes mellitus, atrial fibrillation and CVA that presented to the emergency room on 8 6/13/2022 complaining of 2 weeks of groin pain which started after he had recent colonoscopy.     ED work-up revealed BUN 62, creatinine 1.93 and Motley catheter was replaced in the ED with 500 cc of urine out.     The patient was placed on observation.  He was evaluated by nephrologist.  MAKI was thought to have been due to excessive diuresis.  He was given gentle IV fluid hydration which improved his renal function.  He was discharged to [facility] on 8/16/2022.  He will need to follow-up with nephrology and urology in 2 weeks.  Bumex and Aldactone have been discontinued on discharge.        DISCHARGE Follow Up Recommendations for labs and diagnostics:     Patient had IV established and blood work was obtained-BP was 203/128 and patient was given 0.1 mg of clonidine and 1 inch of nitroglycerin paste was applied-           Amount and/or Complexity of Data Reviewed  Clinical lab tests: reviewed    Risk of Complications, Morbidity, and/or Mortality  Presenting problems:  high  Diagnostic procedures: high  Management options: high        Final diagnoses:   Chest pain, unspecified type   Shortness of breath   Class 3 severe obesity with serious comorbidity and body mass index (BMI) of 40.0 to 44.9 in adult, unspecified obesity type (HCC)   Acute on chronic congestive heart failure, unspecified heart failure type (HCC)   Hypertension, unspecified type       ED Disposition  ED Disposition     ED Disposition   Decision to Admit    Condition   --    Comment   Level of Care: Telemetry [5]   Admitting Physician: DEQUAN CAREY [[id number]]   Attending Physician: DEQUAN CAREY [[id number]]               No follow-up provider specified.       Medication List      No changes were made to your prescriptions during this visit.          Gabriela Mendez, APRN  09/16/22 0343

## 2022-09-16 NOTE — CASE MANAGEMENT/SOCIAL WORK
Discharge Planning Assessment   [place]     Patient Name: Shaji Junior  MRN: [id number]  Today's Date: 9/16/2022    Admit Date: 9/16/2022     Discharge Needs Assessment     Row Name 09/16/22 0914       Living Environment    People in Home facility resident    Current Living Arrangements extended care facility    Provides Primary Care For no one, unable/limited ability to care for self    Family Caregiver if Needed none    Able to Return to Prior Arrangements yes       Transition Planning    Patient/Family Anticipates Transition to long-term care facility    Patient/Family Anticipated Services at Transition none    Transportation Anticipated health plan transportation  Anticipate [facility]       Discharge Needs Assessment    Concerns to be Addressed denies needs/concerns at this time    Anticipated Changes Related to Illness none    Current Discharge Risk cognitively impaired               Discharge Plan     Row Name 09/16/22 0916       Plan    Plan Return to [facility]. No precert/PASRR needed    Patient/Family in Agreement with Plan other (see comments)  Pt has confusion, but states he plans to return to facility.  unable to reach his sister, Michelle    Plan Comments  spoke with patient at bedside. He is oriented to person and that he is in a hospital.He is unaware of the date or the name of the facility of where he lives([facility]),but states he wants to return there. Confirmed with liakaylee Bull that patient can return there at [place],but will need transportation.  Yanira and LISA Contreras notified. Attempted to contact patient's sister Michelle, but there was no answer. I spoke with patient's nurse at [facility].She states the confusion I was describing to her is patient's baseline.              Continued Care and Services - Admitted Since 9/16/2022     Destination     Service Provider Request Status Selected Services Address Phone Fax Patient Preferred     "[facility]  Accepted N/A [address] [place] [address] [phone] --            Selected Continued Care - Prior Encounters Includes selections from prior encounters from 6/18/2022 to 9/16/2022    Discharged on 8/16/2022 Admission date: 8/13/2022 - Discharge disposition: Home or Self Care    Destination     Service Provider Selected Services Address Phone Fax Patient Preferred    [facility]  Skilled Nursing [address] [place] [place] [address] [phone] --                    Expected Discharge Date and Time     Expected Discharge Date Expected Discharge Time    Sep 23, 2022          Demographic Summary     Row Name 09/16/22 0911       General Information    Admission Type inpatient    Arrived From long-term [facility]    Required Notices Provided other (see comments)  Attempted to provide patient with Refusal of Transfer to [facility],but patient is confused    Referral Source admission list    Reason for Consult discharge planning    Preferred Language English       Contact Information    Permission Granted to Share Info With ;facility                Functional Status     Row Name 09/16/22 0911       Functional Status    Usual Activity Tolerance poor       Mental Status Summary    Recent Changes in Mental Status/Cognitive Functioning mental status    Mental Status Comments Pt oriented to self.Didn't know date \"2004\".Knew he was in a hospital,but thought it was in [facility] [place]. Knew he lived \"with a bunch of people\",but was unaware of the name of the facility ([facility]). Knew his sister lives in [place]                Nicole Rosales RN, [facility]  Office: [phone]  Fax: [phone]  [email or web address]      I met with patient in room wearing PPE: mask and goggles.     Maintained distance greater than six feet and spent </=15 minutes in the room        Nicole Rosales RN    "

## 2022-09-16 NOTE — ED NOTES
Patient's  at this time, does not require insulin per sliding scale. Patient provided with glass of water and made comfortable in bed. Call light within reach.   Anesthesia Volume In Cc (Will Not Render If 0): 0.5 Cryotherapy Text: The wound bed was treated with cryotherapy after the biopsy was performed. Biopsy Type: H and E Lab: 6 Render Post-Care Instructions In Note?: no Body Location Override (Optional - Billing Will Still Be Based On Selected Body Map Location If Applicable): Mid Chest Was A Bandage Applied: Yes Detail Level: Simple Type Of Destruction Used: Curettage Billing Type: Third-Party Bill Hemostasis: [facility] Facility: 3 Biopsy Method: double edge Personna blade Electrodesiccation Text: The wound bed was treated with electrodesiccation after the biopsy was performed. Anesthesia Type: 1% lidocaine with epinephrine Notification Instructions: Patient will be notified of biopsy results. However, patient instructed to call the office if not contacted within 2 weeks. Dressing: bandage Post-Care Instructions: I reviewed with the patient in detail post-care instructions. Patient is to keep the biopsy site dry overnight, and then apply polysporin twice daily until healed. Electrodesiccation And Curettage Text: The wound bed was treated with electrodesiccation and curettage after the biopsy was performed. Triangulation (Location Of Lesion In Relation To Distance From Anatomical Landmarks): 19 Left axilla cr , 16 16 axilla crease Size Of Lesion In Cm: 0 Wound Care: Polysporin ointment Depth Of Biopsy: dermis Consent: Written consent was obtained and risks were reviewed including but not limited to scarring, infection, bleeding, scabbing, incomplete removal, nerve damage and allergy to anesthesia. Curettage Text: The wound bed was treated with curettage after the biopsy was performed. Silver Nitrate Text: The wound bed was treated with silver nitrate after the biopsy was performed. Body Location Override (Optional - Billing Will Still Be Based On Selected Body Map Location If Applicable): L eyebrow Triangulation (Location Of Lesion In Relation To Distance From Anatomical Landmarks): 4 outer eye, 5.5 glabella

## 2022-09-16 NOTE — DISCHARGE PLACEMENT REQUEST
"Cyrus Ramirez (77 y.o. Male)             Date of Birth   1945    Social Security Number       Address   [address] [place] [address] [place] [address]    Home Phone   [phone]    MRN   [id number]       Taoist   None    Marital Status                               Admission Date   9/16/22    Admission Type   Emergency    Admitting Provider   Matthieu Porter MD    Attending Provider   Corinna Miranda DO    Department, Room/Bed   [facility] EMERGENCY DEPARTMENT, 17/17       Discharge Date       Discharge Disposition       Discharge Destination                               Attending Provider: Corinna Miranda DO    Allergies: No Known Allergies    Isolation: None   Infection: ESBL (04/01/22), COVID (rule out) (09/16/22)   Code Status: CPR   Advance Care Planning Activity    Ht: 182.9 cm (72\")   Wt: 144 kg (318 lb)    Admission Cmt: None   Principal Problem: Acute on chronic congestive heart failure, unspecified heart failure type (HCC) [I50.9]                 Active Insurance as of 9/16/2022     Primary Coverage     Payor Plan Insurance Group Employer/Plan Group    [facility] DE[id number]      Payor Plan Address Payor Plan Phone Number Payor Plan Fax Number Effective Dates    [facility] OFFICE OF COMMUNITY CARE [phone]  6/1/2021 - None Entered    [address] [address]       [facility] [address]       Subscriber Name Subscriber Birth Date Member ID       CYRUS RAMIREZ 1945 [id number]           Secondary Coverage     Payor Plan Insurance Group Employer/Plan Group    [facility] [employer] OPTUM      Payor Plan Address Payor Plan Phone Number Payor Plan Fax Number Effective Dates    [address] [phone]  1/1/2022 - None Entered    [facility] [id number]       Subscriber Name Subscriber Birth Date Member ID       CYRUS RAMIREZ 1945 [id number]                 Emergency Contacts          No emergency contacts on file.        Nicole Rosales RN, " [facility]  Office: [phone]  Fax: [phone]  [email or web address]      I met with patient in room wearing PPE: mask and goggles.     Maintained distance greater than six feet and spent </=15 minutes in the room

## 2022-09-16 NOTE — H&P
[facility] Medicine Services      Patient Name: Shaji Junior  : 1945  MRN: [id number]  Primary Care Physician:  Naa Valverde MD  Date of admission: 2022      Subjective      Chief Complaint: chest pain    History of Present Illness: Shaji Junior is a 77 y.o. male who presented to [facility] ER on 2022 complaining of chest pain shortness of breath for about the past 3 days.  Patient states that his chest pain worsened over the past day or so.  Patient states that his pain was severe earlier that he described as a chest tightness that was nonradiating but states this is now resolved.  Patient reports shortness of breath that is worse with exertion and better with rest.  Patient denies any cough, fever, chills, vomiting, diarrhea or urinary symptoms.  Patient states he has an indwelling Motley catheter for history of urinary incontinence.  Patient also reports bilateral leg swelling for the past week.      In the ED, initial troponin negative.  BNP elevated at 7900, which was 4000 about 5 months ago.  Serum creatinine 1.34 about baseline for patient, GFR 54.  Last hemoglobin A1c elevated at 6.9 about 1 month ago.  Hemoglobin 9.9 about the same from a month ago, normocytic normochromic.  UA shows 3+ leukocyte Estrace, nitrite positive and 13-20 WBCs and 4+ bacteria.  Urine culture pending.  COVID-19 swab negative.  EKG shows A. fib with nonspecific T wave abnormalities in lateral leads.  Chest x-ray reviewed and interpreted by myself shows some increased vascular congestion, stable cardiomegaly, official radiology read pending.  Last echo done on 3/31/2022 showed EF 55 to 60%.  Patient is afebrile, pulse in the 50s, on room air oxygen 96% SPO2.  Blood pressure elevated 190s over 110s.  Patient given 1 inch of Nitropaste and 80 Lasix IV as well as clonidine 0.1 mg p.o. in ED.  I accepted patient on behalf of of hospitalist team and  and spoke with ER  provider, MELISA Castle.      Review of Systems   Constitutional: Positive for malaise/fatigue. Negative for chills and fever.   HENT: Negative.    Cardiovascular: Positive for chest pain, dyspnea on exertion and leg swelling. Negative for near-syncope and syncope.   Respiratory: Positive for shortness of breath. Negative for cough and wheezing.    Skin: Negative.  Negative for rash.   Musculoskeletal: Negative.    Gastrointestinal: Negative.  Negative for abdominal pain, diarrhea, nausea and vomiting.   Genitourinary: Negative.  Negative for dysuria, flank pain and frequency.   Neurological: Negative.    Psychiatric/Behavioral: Negative.         Personal History     Past Medical History:   Diagnosis Date   • Acute kidney failure (HCC)    • Acute respiratory failure with hypoxia (HCC)    • Anemia    • Benign prostatic hyperplasia    • Bilateral primary osteoarthritis of knee    • Cardiomegaly    • Cardiomyopathy (HCC)    • Cellulitis and abscess of left leg    • Cerebral infarction (HCC)    • Cerebrovascular disease    • Chronic kidney disease    • Dementia (HCC)    • Diabetes mellitus (HCC)    • Essential hypertension    • GERD (gastroesophageal reflux disease)    • Gout    • Heart failure (HCC)    • Hyperlipidemia    • Morbid obesity (HCC)    • Myocardial infarction (HCC)    • Obstructive sleep apnea    • Obstructive uropathy    • Paroxysmal atrial fibrillation (HCC)    • Peptic ulcer    • Peripheral vascular disease (HCC)    • Pulmonary edema    • Venous insufficiency        History reviewed. No pertinent surgical history.    Family History: family history includes Diabetes in his father. Otherwise pertinent FHx was reviewed and not pertinent to current issue.    Social History:  reports that he has never smoked. He does not have any smokeless tobacco history on file. He reports previous alcohol use. He reports that he does not use drugs.    Home Medications:  Prior to Admission Medications     Prescriptions Last  Dose Informant Patient Reported? Taking?    acetaminophen (TYLENOL) 325 MG tablet   Yes No    Take 650 mg by mouth Every 4 (Four) Hours As Needed for Mild Pain .    albuterol sulfate  (90 Base) MCG/ACT inhaler   Yes No    Inhale 2 puffs Every 4 (Four) Hours As Needed (Cough).    apixaban (ELIQUIS) 5 MG tablet tablet   No No    Take 1 tablet by mouth Every 12 (Twelve) Hours. Indications: Atrial Fibrillation    carvedilol (COREG) 12.5 MG tablet   Yes No    Take 12.5 mg by mouth 2 (Two) Times a Day.    escitalopram (LEXAPRO) 10 MG tablet   Yes No    Take 10 mg by mouth Daily.    finasteride (PROSCAR) 5 MG tablet   Yes No    Take 5 mg by mouth Daily.    gabapentin (NEURONTIN) 300 MG capsule   Yes No    Take 300 mg by mouth 3 (Three) Times a Day.    hydrALAZINE (APRESOLINE) 25 MG tablet   No No    Take 1 tablet by mouth 3 (Three) Times a Day.    insulin glargine (LANTUS, SEMGLEE) 100 UNIT/ML injection   Yes No    Inject 10 Units under the skin into the appropriate area as directed Every Morning. Hold for glucose less than 110    isosorbide mononitrate (IMDUR) 30 MG 24 hr tablet   Yes No    Take 30 mg by mouth 2 (Two) Times a Day.    Polyethylene Glycol 3350 powder   Yes No    Take 17 g by mouth Every Night.    saccharomyces boulardii (FLORASTOR) 250 MG capsule   Yes No    Take 250 mg by mouth 2 (Two) Times a Day.    simvastatin (ZOCOR) 40 MG tablet   Yes No    Take 40 mg by mouth every night at bedtime.    tamsulosin (FLOMAX) 0.4 MG capsule 24 hr capsule   Yes No    Take 1 capsule by mouth Daily.            Allergies:  No Known Allergies    Objective      Vitals:   Temp:  [97.5 °F (36.4 °C)] 97.5 °F (36.4 °C)  Heart Rate:  [46-64] 55  Resp:  [18] 18  BP: (180-216)/(106-128) 202/106    Physical Exam  Vitals and nursing note reviewed.   Constitutional:       General: He is not in acute distress.     Appearance: He is well-developed. He is not diaphoretic.   HENT:      Head: Normocephalic and atraumatic.      Right  Ear: External ear normal.      Left Ear: External ear normal.      Nose: Nose normal.      Mouth/Throat:      Pharynx: No oropharyngeal exudate.   Eyes:      Extraocular Movements: Extraocular movements intact.      Conjunctiva/sclera: Conjunctivae normal.      Pupils: Pupils are equal, round, and reactive to light.   Cardiovascular:      Rate and Rhythm: Normal rate and regular rhythm.      Pulses: Normal pulses.      Heart sounds: Normal heart sounds.      Comments: S1, S2 audible.  Pulmonary:      Effort: Pulmonary effort is normal. No respiratory distress.      Breath sounds: Rales (faint bilaterally) present. No wheezing or rhonchi.      Comments: On room air oxygen.  Decreased breath sounds bilaterally likely secondary to body habitus.  Chest:      Chest wall: No tenderness.   Abdominal:      General: Bowel sounds are normal. There is no distension.      Palpations: Abdomen is soft.      Tenderness: There is no abdominal tenderness. There is no guarding or rebound.   Musculoskeletal:         General: No tenderness or deformity. Normal range of motion.      Cervical back: Normal range of motion.      Right lower leg: Edema (2-3) present.      Left lower leg: Edema (2-3+) present.   Skin:     General: Skin is warm.      Capillary Refill: Capillary refill takes less than 2 seconds.      Findings: No erythema or rash.   Neurological:      Mental Status: He is alert and oriented to person, place, and time.      Cranial Nerves: No cranial nerve deficit.   Psychiatric:         Mood and Affect: Mood normal.         Behavior: Behavior normal.          Result Review    Result Review:  I have personally reviewed the results from the time of this admission to 9/16/2022 04:40 EDT and agree with these findings:  [x]  Laboratory  [x]  Microbiology  [x]  Radiology  [x]  EKG/Telemetry   []  Cardiology/Vascular   []  Pathology  []  Old records  []  Other:        Assessment & Plan        Active Hospital Problems:  Active  Hospital Problems    Diagnosis    • **Acute on chronic congestive heart failure, unspecified heart failure type (HCC)    • Chest pain    • Shortness of breath    • Essential hypertension    • Acute UTI (urinary tract infection)    • Normocytic normochromic anemia    • CKD (chronic kidney disease), stage III ([facility])    • BPH without obstruction/lower urinary tract symptoms    • Atrial fibrillation (HCC)    • Congestive heart failure (HCC)    • Type 2 diabetes mellitus with diabetic nephropathy (HCC)    • Cerebrovascular accident (HCC)      Plan:       -----Home meds not yet reconciled----      Acute on chronic CHF, diastolic  -Likely cause of patient's shortness of breath  - initial troponin negative.    -BNP elevated at 7900, which was 4000 about 5 months ago.     - COVID-19 swab negative.   - EKG shows A. fib with nonspecific T wave abnormalities in lateral leads.  Lead tracing not yet uploaded in epic  -Chest x-ray reviewed and interpreted by myself shows some increased vascular congestion, stable cardiomegaly, official radiology read pending.    -Last echo done on 3/31/2022 showed EF 55 to 60%.    -Patient is afebrile, pulse in the 50s, on room air oxygen 96% SPO2.  Blood pressure elevated 190s over 110s.    -Patient given 1 inch of Nitropaste and 80 Lasix IV as well as clonidine 0.1 mg p.o. in ED.   -Cardiology consult, Dr. Vargas  -Continue IV Lasix 40 mg twice daily  -Continuous cardiac monitoring  -Heart healthy diabetic diet    Essential hypertension, poorly controlled  -Blood pressure systolic over 200 in ED  -Patient given clonidine and Nitropaste  -As needed hydralazine ordered  -Continue home antihypertensives    Acute UTI  -UA shows 3+ leukocyte Estrace, nitrite positive and 13-20 WBCs and 4+ bacteria.  Urine culture pending.  -Continue Rocephin  -Follow urine culture, patient has had several UTIs in the past likely secondary to indwelling Motley catheter    Diabetes mellitus type 2, poorly controlled, with  peripheral neuropathy  -Last hemoglobin A1c elevated at 6.9 about 1 month ago.    -SSI, Accu-Cheks 3 times daily AC  -Hold oral agents  -Continue home Neurontin  -Continue home Lantus    Normocytic normochromic anemia, chronic  -Likely secondary to CKD  -Hemoglobin 9.9 about the same from a month ago, normocytic normochromic.    -Monitor hemoglobin    CKD stage III  -Serum creatinine 1.34 about baseline for patient, GFR 54  -Follows with nephrology, Dr. Stein  -Follow-up BMP    History of atrial fibrillation, chronic  -Currently rate controlled  -Continue home Eliquis  -Continue home Coreg    History of CVA    Hyperlipidemia, chronic  -Check lipid panel  -Continue home statin    Depression  -Chronic, controlled  -Continue home Lexapro    BPH  -Continue home finasteride, Flomax  -Motley catheter in place    Morbid obesity, BMI 43.13  -Encourage lifestyle modifications.          DVT prophylaxis:  Mechanical DVT prophylaxis orders are present.    CODE STATUS:    Code Status (Patient has no pulse and is not breathing): CPR (Attempt to Resuscitate)  Medical Interventions (Patient has pulse or is breathing): Full Support    Admission Status:  I believe this patient meets inpatient status.    I discussed the patient's findings and my recommendations with patient.    This patient has been examined wearing appropriate Personal Protective Equipment and discussed with hospital infection control department. 09/16/22      Signature: Electronically signed by CHARMAINE Kim, 09/16/22, 4:40 AM EDT.

## 2022-09-16 NOTE — CONSULTS
[facility] CONSULT NOTE    Referring Provider: Dr. TOM Miranda  Reason for Consultation: Renal insufficiency, fluid overload    Chief complaint.  Shortness of breath, chest discomfort and lower extreme edema    History of present illness:    Patient is 77 years old  male known to me from previous hospitalization and has history of acute kidney injury related to diuretics.  Patient has history of hypertension, diabetes, dementia, CHF?,  Presented to hospital with shortness of breath, lower chest discomfort.  Patient has chronic lower extremity edema but has worsened recently per patient but overall patient is not very good historian.  Patient denies any fever, chills, cough, nausea, vomiting, diarrhea, constipation, hematuria or dysuria.  Patient previously had elevated creatinine due to diuretics but recently his creatinine staying around 1.3-1.6 Mg/DL.  Patient received IV Lasix earlier today in ER.  [place] cath in place and has good urine output    History  Past Medical History:   Diagnosis Date   • Acute kidney failure (HCC)    • Acute respiratory failure with hypoxia (HCC)    • Anemia    • Benign prostatic hyperplasia    • Bilateral primary osteoarthritis of knee    • Cardiomegaly    • Cardiomyopathy (HCC)    • Cellulitis and abscess of left leg    • Cerebral infarction (HCC)    • Cerebrovascular disease    • Chronic kidney disease    • Dementia (HCC)    • Diabetes mellitus (HCC)    • Essential hypertension    • GERD (gastroesophageal reflux disease)    • Gout    • Heart failure (HCC)    • Hyperlipidemia    • Morbid obesity (HCC)    • Myocardial infarction (HCC)    • Obstructive sleep apnea    • Obstructive uropathy    • Paroxysmal atrial fibrillation (HCC)    • Peptic ulcer    • Peripheral vascular disease (HCC)    • Pulmonary edema    • Venous insufficiency      History reviewed. No pertinent surgical history.  Social History     Tobacco Use   • Smoking status: Never Smoker   Vaping Use   • Vaping  Use: Never used   Substance Use Topics   • Alcohol use: Not Currently   • Drug use: Never     Family History   Problem Relation Age of Onset   • Diabetes Father        Review of Systems  ROS  As per HPI  Objective     Vital Signs  Temp:  [97.5 °F (36.4 °C)] 97.5 °F (36.4 °C)  Heart Rate:  [46-79] 79  Resp:  [16-18] 16  BP: (136-216)/() 136/81    I/O this shift:  In: 100 [IV Piggyback:100]  Out: 2500 [Urine:2500]  No intake/output data recorded.    Physical Exam:  Physical Exam    General Appearance: Chronically ill-appearing, morbidly obese  Skin: warm and dry  HEENT: oral mucosa normal, nonicteric sclera  Neck: supple, no JVD  Lungs: Decreased breath sounds at bases  Heart: RRR, normal S1 and S2  Abdomen: soft, nontender, nondistended  : no palpable bladder  Extremities: 2-3+ bilateral lower extreme edema with venous stasis changes present.   Neuro: normal speech and mental status     Results Review:   I reviewed the patient's new clinical results.    Lab Results   Component Value Date    CALCIUM 8.2 (L) 09/16/2022    PHOS 3.6 08/16/2022     Results from last 7 days   Lab Units 09/16/22  0538 09/16/22  0237   SODIUM mmol/L 145 143   POTASSIUM mmol/L 4.0 4.4   CHLORIDE mmol/L 110* 106   CO2 mmol/L 27.0 27.0   BUN mg/dL 29* 30*   CREATININE mg/dL 1.32* 1.34*   GLUCOSE mg/dL 117* 108*   CALCIUM mg/dL 8.2* 8.3*   WBC 10*3/mm3 4.60 4.80   HEMOGLOBIN g/dL 9.6* 9.9*   PLATELETS 10*3/mm3 214 212   ALT (SGPT) U/L  --  8   AST (SGOT) U/L  --  16     Lab Results   Component Value Date    TROPONINT 0.028 09/16/2022     Estimated Creatinine Clearance: 68.9 mL/min (A) (by C-G formula based on SCr of 1.32 mg/dL (H)).No results found for: URICACID    Brief Urine Lab Results  (Last result in the past 365 days)      Color   Clarity   Blood   Leuk Est   Nitrite   Protein   CREAT   Urine HCG        09/16/22 0259 Yellow   Turbid   Negative   Large (3+)   Positive   100 mg/dL (2+)                 Prior to Admission medications     Medication Sig Start Date End Date Taking? Authorizing Provider   acetaminophen (TYLENOL) 325 MG tablet Take 650 mg by mouth Every 4 (Four) Hours As Needed for Mild Pain .    Michelle Esquivel MD   albuterol sulfate  (90 Base) MCG/ACT inhaler Inhale 2 puffs Every 4 (Four) Hours As Needed (Cough).    Michelle Esquivel MD   apixaban (ELIQUIS) 5 MG tablet tablet Take 1 tablet by mouth Every 12 (Twelve) Hours. Indications: Atrial Fibrillation 4/6/22   Ba Banuelos MD   carvedilol (COREG) 12.5 MG tablet Take 12.5 mg by mouth 2 (Two) Times a Day.    Michelle Esquivel MD   escitalopram (LEXAPRO) 10 MG tablet Take 10 mg by mouth Daily.    Michelle Esquivel MD   finasteride (PROSCAR) 5 MG tablet Take 5 mg by mouth Daily.    Michelle Esquivel MD   gabapentin (NEURONTIN) 300 MG capsule Take 300 mg by mouth 3 (Three) Times a Day.    Michelle Esquivel MD   hydrALAZINE (APRESOLINE) 25 MG tablet Take 1 tablet by mouth 3 (Three) Times a Day. 4/6/22   Ba Banuelos MD   insulin glargine (LANTUS, SEMGLEE) 100 UNIT/ML injection Inject 10 Units under the skin into the appropriate area as directed Every Morning. Hold for glucose less than 110    Michelle Esquivel MD   isosorbide mononitrate (IMDUR) 30 MG 24 hr tablet Take 30 mg by mouth 2 (Two) Times a Day.    Michelle Esquivel MD   Polyethylene Glycol 3350 powder Take 17 g by mouth Every Night.    Michelle Esquivel MD   saccharomyces boulardii (FLORASTOR) 250 MG capsule Take 250 mg by mouth 2 (Two) Times a Day.    Michelle Esquivel MD   simvastatin (ZOCOR) 40 MG tablet Take 40 mg by mouth every night at bedtime.    Michelle Esquivel MD   tamsulosin (FLOMAX) 0.4 MG capsule 24 hr capsule Take 1 capsule by mouth Daily.    Michelle Esquivel MD       apixaban, 5 mg, Oral, Q12H  atorvastatin, 20 mg, Oral, Daily  carvedilol, 12.5 mg, Oral, BID  [START ON 9/17/2022] cefTRIAXone, 2 g, Intravenous, Q24H  escitalopram, 10 mg, Oral,  Daily  finasteride, 5 mg, Oral, Daily  furosemide, 40 mg, Intravenous, Q12H  gabapentin, 300 mg, Oral, TID  hydrALAZINE, 25 mg, Oral, TID  hydrALAZINE, 25 mg, Oral, Once  insulin glargine, 10 Units, Subcutaneous, QAM  insulin lispro, 0-9 Units, Subcutaneous, TID AC  isosorbide mononitrate, 30 mg, Oral, BID  sodium chloride, 10 mL, Intravenous, Q12H  tamsulosin, 0.4 mg, Oral, Daily           Assessment & Plan       1.  Chronic kidney disease stage IIIa  Patient seems to have underlying CKD related to hypertension and diabetes but his creatinine has fluctuated due to diuretics.  Electrolytes okay but volume status generous.  Patient receiving IV Lasix  2.  Hypervolemia.  Acute on chronic CHF, diastolic.  Patient may also have venous insufficiency in bilateral lower extremities and pulmonary hypertension  Patient is on IV Lasix and has good urine output  3.  Hypertension with CKD  Patient blood pressure was very high on presentation but improving now after restarting outpatient antihypertensives  4.  Chest pain.  Cardiology evaluating    Recs:  -Continue IV Lasix.  I will monitor renal function while being diuresed  -Continue current and hypertensives  -Order urine protein/creatinine    I discussed the patients findings and my recommendations with patient and nursing staff    Max Stein MD  09/16/22  14:11 EDT

## 2022-09-16 NOTE — CONSULTS
CARDIOLOGY CONSULT NOTE      Referring Provider: Dr. Miranda    Reason for Consultation: Shortness of breath    Attending: Corinna Miranda DO    Chief complaint    Shortness of breath    Subjective .     History of present illness:  Shaji Junior is a 77 y.o. male who presents with patient reports over the last 24 hours preceding admission he had progressive shortness of breath and chest discomfort.    Patient was previously admitted to [facility] back in March 2022 and evaluated by Dr. Vargas.    He is known to have history of previous heart failure, CKD, previous CVA, chronic lower extremity edema and cellulitis, hypertension, dyslipidemia, paroxysmal atrial fibrillation and reported previous MI.    He has not had any previous invasive or noninvasive ischemic evaluations at [facility].    Echocardiogram in March 2022 showed preserved LV function with no significant valvular flow abnormalities.  His ejection fraction was 50 to 55%.    Patient resides at a long-term care facility.    Labs on admission include proBNP of 7936.  Troponin is less than 0.03x2.  Hemoglobin is 9.6 BUN and creatinine 29 and 1.3.    Chest x-ray suggest cardiomegaly and pulmonary vascular congestion with mild bibasilar atelectasis.    EKG on admission appears to be atrial flutter with controlled ventricular rates.    Patient tells me that he had a stress Myoview done at the [facility] approximately 4 months ago and was told it was normal    Review of Systems   Constitutional: Positive for malaise/fatigue. Negative for chills and fever.   HENT: Negative for ear discharge and nosebleeds.    Eyes: Negative for discharge and redness.   Cardiovascular: Positive for leg swelling. Negative for chest pain, orthopnea, palpitations, paroxysmal nocturnal dyspnea and syncope.   Respiratory: Positive for shortness of breath, sleep disturbances due to breathing and snoring. Negative for cough and wheezing.    Endocrine: Negative for heat  intolerance.   Skin: Negative for rash.   Musculoskeletal: Positive for arthritis, back pain and joint pain. Negative for myalgias.   Gastrointestinal: Negative for abdominal pain, melena, nausea and vomiting.   Genitourinary: Negative for dysuria and hematuria.   Neurological: Negative for dizziness, light-headedness, numbness and tremors.   Psychiatric/Behavioral: Negative for depression. The patient is not nervous/anxious.        History  Past Medical History:   Diagnosis Date   • Acute kidney failure (HCC)    • Acute respiratory failure with hypoxia (HCC)    • Anemia    • Benign prostatic hyperplasia    • Bilateral primary osteoarthritis of knee    • Cardiomegaly    • Cardiomyopathy (HCC)    • Cellulitis and abscess of left leg    • Cerebral infarction (HCC)    • Cerebrovascular disease    • Chronic kidney disease    • Dementia (HCC)    • Diabetes mellitus (HCC)    • Essential hypertension    • GERD (gastroesophageal reflux disease)    • Gout    • Heart failure (HCC)    • Hyperlipidemia    • Morbid obesity (HCC)    • Myocardial infarction (HCC)    • Obstructive sleep apnea    • Obstructive uropathy    • Paroxysmal atrial fibrillation (HCC)    • Peptic ulcer    • Peripheral vascular disease (HCC)    • Pulmonary edema    • Venous insufficiency        History reviewed. No pertinent surgical history.    Family History   Problem Relation Age of Onset   • Diabetes Father        Social History     Tobacco Use   • Smoking status: Never Smoker   Vaping Use   • Vaping Use: Never used   Substance Use Topics   • Alcohol use: Not Currently   • Drug use: Never        (Not in a hospital admission)        Patient has no known allergies.    Scheduled Meds:apixaban, 5 mg, Oral, Q12H  atorvastatin, 20 mg, Oral, Nightly  carvedilol, 12.5 mg, Oral, BID With Meals  [START ON 9/17/2022] cefTRIAXone, 2 g, Intravenous, Q24H  escitalopram, 10 mg, Oral, Daily  finasteride, 5 mg, Oral, Daily  furosemide, 40 mg, Intravenous,  "Q12H  gabapentin, 300 mg, Oral, TID  hydrALAZINE, 25 mg, Oral, Once  hydrALAZINE, 50 mg, Oral, TID  insulin glargine, 10 Units, Subcutaneous, Nightly  insulin lispro, 0-9 Units, Subcutaneous, TID AC  isosorbide mononitrate, 30 mg, Oral, BID  sodium chloride, 10 mL, Intravenous, Q12H  tamsulosin, 0.4 mg, Oral, Daily      Continuous Infusions:   PRN Meds:.•  acetaminophen **OR** acetaminophen **OR** acetaminophen  •  aluminum-magnesium hydroxide-simethicone  •  dextrose  •  dextrose  •  glucagon (human recombinant)  •  hydrALAZINE  •  magnesium sulfate **OR** magnesium sulfate **OR** magnesium sulfate  •  melatonin  •  nitroglycerin  •  ondansetron **OR** ondansetron  •  potassium chloride **OR** potassium chloride **OR** potassium chloride  •  sodium chloride  •  sodium chloride    Objective     VITAL SIGNS  Vitals:    09/16/22 0803 09/16/22 0831 09/16/22 1113 09/16/22 1147   BP: (!) 166/101 (!) 164/102 166/93 136/81   Pulse: 74 60 68 79   Resp: 16 16 16 16   Temp:       SpO2: 97% 98% 95% 95%   Weight:       Height:           Flowsheet Rows    Flowsheet Row First Filed Value   Admission Height 182.9 cm (72\") Documented at 09/16/2022 0213   Admission Weight 144 kg (318 lb) Documented at 09/16/2022 0213          Body mass index is 43.13 kg/m².     TELEMETRY: Atrial fibrillation/flutter    Physical Exam:  Constitutional:       Appearance: Well-developed.   Eyes:      General: No scleral icterus.        Right eye: No discharge.   HENT:      Head: Normocephalic and atraumatic.   Neck:      Thyroid: No thyromegaly.      Lymphadenopathy: No cervical adenopathy.   Pulmonary:      Effort: Pulmonary effort is normal. No respiratory distress.      Breath sounds: Normal breath sounds. No wheezing. No rales.   Cardiovascular:      Normal rate. Regular rhythm.      No gallop.   Edema:     Peripheral edema present.  Abdominal:      Tenderness: There is no abdominal tenderness.   Skin:     Findings: No erythema or rash. "   Neurological:      Mental Status: Alert and oriented to person, place, and time.          Results Review:   I reviewed the patient's new clinical results.    CBC    Results from last 7 days   Lab Units 09/16/22  0538 09/16/22  0237   WBC 10*3/mm3 4.60 4.80   HEMOGLOBIN g/dL 9.6* 9.9*   PLATELETS 10*3/mm3 214 212     BMP   Results from last 7 days   Lab Units 09/16/22  0538 09/16/22  0237   SODIUM mmol/L 145 143   POTASSIUM mmol/L 4.0 4.4   CHLORIDE mmol/L 110* 106   CO2 mmol/L 27.0 27.0   BUN mg/dL 29* 30*   CREATININE mg/dL 1.32* 1.34*   GLUCOSE mg/dL 117* 108*     Cr Clearance Estimated Creatinine Clearance: 68.9 mL/min (A) (by C-G formula based on SCr of 1.32 mg/dL (H)).  Coag     HbA1C   Lab Results   Component Value Date    HGBA1C 6.0 (H) 09/16/2022    HGBA1C 6.9 (H) 08/14/2022    HGBA1C 6.4 (H) 03/31/2022     Blood Glucose   Glucose   Date/Time Value Ref Range Status   09/16/2022 1255 146 (H) 70 - 105 mg/dL Final     Comment:     Serial Number: [id number]:  [id number]   09/16/2022 0721 106 (H) 70 - 105 mg/dL Final     Comment:     Serial Number: [id number]:  [id number]     Infection   Results from last 7 days   Lab Units 09/16/22  0237   PROCALCITONIN ng/mL 0.04     CMP   Results from last 7 days   Lab Units 09/16/22  0538 09/16/22  0237   SODIUM mmol/L 145 143   POTASSIUM mmol/L 4.0 4.4   CHLORIDE mmol/L 110* 106   CO2 mmol/L 27.0 27.0   BUN mg/dL 29* 30*   CREATININE mg/dL 1.32* 1.34*   GLUCOSE mg/dL 117* 108*   ALBUMIN g/dL  --  3.10*   BILIRUBIN mg/dL  --  0.4   ALK PHOS U/L  --  104   AST (SGOT) U/L  --  16   ALT (SGPT) U/L  --  8     ABG      UA    Results from last 7 days   Lab Units 09/16/22  0259   NITRITE UA  Positive*   WBC UA /HPF 13-20*   BACTERIA UA /HPF 4+*   SQUAM EPITHEL UA /HPF 0-2     BEBETO  No results found for: POCMETH, POCAMPHET, POCBARBITUR, POCBENZO, POCCOCAINE, POCOPIATES, POCOXYCODO, POCPHENCYC, POCPROPOXY, POCTHC, POCTRICYC  Lysis Labs   Results from last 7 days   Lab  Units 09/16/22  0538 09/16/22  0237   HEMOGLOBIN g/dL 9.6* 9.9*   PLATELETS 10*3/mm3 214 212   CREATININE mg/dL 1.32* 1.34*     Radiology(recent) XR Chest 1 View    Result Date: 9/16/2022  1. Cardiomegaly and central pulmonary vascular congestion similar to comparison study. 2. Mild bibasilar atelectasis.  Electronically Signed By-Chico Henley MD On:9/16/2022 6:46 AM This report was finalized on [id number] by  Chico Henley MD.      Results from last 7 days   Lab Units 09/16/22  0538   TROPONIN T ng/mL 0.028       Imaging Results (Last 24 Hours)     Procedure Component Value Units Date/Time    XR Chest 1 View [[id number]] Collected: 09/16/22 0645     Updated: 09/16/22 0648    Narrative:         DATE OF EXAM:   9/16/2022 2:18 AM     PROCEDURE:   XR CHEST 1 VW-     INDICATIONS:   CHF/COPD Protocol     COMPARISON:  04/27/2022     TECHNIQUE:   Portable chest radiograph.     FINDINGS:    The heart is enlarged with central pulmonary vascular congestion  similar to prior study. Mild bibasilar atelectasis. No pneumothorax. No  significant pleural effusion. Osseous structures grossly intact.       Impression:      1. Cardiomegaly and central pulmonary vascular congestion similar to  comparison study.  2. Mild bibasilar atelectasis.     Electronically Signed By-Chico Henley MD On:9/16/2022 6:46 AM  This report was finalized on [id number] by  Chico Henley MD.          EKG            I personally viewed and interpreted the patient's EKG/Telemetry data:    ECHOCARDIOGRAM: 3/31/2022    Echocardiogram Findings    Left Ventricle Left ventricular systolic function is normal.   Normal left ventricular cavity size and wall thickness noted. All left ventricular wall segments contract normally. Left ventricular diastolic function was normal. Normal left atrial pressure. No evidence of left ventricular thrombus or mass present.   Right Ventricle Normal right ventricular cavity size and systolic function noted.   Left Atrium The  left atrial cavity is moderate to severely dilated.   Right Atrium Normal right atrial cavity size noted.   Aortic Valve The aortic valve is not well visualized. No aortic valve regurgitation or stenosis is present. The aortic valve is grossly normal in structure.   Mitral Valve The mitral valve is grossly normal in structure. No mitral valve regurgitation or significant stenosis is present.   Tricuspid Valve Trace tricuspid valve regurgitation is present. No evidence of significant tricuspid valve stenosis is present.   Pulmonic Valve The pulmonic valve is not well visualized.   Greater Vessels No dilation of the aortic root is present.   Pericardium There is no evidence of pericardial effusion. .         STRESS MYOVIEW:    CARDIAC CATHETERIZATION:    OTHER:         Assessment & Plan       Acute on chronic congestive heart failure, unspecified heart failure type (HCC)    Cerebrovascular accident (HCC)    Type 2 diabetes mellitus with diabetic nephropathy (HCC)    Congestive heart failure (HCC)    Atrial fibrillation (HCC)    Chest pain    Shortness of breath    Essential hypertension    Acute UTI (urinary tract infection)    Normocytic normochromic anemia    CKD (chronic kidney disease), stage III (HCC)    BPH without obstruction/lower urinary tract symptoms      Assessment:    Shortness of breath  Heart failure preserved ejection fraction  Atrial fibrillation with controlled ventricular response  Hypertension  Diabetes  UTI  CKD  Anemia    Plan:    [facility] to see if [facility] had a [facility] and if we can obtain results  Continue gentle diuresis  Continue Eliquis  Ventricular rates are controlled  Additional recommendations per Dr. Vargas    Patient is seen and examined and findings are verified.  All data is reviewed by me personally.  Assessment and plan formulated by APC was done after discussion with attending.  I spent more than 50% of time in taking care of the patient.    Patient is admitted  with shortness of breath and is noted to have bilateral leg edema.  Patient is in permanent atrial fibrillation heart rate is controlled    Hemodynamics are stable    Normal S1 and S2.  Heart rate is irregular.  Decreased breath sound at the bases.  Bilateral leg edema    At this stage, I would continue diuresis.  His previous echocardiogram showed preserved left ventricle function and no significant valvular heart disease.  Patient has not had any ischemic evaluation.  We may consider that once the patient is little bits stabilized.  However his functional status is poor.      I discussed the patients findings and my recommendations with patient and RN    Electronically signed by Gurpreet Vargas MD, 09/16/22, 5:41 PM EDT.      Gurpreet Vargas MD  09/16/22  17:41 EDT

## 2022-09-17 LAB
ALBUMIN SERPL-MCNC: 3 G/DL (ref 3.5–5.2)
ANION GAP SERPL CALCULATED.3IONS-SCNC: 10 MMOL/L (ref 5–15)
BASOPHILS # BLD AUTO: 0 10*3/MM3 (ref 0–0.2)
BASOPHILS NFR BLD AUTO: 0.6 % (ref 0–1.5)
BUN SERPL-MCNC: 29 MG/DL (ref 8–23)
BUN/CREAT SERPL: 20.3 (ref 7–25)
CALCIUM SPEC-SCNC: 8.7 MG/DL (ref 8.6–10.5)
CHLORIDE SERPL-SCNC: 104 MMOL/L (ref 98–107)
CO2 SERPL-SCNC: 28 MMOL/L (ref 22–29)
CREAT SERPL-MCNC: 1.43 MG/DL (ref 0.76–1.27)
CREAT UR-MCNC: 48.6 MG/DL
CREAT UR-MCNC: 49.7 MG/DL
DEPRECATED RDW RBC AUTO: 61.7 FL (ref 37–54)
EGFRCR SERPLBLD CKD-EPI 2021: 50.5 ML/MIN/1.73
EOSINOPHIL # BLD AUTO: 0.4 10*3/MM3 (ref 0–0.4)
EOSINOPHIL NFR BLD AUTO: 8 % (ref 0.3–6.2)
ERYTHROCYTE [DISTWIDTH] IN BLOOD BY AUTOMATED COUNT: 20.4 % (ref 12.3–15.4)
GLUCOSE BLDC GLUCOMTR-MCNC: 115 MG/DL (ref 70–105)
GLUCOSE BLDC GLUCOMTR-MCNC: 134 MG/DL (ref 70–105)
GLUCOSE BLDC GLUCOMTR-MCNC: 141 MG/DL (ref 70–105)
GLUCOSE BLDC GLUCOMTR-MCNC: 94 MG/DL (ref 70–105)
GLUCOSE SERPL-MCNC: 91 MG/DL (ref 65–99)
HCT VFR BLD AUTO: 30.5 % (ref 37.5–51)
HGB BLD-MCNC: 9.6 G/DL (ref 13–17.7)
LYMPHOCYTES # BLD AUTO: 1.4 10*3/MM3 (ref 0.7–3.1)
LYMPHOCYTES NFR BLD AUTO: 29.9 % (ref 19.6–45.3)
MCH RBC QN AUTO: 26.7 PG (ref 26.6–33)
MCHC RBC AUTO-ENTMCNC: 31.4 G/DL (ref 31.5–35.7)
MCV RBC AUTO: 85.1 FL (ref 79–97)
MONOCYTES # BLD AUTO: 0.7 10*3/MM3 (ref 0.1–0.9)
MONOCYTES NFR BLD AUTO: 14.1 % (ref 5–12)
NEUTROPHILS NFR BLD AUTO: 2.3 10*3/MM3 (ref 1.7–7)
NEUTROPHILS NFR BLD AUTO: 47.4 % (ref 42.7–76)
NRBC BLD AUTO-RTO: 0.2 /100 WBC (ref 0–0.2)
PHOSPHATE SERPL-MCNC: 3.7 MG/DL (ref 2.5–4.5)
PLATELET # BLD AUTO: 211 10*3/MM3 (ref 140–450)
PMV BLD AUTO: 7.9 FL (ref 6–12)
POTASSIUM SERPL-SCNC: 3.8 MMOL/L (ref 3.5–5.2)
PROT ?TM UR-MCNC: 30.6 MG/DL
PROT ?TM UR-MCNC: 61.2 MG/DL
PROT/CREAT UR: 1259.3 MG/G CREA (ref 0–200)
PROT/CREAT UR: 615.7 MG/G CREA (ref 0–200)
RBC # BLD AUTO: 3.59 10*6/MM3 (ref 4.14–5.8)
SODIUM SERPL-SCNC: 142 MMOL/L (ref 136–145)
TROPONIN T SERPL-MCNC: 0.04 NG/ML (ref 0–0.03)
WBC NRBC COR # BLD: 4.8 10*3/MM3 (ref 3.4–10.8)

## 2022-09-17 PROCEDURE — 93005 ELECTROCARDIOGRAM TRACING: CPT | Performed by: INTERNAL MEDICINE

## 2022-09-17 PROCEDURE — 82962 GLUCOSE BLOOD TEST: CPT

## 2022-09-17 PROCEDURE — 99232 SBSQ HOSP IP/OBS MODERATE 35: CPT | Performed by: INTERNAL MEDICINE

## 2022-09-17 PROCEDURE — 84156 ASSAY OF PROTEIN URINE: CPT | Performed by: INTERNAL MEDICINE

## 2022-09-17 PROCEDURE — 63710000001 INSULIN GLARGINE PER 5 UNITS: Performed by: INTERNAL MEDICINE

## 2022-09-17 PROCEDURE — 82570 ASSAY OF URINE CREATININE: CPT | Performed by: INTERNAL MEDICINE

## 2022-09-17 PROCEDURE — 80069 RENAL FUNCTION PANEL: CPT | Performed by: INTERNAL MEDICINE

## 2022-09-17 PROCEDURE — 85025 COMPLETE CBC W/AUTO DIFF WBC: CPT | Performed by: INTERNAL MEDICINE

## 2022-09-17 PROCEDURE — 25010000002 CEFTRIAXONE PER 250 MG: Performed by: INTERNAL MEDICINE

## 2022-09-17 PROCEDURE — 93010 ELECTROCARDIOGRAM REPORT: CPT | Performed by: INTERNAL MEDICINE

## 2022-09-17 PROCEDURE — 25010000002 FUROSEMIDE PER 20 MG: Performed by: PHYSICIAN ASSISTANT

## 2022-09-17 PROCEDURE — 84484 ASSAY OF TROPONIN QUANT: CPT | Performed by: INTERNAL MEDICINE

## 2022-09-17 RX ORDER — HYDRALAZINE HYDROCHLORIDE 25 MG/1
75 TABLET, FILM COATED ORAL 3 TIMES DAILY
Status: DISCONTINUED | OUTPATIENT
Start: 2022-09-17 | End: 2022-09-19

## 2022-09-17 RX ORDER — ISOSORBIDE MONONITRATE 60 MG/1
60 TABLET, EXTENDED RELEASE ORAL
Status: DISCONTINUED | OUTPATIENT
Start: 2022-09-17 | End: 2022-09-19 | Stop reason: HOSPADM

## 2022-09-17 RX ADMIN — INSULIN GLARGINE 10 UNITS: 100 INJECTION, SOLUTION SUBCUTANEOUS at 21:40

## 2022-09-17 RX ADMIN — HYDRALAZINE HYDROCHLORIDE 75 MG: 25 TABLET, FILM COATED ORAL at 17:23

## 2022-09-17 RX ADMIN — GABAPENTIN 300 MG: 300 CAPSULE ORAL at 09:37

## 2022-09-17 RX ADMIN — GABAPENTIN 300 MG: 300 CAPSULE ORAL at 21:39

## 2022-09-17 RX ADMIN — FUROSEMIDE 40 MG: 10 INJECTION, SOLUTION INTRAMUSCULAR; INTRAVENOUS at 17:23

## 2022-09-17 RX ADMIN — CARVEDILOL 12.5 MG: 6.25 TABLET, FILM COATED ORAL at 17:23

## 2022-09-17 RX ADMIN — ISOSORBIDE MONONITRATE 60 MG: 60 TABLET, EXTENDED RELEASE ORAL at 09:37

## 2022-09-17 RX ADMIN — ESCITALOPRAM OXALATE 10 MG: 10 TABLET ORAL at 09:36

## 2022-09-17 RX ADMIN — Medication 10 ML: at 09:38

## 2022-09-17 RX ADMIN — ATORVASTATIN CALCIUM 20 MG: 20 TABLET, FILM COATED ORAL at 21:39

## 2022-09-17 RX ADMIN — GABAPENTIN 300 MG: 300 CAPSULE ORAL at 17:23

## 2022-09-17 RX ADMIN — Medication 10 ML: at 21:40

## 2022-09-17 RX ADMIN — ISOSORBIDE MONONITRATE 60 MG: 60 TABLET, EXTENDED RELEASE ORAL at 17:23

## 2022-09-17 RX ADMIN — FUROSEMIDE 40 MG: 10 INJECTION, SOLUTION INTRAMUSCULAR; INTRAVENOUS at 04:57

## 2022-09-17 RX ADMIN — APIXABAN 5 MG: 5 TABLET, FILM COATED ORAL at 21:39

## 2022-09-17 RX ADMIN — CEFTRIAXONE 2 G: 2 INJECTION, POWDER, FOR SOLUTION INTRAMUSCULAR; INTRAVENOUS at 05:00

## 2022-09-17 RX ADMIN — CARVEDILOL 12.5 MG: 6.25 TABLET, FILM COATED ORAL at 09:34

## 2022-09-17 RX ADMIN — HYDRALAZINE HYDROCHLORIDE 75 MG: 25 TABLET, FILM COATED ORAL at 21:39

## 2022-09-17 RX ADMIN — TAMSULOSIN HYDROCHLORIDE 0.4 MG: 0.4 CAPSULE ORAL at 09:35

## 2022-09-17 RX ADMIN — HYDRALAZINE HYDROCHLORIDE 50 MG: 25 TABLET, FILM COATED ORAL at 05:21

## 2022-09-17 RX ADMIN — APIXABAN 5 MG: 5 TABLET, FILM COATED ORAL at 09:35

## 2022-09-17 RX ADMIN — FINASTERIDE 5 MG: 5 TABLET, FILM COATED ORAL at 09:36

## 2022-09-17 NOTE — PLAN OF CARE
Goal Outcome Evaluation:               Patient resting in bed.He's having some confusion at this time.Will check in

## 2022-09-17 NOTE — PROGRESS NOTES
"[facility] PROGRESS NOTE     LOS: 1 day    Patient Care Team:  Naa Valverde MD as PCP - General (Internal Medicine)      Subjective     Patient was seen and examined this morning.  Resting comfortably.  Still has dyspnea but improving.  Diuresing well with IV Lasix    Objective     Vital Sign Min/Max for last 24 hours  Temp:  [97.5 °F (36.4 °C)-98.2 °F (36.8 °C)] 97.9 °F (36.6 °C)  Heart Rate:  [54-79] 70  Resp:  [14-20] 18  BP: (136-198)/() 175/111                       Flowsheet Rows    Flowsheet Row First Filed Value   Admission Height 182.9 cm (72\") Documented at 09/16/2022 0213   Admission Weight 144 kg (318 lb) Documented at 09/16/2022 0213          I/O this shift:  In: 240 [P.O.:240]  Out: 2000 [Urine:2000]  I/O last 3 completed shifts:  In: 540 [P.O.:340; IV Piggyback:200]  Out: 4500 [Urine:4500]    Physical Exam:  Physical Exam    General Appearance: Chronically ill-appearing, morbidly obese  Skin: warm and dry  HEENT: oral mucosa normal, nonicteric sclera  Neck: supple, no JVD  Lungs: Decreased breath sounds at bases  Heart: RRR, normal S1 and S2  Abdomen: soft, nontender, nondistended  : no palpable bladder  Extremities: 2-3+ bilateral lower extreme edema with venous stasis changes present.   Neuro: normal speech and mental status        LABS:  Lab Results   Component Value Date    CALCIUM 8.7 09/17/2022    PHOS 3.7 09/17/2022     Results from last 7 days   Lab Units 09/17/22  0212 09/16/22  0538 09/16/22  0237   SODIUM mmol/L 142 145 143   POTASSIUM mmol/L 3.8 4.0 4.4   CHLORIDE mmol/L 104 110* 106   CO2 mmol/L 28.0 27.0 27.0   BUN mg/dL 29* 29* 30*   CREATININE mg/dL 1.43* 1.32* 1.34*   GLUCOSE mg/dL 91 117* 108*   CALCIUM mg/dL 8.7 8.2* 8.3*   WBC 10*3/mm3 4.80 4.60 4.80   HEMOGLOBIN g/dL 9.6* 9.6* 9.9*   PLATELETS 10*3/mm3 211 214 212   ALT (SGPT) U/L  --   --  8   AST (SGOT) U/L  --   --  16     Lab Results   Component Value Date    TROPONINT 0.028 09/16/2022     Estimated Creatinine " Clearance: 64.9 mL/min (A) (by C-G formula based on SCr of 1.43 mg/dL (H)).      Brief Urine Lab Results  (Last result in the past 365 days)      Color   Clarity   Blood   Leuk Est   Nitrite   Protein   CREAT   Urine HCG        09/16/22 0259 Yellow   Turbid   Negative   Large (3+)   Positive   100 mg/dL (2+)               WEIGHTS:     Wt Readings from Last 1 Encounters:   09/17/22 0625 (!) 148 kg (326 lb 4.5 oz)   09/16/22 1913 (!) 145 kg (320 lb 1.7 oz)   09/16/22 0213 (!) 144 kg (318 lb)       apixaban, 5 mg, Oral, Q12H  atorvastatin, 20 mg, Oral, Nightly  carvedilol, 12.5 mg, Oral, BID With Meals  cefTRIAXone, 2 g, Intravenous, Q24H  escitalopram, 10 mg, Oral, Daily  finasteride, 5 mg, Oral, Daily  furosemide, 40 mg, Intravenous, Q12H  gabapentin, 300 mg, Oral, TID  hydrALAZINE, 75 mg, Oral, TID  insulin glargine, 10 Units, Subcutaneous, Nightly  insulin lispro, 0-9 Units, Subcutaneous, TID AC  isosorbide mononitrate, 60 mg, Oral, BID - Nitrates  sodium chloride, 10 mL, Intravenous, Q12H  tamsulosin, 0.4 mg, Oral, Daily           Assessment & Plan       1.  Chronic kidney disease stage IIIa  Patient seems to have underlying CKD related to hypertension and diabetes but his creatinine has fluctuated due to diuretics.  Electrolytes okay.  Volume status generous but diuresing well.  2.  Hypervolemia.  Acute on chronic CHF, diastolic.  Patient may also have venous insufficiency in bilateral lower extremities and pulmonary hypertension  Patient is on IV Lasix and has good urine output  3.  Hypertension with CKD  Blood pressure running high again  4.  Chest pain.  Cardiology evaluating     Recs:  -Continue IV Lasix.  I will monitor renal function while being diuresed  - hydralazine dose increased this morning.  Cardiology following  -Follow urine protein/creatinine      Max Stein MD  09/17/22  11:28 EDT

## 2022-09-17 NOTE — PROGRESS NOTES
[facility] Medicine Services Daily Progress Note    Patient Name: Shaji Junior  : 1945  MRN: [id number]  Primary Care Physician:  Naa Valverde MD  Date of admission: 2022      Subjective      Chief Complaint: Chest pain, shortness of breath    Patient seen and examined this morning.  Feeling better compared to yesterday but still short of breath with conversation.  Chest tightness has significantly improved compared to yesterday.  No fever or chills.  No dysuria.  No other complaints.    Pertinent positives as noted in HPI/subjective.  All other systems were reviewed and are negative.      Objective      Vitals:   Temp:  [97.5 °F (36.4 °C)-98.2 °F (36.8 °C)] 97.9 °F (36.6 °C)  Heart Rate:  [54-79] 70  Resp:  [14-20] 18  BP: (136-198)/() 175/111    Physical Exam:    General: Awake, alert, morbidly obese elderly male, lying in bed, NAD  Eyes: PERRL, EOMI, conjunctive are clear  Cardiovascular: Regular rate and rhythm, no murmurs  Respiratory: Decreased breath sounds bilaterally, no wheezing or rales, some conversational dyspnea noted  Abdomen: Soft, nontender, positive bowel sounds, no guarding  Neurologic: A&O, CN grossly intact, moves all extremities spontaneously  Musculoskeletal: Generalized weakness noted, no deformities  Skin: Warm, mild bilateral lower extremity pitting edema noted         Result Review    Result Review:  I have personally reviewed the results from the time of this admission to 2022 10:08 EDT and agree with these findings:  [x]  Laboratory  [x]  Microbiology  [x]  Radiology  [x]  EKG/Telemetry   [x]  Cardiology/Vascular   []  Pathology  [x]  Old records  []  Other:    Wounds (last 24 hours)     LDA Wound     Row Name 22 0047 22       [REMOVED] Wound 22 112 Right posterior thigh Pressure Injury    Wound - Properties Group Placement Date: 22  -GK Placement Time:   -GK Side: Right  -GK Orientation:  posterior  -GK Location: thigh  -GK Primary Wound Type: Pressure inj  -GK Removal Date: 09/16/22 - Removal Time: 1818 -EH    Retired Wound - Properties Group Placement Date: 04/01/22 -GK Placement Time: 1122 -GK Side: Right  -GK Orientation: posterior  -GK Location: thigh  -GK Primary Wound Type: Pressure inj  -GK Removal Date: 09/16/22 - Removal Time: 1818 -EH    Retired Wound - Properties Group Date first assessed: 04/01/22 - Time first assessed: 1122 -GK Side: Right  -GK Location: thigh  -GK Primary Wound Type: Pressure inj  -GK Resolution Date: 09/16/22 - Resolution Time: 1818 -EH       Wound 04/01/22 1122 Left posterior thigh Pressure Injury    Wound - Properties Group Placement Date: 04/01/22 - Placement Time: 1122 -GK Side: Left  -GK Orientation: posterior  -GK Location: thigh  -GK Primary Wound Type: Pressure inj  -GK    Wound Image View All Images View Images  - -- View Images  -    Pressure Injury Stage -- 2  -BK 2  -HJ    Dressing Appearance -- dried drainage;dry  -BK dried drainage;dry  -HJ    Closure -- Adhesive bandage  -BK Adhesive bandage  -HJ    Base -- red;purple;granulating  -BK red;purple;granulating  -HJ    Periwound -- -- warm  -HJ    Periwound Temperature -- -- warm  -HJ    Periwound Skin Turgor -- -- soft  -HJ    Drainage Characteristics/Odor -- tan;yellow  -BK tan;yellow  -HJ    Drainage Amount -- scant  -BK scant  -HJ    Dressing Care -- silicone  -BK silicone  -HJ    Retired Wound - Properties Group Placement Date: 04/01/22  - Placement Time: 1122 -GK Side: Left  -GK Orientation: posterior  -GK Location: thigh  -GK Primary Wound Type: Pressure inj  -GK    Retired Wound - Properties Group Date first assessed: 04/01/22 -GK Time first assessed: 1122 -GK Side: Left  -GK Location: thigh  -GK Primary Wound Type: Pressure inj  -GK          User Key  (r) = Recorded By, (t) = Taken By, (c) = Cosigned By    Initials Name Provider Type     Jenelle Miller RN Registered  Nurse    Marlene Romero, RN Registered Nurse    Vira Staton RN Registered Nurse    Wilma Martinez LPN Licensed Nurse                  Assessment & Plan      Brief Patient Summary:  Shaji Junior is a 77 y.o. male with known past medical history of CAD, CVA, chronic diastolic CHF, hypertension, hyperlipidemia, PAF, CKD stage III, DM 2, and hyperlipidemia presented to the ED from nursing home with complaining of chest pain and shortness of breath for 3 days. Patient states that his pain was severe earlier that he described as a chest tightness that was nonradiating but states this is now resolved.  Patient reports shortness of breath that is worse with exertion and better with rest.  Noted to have CHF exacerbation, UTI, and uncontrolled hypertension on admission.  Cardiology and nephrology consulted.  Started on IV antibiotics and diuresis.      apixaban, 5 mg, Oral, Q12H  atorvastatin, 20 mg, Oral, Nightly  carvedilol, 12.5 mg, Oral, BID With Meals  cefTRIAXone, 2 g, Intravenous, Q24H  escitalopram, 10 mg, Oral, Daily  finasteride, 5 mg, Oral, Daily  furosemide, 40 mg, Intravenous, Q12H  gabapentin, 300 mg, Oral, TID  hydrALAZINE, 75 mg, Oral, TID  insulin glargine, 10 Units, Subcutaneous, Nightly  insulin lispro, 0-9 Units, Subcutaneous, TID AC  isosorbide mononitrate, 60 mg, Oral, BID - Nitrates  sodium chloride, 10 mL, Intravenous, Q12H  tamsulosin, 0.4 mg, Oral, Daily             Active Hospital Problems:  Active Hospital Problems    Diagnosis    • **Acute on chronic congestive heart failure, unspecified heart failure type (HCC)    • Chest pain    • Shortness of breath    • Essential hypertension    • Acute UTI (urinary tract infection)    • Normocytic normochromic anemia    • CKD (chronic kidney disease), stage III (HCC)    • BPH without obstruction/lower urinary tract symptoms    • Atrial fibrillation (HCC)    • Congestive heart failure (HCC)    • Type 2 diabetes mellitus with diabetic  nephropathy (HCC)    • Cerebrovascular accident (HCC)      Plan:   Acute on chronic HFpEF  -Recent echo in March with preserved EF noted  -Continue diuresis as tolerated with Lasix, monitor I's and O's  -Continue beta-blocker, statin, Imdur  -Cardiology following    Chest pain  CAD  -Troponin negative, ACS ruled out  -Likely related to CHF  -Cardiology obtaining previous records  -Continue beta-blocker, statin, Imdur  -Echo r as noted above  -Monitor    Essential hypertension, poorly controlled  -Remains uncontrolled  -Hydralazine and Imdur increased  -Continue Coreg  -Monitor and adjust as needed     Acute UTI likely due to chronic Motley  -UA shows 3+ leukocyte Estrace, nitrite positive and 13-20 WBCs and 4+ bacteria.  Urine culture in lab  -Continue Rocephin  -Follow urine culture, patient has had several UTIs in the past likely secondary to indwelling Motley catheter     Diabetes mellitus type 2, poorly controlled, with peripheral neuropathy  -Last hemoglobin A1c elevated at 6.9 about 1 month ago.    -Continue basal insulin with sliding scale  -Monitor blood glucose, adjust as needed     Anemia of chronic kidney disease  -Hemoglobin appears to be stable, monitor  -EPO per nephrology     CKD stage III A  -Creatinine appears to be at around baseline  -Monitor with diuresis  -Nephrology following     Chronic A. fib  -Rate controlled  -Continue beta-blocker and Eliquis     History of CVA  -Continue statin, Eliquis     Hyperlipidemia, chronic  -Lipid panel noted  -Continue home statin     Depression  -Chronic, controlled  -Continue home Lexapro     BPH  -Continue home finasteride, Flomax  -Motley catheter in place     Morbid obesity, BMI 43.13  -Encourage lifestyle modifications    DVT prophylaxis  -Eliquis    CODE STATUS:    Code Status (Patient has no pulse and is not breathing): CPR (Attempt to Resuscitate)  Medical Interventions (Patient has pulse or is breathing): Full Support      Disposition: Back to facility  once improved    Electronically signed by Corinna Miranda DO, 09/17/22, 10:08 EDT.  [facility] [place]      Much of this encounter note is an electronic transcription/translation of spoken language to printed text. The electronic translation of spoken language may permit erroneous, or at times, nonsensical words or phrases to be inadvertently transcribed; Although I have reviewed the note for such errors, some may still exist.

## 2022-09-17 NOTE — PROGRESS NOTES
LOS: 1 day   Admiting Physician- Corinna Miranda DO    Reason For Followup:    Shortness of breath  Bilateral leg edema  Hypertension  Renal insufficiency      Subjective     Patient is feeling better    Objective     Blood pressure is still elevated    Review of Systems:   Review of Systems   Constitutional: Negative for chills and fever.   HENT: Negative for ear discharge and nosebleeds.    Eyes: Negative for discharge and redness.   Cardiovascular: Positive for leg swelling. Negative for chest pain, orthopnea, palpitations, paroxysmal nocturnal dyspnea and syncope.   Respiratory: Positive for shortness of breath. Negative for cough and wheezing.    Endocrine: Negative for heat intolerance.   Skin: Negative for rash.   Musculoskeletal: Positive for arthritis, back pain and joint pain. Negative for myalgias.   Gastrointestinal: Negative for abdominal pain, melena, nausea and vomiting.   Genitourinary: Negative for dysuria and hematuria.   Neurological: Negative for dizziness, light-headedness, numbness and tremors.   Psychiatric/Behavioral: Negative for depression. The patient is not nervous/anxious.          Vital Signs  Vitals:    09/17/22 0508 09/17/22 0625 09/17/22 0642 09/17/22 0926   BP: (!) 198/113  (!) 190/111 (!) 175/111   BP Location: Left arm  Left arm Right arm   Patient Position: Lying  Lying Lying   Pulse: 70   70   Resp: 14   18   Temp: 97.5 °F (36.4 °C)   97.9 °F (36.6 °C)   TempSrc: Oral   Oral   SpO2: 94%   93%   Weight:  (!) 148 kg (326 lb 4.5 oz)     Height:         Wt Readings from Last 1 Encounters:   09/17/22 (!) 148 kg (326 lb 4.5 oz)       Intake/Output Summary (Last 24 hours) at 9/17/2022 1052  Last data filed at 9/17/2022 0900  Gross per 24 hour   Intake 440 ml   Output 4000 ml   Net -3560 ml     Physical Exam:  Constitutional:       Appearance: Well-developed.   Eyes:      General: No scleral icterus.        Right eye: No discharge.   HENT:      Head: Normocephalic and atraumatic.    Neck:      Thyroid: No thyromegaly.      Lymphadenopathy: No cervical adenopathy.   Pulmonary:      Effort: Pulmonary effort is normal. No respiratory distress.      Breath sounds: Normal breath sounds. No wheezing. No rales.   Cardiovascular:      Normal rate. Regular rhythm.      No gallop.   Edema:     Peripheral edema absent.   Abdominal:      Tenderness: There is no abdominal tenderness.   Skin:     Findings: No erythema or rash.   Neurological:      Mental Status: Alert and oriented to person, place, and time.         Results Review:   Lab Results (last 24 hours)     Procedure Component Value Units Date/Time    Urine Culture - Urine, Urine, Catheter [[id number]]  (Normal) Collected: 09/16/22 0259    Specimen: Urine, Catheter Updated: 09/17/22 1042     Urine Culture Culture in progress    POC Glucose Once [[id number]]  (Normal) Collected: 09/17/22 0802    Specimen: Blood Updated: 09/17/22 0803     Glucose 94 mg/dL      Comment: Serial Number: [id number]:  [id number]       Renal Function Panel [[id number]]  (Abnormal) Collected: 09/17/22 0212    Specimen: Blood Updated: 09/17/22 0407     Glucose 91 mg/dL      BUN 29 mg/dL      Creatinine 1.43 mg/dL      Sodium 142 mmol/L      Potassium 3.8 mmol/L      Chloride 104 mmol/L      CO2 28.0 mmol/L      Calcium 8.7 mg/dL      Albumin 3.00 g/dL      Phosphorus 3.7 mg/dL      Anion Gap 10.0 mmol/L      BUN/Creatinine Ratio 20.3     eGFR 50.5 mL/min/1.73      Comment: National Kidney Foundation and American Society of Nephrology (ASN) Task Force recommended calculation based on the Chronic Kidney Disease Epidemiology Collaboration (CKD-EPI) equation refit without adjustment for race.       Narrative:      GFR Normal >60  Chronic Kidney Disease <60  Kidney Failure <15      CBC & Differential [[id number]]  (Abnormal) Collected: 09/17/22 0212    Specimen: Blood Updated: 09/17/22 0340    Narrative:      The following orders were created for panel order CBC &  Differential.  Procedure                               Abnormality         Status                     ---------                               -----------         ------                     CBC Auto Differential[[id number]]        Abnormal            Final result                 Please view results for these tests on the individual orders.    CBC Auto Differential [[id number]]  (Abnormal) Collected: 09/17/22 0212    Specimen: Blood Updated: 09/17/22 0340     WBC 4.80 10*3/mm3      RBC 3.59 10*6/mm3      Hemoglobin 9.6 g/dL      Hematocrit 30.5 %      MCV 85.1 fL      MCH 26.7 pg      MCHC 31.4 g/dL      RDW 20.4 %      RDW-SD 61.7 fl      MPV 7.9 fL      Platelets 211 10*3/mm3      Neutrophil % 47.4 %      Lymphocyte % 29.9 %      Monocyte % 14.1 %      Eosinophil % 8.0 %      Basophil % 0.6 %      Neutrophils, Absolute 2.30 10*3/mm3      Lymphocytes, Absolute 1.40 10*3/mm3      Monocytes, Absolute 0.70 10*3/mm3      Eosinophils, Absolute 0.40 10*3/mm3      Basophils, Absolute 0.00 10*3/mm3      nRBC 0.2 /100 WBC     Protein / Creatinine Ratio, Urine - [[id number]] Collected: 09/17/22 0119    Specimen: Urine Updated: 09/17/22 0135    CANDIDA AURIS SCREEN - Swab, Axilla Right, Axilla Left and Groin [[id number]] Collected: 09/16/22 1957    Specimen: Swab from Axilla Right, Axilla Left and Groin Updated: 09/17/22 0135    POC Glucose Once [[id number]]  (Abnormal) Collected: 09/16/22 1953    Specimen: Blood Updated: 09/16/22 1954     Glucose 133 mg/dL      Comment: Serial Number: [id number]:  [id number]       POC Glucose Once [[id number]]  (Abnormal) Collected: 09/16/22 1814    Specimen: Blood Updated: 09/16/22 1816     Glucose 111 mg/dL      Comment: Serial Number: [id number]:  [id number]       POC Glucose Once [[id number]]  (Abnormal) Collected: 09/16/22 1255    Specimen: Blood Updated: 09/16/22 1259     Glucose 146 mg/dL      Comment: Serial Number: [id number]:  [id number]       Hemoglobin A1c [[id number]]   (Abnormal) Collected: 09/16/22 0538    Specimen: Blood Updated: 09/16/22 1106     Hemoglobin A1C 6.0 %     Narrative:      Hemoglobin A1C Reference Range:    <5.7 %        Normal  5.7-6.4 %     Increased risk for diabetes  > 6.4 %        Diabetes       These guidelines have been recommended by the American Diabetic Association for Hgb A1c.      The following 2010 guidelines have been recommended by the American Diabetes Association for Hemoglobin A1c.    HBA1c 5.7-6.4% Increased risk for future diabetes (pre-diabetes)  HBA1c     >6.4% Diabetes          Imaging Results (Last 72 Hours)     Procedure Component Value Units Date/Time    XR Chest 1 View [[id number]] Collected: 09/16/22 0645     Updated: 09/16/22 0648    Narrative:         DATE OF EXAM:   9/16/2022 2:18 AM     PROCEDURE:   XR CHEST 1 VW-     INDICATIONS:   CHF/COPD Protocol     COMPARISON:  04/27/2022     TECHNIQUE:   Portable chest radiograph.     FINDINGS:    The heart is enlarged with central pulmonary vascular congestion  similar to prior study. Mild bibasilar atelectasis. No pneumothorax. No  significant pleural effusion. Osseous structures grossly intact.       Impression:      1. Cardiomegaly and central pulmonary vascular congestion similar to  comparison study.  2. Mild bibasilar atelectasis.     Electronically Signed By-Chico Henley MD On:9/16/2022 6:46 AM  This report was finalized on [id number] by  Chico Henley MD.        ECG/EMG Results (most recent)     Procedure Component Value Units Date/Time    ECG 12 Lead [[id number]] Collected: 09/16/22 0225     Updated: 09/16/22 0743     QT Interval 458 ms     Narrative:      HEART RATE= 69  bpm  RR Interval= 868  ms  FL Interval= Failed  ms  P Horizontal Axis= Invalid  deg  P Front Axis= Invalid  deg  QRSD Interval= 91  ms  QT Interval= 458  ms  QRS Axis= 7  deg  T Wave Axis= 97  deg  - ABNORMAL ECG -  Atrial fibrillation  Nonspecific T abnormalities, lateral leads  Borderline prolonged QT  interval  Electronically Signed By:   Date and Time of Study: 2022-09-16 02:25:08        CBC    Results from last 7 days   Lab Units 09/17/22 0212 09/16/22  0538 09/16/22 0237   WBC 10*3/mm3 4.80 4.60 4.80   HEMOGLOBIN g/dL 9.6* 9.6* 9.9*   PLATELETS 10*3/mm3 211 214 212     BMP   Results from last 7 days   Lab Units 09/17/22 0212 09/16/22  0538 09/16/22 0237   SODIUM mmol/L 142 145 143   POTASSIUM mmol/L 3.8 4.0 4.4   CHLORIDE mmol/L 104 110* 106   CO2 mmol/L 28.0 27.0 27.0   BUN mg/dL 29* 29* 30*   CREATININE mg/dL 1.43* 1.32* 1.34*   GLUCOSE mg/dL 91 117* 108*   PHOSPHORUS mg/dL 3.7  --   --      CMP   Results from last 7 days   Lab Units 09/17/22 0212 09/16/22 0538 09/16/22 0237   SODIUM mmol/L 142 145 143   POTASSIUM mmol/L 3.8 4.0 4.4   CHLORIDE mmol/L 104 110* 106   CO2 mmol/L 28.0 27.0 27.0   BUN mg/dL 29* 29* 30*   CREATININE mg/dL 1.43* 1.32* 1.34*   GLUCOSE mg/dL 91 117* 108*   ALBUMIN g/dL 3.00*  --  3.10*   BILIRUBIN mg/dL  --   --  0.4   ALK PHOS U/L  --   --  104   AST (SGOT) U/L  --   --  16   ALT (SGPT) U/L  --   --  8     Cardiac Studies:  Echo- Results for orders placed during the hospital encounter of 03/29/22    Adult Transthoracic Echo Complete W/ Cont if Necessary Per Protocol    Interpretation Summary  · Left ventricular systolic function is normal.  · Left ventricular ejection fraction is 55 to 60%  · Left ventricular diastolic function was normal.    Stress Myoview-  Cath-      Medication Review:   Scheduled Meds:apixaban, 5 mg, Oral, Q12H  atorvastatin, 20 mg, Oral, Nightly  carvedilol, 12.5 mg, Oral, BID With Meals  cefTRIAXone, 2 g, Intravenous, Q24H  escitalopram, 10 mg, Oral, Daily  finasteride, 5 mg, Oral, Daily  furosemide, 40 mg, Intravenous, Q12H  gabapentin, 300 mg, Oral, TID  hydrALAZINE, 75 mg, Oral, TID  insulin glargine, 10 Units, Subcutaneous, Nightly  insulin lispro, 0-9 Units, Subcutaneous, TID AC  isosorbide mononitrate, 60 mg, Oral, BID - Nitrates  sodium chloride,  10 mL, Intravenous, Q12H  tamsulosin, 0.4 mg, Oral, Daily      Continuous Infusions:   PRN Meds:.•  acetaminophen **OR** acetaminophen **OR** acetaminophen  •  aluminum-magnesium hydroxide-simethicone  •  dextrose  •  dextrose  •  glucagon (human recombinant)  •  hydrALAZINE  •  magnesium sulfate **OR** magnesium sulfate **OR** magnesium sulfate  •  melatonin  •  nitroglycerin  •  ondansetron **OR** ondansetron  •  potassium chloride **OR** potassium chloride **OR** potassium chloride  •  sodium chloride  •  sodium chloride      Assessment & Plan   Patient Active Problem List   Diagnosis   • CHF exacerbation (HCC)   • Cerebrovascular accident (HCC)   • Type 2 diabetes mellitus with diabetic nephropathy (HCC)   • Acute kidney failure (HCC)   • Congestive heart failure (HCC)   • Acute on chronic congestive heart failure, unspecified heart failure type (HCC)   • MAKI (acute kidney injury) (HCC)   • Atrial fibrillation (HCC)   • Chest pain   • Shortness of breath   • Chest pain, unspecified type   • Essential hypertension   • Acute UTI (urinary tract infection)   • Normocytic normochromic anemia   • CKD (chronic kidney disease), stage III (HCC)   • BPH without obstruction/lower urinary tract symptoms     MDM:    1.  Bilateral leg edema:    Leg edema is slowly improving I discussed with Dr. Stein.  We will switch intravenous diuretics tomorrow.  Creatinine is stabilized.    2.  Hypertension:    Blood pressure is very high.  Hydralazine is increased today.    3.  Ischemic evaluation:    Patient has immobility syndrome.  He hardly able to walk.  He would be a poor candidate for ischemic evaluation.  However once the patient is stabilized I would consider stress test.    Gurpreet Vargas MD  09/17/22  10:52 EDT

## 2022-09-17 NOTE — PLAN OF CARE
Goal Outcome Evaluation:  Plan of Care Reviewed With: patient        Progress: no change  Outcome Evaluation: Pt rested well throughout the night; still complaining of SOA. Continuing with diuretic. BP has been up and down throughout the night; treated per MAR. Cardiology and Nephrology following, will continue to monitor.

## 2022-09-17 NOTE — NURSING NOTE
Pt refused to turn every two hours and refused assistance from staff; pt stated that he wants to sleep and staff's interventions do not allow him to sleep quietly. Pt has been educated. Will continue to monitor.

## 2022-09-18 ENCOUNTER — APPOINTMENT (OUTPATIENT)
Dept: NUCLEAR MEDICINE | Facility: HOSPITAL | Age: 77
End: 2022-09-18

## 2022-09-18 ENCOUNTER — APPOINTMENT (OUTPATIENT)
Dept: GENERAL RADIOLOGY | Facility: HOSPITAL | Age: 77
End: 2022-09-18

## 2022-09-18 LAB
ALBUMIN SERPL-MCNC: 3 G/DL (ref 3.5–5.2)
ANION GAP SERPL CALCULATED.3IONS-SCNC: 10 MMOL/L (ref 5–15)
BASOPHILS # BLD AUTO: 0 10*3/MM3 (ref 0–0.2)
BASOPHILS NFR BLD AUTO: 0.3 % (ref 0–1.5)
BH CV REST NUCLEAR ISOTOPE DOSE: 11 MCI
BH CV STRESS BP STAGE 1: NORMAL
BH CV STRESS COMMENTS STAGE 1: NORMAL
BH CV STRESS DOSE REGADENOSON STAGE 1: 0.4
BH CV STRESS DURATION MIN STAGE 1: 0
BH CV STRESS DURATION SEC STAGE 1: 10
BH CV STRESS HR STAGE 1: 66
BH CV STRESS NUCLEAR ISOTOPE DOSE: 33 MCI
BH CV STRESS PROTOCOL 1: NORMAL
BH CV STRESS RECOVERY BP: NORMAL MMHG
BH CV STRESS RECOVERY HR: 76 BPM
BH CV STRESS STAGE 1: 1
BUN SERPL-MCNC: 27 MG/DL (ref 8–23)
BUN/CREAT SERPL: 18.4 (ref 7–25)
CALCIUM SPEC-SCNC: 8.3 MG/DL (ref 8.6–10.5)
CHLORIDE SERPL-SCNC: 101 MMOL/L (ref 98–107)
CO2 SERPL-SCNC: 28 MMOL/L (ref 22–29)
CREAT SERPL-MCNC: 1.47 MG/DL (ref 0.76–1.27)
DEPRECATED RDW RBC AUTO: 60.8 FL (ref 37–54)
EGFRCR SERPLBLD CKD-EPI 2021: 48.8 ML/MIN/1.73
EOSINOPHIL # BLD AUTO: 0.4 10*3/MM3 (ref 0–0.4)
EOSINOPHIL NFR BLD AUTO: 6.2 % (ref 0.3–6.2)
ERYTHROCYTE [DISTWIDTH] IN BLOOD BY AUTOMATED COUNT: 20.2 % (ref 12.3–15.4)
GLUCOSE BLDC GLUCOMTR-MCNC: 103 MG/DL (ref 70–105)
GLUCOSE BLDC GLUCOMTR-MCNC: 126 MG/DL (ref 70–105)
GLUCOSE SERPL-MCNC: 91 MG/DL (ref 65–99)
HCT VFR BLD AUTO: 31.3 % (ref 37.5–51)
HGB BLD-MCNC: 9.9 G/DL (ref 13–17.7)
LV EF NUC BP: 82 %
LYMPHOCYTES # BLD AUTO: 1.6 10*3/MM3 (ref 0.7–3.1)
LYMPHOCYTES NFR BLD AUTO: 25.8 % (ref 19.6–45.3)
MAXIMAL PREDICTED HEART RATE: 143 BPM
MCH RBC QN AUTO: 27.2 PG (ref 26.6–33)
MCHC RBC AUTO-ENTMCNC: 31.4 G/DL (ref 31.5–35.7)
MCV RBC AUTO: 86.6 FL (ref 79–97)
MONOCYTES # BLD AUTO: 0.8 10*3/MM3 (ref 0.1–0.9)
MONOCYTES NFR BLD AUTO: 13.6 % (ref 5–12)
NEUTROPHILS NFR BLD AUTO: 3.3 10*3/MM3 (ref 1.7–7)
NEUTROPHILS NFR BLD AUTO: 54.1 % (ref 42.7–76)
NRBC BLD AUTO-RTO: 0 /100 WBC (ref 0–0.2)
PERCENT MAX PREDICTED HR: 63.64 %
PHOSPHATE SERPL-MCNC: 4 MG/DL (ref 2.5–4.5)
PLATELET # BLD AUTO: 222 10*3/MM3 (ref 140–450)
PMV BLD AUTO: 8.1 FL (ref 6–12)
POTASSIUM SERPL-SCNC: 3.5 MMOL/L (ref 3.5–5.2)
RBC # BLD AUTO: 3.62 10*6/MM3 (ref 4.14–5.8)
SODIUM SERPL-SCNC: 139 MMOL/L (ref 136–145)
STRESS BASELINE BP: NORMAL MMHG
STRESS BASELINE HR: 68 BPM
STRESS PERCENT HR: 75 %
STRESS POST PEAK BP: NORMAL MMHG
STRESS POST PEAK HR: 91 BPM
STRESS TARGET HR: 122 BPM
TROPONIN T SERPL-MCNC: 0.04 NG/ML (ref 0–0.03)
WBC NRBC COR # BLD: 6.1 10*3/MM3 (ref 3.4–10.8)

## 2022-09-18 PROCEDURE — 93017 CV STRESS TEST TRACING ONLY: CPT

## 2022-09-18 PROCEDURE — 93010 ELECTROCARDIOGRAM REPORT: CPT | Performed by: INTERNAL MEDICINE

## 2022-09-18 PROCEDURE — 71045 X-RAY EXAM CHEST 1 VIEW: CPT

## 2022-09-18 PROCEDURE — 0 TECHNETIUM TETROFOSMIN KIT: Performed by: INTERNAL MEDICINE

## 2022-09-18 PROCEDURE — 78452 HT MUSCLE IMAGE SPECT MULT: CPT | Performed by: INTERNAL MEDICINE

## 2022-09-18 PROCEDURE — 80069 RENAL FUNCTION PANEL: CPT | Performed by: INTERNAL MEDICINE

## 2022-09-18 PROCEDURE — 93018 CV STRESS TEST I&R ONLY: CPT | Performed by: INTERNAL MEDICINE

## 2022-09-18 PROCEDURE — 25010000002 CEFTRIAXONE PER 250 MG: Performed by: INTERNAL MEDICINE

## 2022-09-18 PROCEDURE — A9502 TC99M TETROFOSMIN: HCPCS | Performed by: INTERNAL MEDICINE

## 2022-09-18 PROCEDURE — 93016 CV STRESS TEST SUPVJ ONLY: CPT | Performed by: NURSE PRACTITIONER

## 2022-09-18 PROCEDURE — 63710000001 INSULIN GLARGINE PER 5 UNITS: Performed by: INTERNAL MEDICINE

## 2022-09-18 PROCEDURE — 99232 SBSQ HOSP IP/OBS MODERATE 35: CPT | Performed by: INTERNAL MEDICINE

## 2022-09-18 PROCEDURE — 25010000002 FUROSEMIDE PER 20 MG: Performed by: PHYSICIAN ASSISTANT

## 2022-09-18 PROCEDURE — 93005 ELECTROCARDIOGRAM TRACING: CPT | Performed by: INTERNAL MEDICINE

## 2022-09-18 PROCEDURE — 84484 ASSAY OF TROPONIN QUANT: CPT | Performed by: STUDENT IN AN ORGANIZED HEALTH CARE EDUCATION/TRAINING PROGRAM

## 2022-09-18 PROCEDURE — 78452 HT MUSCLE IMAGE SPECT MULT: CPT

## 2022-09-18 PROCEDURE — 82962 GLUCOSE BLOOD TEST: CPT

## 2022-09-18 PROCEDURE — 85025 COMPLETE CBC W/AUTO DIFF WBC: CPT | Performed by: INTERNAL MEDICINE

## 2022-09-18 RX ORDER — FUROSEMIDE 40 MG/1
40 TABLET ORAL
Status: DISCONTINUED | OUTPATIENT
Start: 2022-09-18 | End: 2022-09-19 | Stop reason: HOSPADM

## 2022-09-18 RX ORDER — DIPHENHYDRAMINE HCL 25 MG
50 CAPSULE ORAL EVERY 6 HOURS PRN
Status: DISCONTINUED | OUTPATIENT
Start: 2022-09-18 | End: 2022-09-18

## 2022-09-18 RX ORDER — LOSARTAN POTASSIUM 25 MG/1
25 TABLET ORAL
Status: DISCONTINUED | OUTPATIENT
Start: 2022-09-18 | End: 2022-09-19 | Stop reason: HOSPADM

## 2022-09-18 RX ADMIN — HYDRALAZINE HYDROCHLORIDE 75 MG: 25 TABLET, FILM COATED ORAL at 11:25

## 2022-09-18 RX ADMIN — Medication 10 ML: at 22:00

## 2022-09-18 RX ADMIN — Medication 10 ML: at 11:25

## 2022-09-18 RX ADMIN — ISOSORBIDE MONONITRATE 60 MG: 60 TABLET, EXTENDED RELEASE ORAL at 17:03

## 2022-09-18 RX ADMIN — ISOSORBIDE MONONITRATE 60 MG: 60 TABLET, EXTENDED RELEASE ORAL at 11:25

## 2022-09-18 RX ADMIN — APIXABAN 5 MG: 5 TABLET, FILM COATED ORAL at 11:25

## 2022-09-18 RX ADMIN — CARVEDILOL 12.5 MG: 6.25 TABLET, FILM COATED ORAL at 11:25

## 2022-09-18 RX ADMIN — LOSARTAN POTASSIUM 25 MG: 25 TABLET, FILM COATED ORAL at 12:38

## 2022-09-18 RX ADMIN — GABAPENTIN 300 MG: 300 CAPSULE ORAL at 11:25

## 2022-09-18 RX ADMIN — TETROFOSMIN 1 DOSE: 1.38 INJECTION, POWDER, LYOPHILIZED, FOR SOLUTION INTRAVENOUS at 07:48

## 2022-09-18 RX ADMIN — TETROFOSMIN 1 DOSE: 1.38 INJECTION, POWDER, LYOPHILIZED, FOR SOLUTION INTRAVENOUS at 11:22

## 2022-09-18 RX ADMIN — ATORVASTATIN CALCIUM 20 MG: 20 TABLET, FILM COATED ORAL at 21:47

## 2022-09-18 RX ADMIN — FUROSEMIDE 40 MG: 10 INJECTION, SOLUTION INTRAMUSCULAR; INTRAVENOUS at 04:57

## 2022-09-18 RX ADMIN — FUROSEMIDE 40 MG: 40 TABLET ORAL at 17:03

## 2022-09-18 RX ADMIN — GABAPENTIN 300 MG: 300 CAPSULE ORAL at 17:04

## 2022-09-18 RX ADMIN — TAMSULOSIN HYDROCHLORIDE 0.4 MG: 0.4 CAPSULE ORAL at 11:25

## 2022-09-18 RX ADMIN — ESCITALOPRAM OXALATE 10 MG: 10 TABLET ORAL at 11:25

## 2022-09-18 RX ADMIN — FINASTERIDE 5 MG: 5 TABLET, FILM COATED ORAL at 11:25

## 2022-09-18 RX ADMIN — Medication 5 MG: at 21:46

## 2022-09-18 RX ADMIN — INSULIN GLARGINE 10 UNITS: 100 INJECTION, SOLUTION SUBCUTANEOUS at 22:00

## 2022-09-18 RX ADMIN — GABAPENTIN 300 MG: 300 CAPSULE ORAL at 21:46

## 2022-09-18 RX ADMIN — CARVEDILOL 12.5 MG: 6.25 TABLET, FILM COATED ORAL at 17:03

## 2022-09-18 RX ADMIN — HYDRALAZINE HYDROCHLORIDE 75 MG: 25 TABLET, FILM COATED ORAL at 22:00

## 2022-09-18 RX ADMIN — ALUMINUM HYDROXIDE, MAGNESIUM HYDROXIDE, AND DIMETHICONE 15 ML: 400; 400; 40 SUSPENSION ORAL at 01:50

## 2022-09-18 RX ADMIN — APIXABAN 5 MG: 5 TABLET, FILM COATED ORAL at 21:46

## 2022-09-18 RX ADMIN — HYDRALAZINE HYDROCHLORIDE 75 MG: 25 TABLET, FILM COATED ORAL at 17:04

## 2022-09-18 NOTE — PROGRESS NOTES
[facility] Medicine Services Daily Progress Note    Patient Name: Shaji Junior  : 1945  MRN: [id number]  Primary Care Physician:  Naa Valverde MD  Date of admission: 2022      Subjective      Chief Complaint: Chest pain, shortness of breath    Patient seen and examined this morning.  Doing better, breathing continues to improve.  No chest pain overnight.  Urinating well.  No other complaints.    Pertinent positives as noted in HPI/subjective.  All other systems were reviewed and are negative.      Objective      Vitals:   Temp:  [97.8 °F (36.6 °C)-98.5 °F (36.9 °C)] 98.2 °F (36.8 °C)  Heart Rate:  [61-85] 66  Resp:  [18] 18  BP: (141-189)/() 141/94    Physical Exam:    General: Awake, alert, morbidly obese elderly male, lying in bed, NAD  Eyes: PERRL, EOMI, conjunctive are clear  Cardiovascular: Regular rate and rhythm, no murmurs  Respiratory: Decreased breath sounds bilaterally, no wheezing or rales, mild conversational dyspnea noted but improving  Abdomen: Soft, nontender, positive bowel sounds, no guarding  Neurologic: A&O, CN grossly intact, moves all extremities spontaneously  Musculoskeletal: Generalized weakness noted, no deformities  Skin: Warm, mild bilateral lower extremity pitting edema noted         Result Review    Result Review:  I have personally reviewed the results from the time of this admission to 2022 08:58 EDT and agree with these findings:  [x]  Laboratory  [x]  Microbiology  []  Radiology  []  EKG/Telemetry   []  Cardiology/Vascular   []  Pathology  []  Old records  []  Other:    Wounds (last 24 hours)     LDA Wound     Row Name 22             Wound 22 1122 Left posterior thigh Pressure Injury    Wound - Properties Group Placement Date: 22  -GK Placement Time: 112  -GK Side: Left  -GK Orientation: posterior  -GK Location: thigh  -GK Primary Wound Type: Pressure inj  -GK      Pressure Injury Stage 2  -BK      Dressing  Appearance dry;intact  -BK      Closure Adhesive bandage  -BK      Care, Wound wound temperature maintained  -BK      Dressing Care silicone  -BK      Retired Wound - Properties Group Placement Date: 04/01/22  -GK Placement Time: 1122 -GK Side: Left  -GK Orientation: posterior  -GK Location: thigh  -GK Primary Wound Type: Pressure inj  -GK      Retired Wound - Properties Group Date first assessed: 04/01/22  -GK Time first assessed: 1122  -GK Side: Left  -GK Location: thigh  -GK Primary Wound Type: Pressure inj  -GK            User Key  (r) = Recorded By, (t) = Taken By, (c) = Cosigned By    Initials Name Provider Type    Vira Staton RN Registered Nurse    Wilma Martinez LPN Licensed Nurse                  Assessment & Plan      Brief Patient Summary:  Shaji Junior is a 77 y.o. male with known past medical history of CAD, CVA, chronic diastolic CHF, hypertension, hyperlipidemia, PAF, CKD stage III, DM 2, and hyperlipidemia presented to the ED from nursing home with complaining of chest pain and shortness of breath for 3 days. Patient states that his pain was severe earlier that he described as a chest tightness that was nonradiating but states this is now resolved.  Patient reports shortness of breath that is worse with exertion and better with rest.  Noted to have CHF exacerbation, UTI, and uncontrolled hypertension on admission.  Cardiology and nephrology consulted.  Started on IV antibiotics and diuresis.      apixaban, 5 mg, Oral, Q12H  atorvastatin, 20 mg, Oral, Nightly  carvedilol, 12.5 mg, Oral, BID With Meals  cefTRIAXone, 2 g, Intravenous, Q24H  escitalopram, 10 mg, Oral, Daily  finasteride, 5 mg, Oral, Daily  furosemide, 40 mg, Intravenous, Q12H  gabapentin, 300 mg, Oral, TID  hydrALAZINE, 75 mg, Oral, TID  insulin glargine, 10 Units, Subcutaneous, Nightly  insulin lispro, 0-9 Units, Subcutaneous, TID AC  isosorbide mononitrate, 60 mg, Oral, BID - Nitrates  sodium chloride, 10 mL,  Intravenous, Q12H  tamsulosin, 0.4 mg, Oral, Daily             Active Hospital Problems:  Active Hospital Problems    Diagnosis    • **Acute on chronic congestive heart failure, unspecified heart failure type (HCC)    • Chest pain    • Shortness of breath    • Essential hypertension    • Acute UTI (urinary tract infection)    • Normocytic normochromic anemia    • CKD (chronic kidney disease), stage III ([facility])    • BPH without obstruction/lower urinary tract symptoms    • Atrial fibrillation ([facility])    • Congestive heart failure (HCC)    • Type 2 diabetes mellitus with diabetic nephropathy ([facility])    • Cerebrovascular accident ([facility])      Plan:   Acute on chronic HFpEF  -Recent echo in March with preserved EF noted  -Continue diuresis as tolerated with Lasix, monitor I's and O's  -Continue beta-blocker, statin, Imdur  -Cardiology following    Chest pain, improved  CAD  -Troponin negative, ACS ruled out  -Likely related to CHF  -Cardiology obtaining previous records  -Continue beta-blocker, statin, Imdur  -Echo report as noted above  -Monitor, further work-up per cardiology    Essential hypertension, poorly controlled  -Remains uncontrolled  -Hydralazine and Imdur increased  -Continue Coreg  -Monitor and adjust as needed     Acute UTI likely due to chronic Motley  -UA shows 3+ leukocyte Estrace, nitrite positive and 13-20 WBCs and 4+ bacteria.  Urine culture growing gram-negative bacilli  -Continue Rocephin  -Follow urine culture, patient has had several UTIs in the past likely secondary to indwelling Motley catheter     Diabetes mellitus type 2, poorly controlled, with peripheral neuropathy  -Last hemoglobin A1c elevated at 6.9 about 1 month ago.    -Continue basal insulin with sliding scale  -Monitor blood glucose, adjust as needed     Anemia of chronic kidney disease  -Hemoglobin appears to be stable, monitor  -EPO per nephrology     CKD stage III A  -Creatinine appears to be at around baseline  -Monitor with  diuresis  -Nephrology following     Chronic A. fib  -Rate controlled  -Continue beta-blocker and Eliquis     History of CVA  -Continue statin, Eliquis     Hyperlipidemia, chronic  -Lipid panel noted  -Continue home statin     Depression  -Chronic, controlled  -Continue home Lexapro     BPH  -Continue home finasteride, Flomax  -Motley catheter in place     Morbid obesity, BMI 43.13  -Encourage lifestyle modifications    DVT prophylaxis  -Eliquis    CODE STATUS:    Code Status (Patient has no pulse and is not breathing): CPR (Attempt to Resuscitate)  Medical Interventions (Patient has pulse or is breathing): Full Support      Disposition: Back to facility once improved    Electronically signed by Corinna Miranda DO, 09/18/22, 08:58 EDT.  [facility] Hospitalist Team      Much of this encounter note is an electronic transcription/translation of spoken language to printed text. The electronic translation of spoken language may permit erroneous, or at times, nonsensical words or phrases to be inadvertently transcribed; Although I have reviewed the note for such errors, some may still exist.

## 2022-09-18 NOTE — PLAN OF CARE
Goal Outcome Evaluation:  Plan of Care Reviewed With: patient        Progress: no change  Outcome Evaluation: Pt c/o chest pain and SOA; STAT EKG ordered and showed no significant change from previous one. Pt has been put on 2L O2 to help with dyspnea. MD has been made aware and ordered a STAT CXR. Pt also had a couple of beats run of V-Tach twice during the night. Pt is off cardiac monitoring at this time for refusing to be touched by staff, stating that he wants to sleep. Antibiotic infusion also stopped due to pt refusal to keep arm straight in order to allow the infusion to run in the vein through AC. Pt also refusing to turn every two hours. Will continue to monitor.

## 2022-09-18 NOTE — SIGNIFICANT NOTE
09/18/22 1316   OTHER   Discipline physical therapist   Rehab Time/Intention   Session Not Performed other (see comments)  (Stress test pending. PT will follow up as time allows.)   Recommendation   PT - Next Appointment 09/19/22

## 2022-09-18 NOTE — PROGRESS NOTES
"[facility] PROGRESS NOTE     LOS: 2 days    Patient Care Team:  Naa Valverde MD as PCP - General (Internal Medicine)      Subjective     Patient resting comfortably.  Dyspnea improving.  Diuresing well  No acute distress overnight    Objective     Vital Sign Min/Max for last 24 hours  Temp:  [97.8 °F (36.6 °C)-98.5 °F (36.9 °C)] 98.2 °F (36.8 °C)  Heart Rate:  [61-85] 66  Resp:  [18] 18  BP: (141-189)/() 141/94                       Flowsheet Rows    Flowsheet Row First Filed Value   Admission Height 182.9 cm (72\") Documented at 09/16/2022 0213   Admission Weight 144 kg (318 lb) Documented at 09/16/2022 0213          I/O this shift:  In: -   Out: 900 [Urine:900]  I/O last 3 completed shifts:  In: 1500 [P.O.:1300; IV Piggyback:200]  Out: 5150 [Urine:5150]    Physical Exam:  Physical Exam    General Appearance: Chronically ill-appearing, morbidly obese  Skin: warm and dry  HEENT: oral mucosa normal, nonicteric sclera  Neck: supple, no JVD  Lungs: Decreased breath sounds at bases  Heart: RRR, normal S1 and S2  Abdomen: soft, nontender, nondistended  : no palpable bladder  Extremities: 2+ bilateral lower extreme edema with venous stasis changes present.   Neuro: normal speech and mental status        LABS:  Lab Results   Component Value Date    CALCIUM 8.3 (L) 09/18/2022    PHOS 4.0 09/18/2022     Results from last 7 days   Lab Units 09/18/22  0025 09/17/22  0212 09/16/22  0538 09/16/22  0237   SODIUM mmol/L 139 142 145 143   POTASSIUM mmol/L 3.5 3.8 4.0 4.4   CHLORIDE mmol/L 101 104 110* 106   CO2 mmol/L 28.0 28.0 27.0 27.0   BUN mg/dL 27* 29* 29* 30*   CREATININE mg/dL 1.47* 1.43* 1.32* 1.34*   GLUCOSE mg/dL 91 91 117* 108*   CALCIUM mg/dL 8.3* 8.7 8.2* 8.3*   WBC 10*3/mm3 6.10 4.80 4.60 4.80   HEMOGLOBIN g/dL 9.9* 9.6* 9.6* 9.9*   PLATELETS 10*3/mm3 222 211 214 212   ALT (SGPT) U/L  --   --   --  8   AST (SGOT) U/L  --   --   --  16     Lab Results   Component Value Date    TROPONINT 0.040 (C) " 09/18/2022     Estimated Creatinine Clearance: 63.1 mL/min (A) (by C-G formula based on SCr of 1.47 mg/dL (H)).      Brief Urine Lab Results  (Last result in the past 365 days)      Color   Clarity   Blood   Leuk Est   Nitrite   Protein   CREAT   Urine HCG        09/17/22 1320             49.7             WEIGHTS:     Wt Readings from Last 1 Encounters:   09/18/22 0324 (!) 149 kg (328 lb 7.8 oz)   09/17/22 0625 (!) 148 kg (326 lb 4.5 oz)   09/16/22 1913 (!) 145 kg (320 lb 1.7 oz)   09/16/22 0213 (!) 144 kg (318 lb)       apixaban, 5 mg, Oral, Q12H  atorvastatin, 20 mg, Oral, Nightly  carvedilol, 12.5 mg, Oral, BID With Meals  cefTRIAXone, 2 g, Intravenous, Q24H  escitalopram, 10 mg, Oral, Daily  finasteride, 5 mg, Oral, Daily  furosemide, 40 mg, Intravenous, Q12H  gabapentin, 300 mg, Oral, TID  hydrALAZINE, 75 mg, Oral, TID  insulin glargine, 10 Units, Subcutaneous, Nightly  insulin lispro, 0-9 Units, Subcutaneous, TID AC  isosorbide mononitrate, 60 mg, Oral, BID - Nitrates  sodium chloride, 10 mL, Intravenous, Q12H  tamsulosin, 0.4 mg, Oral, Daily           Assessment & Plan       1.  Chronic kidney disease stage IIIa  Patient seems to have underlying CKD related to hypertension and diabetes but his creatinine has fluctuated due to diuretics.  Electrolytes okay.  Volume status generous but di improving  2.  Hypervolemia.  Acute on chronic CHF, diastolic.  Patient may also have venous insufficiency in bilateral lower extremities and pulmonary hypertension  Patient is on IV Lasix and has good urine output  3.  Hypertension with CKD  Blood pressure high but improving  4.  Chest pain.  Cardiology evaluating   5.  Proteinuria.  Subnephrotic     Recs:  -Change Lasix to 40 mg p.o. twice daily  -Add low-dose ARB  -Surveillance labs    Max Stein MD  09/18/22  10:48 EDT

## 2022-09-18 NOTE — PROGRESS NOTES
LOS: 2 days   Admiting Physician- Corinna Miranda DO    Reason For Followup:    Shortness of breath  Diastolic dysfunction  Bilateral leg edema  Hypertension  Renal insufficiency    Subjective     Shortness of breath is better.    Objective     Blood pressure is better controlled.  Leg edema is improved    Review of Systems:   Review of Systems   Constitutional: Negative for chills and fever.   HENT: Negative for ear discharge and nosebleeds.    Eyes: Negative for discharge and redness.   Cardiovascular: Positive for leg swelling. Negative for chest pain, orthopnea, palpitations, paroxysmal nocturnal dyspnea and syncope.   Respiratory: Positive for shortness of breath. Negative for cough and wheezing.    Endocrine: Negative for heat intolerance.   Skin: Negative for rash.   Musculoskeletal: Positive for arthritis and joint pain. Negative for myalgias.   Gastrointestinal: Negative for abdominal pain, melena, nausea and vomiting.   Genitourinary: Negative for dysuria and hematuria.   Neurological: Negative for dizziness, light-headedness, numbness and tremors.   Psychiatric/Behavioral: Negative for depression. The patient is not nervous/anxious.          Vital Signs  Vitals:    09/17/22 2138 09/17/22 2240 09/17/22 2339 09/18/22 0324   BP: (!) 183/107 164/83 168/75 141/94   BP Location: Left arm Right arm Right arm Right arm   Patient Position: Lying Lying Lying Lying   Pulse: 81 84 63 66   Resp:   18 18   Temp:   98.5 °F (36.9 °C) 98.2 °F (36.8 °C)   TempSrc:   Oral Oral   SpO2:   95% 96%   Weight:    (!) 149 kg (328 lb 7.8 oz)   Height:         Wt Readings from Last 1 Encounters:   09/18/22 (!) 149 kg (328 lb 7.8 oz)       Intake/Output Summary (Last 24 hours) at 9/18/2022 1114  Last data filed at 9/18/2022 0832  Gross per 24 hour   Intake 820 ml   Output 2900 ml   Net -2080 ml     Physical Exam:  Constitutional:       Appearance: Well-developed.   Eyes:      General: No scleral icterus.        Right eye: No  discharge.   HENT:      Head: Normocephalic and atraumatic.   Neck:      Thyroid: No thyromegaly.      Lymphadenopathy: No cervical adenopathy.   Pulmonary:      Effort: Pulmonary effort is normal. No respiratory distress.      Breath sounds: Normal breath sounds. No wheezing. No rales.   Cardiovascular:      Normal rate. Regular rhythm.      No gallop.   Edema:     Peripheral edema present.  Abdominal:      Tenderness: There is no abdominal tenderness.   Skin:     Findings: No erythema or rash.   Neurological:      Mental Status: Alert and oriented to person, place, and time.         Results Review:   Lab Results (last 24 hours)     Procedure Component Value Units Date/Time    Urine Culture - Urine, Urine, Catheter [[id number]]  (Abnormal) Collected: 09/16/22 0259    Specimen: Urine, Catheter Updated: 09/18/22 0843     Urine Culture >100,000 CFU/mL Gram Negative Bacilli      >100,000 CFU/mL Gram Negative Bacilli    Narrative:      Colonization of the urinary tract without infection is common. Treatment is discouraged unless the patient is symptomatic, pregnant, or undergoing an invasive urologic procedure.    Renal Function Panel [[id number]]  (Abnormal) Collected: 09/18/22 0025    Specimen: Blood Updated: 09/18/22 0246     Glucose 91 mg/dL      BUN 27 mg/dL      Creatinine 1.47 mg/dL      Sodium 139 mmol/L      Potassium 3.5 mmol/L      Chloride 101 mmol/L      CO2 28.0 mmol/L      Calcium 8.3 mg/dL      Albumin 3.00 g/dL      Phosphorus 4.0 mg/dL      Anion Gap 10.0 mmol/L      BUN/Creatinine Ratio 18.4     eGFR 48.8 mL/min/1.73      Comment: National Kidney Foundation and American Society of Nephrology (ASN) Task Force recommended calculation based on the Chronic Kidney Disease Epidemiology Collaboration (CKD-EPI) equation refit without adjustment for race.       Narrative:      GFR Normal >60  Chronic Kidney Disease <60  Kidney Failure <15      Troponin [[id number]]  (Abnormal) Collected: 09/18/22 0025     Specimen: Blood Updated: 09/18/22 0242     Troponin T 0.040 ng/mL     Narrative:      Troponin T Reference Range:  <= 0.03 ng/mL-   Negative for AMI  >0.03 ng/mL-     Abnormal for myocardial necrosis.  Clinicians would have to utilize clinical acumen, EKG, Troponin and serial changes to determine if it is an Acute Myocardial Infarction or myocardial injury due to an underlying chronic condition.       Results may be falsely decreased if patient taking Biotin.      CBC & Differential [[id number]]  (Abnormal) Collected: 09/18/22 0025    Specimen: Blood Updated: 09/18/22 0156    Narrative:      The following orders were created for panel order CBC & Differential.  Procedure                               Abnormality         Status                     ---------                               -----------         ------                     CBC Auto Differential[[id number]]        Abnormal            Final result                 Please view results for these tests on the individual orders.    CBC Auto Differential [[id number]]  (Abnormal) Collected: 09/18/22 0025    Specimen: Blood Updated: 09/18/22 0156     WBC 6.10 10*3/mm3      RBC 3.62 10*6/mm3      Hemoglobin 9.9 g/dL      Hematocrit 31.3 %      MCV 86.6 fL      MCH 27.2 pg      MCHC 31.4 g/dL      RDW 20.2 %      RDW-SD 60.8 fl      MPV 8.1 fL      Platelets 222 10*3/mm3      Neutrophil % 54.1 %      Lymphocyte % 25.8 %      Monocyte % 13.6 %      Eosinophil % 6.2 %      Basophil % 0.3 %      Neutrophils, Absolute 3.30 10*3/mm3      Lymphocytes, Absolute 1.60 10*3/mm3      Monocytes, Absolute 0.80 10*3/mm3      Eosinophils, Absolute 0.40 10*3/mm3      Basophils, Absolute 0.00 10*3/mm3      nRBC 0.0 /100 WBC     CANDIDA AURIS SCREEN - Swab, Axilla Right, Axilla Left and Groin [[id number]]  (Normal) Collected: 09/16/22 1957    Specimen: Swab from Axilla Right, Axilla Left and Groin Updated: 09/18/22 0147     Adelina Auris Screen Culture No Candida auris isolated at 24 hours     Troponin [[id number]]  (Abnormal) Collected: 09/17/22 1753    Specimen: Blood Updated: 09/17/22 1944     Troponin T 0.042 ng/mL     Narrative:      Troponin T Reference Range:  <= 0.03 ng/mL-   Negative for AMI  >0.03 ng/mL-     Abnormal for myocardial necrosis.  Clinicians would have to utilize clinical acumen, EKG, Troponin and serial changes to determine if it is an Acute Myocardial Infarction or myocardial injury due to an underlying chronic condition.       Results may be falsely decreased if patient taking Biotin.      POC Glucose Once [[id number]]  (Abnormal) Collected: 09/17/22 1914    Specimen: Blood Updated: 09/17/22 1914     Glucose 141 mg/dL      Comment: Serial Number: [id number]:  [id number]       Protein / Creatinine Ratio, Urine - [[id number]]  (Abnormal) Collected: 09/17/22 1320    Specimen: Urine Updated: 09/17/22 1820     Protein/Creatinine Ratio, Urine 615.7 mg/G Crea      Creatinine, Urine 49.7 mg/dL      Total Protein, Urine 30.6 mg/dL     POC Glucose Once [[id number]]  (Abnormal) Collected: 09/17/22 1642    Specimen: Blood Updated: 09/17/22 1643     Glucose 115 mg/dL      Comment: Serial Number: [id number]:  [id number]       Protein / Creatinine Ratio, Urine - [[id number]]  (Abnormal) Collected: 09/17/22 0119    Specimen: Urine Updated: 09/17/22 1352     Protein/Creatinine Ratio, Urine 1,259.3 mg/G Crea      Creatinine, Urine 48.6 mg/dL      Total Protein, Urine 61.2 mg/dL     POC Glucose Once [[id number]]  (Abnormal) Collected: 09/17/22 1140    Specimen: Blood Updated: 09/17/22 1141     Glucose 134 mg/dL      Comment: Serial Number: [id number]:  [id number]           Imaging Results (Last 72 Hours)     Procedure Component Value Units Date/Time    XR Chest 1 View [[id number]] Collected: 09/18/22 0646     Updated: 09/18/22 0649    Narrative:      EXAMINATION: XR CHEST 1 VW    DATE: 9/18/2022 5:57 AM    INDICATION:  Chest pain;    COMPARISON:  January 29, 2022    FINDINGS:  Minor  hypoinflation lungs.    Minor central pulmonary vascular engorgement.    No focal consolidation, pleural effusion, or pneumothorax.    Moderate cardiomegaly.    No acute osseous abnormality.          Impression:        1.  Probable minor CHF/volume overload.          Electronically signed by:  Gayle Westbrook M.D.    9/18/2022 4:48 AM    XR Chest 1 View [[id number]] Collected: 09/16/22 0645     Updated: 09/16/22 0648    Narrative:         DATE OF EXAM:   9/16/2022 2:18 AM     PROCEDURE:   XR CHEST 1 VW-     INDICATIONS:   CHF/COPD Protocol     COMPARISON:  04/27/2022     TECHNIQUE:   Portable chest radiograph.     FINDINGS:    The heart is enlarged with central pulmonary vascular congestion  similar to prior study. Mild bibasilar atelectasis. No pneumothorax. No  significant pleural effusion. Osseous structures grossly intact.       Impression:      1. Cardiomegaly and central pulmonary vascular congestion similar to  comparison study.  2. Mild bibasilar atelectasis.     Electronically Signed By-Chico Henley MD On:9/16/2022 6:46 AM  This report was finalized on [id number] by  Chico Henley MD.        ECG/EMG Results (most recent)     Procedure Component Value Units Date/Time    ECG 12 Lead [[id number]] Collected: 09/16/22 0225     Updated: 09/16/22 0743     QT Interval 458 ms     Narrative:      HEART RATE= 69  bpm  RR Interval= 868  ms  NH Interval= Failed  ms  P Horizontal Axis= Invalid  deg  P Front Axis= Invalid  deg  QRSD Interval= 91  ms  QT Interval= 458  ms  QRS Axis= 7  deg  T Wave Axis= 97  deg  - ABNORMAL ECG -  Atrial fibrillation  Nonspecific T abnormalities, lateral leads  Borderline prolonged QT interval  Electronically Signed By:   Date and Time of Study: 2022-09-16 02:25:08    ECG 12 Lead [[id number]] Collected: 09/17/22 1551     Updated: 09/17/22 1553     QT Interval 419 ms     Narrative:      HEART RATE= 72  bpm  RR Interval= 832  ms  NH Interval=   ms  P Horizontal Axis=   deg  P Front Axis=    deg  QRSD Interval= 89  ms  QT Interval= 419  ms  QRS Axis= -6  deg  T Wave Axis= 123  deg  - ABNORMAL ECG -  Atrial fibrillation  Nonspecific T abnormalities, lateral leads  When compared with ECG of 16-Sep-2022 2:25:08,  Nonspecific significant change  Electronically Signed By:   Date and Time of Study: 2022-09-17 15:51:08    ECG 12 Lead [[id number]] Collected: 09/18/22 0206     Updated: 09/18/22 0207     QT Interval 452 ms     Narrative:      HEART RATE= 62  bpm  RR Interval= 969  ms  MO Interval=   ms  P Horizontal Axis=   deg  P Front Axis=   deg  QRSD Interval= 93  ms  QT Interval= 452  ms  QRS Axis= -12  deg  T Wave Axis= 109  deg  - ABNORMAL ECG -  Atrial fibrillation  Consider anterior infarct  Nonspecific T abnormalities, lateral leads  Electronically Signed By:   Date and Time of Study: 2022-09-18 02:06:21        CBC    Results from last 7 days   Lab Units 09/18/22 0025 09/17/22 0212 09/16/22 0538 09/16/22 0237   WBC 10*3/mm3 6.10 4.80 4.60 4.80   HEMOGLOBIN g/dL 9.9* 9.6* 9.6* 9.9*   PLATELETS 10*3/mm3 222 211 214 212     BMP   Results from last 7 days   Lab Units 09/18/22 0025 09/17/22 0212 09/16/22 0538 09/16/22 0237   SODIUM mmol/L 139 142 145 143   POTASSIUM mmol/L 3.5 3.8 4.0 4.4   CHLORIDE mmol/L 101 104 110* 106   CO2 mmol/L 28.0 28.0 27.0 27.0   BUN mg/dL 27* 29* 29* 30*   CREATININE mg/dL 1.47* 1.43* 1.32* 1.34*   GLUCOSE mg/dL 91 91 117* 108*   PHOSPHORUS mg/dL 4.0 3.7  --   --      CMP   Results from last 7 days   Lab Units 09/18/22 0025 09/17/22 0212 09/16/22 0538 09/16/22 0237   SODIUM mmol/L 139 142 145 143   POTASSIUM mmol/L 3.5 3.8 4.0 4.4   CHLORIDE mmol/L 101 104 110* 106   CO2 mmol/L 28.0 28.0 27.0 27.0   BUN mg/dL 27* 29* 29* 30*   CREATININE mg/dL 1.47* 1.43* 1.32* 1.34*   GLUCOSE mg/dL 91 91 117* 108*   ALBUMIN g/dL 3.00* 3.00*  --  3.10*   BILIRUBIN mg/dL  --   --   --  0.4   ALK PHOS U/L  --   --   --  104   AST (SGOT) U/L  --   --   --  16   ALT (SGPT) U/L  --   --    --  8     Cardiac Studies:  Echo- Results for orders placed during the hospital encounter of 03/29/22    Adult Transthoracic Echo Complete W/ Cont if Necessary Per Protocol    Interpretation Summary  · Left ventricular systolic function is normal.  · Left ventricular ejection fraction is 55 to 60%  · Left ventricular diastolic function was normal.    Stress Myoview-  Cath-      Medication Review:   Scheduled Meds:apixaban, 5 mg, Oral, Q12H  atorvastatin, 20 mg, Oral, Nightly  carvedilol, 12.5 mg, Oral, BID With Meals  cefTRIAXone, 2 g, Intravenous, Q24H  escitalopram, 10 mg, Oral, Daily  finasteride, 5 mg, Oral, Daily  furosemide, 40 mg, Oral, BID  gabapentin, 300 mg, Oral, TID  hydrALAZINE, 75 mg, Oral, TID  insulin glargine, 10 Units, Subcutaneous, Nightly  insulin lispro, 0-9 Units, Subcutaneous, TID AC  isosorbide mononitrate, 60 mg, Oral, BID - Nitrates  losartan, 25 mg, Oral, Q24H  sodium chloride, 10 mL, Intravenous, Q12H  tamsulosin, 0.4 mg, Oral, Daily      Continuous Infusions:   PRN Meds:.•  acetaminophen **OR** acetaminophen **OR** acetaminophen  •  aluminum-magnesium hydroxide-simethicone  •  dextrose  •  dextrose  •  glucagon (human recombinant)  •  hydrALAZINE  •  magnesium sulfate **OR** magnesium sulfate **OR** magnesium sulfate  •  melatonin  •  nitroglycerin  •  ondansetron **OR** ondansetron  •  potassium chloride **OR** potassium chloride **OR** potassium chloride  •  sodium chloride  •  sodium chloride      Assessment & Plan   Patient Active Problem List   Diagnosis   • CHF exacerbation (HCC)   • Cerebrovascular accident (HCC)   • Type 2 diabetes mellitus with diabetic nephropathy (HCC)   • Acute kidney failure (HCC)   • Congestive heart failure (HCC)   • Acute on chronic congestive heart failure, unspecified heart failure type (HCC)   • MAKI (acute kidney injury) (HCC)   • Atrial fibrillation (HCC)   • Chest pain   • Shortness of breath   • Chest pain, unspecified type   • Essential  hypertension   • Acute UTI (urinary tract infection)   • Normocytic normochromic anemia   • CKD (chronic kidney disease), stage III (HCC)   • BPH without obstruction/lower urinary tract symptoms     MDM:    1.  Bilateral leg edema:    Leg edema is improving slowly.  Continue diuresis.  Lasix has been switched to p.o.    2.  Shortness of breath:    Patient is feeling and breathing much better    3.  Hypertension:    Patient is on hydralazine, carvedilol and losartan.  Blood pressure is much better.  Current treatment would be continued    If stress test is negative, patient can be discharged and follow me as a outpatient    Gurpreet Vargas MD  09/18/22  11:14 EDT

## 2022-09-19 VITALS
HEART RATE: 80 BPM | TEMPERATURE: 97.5 F | WEIGHT: 315 LBS | OXYGEN SATURATION: 94 % | BODY MASS INDEX: 42.66 KG/M2 | RESPIRATION RATE: 18 BRPM | HEIGHT: 72 IN | SYSTOLIC BLOOD PRESSURE: 130 MMHG | DIASTOLIC BLOOD PRESSURE: 79 MMHG

## 2022-09-19 LAB
ALBUMIN SERPL-MCNC: 2.7 G/DL (ref 3.5–5.2)
ANION GAP SERPL CALCULATED.3IONS-SCNC: 8 MMOL/L (ref 5–15)
BACTERIA SPEC AEROBE CULT: ABNORMAL
BACTERIA SPEC AEROBE CULT: ABNORMAL
BASOPHILS # BLD AUTO: 0 10*3/MM3 (ref 0–0.2)
BASOPHILS NFR BLD AUTO: 0.7 % (ref 0–1.5)
BUN SERPL-MCNC: 30 MG/DL (ref 8–23)
BUN/CREAT SERPL: 20.8 (ref 7–25)
CALCIUM SPEC-SCNC: 7.8 MG/DL (ref 8.6–10.5)
CHLORIDE SERPL-SCNC: 101 MMOL/L (ref 98–107)
CO2 SERPL-SCNC: 30 MMOL/L (ref 22–29)
CREAT SERPL-MCNC: 1.44 MG/DL (ref 0.76–1.27)
DEPRECATED RDW RBC AUTO: 60.8 FL (ref 37–54)
EGFRCR SERPLBLD CKD-EPI 2021: 50 ML/MIN/1.73
EOSINOPHIL # BLD AUTO: 0.4 10*3/MM3 (ref 0–0.4)
EOSINOPHIL NFR BLD AUTO: 7.2 % (ref 0.3–6.2)
ERYTHROCYTE [DISTWIDTH] IN BLOOD BY AUTOMATED COUNT: 20.3 % (ref 12.3–15.4)
GLUCOSE BLDC GLUCOMTR-MCNC: 136 MG/DL (ref 70–105)
GLUCOSE BLDC GLUCOMTR-MCNC: 141 MG/DL (ref 70–105)
GLUCOSE BLDC GLUCOMTR-MCNC: 93 MG/DL (ref 70–105)
GLUCOSE SERPL-MCNC: 132 MG/DL (ref 65–99)
HCT VFR BLD AUTO: 30.3 % (ref 37.5–51)
HGB BLD-MCNC: 9.7 G/DL (ref 13–17.7)
LYMPHOCYTES # BLD AUTO: 1.3 10*3/MM3 (ref 0.7–3.1)
LYMPHOCYTES NFR BLD AUTO: 22.5 % (ref 19.6–45.3)
MCH RBC QN AUTO: 27.4 PG (ref 26.6–33)
MCHC RBC AUTO-ENTMCNC: 32 G/DL (ref 31.5–35.7)
MCV RBC AUTO: 85.5 FL (ref 79–97)
MONOCYTES # BLD AUTO: 0.9 10*3/MM3 (ref 0.1–0.9)
MONOCYTES NFR BLD AUTO: 14.7 % (ref 5–12)
NEUTROPHILS NFR BLD AUTO: 3.3 10*3/MM3 (ref 1.7–7)
NEUTROPHILS NFR BLD AUTO: 54.9 % (ref 42.7–76)
NRBC BLD AUTO-RTO: 0.1 /100 WBC (ref 0–0.2)
PHOSPHATE SERPL-MCNC: 4.1 MG/DL (ref 2.5–4.5)
PLATELET # BLD AUTO: 209 10*3/MM3 (ref 140–450)
PMV BLD AUTO: 7.9 FL (ref 6–12)
POTASSIUM SERPL-SCNC: 3.6 MMOL/L (ref 3.5–5.2)
RBC # BLD AUTO: 3.54 10*6/MM3 (ref 4.14–5.8)
SODIUM SERPL-SCNC: 139 MMOL/L (ref 136–145)
WBC NRBC COR # BLD: 5.9 10*3/MM3 (ref 3.4–10.8)

## 2022-09-19 PROCEDURE — 97161 PT EVAL LOW COMPLEX 20 MIN: CPT

## 2022-09-19 PROCEDURE — 82962 GLUCOSE BLOOD TEST: CPT

## 2022-09-19 PROCEDURE — 80069 RENAL FUNCTION PANEL: CPT | Performed by: INTERNAL MEDICINE

## 2022-09-19 PROCEDURE — 25010000002 MEROPENEM PER 100 MG: Performed by: INTERNAL MEDICINE

## 2022-09-19 PROCEDURE — 99232 SBSQ HOSP IP/OBS MODERATE 35: CPT | Performed by: INTERNAL MEDICINE

## 2022-09-19 PROCEDURE — C1751 CATH, INF, PER/CENT/MIDLINE: HCPCS

## 2022-09-19 PROCEDURE — 36410 VNPNXR 3YR/> PHY/QHP DX/THER: CPT

## 2022-09-19 PROCEDURE — 85025 COMPLETE CBC W/AUTO DIFF WBC: CPT | Performed by: INTERNAL MEDICINE

## 2022-09-19 PROCEDURE — 25010000002 CEFTRIAXONE PER 250 MG: Performed by: INTERNAL MEDICINE

## 2022-09-19 PROCEDURE — 05HD33Z INSERTION OF INFUSION DEVICE INTO RIGHT CEPHALIC VEIN, PERCUTANEOUS APPROACH: ICD-10-PCS | Performed by: INTERNAL MEDICINE

## 2022-09-19 PROCEDURE — C1894 INTRO/SHEATH, NON-LASER: HCPCS

## 2022-09-19 RX ORDER — CARVEDILOL 12.5 MG/1
12.5 TABLET ORAL 2 TIMES DAILY WITH MEALS
Qty: 60 TABLET | Refills: 0 | Status: SHIPPED | OUTPATIENT
Start: 2022-09-19 | End: 2023-04-04 | Stop reason: HOSPADM

## 2022-09-19 RX ORDER — FUROSEMIDE 40 MG/1
40 TABLET ORAL 2 TIMES DAILY
Qty: 60 TABLET | Refills: 0 | Status: SHIPPED | OUTPATIENT
Start: 2022-09-19 | End: 2023-04-04 | Stop reason: HOSPADM

## 2022-09-19 RX ORDER — HYDRALAZINE HYDROCHLORIDE 25 MG/1
50 TABLET, FILM COATED ORAL 3 TIMES DAILY
Status: DISCONTINUED | OUTPATIENT
Start: 2022-09-19 | End: 2022-09-19 | Stop reason: HOSPADM

## 2022-09-19 RX ORDER — HYDRALAZINE HYDROCHLORIDE 50 MG/1
50 TABLET, FILM COATED ORAL 3 TIMES DAILY
Qty: 90 TABLET | Refills: 0 | Status: SHIPPED | OUTPATIENT
Start: 2022-09-19

## 2022-09-19 RX ORDER — LOSARTAN POTASSIUM 25 MG/1
25 TABLET ORAL
Qty: 30 TABLET | Refills: 0 | Status: SHIPPED | OUTPATIENT
Start: 2022-09-20

## 2022-09-19 RX ORDER — SODIUM CHLORIDE 0.9 % (FLUSH) 0.9 %
10 SYRINGE (ML) INJECTION EVERY 12 HOURS SCHEDULED
Status: DISCONTINUED | OUTPATIENT
Start: 2022-09-19 | End: 2022-09-19 | Stop reason: HOSPADM

## 2022-09-19 RX ORDER — SODIUM CHLORIDE 0.9 % (FLUSH) 0.9 %
10 SYRINGE (ML) INJECTION AS NEEDED
Status: DISCONTINUED | OUTPATIENT
Start: 2022-09-19 | End: 2022-09-19 | Stop reason: HOSPADM

## 2022-09-19 RX ORDER — ISOSORBIDE MONONITRATE 60 MG/1
60 TABLET, EXTENDED RELEASE ORAL
Qty: 60 TABLET | Refills: 0 | Status: SHIPPED | OUTPATIENT
Start: 2022-09-19

## 2022-09-19 RX ADMIN — TAMSULOSIN HYDROCHLORIDE 0.4 MG: 0.4 CAPSULE ORAL at 09:10

## 2022-09-19 RX ADMIN — ISOSORBIDE MONONITRATE 60 MG: 60 TABLET, EXTENDED RELEASE ORAL at 09:10

## 2022-09-19 RX ADMIN — LOSARTAN POTASSIUM 25 MG: 25 TABLET, FILM COATED ORAL at 09:10

## 2022-09-19 RX ADMIN — ISOSORBIDE MONONITRATE 60 MG: 60 TABLET, EXTENDED RELEASE ORAL at 17:25

## 2022-09-19 RX ADMIN — GABAPENTIN 300 MG: 300 CAPSULE ORAL at 09:10

## 2022-09-19 RX ADMIN — HYDRALAZINE HYDROCHLORIDE 50 MG: 25 TABLET, FILM COATED ORAL at 09:01

## 2022-09-19 RX ADMIN — FUROSEMIDE 40 MG: 40 TABLET ORAL at 17:29

## 2022-09-19 RX ADMIN — CARVEDILOL 12.5 MG: 6.25 TABLET, FILM COATED ORAL at 09:06

## 2022-09-19 RX ADMIN — HYDRALAZINE HYDROCHLORIDE 50 MG: 25 TABLET, FILM COATED ORAL at 17:29

## 2022-09-19 RX ADMIN — FUROSEMIDE 40 MG: 40 TABLET ORAL at 09:10

## 2022-09-19 RX ADMIN — CARVEDILOL 12.5 MG: 6.25 TABLET, FILM COATED ORAL at 17:30

## 2022-09-19 RX ADMIN — APIXABAN 5 MG: 5 TABLET, FILM COATED ORAL at 09:10

## 2022-09-19 RX ADMIN — FINASTERIDE 5 MG: 5 TABLET, FILM COATED ORAL at 09:01

## 2022-09-19 RX ADMIN — CEFTRIAXONE 2 G: 2 INJECTION, POWDER, FOR SOLUTION INTRAMUSCULAR; INTRAVENOUS at 04:55

## 2022-09-19 RX ADMIN — MEROPENEM 1 G: 1 INJECTION, POWDER, FOR SOLUTION INTRAVENOUS at 12:27

## 2022-09-19 RX ADMIN — ESCITALOPRAM OXALATE 10 MG: 10 TABLET ORAL at 09:10

## 2022-09-19 RX ADMIN — GABAPENTIN 300 MG: 300 CAPSULE ORAL at 17:26

## 2022-09-19 RX ADMIN — Medication 10 ML: at 09:11

## 2022-09-19 NOTE — PROGRESS NOTES
"[facility] PROGRESS NOTE     LOS: 3 days    Patient Care Team:  Naa Valverde MD as PCP - General (Internal Medicine)      Subjective     Patient feeling better.  Dyspnea improving.  Denies any chest pain, nausea or vomiting.  Edema is improved    Objective     Vital Sign Min/Max for last 24 hours  Temp:  [98 °F (36.7 °C)-98.8 °F (37.1 °C)] 98.1 °F (36.7 °C)  Heart Rate:  [67-74] 69  Resp:  [18] 18  BP: ()/(61-90) 146/78                       Flowsheet Rows    Flowsheet Row First Filed Value   Admission Height 182.9 cm (72\") Documented at 09/16/2022 0213   Admission Weight 144 kg (318 lb) Documented at 09/16/2022 0213          No intake/output data recorded.  I/O last 3 completed shifts:  In: 1400 [P.O.:1200; IV Piggyback:200]  Out: 4600 [Urine:4600]    Physical Exam:  Physical Exam    General Appearance: Chronically ill-appearing, morbidly obese  Skin: warm and dry  HEENT: oral mucosa normal, nonicteric sclera  Neck: supple, no JVD  Lungs: Decreased breath sounds at bases  Heart: RRR, normal S1 and S2  Abdomen: soft, nontender, nondistended  : no palpable bladder  Extremities: 2+ bilateral lower extreme edema with venous stasis changes present.   Neuro: normal speech and mental status        LABS:  Lab Results   Component Value Date    CALCIUM 7.8 (L) 09/19/2022    PHOS 4.1 09/19/2022     Results from last 7 days   Lab Units 09/19/22  0111 09/18/22  0025 09/17/22  0212 09/16/22  0538 09/16/22  0237   SODIUM mmol/L 139 139 142   < > 143   POTASSIUM mmol/L 3.6 3.5 3.8   < > 4.4   CHLORIDE mmol/L 101 101 104   < > 106   CO2 mmol/L 30.0* 28.0 28.0   < > 27.0   BUN mg/dL 30* 27* 29*   < > 30*   CREATININE mg/dL 1.44* 1.47* 1.43*   < > 1.34*   GLUCOSE mg/dL 132* 91 91   < > 108*   CALCIUM mg/dL 7.8* 8.3* 8.7   < > 8.3*   WBC 10*3/mm3 5.90 6.10 4.80   < > 4.80   HEMOGLOBIN g/dL 9.7* 9.9* 9.6*   < > 9.9*   PLATELETS 10*3/mm3 209 222 211   < > 212   ALT (SGPT) U/L  --   --   --   --  8   AST (SGOT) U/L  --   " --   --   --  16    < > = values in this interval not displayed.     Lab Results   Component Value Date    TROPONINT 0.040 (C) 09/18/2022     Estimated Creatinine Clearance: 64.4 mL/min (A) (by C-G formula based on SCr of 1.44 mg/dL (H)).      Brief Urine Lab Results  (Last result in the past 365 days)      Color   Clarity   Blood   Leuk Est   Nitrite   Protein   CREAT   Urine HCG        09/17/22 1320             49.7             WEIGHTS:     Wt Readings from Last 1 Encounters:   09/18/22 0324 (!) 149 kg (328 lb 7.8 oz)   09/17/22 0625 (!) 148 kg (326 lb 4.5 oz)   09/16/22 1913 (!) 145 kg (320 lb 1.7 oz)   09/16/22 0213 (!) 144 kg (318 lb)       apixaban, 5 mg, Oral, Q12H  atorvastatin, 20 mg, Oral, Nightly  carvedilol, 12.5 mg, Oral, BID With Meals  cefTRIAXone, 2 g, Intravenous, Q24H  escitalopram, 10 mg, Oral, Daily  finasteride, 5 mg, Oral, Daily  furosemide, 40 mg, Oral, BID  gabapentin, 300 mg, Oral, TID  hydrALAZINE, 50 mg, Oral, TID  insulin glargine, 10 Units, Subcutaneous, Nightly  insulin lispro, 0-9 Units, Subcutaneous, TID AC  isosorbide mononitrate, 60 mg, Oral, BID - Nitrates  losartan, 25 mg, Oral, Q24H  sodium chloride, 10 mL, Intravenous, Q12H  tamsulosin, 0.4 mg, Oral, Daily           Assessment & Plan       1.  Chronic kidney disease stage IIIa  Patient seems to have underlying CKD related to hypertension and diabetes but his creatinine has fluctuated due to diuretics.  Electrolytes okay.  Volume status generous but improving  2.  Hypervolemia.  Acute on chronic CHF, diastolic.  Patient may also have venous insufficiency in bilateral lower extremities and pulmonary hypertension  Patient is on IV Lasix and has good urine output  3.  Hypertension with CKD  Blood pressure better  4.  Chest pain.  Cardiology evaluating   5.  Proteinuria.  Subnephrotic     Recs:  -Continue and current antihypertensives  -Okay to discharge from renal standpoint    Max Stein MD  09/19/22  08:36 EDT

## 2022-09-19 NOTE — DISCHARGE SUMMARY
[facility] Medicine Services  DISCHARGE SUMMARY    Patient Name: Shaji Junior  : 1945  MRN: [id number]    Date of Admission: 2022  Date of Discharge: 2022  Primary Care Physician: Naa Valverde MD      Presenting Problem:   Shortness of breath [R06.02]  Chest pain, unspecified type [R07.9]  Hypertension, unspecified type [I10]  Acute on chronic congestive heart failure, unspecified heart failure type (HCC) [I50.9]  Class 3 severe obesity with serious comorbidity and body mass index (BMI) of 40.0 to 44.9 in adult, unspecified obesity type (HCC) [E66.01, Z68.41]    Active and Resolved Hospital Problems:  Active Hospital Problems    Diagnosis POA   • **Acute on chronic congestive heart failure, unspecified heart failure type (HCC) [I50.9] Yes   • Chest pain [R07.9] Yes   • Shortness of breath [R06.02] Yes   • Essential hypertension [I10] Yes   • Acute UTI (urinary tract infection) [N39.0] Yes   • Normocytic normochromic anemia [D64.9] Yes   • CKD (chronic kidney disease), stage III (HCC) [N18.30] Yes   • BPH without obstruction/lower urinary tract symptoms [N40.0] Yes   • Atrial fibrillation (HCC) [I48.91] Yes   • Congestive heart failure (HCC) [I50.9] Yes   • Type 2 diabetes mellitus with diabetic nephropathy (HCC) [E11.21] Yes   • Cerebrovascular accident (HCC) [I63.9] Yes      Resolved Hospital Problems   No resolved problems to display.         Hospital Course     Hospital Course:  Shaji Junior is a 77 y.o. male with known past medical history of CAD, CVA, chronic diastolic CHF, hypertension, hyperlipidemia, PAF, CKD stage III, DM 2, and hyperlipidemia presented to the ED from nursing home with complaining of chest pain and shortness of breath for 3 days. Patient states that his pain was severe earlier that he described as a chest tightness that was nonradiating but states this is now resolved.  Patient reports shortness of breath that is worse with exertion and  better with rest.  Noted to have CHF exacerbation, UTI, and uncontrolled hypertension on admission.  Cardiology and nephrology consulted.  Started on IV antibiotics and diuresis.  Patient had urinary catheter inserted recently at the rehab facility for urinary retention.  He is due to follow-up with urology outpatient for it.  Patient has significant improvement with diuresis, his blood pressure also improved after adjustment as noted below.  Urine culture grew Proteus and E. coli, ESBL, antibiotic switched to IV meropenem to finish up the course.  Midline ordered.  Stress test was also done per cardiology which does not show any severe ischemia at this time.  No further inpatient work-up required per cardiology and nephrology and they are okay with discharge.  Patient stable to discharge back to facility today with follow-up with PCP, nephrology, cardiology, and urology as an outpatient.    A/P:    Acute on chronic HFpEF  -Recent echo in March with preserved EF noted  -Continue diuresis as tolerated with Lasix, monitor I's and O's  -Continue beta-blocker, statin, Imdur  -Okay to discharge per cardiology and nephrology     Chest pain, improved  CAD  -Troponin negative, ACS ruled out  -Likely related to CHF  -Cardiology obtaining previous records  -Continue beta-blocker, statin, Imdur  -Echo report as noted above  -Stress test negative  -Okay to discharge per cardiology with outpatient follow-up     Essential hypertension, poorly controlled  -Uncontrolled but improved  -Hydralazine and Imdur increased  -Continue Coreg  -Final adjustments as noted below and DC med rec     Acute UTI likely due to chronic Motley  -UA shows 3+ leukocyte Estrace, nitrite positive and 13-20 WBCs and 4+ bacteria.  Urine culture growing ESBL E. coli and Proteus  -Initially on Rocephin, changed to meropenem on discharge  -Midline ordered  -Patient to follow-up with urology outpatient for the urinary retention, Motley replaced prior to discharge  due to UTI    Diabetes mellitus type 2, poorly controlled, with peripheral neuropathy  -Last hemoglobin A1c elevated at 6.9 about 1 month ago.    -Continue basal insulin with sliding scale  -Monitor blood glucose, adjust as needed     Anemia of chronic kidney disease  -Hemoglobin appears to be stable, monitor  -EPO per nephrology     CKD stage III A  -Creatinine appears to be at around baseline  -Monitor with diuresis  -Nephrology following, okay for discharge     Chronic A. fib  -Rate controlled  -Continue beta-blocker and Eliquis     History of CVA  -Continue statin, Eliquis     Hyperlipidemia, chronic  -Lipid panel noted  -Continue home statin     Depression  -Chronic, controlled  -Continue home Lexapro     BPH  -Continue home finasteride, Flomax  -Motley catheter in place     Morbid obesity, BMI 43.13  -Encourage lifestyle modifications        DISCHARGE Follow Up Recommendations for labs and diagnostics:   Follow-up with PCP, nephrology, cardiology, and urology    Reasons For Change In Medications and Indications for New Medications:  Medication changes as noted below    Day of Discharge     Vital Signs:  Temp:  [98 °F (36.7 °C)-98.8 °F (37.1 °C)] 98.1 °F (36.7 °C)  Heart Rate:  [67-74] 70  Resp:  [18] 18  BP: ()/(61-90) 158/82    Physical Exam:  General: Awake, alert, morbidly obese elderly male, lying in bed, NAD  Eyes: PERRL, EOMI, conjunctive are clear  Cardiovascular: Regular rate and rhythm, no murmurs  Respiratory: Decreased breath sounds bilaterally, no wheezing or rales, mild conversational dyspnea noted but improving  Abdomen: Soft, nontender, positive bowel sounds, no guarding  Neurologic: A&O, CN grossly intact, moves all extremities spontaneously  Musculoskeletal: Generalized weakness noted, no deformities  Skin: Warm, mild bilateral lower extremity pitting edema noted        Pertinent  and/or Most Recent Results     LAB RESULTS:      Lab 09/19/22  0111 09/18/22  0025 09/17/22  0212  09/16/22 0538 09/16/22 0237   WBC 5.90 6.10 4.80 4.60 4.80   HEMOGLOBIN 9.7* 9.9* 9.6* 9.6* 9.9*   HEMATOCRIT 30.3* 31.3* 30.5* 31.0* 32.0*   PLATELETS 209 222 211 214 212   NEUTROS ABS 3.30 3.30 2.30 2.20 2.40   LYMPHS ABS 1.30 1.60 1.40 1.40 1.40   MONOS ABS 0.90 0.80 0.70 0.60 0.60   EOS ABS 0.40 0.40 0.40 0.40 0.40   MCV 85.5 86.6 85.1 86.8 86.2   PROCALCITONIN  --   --   --   --  0.04         Lab 09/19/22  0111 09/18/22 0025 09/17/22 0212 09/16/22 0538 09/16/22 0237   SODIUM 139 139 142 145 143   POTASSIUM 3.6 3.5 3.8 4.0 4.4   CHLORIDE 101 101 104 110* 106   CO2 30.0* 28.0 28.0 27.0 27.0   ANION GAP 8.0 10.0 10.0 8.0 10.0   BUN 30* 27* 29* 29* 30*   CREATININE 1.44* 1.47* 1.43* 1.32* 1.34*   EGFR 50.0* 48.8* 50.5* 55.6* 54.6*   GLUCOSE 132* 91 91 117* 108*   CALCIUM 7.8* 8.3* 8.7 8.2* 8.3*   PHOSPHORUS 4.1 4.0 3.7  --   --    HEMOGLOBIN A1C  --   --   --  6.0*  --          Lab 09/19/22  0111 09/18/22  0025 09/17/22 0212 09/16/22 0237   TOTAL PROTEIN  --   --   --  6.9   ALBUMIN 2.70* 3.00* 3.00* 3.10*   GLOBULIN  --   --   --  3.8   ALT (SGPT)  --   --   --  8   AST (SGOT)  --   --   --  16   BILIRUBIN  --   --   --  0.4   ALK PHOS  --   --   --  104         Lab 09/18/22  0025 09/17/22  1753 09/16/22  0538 09/16/22  0237   PROBNP  --   --   --  7,936.0*   TROPONIN T 0.040* 0.042* 0.028 0.021         Lab 09/16/22  0538   CHOLESTEROL 84   LDL CHOL 40   HDL CHOL 33*   TRIGLYCERIDES 38             Brief Urine Lab Results  (Last result in the past 365 days)      Color   Clarity   Blood   Leuk Est   Nitrite   Protein   CREAT   Urine HCG        09/17/22 1320             49.7             Microbiology Results (last 10 days)     Procedure Component Value - Date/Time    CANDIDA AURIS SCREEN - Swab, Axilla Right, Axilla Left and Groin [[id number]]  (Normal) Collected: 09/16/22 1957    Lab Status: Preliminary result Specimen: Swab from Axilla Right, Axilla Left and Groin Updated: 09/19/22 0145     Candida Auris  Screen Culture No Candida auris isolated at 2 days    Urine Culture - Urine, Urine, Catheter [[id number]]  (Abnormal)  (Susceptibility) Collected: 09/16/22 0259    Lab Status: Final result Specimen: Urine, Catheter Updated: 09/19/22 1017     Urine Culture >100,000 CFU/mL Klebsiella pneumoniae ESBL     Comment:   Consider infectious disease consult.  Susceptibility results may not correlate to clinical outcomes.         >100,000 CFU/mL Proteus mirabilis ESBL     Comment:   Consider infectious disease consult.  Susceptibility results may not correlate to clinical outcomes.       Narrative:      Colonization of the urinary tract without infection is common. Treatment is discouraged unless the patient is symptomatic, pregnant, or undergoing an invasive urologic procedure.  Recent outcomes data supports the use of pip/tazo in the treatment of susceptible ESBL infections for uncomplicated UTI. Consider use of pip/tazo as a carbapenem-sparing regimen in applicable patients.    Susceptibility      Klebsiella pneumoniae ESBL      GAYLA      Amikacin Susceptible      Ertapenem Susceptible      Gentamicin Resistant     Levofloxacin Resistant     Meropenem Susceptible      Nitrofurantoin Intermediate      Piperacillin + Tazobactam Susceptible      Tobramycin Resistant     Trimethoprim + Sulfamethoxazole Resistant                      Susceptibility      Proteus mirabilis ESBL      GAYLA      Ertapenem Susceptible      Gentamicin Susceptible      Levofloxacin Resistant     Meropenem Susceptible      Nitrofurantoin Resistant     Piperacillin + Tazobactam Susceptible      Trimethoprim + Sulfamethoxazole Susceptible                           COVID-19,CEPHEID/WENDIE,COR/JOSELIN/PAD/KAVON IN-HOUSE(OR EMERGENT/ADD-ON),NP SWAB IN TRANSPORT MEDIA 3-4 HR TAT, RT-PCR - Swab, Nasopharynx [[id number]]  (Normal) Collected: 09/16/22 0237    Lab Status: Final result Specimen: Swab from Nasopharynx Updated: 09/16/22 0304     COVID19 Not Detected     Narrative:      Fact sheet for providers: https://www.fda.gov/media/975329/download     Fact sheet for patients: https://www.fda.gov/media/642953/download  Fact sheet for providers: https://www.fda.gov/media/396163/download    Fact sheet for patients: https://www.fda.gov/media/502474/download    Test performed by PCR.          XR Chest 1 View    Result Date: 9/18/2022  Impression: 1.  Probable minor CHF/volume overload. Electronically signed by:  Gayle Westbrook M.D.  9/18/2022 4:48 AM    XR Chest 1 View    Result Date: 9/16/2022  Impression: 1. Cardiomegaly and central pulmonary vascular congestion similar to comparison study. 2. Mild bibasilar atelectasis.  Electronically Signed By-Chico Henley MD On:9/16/2022 6:46 AM This report was finalized on [id number] by  Chico Henley MD.      Results for orders placed during the hospital encounter of 03/29/22    Duplex Venous Lower Extremity - Bilateral CAR    Interpretation Summary  · Normal bilateral lower extremity venous duplex scan.      Results for orders placed during the hospital encounter of 03/29/22    Duplex Venous Lower Extremity - Bilateral CAR    Interpretation Summary  · Normal bilateral lower extremity venous duplex scan.      Results for orders placed during the hospital encounter of 03/29/22    Adult Transthoracic Echo Complete W/ Cont if Necessary Per Protocol    Interpretation Summary  · Left ventricular systolic function is normal.  · Left ventricular ejection fraction is 55 to 60%  · Left ventricular diastolic function was normal.      Labs Pending at Discharge:  Pending Labs     Order Current Status    CANDIDA AURIS SCREEN - Swab, Axilla Right, Axilla Left and Groin Preliminary result          Procedures Performed           Consults:   Consults     Date and Time Order Name Status Description    9/16/2022  7:48 AM Inpatient Nephrology Consult Completed     9/16/2022  4:22 AM Inpatient Cardiology Consult Completed     9/16/2022  3:27 AM Hospitalist  (on-call MD unless specified)              Discharge Details        Discharge Medications      New Medications      Instructions Start Date   furosemide 40 MG tablet  Commonly known as: LASIX   40 mg, Oral, 2 Times Daily      losartan 25 MG tablet  Commonly known as: COZAAR   25 mg, Oral, Every 24 Hours Scheduled   Start Date: September 20, 2022     meropenem (MERREM) 1gm IVPB in 100ml NS (MBP)   1 g, Intravenous, Every 8 Hours         Changes to Medications      Instructions Start Date   carvedilol 12.5 MG tablet  Commonly known as: COREG  What changed:   medication strength  how much to take  when to take this   12.5 mg, Oral, 2 Times Daily With Meals      hydrALAZINE 50 MG tablet  Commonly known as: APRESOLINE  What changed:   medication strength  how much to take  Another medication with the same name was removed. Continue taking this medication, and follow the directions you see here.   50 mg, Oral, 3 Times Daily      isosorbide mononitrate 60 MG 24 hr tablet  Commonly known as: IMDUR  What changed:   medication strength  how much to take  when to take this   60 mg, Oral, 2 Times Daily (Nitrates)         Continue These Medications      Instructions Start Date   acetaminophen 325 MG tablet  Commonly known as: TYLENOL   650 mg, Oral, Every 4 Hours PRN      albuterol sulfate  (90 Base) MCG/ACT inhaler  Commonly known as: PROVENTIL HFA;VENTOLIN HFA;PROAIR HFA   2 puffs, Inhalation, Every 4 Hours PRN      apixaban 5 MG tablet tablet  Commonly known as: ELIQUIS   5 mg, Oral, Every 12 Hours Scheduled      BASAGLAR KWIKPEN 100 UNIT/ML injection pen   10 Units, Subcutaneous, Nightly      escitalopram 10 MG tablet  Commonly known as: LEXAPRO   10 mg, Oral, Daily      finasteride 5 MG tablet  Commonly known as: PROSCAR   5 mg, Oral, Daily      gabapentin 300 MG capsule  Commonly known as: NEURONTIN   300 mg, Oral, 3 Times Daily      Polyethylene Glycol 3350 powder   17 g, Oral, Nightly      saccharomyces boulardii  250 MG capsule  Commonly known as: FLORASTOR   250 mg, Oral, 2 Times Daily      simvastatin 40 MG tablet  Commonly known as: ZOCOR   40 mg, Oral, Every Night at Bedtime      tamsulosin 0.4 MG capsule 24 hr capsule  Commonly known as: FLOMAX   1 capsule, Oral, Daily             No Known Allergies      Discharge Disposition:  Rehab Facility or Unit (DC - External)    Diet:  Hospital:  Diet Order   Procedures   • Diet Cardiac; Healthy Heart         Discharge Activity:         CODE STATUS:  Code Status and Medical Interventions:   Ordered at: 09/16/22 0421     Code Status (Patient has no pulse and is not breathing):    CPR (Attempt to Resuscitate)     Medical Interventions (Patient has pulse or is breathing):    Full Support         No future appointments.        Time spent on Discharge including face to face service:  35 minutes    Signature:    Electronically signed by Corinna Miranda DO, 09/19/22, 11:13 AM EDT.      Much of this encounter note is an electronic transcription/translation of spoken language to printed text. The electronic translation of spoken language may permit erroneous, or at times, nonsensical words or phrases to be inadvertently transcribed; Although I have reviewed the note for such errors, some may still exist.

## 2022-09-19 NOTE — THERAPY EVALUATION
Patient Name: Shaji Junior  : 1945    MRN: [id number]                              Today's Date: 2022       Admit Date: 2022    Visit Dx:     ICD-10-CM ICD-9-CM   1. Chest pain, unspecified type  R07.9 786.50   2. Shortness of breath  R06.02 786.05   3. Class 3 severe obesity with serious comorbidity and body mass index (BMI) of 40.0 to 44.9 in adult, unspecified obesity type (HCC)  E66.01 278.01    Z68.41 V85.41   4. Acute on chronic congestive heart failure, unspecified heart failure type (HCC)  I50.9 428.0   5. Hypertension, unspecified type  I10 401.9     Patient Active Problem List   Diagnosis   • CHF exacerbation (HCC)   • Cerebrovascular accident (HCC)   • Type 2 diabetes mellitus with diabetic nephropathy (HCC)   • Acute kidney failure (HCC)   • Congestive heart failure (HCC)   • Acute on chronic congestive heart failure, unspecified heart failure type (HCC)   • MAKI (acute kidney injury) ([facility])   • Atrial fibrillation (HCC)   • Chest pain   • Shortness of breath   • Chest pain, unspecified type   • Essential hypertension   • Acute UTI (urinary tract infection)   • Normocytic normochromic anemia   • CKD (chronic kidney disease), stage III ([facility])   • BPH without obstruction/lower urinary tract symptoms     Past Medical History:   Diagnosis Date   • Acute kidney failure (HCC)    • Acute respiratory failure with hypoxia ([facility])    • Anemia    • Benign prostatic hyperplasia    • Bilateral primary osteoarthritis of knee    • Cardiomegaly    • Cardiomyopathy (HCC)    • Cellulitis and abscess of left leg    • Cerebral infarction ([facility])    • Cerebrovascular disease    • Chronic kidney disease    • Dementia (HCC)    • Diabetes mellitus (HCC)    • Essential hypertension    • GERD (gastroesophageal reflux disease)    • Gout    • Heart failure (HCC)    • Hyperlipidemia    • Morbid obesity (HCC)    • Myocardial infarction (HCC)    • Obstructive sleep apnea    • Obstructive uropathy    • Paroxysmal atrial  fibrillation (HCC)    • Peptic ulcer    • Peripheral vascular disease (HCC)    • Pulmonary edema    • Venous insufficiency      History reviewed. No pertinent surgical history.   General Information     Row Name 09/19/22 1112          Physical Therapy Time and Intention    Document Type evaluation  -CR     Mode of Treatment physical therapy  -CR     Row Name 09/19/22 1112          General Information    Patient Profile Reviewed yes  -CR     Prior Level of Function min assist:;all household mobility  per patient report, only walks to bathroom  -CR     Barriers to Rehab previous functional deficit  -CR     Row Name 09/19/22 1112          Living Environment    People in Home facility resident  [facility]  -CR     Row Name 09/19/22 1112          Home Main Entrance    Number of Stairs, Main Entrance none  -CR     Row Name 09/19/22 1112          Stairs Within Home, Primary    Number of Stairs, Within Home, Primary none  -CR     Row Name 09/19/22 1112          Cognition    Orientation Status (Cognition) oriented x 3  -CR     Row Name 09/19/22 1112          Safety Issues, Functional Mobility    Safety Issues Affecting Function (Mobility) friction/shear risk  -CR     Impairments Affecting Function (Mobility) endurance/activity tolerance;pain;postural/trunk control;strength  -CR           User Key  (r) = Recorded By, (t) = Taken By, (c) = Cosigned By    Initials Name Provider Type    CR Reyes, Carmela, PT Physical Therapist               Mobility     Row Name 09/19/22 1114          Bed Mobility    Comment, (Bed Mobility) up in chair  -CR     Row Name 09/19/22 1114          Sit-Stand Transfer    Sit-Stand [place] (Transfers) standby assist  -CR     Comment, (Sit-Stand Transfer) needs extra time  -CR     Row Name 09/19/22 1114          Gait/Stairs (Locomotion)    [place] Level (Gait) minimum assist (75% patient effort);contact guard  -CR     Assistive Device (Gait) walker, front-wheeled  -CR     Distance in Feet (Gait)  10 ft  -CR     Deviations/Abnormal Patterns (Gait) gait speed decreased;festinating/shuffling  -CR     Bilateral Gait Deviations weight shift ability decreased  both knees in flexion  -CR           User Key  (r) = Recorded By, (t) = Taken By, (c) = Cosigned By    Initials Name Provider Type    CR Reyes, Carmela, PT Physical Therapist               Obj/Interventions     Row Name 09/19/22 1115          Range of Motion Comprehensive    Comment, General Range of Motion AROM BLE min limit due to pain  -CR     Row Name 09/19/22 1115          Strength Comprehensive (MMT)    Comment, General Manual Muscle Testing (MMT) Assessment BLE grossly 3/5  -CR     Row Name 09/19/22 1115          Balance    Balance Assessment sitting static balance;sit to stand dynamic balance;standing static balance;standing dynamic balance  -CR     Static Sitting Balance independent  -CR     Position, Sitting Balance sitting in chair  -CR     Sit to Stand Dynamic Balance standby assist  -CR     Static Standing Balance contact guard  -CR     Dynamic Standing Balance contact guard;minimal assist  -CR     Position/Device Used, Standing Balance walker, front-wheeled  -CR     Row Name 09/19/22 1115          Sensory Assessment (Somatosensory)    Sensory Assessment (Somatosensory) sensation intact  -CR           User Key  (r) = Recorded By, (t) = Taken By, (c) = Cosigned By    Initials Name Provider Type    CR Reyes, Carmela, PT Physical Therapist               Goals/Plan    No documentation.                Clinical Impression     Row Name 09/19/22 1116          Pain    Pretreatment Pain Rating 4/10  -CR     Posttreatment Pain Rating 7/10  -CR     Pain Location - Side/Orientation Bilateral  -CR     Pain Location - knee  -CR     Row Name 09/19/22 1116          Plan of Care Review    Plan of Care Reviewed With patient  -CR     Outcome Evaluation 78 y/o male admitted on 9/16 from SNF due to chest pain and shortness of breath. PMH includes cva, CKD, PVD, DM.  Patient reports he is primarily using wc for mobility and only ambulates to bathroom at the facility. During this eval, patient up in chair and able to come to standing from chair with SBA, needing extra time. Patient ambulated 10 ft with min/CGA using rw with slow gait speed and bilateral knees in flexion which worsened with inc time in standing. Patient appears to be close to his baseline level of function and does not require further skilled rehab services. Recommend return to SNF.  -CR     Row Name 09/19/22 1116          Therapy Assessment/Plan (PT)    Patient/Family Therapy Goals Statement (PT) return to facility  -CR     Criteria for Skilled Interventions Met (PT) no;no problems identified which require skilled intervention  -CR     Therapy Frequency (PT) evaluation only  -CR     Predicted Duration of Therapy Intervention (PT) dc  -CR     Row Name 09/19/22 1116          Positioning and Restraints    Pre-Treatment Position sitting in chair/recliner  -CR     Post Treatment Position chair  -CR     In Chair notified nsg;sitting;call light within reach;exit alarm on  -CR           User Key  (r) = Recorded By, (t) = Taken By, (c) = Cosigned By    Initials Name Provider Type    CR Reyes, Carmela, PT Physical Therapist               Outcome Measures     Row Name 09/19/22 1121          How much help from another person do you currently need...    Turning from your back to your side while in flat bed without using bedrails? 2  -CR     Moving from lying on back to sitting on the side of a flat bed without bedrails? 2  -CR     Moving to and from a bed to a chair (including a wheelchair)? 3  -CR     Standing up from a chair using your arms (e.g., wheelchair, bedside chair)? 3  -CR     Climbing 3-5 steps with a railing? 1  -CR     To walk in hospital room? 3  -CR     AM-PAC 6 Clicks Score (PT) 14  -CR     Highest level of mobility 4 --> Transferred to chair/commode  -CR     Row Name 09/19/22 1121          Functional  Assessment    Outcome Measure Options AM-PAC 6 Clicks Basic Mobility (PT)  -CR           User Key  (r) = Recorded By, (t) = Taken By, (c) = Cosigned By    Initials Name Provider Type    CR Reyes, Carmela, PT Physical Therapist                             Physical Therapy Education                 Title: PT OT SLP Therapies (Resolved)     Topic: Physical Therapy (Resolved)     Point: Mobility training (Resolved)     Learning Progress Summary           Patient Acceptance, E, VU by CR at 9/19/2022 1121                               User Key     Initials Effective Dates Name Provider Type Discipline    CR 06/16/21 -  Reyes, Carmela, PT Physical Therapist PT              PT Recommendation and Plan     Plan of Care Reviewed With: patient  Outcome Evaluation: 76 y/o male admitted on 9/16 from SNF due to chest pain and shortness of breath. PMH includes cva, CKD, PVD, DM. Patient reports he is primarily using wc for mobility and only ambulates to bathroom at the facility. During this eval, patient up in chair and able to come to standing from chair with SBA, needing extra time. Patient ambulated 10 ft with min/CGA using rw with slow gait speed and bilateral knees in flexion which worsened with inc time in standing. Patient appears to be close to his baseline level of function and does not require further skilled rehab services. Recommend return to SNF.     Time Calculation:    PT Charges     Row Name 09/19/22 1122             Time Calculation    Start Time 0905  -CR      Stop Time 0938  -CR      Time Calculation (min) 33 min  -CR      PT Received On 09/19/22  -CR              Time Calculation- PT    Total Timed Code Minutes- PT 0 minute(s)  -CR            User Key  (r) = Recorded By, (t) = Taken By, (c) = Cosigned By    Initials Name Provider Type    CR Reyes, Carmela, PT Physical Therapist              Therapy Charges for Today     Code Description Service Date Service Provider Modifiers Qty    01444042547 HC PT EVAL LOW  COMPLEXITY 4 9/19/2022 Reyes, Carmela, PT GP 1          PT G-Codes  Outcome Measure Options: AM-PAC 6 Clicks Basic Mobility (PT)  AM-PAC 6 Clicks Score (PT): 14    Carmela Reyes, PT  9/19/2022

## 2022-09-19 NOTE — PLAN OF CARE
Goal Outcome Evaluation:  Plan of Care Reviewed With: patient           Outcome Evaluation: 76 y/o male admitted on 9/16 from SNF due to chest pain and shortness of breath. PMH includes cva, CKD, PVD, DM. Patient reports he is primarily using wc for mobility and only ambulates to bathroom at the facility. During this eval, patient up in chair and able to come to standing from chair with SBA, needing extra time. Patient ambulated 10 ft with min/CGA using rw with slow gait speed and bilateral knees in flexion which worsened with inc time in standing. Patient appears to be close to his baseline level of function and does not require further skilled rehab services. Recommend return to SNF.

## 2022-09-19 NOTE — CASE MANAGEMENT/SOCIAL WORK
Case Management/Social Work    Patient Name:  Shaji Junior  YOB: 1945  MRN: [id number]  Admit Date:  9/16/2022        LISA called [facility] [employer] ([phone]) and spoke with Lakisha. Trip ID number is [id number]. Transportation was arranged from [facility] to [place] ([address] [place] [address]). ETA is 1615. Upon arrival,  will call nurse's station ([phone]). JM made aware.         Electronically signed by:  Ben Mcdaniel CMA  09/19/22 13:17 EDT

## 2022-09-19 NOTE — PROGRESS NOTES
LOS: 3 days   Admiting Physician- Corinna Miranda DO    Reason For Followup:    Shortness of breath  Diastolic dysfunction  Bilateral leg edema  Hypertension  Renal insufficiency    Subjective     Patient denies any chest pain or shortness of breath    Objective     Blood pressure is much better.    Review of Systems:   Review of Systems   Constitutional: Negative for chills and fever.   HENT: Negative for ear discharge and nosebleeds.    Eyes: Negative for discharge and redness.   Cardiovascular: Positive for leg swelling. Negative for chest pain, orthopnea, palpitations, paroxysmal nocturnal dyspnea and syncope.   Respiratory: Positive for shortness of breath. Negative for cough and wheezing.    Endocrine: Negative for heat intolerance.   Skin: Negative for rash.   Musculoskeletal: Positive for arthritis and joint pain. Negative for myalgias.   Gastrointestinal: Negative for abdominal pain, melena, nausea and vomiting.   Genitourinary: Negative for dysuria and hematuria.   Neurological: Negative for dizziness, light-headedness, numbness and tremors.   Psychiatric/Behavioral: Negative for depression. The patient is not nervous/anxious.          Vital Signs  Vitals:    09/18/22 2100 09/18/22 2329 09/19/22 0328 09/19/22 0901   BP: 131/74 97/61 146/78 158/82   BP Location: Right arm Left arm Right arm    Patient Position: Sitting Sitting Lying    Pulse: 67 72 69 70   Resp: 18 18 18    Temp: 98.8 °F (37.1 °C) 98.1 °F (36.7 °C) 98.1 °F (36.7 °C)    TempSrc: Oral Oral Axillary    SpO2: 94% 95% 92%    Weight:       Height:         Wt Readings from Last 1 Encounters:   09/18/22 (!) 149 kg (328 lb 7.8 oz)       Intake/Output Summary (Last 24 hours) at 9/19/2022 1119  Last data filed at 9/19/2022 0901  Gross per 24 hour   Intake 1060 ml   Output 1700 ml   Net -640 ml     Physical Exam:  Constitutional:       Appearance: Well-developed.   Eyes:      General: No scleral icterus.        Right eye: No discharge.   HENT:       Head: Normocephalic and atraumatic.   Neck:      Thyroid: No thyromegaly.      Lymphadenopathy: No cervical adenopathy.   Pulmonary:      Effort: Pulmonary effort is normal. No respiratory distress.      Breath sounds: Normal breath sounds. No wheezing. No rales.   Cardiovascular:      Normal rate. Regular rhythm.      No gallop.   Edema:     Peripheral edema present.  Abdominal:      Tenderness: There is no abdominal tenderness.   Skin:     Findings: No erythema or rash.   Neurological:      Mental Status: Alert and oriented to person, place, and time.         Results Review:   Lab Results (last 24 hours)     Procedure Component Value Units Date/Time    POC Glucose Once [[id number]]  (Abnormal) Collected: 09/19/22 1115    Specimen: Blood Updated: 09/19/22 1116     Glucose 136 mg/dL      Comment: Serial Number: [id number]:  [id number]       Urine Culture - Urine, Urine, Catheter [[id number]]  (Abnormal)  (Susceptibility) Collected: 09/16/22 0259    Specimen: Urine, Catheter Updated: 09/19/22 1017     Urine Culture >100,000 CFU/mL Klebsiella pneumoniae ESBL     Comment:   Consider infectious disease consult.  Susceptibility results may not correlate to clinical outcomes.         >100,000 CFU/mL Proteus mirabilis ESBL     Comment:   Consider infectious disease consult.  Susceptibility results may not correlate to clinical outcomes.       Narrative:      Colonization of the urinary tract without infection is common. Treatment is discouraged unless the patient is symptomatic, pregnant, or undergoing an invasive urologic procedure.  Recent outcomes data supports the use of pip/tazo in the treatment of susceptible ESBL infections for uncomplicated UTI. Consider use of pip/tazo as a carbapenem-sparing regimen in applicable patients.    Susceptibility      Klebsiella pneumoniae ESBL      GAYLA      Amikacin Susceptible      Ertapenem Susceptible      Gentamicin Resistant     Levofloxacin Resistant     Meropenem  Susceptible      Nitrofurantoin Intermediate      Piperacillin + Tazobactam Susceptible      Tobramycin Resistant     Trimethoprim + Sulfamethoxazole Resistant                   Linear View               Susceptibility      Proteus mirabilis ESBL      GAYLA      Ertapenem Susceptible      Gentamicin Susceptible      Levofloxacin Resistant     Meropenem Susceptible      Nitrofurantoin Resistant     Piperacillin + Tazobactam Susceptible      Trimethoprim + Sulfamethoxazole Susceptible                    Linear View                   POC Glucose Once [[id number]]  (Normal) Collected: 09/19/22 0728    Specimen: Blood Updated: 09/19/22 0729     Glucose 93 mg/dL      Comment: Serial Number: [id number]:  [id number]       Renal Function Panel [[id number]]  (Abnormal) Collected: 09/19/22 0111    Specimen: Blood Updated: 09/19/22 0245     Glucose 132 mg/dL      BUN 30 mg/dL      Creatinine 1.44 mg/dL      Sodium 139 mmol/L      Potassium 3.6 mmol/L      Chloride 101 mmol/L      CO2 30.0 mmol/L      Calcium 7.8 mg/dL      Albumin 2.70 g/dL      Phosphorus 4.1 mg/dL      Anion Gap 8.0 mmol/L      BUN/Creatinine Ratio 20.8     eGFR 50.0 mL/min/1.73      Comment: National Kidney Foundation and American Society of Nephrology (ASN) Task Force recommended calculation based on the Chronic Kidney Disease Epidemiology Collaboration (CKD-EPI) equation refit without adjustment for race.       Narrative:      GFR Normal >60  Chronic Kidney Disease <60  Kidney Failure <15      CBC & Differential [[id number]]  (Abnormal) Collected: 09/19/22 0111    Specimen: Blood Updated: 09/19/22 0208    Narrative:      The following orders were created for panel order CBC & Differential.  Procedure                               Abnormality         Status                     ---------                               -----------         ------                     CBC Auto Differential[[id number]]        Abnormal            Final result                 Please  view results for these tests on the individual orders.    CBC Auto Differential [[id number]]  (Abnormal) Collected: 09/19/22 0111    Specimen: Blood Updated: 09/19/22 0208     WBC 5.90 10*3/mm3      RBC 3.54 10*6/mm3      Hemoglobin 9.7 g/dL      Hematocrit 30.3 %      MCV 85.5 fL      MCH 27.4 pg      MCHC 32.0 g/dL      RDW 20.3 %      RDW-SD 60.8 fl      MPV 7.9 fL      Platelets 209 10*3/mm3      Neutrophil % 54.9 %      Lymphocyte % 22.5 %      Monocyte % 14.7 %      Eosinophil % 7.2 %      Basophil % 0.7 %      Neutrophils, Absolute 3.30 10*3/mm3      Lymphocytes, Absolute 1.30 10*3/mm3      Monocytes, Absolute 0.90 10*3/mm3      Eosinophils, Absolute 0.40 10*3/mm3      Basophils, Absolute 0.00 10*3/mm3      nRBC 0.1 /100 WBC     CANDIDA AURIS SCREEN - Swab, Axilla Right, Axilla Left and Groin [[id number]]  (Normal) Collected: 09/16/22 1957    Specimen: Swab from Axilla Right, Axilla Left and Groin Updated: 09/19/22 0145     Candida Auris Screen Culture No Candida auris isolated at 2 days    POC Glucose Once [[id number]]  (Abnormal) Collected: 09/18/22 1652    Specimen: Blood Updated: 09/18/22 1653     Glucose 126 mg/dL      Comment: Serial Number: [id number]:  [id number]           Imaging Results (Last 72 Hours)     Procedure Component Value Units Date/Time    XR Chest 1 View [[id number]] Collected: 09/18/22 0646     Updated: 09/18/22 0649    Narrative:      EXAMINATION: XR CHEST 1 VW    DATE: 9/18/2022 5:57 AM    INDICATION:  Chest pain;    COMPARISON:  January 29, 2022    FINDINGS:  Minor hypoinflation lungs.    Minor central pulmonary vascular engorgement.    No focal consolidation, pleural effusion, or pneumothorax.    Moderate cardiomegaly.    No acute osseous abnormality.          Impression:        1.  Probable minor CHF/volume overload.          Electronically signed by:  Gayle Westbrook M.D.    9/18/2022 4:48 AM        ECG/EMG Results (most recent)     Procedure Component Value Units Date/Time     ECG 12 Lead [[id number]] Collected: 09/16/22 0225     Updated: 09/16/22 0743     QT Interval 458 ms     Narrative:      HEART RATE= 69  bpm  RR Interval= 868  ms  [place] Interval= Failed  ms  P Horizontal Axis= Invalid  deg  P Front Axis= Invalid  deg  QRSD Interval= 91  ms  QT Interval= 458  ms  QRS Axis= 7  deg  T Wave Axis= 97  deg  - ABNORMAL ECG -  Atrial fibrillation  Nonspecific T abnormalities, lateral leads  Borderline prolonged QT interval  Electronically Signed By:   Date and Time of Study: 2022-09-16 02:25:08    ECG 12 Lead [[id number]] Collected: 09/17/22 1551     Updated: 09/17/22 1553     QT Interval 419 ms     Narrative:      HEART RATE= 72  bpm  RR Interval= 832  ms  KY Interval=   ms  P Horizontal Axis=   deg  P Front Axis=   deg  QRSD Interval= 89  ms  QT Interval= 419  ms  QRS Axis= -6  deg  T Wave Axis= 123  deg  - ABNORMAL ECG -  Atrial fibrillation  Nonspecific T abnormalities, lateral leads  When compared with ECG of 16-Sep-2022 2:25:08,  Nonspecific significant change  Electronically Signed By:   Date and Time of Study: 2022-09-17 15:51:08    ECG 12 Lead [[id number]] Collected: 09/18/22 0206     Updated: 09/18/22 0207     QT Interval 452 ms     Narrative:      HEART RATE= 62  bpm  RR Interval= 969  ms  KY Interval=   ms  P Horizontal Axis=   deg  P Front Axis=   deg  QRSD Interval= 93  ms  QT Interval= 452  ms  QRS Axis= -12  deg  T Wave Axis= 109  deg  - ABNORMAL ECG -  Atrial fibrillation  Consider anterior infarct  Nonspecific T abnormalities, lateral leads  Electronically Signed By:   Date and Time of Study: 2022-09-18 02:06:21    SCANNED - TELEMETRY   [[id number]] Resulted: 09/16/22     Updated: 09/19/22 0633    SCANNED - TELEMETRY   [[id number]] Resulted: 09/16/22     Updated: 09/19/22 0715        CBC    Results from last 7 days   Lab Units 09/19/22  0111 09/18/22  0025 09/17/22  0212 09/16/22  0538 09/16/22  0237   WBC 10*3/mm3 5.90 6.10 4.80 4.60 4.80   HEMOGLOBIN g/dL 9.7* 9.9* 9.6* 9.6*  9.9*   PLATELETS 10*3/mm3 209 222 211 214 212     BMP   Results from last 7 days   Lab Units 09/19/22  0111 09/18/22  0025 09/17/22 0212 09/16/22  0538 09/16/22  0237   SODIUM mmol/L 139 139 142 145 143   POTASSIUM mmol/L 3.6 3.5 3.8 4.0 4.4   CHLORIDE mmol/L 101 101 104 110* 106   CO2 mmol/L 30.0* 28.0 28.0 27.0 27.0   BUN mg/dL 30* 27* 29* 29* 30*   CREATININE mg/dL 1.44* 1.47* 1.43* 1.32* 1.34*   GLUCOSE mg/dL 132* 91 91 117* 108*   PHOSPHORUS mg/dL 4.1 4.0 3.7  --   --      CMP   Results from last 7 days   Lab Units 09/19/22  0111 09/18/22  0025 09/17/22 0212 09/16/22  0538 09/16/22  0237   SODIUM mmol/L 139 139 142 145 143   POTASSIUM mmol/L 3.6 3.5 3.8 4.0 4.4   CHLORIDE mmol/L 101 101 104 110* 106   CO2 mmol/L 30.0* 28.0 28.0 27.0 27.0   BUN mg/dL 30* 27* 29* 29* 30*   CREATININE mg/dL 1.44* 1.47* 1.43* 1.32* 1.34*   GLUCOSE mg/dL 132* 91 91 117* 108*   ALBUMIN g/dL 2.70* 3.00* 3.00*  --  3.10*   BILIRUBIN mg/dL  --   --   --   --  0.4   ALK PHOS U/L  --   --   --   --  104   AST (SGOT) U/L  --   --   --   --  16   ALT (SGPT) U/L  --   --   --   --  8     Cardiac Studies:  Echo- Results for orders placed during the hospital encounter of 03/29/22    Adult Transthoracic Echo Complete W/ Cont if Necessary Per Protocol    Interpretation Summary  · Left ventricular systolic function is normal.  · Left ventricular ejection fraction is 55 to 60%  · Left ventricular diastolic function was normal.    Stress Myoview-  Cath-      Medication Review:   Scheduled Meds:apixaban, 5 mg, Oral, Q12H  atorvastatin, 20 mg, Oral, Nightly  carvedilol, 12.5 mg, Oral, BID With Meals  escitalopram, 10 mg, Oral, Daily  finasteride, 5 mg, Oral, Daily  furosemide, 40 mg, Oral, BID  gabapentin, 300 mg, Oral, TID  hydrALAZINE, 50 mg, Oral, TID  insulin glargine, 10 Units, Subcutaneous, Nightly  insulin lispro, 0-9 Units, Subcutaneous, TID AC  isosorbide mononitrate, 60 mg, Oral, BID - Nitrates  losartan, 25 mg, Oral, Q24H  meropenem, 1  g, Intravenous, Once  meropenem, 1 g, Intravenous, Q8H  sodium chloride, 10 mL, Intravenous, Q12H  tamsulosin, 0.4 mg, Oral, Daily      Continuous Infusions:   PRN Meds:.•  acetaminophen **OR** acetaminophen **OR** acetaminophen  •  aluminum-magnesium hydroxide-simethicone  •  dextrose  •  dextrose  •  glucagon (human recombinant)  •  hydrALAZINE  •  magnesium sulfate **OR** magnesium sulfate **OR** magnesium sulfate  •  melatonin  •  nitroglycerin  •  ondansetron **OR** ondansetron  •  potassium chloride **OR** potassium chloride **OR** potassium chloride  •  sodium chloride  •  sodium chloride      Assessment & Plan   Patient Active Problem List   Diagnosis   • CHF exacerbation (HCC)   • Cerebrovascular accident (HCC)   • Type 2 diabetes mellitus with diabetic nephropathy (HCC)   • Acute kidney failure (HCC)   • Congestive heart failure (HCC)   • Acute on chronic congestive heart failure, unspecified heart failure type (HCC)   • MAKI (acute kidney injury) (HCC)   • Atrial fibrillation (HCC)   • Chest pain   • Shortness of breath   • Chest pain, unspecified type   • Essential hypertension   • Acute UTI (urinary tract infection)   • Normocytic normochromic anemia   • CKD (chronic kidney disease), stage III (HCC)   • BPH without obstruction/lower urinary tract symptoms     MDM:    1.  Bilateral leg edema:    Leg edema is improving slowly.  Continue diuresis.  Lasix has been switched to p.o.    2.  Shortness of breath:    Patient is feeling and breathing much better    3.  Hypertension:    Patient is on hydralazine, carvedilol and losartan.  Blood pressure is much better.  Current treatment would be continued    Patient is seen and examined and findings are verified.  His stress test showed no myocardial infarction or ischemia.  Fixed inferior defect was noted.    Patient is feeling much better.  Leg edema has improved.  Shortness of breath is normalized.  I would recommend patient can be discharged and I will follow  the patient as a outpatient.    Gurpreet Vargas MD  09/19/22  11:19 EDT

## 2022-09-19 NOTE — CONSULTS
Midline Line Insertion Procedure Note    Procedure: Insertion of #18G/10CM PowerGlide    Indications:  Home IV therapy    Procedure Details:   Sterile technique was used including antiseptics, cap, gloves, hand hygiene, mask and sheet.    #18G/10CM PowerGlide inserted to the R Cephalic vein per hospital protocol by Riri Malik RN.     Non-pulsatile blood return: yes    Ultrasound Guidance: Yes    Lot #: [id number]  Expiration date: 09-    Complications:  Basilic and brachial veins very deep. Unable to advance catheter with initial insertion attempt. Moved up cephalic vein. Able to advance catheter detention. Assisting RN dropped guidewire. Guidewire inserted into catheter and catheter advanced fully into vein.     Findings:  Catheter inserted to 10 cm, with 0 cm exposed.   Mid upper arm circumference is 44 cm.  Catheter was flushed with 20 cc NS and sterile dressing applied.   Patient tolerated procedure well.    Recommendations:  Verbal and/or written Care/Maintenance instructions provided to patient.   Primary nurse notified that midline is okay to use at this time.     Riri Malik RN  09/19/22  16:07 EDT

## 2022-09-19 NOTE — NURSING NOTE
77-year-old male who presented to the hospital complaining of chest pain and shortness of breath.  Patient consult received to assess patient's left posterior thigh.  Per patient states that he is mostly bedbound however he states that he can use a walker and transfer and walk a few steps.  Patient states that his bed was uncomfortable so he slept in the chair last night.  There is a waffle chair cushion in place.  There are no open areas at this time however patient has an old resolved area to the left posterior thigh does have fresh skin but it is closed.  Likely this is from a chair and likely from extended periods of time being in the chair.  There is also an area to the right thigh posterior which is red but blanchable at this time.  Staff have prevention strategies in place.  Patient is encouraged to not sleep in his chair so that he can get off of the affected area

## 2022-09-19 NOTE — PLAN OF CARE
Problem: Adult Inpatient Plan of Care  Goal: Absence of Hospital-Acquired Illness or Injury  Intervention: Identify and Manage Fall Risk  Description: Perform standard risk assessment on admission using a validated tool or comprehensive approach appropriate to the patient; reassess fall risk frequently, with change in status or transfer to another level of care.  Communicate fall injury risk to interprofessional healthcare team.  Determine need for increased observation, equipment and environmental modification, such as low bed, signage and supportive, nonskid footwear.  Adjust safety measures to individual developmental age, stage and identified risk factors.  Reinforce the importance of safety and physical activity with patient and family.  Perform regular intentional rounding to assess need for position change, pain assessment and personal needs, including assistance with toileting.  Recent Flowsheet Documentation  Taken 9/19/2022 0015 by Carlos Serrano LPN  Safety Promotion/Fall Prevention:   activity supervised   clutter free environment maintained   assistive device/personal items within reach   fall prevention program maintained   muscle strengthening facilitated   nonskid shoes/slippers when out of bed   room organization consistent   safety round/check completed  Taken 9/18/2022 2035 by Carlos Serrano LPN  Safety Promotion/Fall Prevention:   safety round/check completed   room organization consistent   muscle strengthening facilitated   nonskid shoes/slippers when out of bed   mobility aid in reach   lighting adjusted   fall prevention program maintained   clutter free environment maintained   assistive device/personal items within reach   activity supervised  Intervention: Prevent Skin Injury  Description: Perform a screening for skin injury risk, such as pressure or moisture associated skin damage on admission and at regular intervals throughout hospital stay.  Keep all areas of skin  (especially folds) clean and dry.  Maintain adequate skin hydration.  Relieve and redistribute pressure and protect bony prominences; implement measures based on patient-specific risk factors.  Match turning and repositioning schedule to clinical condition.  Encourage weight shift frequently; assist with reposition if unable to complete independently.  Float heels off bed; avoid pressure on the Achilles tendon.  Keep skin free from extended contact with medical devices.  Encourage functional activity and mobility, as early as tolerated.  Use aids (e.g., slide boards, mechanical lift) during transfer.  Recent Flowsheet Documentation  Taken 9/19/2022 0015 by Carlos Serrano LPN  Body Position: (up in recliner refuse to be reposition) patient/family refused  Taken 9/18/2022 2200 by Carlos Serrano LPN  Body Position: (pt assist to recliner with assist to x2( 3rd person stand by ) pt up in recliner per he request)   other (see comments)   turned  Taken 9/18/2022 2035 by Carlos Serrano LPN  Body Position: weight shifting  Intervention: Prevent and Manage VTE (Venous Thromboembolism) Risk  Description: Assess for VTE (venous thromboembolism) risk.  Encourage and assist with early ambulation.  Initiate and maintain compression or other therapy, as indicated, based on identified risk in accordance with organizational protocol and provider order.  Encourage both active and passive leg exercises while in bed, if unable to ambulate.  Recent Flowsheet Documentation  Taken 9/19/2022 0015 by Carlos Serrano LPN  Activity Management:   activity encouraged   up in chair   activity adjusted per tolerance  Taken 9/18/2022 2035 by Carlos Serrano LPN  Activity Management:   activity adjusted per tolerance   activity encouraged  Intervention: Prevent Infection  Description: Maintain skin and mucous membrane integrity; promote hand, oral and pulmonary hygiene.  Optimize fluid balance, nutrition, sleep and  glycemic control to maximize infection resistance.  Identify potential sources of infection early to prevent or mitigate progression of infection (e.g., wound, lines, devices).  Evaluate ongoing need for invasive devices; remove promptly when no longer indicated.  Recent Flowsheet Documentation  Taken 9/19/2022 0015 by Calros Serrano LPN  Infection Prevention:   visitors restricted/screened   single patient room provided   rest/sleep promoted   personal protective equipment utilized   hand hygiene promoted  Goal: Optimal Comfort and Wellbeing  Intervention: Monitor Pain and Promote Comfort  Description: Assess pain level, treatment efficacy and patient response at regular intervals using a consistent pain scale.  Consider the presence and impact of preexisting chronic pain.  Encourage patient and caregiver involvement in pain assessment, interventions and safety measures.  Recent Flowsheet Documentation  Taken 9/19/2022 0015 by Carlos Serrano LPN  Pain Management Interventions:   quiet environment facilitated   see MAR   position adjusted   pillow support provided   medication offered but refused  Intervention: Provide Person-Centered Care  Description: Use a family-focused approach to care.  Develop trust and rapport by proactively providing information, encouraging questions, addressing concerns and offering reassurance.  Acknowledge emotional response to hospitalization.  Recognize and utilize personal coping strategies.  Honor spiritual and cultural preferences.  Recent Flowsheet Documentation  Taken 9/18/2022 2035 by Carlos Serrano LPN  Trust Relationship/Rapport: care explained     Problem: Skin Injury Risk Increased  Goal: Skin Health and Integrity  Intervention: Optimize Skin Protection  Description: Perform a full pressure injury risk assessment, as indicated by screening, upon admission to care unit.  Reassess skin (injury risk, full inspection) frequently (e.g., scheduled interval, with  change in condition) to provide optimal early detection and prevention.  Maintain adequate tissue perfusion (e.g., encourage fluid balance; avoid crossing legs, constrictive clothing or devices) to promote tissue oxygenation.  Maintain head of bed at lowest degree of elevation tolerated, considering medical condition and other restrictions.  Avoid positioning onto an area that remains reddened.  Minimize incontinence and moisture (e.g., toileting schedule; moisture-wicking pad, diaper or incontinence collection device; skin moisture barrier).  Cleanse skin promptly and gently when soiled utilizing a pH-balanced cleanser.  Relieve and redistribute pressure (e.g., scheduled position changes, weight shifts, use of support surface, medical device repositioning, protective dressing application, use of positioning device, microclimate control, use of pressure-injury-monitor  Encourage increased activity, such as sitting in a chair at the bedside or early mobilization, when able to tolerate.  Recent Flowsheet Documentation  Taken 9/19/2022 0015 by Carlos Serrano LPN  Head of Bed (HOB) Positioning: HOB at 20-30 degrees  Taken 9/18/2022 2035 by Carlos Serrano LPN  Pressure Reduction Techniques: frequent weight shift encouraged  Head of Bed (HOB) Positioning: HOB at 20-30 degrees  Pressure Reduction Devices:   alternating pressure pump (ADD)   pressure-redistributing mattress utilized     Problem: Fall Injury Risk  Goal: Absence of Fall and Fall-Related Injury  Intervention: Identify and Manage Contributors  Description: Develop a fall prevention plan with the patient and caregiver/family.  Provide reorientation, appropriate sensory stimulation and routines with changes in mental status to decrease risk of fall.  Promote use of personal vision and auditory aids.  Assess assistance level required for safe and effective self-care; provide support as needed, such as toileting, mobilization. For age 65 and older,  implement timed toileting with assistance.  Encourage physical activity, such as performance of mobility and self-care at highest level of patient ability, multicomponent exercise program and provision of appropriate assistive devices.  If fall occurs, assess the severity of injury; implement fall injury protocol. Determine the cause and revise fall injury prevention plan.  Regularly review medication contribution to fall risk; adjust medication administration times to minimize risk of falling.  Consider risk related to polypharmacy and age.  Balance adequate pain management with potential for oversedation.  Recent Flowsheet Documentation  Taken 9/19/2022 0015 by Carlos Serrano LPN  Medication Review/Management: medications reviewed  Taken 9/18/2022 2035 by Carlos Serrano LPN  Medication Review/Management: medications reviewed  Intervention: Promote Injury-Free Environment  Description: Provide a safe, barrier-free environment that encourages independent activity.  Keep care area uncluttered and well-lighted.  Determine need for increased observation or monitoring.  Avoid use of devices that minimize mobility, such as restraints or indwelling urinary catheter.  Recent Flowsheet Documentation  Taken 9/19/2022 0015 by Carlos Serrano LPN  Safety Promotion/Fall Prevention:   activity supervised   clutter free environment maintained   assistive device/personal items within reach   fall prevention program maintained   muscle strengthening facilitated   nonskid shoes/slippers when out of bed   room organization consistent   safety round/check completed  Taken 9/18/2022 2035 by Carlos Serrano LPN  Safety Promotion/Fall Prevention:   safety round/check completed   room organization consistent   muscle strengthening facilitated   nonskid shoes/slippers when out of bed   mobility aid in reach   lighting adjusted   fall prevention program maintained   clutter free environment maintained   assistive  device/personal items within reach   activity supervised   Goal Outcome Evaluation:  Confused and extremely forgetfull , non compliant with care and repositioning , f/c for retention air pump in use for help reducing pressure, cont to require on going education for repositioning taken medication   [place] for discharge in am

## 2022-09-20 NOTE — CASE MANAGEMENT/SOCIAL WORK
Case Management Discharge Note      Final Note: [facility]         Selected Continued Care - Discharged on 9/19/2022 Admission date: 9/16/2022 - Discharge disposition: Rehab Facility or Unit (DC - External)    Destination Coordination complete.    Service Provider Selected Services Address Phone Fax Patient Preferred    [facility] [place]note 2[facility] [address], [place] [address] [phone] --         Transportation Services  Taxi: other (per [facility])    Final Discharge Disposition Code: 04 - intermediate care facility

## 2022-09-21 LAB — QT INTERVAL: 458 MS

## 2022-09-22 LAB — BACTERIA ISLT: NORMAL

## 2022-10-13 LAB
QT INTERVAL: 419 MS
QT INTERVAL: 452 MS

## 2023-02-15 ENCOUNTER — APPOINTMENT (OUTPATIENT)
Dept: GENERAL RADIOLOGY | Facility: HOSPITAL | Age: 78
DRG: 682 | End: 2023-02-15
Payer: OTHER GOVERNMENT

## 2023-02-15 ENCOUNTER — HOSPITAL ENCOUNTER (INPATIENT)
Facility: HOSPITAL | Age: 78
LOS: 3 days | Discharge: NURSING, REHAB OR LONG-TERM CARE | DRG: 682 | End: 2023-02-18
Attending: EMERGENCY MEDICINE | Admitting: STUDENT IN AN ORGANIZED HEALTH CARE EDUCATION/TRAINING PROGRAM
Payer: OTHER GOVERNMENT

## 2023-02-15 ENCOUNTER — APPOINTMENT (OUTPATIENT)
Dept: CT IMAGING | Facility: HOSPITAL | Age: 78
DRG: 682 | End: 2023-02-15
Payer: OTHER GOVERNMENT

## 2023-02-15 DIAGNOSIS — R41.82 ALTERED MENTAL STATUS, UNSPECIFIED ALTERED MENTAL STATUS TYPE: Primary | ICD-10-CM

## 2023-02-15 DIAGNOSIS — I50.9 ACUTE ON CHRONIC CONGESTIVE HEART FAILURE, UNSPECIFIED HEART FAILURE TYPE: ICD-10-CM

## 2023-02-15 DIAGNOSIS — N18.9 CHRONIC KIDNEY DISEASE, UNSPECIFIED CKD STAGE: ICD-10-CM

## 2023-02-15 DIAGNOSIS — G93.41 ACUTE METABOLIC ENCEPHALOPATHY: ICD-10-CM

## 2023-02-15 DIAGNOSIS — N39.0 ACUTE UTI: ICD-10-CM

## 2023-02-15 DIAGNOSIS — R77.8 ELEVATED TROPONIN: ICD-10-CM

## 2023-02-15 PROBLEM — I50.33 ACUTE ON CHRONIC DIASTOLIC CHF (CONGESTIVE HEART FAILURE): Status: ACTIVE | Noted: 2023-02-15

## 2023-02-15 PROBLEM — E66.01 MORBID OBESITY: Status: ACTIVE | Noted: 2023-02-15

## 2023-02-15 PROBLEM — F03.90 DEMENTIA: Status: ACTIVE | Noted: 2023-02-15

## 2023-02-15 PROBLEM — I16.0 HYPERTENSIVE URGENCY: Status: ACTIVE | Noted: 2023-02-15

## 2023-02-15 LAB
ALBUMIN SERPL-MCNC: 2.9 G/DL (ref 3.5–5.2)
ALBUMIN/GLOB SERPL: 0.8 G/DL
ALP SERPL-CCNC: 104 U/L (ref 39–117)
ALT SERPL W P-5'-P-CCNC: 10 U/L (ref 1–41)
ANION GAP SERPL CALCULATED.3IONS-SCNC: 9 MMOL/L (ref 5–15)
AST SERPL-CCNC: 12 U/L (ref 1–40)
ATMOSPHERIC PRESS: ABNORMAL MM[HG]
B PARAPERT DNA SPEC QL NAA+PROBE: NOT DETECTED
B PERT DNA SPEC QL NAA+PROBE: NOT DETECTED
BACTERIA UR QL AUTO: ABNORMAL /HPF
BASE EXCESS BLDV CALC-SCNC: 2.7 MMOL/L (ref -2–2)
BASOPHILS # BLD AUTO: 0 10*3/MM3 (ref 0–0.2)
BASOPHILS NFR BLD AUTO: 0.6 % (ref 0–1.5)
BDY SITE: ABNORMAL
BILIRUB SERPL-MCNC: 0.4 MG/DL (ref 0–1.2)
BILIRUB UR QL STRIP: ABNORMAL
BUN SERPL-MCNC: 24 MG/DL (ref 8–23)
BUN/CREAT SERPL: 14.9 (ref 7–25)
C PNEUM DNA NPH QL NAA+NON-PROBE: NOT DETECTED
CALCIUM SPEC-SCNC: 8.4 MG/DL (ref 8.6–10.5)
CHLORIDE SERPL-SCNC: 102 MMOL/L (ref 98–107)
CLARITY UR: ABNORMAL
CO2 BLDA-SCNC: 29 MMOL/L (ref 22–29)
CO2 SERPL-SCNC: 27 MMOL/L (ref 22–29)
COLOR UR: ABNORMAL
CREAT SERPL-MCNC: 1.61 MG/DL (ref 0.76–1.27)
D-LACTATE SERPL-SCNC: 0.8 MMOL/L (ref 0.2–2)
D-LACTATE SERPL-SCNC: 1.5 MMOL/L (ref 0.3–2)
DEPRECATED RDW RBC AUTO: 56 FL (ref 37–54)
EGFRCR SERPLBLD CKD-EPI 2021: 43.8 ML/MIN/1.73
EOSINOPHIL # BLD AUTO: 0.2 10*3/MM3 (ref 0–0.4)
EOSINOPHIL NFR BLD AUTO: 3.8 % (ref 0.3–6.2)
ERYTHROCYTE [DISTWIDTH] IN BLOOD BY AUTOMATED COUNT: 19 % (ref 12.3–15.4)
FLUAV SUBTYP SPEC NAA+PROBE: NOT DETECTED
FLUBV RNA ISLT QL NAA+PROBE: NOT DETECTED
GEN 5 2HR TROPONIN T REFLEX: 92 NG/L
GLOBULIN UR ELPH-MCNC: 3.6 GM/DL
GLUCOSE BLDC GLUCOMTR-MCNC: 126 MG/DL (ref 70–105)
GLUCOSE BLDC GLUCOMTR-MCNC: 93 MG/DL (ref 70–105)
GLUCOSE SERPL-MCNC: 116 MG/DL (ref 65–99)
GLUCOSE UR STRIP-MCNC: NEGATIVE MG/DL
GRAN CASTS URNS QL MICRO: ABNORMAL /LPF
HADV DNA SPEC NAA+PROBE: NOT DETECTED
HCO3 BLDV-SCNC: 27.7 MMOL/L (ref 22–26)
HCOV 229E RNA SPEC QL NAA+PROBE: NOT DETECTED
HCOV HKU1 RNA SPEC QL NAA+PROBE: NOT DETECTED
HCOV NL63 RNA SPEC QL NAA+PROBE: NOT DETECTED
HCOV OC43 RNA SPEC QL NAA+PROBE: NOT DETECTED
HCT VFR BLD AUTO: 35.8 % (ref 37.5–51)
HGB BLD-MCNC: 11.2 G/DL (ref 13–17.7)
HGB UR QL STRIP.AUTO: ABNORMAL
HMPV RNA NPH QL NAA+NON-PROBE: NOT DETECTED
HOLD SPECIMEN: NORMAL
HPIV1 RNA ISLT QL NAA+PROBE: NOT DETECTED
HPIV2 RNA SPEC QL NAA+PROBE: NOT DETECTED
HPIV3 RNA NPH QL NAA+PROBE: NOT DETECTED
HPIV4 P GENE NPH QL NAA+PROBE: NOT DETECTED
HYALINE CASTS UR QL AUTO: ABNORMAL /LPF
INHALED O2 CONCENTRATION: 34 %
KETONES UR QL STRIP: ABNORMAL
LEUKOCYTE ESTERASE UR QL STRIP.AUTO: ABNORMAL
LYMPHOCYTES # BLD AUTO: 1.4 10*3/MM3 (ref 0.7–3.1)
LYMPHOCYTES NFR BLD AUTO: 36.4 % (ref 19.6–45.3)
M PNEUMO IGG SER IA-ACNC: NOT DETECTED
MAGNESIUM SERPL-MCNC: 1.9 MG/DL (ref 1.6–2.4)
MCH RBC QN AUTO: 25 PG (ref 26.6–33)
MCHC RBC AUTO-ENTMCNC: 31.3 G/DL (ref 31.5–35.7)
MCV RBC AUTO: 80 FL (ref 79–97)
MODALITY: ABNORMAL
MONOCYTES # BLD AUTO: 0.6 10*3/MM3 (ref 0.1–0.9)
MONOCYTES NFR BLD AUTO: 14.8 % (ref 5–12)
NEUTROPHILS NFR BLD AUTO: 1.7 10*3/MM3 (ref 1.7–7)
NEUTROPHILS NFR BLD AUTO: 44.4 % (ref 42.7–76)
NITRITE UR QL STRIP: NEGATIVE
NRBC BLD AUTO-RTO: 0.2 /100 WBC (ref 0–0.2)
NT-PROBNP SERPL-MCNC: 7939 PG/ML (ref 0–1800)
PCO2 BLDV: 43.2 MM HG (ref 42–51)
PH BLDV: 7.42 PH UNITS (ref 7.32–7.43)
PH UR STRIP.AUTO: <=5 [PH] (ref 5–8)
PLATELET # BLD AUTO: 223 10*3/MM3 (ref 140–450)
PMV BLD AUTO: 8 FL (ref 6–12)
PO2 BLDV: 116.1 MM HG (ref 40–42)
POTASSIUM SERPL-SCNC: 3.9 MMOL/L (ref 3.5–5.2)
PROT SERPL-MCNC: 6.5 G/DL (ref 6–8.5)
PROT UR QL STRIP: ABNORMAL
RBC # BLD AUTO: 4.47 10*6/MM3 (ref 4.14–5.8)
RBC # UR STRIP: ABNORMAL /HPF
REF LAB TEST METHOD: ABNORMAL
RHINOVIRUS RNA SPEC NAA+PROBE: NOT DETECTED
RSV RNA NPH QL NAA+NON-PROBE: NOT DETECTED
SAO2 % BLDCOV: 98.6 % (ref 45–75)
SARS-COV-2 RNA NPH QL NAA+NON-PROBE: NOT DETECTED
SODIUM SERPL-SCNC: 138 MMOL/L (ref 136–145)
SP GR UR STRIP: 1.02 (ref 1–1.03)
SQUAMOUS #/AREA URNS HPF: ABNORMAL /HPF
TROPONIN T DELTA: -6 NG/L
TROPONIN T SERPL HS-MCNC: 98 NG/L
TSH SERPL DL<=0.05 MIU/L-ACNC: 2.22 UIU/ML (ref 0.27–4.2)
UROBILINOGEN UR QL STRIP: ABNORMAL
WBC # UR STRIP: ABNORMAL /HPF
WBC NRBC COR # BLD: 3.9 10*3/MM3 (ref 3.4–10.8)
WHOLE BLOOD HOLD COAG: NORMAL

## 2023-02-15 PROCEDURE — 71045 X-RAY EXAM CHEST 1 VIEW: CPT

## 2023-02-15 PROCEDURE — 81001 URINALYSIS AUTO W/SCOPE: CPT | Performed by: PHYSICIAN ASSISTANT

## 2023-02-15 PROCEDURE — 99285 EMERGENCY DEPT VISIT HI MDM: CPT

## 2023-02-15 PROCEDURE — 25010000002 HYDRALAZINE PER 20 MG: Performed by: PHYSICIAN ASSISTANT

## 2023-02-15 PROCEDURE — 83605 ASSAY OF LACTIC ACID: CPT

## 2023-02-15 PROCEDURE — 25010000002 PIPERACILLIN SOD-TAZOBACTAM PER 1 G: Performed by: PHYSICIAN ASSISTANT

## 2023-02-15 PROCEDURE — 85025 COMPLETE CBC W/AUTO DIFF WBC: CPT | Performed by: PHYSICIAN ASSISTANT

## 2023-02-15 PROCEDURE — 83880 ASSAY OF NATRIURETIC PEPTIDE: CPT | Performed by: PHYSICIAN ASSISTANT

## 2023-02-15 PROCEDURE — P9612 CATHETERIZE FOR URINE SPEC: HCPCS

## 2023-02-15 PROCEDURE — 82962 GLUCOSE BLOOD TEST: CPT

## 2023-02-15 PROCEDURE — 82803 BLOOD GASES ANY COMBINATION: CPT

## 2023-02-15 PROCEDURE — 93005 ELECTROCARDIOGRAM TRACING: CPT | Performed by: EMERGENCY MEDICINE

## 2023-02-15 PROCEDURE — 84443 ASSAY THYROID STIM HORMONE: CPT | Performed by: PHYSICIAN ASSISTANT

## 2023-02-15 PROCEDURE — 93005 ELECTROCARDIOGRAM TRACING: CPT

## 2023-02-15 PROCEDURE — 80053 COMPREHEN METABOLIC PANEL: CPT | Performed by: PHYSICIAN ASSISTANT

## 2023-02-15 PROCEDURE — 87086 URINE CULTURE/COLONY COUNT: CPT | Performed by: PHYSICIAN ASSISTANT

## 2023-02-15 PROCEDURE — 84484 ASSAY OF TROPONIN QUANT: CPT | Performed by: PHYSICIAN ASSISTANT

## 2023-02-15 PROCEDURE — 25010000002 FUROSEMIDE PER 20 MG: Performed by: PHYSICIAN ASSISTANT

## 2023-02-15 PROCEDURE — 70450 CT HEAD/BRAIN W/O DYE: CPT

## 2023-02-15 PROCEDURE — 0202U NFCT DS 22 TRGT SARS-COV-2: CPT | Performed by: PHYSICIAN ASSISTANT

## 2023-02-15 PROCEDURE — 83735 ASSAY OF MAGNESIUM: CPT | Performed by: PHYSICIAN ASSISTANT

## 2023-02-15 PROCEDURE — 87040 BLOOD CULTURE FOR BACTERIA: CPT | Performed by: PHYSICIAN ASSISTANT

## 2023-02-15 RX ORDER — HYDRALAZINE HYDROCHLORIDE 20 MG/ML
10 INJECTION INTRAMUSCULAR; INTRAVENOUS ONCE
Status: COMPLETED | OUTPATIENT
Start: 2023-02-15 | End: 2023-02-15

## 2023-02-15 RX ORDER — METHOCARBAMOL 500 MG/1
500 TABLET, FILM COATED ORAL EVERY 12 HOURS PRN
COMMUNITY

## 2023-02-15 RX ORDER — FUROSEMIDE 10 MG/ML
80 INJECTION INTRAMUSCULAR; INTRAVENOUS ONCE
Status: COMPLETED | OUTPATIENT
Start: 2023-02-15 | End: 2023-02-15

## 2023-02-15 RX ORDER — HYDRALAZINE HYDROCHLORIDE 20 MG/ML
20 INJECTION INTRAMUSCULAR; INTRAVENOUS ONCE
Status: COMPLETED | OUTPATIENT
Start: 2023-02-16 | End: 2023-02-15

## 2023-02-15 RX ORDER — NITROGLYCERIN 0.4 MG/1
0.4 TABLET SUBLINGUAL
COMMUNITY

## 2023-02-15 RX ORDER — NICOTINE POLACRILEX 4 MG
15 LOZENGE BUCCAL
Status: DISCONTINUED | OUTPATIENT
Start: 2023-02-15 | End: 2023-02-18 | Stop reason: HOSPADM

## 2023-02-15 RX ORDER — SODIUM CHLORIDE 0.9 % (FLUSH) 0.9 %
10 SYRINGE (ML) INJECTION AS NEEDED
Status: DISCONTINUED | OUTPATIENT
Start: 2023-02-15 | End: 2023-02-18 | Stop reason: HOSPADM

## 2023-02-15 RX ORDER — DEXTROSE MONOHYDRATE 25 G/50ML
25 INJECTION, SOLUTION INTRAVENOUS
Status: DISCONTINUED | OUTPATIENT
Start: 2023-02-15 | End: 2023-02-18 | Stop reason: HOSPADM

## 2023-02-15 RX ORDER — INSULIN LISPRO 100 [IU]/ML
3-14 INJECTION, SOLUTION INTRAVENOUS; SUBCUTANEOUS
Status: DISCONTINUED | OUTPATIENT
Start: 2023-02-16 | End: 2023-02-18 | Stop reason: HOSPADM

## 2023-02-15 RX ORDER — OLANZAPINE 10 MG/2ML
1 INJECTION, POWDER, LYOPHILIZED, FOR SOLUTION INTRAMUSCULAR
Status: DISCONTINUED | OUTPATIENT
Start: 2023-02-15 | End: 2023-02-18 | Stop reason: HOSPADM

## 2023-02-15 RX ORDER — CALCIUM POLYCARBOPHIL 625 MG
625 TABLET ORAL DAILY
COMMUNITY

## 2023-02-15 RX ORDER — DOCUSATE CALCIUM 240 MG
240 CAPSULE ORAL DAILY
COMMUNITY

## 2023-02-15 RX ORDER — SIMETHICONE 180 MG
180 CAPSULE ORAL EVERY 6 HOURS PRN
COMMUNITY

## 2023-02-15 RX ORDER — FUROSEMIDE 10 MG/ML
40 INJECTION INTRAMUSCULAR; INTRAVENOUS
Status: DISCONTINUED | OUTPATIENT
Start: 2023-02-16 | End: 2023-02-18

## 2023-02-15 RX ADMIN — FUROSEMIDE 80 MG: 10 INJECTION, SOLUTION INTRAMUSCULAR; INTRAVENOUS at 19:43

## 2023-02-15 RX ADMIN — APIXABAN 5 MG: 5 TABLET, FILM COATED ORAL at 23:05

## 2023-02-15 RX ADMIN — Medication 10 ML: at 23:06

## 2023-02-15 RX ADMIN — NITROGLYCERIN 1 INCH: 20 OINTMENT TOPICAL at 18:38

## 2023-02-15 RX ADMIN — PIPERACILLIN AND TAZOBACTAM 4.5 G: 4; .5 INJECTION, POWDER, FOR SOLUTION INTRAVENOUS at 23:05

## 2023-02-15 RX ADMIN — HYDRALAZINE HYDROCHLORIDE 10 MG: 20 INJECTION INTRAMUSCULAR; INTRAVENOUS at 18:38

## 2023-02-15 RX ADMIN — HYDRALAZINE HYDROCHLORIDE 20 MG: 20 INJECTION INTRAMUSCULAR; INTRAVENOUS at 23:30

## 2023-02-15 RX ADMIN — PIPERACILLIN AND TAZOBACTAM 4.5 G: 4; .5 INJECTION, POWDER, FOR SOLUTION INTRAVENOUS at 17:47

## 2023-02-15 NOTE — ED PROVIDER NOTES
Subjective       Patient is a 77-year-old male comes in complaining of generalized weakness, decreased responsiveness from ECF for the past day.  Patient reportedly is currently being treated for UTI.  Per EMS report, patient had had increased lethargy throughout the day today.  Patient has a history of dementia and is alert and oriented x2 at baseline.  On evaluation, patient withdrawing to pain and will open eyes upon sternal rub but nonverbal at this time.      Review of Systems   Unable to perform ROS: Mental status change       Past Medical History:   Diagnosis Date   • Acute kidney failure (HCC)    • Acute respiratory failure with hypoxia (HCC)    • Anemia    • Benign prostatic hyperplasia    • Bilateral primary osteoarthritis of knee    • Cardiomegaly    • Cardiomyopathy (HCC)    • Cellulitis and abscess of left leg    • Cerebral infarction (HCC)    • Cerebrovascular disease    • Chronic kidney disease    • Dementia (HCC)    • Diabetes mellitus (HCC)    • Essential hypertension    • GERD (gastroesophageal reflux disease)    • Gout    • Heart failure (HCC)    • Hyperlipidemia    • Morbid obesity (HCC)    • Myocardial infarction (HCC)    • Obstructive sleep apnea    • Obstructive uropathy    • Paroxysmal atrial fibrillation (HCC)    • Peptic ulcer    • Peripheral vascular disease (HCC)    • Pulmonary edema    • Venous insufficiency        No Known Allergies    History reviewed. No pertinent surgical history.    Family History   Problem Relation Age of Onset   • Diabetes Father        Social History     Socioeconomic History   • Marital status:    • Number of children: 0   Tobacco Use   • Smoking status: Never   Vaping Use   • Vaping Use: Never used   Substance and Sexual Activity   • Alcohol use: Not Currently   • Drug use: Never   • Sexual activity: Defer           Objective   Physical Exam  Vitals and nursing note reviewed.   Constitutional:       General: He is not in acute distress.     Appearance:  He is well-developed. He is not diaphoretic.   HENT:      Head: Normocephalic and atraumatic.      Right Ear: External ear normal.      Left Ear: External ear normal.      Nose: Nose normal.      Mouth/Throat:      Mouth: Mucous membranes are moist.   Eyes:      Extraocular Movements: Extraocular movements intact.      Conjunctiva/sclera: Conjunctivae normal.      Pupils: Pupils are equal, round, and reactive to light.   Cardiovascular:      Rate and Rhythm: Normal rate and regular rhythm.      Pulses: Normal pulses.      Heart sounds: Normal heart sounds.      Comments: S1, S2 audible.  Pulmonary:      Effort: Pulmonary effort is normal. No respiratory distress.      Breath sounds: Normal breath sounds. No wheezing.      Comments: On room air.  Abdominal:      General: Bowel sounds are normal. There is no distension.      Palpations: Abdomen is soft.      Tenderness: There is no abdominal tenderness. There is no guarding or rebound.   Musculoskeletal:         General: No tenderness or deformity. Normal range of motion.      Cervical back: Normal range of motion.   Skin:     General: Skin is warm.      Capillary Refill: Capillary refill takes less than 2 seconds.      Findings: No erythema or rash.   Neurological:      Mental Status: He is alert. He is disoriented.      Cranial Nerves: No cranial nerve deficit.      Sensory: No sensory deficit.      Motor: No weakness.   Psychiatric:         Mood and Affect: Mood normal.         Behavior: Behavior normal.         Procedures           ED Course  ED Course as of 02/16/23 0055   Wed Feb 15, 2023   1854 Chest x-ray independently reviewed by myself shows mild vascular congestion throughout and cardiomegaly.  Official radiology review shows cardiomegaly with pulmonary vascular congestion and probable mild pulmonary edema pattern. [RL]      ED Course User Index  [RL] Nino Brock PA      /92 (BP Location: Right arm, Patient Position: Lying)   Pulse 83   Temp 98.3 °F  "(36.8 °C) (Axillary)   Resp 17   Ht 182.9 cm (72\")   Wt (!) 138 kg (303 lb 5.7 oz)   SpO2 98%   BMI 41.14 kg/m²   Labs Reviewed   COMPREHENSIVE METABOLIC PANEL - Abnormal; Notable for the following components:       Result Value    Glucose 116 (*)     BUN 24 (*)     Creatinine 1.61 (*)     Calcium 8.4 (*)     Albumin 2.9 (*)     eGFR 43.8 (*)     All other components within normal limits    Narrative:     GFR Normal >60  Chronic Kidney Disease <60  Kidney Failure <15    The GFR formula is only valid for adults with stable renal function between ages 18 and 70.   URINALYSIS W/ CULTURE IF INDICATED - Abnormal; Notable for the following components:    Color, UA Dark Yellow (*)     Appearance, UA Cloudy (*)     Ketones, UA Trace (*)     Bilirubin, UA Small (1+) (*)     Blood, UA Trace (*)     Protein, UA >=300 mg/dL (3+) (*)     Leuk Esterase, UA Large (3+) (*)     All other components within normal limits    Narrative:     In absence of clinical symptoms, the presence of pyuria, bacteria, and/or nitrites on the urinalysis result does not correlate with infection.   TROPONIN - Abnormal; Notable for the following components:    HS Troponin T 98 (*)     All other components within normal limits    Narrative:     High Sensitive Troponin T Reference Range:  <10.0 ng/L- Negative Female for AMI  <15.0 ng/L- Negative Male for AMI  >=10 - Abnormal Female indicating possible myocardial injury.  >=15 - Abnormal Male indicating possible myocardial injury.   Clinicians would have to utilize clinical acumen, EKG, Troponin, and serial changes to determine if it is an Acute Myocardial Infarction or myocardial injury due to an underlying chronic condition.        CBC WITH AUTO DIFFERENTIAL - Abnormal; Notable for the following components:    Hemoglobin 11.2 (*)     Hematocrit 35.8 (*)     MCH 25.0 (*)     MCHC 31.3 (*)     RDW 19.0 (*)     RDW-SD 56.0 (*)     Monocyte % 14.8 (*)     All other components within normal limits "   BLOOD GAS, VENOUS - Abnormal; Notable for the following components:    pO2, Venous 116.1 (*)     HCO3, Venous 27.7 (*)     Base Excess, Venous 2.7 (*)     O2 Saturation, Venous 98.6 (*)     All other components within normal limits   URINALYSIS, MICROSCOPIC ONLY - Abnormal; Notable for the following components:    RBC, UA 3-5 (*)     WBC, UA Too Numerous to Count (*)     Bacteria, UA 4+ (*)     Squamous Epithelial Cells, UA 3-6 (*)     All other components within normal limits   HIGH SENSITIVITIY TROPONIN T 2HR - Abnormal; Notable for the following components:    HS Troponin T 92 (*)     Troponin T Delta -6 (*)     All other components within normal limits    Narrative:     High Sensitive Troponin T Reference Range:  <10.0 ng/L- Negative Female for AMI  <15.0 ng/L- Negative Male for AMI  >=10 - Abnormal Female indicating possible myocardial injury.  >=15 - Abnormal Male indicating possible myocardial injury.   Clinicians would have to utilize clinical acumen, EKG, Troponin, and serial changes to determine if it is an Acute Myocardial Infarction or myocardial injury due to an underlying chronic condition.        BNP (IN-HOUSE) - Abnormal; Notable for the following components:    proBNP 7,939.0 (*)     All other components within normal limits    Narrative:     Among patients with dyspnea, NT-proBNP is highly sensitive for the detection of acute congestive heart failure. In addition NT-proBNP of <300 pg/ml effectively rules out acute congestive heart failure with 99% negative predictive value.    Results may be falsely decreased if patient taking Biotin.     POCT GLUCOSE FINGERSTICK - Abnormal; Notable for the following components:    Glucose 126 (*)     All other components within normal limits   RESPIRATORY PANEL PCR W/ COVID-19 (SARS-COV-2) MEÑO/MARCOS/JOSELIN/PAD/COR/MAD/WILBERT IN-HOUSE, NP SWAB IN [facility], 3-4 HR TAT - Normal    Narrative:     In the setting of a positive respiratory panel with a viral infection PLUS a  negative procalcitonin without other underlying concern for bacterial infection, consider observing off antibiotics or discontinuation of antibiotics and continue supportive care. If the respiratory panel is positive for atypical bacterial infection (Bordetella pertussis, Chlamydophila pneumoniae, or Mycoplasma pneumoniae), consider antibiotic de-escalation to target atypical bacterial infection.   TSH - Normal   MAGNESIUM - Normal   POC LACTATE - Normal   POC LACTATE - Normal   POCT GLUCOSE FINGERSTICK - Normal   URINE CULTURE   BLOOD CULTURE   BLOOD CULTURE   BLOOD GAS, ARTERIAL   TROPONIN   BASIC METABOLIC PANEL   CBC WITH AUTO DIFFERENTIAL   POCT GLUCOSE FINGERSTICK   POCT GLUCOSE FINGERSTICK   POCT GLUCOSE FINGERSTICK   POCT GLUCOSE FINGERSTICK   CBC AND DIFFERENTIAL    Narrative:     The following orders were created for panel order CBC & Differential.  Procedure                               Abnormality         Status                     ---------                               -----------         ------                     CBC Auto Differential[[id number]]        Abnormal            Final result                 Please view results for these tests on the individual orders.   EXTRA TUBES    Narrative:     The following orders were created for panel order Extra Tubes.  Procedure                               Abnormality         Status                     ---------                               -----------         ------                     Gold Top - SST[[id number]]                                   Final result               Light Blue Top[[id number]]                                   Final result                 Please view results for these tests on the individual orders.   GOLD TOP - SST   LIGHT BLUE TOP   CBC AND DIFFERENTIAL    Narrative:     The following orders were created for panel order CBC & Differential.  Procedure                               Abnormality         Status                     ---------                                -----------         ------                     CBC Auto Differential[[id number]]                                                         Please view results for these tests on the individual orders.     CT Head Without Contrast    Result Date: 2/15/2023  Impression: 1. No acute intracranial abnormality. 2. Extensive white matter findings most consistent changes of chronic microvascular disease, stable. Electronically Signed: Chico Henley  2/15/2023 5:34 PM EST  Workstation ID: [id number]    XR Chest 1 View    Result Date: 2/15/2023  Impression: Cardiomegaly with pulmonary vascular congestion and probable mild pulmonary edema pattern. Electronically Signed: Bestdeya Mirza  2/15/2023 4:32 PM EST  Workstation ID: [id number]                                         MDM     Differential diagnosis: Acute UTI, pneumonia, metabolic encephalopathy, CHF exacerbation, not meant to be an all-inclusive list.  Chart review:  Chart review from last discharge summary on 9/19/2022, patient was seen for chest pain shortness of breath and CHF exacerbation as well as UTI.  Patient had an elevated BNP upon admission 7000.  Last echo in March 2022 with preserved EF.    EKG: EKG independently or interpreted by myself shows atrial fibrillation rate 55 bpm, no ST elevation apparent.  Findings confirmed by Dr. Buckley.  Similar to previous study on 9/18/2022 that showed atrial fibrillation at that time.    Imaging:  Chest x-ray independently reviewed by myself shows mild vascular congestion throughout and cardiomegaly.  Official radiology review shows cardiomegaly with pulmonary vascular congestion and probable mild pulmonary edema pattern.  CT head unremarkable for acute findings.  Labs: Initial ABG shows normal pH and CO2.  Initial lactic acid normal.  CBC largely unremarkable for acute findings.  Serum creatinine slightly bumped at 1.6 with baseline around 1.4 otherwise largely unremarkable CMP for acute findings.  TSH  "normal.  Initial troponin elevated at 98.  BNP elevated at 7900 which was similar to previous study in which patient was admitted for CHF at that time.  Respiratory viral panel with COVID-19 swab negative.  UA shows 3+ leukocyte esterase, 4+ bacteria, too numerous to count WBCs.  Urine culture pending.  Blood cultures x2 pending.    Vitals:  /92 (BP Location: Right arm, Patient Position: Lying)   Pulse 83   Temp 98.3 °F (36.8 °C) (Axillary)   Resp 17   Ht 182.9 cm (72\")   Wt (!) 138 kg (303 lb 5.7 oz)   SpO2 98%   BMI 41.14 kg/m²     Medications given:    Medications   sodium chloride 0.9 % flush 10 mL (10 mL Intravenous Given 2/15/23 2306)   dextrose (GLUTOSE) oral gel 15 g (has no administration in time range)   dextrose (D50W) (25 g/50 mL) IV injection 25 g (has no administration in time range)   glucagon (human recombinant) (GLUCAGEN DIAGNOSTIC) 1 mg in sterile water (preservative free) 1 mL injection (has no administration in time range)   insulin lispro (ADMELOG) injection 3-14 Units (has no administration in time range)   Pharmacy to Dose Zosyn (has no administration in time range)   furosemide (LASIX) injection 40 mg (has no administration in time range)   apixaban (ELIQUIS) tablet 5 mg (5 mg Oral Given 2/15/23 2305)   piperacillin-tazobactam (ZOSYN) IVPB 4.5 g in 100 mL NS (CD) (4.5 g Intravenous New Bag 2/15/23 2305)   piperacillin-tazobactam (ZOSYN) IVPB 4.5 g in 100 mL NS (CD) (0 g Intravenous Stopped 2/15/23 1755)   hydrALAZINE (APRESOLINE) injection 10 mg (10 mg Intravenous Given 2/15/23 1838)   nitroglycerin (NITROSTAT) ointment 1 inch (1 inch Topical Given 2/15/23 1838)   furosemide (LASIX) injection 80 mg (80 mg Intravenous Given 2/15/23 1943)   hydrALAZINE (APRESOLINE) injection 20 mg (20 mg Intravenous Given 2/15/23 2330)       Procedures: Not indicated  MDM: Patient is a 77-year-old male comes in complaining of generalized weakness.  IV established.  Initial ABG shows normal pH and " CO2.  Initial lactic acid normal.  CBC largely unremarkable for acute findings.  Serum creatinine slightly bumped at 1.6 with baseline around 1.4 otherwise largely unremarkable CMP for acute findings.  TSH normal.  Initial troponin elevated at 98.  BNP elevated at 7900 which was similar to previous study in which patient was admitted for CHF at that time.  Respiratory viral panel with COVID-19 swab negative.  UA shows 3+ leukocyte esterase, 4+ bacteria, too numerous to count WBCs.  Urine culture pending.  Blood cultures x2 pending  Chest x-ray independently reviewed by myself shows mild vascular congestion throughout and cardiomegaly.  Official radiology review shows cardiomegaly with pulmonary vascular congestion and probable mild pulmonary edema pattern.  CT head unremarkable for acute findings.  Patient had elevated blood pressure throughout ER stay was given hydralazine as well as nitro paste and Lasix as I suspect CHF exacerbation for the patient.  Patient's blood pressure did improve to 160s over 90s upon admission to the hospital.  Pharmacy recommended to switch Rocephin to Zosyn for UTI given patient's prior infections of ESBL and this was changed.  On reevaluation, patient was alert and looking around the room and answering questions appropriately although somewhat confused as to where he is but was verbal and talking to me.  Spoke with MELISA Amado, accepted patient behalf of Dr. Campuzano for admission the hospital further IV antibiotics and diuresis and cardiology consultation.  Results and plan discussed with patient and is agreeable with plan.       Final diagnoses:   Altered mental status, unspecified altered mental status type   Acute metabolic encephalopathy   Acute UTI   Chronic kidney disease, unspecified CKD stage   Elevated troponin   Acute on chronic congestive heart failure, unspecified heart failure type (HCC)       ED Disposition  ED Disposition     ED Disposition   Decision to Admit     Condition   --    Comment   Level of Care: Telemetry [5]   Admitting Physician: YANNI DUMONT [[id number]]   Attending Physician: YANNI DUMONT [[id number]]               No follow-up provider specified.       Medication List      No changes were made to your prescriptions during this visit.          Nino Brock PA  02/16/23 0056

## 2023-02-15 NOTE — ED NOTES
Pt repositioned and placed on atul  for comfort- pt remains responsive to painful stimuli at this time-

## 2023-02-16 LAB
ANION GAP SERPL CALCULATED.3IONS-SCNC: 11 MMOL/L (ref 5–15)
BACTERIA SPEC AEROBE CULT: NORMAL
BASOPHILS # BLD AUTO: 0 10*3/MM3 (ref 0–0.2)
BASOPHILS NFR BLD AUTO: 0.8 % (ref 0–1.5)
BUN SERPL-MCNC: 22 MG/DL (ref 8–23)
BUN/CREAT SERPL: 14 (ref 7–25)
CALCIUM SPEC-SCNC: 8.6 MG/DL (ref 8.6–10.5)
CHLORIDE SERPL-SCNC: 102 MMOL/L (ref 98–107)
CO2 SERPL-SCNC: 27 MMOL/L (ref 22–29)
CREAT SERPL-MCNC: 1.57 MG/DL (ref 0.76–1.27)
DEPRECATED RDW RBC AUTO: 54.3 FL (ref 37–54)
EGFRCR SERPLBLD CKD-EPI 2021: 45.1 ML/MIN/1.73
EOSINOPHIL # BLD AUTO: 0.3 10*3/MM3 (ref 0–0.4)
EOSINOPHIL NFR BLD AUTO: 6.8 % (ref 0.3–6.2)
ERYTHROCYTE [DISTWIDTH] IN BLOOD BY AUTOMATED COUNT: 19.3 % (ref 12.3–15.4)
GLUCOSE BLDC GLUCOMTR-MCNC: 107 MG/DL (ref 70–105)
GLUCOSE BLDC GLUCOMTR-MCNC: 145 MG/DL (ref 70–105)
GLUCOSE BLDC GLUCOMTR-MCNC: 147 MG/DL (ref 70–105)
GLUCOSE BLDC GLUCOMTR-MCNC: 155 MG/DL (ref 70–105)
GLUCOSE SERPL-MCNC: 110 MG/DL (ref 65–99)
HCT VFR BLD AUTO: 38.3 % (ref 37.5–51)
HGB BLD-MCNC: 11.6 G/DL (ref 13–17.7)
LYMPHOCYTES # BLD AUTO: 1.3 10*3/MM3 (ref 0.7–3.1)
LYMPHOCYTES NFR BLD AUTO: 34.9 % (ref 19.6–45.3)
MAGNESIUM SERPL-MCNC: 1.9 MG/DL (ref 1.6–2.4)
MCH RBC QN AUTO: 24.6 PG (ref 26.6–33)
MCHC RBC AUTO-ENTMCNC: 30.4 G/DL (ref 31.5–35.7)
MCV RBC AUTO: 80.9 FL (ref 79–97)
MONOCYTES # BLD AUTO: 0.6 10*3/MM3 (ref 0.1–0.9)
MONOCYTES NFR BLD AUTO: 15.1 % (ref 5–12)
NEUTROPHILS NFR BLD AUTO: 1.6 10*3/MM3 (ref 1.7–7)
NEUTROPHILS NFR BLD AUTO: 42.4 % (ref 42.7–76)
NRBC BLD AUTO-RTO: 0 /100 WBC (ref 0–0.2)
PLATELET # BLD AUTO: 222 10*3/MM3 (ref 140–450)
PMV BLD AUTO: 8.3 FL (ref 6–12)
POTASSIUM SERPL-SCNC: 3.3 MMOL/L (ref 3.5–5.2)
RBC # BLD AUTO: 4.74 10*6/MM3 (ref 4.14–5.8)
SODIUM SERPL-SCNC: 140 MMOL/L (ref 136–145)
TROPONIN T SERPL HS-MCNC: 91 NG/L
WBC NRBC COR # BLD: 3.7 10*3/MM3 (ref 3.4–10.8)

## 2023-02-16 PROCEDURE — 25010000002 PIPERACILLIN SOD-TAZOBACTAM PER 1 G: Performed by: NURSE PRACTITIONER

## 2023-02-16 PROCEDURE — 82962 GLUCOSE BLOOD TEST: CPT

## 2023-02-16 PROCEDURE — 84484 ASSAY OF TROPONIN QUANT: CPT | Performed by: NURSE PRACTITIONER

## 2023-02-16 PROCEDURE — 25010000002 HYDRALAZINE PER 20 MG: Performed by: NURSE PRACTITIONER

## 2023-02-16 PROCEDURE — 83735 ASSAY OF MAGNESIUM: CPT | Performed by: STUDENT IN AN ORGANIZED HEALTH CARE EDUCATION/TRAINING PROGRAM

## 2023-02-16 PROCEDURE — 25010000002 FUROSEMIDE PER 20 MG: Performed by: NURSE PRACTITIONER

## 2023-02-16 PROCEDURE — 36415 COLL VENOUS BLD VENIPUNCTURE: CPT | Performed by: NURSE PRACTITIONER

## 2023-02-16 PROCEDURE — 99222 1ST HOSP IP/OBS MODERATE 55: CPT | Performed by: NURSE PRACTITIONER

## 2023-02-16 PROCEDURE — 92610 EVALUATE SWALLOWING FUNCTION: CPT

## 2023-02-16 PROCEDURE — 85025 COMPLETE CBC W/AUTO DIFF WBC: CPT | Performed by: NURSE PRACTITIONER

## 2023-02-16 PROCEDURE — 80048 BASIC METABOLIC PNL TOTAL CA: CPT | Performed by: NURSE PRACTITIONER

## 2023-02-16 RX ORDER — ISOSORBIDE MONONITRATE 60 MG/1
60 TABLET, EXTENDED RELEASE ORAL
Status: DISCONTINUED | OUTPATIENT
Start: 2023-02-16 | End: 2023-02-18 | Stop reason: HOSPADM

## 2023-02-16 RX ORDER — CARVEDILOL 6.25 MG/1
12.5 TABLET ORAL 2 TIMES DAILY WITH MEALS
Status: DISCONTINUED | OUTPATIENT
Start: 2023-02-16 | End: 2023-02-18 | Stop reason: HOSPADM

## 2023-02-16 RX ORDER — POTASSIUM CHLORIDE 7.45 MG/ML
10 INJECTION INTRAVENOUS
Status: DISCONTINUED | OUTPATIENT
Start: 2023-02-16 | End: 2023-02-18 | Stop reason: HOSPADM

## 2023-02-16 RX ORDER — FINASTERIDE 5 MG/1
5 TABLET, FILM COATED ORAL DAILY
Status: DISCONTINUED | OUTPATIENT
Start: 2023-02-16 | End: 2023-02-18 | Stop reason: HOSPADM

## 2023-02-16 RX ORDER — LOSARTAN POTASSIUM 25 MG/1
25 TABLET ORAL
Status: DISCONTINUED | OUTPATIENT
Start: 2023-02-16 | End: 2023-02-18 | Stop reason: HOSPADM

## 2023-02-16 RX ORDER — SACCHAROMYCES BOULARDII 250 MG
250 CAPSULE ORAL 2 TIMES DAILY
Status: DISCONTINUED | OUTPATIENT
Start: 2023-02-16 | End: 2023-02-18 | Stop reason: HOSPADM

## 2023-02-16 RX ORDER — MAGNESIUM SULFATE HEPTAHYDRATE 40 MG/ML
2 INJECTION, SOLUTION INTRAVENOUS AS NEEDED
Status: DISCONTINUED | OUTPATIENT
Start: 2023-02-16 | End: 2023-02-18 | Stop reason: HOSPADM

## 2023-02-16 RX ORDER — TAMSULOSIN HYDROCHLORIDE 0.4 MG/1
0.4 CAPSULE ORAL DAILY
Status: DISCONTINUED | OUTPATIENT
Start: 2023-02-16 | End: 2023-02-18 | Stop reason: HOSPADM

## 2023-02-16 RX ORDER — ESCITALOPRAM OXALATE 10 MG/1
10 TABLET ORAL DAILY
Status: DISCONTINUED | OUTPATIENT
Start: 2023-02-16 | End: 2023-02-18 | Stop reason: HOSPADM

## 2023-02-16 RX ORDER — HYDRALAZINE HYDROCHLORIDE 25 MG/1
50 TABLET, FILM COATED ORAL 3 TIMES DAILY
Status: DISCONTINUED | OUTPATIENT
Start: 2023-02-16 | End: 2023-02-18 | Stop reason: HOSPADM

## 2023-02-16 RX ORDER — POTASSIUM CHLORIDE 1.5 G/1.77G
40 POWDER, FOR SOLUTION ORAL AS NEEDED
Status: DISCONTINUED | OUTPATIENT
Start: 2023-02-16 | End: 2023-02-18 | Stop reason: HOSPADM

## 2023-02-16 RX ORDER — POTASSIUM CHLORIDE 20 MEQ/1
40 TABLET, EXTENDED RELEASE ORAL AS NEEDED
Status: DISCONTINUED | OUTPATIENT
Start: 2023-02-16 | End: 2023-02-18 | Stop reason: HOSPADM

## 2023-02-16 RX ORDER — METHOCARBAMOL 500 MG/1
500 TABLET, FILM COATED ORAL EVERY 12 HOURS PRN
Status: DISCONTINUED | OUTPATIENT
Start: 2023-02-16 | End: 2023-02-18 | Stop reason: HOSPADM

## 2023-02-16 RX ORDER — GABAPENTIN 300 MG/1
300 CAPSULE ORAL 3 TIMES DAILY
Status: DISCONTINUED | OUTPATIENT
Start: 2023-02-16 | End: 2023-02-18 | Stop reason: HOSPADM

## 2023-02-16 RX ORDER — MAGNESIUM SULFATE HEPTAHYDRATE 40 MG/ML
4 INJECTION, SOLUTION INTRAVENOUS AS NEEDED
Status: DISCONTINUED | OUTPATIENT
Start: 2023-02-16 | End: 2023-02-18 | Stop reason: HOSPADM

## 2023-02-16 RX ORDER — ATORVASTATIN CALCIUM 20 MG/1
20 TABLET, FILM COATED ORAL DAILY
Status: DISCONTINUED | OUTPATIENT
Start: 2023-02-16 | End: 2023-02-18 | Stop reason: HOSPADM

## 2023-02-16 RX ADMIN — PIPERACILLIN AND TAZOBACTAM 4.5 G: 4; .5 INJECTION, POWDER, FOR SOLUTION INTRAVENOUS at 09:55

## 2023-02-16 RX ADMIN — HYDRALAZINE HYDROCHLORIDE 50 MG: 25 TABLET, FILM COATED ORAL at 20:27

## 2023-02-16 RX ADMIN — ATORVASTATIN CALCIUM 20 MG: 20 TABLET, FILM COATED ORAL at 09:45

## 2023-02-16 RX ADMIN — Medication 10 ML: at 09:50

## 2023-02-16 RX ADMIN — GABAPENTIN 300 MG: 300 CAPSULE ORAL at 09:49

## 2023-02-16 RX ADMIN — Medication 250 MG: at 09:49

## 2023-02-16 RX ADMIN — Medication 10 ML: at 20:23

## 2023-02-16 RX ADMIN — PIPERACILLIN AND TAZOBACTAM 4.5 G: 4; .5 INJECTION, POWDER, FOR SOLUTION INTRAVENOUS at 17:24

## 2023-02-16 RX ADMIN — ISOSORBIDE MONONITRATE 60 MG: 60 TABLET, EXTENDED RELEASE ORAL at 09:48

## 2023-02-16 RX ADMIN — FUROSEMIDE 40 MG: 10 INJECTION, SOLUTION INTRAMUSCULAR; INTRAVENOUS at 18:08

## 2023-02-16 RX ADMIN — Medication 250 MG: at 20:23

## 2023-02-16 RX ADMIN — PIPERACILLIN AND TAZOBACTAM 4.5 G: 4; .5 INJECTION, POWDER, FOR SOLUTION INTRAVENOUS at 23:55

## 2023-02-16 RX ADMIN — POTASSIUM CHLORIDE 40 MEQ: 1500 TABLET, EXTENDED RELEASE ORAL at 20:35

## 2023-02-16 RX ADMIN — ESCITALOPRAM OXALATE 10 MG: 10 TABLET ORAL at 09:46

## 2023-02-16 RX ADMIN — LOSARTAN POTASSIUM 25 MG: 25 TABLET, FILM COATED ORAL at 09:46

## 2023-02-16 RX ADMIN — CARVEDILOL 12.5 MG: 6.25 TABLET, FILM COATED ORAL at 18:08

## 2023-02-16 RX ADMIN — APIXABAN 5 MG: 5 TABLET, FILM COATED ORAL at 20:23

## 2023-02-16 RX ADMIN — APIXABAN 5 MG: 5 TABLET, FILM COATED ORAL at 09:34

## 2023-02-16 RX ADMIN — TAMSULOSIN HYDROCHLORIDE 0.4 MG: 0.4 CAPSULE ORAL at 09:49

## 2023-02-16 RX ADMIN — FINASTERIDE 5 MG: 5 TABLET, FILM COATED ORAL at 09:46

## 2023-02-16 RX ADMIN — CARVEDILOL 12.5 MG: 6.25 TABLET, FILM COATED ORAL at 09:36

## 2023-02-16 RX ADMIN — GABAPENTIN 300 MG: 300 CAPSULE ORAL at 18:08

## 2023-02-16 RX ADMIN — HYDRALAZINE HYDROCHLORIDE 50 MG: 25 TABLET, FILM COATED ORAL at 18:14

## 2023-02-16 RX ADMIN — GABAPENTIN 300 MG: 300 CAPSULE ORAL at 20:23

## 2023-02-16 RX ADMIN — POTASSIUM CHLORIDE 40 MEQ: 1.5 POWDER, FOR SOLUTION ORAL at 15:13

## 2023-02-16 RX ADMIN — FUROSEMIDE 40 MG: 10 INJECTION, SOLUTION INTRAMUSCULAR; INTRAVENOUS at 09:50

## 2023-02-16 RX ADMIN — HYDRALAZINE HYDROCHLORIDE 50 MG: 25 TABLET, FILM COATED ORAL at 09:47

## 2023-02-16 RX ADMIN — ISOSORBIDE MONONITRATE 60 MG: 60 TABLET, EXTENDED RELEASE ORAL at 18:08

## 2023-02-16 NOTE — PLAN OF CARE
Goal Outcome Evaluation:  Plan of Care Reviewed With: patient        Progress: no change  Outcome Evaluation: Admitted from ED with altered mental status and UTI. Oriented to person. Answers some yes/no questions.  B/P improved with one time dose Hydralazine. F/C in place. IV abx given. WOCN consult placed. Will continue to monitor.

## 2023-02-16 NOTE — PLAN OF CARE
Goal Outcome Evaluation:  Pt observed w/ meal tray and PO trials provided by SLP to clinically assess swallow function. Pt agreeable to trials of grits, fig ruiz, crackers, and thin liquids by straw. Pt self fed and required intermittent feeding assistance. Pt w/ intermittent impulsivity w/ large rapid sips of thin liquids by straw. Functional mastication w/ both soft and regular solids. Oral transit WFL. Cough x1 following soft solids. No other overt s/s of aspiration observed at any time. Pt independently clears oral stasis w/ lingual sweep. Recommend pt continue a regular and thin liquid diet w/ standard safe swallow and aspiration precautions. ST will continue to follow to ensure diet tolerance and make further recs as indicated.

## 2023-02-16 NOTE — CASE MANAGEMENT/SOCIAL WORK
Continued Stay Note   [facility]     Patient Name: Shaji Junior  MRN: [id number]  Today's Date: 2/16/2023    Admit Date: 2/15/2023    Plan: Return to [facility] for LTC. CM needs to verify with family   Discharge Plan     Row Name 02/16/23 1143       Plan    Plan Return to [facility] for LTC. CM needs to verify with family    Plan Comments Teal with Anay Mcdaniels reported Mr. Junior resides at [facility] and has been there since April 2022. Added to basket and Pharm verified. CM will need to verify with family. Mr. Junior has [facility]              Phone communication or documentation only - no physical contact with patient or family.    Pennie MONTELONGO,RN Case Manager  [facility]  Phone: Desk- [phone] cell- [phone]

## 2023-02-16 NOTE — CASE MANAGEMENT/SOCIAL WORK
Discharge Planning Assessment   [facility]     Patient Name: Shaji Junior  MRN: [id number]  Today's Date: 2/16/2023    Admit Date: 2/15/2023    Plan: D/C Plan: Anticipate return to [facility]. No precert or PASRR required. Okay to return per sister and facility liaison. See comments.   Discharge Needs Assessment     Row Name 02/16/23 1346       Living Environment    People in Home facility resident    Name(s) of People in Home [facility]    Current Living Arrangements extended care facility    Provides Primary Care For no one, unable/limited ability to care for self    Family Caregiver if Needed sibling(s)    Quality of Family Relationships involved    Able to Return to Prior Arrangements yes       Resource/Environmental Concerns    Resource/Environmental Concerns none    Transportation Concerns none       Transition Planning    Patient/Family Anticipates Transition to long-term care facility    Patient/Family Anticipated Services at Transition none    Transportation Anticipated health plan transportation       Discharge Needs Assessment    Equipment Currently Used at Home wheelchair    Concerns to be Addressed denies needs/concerns at this time    Anticipated Changes Related to Illness none    Equipment Needed After Discharge none               Discharge Plan     Row Name 02/16/23 1346       Plan    Plan D/C Plan: Anticipate return to [place], [facility]. No precert or PASRR required. Okay to return per sister and facility liaison. See comments.    Patient/Family in Agreement with Plan yes    Plan Comments CM contacted patient's sister, Michelle, by phone. Sister states she plans to move patient to [place] with her, but patient would need switched to [place] Medicaid and transportation arranged. Sister plans for patient to come to her home and she will arrange in-home caregivers. CM discussed patient returning to [place] if he is d/c ready before this can be arranged, sister is aware and agreeable. CM updated JM,  Olga and ESTHER to follow up with patient's sister about starting process for [place] Medicaid. [facility] letter reviewed with sister, Paris, verbal consent and copy emailed to [email or web address]. Barrier to D/C: AMS, SLP and wound care following.                                         Expected Discharge Date and Time     Expected Discharge Date Expected Discharge Time    Feb 18, 2023          Demographic Summary     Row Name 02/16/23 1346       General Information    Admission Type inpatient    Arrived From emergency department    Referral Source admission list    Reason for Consult discharge planning    Preferred Language English       Contact Information    Permission Granted to Share Info With                Functional Status     Row Name 02/16/23 1346       Functional Status    Usual Activity Tolerance poor    Current Activity Tolerance poor       Functional Status, IADL    Medications completely dependent    Meal Preparation completely dependent    Housekeeping completely dependent    Laundry completely dependent    Shopping completely dependent       Mental Status Summary    Recent Changes in Mental Status/Cognitive Functioning unable to assess                   Patient Forms     Row Name 02/16/23 1334       Patient Forms    Important Message from Medicare (IMM) Delivered  2/16/23    Delivered to Support person    Method of delivery Telephone              Phone communication or documentation only - no physical contact with patient or family.    MATTI [place]N, [place] [address]    Office: [phone]  Fax: [phone]

## 2023-02-16 NOTE — H&P
"    [facility] Medicine Services      Patient Name: Shaji Junior  : 1945  MRN: [id number]  Primary Care Physician:  Naa Valverde MD  Date of admission: 2/15/2023      Subjective      Chief Complaint: altered mental status    History of Present Illness: Shaji Junior is a 77 y.o. male morbidly obese male sent from [facility] , with a past medical history of diabetes mellitus type 2, chronic kidney disease BPH, dementia, hypertension, heart failure preserved EF atrial fibrillation on anticoagulation who presented to [facility] on 2/15/2023 with reports of  Increased alteration in mental status. He has a documented history of dementia, baseline unknown.  He is easily awakened and alert and oriented to self.  He denies any chest pain, shortness of air or abdominal pain nausea vomiting or diarrhea or fever.  He has no facial droop or slurred speech and moves all extremities.  Poor historian for details unable to obtain further information from patient and no family at bedside.  Review of records shows he was admitted here in September for chest pain with acute on chronic heart failure preserved EF poorly controlled hypertension and acute UTI.  He had a 2D echo done at that time which showed left ventricular ejection fraction 55 to 60% with diastolic function was normal.  He also had a stress test Cardiolite which was normal showing no evidence of ischemia.  Urine culture at that time grew Klebsiella pneumoniae ESBL and Proteus Mirabella's ESBL.  Today he presents with similar results.  CT head per radiology showed no acute intracranial abnormality but did show extensive white matter findings most consistent changes of chronic microvascular disease stable.  Chest x-ray per radiology showed \"cardiomegaly with pulmonary vascular congestion and probable mild pulmonary edema pattern\".  EKG shows atrial fibrillation heart rate 55 with no ST elevation or " depression.  Labs show high-sensitivity troponin 98 and 92 proBNP 7939 glucose 116 creatinine 1.61 with a baseline of 1.4 per labs, albumin 2.9 WBC not elevated at 3.9 hemoglobin 11.2 and improved from previous, respiratory panel was negative.  Urinalysis today shows 3+ leukocytes too numerous to count WBCs and 4+ bacteria.  Given past medical history of recent ESBL per urine culture he was started on IV Zosyn.  He was given 80 mg IV Lasix in ED and Nitropaste.  He was  hypertensive on admission and given IV hydralazine.  He was initially ordered a Cardene drip but blood pressure improved.  Continuous cardiac monitoring and repeat troponin lowness's have been ordered.  He will be admitted for further evaluation and treatment.  Review of Systems   Unable to perform ROS: dementia       Personal History     Past Medical History:   Diagnosis Date   • Acute kidney failure (HCC)    • Acute respiratory failure with hypoxia (HCC)    • Anemia    • Benign prostatic hyperplasia    • Bilateral primary osteoarthritis of knee    • Cardiomegaly    • Cardiomyopathy (HCC)    • Cellulitis and abscess of left leg    • Cerebral infarction (HCC)    • Cerebrovascular disease    • Chronic kidney disease    • Dementia (HCC)    • Diabetes mellitus (HCC)    • Essential hypertension    • GERD (gastroesophageal reflux disease)    • Gout    • Heart failure (HCC)    • Hyperlipidemia    • Morbid obesity (HCC)    • Myocardial infarction (HCC)    • Obstructive sleep apnea    • Obstructive uropathy    • Paroxysmal atrial fibrillation (HCC)    • Peptic ulcer    • Peripheral vascular disease (HCC)    • Pulmonary edema    • Venous insufficiency        History reviewed. No pertinent surgical history.    Family History: family history includes Diabetes in his father. Otherwise pertinent FHx was reviewed and not pertinent to current issue.    Social History:  reports that he has never smoked. He does not have any smokeless tobacco history on file. He  reports that he does not currently use alcohol. He reports that he does not use drugs.    Home Medications:  Prior to Admission Medications     Prescriptions Last Dose Informant Patient Reported? Taking?    acetaminophen (TYLENOL) 325 MG tablet   Yes No    Take 650 mg by mouth Every 4 (Four) Hours As Needed for Mild Pain .    albuterol sulfate  (90 Base) MCG/ACT inhaler   Yes No    Inhale 2 puffs Every 4 (Four) Hours As Needed (Cough).    apixaban (ELIQUIS) 5 MG tablet tablet   No No    Take 1 tablet by mouth Every 12 (Twelve) Hours. Indications: Atrial Fibrillation    carvedilol (COREG) 12.5 MG tablet   No No    Take 1 tablet by mouth 2 (Two) Times a Day With Meals.    escitalopram (LEXAPRO) 10 MG tablet   Yes No    Take 10 mg by mouth Daily.    finasteride (PROSCAR) 5 MG tablet   Yes No    Take 5 mg by mouth Daily.    furosemide (LASIX) 40 MG tablet   No No    Take 1 tablet by mouth 2 (Two) Times a Day.    gabapentin (NEURONTIN) 300 MG capsule   Yes No    Take 300 mg by mouth 3 (Three) Times a Day.    hydrALAZINE (APRESOLINE) 50 MG tablet   No No    Take 1 tablet by mouth 3 (Three) Times a Day.    Insulin Glargine (BASAGLAR KWIKPEN) 100 UNIT/ML injection pen   Yes No    Inject 10 Units under the skin into the appropriate area as directed Every Night.    isosorbide mononitrate (IMDUR) 60 MG 24 hr tablet   No No    Take 1 tablet by mouth 2 (Two) Times a Day.    losartan (COZAAR) 25 MG tablet   No No    Take 1 tablet by mouth Daily.    Polyethylene Glycol 3350 powder   Yes No    Take 17 g by mouth Every Night.    saccharomyces boulardii (FLORASTOR) 250 MG capsule   Yes No    Take 250 mg by mouth 2 (Two) Times a Day.    simvastatin (ZOCOR) 40 MG tablet   Yes No    Take 40 mg by mouth every night at bedtime.    tamsulosin (FLOMAX) 0.4 MG capsule 24 hr capsule   Yes No    Take 1 capsule by mouth Daily.            Allergies:  No Known Allergies    Objective      Vitals:   Temp:  [97.8 °F (36.6 °C)] 97.8 °F (36.6  "°C)  Heart Rate:  [60-79] 79  Resp:  [15] 15  BP: (139-200)/() 159/103  Flow (L/min):  [3] 3    Physical Exam  Vitals reviewed.   Constitutional:       Appearance: He is obese.   HENT:      Head: Normocephalic and atraumatic.      Right Ear: External ear normal.      Left Ear: External ear normal.      Nose: Nose normal.      Mouth/Throat:      Mouth: Mucous membranes are moist.   Eyes:      Extraocular Movements: Extraocular movements intact.   Cardiovascular:      Rate and Rhythm: Bradycardia present. Rhythm irregular.      Heart sounds: Normal heart sounds.   Pulmonary:      Effort: Pulmonary effort is normal.      Breath sounds: Normal breath sounds.   Abdominal:      Palpations: Abdomen is soft.   Genitourinary:     Comments: deferred  Musculoskeletal:         General: Normal range of motion.      Cervical back: Normal range of motion and neck supple.   Skin:     General: Skin is warm and dry.      Comments: BLE venous stasis dermatitis with lichinification   Neurological:      Mental Status: He is alert.      Comments: Oriented to self   Psychiatric:         Mood and Affect: Mood normal.      Comments: Cooperative, pleasant answers questions          Result Review    Result Review:  I have personally reviewed the results from the time of this admission to 2/15/2023 21:41 EST and agree with these findings:  [x]  Laboratory  [x]  Microbiology  [x]  Radiology  [x]  EKG/Telemetry   [x]  Cardiology/Vascular   []  Pathology  [x]  Old records  []  Other:  Most notable findings include: High-sensitivity troponin 98 and 92 proBNP 7939 glucose 116, creatinine 1.61 BUN 24 EGFR 43.8 albumin 2.9, WBC 3.9 hemoglobin 11.2, respiratory panel negative, urinalysis showing 3+ leukocytes too numerous to count WBCs 4+ bacteria, EKG atrial fibrillation heart rate 55 no ST elevation or depression    Chest x-ray per radiology:    \"Impression:  Cardiomegaly with pulmonary vascular congestion and probable mild pulmonary edema " "pattern.  \"    CT head per radiology:    \"1. No acute intracranial abnormality.  2. Extensive white matter findings most consistent changes of chronic microvascular disease, stable.  \"        Assessment & Plan        Active Hospital Problems:  Active Hospital Problems    Diagnosis    • **Altered mental status, unspecified altered mental status type    • Hypertensive urgency    • Elevated troponin    • Acute UTI (urinary tract infection)    • Type 2 diabetes mellitus with diabetic nephropathy ([facility])    • Acute on chronic diastolic CHF (congestive heart failure) ([facility])    • Morbid obesity ([facility])    • Dementia ([facility])    • CKD (chronic kidney disease), stage III ([facility])    • BPH without obstruction/lower urinary tract symptoms    • Atrial fibrillation ([facility])      Plan:    Altered mental status with past medical history of dementia baseline unknown CT head negative for acute likely multifactorial including acute UTI and acute on chronic diastolic heart failure and hypertension see plans below    Hypertensive urgency, received Nitropaste and hydralazine in ED with improvement Cardene drip initially ordered but blood pressure improved will reorder if needed monitor BP, home meds unverified at this time reorder pending verification from pharmacy    Elevated troponin high-sensitivity 98 and 92 trending downward likely secondary to ischemic demand from congestive heart failure denies chest pain recent stress test in September was negative for ischemia, continuous cardiac monitoring repeat troponin    Acute UTI, 4+ bacteria 3+ leukocytes too numerous to count WBCs with past medical history ESBL, continue IV Zosyn until urine culture resulted afebrile serum WBC not elevated    Type 2 diabetes mellitus with diabetic nephropathy, glucose 116, add SSI as needed with Accu-Cheks ACHS consistent carb diet, home meds unverified at this time reorder pending verification pharmacy    Acute on chronic heart failure preserved EF per echo in " September proBNP elevated 7939, pulmonary edema per chest x-ray, 80 mg IV Lasix in ED, was on home Lasix 40 mg p.o. twice daily changed to 40 mg IV twice daily    Morbid obesity BMI 44.48, encourage lifestyle management fall precautions    Dementia per records baseline unknown alert cooperative and pleasant    Chronic kidney disease stage III baseline appears to be 1.4 creatinine today 1.6, may worsen with diuresis, trend renal function    BPH, home meds unverified at this time reorder pending verification pharmacy    Atrial fibrillation, rate controlled on home Eliquis reordered on home Coreg unverified this time reorder pending verification from pharmacy    Hyperlipidemia, home med unverified at this time reorder pending verification pharmacy    Depression, home med unverified at this time reorder pending verification pharmacy and if appropriate    DVT prophylaxis:  Medical DVT prophylaxis orders are present.    CODE STATUS:    Code Status (Patient has no pulse and is not breathing): CPR (Attempt to Resuscitate)  Medical Interventions (Patient has pulse or is breathing): Full Support    Admission Status:  I believe this patient meets inpatient  status.    I discussed the patient's findings and my recommendations with patient.    This patient has been examined wearing appropriate Personal Protective Equipment . 02/15/23      Signature: Electronically signed by MELISA Mei, 02/15/23, 9:43 PM EST.

## 2023-02-16 NOTE — DISCHARGE PLACEMENT REQUEST
"Cyrus Junior (77 y.o. Male)     Date of Birth   1945    Social Security Number       Address   [address] [place] [address]    Home Phone   [phone]    MRN   [id number]       Confucianism   None    Marital Status                               Admission Date   2/15/23    Admission Type   Emergency    Admitting Provider   Sandip Campuzano DO    Attending Provider   Sandip Campuzano DO    Department, Room/Bed   [facility] 2B MEDICAL INPATIENT, 230/1       Discharge Date       Discharge Disposition       Discharge Destination                               Attending Provider: Sandip Campuzano DO    Allergies: No Known Allergies    Isolation: Contact   Infection: ESBL (04/01/22), ESBL Klebsiella (09/19/22)   Code Status: CPR    Ht: 182.9 cm (72\")   Wt: 138 kg (303 lb 5.7 oz)    Admission Cmt: None   Principal Problem: Altered mental status, unspecified altered mental status type [R41.82]                 Active Insurance as of 2/15/2023     Primary Coverage     Payor Plan Insurance Group Employer/Plan Group    MISC MEDICARE REPLACEMENT MISC MCARE REPLACEMENT [id number]     Coverage Address Coverage Phone Number Coverage Fax Number Effective Dates    [phone]  2/1/2023 - None Entered    [facility] [id number]       Subscriber Name Subscriber Birth Date Member ID       CYRUS JUNIOR 1945 [id number]           Secondary Coverage     Payor Plan Insurance Group Employer/Plan Group    [facility] DE[facility] [id number]     Payor Plan Address Payor Plan Phone Number Payor Plan Fax Number Effective Dates    [facility] CARE [phone]  2/15/2023 - None Entered    [address] [address]       [facility] [address]       Subscriber Name Subscriber Birth Date Member ID       CYRUS JUNIOR 1945 [id number]           Tertiary Coverage     Payor Plan Insurance Group Employer/Plan Group    [facility] CCN OPTUM      Payor Plan Address " Payor Plan Phone Number Payor Plan Fax Number Effective Dates    [address] [phone]  1/1/2022 - None Entered    [facility] [id number]       Subscriber Name Subscriber Birth Date Member ID       CYRUS RAMIREZ 1945 [id number]           Other Coverage     Payor Plan Insurance Group Employer/Plan Group    [place]      Payor Plan Address Payor Plan Phone Number Payor Plan Fax Number Effective Dates    [address]   1/1/2022 - None Entered    [facility] [address]       Subscriber Name Subscriber Birth Date Member ID       CYRUS RAMIREZ 1945 [id number]                 Emergency Contacts      (Rel.) Home Phone Work Phone Mobile Phone    RICHAR MONROE (Sister) [phone] -- --

## 2023-02-16 NOTE — PROGRESS NOTES
[facility]     Progress Note    Patient Name: Shaji Junior  : 1945  MRN: [id number]  Primary Care Physician:  Naa Valverde MD  Date of admission: 2/15/2023    Subjective   Subjective     Seen and examined this morning  Still confused      Objective   Objective     Vitals:   Temp:  [97.3 °F (36.3 °C)-98.3 °F (36.8 °C)] 98 °F (36.7 °C)  Heart Rate:  [60-83] 83  Resp:  [12-18] 18  BP: (139-200)/() 152/105    Physical Exam  Constitutional:       Appearance: awake, alert, confused  HENT:      Head: Normocephalic.      Right Ear: Tympanic membrane normal.      Left Ear: Tympanic membrane normal.      Nose: Nose normal.      Mouth/Throat:      Mouth: Mucous membranes are moist.   Eyes:      Pupils: Pupils are equal, round, and reactive to light.   Cardiovascular:      Rate and Rhythm: Normal rate.      Pulses: Normal pulses.   Pulmonary:      Effort: Pulmonary effort is normal.   Abdominal:      General: Abdomen is flat.   Musculoskeletal:         Cervical back: Normal range of motion.   Skin:     General: Skin is warm.      Capillary Refill: Capillary refill takes less than 2 seconds.   Neurological:   Alert and confused, moves all extremities      Psychiatric:         Mood and Affect: Mood normal.         Result Review    Result Review:  I have personally reviewed the results from the time of this admission to 2023 16:20 EST and agree with these findings:  []  Laboratory list / accordion  []  Microbiology  []  Radiology  []  EKG/Telemetry   []  Cardiology/Vascular   []  Pathology  []  Old records  []  Other:      Assessment & Plan   Assessment / Plan         Active Hospital Problems:  Active Hospital Problems    Diagnosis    • **Altered mental status, unspecified altered mental status type    • Acute on chronic diastolic CHF (congestive heart failure) ([facility])    • Hypertensive urgency    • Elevated troponin    • Acute UTI (urinary tract infection)    • Morbid obesity ([facility])    • Dementia ([facility])     • CKD (chronic kidney disease), stage III ([facility])    • BPH without obstruction/lower urinary tract symptoms    • Atrial fibrillation (HCC)    • Type 2 diabetes mellitus with diabetic nephropathy (HCC)           Altered mental status with past medical history of dementia baseline unknown CT head negative for acute likely multifactorial including acute UTI and acute on chronic diastolic heart failure and hypertension see plans below     Hypertensive urgency  Still uncontrolled  I resume home meds, will monitor BP and adjust meds as needed       Elevated troponin high-sensitivity   likely secondary to ischemic demand from congestive heart failure denies chest pain recent stress test in September was negative for ischemia, continuous cardiac monitoring repeat troponin  I will consult cardiology     Complicated UTI  4+ bacteria 3+ leukocytes too numerous to count WBCs with past medical history ESBL, continue IV Zosyn until urine culture resulted       Type 2 diabetes mellitus with diabetic nephropathy add SSI as needed with Accu-Cheks ACHS consistent carb diet, home meds unverified at this time reorder pending verification pharmacy     Acute on chronic heart failure preserved EF per echo in September proBNP elevated 7939, pulmonary edema per chest x-ray, 80 mg IV Lasix in ED, was on home Lasix 40 mg p.o. twice daily changed to 40 mg IV twice daily     Morbid obesity BMI 44.48, encourage lifestyle management fall precautions     Dementia per records baseline unknown alert cooperative and pleasant     Chronic kidney disease stage  Monitor Cr, avoid nephrotoxins     BPH, home meds unverified at this time reorder pending verification pharmacy     Atrial fibrillation, rate controlled on home Eliquis   Telemetry     Hyperlipidemia           GI and DVT prophylaxis    CODE STATUS:   Code Status (Patient has no pulse and is not breathing): CPR (Attempt to Resuscitate)  Medical Interventions (Patient has pulse or is breathing):  Full Support      Christofer Solitario MD

## 2023-02-16 NOTE — PAYOR COMM NOTE
"            Requesting IP Auth for ER Medical Admit  RE: Cyrus Junior  1945  Policy no. [id number]    AUTHORIZATION PENDING  PLEASE FORWARD DETERMINATION TO FOLLOWING CONTACT:    GERMAN CRESPO LPN UR  Utilization Review Nurse  [facility]  Direct & confidential phone # [phone]  Fax # [phone]    Cyrus Junior (77 y.o. Male)     Date of Birth   1945    Social Security Number       Address   [address] [place] [address] [place] [address]    Home Phone   [phone]    MRN   [id number]       Christian   None    Marital Status                               Admission Date   2/15/23    Admission Type   Emergency    Admitting Provider   Sandip Campuzano DO    Attending Provider   Sandip Campuzano DO    Department, Room/Bed   [facility] 2B MEDICAL INPATIENT, 230/       Discharge Date       Discharge Disposition       Discharge Destination                               Attending Provider: Sandip Campuzano DO    Allergies: No Known Allergies    Isolation: Contact   Infection: ESBL (22), ESBL Klebsiella (22)   Code Status: CPR    Ht: 182.9 cm (72\")   Wt: 138 kg (303 lb 5.7 oz)    Admission Cmt: None   Principal Problem: Altered mental status, unspecified altered mental status type [R41.82]                 Active Insurance as of 2/15/2023     Primary Coverage     Payor Plan Insurance Group Employer/Plan Group    MISC MEDICARE REPLACEMENT MISC MCARE REPLACEMENT [id number]     Coverage Address Coverage Phone Number Coverage Fax Number Effective Dates    [address] [address] [phone]  2023 - None Entered    [facility] [id number]       Subscriber Name Subscriber Birth Date Member ID       CYRUS JUNIOR 1945 [id number]           Secondary Coverage     Payor Plan Insurance Group Employer/Plan Group    [employer] [place] [facility] [id number]     Payor Plan Address Payor Plan Phone Number Payor Plan Fax Number Effective Dates    [facility] OFFICE OF " [facility] [phone]  2/15/2023 - None Entered    [address] [address]       [facility] [address]       Subscriber Name Subscriber Birth Date Member ID       CYRUS RAMIREZ 1945 [id number]           Tertiary Coverage     Payor Plan Insurance Group Employer/Plan Group    [facility] OPTUM      Payor Plan Address Payor Plan Phone Number Payor Plan Fax Number Effective Dates    [address] [phone]  2022 - None Entered    [facility] [id number]       Subscriber Name Subscriber Birth Date Member ID       CYRUS RAMIREZ 1945 [id number]           Other Coverage     Payor Plan Insurance Group Employer/Plan Group    [place]      Payor Plan Address Payor Plan Phone Number Payor Plan Fax Number Effective Dates    [address]   2022 - None Entered    [place] [address]       Subscriber Name Subscriber Birth Date Member ID       CYRUS RAMIREZ 1945 [id number]                 Emergency Contacts      (Rel.) Home Phone Work Phone Mobile Phone    RICHAR MONROE (Sister) [phone] -- --               History & Physical      Essing-Ingris Bae APRN at 02/15/23 2015     Attestation signed by Sandip Campuzano DO at 02/15/23 2331    Documentation reviewed, agree with plan of NP supervised by physician.  There were no concerns on patient care and management made known by the NP to supervising physician.                        [facility] Medicine Services      Patient Name: Cyrus Ramirez  : 1945  MRN: [id number]  Primary Care Physician:  Naa Valverde MD  Date of admission: 2/15/2023      Subjective       Chief Complaint: altered mental status    History of Present Illness: Cyrus Ramirez is a 77 y.o. male morbidly obese male sent from [facility] , with a past medical history of diabetes mellitus type 2, chronic kidney disease BPH, dementia, hypertension, heart failure preserved EF atrial fibrillation on  "anticoagulation who presented to [facility] on 2/15/2023 with reports of  Increased alteration in mental status. He has a documented history of dementia, baseline unknown.  He is easily awakened and alert and oriented to self.  He denies any chest pain, shortness of air or abdominal pain nausea vomiting or diarrhea or fever.  He has no facial droop or slurred speech and moves all extremities.  Poor historian for details unable to obtain further information from patient and no family at bedside.  Review of records shows he was admitted here in September for chest pain with acute on chronic heart failure preserved EF poorly controlled hypertension and acute UTI.  He had a 2D echo done at that time which showed left ventricular ejection fraction 55 to 60% with diastolic function was normal.  He also had a stress test Cardiolite which was normal showing no evidence of ischemia.  Urine culture at that time grew Klebsiella pneumoniae ESBL and Proteus Mirabella's ESBL.  Today he presents with similar results.  CT head per radiology showed no acute intracranial abnormality but did show extensive white matter findings most consistent changes of chronic microvascular disease stable.  Chest x-ray per radiology showed \"cardiomegaly with pulmonary vascular congestion and probable mild pulmonary edema pattern\".  EKG shows atrial fibrillation heart rate 55 with no ST elevation or depression.  Labs show high-sensitivity troponin 98 and 92 proBNP 7939 glucose 116 creatinine 1.61 with a baseline of 1.4 per labs, albumin 2.9 WBC not elevated at 3.9 hemoglobin 11.2 and improved from previous, respiratory panel was negative.  Urinalysis today shows 3+ leukocytes too numerous to count WBCs and 4+ bacteria.  Given past medical history of recent ESBL per urine culture he was started on IV Zosyn.  He was given 80 mg IV Lasix in ED and Nitropaste.  He was  hypertensive on admission and given IV hydralazine.  He was initially " ordered a Cardene drip but blood pressure improved.  Continuous cardiac monitoring and repeat troponin lowness's have been ordered.  He will be admitted for further evaluation and treatment.  Review of Systems   Unable to perform ROS: dementia       Personal History     Past Medical History:   Diagnosis Date   • Acute kidney failure (HCC)    • Acute respiratory failure with hypoxia (HCC)    • Anemia    • Benign prostatic hyperplasia    • Bilateral primary osteoarthritis of knee    • Cardiomegaly    • Cardiomyopathy (HCC)    • Cellulitis and abscess of left leg    • Cerebral infarction (HCC)    • Cerebrovascular disease    • Chronic kidney disease    • Dementia (HCC)    • Diabetes mellitus (HCC)    • Essential hypertension    • GERD (gastroesophageal reflux disease)    • Gout    • Heart failure (HCC)    • Hyperlipidemia    • Morbid obesity (HCC)    • Myocardial infarction (HCC)    • Obstructive sleep apnea    • Obstructive uropathy    • Paroxysmal atrial fibrillation (HCC)    • Peptic ulcer    • Peripheral vascular disease (HCC)    • Pulmonary edema    • Venous insufficiency        History reviewed. No pertinent surgical history.    Family History: family history includes Diabetes in his father. Otherwise pertinent FHx was reviewed and not pertinent to current issue.    Social History:  reports that he has never smoked. He does not have any smokeless tobacco history on file. He reports that he does not currently use alcohol. He reports that he does not use drugs.    Home Medications:  Prior to Admission Medications     Prescriptions Last Dose Informant Patient Reported? Taking?    acetaminophen (TYLENOL) 325 MG tablet   Yes No    Take 650 mg by mouth Every 4 (Four) Hours As Needed for Mild Pain .    albuterol sulfate  (90 Base) MCG/ACT inhaler   Yes No    Inhale 2 puffs Every 4 (Four) Hours As Needed (Cough).    apixaban (ELIQUIS) 5 MG tablet tablet   No No    Take 1 tablet by mouth Every 12 (Twelve) Hours.  Indications: Atrial Fibrillation    carvedilol (COREG) 12.5 MG tablet   No No    Take 1 tablet by mouth 2 (Two) Times a Day With Meals.    escitalopram (LEXAPRO) 10 MG tablet   Yes No    Take 10 mg by mouth Daily.    finasteride (PROSCAR) 5 MG tablet   Yes No    Take 5 mg by mouth Daily.    furosemide (LASIX) 40 MG tablet   No No    Take 1 tablet by mouth 2 (Two) Times a Day.    gabapentin (NEURONTIN) 300 MG capsule   Yes No    Take 300 mg by mouth 3 (Three) Times a Day.    hydrALAZINE (APRESOLINE) 50 MG tablet   No No    Take 1 tablet by mouth 3 (Three) Times a Day.    Insulin Glargine (BASAGLAR KWIKPEN) 100 UNIT/ML injection pen   Yes No    Inject 10 Units under the skin into the appropriate area as directed Every Night.    isosorbide mononitrate (IMDUR) 60 MG 24 hr tablet   No No    Take 1 tablet by mouth 2 (Two) Times a Day.    losartan (COZAAR) 25 MG tablet   No No    Take 1 tablet by mouth Daily.    Polyethylene Glycol 3350 powder   Yes No    Take 17 g by mouth Every Night.    saccharomyces boulardii (FLORASTOR) 250 MG capsule   Yes No    Take 250 mg by mouth 2 (Two) Times a Day.    simvastatin (ZOCOR) 40 MG tablet   Yes No    Take 40 mg by mouth every night at bedtime.    tamsulosin (FLOMAX) 0.4 MG capsule 24 hr capsule   Yes No    Take 1 capsule by mouth Daily.            Allergies:  No Known Allergies    Objective       Vitals:   Temp:  [97.8 °F (36.6 °C)] 97.8 °F (36.6 °C)  Heart Rate:  [60-79] 79  Resp:  [15] 15  BP: (139-200)/() 159/103  Flow (L/min):  [3] 3    Physical Exam  Vitals reviewed.   Constitutional:       Appearance: He is obese.   HENT:      Head: Normocephalic and atraumatic.      Right Ear: External ear normal.      Left Ear: External ear normal.      Nose: Nose normal.      Mouth/Throat:      Mouth: Mucous membranes are moist.   Eyes:      Extraocular Movements: Extraocular movements intact.   Cardiovascular:      Rate and Rhythm: Bradycardia present. Rhythm irregular.      Heart  "sounds: Normal heart sounds.   Pulmonary:      Effort: Pulmonary effort is normal.      Breath sounds: Normal breath sounds.   Abdominal:      Palpations: Abdomen is soft.   Genitourinary:     Comments: deferred  Musculoskeletal:         General: Normal range of motion.      Cervical back: Normal range of motion and neck supple.   Skin:     General: Skin is warm and dry.      Comments: BLE venous stasis dermatitis with lichinification   Neurological:      Mental Status: He is alert.      Comments: Oriented to self   Psychiatric:         Mood and Affect: Mood normal.      Comments: Cooperative, pleasant answers questions          Result Review    Result Review:  I have personally reviewed the results from the time of this admission to 2/15/2023 21:41 EST and agree with these findings:  [x]  Laboratory  [x]  Microbiology  [x]  Radiology  [x]  EKG/Telemetry   [x]  Cardiology/Vascular   []  Pathology  [x]  Old records  []  Other:  Most notable findings include: High-sensitivity troponin 98 and 92 proBNP 7939 glucose 116, creatinine 1.61 BUN 24 EGFR 43.8 albumin 2.9, WBC 3.9 hemoglobin 11.2, respiratory panel negative, urinalysis showing 3+ leukocytes too numerous to count WBCs 4+ bacteria, EKG atrial fibrillation heart rate 55 no ST elevation or depression    Chest x-ray per radiology:    \"Impression:  Cardiomegaly with pulmonary vascular congestion and probable mild pulmonary edema pattern.  \"    CT head per radiology:    \"1. No acute intracranial abnormality.  2. Extensive white matter findings most consistent changes of chronic microvascular disease, stable.  \"        Assessment & Plan        Active Hospital Problems:  Active Hospital Problems    Diagnosis    • **Altered mental status, unspecified altered mental status type    • Hypertensive urgency    • Elevated troponin    • Acute UTI (urinary tract infection)    • Type 2 diabetes mellitus with diabetic nephropathy (HCC)    • Acute on chronic diastolic CHF " (congestive heart failure) ([facility])    • Morbid obesity ([facility])    • Dementia ([facility])    • CKD (chronic kidney disease), stage III ([facility])    • BPH without obstruction/lower urinary tract symptoms    • Atrial fibrillation ([facility])      Plan:    Altered mental status with past medical history of dementia baseline unknown CT head negative for acute likely multifactorial including acute UTI and acute on chronic diastolic heart failure and hypertension see plans below    Hypertensive urgency, received Nitropaste and hydralazine in ED with improvement Cardene drip initially ordered but blood pressure improved will reorder if needed monitor BP, home meds unverified at this time reorder pending verification from pharmacy    Elevated troponin high-sensitivity 98 and 92 trending downward likely secondary to ischemic demand from congestive heart failure denies chest pain recent stress test in September was negative for ischemia, continuous cardiac monitoring repeat troponin    Acute UTI, 4+ bacteria 3+ leukocytes too numerous to count WBCs with past medical history ESBL, continue IV Zosyn until urine culture resulted afebrile serum WBC not elevated    Type 2 diabetes mellitus with diabetic nephropathy, glucose 116, add SSI as needed with Accu-Cheks ACHS consistent carb diet, home meds unverified at this time reorder pending verification pharmacy    Acute on chronic heart failure preserved EF per echo in September proBNP elevated 7939, pulmonary edema per chest x-ray, 80 mg IV Lasix in ED, was on home Lasix 40 mg p.o. twice daily changed to 40 mg IV twice daily    Morbid obesity BMI 44.48, encourage lifestyle management fall precautions    Dementia per records baseline unknown alert cooperative and pleasant    Chronic kidney disease stage III baseline appears to be 1.4 creatinine today 1.6, may worsen with diuresis, trend renal function    BPH, home meds unverified at this time reorder pending verification pharmacy    Atrial fibrillation,  rate controlled on home Eliquis reordered on home Coreg unverified this time reorder pending verification from pharmacy    Hyperlipidemia, home med unverified at this time reorder pending verification pharmacy    Depression, home med unverified at this time reorder pending verification pharmacy and if appropriate    DVT prophylaxis:  Medical DVT prophylaxis orders are present.    CODE STATUS:    Code Status (Patient has no pulse and is not breathing): CPR (Attempt to Resuscitate)  Medical Interventions (Patient has pulse or is breathing): Full Support    Admission Status:  I believe this patient meets inpatient  status.    I discussed the patient's findings and my recommendations with patient.    This patient has been examined wearing appropriate Personal Protective Equipment . 02/15/23      Signature: Electronically signed by MELISA Mei, 02/15/23, 9:43 PM EST.    Electronically signed by Sandip Campuzano DO at 02/15/23 2331          Emergency Department Notes      Nino Brock PA at 02/15/23 1551          Subjective       Patient is a 77-year-old male comes in complaining of generalized weakness, decreased responsiveness from ECF for the past day.  Patient reportedly is currently being treated for UTI.  Per EMS report, patient had had increased lethargy throughout the day today.  Patient has a history of dementia and is alert and oriented x2 at baseline.  On evaluation, patient withdrawing to pain and will open eyes upon sternal rub but nonverbal at this time.      Review of Systems   Unable to perform ROS: Mental status change       Past Medical History:   Diagnosis Date   • Acute kidney failure (HCC)    • Acute respiratory failure with hypoxia (HCC)    • Anemia    • Benign prostatic hyperplasia    • Bilateral primary osteoarthritis of knee    • Cardiomegaly    • Cardiomyopathy (HCC)    • Cellulitis and abscess of left leg    • Cerebral infarction (HCC)    • Cerebrovascular disease    • Chronic  kidney disease    • Dementia (HCC)    • Diabetes mellitus (HCC)    • Essential hypertension    • GERD (gastroesophageal reflux disease)    • Gout    • Heart failure (HCC)    • Hyperlipidemia    • Morbid obesity (HCC)    • Myocardial infarction (HCC)    • Obstructive sleep apnea    • Obstructive uropathy    • Paroxysmal atrial fibrillation (HCC)    • Peptic ulcer    • Peripheral vascular disease (HCC)    • Pulmonary edema    • Venous insufficiency        No Known Allergies    History reviewed. No pertinent surgical history.    Family History   Problem Relation Age of Onset   • Diabetes Father        Social History     Socioeconomic History   • Marital status:    • Number of children: 0   Tobacco Use   • Smoking status: Never   Vaping Use   • Vaping Use: Never used   Substance and Sexual Activity   • Alcohol use: Not Currently   • Drug use: Never   • Sexual activity: Defer           Objective   Physical Exam  Vitals and nursing note reviewed.   Constitutional:       General: He is not in acute distress.     Appearance: He is well-developed. He is not diaphoretic.   HENT:      Head: Normocephalic and atraumatic.      Right Ear: External ear normal.      Left Ear: External ear normal.      Nose: Nose normal.      Mouth/Throat:      Mouth: Mucous membranes are moist.   Eyes:      Extraocular Movements: Extraocular movements intact.      Conjunctiva/sclera: Conjunctivae normal.      Pupils: Pupils are equal, round, and reactive to light.   Cardiovascular:      Rate and Rhythm: Normal rate and regular rhythm.      Pulses: Normal pulses.      Heart sounds: Normal heart sounds.      Comments: S1, S2 audible.  Pulmonary:      Effort: Pulmonary effort is normal. No respiratory distress.      Breath sounds: Normal breath sounds. No wheezing.      Comments: On room air.  Abdominal:      General: Bowel sounds are normal. There is no distension.      Palpations: Abdomen is soft.      Tenderness: There is no abdominal  "tenderness. There is no guarding or rebound.   Musculoskeletal:         General: No tenderness or deformity. Normal range of motion.      Cervical back: Normal range of motion.   Skin:     General: Skin is warm.      Capillary Refill: Capillary refill takes less than 2 seconds.      Findings: No erythema or rash.   Neurological:      Mental Status: He is alert. He is disoriented.      Cranial Nerves: No cranial nerve deficit.      Sensory: No sensory deficit.      Motor: No weakness.   Psychiatric:         Mood and Affect: Mood normal.         Behavior: Behavior normal.         Procedures          ED Course  ED Course as of 02/16/23 0055   Wed Feb 15, 2023   1854 Chest x-ray independently reviewed by myself shows mild vascular congestion throughout and cardiomegaly.  Official radiology review shows cardiomegaly with pulmonary vascular congestion and probable mild pulmonary edema pattern. [RL]      ED Course User Index  [RL] Nino Brock PA      /92 (BP Location: Right arm, Patient Position: Lying)   Pulse 83   Temp 98.3 °F (36.8 °C) (Axillary)   Resp 17   Ht 182.9 cm (72\")   Wt (!) 138 kg (303 lb 5.7 oz)   SpO2 98%   BMI 41.14 kg/m²   Labs Reviewed   COMPREHENSIVE METABOLIC PANEL - Abnormal; Notable for the following components:       Result Value    Glucose 116 (*)     BUN 24 (*)     Creatinine 1.61 (*)     Calcium 8.4 (*)     Albumin 2.9 (*)     eGFR 43.8 (*)     All other components within normal limits    Narrative:     GFR Normal >60  Chronic Kidney Disease <60  Kidney Failure <15    The GFR formula is only valid for adults with stable renal function between ages 18 and 70.   URINALYSIS W/ CULTURE IF INDICATED - Abnormal; Notable for the following components:    Color, UA Dark Yellow (*)     Appearance, UA Cloudy (*)     Ketones, UA Trace (*)     Bilirubin, UA Small (1+) (*)     Blood, UA Trace (*)     Protein, UA >=300 mg/dL (3+) (*)     Leuk Esterase, UA Large (3+) (*)     All other components " within normal limits    Narrative:     In absence of clinical symptoms, the presence of pyuria, bacteria, and/or nitrites on the urinalysis result does not correlate with infection.   TROPONIN - Abnormal; Notable for the following components:    HS Troponin T 98 (*)     All other components within normal limits    Narrative:     High Sensitive Troponin T Reference Range:  <10.0 ng/L- Negative Female for AMI  <15.0 ng/L- Negative Male for AMI  >=10 - Abnormal Female indicating possible myocardial injury.  >=15 - Abnormal Male indicating possible myocardial injury.   Clinicians would have to utilize clinical acumen, EKG, Troponin, and serial changes to determine if it is an Acute Myocardial Infarction or myocardial injury due to an underlying chronic condition.        CBC WITH AUTO DIFFERENTIAL - Abnormal; Notable for the following components:    Hemoglobin 11.2 (*)     Hematocrit 35.8 (*)     MCH 25.0 (*)     MCHC 31.3 (*)     RDW 19.0 (*)     RDW-SD 56.0 (*)     Monocyte % 14.8 (*)     All other components within normal limits   BLOOD GAS, VENOUS - Abnormal; Notable for the following components:    pO2, Venous 116.1 (*)     HCO3, Venous 27.7 (*)     Base Excess, Venous 2.7 (*)     O2 Saturation, Venous 98.6 (*)     All other components within normal limits   URINALYSIS, MICROSCOPIC ONLY - Abnormal; Notable for the following components:    RBC, UA 3-5 (*)     WBC, UA Too Numerous to Count (*)     Bacteria, UA 4+ (*)     Squamous Epithelial Cells, UA 3-6 (*)     All other components within normal limits   HIGH SENSITIVITIY TROPONIN T 2HR - Abnormal; Notable for the following components:    HS Troponin T 92 (*)     Troponin T Delta -6 (*)     All other components within normal limits    Narrative:     High Sensitive Troponin T Reference Range:  <10.0 ng/L- Negative Female for AMI  <15.0 ng/L- Negative Male for AMI  >=10 - Abnormal Female indicating possible myocardial injury.  >=15 - Abnormal Male indicating possible  myocardial injury.   Clinicians would have to utilize clinical acumen, EKG, Troponin, and serial changes to determine if it is an Acute Myocardial Infarction or myocardial injury due to an underlying chronic condition.        BNP (IN-HOUSE) - Abnormal; Notable for the following components:    proBNP 7,939.0 (*)     All other components within normal limits    Narrative:     Among patients with dyspnea, NT-proBNP is highly sensitive for the detection of acute congestive heart failure. In addition NT-proBNP of <300 pg/ml effectively rules out acute congestive heart failure with 99% negative predictive value.    Results may be falsely decreased if patient taking Biotin.     POCT GLUCOSE FINGERSTICK - Abnormal; Notable for the following components:    Glucose 126 (*)     All other components within normal limits   RESPIRATORY PANEL PCR W/ COVID-19 (SARS-COV-2) MEÑO/MARCOS/JOSELIN/PAD/COR/MAD/WILBERT IN-HOUSE, NP SWAB IN UTM/VTP, 3-4 HR TAT - Normal    Narrative:     In the setting of a positive respiratory panel with a viral infection PLUS a negative procalcitonin without other underlying concern for bacterial infection, consider observing off antibiotics or discontinuation of antibiotics and continue supportive care. If the respiratory panel is positive for atypical bacterial infection (Bordetella pertussis, Chlamydophila pneumoniae, or Mycoplasma pneumoniae), consider antibiotic de-escalation to target atypical bacterial infection.   TSH - Normal   MAGNESIUM - Normal   POC LACTATE - Normal   POC LACTATE - Normal   POCT GLUCOSE FINGERSTICK - Normal   URINE CULTURE   BLOOD CULTURE   BLOOD CULTURE   BLOOD GAS, ARTERIAL   TROPONIN   BASIC METABOLIC PANEL   CBC WITH AUTO DIFFERENTIAL   POCT GLUCOSE FINGERSTICK   POCT GLUCOSE FINGERSTICK   POCT GLUCOSE FINGERSTICK   POCT GLUCOSE FINGERSTICK   CBC AND DIFFERENTIAL    Narrative:     The following orders were created for panel order CBC & Differential.  Procedure                                Abnormality         Status                     ---------                               -----------         ------                     CBC Auto Differential[[id number]]        Abnormal            Final result                 Please view results for these tests on the individual orders.   EXTRA TUBES    Narrative:     The following orders were created for panel order Extra Tubes.  Procedure                               Abnormality         Status                     ---------                               -----------         ------                     Gold Top - SST[[id number]]                                   Final result               Light Blue Top[[id number]]                                   Final result                 Please view results for these tests on the individual orders.   GOLD TOP - SST   LIGHT BLUE TOP   CBC AND DIFFERENTIAL    Narrative:     The following orders were created for panel order CBC & Differential.  Procedure                               Abnormality         Status                     ---------                               -----------         ------                     CBC Auto Differential[[id number]]                                                         Please view results for these tests on the individual orders.     CT Head Without Contrast    Result Date: 2/15/2023  Impression: 1. No acute intracranial abnormality. 2. Extensive white matter findings most consistent changes of chronic microvascular disease, stable. Electronically Signed: Chico Henley  2/15/2023 5:34 PM EST  Workstation ID: [id number]    XR Chest 1 View    Result Date: 2/15/2023  Impression: Cardiomegaly with pulmonary vascular congestion and probable mild pulmonary edema pattern. Electronically Signed: Best Mirza  2/15/2023 4:32 PM EST  Workstation ID: [id number]                                         MDM     Differential diagnosis: Acute UTI, pneumonia, metabolic encephalopathy, CHF exacerbation, not meant to be an  "all-inclusive list.  Chart review:  Chart review from last discharge summary on 9/19/2022, patient was seen for chest pain shortness of breath and CHF exacerbation as well as UTI.  Patient had an elevated BNP upon admission 7000.  Last echo in March 2022 with preserved EF.    EKG: EKG independently or interpreted by myself shows atrial fibrillation rate 55 bpm, no ST elevation apparent.  Findings confirmed by Dr. Buckley.  Similar to previous study on 9/18/2022 that showed atrial fibrillation at that time.    Imaging:  Chest x-ray independently reviewed by myself shows mild vascular congestion throughout and cardiomegaly.  Official radiology review shows cardiomegaly with pulmonary vascular congestion and probable mild pulmonary edema pattern.  CT head unremarkable for acute findings.  Labs: Initial ABG shows normal pH and CO2.  Initial lactic acid normal.  CBC largely unremarkable for acute findings.  Serum creatinine slightly bumped at 1.6 with baseline around 1.4 otherwise largely unremarkable CMP for acute findings.  TSH normal.  Initial troponin elevated at 98.  BNP elevated at 7900 which was similar to previous study in which patient was admitted for CHF at that time.  Respiratory viral panel with COVID-19 swab negative.  UA shows 3+ leukocyte esterase, 4+ bacteria, too numerous to count WBCs.  Urine culture pending.  Blood cultures x2 pending.    Vitals:  /92 (BP Location: Right arm, Patient Position: Lying)   Pulse 83   Temp 98.3 °F (36.8 °C) (Axillary)   Resp 17   Ht 182.9 cm (72\")   Wt (!) 138 kg (303 lb 5.7 oz)   SpO2 98%   BMI 41.14 kg/m²     Medications given:    Medications   sodium chloride 0.9 % flush 10 mL (10 mL Intravenous Given 2/15/23 2306)   dextrose (GLUTOSE) oral gel 15 g (has no administration in time range)   dextrose (D50W) (25 g/50 mL) IV injection 25 g (has no administration in time range)   glucagon (human recombinant) (GLUCAGEN DIAGNOSTIC) 1 mg in sterile water (preservative " free) 1 mL injection (has no administration in time range)   insulin lispro (ADMELOG) injection 3-14 Units (has no administration in time range)   Pharmacy to Dose Zosyn (has no administration in time range)   furosemide (LASIX) injection 40 mg (has no administration in time range)   apixaban (ELIQUIS) tablet 5 mg (5 mg Oral Given 2/15/23 2305)   piperacillin-tazobactam (ZOSYN) IVPB 4.5 g in 100 mL NS (CD) (4.5 g Intravenous New Bag 2/15/23 2305)   piperacillin-tazobactam (ZOSYN) IVPB 4.5 g in 100 mL NS (CD) (0 g Intravenous Stopped 2/15/23 1755)   hydrALAZINE (APRESOLINE) injection 10 mg (10 mg Intravenous Given 2/15/23 1838)   nitroglycerin (NITROSTAT) ointment 1 inch (1 inch Topical Given 2/15/23 1838)   furosemide (LASIX) injection 80 mg (80 mg Intravenous Given 2/15/23 1943)   hydrALAZINE (APRESOLINE) injection 20 mg (20 mg Intravenous Given 2/15/23 2330)       Procedures: Not indicated  MDM: Patient is a 77-year-old male comes in complaining of generalized weakness.  IV established.  Initial ABG shows normal pH and CO2.  Initial lactic acid normal.  CBC largely unremarkable for acute findings.  Serum creatinine slightly bumped at 1.6 with baseline around 1.4 otherwise largely unremarkable CMP for acute findings.  TSH normal.  Initial troponin elevated at 98.  BNP elevated at 7900 which was similar to previous study in which patient was admitted for CHF at that time.  Respiratory viral panel with COVID-19 swab negative.  UA shows 3+ leukocyte esterase, 4+ bacteria, too numerous to count WBCs.  Urine culture pending.  Blood cultures x2 pending  Chest x-ray independently reviewed by myself shows mild vascular congestion throughout and cardiomegaly.  Official radiology review shows cardiomegaly with pulmonary vascular congestion and probable mild pulmonary edema pattern.  CT head unremarkable for acute findings.  Patient had elevated blood pressure throughout ER stay was given hydralazine as well as nitro paste  and Lasix as I suspect CHF exacerbation for the patient.  Patient's blood pressure did improve to 160s over 90s upon admission to the hospital.  Pharmacy recommended to switch Rocephin to Zosyn for UTI given patient's prior infections of ESBL and this was changed.  On reevaluation, patient was alert and looking around the room and answering questions appropriately although somewhat confused as to where he is but was verbal and talking to me.  Spoke with MELISA Amado, accepted patient behalf of Dr. Dumont for admission the hospital further IV antibiotics and diuresis and cardiology consultation.  Results and plan discussed with patient and is agreeable with plan.       Final diagnoses:   Altered mental status, unspecified altered mental status type   Acute metabolic encephalopathy   Acute UTI   Chronic kidney disease, unspecified CKD stage   Elevated troponin   Acute on chronic congestive heart failure, unspecified heart failure type (HCC)       ED Disposition  ED Disposition     ED Disposition   Decision to Admit    Condition   --    Comment   Level of Care: Telemetry [5]   Admitting Physician: YANNI DUMONT [[id number]]   Attending Physician: YANNI DUMONT [[id number]]               No follow-up provider specified.       Medication List      No changes were made to your prescriptions during this visit.          Nino Brock PA  02/16/23 0056      Electronically signed by Nino Brock PA at 02/16/23 0056     Philomena Sanders LPN at 02/15/23 1755        Pt repositioned and placed on atul  for comfort- pt remains responsive to painful stimuli at this time-    Electronically signed by Philomena Sanders LPN at 02/15/23 1756

## 2023-02-16 NOTE — CASE MANAGEMENT/SOCIAL WORK
Social Work Assessment  [facility]     Patient Name: Shaji Junior  MRN: [id number]  Today's Date: 2/16/2023    Admit Date: 2/15/2023     Discharge Plan     Row Name 02/16/23 1507       Plan    Plan Comments LSW received a secure chat that patient’s sister, Michelle, about long-term goal of getting patient to [place] with her. LSW discussed that patient would return to [place] at hospital d/c as the process to transition can take time. Discussed a couple options for sister to call to inquire about process. First  option, contacting a local nursing home about Medicaid pending admission as patient would need to establish residence in [place]. Second option, contacting [place] Medicaid office to inquire about process. Third option, contacting the [facility] to see if patient is service connected and eligible for home supports if she didn’t want patient to return to a nursing home, but reside with her. LSW discussed these are avenues to start the process, but it will take time for a transition to fall in place Reviewed IMM again via phone & answered questions related to this. No further needs at this time. SW contact information left with sister.              Phone communication or documentation only - no physical contact with patient or family.  GIOVANNA Tafoya    Phone: [phone]  Cell: [phone]  Fax: [phone]  [email or web address]

## 2023-02-16 NOTE — THERAPY EVALUATION
Acute Care - Speech Language Pathology   Swallow Initial Evaluation  [facility]     Patient Name: Shaji Junior  : 1945  MRN: [id number]  Today's Date: 2023               Admit Date: 2/15/2023    Visit Dx:     ICD-10-CM ICD-9-CM   1. Altered mental status, unspecified altered mental status type  R41.82 780.97   2. Acute metabolic encephalopathy  G93.41 348.31   3. Acute UTI  N39.0 599.0   4. Chronic kidney disease, unspecified CKD stage  N18.9 585.9   5. Elevated troponin  R77.8 790.6   6. 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[facility])   • Acute kidney failure ([facility])   • Congestive heart failure (HCC)   • Acute on chronic congestive heart failure, unspecified heart failure type ([facility])   • MAKI (acute kidney injury) ([facility])   • Atrial fibrillation ([facility])   • Chest pain   • Shortness of breath   • Chest pain, unspecified type   • Essential hypertension   • Acute UTI (urinary tract infection)   • Normocytic normochromic anemia   • CKD (chronic kidney disease), stage III ([facility])   • BPH without obstruction/lower urinary tract symptoms   • Altered mental status, unspecified altered mental status type   • Acute on chronic diastolic CHF (congestive heart failure) ([facility])   • Hypertensive urgency   • Elevated troponin   • Acute UTI (urinary tract infection)   • Morbid obesity ([facility])   • Dementia ([facility])     Past Medical History:   Diagnosis Date   • Acute kidney failure (HCC)    • Acute respiratory failure with hypoxia ([facility])    • Anemia    • Benign prostatic hyperplasia    • Bilateral primary osteoarthritis of knee    • Cardiomegaly    • Cardiomyopathy ([facility])    • Cellulitis and abscess of left leg    • Cerebral infarction ([facility])    • Cerebrovascular disease    • Chronic kidney disease    • Dementia ([facility])    • Diabetes  mellitus (HCC)    • Essential hypertension    • GERD (gastroesophageal reflux disease)    • Gout    • Heart failure (HCC)    • Hyperlipidemia    • Morbid obesity (HCC)    • Myocardial infarction (HCC)    • Obstructive sleep apnea    • Obstructive uropathy    • Paroxysmal atrial fibrillation (HCC)    • Peptic ulcer    • Peripheral vascular disease (HCC)    • Pulmonary edema    • Venous insufficiency      History reviewed. No pertinent surgical history.    SLP Recommendation and Plan  SLP Swallowing Diagnosis: functional oral phase, functional pharyngeal phase (02/16/23 1100)  SLP Diet Recommendation: regular textures, thin liquids (02/16/23 1100)  Recommended Precautions and Strategies: upright posture during/after eating, small bites of food and sips of liquid, general aspiration precautions, reflux precautions (02/16/23 1100)  SLP Rec. for Method of Medication Administration: meds whole, meds crushed, as tolerated (02/16/23 1100)     Monitor for Signs of Aspiration: yes, notify SLP if any concerns (02/16/23 1100)     Swallow Criteria for Skilled Therapeutic Interventions Met: demonstrates skilled criteria (02/16/23 1100)     Rehab Potential/Prognosis, Swallowing: good, to achieve stated therapy goals (02/16/23 1100)  Therapy Frequency (Swallow): PRN (02/16/23 1100)  Predicted Duration Therapy Intervention (Days): until discharge (02/16/23 1100)          SWALLOW EVALUATION (last 72 hours)     SLP Adult Swallow Evaluation     Row Name 02/16/23 1100       Rehab Evaluation    Document Type evaluation  -LF    Subjective Information no complaints  -LF    Patient Observations alert;cooperative;agree to therapy  -LF    Patient Effort good  -LF    Symptoms Noted During/After Treatment none  -LF       General Information    Patient Profile Reviewed yes  -LF    Pertinent History Of Current Problem Pt is a 78 y/o male who presents to [facility] from [facility] d/t [facility]. Pt dx w/ acute UTI. CT of head negative for acute findings. CXR  revealed cardiomegaly w/ pulmonary vascular congestion and probably mild pulmonary edema pattern.   -    Current Method of Nutrition regular textures;thin liquids  -LF    Precautions/Limitations, Vision WFL;for purposes of eval  -LF    Precautions/Limitations, Hearing WFL;for purposes of eval  -LF    Prior Level of Function-Swallowing unknown  -LF    Plans/Goals Discussed with patient  -       Oral Motor Structure and Function    Dentition Assessment natural, present and adequate  -LF    Secretion Management WNL/WFL  -LF    Mucosal Quality moist, healthy  -LF       Oral Musculature and Cranial Nerve Assessment    Oral Motor General Assessment WFL  -LF       General Eating/Swallowing Observations    Respiratory Support Currently in Use room air  -    Eating/Swallowing Skills self-fed;fed by SLP;appropriate self-feeding skills observed  -LF    Positioning During Eating upright in bed  -LF    Utensils Used spoon;straw  -LF       Clinical Swallow Eval    Clinical Swallow Evaluation Summary Pt observed w/ meal tray and PO trials provided by SLP to clinically assess swallow function. Pt agreeable to trials of grits, fig ruiz, crackers, and thin liquids by straw. Pt self fed and required intermittent feeding assistance. Pt w/ intermittent impulsivity w/ large rapid sips of thin liquids by straw. Functional mastication w/ both soft and regular solids. Oral transit WFL. Cough x1 following soft solids. No other overt s/s of aspiration observed at any time. Pt independently clears oral stasis w/ lingual sweep. Recommend pt continue a regular and thin liquid diet w/ standard safe swallow and aspiration precautions. ST will continue to follow to ensure diet tolerance and make further recs as indicated.  -       SLP Evaluation Clinical Impression    SLP Swallowing Diagnosis functional oral phase;functional pharyngeal phase  -    Rehab Potential/Prognosis, Swallowing good, to achieve stated therapy goals  -     Swallow Criteria for Skilled Therapeutic Interventions Met demonstrates skilled criteria  -       Recommendations    Therapy Frequency (Swallow) PRN  -    Predicted Duration Therapy Intervention (Days) until discharge  -    SLP Diet Recommendation regular textures;thin liquids  -    Recommended Precautions and Strategies upright posture during/after eating;small bites of food and sips of liquid;general aspiration precautions;reflux precautions  -    Oral Care Recommendations Oral Care BID/PRN;Oral Care before breakfast, after meals and PRN;Toothbrush  -    SLP Rec. for Method of Medication Administration meds whole;meds crushed;as tolerated  -    Monitor for Signs of Aspiration yes;notify SLP if any concerns  -       Swallow Goals (SLP)    Swallow LTGs Swallow Long Term Goal (free text)  -    Swallow STGs diet tolerance goal selection (SLP)  -    Diet Tolerance Goal Selection (SLP) Swallow Short Term Goal 1  -LF       (LTG) Swallow    (LTG) Swallow The patient will maximize swallow function for least restrictive po diet, exhibiting no complications associated with dysphagia, adequate po intake, and demonstrating independent use of safe swallow compensations.  -    Time Frame (Swallow Long Term Goal) by discharge  -LF       (STG) Swallow 1    (STG) Swallow 1 The patient will participate in a full meal assessment to determine safety and adequacy of recommended diet, independent use of safe swallow compensations, and additional goals/recommendations to follow  -    Time Frame (Swallow Short Term Goal 1) 1 week  -          User Key  (r) = Recorded By, (t) = Taken By, (c) = Cosigned By    Initials Name Effective Dates     Meryl Burton SLP 06/16/21 -                 EDUCATION  The patient has been educated in the following areas:   Dysphagia (Swallowing Impairment) Oral Care/Hydration.              Time Calculation:                TIMOTEO Krishnan  2/16/2023

## 2023-02-16 NOTE — NURSING NOTE
WOCN note:    77 yr old male admitted 2/15/23 with altered mental status. WOCN consult received for pressure injuries noted upon admission.     Patient presents with 2 small healing stage 2 pressure injuries to his left sacrum and left posterior thigh at the gluteal crease. Both areas measure less than 1cm and appear to be healing. Patient has an indwelling gardnre catheter. He has TAPS and foam off loading boots in place.   Patient also noted to have venous stasis skin changes to his lower legs with hemosiderin staining and thick plaques of dry skin.     Recommend zinc based barrier paste to the sacrum and buttocks and hydrating cream to the lower legs and feet. Wedges will be needed for support and repositioning d/t patient's body habitus. Continue current pressure injury prevention measures and we will follow as needed.

## 2023-02-16 NOTE — CONSULTS
CARDIOLOGY CONSULT NOTE      Referring Provider: Dr. Deutsch    Reason for Consultation: Elevated troponin    Attending: Sandip Campuzano DO    Chief complaint    MS changes from nursing home    Subjective .     History of present illness:  Shaji Junior is a 77 y.o. male who presents with MS changes from nursing home. .     Patient came to the emergency room from his extended care facility with reported decreased levels of consciousness and generalized weakness.  He has been treated as an outpatient for UTI.    Patient is known to have diastolic heart failure, chronic kidney disease, previous CVA, chronic lower extremity edema and cellulitis, hypertension, dyslipidemia, atrial fibrillation that in the past been paroxysmal and question of previous MI.    Echocardiogram in March 2022 showed preserved LV function with no significant valvular flow abnormalities.  Ejection fraction was 50 to 55%.    In September 2022 the patient was admitted to [facility] and underwent a stress Myoview which showed no reversible ischemia or MI.    The patient resides in a long-term care facility.  He has been in atrial flutter which has been permanent at least since his last hospitalization in September.    EKG on admission shows atrial flutter with slow ventricular rates of 58.  High-sensitivity troponin was obtained which prompted cardiology consultation.  Initial value 98, 92, 91.  BUN and creatinine are 22 and 1.57.  proBNP 7939.  Hemoglobin 11.6.  Chest x-ray on admission demonstrated cardiomegaly with pulmonary vascular congestion and probable mild pulmonary edema pattern      Review of Systems   Unable to perform ROS: dementia     Pertinent items are noted in HPI, all other systems reviewed and negative  History  Past Medical History:   Diagnosis Date   • Acute kidney failure (HCC)    • Acute respiratory failure with hypoxia (HCC)    • Anemia    • Benign prostatic hyperplasia    • Bilateral primary osteoarthritis of knee     • Cardiomegaly    • Cardiomyopathy (HCC)    • Cellulitis and abscess of left leg    • Cerebral infarction (HCC)    • Cerebrovascular disease    • Chronic kidney disease    • Dementia (HCC)    • Diabetes mellitus (HCC)    • Essential hypertension    • GERD (gastroesophageal reflux disease)    • Gout    • Heart failure (HCC)    • Hyperlipidemia    • Morbid obesity (HCC)    • Myocardial infarction (HCC)    • Obstructive sleep apnea    • Obstructive uropathy    • Paroxysmal atrial fibrillation (HCC)    • Peptic ulcer    • Peripheral vascular disease (HCC)    • Pulmonary edema    • Venous insufficiency        History reviewed. No pertinent surgical history.    Family History   Problem Relation Age of Onset   • Diabetes Father        Social History     Tobacco Use   • Smoking status: Never   Vaping Use   • Vaping Use: Never used   Substance Use Topics   • Alcohol use: Not Currently   • Drug use: Never        Medications Prior to Admission   Medication Sig Dispense Refill Last Dose   • apixaban (ELIQUIS) 5 MG tablet tablet Take 1 tablet by mouth Every 12 (Twelve) Hours. Indications: Atrial Fibrillation 60 tablet  2/14/2023   • carvedilol (COREG) 12.5 MG tablet Take 1 tablet by mouth 2 (Two) Times a Day With Meals. 60 tablet 0 2/14/2023   • docusate calcium (SURFAK) 240 MG capsule Take 240 mg by mouth Daily.   2/14/2023   • escitalopram (LEXAPRO) 10 MG tablet Take 10 mg by mouth Daily.   2/14/2023   • finasteride (PROSCAR) 5 MG tablet Take 5 mg by mouth Daily.   2/14/2023   • furosemide (LASIX) 40 MG tablet Take 1 tablet by mouth 2 (Two) Times a Day. 60 tablet 0 2/14/2023   • gabapentin (NEURONTIN) 300 MG capsule Take 300 mg by mouth 3 (Three) Times a Day.   2/14/2023   • hydrALAZINE (APRESOLINE) 50 MG tablet Take 1 tablet by mouth 3 (Three) Times a Day. 90 tablet 0 2/14/2023   • Insulin Glargine (BASAGLAR KWIKPEN) 100 UNIT/ML injection pen Inject 10 Units under the skin into the appropriate area as directed Every Night.    2/14/2023   • isosorbide mononitrate (IMDUR) 60 MG 24 hr tablet Take 1 tablet by mouth 2 (Two) Times a Day. 60 tablet 0 2/14/2023   • losartan (COZAAR) 25 MG tablet Take 1 tablet by mouth Daily. 30 tablet 0 2/14/2023   • polycarbophil (calcium polycarbophil) 625 MG tablet tablet Take 625 mg by mouth Daily.   2/14/2023   • Polyethylene Glycol 3350 powder Take 17 g by mouth Every Night.   2/14/2023   • saccharomyces boulardii (FLORASTOR) 250 MG capsule Take 250 mg by mouth 2 (Two) Times a Day.   2/14/2023   • simvastatin (ZOCOR) 40 MG tablet Take 40 mg by mouth every night at bedtime.   2/14/2023   • tamsulosin (FLOMAX) 0.4 MG capsule 24 hr capsule Take 1 capsule by mouth Daily.   2/14/2023   • acetaminophen (TYLENOL) 325 MG tablet Take 650 mg by mouth Every 4 (Four) Hours As Needed for Mild Pain .   Unknown   • albuterol sulfate  (90 Base) MCG/ACT inhaler Inhale 2 puffs Every 4 (Four) Hours As Needed (Cough).   Unknown   • methocarbamol (ROBAXIN) 500 MG tablet Take 500 mg by mouth Every 12 (Twelve) Hours As Needed for Muscle Spasms.   Unknown   • nitroglycerin (NITROSTAT) 0.4 MG SL tablet Place 0.4 mg under the tongue Every 5 (Five) Minutes As Needed for Chest Pain. Take no more than 3 doses in 15 minutes.   Unknown   • simethicone (MYLICON,GAS-X) 180 MG capsule Take 180 mg by mouth Every 6 (Six) Hours As Needed for Flatulence.            Patient has no known allergies.    Scheduled Meds:apixaban, 5 mg, Oral, Q12H  atorvastatin, 20 mg, Oral, Daily  carvedilol, 12.5 mg, Oral, BID With Meals  escitalopram, 10 mg, Oral, Daily  finasteride, 5 mg, Oral, Daily  furosemide, 40 mg, Intravenous, BID  gabapentin, 300 mg, Oral, TID  hydrALAZINE, 50 mg, Oral, TID  insulin lispro, 3-14 Units, Subcutaneous, TID With Meals  isosorbide mononitrate, 60 mg, Oral, BID - Nitrates  losartan, 25 mg, Oral, Q24H  piperacillin-tazobactam, 4.5 g, Intravenous, Q8H  saccharomyces boulardii, 250 mg, Oral, BID  tamsulosin, 0.4 mg, Oral,  "Daily      Continuous Infusions:Pharmacy to Dose Zosyn,       PRN Meds:.•  dextrose  •  dextrose  •  glucagon (human recombinant)  •  magnesium sulfate **OR** magnesium sulfate **OR** magnesium sulfate  •  methocarbamol  •  Pharmacy to Dose Zosyn  •  potassium chloride **OR** potassium chloride **OR** potassium chloride  •  sodium chloride    Objective     VITAL SIGNS  Vitals:    02/16/23 0000 02/16/23 0100 02/16/23 0358 02/16/23 0947   BP:  145/87 154/97 (!) 152/105   BP Location:  Right arm Right arm Right arm   Patient Position:  Lying Lying Lying   Pulse: 83 76 68 83   Resp:  18 17 18   Temp:   97.3 °F (36.3 °C) 98 °F (36.7 °C)   TempSrc:   Axillary Oral   SpO2: 98% 97% 94%    Weight:       Height:           Flowsheet Rows    Flowsheet Row First Filed Value   Admission Height 182.9 cm (72\") Documented at 02/15/2023 1417   Admission Weight 149 kg (328 lb) Documented at 02/15/2023 1417          Body mass index is 41.14 kg/m².     TELEMETRY: AFlutter    Physical Exam:  Vitals reviewed.   Constitutional:       General: Not in acute distress.     Appearance: Chronically ill-appearing.   Eyes:      Pupils: Pupils are equal, round, and reactive to light.   HENT:      Head: Normocephalic and atraumatic.   Neck:      Vascular: No JVD.   Pulmonary:      Effort: Pulmonary effort is normal.      Breath sounds: Examination of the right-lower field reveals rales. Examination of the left-lower field reveals rales. Rales present.   Cardiovascular:      Normal rate. Regular rhythm.   Pulses:     Intact distal pulses.   Edema:     Peripheral edema present.  Abdominal:      General: There is no distension.      Palpations: Abdomen is soft.      Tenderness: There is no abdominal tenderness.   Musculoskeletal: Normal range of motion.      Cervical back: Normal range of motion and neck supple. Skin:     General: Skin is warm and dry.      Comments: Bilateral lower extremity changes consistent with venous stasis   Neurological:      " Mental Status: Alert.          Results Review:   I reviewed the patient's new clinical results.    CBC    Results from last 7 days   Lab Units 02/16/23  0527 02/15/23  1614   WBC 10*3/mm3 3.70 3.90   HEMOGLOBIN g/dL 11.6* 11.2*   PLATELETS 10*3/mm3 222 223     BMP   Results from last 7 days   Lab Units 02/16/23  1312 02/16/23  0527 02/15/23  1614   SODIUM mmol/L  --  140 138   POTASSIUM mmol/L  --  3.3* 3.9   CHLORIDE mmol/L  --  102 102   CO2 mmol/L  --  27.0 27.0   BUN mg/dL  --  22 24*   CREATININE mg/dL  --  1.57* 1.61*   GLUCOSE mg/dL  --  110* 116*   MAGNESIUM mg/dL 1.9  --  1.9     Cr Clearance Estimated Creatinine Clearance: 56.8 mL/min (A) (by C-G formula based on SCr of 1.57 mg/dL (H)).  Coag     HbA1C   Lab Results   Component Value Date    HGBA1C 6.0 (H) 09/16/2022    HGBA1C 6.9 (H) 08/14/2022    HGBA1C 6.4 (H) 03/31/2022     Blood Glucose   Glucose   Date/Time Value Ref Range Status   02/16/2023 1116 147 (H) 70 - 105 mg/dL Final     Comment:     Serial Number: [id number]:  [id number]   02/16/2023 0752 107 (H) 70 - 105 mg/dL Final     Comment:     Serial Number: [id number]:  [id number]   02/15/2023 2221 93 70 - 105 mg/dL Final     Comment:     Serial Number: [id number]:  [id number]   02/15/2023 1535 126 (H) 70 - 105 mg/dL Final     Comment:     Serial Number: [id number]:  [id number]     Infection   Results from last 7 days   Lab Units 02/15/23  1619   URINECX  >100,000 CFU/mL Mixed Thais Isolated     CMP   Results from last 7 days   Lab Units 02/16/23  0527 02/15/23  1614   SODIUM mmol/L 140 138   POTASSIUM mmol/L 3.3* 3.9   CHLORIDE mmol/L 102 102   CO2 mmol/L 27.0 27.0   BUN mg/dL 22 24*   CREATININE mg/dL 1.57* 1.61*   GLUCOSE mg/dL 110* 116*   ALBUMIN g/dL  --  2.9*   BILIRUBIN mg/dL  --  0.4   ALK PHOS U/L  --  104   AST (SGOT) U/L  --  12   ALT (SGPT) U/L  --  10     ABG      UA    Results from last 7 days   Lab Units 02/15/23  1619   NITRITE UA  Negative   WBC UA /HPF Too  Numerous to Count*   BACTERIA UA /HPF 4+*   SQUAM EPITHEL UA /HPF 3-6*   URINECX  >100,000 CFU/mL Mixed Thais Isolated     BEBETO  No results found for: POCMETH, POCAMPHET, POCBARBITUR, POCBENZO, POCCOCAINE, POCOPIATES, POCOXYCODO, POCPHENCYC, POCPROPOXY, POCTHC, POCTRICYC  Lysis Labs   Results from last 7 days   Lab Units 02/16/23  0527 02/15/23  1614   HEMOGLOBIN g/dL 11.6* 11.2*   PLATELETS 10*3/mm3 222 223   CREATININE mg/dL 1.57* 1.61*     Radiology(recent) CT Head Without Contrast    Result Date: 2/15/2023  Impression: 1. No acute intracranial abnormality. 2. Extensive white matter findings most consistent changes of chronic microvascular disease, stable. Electronically Signed: Chico Henley  2/15/2023 5:34 PM EST  Workstation ID: [id number]    XR Chest 1 View    Result Date: 2/15/2023  Impression: Cardiomegaly with pulmonary vascular congestion and probable mild pulmonary edema pattern. Electronically Signed: Best Mirza  2/15/2023 4:32 PM EST  Workstation ID: [id number]      Results from last 7 days   Lab Units 02/16/23  0527   HSTROP T ng/L 91*       Imaging Results (Last 24 Hours)     Procedure Component Value Units Date/Time    CT Head Without Contrast [[id number]] Collected: 02/15/23 1731     Updated: 02/15/23 1736    Narrative:      CT HEAD WO CONTRAST    Date of Exam: 2/15/2023 5:02 PM EST    Indication: Mental status change, unknown cause.    Comparison: 12/1/2021    Technique: Axial CT images were obtained of the head without contrast administration.  Sagittal and coronal reconstructions were performed.  Automated exposure control and iterative reconstruction methods were used.     Findings:  No intracranial hemorrhage. Negative for mass effect or midline shift. There is extensive white matter hypoattenuation suggestive advanced chronic small vessel disease similar to the prior study. Small areas of parenchymal loss involving left basal   ganglia and right thalamus unchanged likely remote lacunar infarcts.  Appears unchanged. Posterior fossa without acute abnormality. No midline shift or mass effect. Mild cerebral volume loss. Globes symmetric. No retro-orbital abnormality. The mastoid air   cells are well-aerated. There is a retention cyst at the base of the right maxillary sinus. Negative for calvarial fracture.      Impression:      Impression:  1. No acute intracranial abnormality.  2. Extensive white matter findings most consistent changes of chronic microvascular disease, stable.    Electronically Signed: Chico Henley    2/15/2023 5:34 PM EST    Workstation ID: [id number]    XR Chest 1 View [[id number]] Collected: 02/15/23 1631     Updated: 02/15/23 1634    Narrative:      XR CHEST 1 VW    Date of Exam: 2/15/2023 4:26 PM EST    Indication: AMS Protocol  AMS Protocol.    Comparison: None 1822    Findings:  There is marked cardiomegaly again noted. There is pulmonary vascular congestion with probable mild pulmonary edema/CHF pattern. No pneumothorax is seen. Question small left effusion.      Impression:      Impression:  Cardiomegaly with pulmonary vascular congestion and probable mild pulmonary edema pattern.    Electronically Signed: Best Mirza    2/15/2023 4:32 PM EST    Workstation ID: [id number]          EKG            I personally viewed and interpreted the patient's EKG/Telemetry data:    ECHOCARDIOGRAM:  3/2022  Echocardiogram Findings    Left Ventricle Left ventricular systolic function is normal.   Normal left ventricular cavity size and wall thickness noted. All left ventricular wall segments contract normally. Left ventricular diastolic function was normal. Normal left atrial pressure. No evidence of left ventricular thrombus or mass present.   Right Ventricle Normal right ventricular cavity size and systolic function noted.   Left Atrium The left atrial cavity is moderate to severely dilated.   Right Atrium Normal right atrial cavity size noted.   Aortic Valve The aortic valve is not well visualized. No  aortic valve regurgitation or stenosis is present. The aortic valve is grossly normal in structure.   Mitral Valve The mitral valve is grossly normal in structure. No mitral valve regurgitation or significant stenosis is present.   Tricuspid Valve Trace tricuspid valve regurgitation is present. No evidence of significant tricuspid valve stenosis is present.   Pulmonic Valve The pulmonic valve is not well visualized.   Greater Vessels No dilation of the aortic root is present.   Pericardium There is no evidence of pericardial effusion. .         STRESS MYOVIEW: 9/2022    Interpretation Summary    • Diaphragmatic attenuation artifact is present.  • Left ventricular ejection fraction is hyperdynamic (Calculated EF > 70%). .  • Myocardial perfusion imaging indicates a normal myocardial perfusion study with no evidence of ischemia.  • Impressions are consistent with a low risk study.  • This is normal Cardiolite imaging stress test with no evidence of ischemia or myocardial infarction. Left ventricle size and function is normal on gated SPECT imaging. No wall motion abnormality was noted. Clinical correlation recommended. Further recommendation as per ordering physician..  • Findings consistent with an indeterminate ECG stress test.         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[facility])    Atrial fibrillation (HCC)    CKD (chronic kidney disease), stage III ([facility])    BPH without obstruction/lower urinary tract symptoms    Acute on chronic diastolic CHF (congestive heart failure) ([facility])    Hypertensive urgency    Elevated troponin    Acute UTI (urinary tract infection)    Morbid obesity ([facility])    Dementia ([facility])      Assessment:    Indeterminate high-sensitivity troponin elevation     -EKG with no acute ST or T wave segment abnormalities     -98, 92, 91  Mental status changes  Heart  failure preserved ejection fraction  Atrial fibrillation with slow ventricular response  Hypertension  Diabetes  UTI  CKD  Anemia    Plan:    Echocardiogram will be repeated  No ST/T wave abnormalities on EKG  Will observe telemetry for ventricular response  Titrate medications if persistent bradycardia  Continue gentle diuresis  No s/s of unstable angina/ACS  Troponin elevation flat and likely related to CKD  Recent noninvasive ischemic evaluation September 2022 with no reversible ischemia  Will follow    I discussed the patients findings and my recommendations with patient and RN      Electronically signed by MELISA Jerry, 02/16/23, 3:01 PM EST.      MELISA Jerry  02/16/23  15:01 EST

## 2023-02-17 ENCOUNTER — APPOINTMENT (OUTPATIENT)
Dept: CARDIOLOGY | Facility: HOSPITAL | Age: 78
DRG: 682 | End: 2023-02-17
Payer: OTHER GOVERNMENT

## 2023-02-17 LAB
ANION GAP SERPL CALCULATED.3IONS-SCNC: 9 MMOL/L (ref 5–15)
ANISOCYTOSIS BLD QL: ABNORMAL
BASOPHILS # BLD MANUAL: 0.04 10*3/MM3 (ref 0–0.2)
BASOPHILS NFR BLD MANUAL: 1 % (ref 0–1.5)
BH CV ECHO MEAS - ACS: 2.1 CM
BH CV ECHO MEAS - AI P1/2T: 761 MSEC
BH CV ECHO MEAS - AO MAX PG: 9.9 MMHG
BH CV ECHO MEAS - AO MEAN PG: 5 MMHG
BH CV ECHO MEAS - AO ROOT DIAM: 3.9 CM
BH CV ECHO MEAS - AO V2 MAX: 157 CM/SEC
BH CV ECHO MEAS - AO V2 VTI: 32.5 CM
BH CV ECHO MEAS - AVA(I,D): 1.21 CM2
BH CV ECHO MEAS - EDV(CUBED): 110.6 ML
BH CV ECHO MEAS - EDV(MOD-SP2): 79.8 ML
BH CV ECHO MEAS - EDV(MOD-SP4): 104 ML
BH CV ECHO MEAS - EF(MOD-BP): 60.5 %
BH CV ECHO MEAS - EF(MOD-SP2): 57.3 %
BH CV ECHO MEAS - EF(MOD-SP4): 63.7 %
BH CV ECHO MEAS - ESV(CUBED): 39.3 ML
BH CV ECHO MEAS - ESV(MOD-SP2): 34.1 ML
BH CV ECHO MEAS - ESV(MOD-SP4): 37.8 ML
BH CV ECHO MEAS - FS: 29.2 %
BH CV ECHO MEAS - IVS/LVPW: 1 CM
BH CV ECHO MEAS - IVSD: 1.2 CM
BH CV ECHO MEAS - LA DIMENSION: 5.8 CM
BH CV ECHO MEAS - LAT PEAK E' VEL: 7.8 CM/SEC
BH CV ECHO MEAS - LV DIASTOLIC VOL/BSA (35-75): 41.1 CM2
BH CV ECHO MEAS - LV MASS(C)D: 219.1 GRAMS
BH CV ECHO MEAS - LV MAX PG: 2.09 MMHG
BH CV ECHO MEAS - LV MEAN PG: 1 MMHG
BH CV ECHO MEAS - LV SYSTOLIC VOL/BSA (12-30): 14.9 CM2
BH CV ECHO MEAS - LV V1 MAX: 72.2 CM/SEC
BH CV ECHO MEAS - LV V1 VTI: 12.5 CM
BH CV ECHO MEAS - LVIDD: 4.8 CM
BH CV ECHO MEAS - LVIDS: 3.4 CM
BH CV ECHO MEAS - LVOT AREA: 3.1 CM2
BH CV ECHO MEAS - LVOT DIAM: 2 CM
BH CV ECHO MEAS - LVPWD: 1.2 CM
BH CV ECHO MEAS - MED PEAK E' VEL: 5.6 CM/SEC
BH CV ECHO MEAS - MR MAX PG: 33.4 MMHG
BH CV ECHO MEAS - MR MAX VEL: 289 CM/SEC
BH CV ECHO MEAS - MV DEC SLOPE: 281 CM/SEC2
BH CV ECHO MEAS - MV DEC TIME: 0.15 MSEC
BH CV ECHO MEAS - MV E MAX VEL: 65.5 CM/SEC
BH CV ECHO MEAS - MV MAX PG: 1.3 MMHG
BH CV ECHO MEAS - MV MEAN PG: 1 MMHG
BH CV ECHO MEAS - MV P1/2T: 62.2 MSEC
BH CV ECHO MEAS - MV V2 VTI: 12.9 CM
BH CV ECHO MEAS - MVA(P1/2T): 3.5 CM2
BH CV ECHO MEAS - MVA(VTI): 3 CM2
BH CV ECHO MEAS - PA V2 MAX: 59.1 CM/SEC
BH CV ECHO MEAS - PULM DIAS VEL: 26.5 CM/SEC
BH CV ECHO MEAS - PULM S/D: 0.75
BH CV ECHO MEAS - PULM SYS VEL: 19.9 CM/SEC
BH CV ECHO MEAS - RAP SYSTOLE: 8 MMHG
BH CV ECHO MEAS - RV MAX PG: 1.76 MMHG
BH CV ECHO MEAS - RV V1 MAX: 66.3 CM/SEC
BH CV ECHO MEAS - RV V1 VTI: 9.7 CM
BH CV ECHO MEAS - RVSP: 39.6 MMHG
BH CV ECHO MEAS - SI(MOD-SP2): 18.1 ML/M2
BH CV ECHO MEAS - SI(MOD-SP4): 26.2 ML/M2
BH CV ECHO MEAS - SV(LVOT): 39.3 ML
BH CV ECHO MEAS - SV(MOD-SP2): 45.7 ML
BH CV ECHO MEAS - SV(MOD-SP4): 66.2 ML
BH CV ECHO MEAS - TAPSE (>1.6): 1.69 CM
BH CV ECHO MEAS - TR MAX PG: 31.6 MMHG
BH CV ECHO MEAS - TR MAX VEL: 281 CM/SEC
BH CV ECHO MEASUREMENTS AVERAGE E/E' RATIO: 9.78
BH CV XLRA - RV BASE: 3.4 CM
BH CV XLRA - RV LENGTH: 7.9 CM
BH CV XLRA - RV MID: 2.5 CM
BH CV XLRA - TDI S': 9 CM/SEC
BUN SERPL-MCNC: 27 MG/DL (ref 8–23)
BUN/CREAT SERPL: 13.6 (ref 7–25)
CALCIUM SPEC-SCNC: 8.5 MG/DL (ref 8.6–10.5)
CHLORIDE SERPL-SCNC: 106 MMOL/L (ref 98–107)
CO2 SERPL-SCNC: 27 MMOL/L (ref 22–29)
CREAT SERPL-MCNC: 1.98 MG/DL (ref 0.76–1.27)
DEPRECATED RDW RBC AUTO: 55.1 FL (ref 37–54)
EGFRCR SERPLBLD CKD-EPI 2021: 34.2 ML/MIN/1.73
EOSINOPHIL # BLD MANUAL: 0.19 10*3/MM3 (ref 0–0.4)
EOSINOPHIL NFR BLD MANUAL: 5 % (ref 0.3–6.2)
ERYTHROCYTE [DISTWIDTH] IN BLOOD BY AUTOMATED COUNT: 19.2 % (ref 12.3–15.4)
GEN 5 2HR TROPONIN T REFLEX: 94 NG/L
GLUCOSE BLDC GLUCOMTR-MCNC: 102 MG/DL (ref 70–105)
GLUCOSE BLDC GLUCOMTR-MCNC: 105 MG/DL (ref 70–105)
GLUCOSE BLDC GLUCOMTR-MCNC: 132 MG/DL (ref 70–105)
GLUCOSE BLDC GLUCOMTR-MCNC: 153 MG/DL (ref 70–105)
GLUCOSE SERPL-MCNC: 102 MG/DL (ref 65–99)
HCT VFR BLD AUTO: 35.2 % (ref 37.5–51)
HGB BLD-MCNC: 10.9 G/DL (ref 13–17.7)
LARGE PLATELETS: ABNORMAL
LEFT ATRIUM VOLUME INDEX: 50.2 ML/M2
LYMPHOCYTES # BLD MANUAL: 1.44 10*3/MM3 (ref 0.7–3.1)
LYMPHOCYTES NFR BLD MANUAL: 14 % (ref 5–12)
MAGNESIUM SERPL-MCNC: 2 MG/DL (ref 1.6–2.4)
MAXIMAL PREDICTED HEART RATE: 143 BPM
MCH RBC QN AUTO: 25.1 PG (ref 26.6–33)
MCHC RBC AUTO-ENTMCNC: 30.8 G/DL (ref 31.5–35.7)
MCV RBC AUTO: 81.4 FL (ref 79–97)
METAMYELOCYTES NFR BLD MANUAL: 1 % (ref 0–0)
MONOCYTES # BLD: 0.53 10*3/MM3 (ref 0.1–0.9)
NEUTROPHILS # BLD AUTO: 1.52 10*3/MM3 (ref 1.7–7)
NEUTROPHILS NFR BLD MANUAL: 39 % (ref 42.7–76)
NEUTS BAND NFR BLD MANUAL: 1 % (ref 0–5)
PLASMA CELL PREC NFR BLD MANUAL: 1 % (ref 0–0)
PLATELET # BLD AUTO: 221 10*3/MM3 (ref 140–450)
PMV BLD AUTO: 8.3 FL (ref 6–12)
POTASSIUM SERPL-SCNC: 3.8 MMOL/L (ref 3.5–5.2)
QT INTERVAL: 455 MS
RBC # BLD AUTO: 4.32 10*6/MM3 (ref 4.14–5.8)
SCAN SLIDE: NORMAL
SINUS: 3.9 CM
SODIUM SERPL-SCNC: 142 MMOL/L (ref 136–145)
STJ: 2.8 CM
STRESS TARGET HR: 122 BPM
TROPONIN T DELTA: 1 NG/L
TROPONIN T SERPL HS-MCNC: 93 NG/L
VARIANT LYMPHS NFR BLD MANUAL: 32 % (ref 19.6–45.3)
VARIANT LYMPHS NFR BLD MANUAL: 6 % (ref 0–5)
WBC MORPH BLD: NORMAL
WBC NRBC COR # BLD: 3.8 10*3/MM3 (ref 3.4–10.8)

## 2023-02-17 PROCEDURE — 84484 ASSAY OF TROPONIN QUANT: CPT | Performed by: INTERNAL MEDICINE

## 2023-02-17 PROCEDURE — 85007 BL SMEAR W/DIFF WBC COUNT: CPT | Performed by: NURSE PRACTITIONER

## 2023-02-17 PROCEDURE — 80048 BASIC METABOLIC PNL TOTAL CA: CPT | Performed by: NURSE PRACTITIONER

## 2023-02-17 PROCEDURE — 93306 TTE W/DOPPLER COMPLETE: CPT

## 2023-02-17 PROCEDURE — 25010000002 SULFUR HEXAFLUORIDE MICROSPH 60.7-25 MG RECONSTITUTED SUSPENSION: Performed by: INTERNAL MEDICINE

## 2023-02-17 PROCEDURE — 25010000002 PIPERACILLIN SOD-TAZOBACTAM PER 1 G: Performed by: NURSE PRACTITIONER

## 2023-02-17 PROCEDURE — 82962 GLUCOSE BLOOD TEST: CPT

## 2023-02-17 PROCEDURE — 83735 ASSAY OF MAGNESIUM: CPT | Performed by: STUDENT IN AN ORGANIZED HEALTH CARE EDUCATION/TRAINING PROGRAM

## 2023-02-17 PROCEDURE — 92526 ORAL FUNCTION THERAPY: CPT

## 2023-02-17 PROCEDURE — 85025 COMPLETE CBC W/AUTO DIFF WBC: CPT | Performed by: NURSE PRACTITIONER

## 2023-02-17 PROCEDURE — 36415 COLL VENOUS BLD VENIPUNCTURE: CPT | Performed by: NURSE PRACTITIONER

## 2023-02-17 PROCEDURE — 63710000001 INSULIN LISPRO (HUMAN) PER 5 UNITS: Performed by: NURSE PRACTITIONER

## 2023-02-17 PROCEDURE — 99232 SBSQ HOSP IP/OBS MODERATE 35: CPT | Performed by: INTERNAL MEDICINE

## 2023-02-17 PROCEDURE — 93306 TTE W/DOPPLER COMPLETE: CPT | Performed by: INTERNAL MEDICINE

## 2023-02-17 PROCEDURE — 25010000002 FUROSEMIDE PER 20 MG: Performed by: NURSE PRACTITIONER

## 2023-02-17 RX ADMIN — SULFUR HEXAFLUORIDE 1 ML: KIT at 10:19

## 2023-02-17 RX ADMIN — PIPERACILLIN AND TAZOBACTAM 4.5 G: 4; .5 INJECTION, POWDER, FOR SOLUTION INTRAVENOUS at 08:11

## 2023-02-17 RX ADMIN — GABAPENTIN 300 MG: 300 CAPSULE ORAL at 20:10

## 2023-02-17 RX ADMIN — CARVEDILOL 12.5 MG: 6.25 TABLET, FILM COATED ORAL at 09:48

## 2023-02-17 RX ADMIN — Medication 250 MG: at 09:50

## 2023-02-17 RX ADMIN — ISOSORBIDE MONONITRATE 60 MG: 60 TABLET, EXTENDED RELEASE ORAL at 16:41

## 2023-02-17 RX ADMIN — Medication 10 ML: at 09:49

## 2023-02-17 RX ADMIN — FINASTERIDE 5 MG: 5 TABLET, FILM COATED ORAL at 09:48

## 2023-02-17 RX ADMIN — TAMSULOSIN HYDROCHLORIDE 0.4 MG: 0.4 CAPSULE ORAL at 09:48

## 2023-02-17 RX ADMIN — ISOSORBIDE MONONITRATE 60 MG: 60 TABLET, EXTENDED RELEASE ORAL at 09:48

## 2023-02-17 RX ADMIN — HYDRALAZINE HYDROCHLORIDE 50 MG: 25 TABLET, FILM COATED ORAL at 20:10

## 2023-02-17 RX ADMIN — APIXABAN 5 MG: 5 TABLET, FILM COATED ORAL at 09:48

## 2023-02-17 RX ADMIN — FUROSEMIDE 40 MG: 10 INJECTION, SOLUTION INTRAMUSCULAR; INTRAVENOUS at 16:40

## 2023-02-17 RX ADMIN — Medication 250 MG: at 20:10

## 2023-02-17 RX ADMIN — APIXABAN 5 MG: 5 TABLET, FILM COATED ORAL at 20:10

## 2023-02-17 RX ADMIN — HYDRALAZINE HYDROCHLORIDE 50 MG: 25 TABLET, FILM COATED ORAL at 09:48

## 2023-02-17 RX ADMIN — PIPERACILLIN AND TAZOBACTAM 4.5 G: 4; .5 INJECTION, POWDER, FOR SOLUTION INTRAVENOUS at 16:48

## 2023-02-17 RX ADMIN — ESCITALOPRAM OXALATE 10 MG: 10 TABLET ORAL at 09:53

## 2023-02-17 RX ADMIN — INSULIN LISPRO 3 UNITS: 100 INJECTION, SOLUTION INTRAVENOUS; SUBCUTANEOUS at 11:48

## 2023-02-17 RX ADMIN — GABAPENTIN 300 MG: 300 CAPSULE ORAL at 16:41

## 2023-02-17 RX ADMIN — LOSARTAN POTASSIUM 25 MG: 25 TABLET, FILM COATED ORAL at 09:48

## 2023-02-17 RX ADMIN — PIPERACILLIN AND TAZOBACTAM 4.5 G: 4; .5 INJECTION, POWDER, FOR SOLUTION INTRAVENOUS at 23:25

## 2023-02-17 RX ADMIN — ATORVASTATIN CALCIUM 20 MG: 20 TABLET, FILM COATED ORAL at 09:47

## 2023-02-17 RX ADMIN — CARVEDILOL 12.5 MG: 6.25 TABLET, FILM COATED ORAL at 16:41

## 2023-02-17 RX ADMIN — Medication 10 ML: at 20:10

## 2023-02-17 RX ADMIN — FUROSEMIDE 40 MG: 10 INJECTION, SOLUTION INTRAMUSCULAR; INTRAVENOUS at 08:11

## 2023-02-17 RX ADMIN — GABAPENTIN 300 MG: 300 CAPSULE ORAL at 09:48

## 2023-02-17 RX ADMIN — HYDRALAZINE HYDROCHLORIDE 50 MG: 25 TABLET, FILM COATED ORAL at 16:41

## 2023-02-17 NOTE — THERAPY TREATMENT NOTE
Acute Care - Speech Language Pathology   Swallow Treatment Note KAYODE Camarena     Patient Name: Shaji Junior  : 1945  MRN: [id number]  Today's Date: 2023               Admit Date: 2/15/2023  Patient was not wearing a face mask during this therapy encounter. Therapist used appropriate personal protective equipment including mask, eye protection and gloves.  Mask used was standard procedure mask. Appropriate PPE was worn during the entire therapy session. Hand hygiene was completed before and after therapy session. Patient is not in enhanced droplet precautions.       Visit Dx:     ICD-10-CM ICD-9-CM   1. Altered mental status, unspecified altered mental status type  R41.82 780.97   2. Acute metabolic encephalopathy  G93.41 348.31   3. Acute UTI  N39.0 599.0   4. Chronic kidney disease, unspecified CKD stage  N18.9 585.9   5. Elevated troponin  R77.8 790.6   6. 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[facility])   • Acute kidney failure (HCC)   • Congestive heart failure (HCC)   • Acute on chronic congestive heart failure, unspecified heart failure type ([facility])   • MAKI (acute kidney injury) ([facility])   • Atrial fibrillation ([facility])   • Chest pain   • Shortness of breath   • Chest pain, unspecified type   • Essential hypertension   • Acute UTI (urinary tract infection)   • Normocytic normochromic anemia   • CKD (chronic kidney disease), stage III ([facility])   • BPH without obstruction/lower urinary tract symptoms   • Altered mental status, unspecified altered mental status type   • Acute on chronic diastolic CHF (congestive heart failure) ([facility])   • Hypertensive urgency   • Elevated troponin   • Acute UTI (urinary tract infection)   • Morbid obesity ([facility])   • Dementia (HCC)     Past Medical History:   Diagnosis Date   •  Acute kidney failure (HCC)    • Acute respiratory failure with hypoxia (HCC)    • Anemia    • Benign prostatic hyperplasia    • Bilateral primary osteoarthritis of knee    • Cardiomegaly    • Cardiomyopathy (HCC)    • Cellulitis and abscess of left leg    • Cerebral infarction (HCC)    • Cerebrovascular disease    • Chronic kidney disease    • Dementia (HCC)    • Diabetes mellitus (HCC)    • Essential hypertension    • GERD (gastroesophageal reflux disease)    • Gout    • Heart failure (HCC)    • Hyperlipidemia    • Morbid obesity (HCC)    • Myocardial infarction (HCC)    • Obstructive sleep apnea    • Obstructive uropathy    • Paroxysmal atrial fibrillation (HCC)    • Peptic ulcer    • Peripheral vascular disease (HCC)    • Pulmonary edema    • Venous insufficiency      History reviewed. No pertinent surgical history.       SWALLOW EVALUATION (last 72 hours)     SLP Adult Swallow Evaluation     Row Name 02/17/23 1300          Document Type therapy note (daily note)  -CP    Subjective Information no complaints  -CP    Patient Observations alert;cooperative  -CP    Patient Effort good  -CP    Comment Skilled ST targeting dysphagia was conducted today. pt was seen for meal assessment. pt was upright in bed and fed by CNA. Pt was seen consuming roast beef, mashed potatoes, and drinking soda by straw. Pt had functional mastication and timely oral transit. No pocketing or oral residual appreciated. Suspect timely swallow. After the swallow, pt had clear vocal quality and no cough or other overt s/s of aspiration. Pt is tolerating a regular diet with thin liquids. Pt has achieved all goals established by ST and will be DCed from ST caseload. Please re-consult this dept if any further needs arise.  -CP          User Key  (r) = Recorded By, (t) = Taken By, (c) = Cosigned By    Initials Name Effective Dates    CP Bhavana Chou, SLP 06/16/21 -                 EDUCATION  The patient has been educated in the following areas:    Dysphagia (Swallowing Impairment).        SLP GOALS     Row Name 02/17/23 1300 02/16/23 1100          (LTG) Swallow    (LTG) Swallow The patient will maximize swallow function for least restrictive po diet, exhibiting no complications associated with dysphagia, adequate po intake, and demonstrating independent use of safe swallow compensations.  -CP The patient will maximize swallow function for least restrictive po diet, exhibiting no complications associated with dysphagia, adequate po intake, and demonstrating independent use of safe swallow compensations.  -LF     Time Frame (Swallow Long Term Goal) by discharge  -CP by discharge  -LF     Progress/Outcomes (Swallow Long Term Goal) goal met  -CP --     Comment (Swallow Long Term Goal) SEe above note  -CP --        (STG) Swallow 1    (STG) Swallow 1 The patient will participate in a full meal assessment to determine safety and adequacy of recommended diet, independent use of safe swallow compensations, and additional goals/recommendations to follow  -CP The patient will participate in a full meal assessment to determine safety and adequacy of recommended diet, independent use of safe swallow compensations, and additional goals/recommendations to follow  -LF     Time Frame (Swallow Short Term Goal 1) 1 week  -CP 1 week  -LF     Progress/Outcomes (Swallow Short Term Goal 1) goal met  -CP --     Comment (Swallow Short Term Goal 1) See above note  -CP --           User Key  (r) = Recorded By, (t) = Taken By, (c) = Cosigned By    Initials Name Provider Type    CP Bhavana Chou, SLP Speech and Language Pathologist    Meryl Gustafson, SLP Speech and Language Pathologist                   Time Calculation:                TIMOTEO Mtz  2/17/2023

## 2023-02-17 NOTE — PROGRESS NOTES
"CARDIOLOGY FOLLOW-UP PROGRESS NOTE      Reason for follow-up:  Elevated troponin  Mental status changes       Attending: Christofer Solitario MD      Subjective .   Drowsy.  Denies pain  Dementia    Pertinent items are noted in HPI, all other systems reviewed and negative  Allergies: Patient has no known allergies.    Scheduled Meds:apixaban, 5 mg, Oral, Q12H  atorvastatin, 20 mg, Oral, Daily  carvedilol, 12.5 mg, Oral, BID With Meals  escitalopram, 10 mg, Oral, Daily  finasteride, 5 mg, Oral, Daily  furosemide, 40 mg, Intravenous, BID  gabapentin, 300 mg, Oral, TID  hydrALAZINE, 50 mg, Oral, TID  insulin lispro, 3-14 Units, Subcutaneous, TID With Meals  isosorbide mononitrate, 60 mg, Oral, BID - Nitrates  losartan, 25 mg, Oral, Q24H  piperacillin-tazobactam, 4.5 g, Intravenous, Q8H  saccharomyces boulardii, 250 mg, Oral, BID  tamsulosin, 0.4 mg, Oral, Daily          Continuous Infusions:Pharmacy to Dose Zosyn,         PRN Meds:.•  dextrose  •  dextrose  •  glucagon (human recombinant)  •  magnesium sulfate **OR** magnesium sulfate **OR** magnesium sulfate  •  methocarbamol  •  Pharmacy to Dose Zosyn  •  potassium chloride **OR** potassium chloride **OR** potassium chloride  •  sodium chloride    Objective     VITAL SIGNS  Patient Vitals for the past 24 hrs:   BP Temp Temp src Pulse Resp SpO2 Height Weight   02/17/23 1510 118/78 97.8 °F (36.6 °C) Oral 69 -- 90 % -- --   02/17/23 1016 137/95 -- -- 75 -- 93 % 182.9 cm (72\") 136 kg (300 lb)   02/17/23 0805 137/95 98 °F (36.7 °C) Axillary -- 20 93 % -- --   02/17/23 0306 140/85 98.1 °F (36.7 °C) Axillary 75 17 92 % -- (!) 136 kg (300 lb 11.3 oz)   02/16/23 2013 141/82 98.3 °F (36.8 °C) Axillary 77 15 93 % -- --        Flowsheet Rows    Flowsheet Row First Filed Value   Admission Height 182.9 cm (72\") Documented at 02/15/2023 1417   Admission Weight 149 kg (328 lb) Documented at 02/15/2023 1417          Body mass index is 40.69 kg/m².      Intake/Output Summary (Last 24 " hours) at 2/17/2023 1750  Last data filed at 2/17/2023 1648  Gross per 24 hour   Intake 1000 ml   Output 1375 ml   Net -375 ml        TELEMETRY:     Atrial fibrillation    Physical Exam:  The patient is alert, oriented and in no distress.  Vital signs as noted above.  Head and neck revealed no carotid bruits or jugular venous distention.  No thyromegaly or lymph adenopathy is present  Lungs clear.  No wheezing.  Breath sounds are normal bilaterally.  Irregularly irregular.No murmur.  No precordial rub is present.  No gallop is present.  Abdomen soft and nontender.  No organomegaly is present.  Extremities with good peripheral pulses with pedal edema  Skin warm and dry.  Musculoskeletal system is grossly normal  Lethargic and demented      Results Review:   I reviewed the patient's new clinical results.    CBC    Results from last 7 days   Lab Units 02/17/23  0505 02/16/23  0527 02/15/23  1614   WBC 10*3/mm3 3.80 3.70 3.90   HEMOGLOBIN g/dL 10.9* 11.6* 11.2*   PLATELETS 10*3/mm3 221 222 223     BMP   Results from last 7 days   Lab Units 02/17/23  0807 02/17/23  0505 02/16/23  1312 02/16/23  0527 02/15/23  1614   SODIUM mmol/L 142  --   --  140 138   POTASSIUM mmol/L 3.8  --   --  3.3* 3.9   CHLORIDE mmol/L 106  --   --  102 102   CO2 mmol/L 27.0  --   --  27.0 27.0   BUN mg/dL 27*  --   --  22 24*   CREATININE mg/dL 1.98*  --   --  1.57* 1.61*   GLUCOSE mg/dL 102*  --   --  110* 116*   MAGNESIUM mg/dL  --  2.0 1.9  --  1.9     Cr Clearance Estimated Creatinine Clearance: 44.6 mL/min (A) (by C-G formula based on SCr of 1.98 mg/dL (H)).  Coag     HbA1C   Lab Results   Component Value Date    HGBA1C 6.0 (H) 09/16/2022    HGBA1C 6.9 (H) 08/14/2022    HGBA1C 6.4 (H) 03/31/2022     Blood Glucose   Glucose   Date/Time Value Ref Range Status   02/17/2023 1508 105 70 - 105 mg/dL Final     Comment:     Serial Number: [id number]:  [id number]   02/17/2023 1118 153 (H) 70 - 105 mg/dL Final     Comment:     Serial Number:  [id number]:  [id number]   02/17/2023 0716 102 70 - 105 mg/dL Final     Comment:     Serial Number: [id number]:  [id number]   02/16/2023 2016 155 (H) 70 - 105 mg/dL Final     Comment:     Serial Number: [id number]:  [id number]   02/16/2023 1604 145 (H) 70 - 105 mg/dL Final     Comment:     Serial Number: [id number]:  [id number]   02/16/2023 1116 147 (H) 70 - 105 mg/dL Final     Comment:     Serial Number: [id number]:  [id number]   02/16/2023 0752 107 (H) 70 - 105 mg/dL Final     Comment:     Serial Number: [id number]:  [id number]   02/15/2023 2221 93 70 - 105 mg/dL Final     Comment:     Serial Number: [id number]:  [id number]     Infection   Results from last 7 days   Lab Units 02/15/23  1619 02/15/23  1615   BLOODCX   --  No growth at 2 days  No growth at 2 days   URINECX  >100,000 CFU/mL Mixed Thais Isolated  --      CMP   Results from last 7 days   Lab Units 02/17/23  0807 02/16/23  0527 02/15/23  1614   SODIUM mmol/L 142 140 138   POTASSIUM mmol/L 3.8 3.3* 3.9   CHLORIDE mmol/L 106 102 102   CO2 mmol/L 27.0 27.0 27.0   BUN mg/dL 27* 22 24*   CREATININE mg/dL 1.98* 1.57* 1.61*   GLUCOSE mg/dL 102* 110* 116*   ALBUMIN g/dL  --   --  2.9*   BILIRUBIN mg/dL  --   --  0.4   ALK PHOS U/L  --   --  104   AST (SGOT) U/L  --   --  12   ALT (SGPT) U/L  --   --  10     ABG      UA    Results from last 7 days   Lab Units 02/15/23  1619   NITRITE UA  Negative   WBC UA /HPF Too Numerous to Count*   BACTERIA UA /HPF 4+*   SQUAM EPITHEL UA /HPF 3-6*   URINECX  >100,000 CFU/mL Mixed Thais Isolated     BEBETO  No results found for: POCMETH, POCAMPHET, POCBARBITUR, POCBENZO, POCCOCAINE, POCOPIATES, POCOXYCODO, POCPHENCYC, POCPROPOXY, POCTHC, POCTRICYC  Lysis Labs   Results from last 7 days   Lab Units 02/17/23  0807 02/17/23  0505 02/16/23  0527 02/15/23  1614   HEMOGLOBIN g/dL  --  10.9* 11.6* 11.2*   PLATELETS 10*3/mm3  --  221 222 223   CREATININE mg/dL 1.98*  --  1.57* 1.61*      Radiology(recent) No radiology results for the last day    Imaging Results (Last 24 Hours)     ** No results found for the last 24 hours. **          Results from last 7 days   Lab Units 02/17/23  1253   HSTROP T ng/L 94*       EKG               I personally viewed and interpreted the patient's EKG/Telemetry data:        ECHOCARDIOGRAM:     Results for orders placed during the hospital encounter of 02/15/23    Adult Transthoracic Echo Complete W/ Cont if Necessary Per Protocol    Interpretation Summary  •  Left ventricular systolic function is normal. Left ventricular ejection fraction appears to be 61 - 65%.  •  Left ventricular wall thickness is consistent with borderline concentric hypertrophy.  •  Left ventricular diastolic function was normal.  •  Estimated right ventricular systolic pressure from tricuspid regurgitation is mildly elevated (35-45 mmHg). Calculated right ventricular systolic pressure from tricuspid regurgitation is 40 mmHg.        STRESS MYOVIEW:      CARDIAC CATHETERIZATION:      OTHER:         Assessment & Plan            Altered mental status, unspecified altered mental status type    Type 2 diabetes mellitus with diabetic nephropathy ([facility])    Atrial fibrillation ([facility])    CKD (chronic kidney disease), stage III ([facility])    BPH without obstruction/lower urinary tract symptoms    Acute on chronic diastolic CHF (congestive heart failure) ([facility])    Hypertensive urgency    Elevated troponin    Acute UTI (urinary tract infection)    Morbid obesity ([facility])    Dementia ([facility])    77-year-old man with preserved LV function here with mild elevation of troponin.  Echocardiogram shows preserved LV function with EF of 60 to 65%, RVSP of 40 mmHg.  ECG does not show any active ischemic changes.  Troponin does not show any significant trend.  Worsening renal function is likely causing the troponin elevation.  Continue full dose anticoagulation with Eliquis for atrial fibrillation.  Continue antibiotics for  ESBL  Medical management has been uptitrated with ARB, beta-blocker, high intensity statin, isosorbide mononitrate.  Hydralazine added for better blood pressure control.  Continue furosemide.  No further cardiac testing at this point        Frank Foster MD  02/17/23  17:50 EST

## 2023-02-17 NOTE — PROGRESS NOTES
[facility]     Progress Note    Patient Name: Shaji Junior  : 1945  MRN: [id number]  Primary Care Physician:  Naa Valverde MD  Date of admission: 2/15/2023    Subjective   Subjective     Seen and examined this morning  Still confused      Objective   Objective     Vitals:   Temp:  [97.3 °F (36.3 °C)-98.3 °F (36.8 °C)] 97.8 °F (36.6 °C)  Heart Rate:  [62-77] 69  Resp:  [10-20] 20  BP: (118-167)/(78-95) 118/78    Physical Exam  Constitutional:       Appearance: awake, alert, confused  HENT:      Head: Normocephalic.      Right Ear: Tympanic membrane normal.      Left Ear: Tympanic membrane normal.      Nose: Nose normal.      Mouth/Throat:      Mouth: Mucous membranes are moist.   Eyes:      Pupils: Pupils are equal, round, and reactive to light.   Cardiovascular:      Rate and Rhythm: Normal rate.      Pulses: Normal pulses.   Pulmonary:      Effort: Pulmonary effort is normal.   Abdominal:      General: Abdomen is flat.   Musculoskeletal:         Cervical back: Normal range of motion.   Skin:     General: Skin is warm.      Capillary Refill: Capillary refill takes less than 2 seconds.   Neurological:   Alert and confused, moves all extremities      Psychiatric:         Mood and Affect: Mood normal.         Result Review    Result Review:  I have personally reviewed the results from the time of this admission to 2023 16:33 EST and agree with these findings:  []  Laboratory list / accordion  []  Microbiology  []  Radiology  []  EKG/Telemetry   []  Cardiology/Vascular   []  Pathology  []  Old records  []  Other:      Assessment & Plan   Assessment / Plan         Active Hospital Problems:  Active Hospital Problems    Diagnosis    • **Altered mental status, unspecified altered mental status type    • Acute on chronic diastolic CHF (congestive heart failure) ([facility])    • Hypertensive urgency    • Elevated troponin    • Acute UTI (urinary tract infection)    • Morbid obesity ([facility])    • Dementia ([facility])     • CKD (chronic kidney disease), stage III ([facility])    • BPH without obstruction/lower urinary tract symptoms    • Atrial fibrillation ([facility])    • Type 2 diabetes mellitus with diabetic nephropathy ([facility])           Altered mental status with past medical history of dementia baseline unknown CT head negative for acute likely multifactorial including acute UTI and acute on chronic diastolic heart failure and hypertension see plans below     Hypertensive urgency  Still uncontrolled   home meds resumed, will monitor BP and adjust meds as needed       Elevated troponin high-sensitivity   likely secondary to ischemic demand from congestive heart failure denies chest pain recent stress test in September was negative for ischemia, continuous cardiac monitoring repeat troponin  Cardiology consult appreciated, Troponin elevation flat and likely related to CKD; no evidence of acute myonecrosis  Echo ordered     Complicated UTI  4+ bacteria 3+ leukocytes too numerous to count WBCs with past medical history ESBL, continue IV Zosyn until urine culture resulted       Type 2 diabetes mellitus with diabetic nephropathy add SSI as needed with Accu-Cheks ACHS consistent carb diet, home meds unverified at this time reorder pending verification pharmacy     Acute on chronic heart failure preserved EF per echo in September proBNP elevated 7939, pulmonary edema per chest x-ray, 80 mg IV Lasix in ED, was on home Lasix 40 mg p.o. twice daily changed to 40 mg IV twice daily     Morbid obesity BMI 44.48, encourage lifestyle management fall precautions     Dementia per records baseline unknown alert cooperative and pleasant     Chronic kidney disease stage  Monitor Cr, avoid nephrotoxins     BPH, home meds unverified at this time reorder pending verification pharmacy     Atrial fibrillation, rate controlled on home Eliquis   Telemetry     Hyperlipidemia           GI and DVT prophylaxis    CODE STATUS:   Code Status (Patient has no pulse and is not  breathing): CPR (Attempt to Resuscitate)  Medical Interventions (Patient has pulse or is breathing): Full Support      Christofer Solitario MD

## 2023-02-17 NOTE — CASE MANAGEMENT/SOCIAL WORK
Continued Stay Note   [place]     Patient Name: Shaji Junior  MRN: [id number]  Today's Date: 2/17/2023    Admit Date: 2/15/2023    Plan: D/C Plan: Anticipate return to [place], [facility]. No precert or PASRR required. Okay to return per sister and facility liaison. See comments.   Discharge Plan     Row Name 02/17/23 1654       Plan    Plan D/C Plan: Anticipate return to [place], [place]. No precert or PASRR required. Okay to return per sister and facility liaison. See comments.    Plan Comments Possible DC 2/18. Pt will require EMS transport. Transport form placed in front of chart.            Met with patient in room wearing PPE: mask.      Maintained distance greater than six feet and spent less than 15 minutes in the room.                      Expected Discharge Date and Time     Expected Discharge Date Expected Discharge Time    Feb 18, 2023             Derrick Stoddard RN

## 2023-02-17 NOTE — PLAN OF CARE
Goal Outcome Evaluation:  Plan of Care Reviewed With: patient        Progress: improving  Outcome Evaluation: Rested well during the shift. More alert this evening, oriented to person. IV abx given. No c/o discomfort. Will continue to monitor.

## 2023-02-17 NOTE — CONSULTS
Nutrition Services    Patient Name: Shaji Junior  YOB: 1945  MRN: [id number]  Admission date: 2/15/2023    PPE Documentation        PPE Worn By Provider Did not enter room this encounter   PPE Worn By Patient  N/A     NUTRITION SCREENING      Encounter Information: RD assessing pt r/t 2 small healing stage pressure injuries to pt's L sacrum and L posterior thigh at the gluteal crease per WOCN's assessment. Pt remains confused per attending's assessment. Pt with UTI and hx dementia.        PO Diet: Diet: Cardiac Diets, Diabetic Diets; Healthy Heart (2-3 Na+); Consistent Carbohydrate; Texture: Regular Texture (IDDSI 7); Fluid Consistency: Thin (IDDSI 0)   PO Supplements: none   PO Intake:  Pt eating % of meals       Labs (reviewed below): Reviewed, management per attending        GI Function:  Last documented BM on 2/16       Skin: 2 small, healing pressure injuries per WOCN documentation       Weight Hx Review: 8.5% wt loss x 5 months per wt hx review       Nutrition Intervention: Continue current diet and encourage good PO intake. Could liberalize diet if pt not eating well r/t well controlled blood glucose levels at this time.       Results from last 7 days   Lab Units 02/17/23  0807 02/16/23  0527 02/15/23  1614   SODIUM mmol/L 142 140 138   POTASSIUM mmol/L 3.8 3.3* 3.9   CHLORIDE mmol/L 106 102 102   CO2 mmol/L 27.0 27.0 27.0   BUN mg/dL 27* 22 24*   CREATININE mg/dL 1.98* 1.57* 1.61*   CALCIUM mg/dL 8.5* 8.6 8.4*   BILIRUBIN mg/dL  --   --  0.4   ALK PHOS U/L  --   --  104   ALT (SGPT) U/L  --   --  10   AST (SGOT) U/L  --   --  12   GLUCOSE mg/dL 102* 110* 116*     Results from last 7 days   Lab Units 02/17/23  0505 02/16/23  1312 02/16/23  0527 02/15/23  1614   MAGNESIUM mg/dL 2.0 1.9  --  1.9   HEMOGLOBIN g/dL 10.9*  --    < > 11.2*   HEMATOCRIT % 35.2*  --    < > 35.8*    < > = values in this interval not displayed.     COVID19   Date Value Ref Range Status   02/15/2023 Not Detected Not  Detected - Ref. Range Final     Lab Results   Component Value Date    HGBA1C 6.0 (H) 09/16/2022       RD to follow up per protocol.    Electronically signed by:  Tanya Gonzalez RD  02/17/23 13:53 EST

## 2023-02-18 VITALS
DIASTOLIC BLOOD PRESSURE: 89 MMHG | BODY MASS INDEX: 40.63 KG/M2 | TEMPERATURE: 98.4 F | OXYGEN SATURATION: 96 % | HEART RATE: 76 BPM | RESPIRATION RATE: 17 BRPM | SYSTOLIC BLOOD PRESSURE: 145 MMHG | WEIGHT: 300 LBS | HEIGHT: 72 IN

## 2023-02-18 LAB
GLUCOSE BLDC GLUCOMTR-MCNC: 132 MG/DL (ref 70–105)
GLUCOSE BLDC GLUCOMTR-MCNC: 95 MG/DL (ref 70–105)

## 2023-02-18 PROCEDURE — 99232 SBSQ HOSP IP/OBS MODERATE 35: CPT | Performed by: INTERNAL MEDICINE

## 2023-02-18 PROCEDURE — 25010000002 PIPERACILLIN SOD-TAZOBACTAM PER 1 G: Performed by: NURSE PRACTITIONER

## 2023-02-18 PROCEDURE — 82962 GLUCOSE BLOOD TEST: CPT

## 2023-02-18 RX ORDER — CEFUROXIME AXETIL 500 MG/1
500 TABLET ORAL 2 TIMES DAILY
Qty: 10 TABLET | Refills: 0 | Status: SHIPPED | OUTPATIENT
Start: 2023-02-18 | End: 2023-03-26

## 2023-02-18 RX ADMIN — TAMSULOSIN HYDROCHLORIDE 0.4 MG: 0.4 CAPSULE ORAL at 10:21

## 2023-02-18 RX ADMIN — HYDRALAZINE HYDROCHLORIDE 50 MG: 25 TABLET, FILM COATED ORAL at 10:21

## 2023-02-18 RX ADMIN — GABAPENTIN 300 MG: 300 CAPSULE ORAL at 10:20

## 2023-02-18 RX ADMIN — FINASTERIDE 5 MG: 5 TABLET, FILM COATED ORAL at 10:20

## 2023-02-18 RX ADMIN — ESCITALOPRAM OXALATE 10 MG: 10 TABLET ORAL at 10:21

## 2023-02-18 RX ADMIN — CARVEDILOL 12.5 MG: 6.25 TABLET, FILM COATED ORAL at 10:21

## 2023-02-18 RX ADMIN — ISOSORBIDE MONONITRATE 60 MG: 60 TABLET, EXTENDED RELEASE ORAL at 10:20

## 2023-02-18 RX ADMIN — PIPERACILLIN AND TAZOBACTAM 4.5 G: 4; .5 INJECTION, POWDER, FOR SOLUTION INTRAVENOUS at 08:08

## 2023-02-18 RX ADMIN — ATORVASTATIN CALCIUM 20 MG: 20 TABLET, FILM COATED ORAL at 10:20

## 2023-02-18 RX ADMIN — Medication 10 ML: at 10:22

## 2023-02-18 RX ADMIN — APIXABAN 5 MG: 5 TABLET, FILM COATED ORAL at 10:21

## 2023-02-18 RX ADMIN — Medication 10 ML: at 08:08

## 2023-02-18 RX ADMIN — LOSARTAN POTASSIUM 25 MG: 25 TABLET, FILM COATED ORAL at 10:21

## 2023-02-18 NOTE — NURSING NOTE
Pt currently refusing blood pressure to be taken. Refusing morning medications. Education provided.

## 2023-02-18 NOTE — PROGRESS NOTES
"CARDIOLOGY FOLLOW-UP PROGRESS NOTE      Reason for follow-up:  Elevated troponin  Mental status changes       Attending: Christofer Solitario MD      Subjective .   Drowsy.  Denies pain  Dementia    Pertinent items are noted in HPI, all other systems reviewed and negative  Allergies: Patient has no known allergies.    Scheduled Meds:apixaban, 5 mg, Oral, Q12H  atorvastatin, 20 mg, Oral, Daily  carvedilol, 12.5 mg, Oral, BID With Meals  escitalopram, 10 mg, Oral, Daily  finasteride, 5 mg, Oral, Daily  gabapentin, 300 mg, Oral, TID  hydrALAZINE, 50 mg, Oral, TID  insulin lispro, 3-14 Units, Subcutaneous, TID With Meals  isosorbide mononitrate, 60 mg, Oral, BID - Nitrates  losartan, 25 mg, Oral, Q24H  piperacillin-tazobactam, 4.5 g, Intravenous, Q8H  saccharomyces boulardii, 250 mg, Oral, BID  tamsulosin, 0.4 mg, Oral, Daily          Continuous Infusions:Pharmacy to Dose Zosyn,         PRN Meds:.•  dextrose  •  dextrose  •  glucagon (human recombinant)  •  magnesium sulfate **OR** magnesium sulfate **OR** magnesium sulfate  •  methocarbamol  •  Pharmacy to Dose Zosyn  •  potassium chloride **OR** potassium chloride **OR** potassium chloride  •  sodium chloride    Objective     VITAL SIGNS  Patient Vitals for the past 24 hrs:   BP Temp Temp src Pulse Resp SpO2   02/18/23 1020 145/89 -- -- 76 -- --   02/18/23 0809 -- -- -- 67 17 96 %   02/18/23 0538 145/89 98.4 °F (36.9 °C) Axillary 69 17 93 %   02/17/23 1950 132/89 98 °F (36.7 °C) Oral 76 20 100 %   02/17/23 1510 118/78 97.8 °F (36.6 °C) Oral 69 -- 90 %        Flowsheet Rows    Flowsheet Row First Filed Value   Admission Height 182.9 cm (72\") Documented at 02/15/2023 1417   Admission Weight 149 kg (328 lb) Documented at 02/15/2023 1417          Body mass index is 40.69 kg/m².      Intake/Output Summary (Last 24 hours) at 2/18/2023 1246  Last data filed at 2/18/2023 1020  Gross per 24 hour   Intake 820 ml   Output 575 ml   Net 245 ml        TELEMETRY:     Atrial " fibrillation    Physical Exam:  The patient is alert, oriented and in no distress.  Vital signs as noted above.  Head and neck revealed no carotid bruits or jugular venous distention.  No thyromegaly or lymph adenopathy is present  Lungs clear.  No wheezing.  Breath sounds are normal bilaterally.  Irregularly irregular.No murmur.  No precordial rub is present.  No gallop is present.  Abdomen soft and nontender.  No organomegaly is present.  Extremities with good peripheral pulses with pedal edema  Skin warm and dry.  Musculoskeletal system is grossly normal  Lethargic and demented      Results Review:   I reviewed the patient's new clinical results.    CBC    Results from last 7 days   Lab Units 02/17/23  0505 02/16/23  0527 02/15/23  1614   WBC 10*3/mm3 3.80 3.70 3.90   HEMOGLOBIN g/dL 10.9* 11.6* 11.2*   PLATELETS 10*3/mm3 221 222 223     BMP   Results from last 7 days   Lab Units 02/17/23  0807 02/17/23  0505 02/16/23  1312 02/16/23  0527 02/15/23  1614   SODIUM mmol/L 142  --   --  140 138   POTASSIUM mmol/L 3.8  --   --  3.3* 3.9   CHLORIDE mmol/L 106  --   --  102 102   CO2 mmol/L 27.0  --   --  27.0 27.0   BUN mg/dL 27*  --   --  22 24*   CREATININE mg/dL 1.98*  --   --  1.57* 1.61*   GLUCOSE mg/dL 102*  --   --  110* 116*   MAGNESIUM mg/dL  --  2.0 1.9  --  1.9     Cr Clearance Estimated Creatinine Clearance: 44.6 mL/min (A) (by C-G formula based on SCr of 1.98 mg/dL (H)).  Coag     HbA1C   Lab Results   Component Value Date    HGBA1C 6.0 (H) 09/16/2022    HGBA1C 6.9 (H) 08/14/2022    HGBA1C 6.4 (H) 03/31/2022     Blood Glucose   Glucose   Date/Time Value Ref Range Status   02/18/2023 1143 132 (H) 70 - 105 mg/dL Final     Comment:     Serial Number: [id number]:  [id number]   02/18/2023 0752 95 70 - 105 mg/dL Final     Comment:     Serial Number: [id number]:  [id number]   02/17/2023 2001 132 (H) 70 - 105 mg/dL Final     Comment:     Serial Number: [id number]:  [id number]   02/17/2023 1508  105 70 - 105 mg/dL Final     Comment:     Serial Number: [id number]:  [id number]   02/17/2023 1118 153 (H) 70 - 105 mg/dL Final     Comment:     Serial Number: [id number]:  [id number]   02/17/2023 0716 102 70 - 105 mg/dL Final     Comment:     Serial Number: [id number]:  [id number]   02/16/2023 2016 155 (H) 70 - 105 mg/dL Final     Comment:     Serial Number: [id number]:  [id number]   02/16/2023 1604 145 (H) 70 - 105 mg/dL Final     Comment:     Serial Number: [id number]:  [id number]     Infection   Results from last 7 days   Lab Units 02/15/23  1619 02/15/23  1615   BLOODCX   --  No growth at 2 days  No growth at 2 days   URINECX  >100,000 CFU/mL Mixed Thais Isolated  --      CMP   Results from last 7 days   Lab Units 02/17/23  0807 02/16/23  0527 02/15/23  1614   SODIUM mmol/L 142 140 138   POTASSIUM mmol/L 3.8 3.3* 3.9   CHLORIDE mmol/L 106 102 102   CO2 mmol/L 27.0 27.0 27.0   BUN mg/dL 27* 22 24*   CREATININE mg/dL 1.98* 1.57* 1.61*   GLUCOSE mg/dL 102* 110* 116*   ALBUMIN g/dL  --   --  2.9*   BILIRUBIN mg/dL  --   --  0.4   ALK PHOS U/L  --   --  104   AST (SGOT) U/L  --   --  12   ALT (SGPT) U/L  --   --  10     ABG      UA    Results from last 7 days   Lab Units 02/15/23  1619   NITRITE UA  Negative   WBC UA /HPF Too Numerous to Count*   BACTERIA UA /HPF 4+*   SQUAM EPITHEL UA /HPF 3-6*   URINECX  >100,000 CFU/mL Mixed Thais Isolated     BEBETO  No results found for: POCMETH, POCAMPHET, POCBARBITUR, POCBENZO, POCCOCAINE, POCOPIATES, POCOXYCODO, POCPHENCYC, POCPROPOXY, POCTHC, POCTRICYC  Lysis Labs   Results from last 7 days   Lab Units 02/17/23  0807 02/17/23  0505 02/16/23  0527 02/15/23  1614   HEMOGLOBIN g/dL  --  10.9* 11.6* 11.2*   PLATELETS 10*3/mm3  --  221 222 223   CREATININE mg/dL 1.98*  --  1.57* 1.61*     Radiology(recent) No radiology results for the last day    Imaging Results (Last 24 Hours)     ** No results found for the last 24 hours. **          Results from  last 7 days   Lab Units 02/17/23  1253   HSTROP T ng/L 94*       EKG               I personally viewed and interpreted the patient's EKG/Telemetry data:        ECHOCARDIOGRAM:     Results for orders placed during the hospital encounter of 02/15/23    Adult Transthoracic Echo Complete W/ Cont if Necessary Per Protocol    Interpretation Summary  •  Left ventricular systolic function is normal. Left ventricular ejection fraction appears to be 61 - 65%.  •  Left ventricular wall thickness is consistent with borderline concentric hypertrophy.  •  Left ventricular diastolic function was normal.  •  Estimated right ventricular systolic pressure from tricuspid regurgitation is mildly elevated (35-45 mmHg). Calculated right ventricular systolic pressure from tricuspid regurgitation is 40 mmHg.        STRESS MYOVIEW:      CARDIAC CATHETERIZATION:      OTHER:         Assessment & Plan            Altered mental status, unspecified altered mental status type    Type 2 diabetes mellitus with diabetic nephropathy ([facility])    Atrial fibrillation ([facility])    CKD (chronic kidney disease), stage III ([facility])    BPH without obstruction/lower urinary tract symptoms    Acute on chronic diastolic CHF (congestive heart failure) ([facility])    Hypertensive urgency    Elevated troponin    Acute UTI (urinary tract infection)    Morbid obesity ([facility])    Dementia ([facility])    77-year-old man with preserved LV function here with mild elevation of troponin.  Echocardiogram shows preserved LV function with EF of 60 to 65%, RVSP of 40 mmHg.  ECG does not show any active ischemic changes.  Troponin does not show any significant trend.  Worsening renal function is likely causing the troponin elevation.  Continue full dose anticoagulation with Eliquis for atrial fibrillation.  Continue antibiotics for ESBL  Medical management has been uptitrated with ARB, beta-blocker, high intensity statin, isosorbide mononitrate.  Hydralazine added for better blood pressure  control.  Continue furosemide.  Patient is history take all his medicines and I discussed with him about being compliant with medications especially with his beta-blockers and Eliquis  Explained to him about the risk of stroke versus the risk of bleeding and he understands  We will monitor his heart rate and blood pressure  Patient's renal function is worse and may need nephrology consultation        David Hernandez MD  02/18/23  12:46 EST

## 2023-02-18 NOTE — PLAN OF CARE
Goal Outcome Evaluation:  Plan of Care Reviewed With: patient        Progress: improving  Outcome Evaluation: Pt rested during this shift, Pt was able to make his needs known. F/c in place with outout noted, will cont to monitor.

## 2023-02-18 NOTE — DISCHARGE SUMMARY
" [facility]     Progress Note    Patient Name: Shaji Junior  : 1945  MRN: [id number]  Primary Care Physician:  Naa Valverde MD  Date of admission: 2/15/2023    Subjective   Subjective     : Shaji Junior is a 77 y.o. male morbidly obese male sent from [facility] , with a past medical history of diabetes mellitus type 2, chronic kidney disease BPH, dementia, hypertension, heart failure preserved EF atrial fibrillation on anticoagulation who presented to [facility] on 2/15/2023 with reports of  Increased alteration in mental status. He has a documented history of dementia, baseline unknown.  He is easily awakened and alert and oriented to self.  He denies any chest pain, shortness of air or abdominal pain nausea vomiting or diarrhea or fever.  He has no facial droop or slurred speech and moves all extremities.  Poor historian for details unable to obtain further information from patient and no family at bedside.  Review of records shows he was admitted here in September for chest pain with acute on chronic heart failure preserved EF poorly controlled hypertension and acute UTI.  He had a 2D echo done at that time which showed left ventricular ejection fraction 55 to 60% with diastolic function was normal.  He also had a stress test Cardiolite which was normal showing no evidence of ischemia.  Urine culture at that time grew Klebsiella pneumoniae ESBL and Proteus Mirabella's ESBL.  Today he presents with similar results.  CT head per radiology showed no acute intracranial abnormality but did show extensive white matter findings most consistent changes of chronic microvascular disease stable.  Chest x-ray per radiology showed \"cardiomegaly with pulmonary vascular congestion and probable mild pulmonary edema pattern\".  EKG shows atrial fibrillation heart rate 55 with no ST elevation or depression.  Labs show high-sensitivity troponin 98 and 92 proBNP 7939 glucose 116 " creatinine 1.61 with a baseline of 1.4 per labs, albumin 2.9 WBC not elevated at 3.9 hemoglobin 11.2 and improved from previous, respiratory panel was negative.  Urinalysis today shows 3+ leukocytes too numerous to count WBCs and 4+ bacteria.  Given past medical history of recent ESBL per urine culture he was started on IV Zosyn.  He was given 80 mg IV Lasix in ED and Nitropaste.  He was  hypertensive on admission and given IV hydralazine.  He was initially ordered a Cardene drip but blood pressure improved.  Continuous cardiac monitoring and repeat troponin lowness's have been ordered.  He will be admitted for further evaluation and treatment.    Objective   Objective     Vitals:   Temp:  [97.8 °F (36.6 °C)-98.4 °F (36.9 °C)] 98.4 °F (36.9 °C)  Heart Rate:  [67-76] 67  Resp:  [17-20] 17  BP: (118-145)/(78-95) 145/89    Physical Exam  Constitutional:       Appearance: awake, alert, confused  HENT:      Head: Normocephalic.      Right Ear: Tympanic membrane normal.      Left Ear: Tympanic membrane normal.      Nose: Nose normal.      Mouth/Throat:      Mouth: Mucous membranes are moist.   Eyes:      Pupils: Pupils are equal, round, and reactive to light.   Cardiovascular:      Rate and Rhythm: Normal rate.      Pulses: Normal pulses.   Pulmonary:      Effort: Pulmonary effort is normal.   Abdominal:      General: Abdomen is flat.   Musculoskeletal:         Cervical back: Normal range of motion.   Skin:     General: Skin is warm.      Capillary Refill: Capillary refill takes less than 2 seconds.   Neurological:   Alert and confused, moves all extremities      Psychiatric:         Mood and Affect: Mood normal.         Result Review    Result Review:  I have personally reviewed the results from the time of this admission to 2/18/2023 09:22 EST and agree with these findings:  []  Laboratory list / accordion  []  Microbiology  []  Radiology  []  EKG/Telemetry   []  Cardiology/Vascular   []  Pathology  []  Old records  []   Other:      Assessment & Plan   Assessment / Plan         Active Hospital Problems:  Active Hospital Problems    Diagnosis    • **Altered mental status, unspecified altered mental status type    • Acute on chronic diastolic CHF (congestive heart failure) ([facility])    • Hypertensive urgency    • Elevated troponin    • Acute UTI (urinary tract infection)    • Morbid obesity ([facility])    • Dementia ([facility])    • CKD (chronic kidney disease), stage III ([facility])    • BPH without obstruction/lower urinary tract symptoms    • Atrial fibrillation ([facility])    • Type 2 diabetes mellitus with diabetic nephropathy ([facility])           Altered mental status with past medical history of dementia baseline unknown CT head negative for acute likely multifactorial including acute UTI and acute on chronic diastolic heart failure and hypertension see plans below     Hypertensive urgency  Better controlled on discharge       Elevated troponin high-sensitivity   likely secondary to ischemic demand from congestive heart failure denies chest pain recent stress test in September was negative for ischemia, continuous cardiac monitoring repeat troponin  Cardiology consult appreciated, Troponin elevation flat and likely related to CKD; no evidence of acute myonecrosis     Complicated UTI  4+ bacteria 3+ leukocytes too numerous to count WBCs with past medical history ESBL, continue IV Zosyn until urine culture resulted  Urine cultures negative, will discharge on Ceftin for 5 more days       Type 2 diabetes mellitus with diabetic nephropathy     Acute on chronic heart failure preserved  Back to baseline     Morbid obesity BMI 44.48, encourage lifestyle management fall precautions     Dementia back to baseline     Chronic kidney disease stage  Monitor Cr, avoid nephrotoxins     BPH, home meds unverified at this time reorder pending verification pharmacy     Atrial fibrillation, rate controlled on home Eliquis   Telemetry     Hyperlipidemia         Patient stable on  discharge    CODE STATUS:   Code Status (Patient has no pulse and is not breathing): CPR (Attempt to Resuscitate)  Medical Interventions (Patient has pulse or is breathing): Full Support      Christofer Solitario MD

## 2023-02-20 LAB
BACTERIA SPEC AEROBE CULT: NORMAL
BACTERIA SPEC AEROBE CULT: NORMAL

## 2023-02-20 NOTE — PAYOR COMM NOTE
"DISCHARGE NOTIFICATION  AUTH REPLY PENDING  DC DATE 2/18/2023  ====================================  UTILIZATION REVIEW  DRAKE VARGAS RN   PH: [phone]  FAX: [phone]    [facility]  NPI# [id number]  TID # [id number]  ====================================            Cyrus Junior (77 y.o. Male)     Date of Birth   1945    Social Security Number       Address   [address] [place] [address] [place] [facility]    Home Phone   [phone]    MRN   [id number]       Baptism   None    Marital Status                               Admission Date   2/15/23    Admission Type   Emergency    Admitting Provider   Sandip Campuzano DO    Attending Provider       Department, Room/Bed   [facility] 2B MEDICAL INPATIENT, 230/1       Discharge Date   2/18/2023    Discharge Disposition   Skilled Nursing Facility (DC - External)    Discharge Destination                               Attending Provider: (none)   Allergies: No Known Allergies    Isolation: None   Infection: ESBL (04/01/22), ESBL Klebsiella (09/19/22)   Code Status: Prior    Ht: 182.9 cm (72\")   Wt: 136 kg (300 lb)    Admission Cmt: None   Principal Problem: Altered mental status, unspecified altered mental status type [R41.82]                 Active Insurance as of 2/15/2023     Primary Coverage     Payor Plan Insurance Group Employer/Plan Group    MISC MEDICARE REPLACEMENT MISC MCARE REPLACEMENT [id number]     Coverage Address Coverage Phone Number Coverage Fax Number Effective Dates    [address] [address] [phone]  2/1/2023 - None Entered    [facility] [id number]       Subscriber Name Subscriber Birth Date Member ID       CYRUS JUNIOR 1945 [id number]           Secondary Coverage     Payor Plan Insurance Group Employer/Plan Group    [employer] [facility] DE[facility] [id number]     Payor Plan Address Payor Plan Phone Number Payor Plan Fax Number Effective Dates    [facility] OFFICE OF COMMUNITY CARE [phone]  2/15/2023 - None " Entered    [address] [address]       [facility] [address]       Subscriber Name Subscriber Birth Date Member ID       CYRUS RAMIREZ 1945 [id number]           Tertiary Coverage     Payor Plan Insurance Group Employer/Plan Group    [facility] OPTUM      Payor Plan Address Payor Plan Phone Number Payor Plan Fax Number Effective Dates    [address] [phone]  2022 - None Entered    [facility] [id number]       Subscriber Name Subscriber Birth Date Member ID       CYRUS RAMIREZ 1945 [id number]           Other Coverage     Payor Plan Insurance Group Employer/Plan Group    [place]      Payor Plan Address Payor Plan Phone Number Payor Plan Fax Number Effective Dates    [address]   2022 - None Entered    [place] [address]       Subscriber Name Subscriber Birth Date Member ID       CYRUS RAMIREZ 1945 [id number]                 Emergency Contacts      (Rel.) Home Phone Work Phone Mobile Phone    RICHAR MONROE (Sister) [phone] -- --               Discharge Summary      Christofer Solitario MD at 23 0922           [facility]     Progress Note    Patient Name: Cyrus Ramirez  : 1945  MRN: [id number]  Primary Care Physician:  Naa Valverde MD  Date of admission: 2/15/2023    Subjective    Subjective     : Cyrus Ramirez is a 77 y.o. male morbidly obese male sent from [facility] , with a past medical history of diabetes mellitus type 2, chronic kidney disease BPH, dementia, hypertension, heart failure preserved EF atrial fibrillation on anticoagulation who presented to [facility] on 2/15/2023 with reports of  Increased alteration in mental status. He has a documented history of dementia, baseline unknown.  He is easily awakened and alert and oriented to self.  He denies any chest pain, shortness of air or abdominal pain nausea vomiting or diarrhea or fever.  He has no facial droop or slurred speech and  "moves all extremities.  Poor historian for details unable to obtain further information from patient and no family at bedside.  Review of records shows he was admitted here in September for chest pain with acute on chronic heart failure preserved EF poorly controlled hypertension and acute UTI.  He had a 2D echo done at that time which showed left ventricular ejection fraction 55 to 60% with diastolic function was normal.  He also had a stress test Cardiolite which was normal showing no evidence of ischemia.  Urine culture at that time grew Klebsiella pneumoniae ESBL and Proteus Mirabella's ESBL.  Today he presents with similar results.  CT head per radiology showed no acute intracranial abnormality but did show extensive white matter findings most consistent changes of chronic microvascular disease stable.  Chest x-ray per radiology showed \"cardiomegaly with pulmonary vascular congestion and probable mild pulmonary edema pattern\".  EKG shows atrial fibrillation heart rate 55 with no ST elevation or depression.  Labs show high-sensitivity troponin 98 and 92 proBNP 7939 glucose 116 creatinine 1.61 with a baseline of 1.4 per labs, albumin 2.9 WBC not elevated at 3.9 hemoglobin 11.2 and improved from previous, respiratory panel was negative.  Urinalysis today shows 3+ leukocytes too numerous to count WBCs and 4+ bacteria.  Given past medical history of recent ESBL per urine culture he was started on IV Zosyn.  He was given 80 mg IV Lasix in ED and Nitropaste.  He was  hypertensive on admission and given IV hydralazine.  He was initially ordered a Cardene drip but blood pressure improved.  Continuous cardiac monitoring and repeat troponin lowness's have been ordered.  He will be admitted for further evaluation and treatment.    Objective    Objective     Vitals:   Temp:  [97.8 °F (36.6 °C)-98.4 °F (36.9 °C)] 98.4 °F (36.9 °C)  Heart Rate:  [67-76] 67  Resp:  [17-20] 17  BP: (118-145)/(78-95) 145/89    Physical " Exam  Constitutional:       Appearance: awake, alert, confused  HENT:      Head: Normocephalic.      Right Ear: Tympanic membrane normal.      Left Ear: Tympanic membrane normal.      Nose: Nose normal.      Mouth/Throat:      Mouth: Mucous membranes are moist.   Eyes:      Pupils: Pupils are equal, round, and reactive to light.   Cardiovascular:      Rate and Rhythm: Normal rate.      Pulses: Normal pulses.   Pulmonary:      Effort: Pulmonary effort is normal.   Abdominal:      General: Abdomen is flat.   Musculoskeletal:         Cervical back: Normal range of motion.   Skin:     General: Skin is warm.      Capillary Refill: Capillary refill takes less than 2 seconds.   Neurological:   Alert and confused, moves all extremities      Psychiatric:         Mood and Affect: Mood normal.         Result Review    Result Review:  I have personally reviewed the results from the time of this admission to 2/18/2023 09:22 EST and agree with these findings:  []  Laboratory list / accordion  []  Microbiology  []  Radiology  []  EKG/Telemetry   []  Cardiology/Vascular   []  Pathology  []  Old records  []  Other:      Assessment & Plan   Assessment / Plan         Active Hospital Problems:  Active Hospital Problems    Diagnosis    • **Altered mental status, unspecified altered mental status type    • Acute on chronic diastolic CHF (congestive heart failure) ([facility])    • Hypertensive urgency    • Elevated troponin    • Acute UTI (urinary tract infection)    • Morbid obesity ([facility])    • Dementia ([facility])    • CKD (chronic kidney disease), stage III ([facility])    • BPH without obstruction/lower urinary tract symptoms    • Atrial fibrillation ([facility])    • Type 2 diabetes mellitus with diabetic nephropathy ([facility])           Altered mental status with past medical history of dementia baseline unknown CT head negative for acute likely multifactorial including acute UTI and acute on chronic diastolic heart failure and hypertension see plans  below     Hypertensive urgency  Better controlled on discharge       Elevated troponin high-sensitivity   likely secondary to ischemic demand from congestive heart failure denies chest pain recent stress test in September was negative for ischemia, continuous cardiac monitoring repeat troponin  Cardiology consult appreciated, Troponin elevation flat and likely related to CKD; no evidence of acute myonecrosis     Complicated UTI  4+ bacteria 3+ leukocytes too numerous to count WBCs with past medical history ESBL, continue IV Zosyn until urine culture resulted  Urine cultures negative, will discharge on Ceftin for 5 more days       Type 2 diabetes mellitus with diabetic nephropathy     Acute on chronic heart failure preserved  Back to baseline     Morbid obesity BMI 44.48, encourage lifestyle management fall precautions     Dementia back to baseline     Chronic kidney disease stage  Monitor Cr, avoid nephrotoxins     BPH, home meds unverified at this time reorder pending verification pharmacy     Atrial fibrillation, rate controlled on home Eliquis   Telemetry     Hyperlipidemia         Patient stable on discharge    CODE STATUS:   Code Status (Patient has no pulse and is not breathing): CPR (Attempt to Resuscitate)  Medical Interventions (Patient has pulse or is breathing): Full Support      Christofer Solitario MD    Electronically signed by Christofer Solitario MD at 02/18/23 6873

## 2023-03-10 ENCOUNTER — HOSPITAL ENCOUNTER (EMERGENCY)
Facility: HOSPITAL | Age: 78
Discharge: HOME | End: 2023-03-10
Attending: EMERGENCY MEDICINE | Admitting: EMERGENCY MEDICINE
Payer: OTHER GOVERNMENT

## 2023-03-10 VITALS
TEMPERATURE: 97.9 F | HEART RATE: 63 BPM | BODY MASS INDEX: 44.38 KG/M2 | HEIGHT: 70 IN | DIASTOLIC BLOOD PRESSURE: 94 MMHG | RESPIRATION RATE: 17 BRPM | WEIGHT: 310 LBS | OXYGEN SATURATION: 100 % | SYSTOLIC BLOOD PRESSURE: 166 MMHG

## 2023-03-10 DIAGNOSIS — Z86.19 HISTORY OF ESBL E. COLI INFECTION: ICD-10-CM

## 2023-03-10 DIAGNOSIS — N18.9 CHRONIC KIDNEY DISEASE, UNSPECIFIED CKD STAGE: ICD-10-CM

## 2023-03-10 DIAGNOSIS — N30.01 ACUTE CYSTITIS WITH HEMATURIA: ICD-10-CM

## 2023-03-10 DIAGNOSIS — T83.9XXA FOLEY CATHETER PROBLEM, INITIAL ENCOUNTER: Primary | ICD-10-CM

## 2023-03-10 LAB
ANION GAP SERPL CALCULATED.3IONS-SCNC: 7 MMOL/L (ref 5–15)
BACTERIA UR QL AUTO: ABNORMAL /HPF
BASOPHILS # BLD AUTO: 0 10*3/MM3 (ref 0–0.2)
BASOPHILS NFR BLD AUTO: 0.3 % (ref 0–1.5)
BILIRUB UR QL STRIP: NEGATIVE
BUN SERPL-MCNC: 34 MG/DL (ref 8–23)
BUN/CREAT SERPL: 25.2 (ref 7–25)
CALCIUM SPEC-SCNC: 8.7 MG/DL (ref 8.6–10.5)
CHLORIDE SERPL-SCNC: 108 MMOL/L (ref 98–107)
CLARITY UR: ABNORMAL
CO2 SERPL-SCNC: 28 MMOL/L (ref 22–29)
COLOR UR: YELLOW
CREAT SERPL-MCNC: 1.35 MG/DL (ref 0.76–1.27)
DEPRECATED RDW RBC AUTO: 60.8 FL (ref 37–54)
EGFRCR SERPLBLD CKD-EPI 2021: 54.1 ML/MIN/1.73
EOSINOPHIL # BLD AUTO: 0.4 10*3/MM3 (ref 0–0.4)
EOSINOPHIL NFR BLD AUTO: 10.8 % (ref 0.3–6.2)
ERYTHROCYTE [DISTWIDTH] IN BLOOD BY AUTOMATED COUNT: 20.3 % (ref 12.3–15.4)
GLUCOSE SERPL-MCNC: 96 MG/DL (ref 65–99)
GLUCOSE UR STRIP-MCNC: NEGATIVE MG/DL
HCT VFR BLD AUTO: 35.2 % (ref 37.5–51)
HGB BLD-MCNC: 11 G/DL (ref 13–17.7)
HGB UR QL STRIP.AUTO: ABNORMAL
HYALINE CASTS UR QL AUTO: ABNORMAL /LPF
KETONES UR QL STRIP: NEGATIVE
LEUKOCYTE ESTERASE UR QL STRIP.AUTO: ABNORMAL
LYMPHOCYTES # BLD AUTO: 1.3 10*3/MM3 (ref 0.7–3.1)
LYMPHOCYTES NFR BLD AUTO: 34.7 % (ref 19.6–45.3)
MCH RBC QN AUTO: 25 PG (ref 26.6–33)
MCHC RBC AUTO-ENTMCNC: 31.2 G/DL (ref 31.5–35.7)
MCV RBC AUTO: 80.2 FL (ref 79–97)
MONOCYTES # BLD AUTO: 0.4 10*3/MM3 (ref 0.1–0.9)
MONOCYTES NFR BLD AUTO: 9.2 % (ref 5–12)
NEUTROPHILS NFR BLD AUTO: 1.7 10*3/MM3 (ref 1.7–7)
NEUTROPHILS NFR BLD AUTO: 45 % (ref 42.7–76)
NITRITE UR QL STRIP: POSITIVE
NRBC BLD AUTO-RTO: 0.1 /100 WBC (ref 0–0.2)
PH UR STRIP.AUTO: 7.5 [PH] (ref 5–8)
PLATELET # BLD AUTO: 180 10*3/MM3 (ref 140–450)
PMV BLD AUTO: 8.2 FL (ref 6–12)
POTASSIUM SERPL-SCNC: 3.6 MMOL/L (ref 3.5–5.2)
PROT UR QL STRIP: ABNORMAL
RBC # BLD AUTO: 4.39 10*6/MM3 (ref 4.14–5.8)
RBC # UR STRIP: ABNORMAL /HPF
REF LAB TEST METHOD: ABNORMAL
SODIUM SERPL-SCNC: 143 MMOL/L (ref 136–145)
SP GR UR STRIP: 1.02 (ref 1–1.03)
SQUAMOUS #/AREA URNS HPF: ABNORMAL /HPF
UROBILINOGEN UR QL STRIP: ABNORMAL
WBC # UR STRIP: ABNORMAL /HPF
WBC NRBC COR # BLD: 3.8 10*3/MM3 (ref 3.4–10.8)

## 2023-03-10 PROCEDURE — 51702 INSERT TEMP BLADDER CATH: CPT

## 2023-03-10 PROCEDURE — 36415 COLL VENOUS BLD VENIPUNCTURE: CPT

## 2023-03-10 PROCEDURE — 99284 EMERGENCY DEPT VISIT MOD MDM: CPT

## 2023-03-10 PROCEDURE — 80048 BASIC METABOLIC PNL TOTAL CA: CPT | Performed by: NURSE PRACTITIONER

## 2023-03-10 PROCEDURE — 25010000002 HYDRALAZINE PER 20 MG: Performed by: NURSE PRACTITIONER

## 2023-03-10 PROCEDURE — 81001 URINALYSIS AUTO W/SCOPE: CPT | Performed by: NURSE PRACTITIONER

## 2023-03-10 PROCEDURE — 87086 URINE CULTURE/COLONY COUNT: CPT | Performed by: NURSE PRACTITIONER

## 2023-03-10 PROCEDURE — 85025 COMPLETE CBC W/AUTO DIFF WBC: CPT | Performed by: NURSE PRACTITIONER

## 2023-03-10 PROCEDURE — 96374 THER/PROPH/DIAG INJ IV PUSH: CPT

## 2023-03-10 RX ORDER — GRANULES FOR ORAL 3 G/1
3 POWDER ORAL
Status: DISCONTINUED | OUTPATIENT
Start: 2023-03-10 | End: 2023-03-10 | Stop reason: HOSPADM

## 2023-03-10 RX ORDER — HYDRALAZINE HYDROCHLORIDE 20 MG/ML
20 INJECTION INTRAMUSCULAR; INTRAVENOUS ONCE
Status: COMPLETED | OUTPATIENT
Start: 2023-03-10 | End: 2023-03-10

## 2023-03-10 RX ORDER — SODIUM CHLORIDE 0.9 % (FLUSH) 0.9 %
10 SYRINGE (ML) INJECTION AS NEEDED
Status: DISCONTINUED | OUTPATIENT
Start: 2023-03-10 | End: 2023-03-10 | Stop reason: HOSPADM

## 2023-03-10 RX ADMIN — GRANULES FOR ORAL SOLUTION 3 G: 3 POWDER ORAL at 09:43

## 2023-03-10 RX ADMIN — HYDRALAZINE HYDROCHLORIDE 20 MG: 20 INJECTION INTRAMUSCULAR; INTRAVENOUS at 08:33

## 2023-03-10 NOTE — ED PROVIDER NOTES
Subjective   History of Present Illness  77-year-old male, alert and oriented to self and place, disoriented to time; presents from extended-care facility with complaint of suprapubic pain and urinary catheter not draining.  He reports associated nausea denies vomiting/diarrhea.  Denies associated fever sweats chills.             Review of Systems    Past Medical History:   Diagnosis Date   • Acute kidney failure (HCC)    • Acute respiratory failure with hypoxia (HCC)    • Anemia    • Benign prostatic hyperplasia    • Bilateral primary osteoarthritis of knee    • Cardiomegaly    • Cardiomyopathy (HCC)    • Cellulitis and abscess of left leg    • Cerebral infarction (HCC)    • Cerebrovascular disease    • Chronic kidney disease    • Dementia (HCC)    • Diabetes mellitus (HCC)    • Essential hypertension    • GERD (gastroesophageal reflux disease)    • Gout    • Heart failure (HCC)    • Hyperlipidemia    • Morbid obesity (HCC)    • Myocardial infarction (HCC)    • Obstructive sleep apnea    • Obstructive uropathy    • Paroxysmal atrial fibrillation (HCC)    • Peptic ulcer    • Peripheral vascular disease (HCC)    • Pulmonary edema    • Venous insufficiency        No Known Allergies    History reviewed. No pertinent surgical history.    Family History   Problem Relation Age of Onset   • Diabetes Father        Social History     Socioeconomic History   • Marital status:    • Number of children: 0   Tobacco Use   • Smoking status: Never   Vaping Use   • Vaping Use: Never used   Substance and Sexual Activity   • Alcohol use: Not Currently   • Drug use: Never   • Sexual activity: Defer           Objective   Physical Exam  Vitals and nursing note reviewed.   Constitutional:       General: He is awake. He is not in acute distress.     Appearance: Normal appearance. He is well-developed and normal weight. He is not ill-appearing, toxic-appearing or diaphoretic.   HENT:      Mouth/Throat:      Mouth: Mucous membranes  are moist.      Pharynx: Oropharynx is clear.   Eyes:      General: No scleral icterus.  Cardiovascular:      Rate and Rhythm: Normal rate and regular rhythm.      Pulses: Normal pulses.      Heart sounds: Normal heart sounds, S1 normal and S2 normal. Heart sounds not distant. No murmur heard.    No friction rub. No gallop.   Pulmonary:      Effort: Pulmonary effort is normal.      Breath sounds: Normal breath sounds and air entry.   Abdominal:      General: Abdomen is flat. Bowel sounds are normal. There is no distension.      Palpations: Abdomen is soft. There is no mass.      Tenderness: There is abdominal tenderness in the suprapubic area. There is no right CVA tenderness, left CVA tenderness, guarding or rebound.      Hernia: No hernia is present.       Skin:     General: Skin is warm and dry.      Capillary Refill: Capillary refill takes less than 2 seconds.      Coloration: Skin is not jaundiced or pale.      Findings: No rash.   Neurological:      Mental Status: He is alert. He is disoriented.      GCS: GCS eye subscore is 4. GCS verbal subscore is 5. GCS motor subscore is 6.      Comments: Patient has history of dementia, alert and oriented x2 likely his baseline. He does have history of CVA and communication deficit.   Psychiatric:         Mood and Affect: Mood normal.         Behavior: Behavior normal. Behavior is cooperative.         Thought Content: Thought content normal.         Judgment: Judgment normal.         Procedures           ED Course                                           Medical Decision Making  Acute cystitis with hematuria: acute illness or injury  Chronic kidney disease, unspecified CKD stage: acute illness or injury  Motley catheter problem, initial encounter (HCC): acute illness or injury  History of ESBL E. coli infection: acute illness or injury  Amount and/or Complexity of Data Reviewed  Labs: ordered. Decision-making details documented in ED Course.  Discussion of management or  "test interpretation with external provider(s): Spoke with MAGUI Steele at the facility who will manage patient's antibiotics once he returns.    Risk  Prescription drug management.        Interpreted by radiologist as below:     No radiology results for the last day      BP (!) 174/102   Pulse 64   Temp 97.9 °F (36.6 °C)   Resp 17   Ht 177.8 cm (70\")   Wt (!) 141 kg (310 lb)   SpO2 100%   BMI 44.48 kg/m²      Lab Results (last 24 hours)     Procedure Component Value Units Date/Time    CBC & Differential [[id number]]  (Abnormal) Collected: 03/10/23 0706    Specimen: Blood Updated: 03/10/23 0736    Narrative:      The following orders were created for panel order CBC & Differential.  Procedure                               Abnormality         Status                     ---------                               -----------         ------                     CBC Auto Differential[[id number]]        Abnormal            Final result                 Please view results for these tests on the individual orders.    CBC Auto Differential [[id number]]  (Abnormal) Collected: 03/10/23 0706    Specimen: Blood Updated: 03/10/23 0736     WBC 3.80 10*3/mm3      RBC 4.39 10*6/mm3      Hemoglobin 11.0 g/dL      Hematocrit 35.2 %      MCV 80.2 fL      MCH 25.0 pg      MCHC 31.2 g/dL      RDW 20.3 %      RDW-SD 60.8 fl      MPV 8.2 fL      Platelets 180 10*3/mm3      Neutrophil % 45.0 %      Lymphocyte % 34.7 %      Monocyte % 9.2 %      Eosinophil % 10.8 %      Basophil % 0.3 %      Neutrophils, Absolute 1.70 10*3/mm3      Lymphocytes, Absolute 1.30 10*3/mm3      Monocytes, Absolute 0.40 10*3/mm3      Eosinophils, Absolute 0.40 10*3/mm3      Basophils, Absolute 0.00 10*3/mm3      nRBC 0.1 /100 WBC     Basic Metabolic Panel [[id number]]  (Abnormal) Collected: 03/10/23 0720    Specimen: Blood Updated: 03/10/23 0756     Glucose 96 mg/dL      BUN 34 mg/dL      Creatinine 1.35 mg/dL      Sodium 143 mmol/L      Potassium 3.6 mmol/L      Chloride " 108 mmol/L      CO2 28.0 mmol/L      Calcium 8.7 mg/dL      BUN/Creatinine Ratio 25.2     Anion Gap 7.0 mmol/L      eGFR 54.1 mL/min/1.73     Narrative:      GFR Normal >60  Chronic Kidney Disease <60  Kidney Failure <15    The GFR formula is only valid for adults with stable renal function between ages 18 and 70.    Urinalysis With Culture If Indicated - Urine, Catheter [[id number]]  (Abnormal) Collected: 03/10/23 0805    Specimen: Urine, Catheter Updated: 03/10/23 0815     Color, UA Yellow     Appearance, UA Slightly Cloudy     Comment: Result checked          pH, UA 7.5     Specific Gravity, UA 1.018     Glucose, UA Negative     Ketones, UA Negative     Bilirubin, UA Negative     Blood, UA Moderate (2+)     Protein,  mg/dL (2+)     Leuk Esterase, UA Large (3+)     Nitrite, UA Positive     Urobilinogen, UA 0.2 E.U./dL    Narrative:      In absence of clinical symptoms, the presence of pyuria, bacteria, and/or nitrites on the urinalysis result does not correlate with infection.    Urinalysis, Microscopic Only - Urine, Catheter [[id number]]  (Abnormal) Collected: 03/10/23 0805    Specimen: Urine, Catheter Updated: 03/10/23 0817     RBC, UA Too Numerous to Count /HPF      WBC, UA Too Numerous to Count /HPF      Bacteria, UA 1+ /HPF      Squamous Epithelial Cells, UA 0-2 /HPF      Hyaline Casts, UA 3-6 /LPF      Methodology Automated Microscopy    Urine Culture - Urine, Urine, Catheter [[id number]] Collected: 03/10/23 0805    Specimen: Urine, Catheter Updated: 03/10/23 0817           Medications   sodium chloride 0.9 % flush 10 mL (has no administration in time range)   fosfomycin (MONUROL) packet 3 g (3 g Oral Given 3/10/23 0943)   hydrALAZINE (APRESOLINE) injection 20 mg (20 mg Intravenous Given 3/10/23 0833)        Patient undressed and placed in gown for exam.  Appropriate PPE worn during patient exam.  Appropriate monitoring initiated. Patient is alert and oriented x2.  No acute distress noted.  Patient has  point tenderness noted to the suprapubic region.  Bladder scan ordered.  Motley catheter replaced. CBC essentially unremarkable.  BMP is improved since previous.  Urine was significant for moderate blood large leuk esterase nitrite positive to numerous red and white cells 1+ bacteria.  Spoke with Misty, pharmacist who recommends fosfomycin x3 doses.  First dose was given in the ER.  The rest was dispensed for meds for beds.  Patient received relief after Motley catheter was replaced.    I reviewed chart 2/15/2023 patient was admitted at this facility for metabolic encephalopathy, UTI, altered mental status, CKD Differential Diagnoses, not all-inclusive and does not constitute entirety of all causes: Pyelonephritis, UTI, Motley catheter dysfunction.  Motley catheter dysfunction determined when replacing Motley catheter and is now functioning.  UTI was confirmed by labs.  Pyelonephritis ruled out, as patient has no flank pain.    Disposition/Treatment: Discussed results with patient, verbalized understanding. Discussed reasons to return to the ER, patient verbalized understanding. Agreeable with plan of care. Patient was stable upon discharge.    Upon reassessment, patient is flesh tone warm and dry no acute distress noted.  Vital signs are stable.    Part of this note may be an electronic transcription/translation of spoken language to printed text using the Dragon Dictation System.         Final diagnoses:   Motley catheter problem, initial encounter (HCC)   Acute cystitis with hematuria   History of ESBL E. coli infection   Chronic kidney disease, unspecified CKD stage       ED Disposition  ED Disposition     ED Disposition   Discharge    Condition   Stable    Comment   --             Naa Valverde MD  [address]  [place] [address] [phone]    Schedule an appointment as soon as possible for a visit       [facility] EMERGENCY DEPARTMENT  [facility]  [address] [phone]  Go to   If symptoms worsen         Medication List      New Prescriptions    fosfomycin 3 g pack  Commonly known as: MONUROL  Mix 1 packet in 3 to 4 oz water and take by mouth every 48 hours as directed.           Where to Get Your Medications      [facility] [address] [place] [address]    Hours: Mon-Fri 7:00AM-7:00PM Phone: [phone]   · fosfomycin 3 g pack          Amarilis Wiggins, APRN  03/10/23 1242       Amarilis Wiggins, APRSULMA  03/10/23 1249

## 2023-03-10 NOTE — DISCHARGE INSTRUCTIONS
Perform catheter care as directed.  Take second dose of fosfomycin in 2 days, then third dose 2 days after that.  First dose was given in the ER.  Schedule follow-up with primary care.  Return to the ER for new or worsening symptoms.

## 2023-03-11 LAB — BACTERIA SPEC AEROBE CULT: NORMAL

## 2023-03-25 ENCOUNTER — APPOINTMENT (OUTPATIENT)
Dept: GENERAL RADIOLOGY | Facility: HOSPITAL | Age: 78
DRG: 698 | End: 2023-03-25
Payer: OTHER GOVERNMENT

## 2023-03-25 ENCOUNTER — HOSPITAL ENCOUNTER (INPATIENT)
Facility: HOSPITAL | Age: 78
LOS: 9 days | Discharge: NURSING, REHAB OR LONG-TERM CARE | DRG: 698 | End: 2023-04-04
Attending: EMERGENCY MEDICINE | Admitting: HOSPITALIST
Payer: OTHER GOVERNMENT

## 2023-03-25 ENCOUNTER — APPOINTMENT (OUTPATIENT)
Dept: CT IMAGING | Facility: HOSPITAL | Age: 78
DRG: 698 | End: 2023-03-25
Payer: OTHER GOVERNMENT

## 2023-03-25 DIAGNOSIS — R41.82 ALTERED MENTAL STATUS, UNSPECIFIED ALTERED MENTAL STATUS TYPE: Primary | ICD-10-CM

## 2023-03-25 DIAGNOSIS — N39.0 URINARY TRACT INFECTION ASSOCIATED WITH INDWELLING URETHRAL CATHETER, INITIAL ENCOUNTER: ICD-10-CM

## 2023-03-25 DIAGNOSIS — R50.9 FEVER, UNSPECIFIED FEVER CAUSE: ICD-10-CM

## 2023-03-25 DIAGNOSIS — T83.511A URINARY TRACT INFECTION ASSOCIATED WITH INDWELLING URETHRAL CATHETER, INITIAL ENCOUNTER: ICD-10-CM

## 2023-03-25 DIAGNOSIS — N39.0 URINARY TRACT INFECTION WITHOUT HEMATURIA, SITE UNSPECIFIED: ICD-10-CM

## 2023-03-25 LAB
ALBUMIN SERPL-MCNC: 3.1 G/DL (ref 3.5–5.2)
ALBUMIN/GLOB SERPL: 0.9 G/DL
ALP SERPL-CCNC: 101 U/L (ref 39–117)
ALT SERPL W P-5'-P-CCNC: 9 U/L (ref 1–41)
ANION GAP SERPL CALCULATED.3IONS-SCNC: 8 MMOL/L (ref 5–15)
ARTERIAL PATENCY WRIST A: NEGATIVE
AST SERPL-CCNC: 13 U/L (ref 1–40)
ATMOSPHERIC PRESS: ABNORMAL MM[HG]
BACTERIA UR QL AUTO: ABNORMAL /HPF
BASE EXCESS BLDA CALC-SCNC: 5.3 MMOL/L (ref 0–3)
BASOPHILS # BLD AUTO: 0 10*3/MM3 (ref 0–0.2)
BASOPHILS NFR BLD AUTO: 0.3 % (ref 0–1.5)
BDY SITE: ABNORMAL
BILIRUB SERPL-MCNC: 0.4 MG/DL (ref 0–1.2)
BILIRUB UR QL STRIP: NEGATIVE
BUN SERPL-MCNC: 22 MG/DL (ref 8–23)
BUN/CREAT SERPL: 21.8 (ref 7–25)
CALCIUM SPEC-SCNC: 8.7 MG/DL (ref 8.6–10.5)
CHLORIDE SERPL-SCNC: 100 MMOL/L (ref 98–107)
CLARITY UR: ABNORMAL
CO2 BLDA-SCNC: 33.8 MMOL/L (ref 22–29)
CO2 SERPL-SCNC: 28 MMOL/L (ref 22–29)
COLOR UR: ABNORMAL
CREAT SERPL-MCNC: 1.01 MG/DL (ref 0.76–1.27)
D-LACTATE SERPL-SCNC: 1 MMOL/L (ref 0.3–2)
DEPRECATED RDW RBC AUTO: 59.1 FL (ref 37–54)
EGFRCR SERPLBLD CKD-EPI 2021: 76.6 ML/MIN/1.73
EOSINOPHIL # BLD AUTO: 0.2 10*3/MM3 (ref 0–0.4)
EOSINOPHIL NFR BLD AUTO: 4.7 % (ref 0.3–6.2)
ERYTHROCYTE [DISTWIDTH] IN BLOOD BY AUTOMATED COUNT: 20.7 % (ref 12.3–15.4)
GLOBULIN UR ELPH-MCNC: 3.6 GM/DL
GLUCOSE SERPL-MCNC: 104 MG/DL (ref 65–99)
GLUCOSE UR STRIP-MCNC: NEGATIVE MG/DL
HCO3 BLDA-SCNC: 32.1 MMOL/L (ref 21–28)
HCT VFR BLD AUTO: 38 % (ref 37.5–51)
HEMODILUTION: NO
HGB BLD-MCNC: 11.7 G/DL (ref 13–17.7)
HGB UR QL STRIP.AUTO: ABNORMAL
HOLD SPECIMEN: NORMAL
HOLD SPECIMEN: NORMAL
HYALINE CASTS UR QL AUTO: ABNORMAL /LPF
INHALED O2 CONCENTRATION: 28 %
KETONES UR QL STRIP: NEGATIVE
LEUKOCYTE ESTERASE UR QL STRIP.AUTO: ABNORMAL
LYMPHOCYTES # BLD AUTO: 1.1 10*3/MM3 (ref 0.7–3.1)
LYMPHOCYTES NFR BLD AUTO: 24.3 % (ref 19.6–45.3)
MCH RBC QN AUTO: 25 PG (ref 26.6–33)
MCHC RBC AUTO-ENTMCNC: 30.7 G/DL (ref 31.5–35.7)
MCV RBC AUTO: 81.4 FL (ref 79–97)
MODALITY: ABNORMAL
MONOCYTES # BLD AUTO: 0.4 10*3/MM3 (ref 0.1–0.9)
MONOCYTES NFR BLD AUTO: 9.6 % (ref 5–12)
NEUTROPHILS NFR BLD AUTO: 2.9 10*3/MM3 (ref 1.7–7)
NEUTROPHILS NFR BLD AUTO: 61.1 % (ref 42.7–76)
NITRITE UR QL STRIP: POSITIVE
NRBC BLD AUTO-RTO: 0 /100 WBC (ref 0–0.2)
PCO2 BLDA: 55.6 MM HG (ref 35–48)
PH BLDA: 7.37 PH UNITS (ref 7.35–7.45)
PH UR STRIP.AUTO: 8 [PH] (ref 5–8)
PLATELET # BLD AUTO: 209 10*3/MM3 (ref 140–450)
PMV BLD AUTO: 8.2 FL (ref 6–12)
PO2 BLDA: 27.7 MM HG (ref 83–108)
POTASSIUM SERPL-SCNC: 4.1 MMOL/L (ref 3.5–5.2)
PROCALCITONIN SERPL-MCNC: 0.07 NG/ML (ref 0–0.25)
PROT SERPL-MCNC: 6.7 G/DL (ref 6–8.5)
PROT UR QL STRIP: ABNORMAL
RBC # BLD AUTO: 4.66 10*6/MM3 (ref 4.14–5.8)
RBC # UR STRIP: ABNORMAL /HPF
REF LAB TEST METHOD: ABNORMAL
SAO2 % BLDCOA: 48.4 % (ref 94–98)
SODIUM SERPL-SCNC: 136 MMOL/L (ref 136–145)
SP GR UR STRIP: 1.01 (ref 1–1.03)
SQUAMOUS #/AREA URNS HPF: ABNORMAL /HPF
TRI-PHOS CRY URNS QL MICRO: ABNORMAL /HPF
UROBILINOGEN UR QL STRIP: ABNORMAL
WBC # UR STRIP: ABNORMAL /HPF
WBC NRBC COR # BLD: 4.7 10*3/MM3 (ref 3.4–10.8)
WHOLE BLOOD HOLD COAG: NORMAL
WHOLE BLOOD HOLD SPECIMEN: NORMAL

## 2023-03-25 PROCEDURE — 85025 COMPLETE CBC W/AUTO DIFF WBC: CPT | Performed by: EMERGENCY MEDICINE

## 2023-03-25 PROCEDURE — 87040 BLOOD CULTURE FOR BACTERIA: CPT | Performed by: EMERGENCY MEDICINE

## 2023-03-25 PROCEDURE — 87077 CULTURE AEROBIC IDENTIFY: CPT | Performed by: EMERGENCY MEDICINE

## 2023-03-25 PROCEDURE — 83605 ASSAY OF LACTIC ACID: CPT

## 2023-03-25 PROCEDURE — 80053 COMPREHEN METABOLIC PANEL: CPT | Performed by: EMERGENCY MEDICINE

## 2023-03-25 PROCEDURE — 87147 CULTURE TYPE IMMUNOLOGIC: CPT | Performed by: EMERGENCY MEDICINE

## 2023-03-25 PROCEDURE — 84145 PROCALCITONIN (PCT): CPT | Performed by: EMERGENCY MEDICINE

## 2023-03-25 PROCEDURE — 93005 ELECTROCARDIOGRAM TRACING: CPT | Performed by: EMERGENCY MEDICINE

## 2023-03-25 PROCEDURE — 87150 DNA/RNA AMPLIFIED PROBE: CPT | Performed by: EMERGENCY MEDICINE

## 2023-03-25 PROCEDURE — 81001 URINALYSIS AUTO W/SCOPE: CPT | Performed by: EMERGENCY MEDICINE

## 2023-03-25 PROCEDURE — 87186 SC STD MICRODIL/AGAR DIL: CPT | Performed by: EMERGENCY MEDICINE

## 2023-03-25 PROCEDURE — 51702 INSERT TEMP BLADDER CATH: CPT

## 2023-03-25 PROCEDURE — 87086 URINE CULTURE/COLONY COUNT: CPT | Performed by: EMERGENCY MEDICINE

## 2023-03-25 PROCEDURE — 70450 CT HEAD/BRAIN W/O DYE: CPT

## 2023-03-25 PROCEDURE — 36600 WITHDRAWAL OF ARTERIAL BLOOD: CPT

## 2023-03-25 PROCEDURE — 71045 X-RAY EXAM CHEST 1 VIEW: CPT

## 2023-03-25 PROCEDURE — 82803 BLOOD GASES ANY COMBINATION: CPT

## 2023-03-25 PROCEDURE — 25010000002 PIPERACILLIN SOD-TAZOBACTAM PER 1 G: Performed by: EMERGENCY MEDICINE

## 2023-03-25 PROCEDURE — 99285 EMERGENCY DEPT VISIT HI MDM: CPT

## 2023-03-25 PROCEDURE — 87181 SC STD AGAR DILUTION PER AGT: CPT | Performed by: EMERGENCY MEDICINE

## 2023-03-25 RX ORDER — SODIUM CHLORIDE 0.9 % (FLUSH) 0.9 %
10 SYRINGE (ML) INJECTION AS NEEDED
Status: DISCONTINUED | OUTPATIENT
Start: 2023-03-25 | End: 2023-04-04 | Stop reason: HOSPADM

## 2023-03-25 RX ORDER — ACETAMINOPHEN 650 MG/1
650 SUPPOSITORY RECTAL ONCE
Status: COMPLETED | OUTPATIENT
Start: 2023-03-25 | End: 2023-03-25

## 2023-03-25 RX ORDER — LABETALOL HYDROCHLORIDE 5 MG/ML
10 INJECTION, SOLUTION INTRAVENOUS ONCE
Status: COMPLETED | OUTPATIENT
Start: 2023-03-25 | End: 2023-03-25

## 2023-03-25 RX ADMIN — PIPERACILLIN AND TAZOBACTAM 4.5 G: 4; .5 INJECTION, POWDER, FOR SOLUTION INTRAVENOUS at 22:21

## 2023-03-25 RX ADMIN — ACETAMINOPHEN 650 MG: 650 SUPPOSITORY RECTAL at 21:33

## 2023-03-25 RX ADMIN — LABETALOL HYDROCHLORIDE 10 MG: 5 INJECTION, SOLUTION INTRAVENOUS at 22:55

## 2023-03-25 NOTE — LETTER
[facility] CASE MANAGEMENT  [address] [place] [address] [place] [id number] [phone]        April 1, 2023      Patient: Shaji Junior  YOB: 1945  Date of Visit: 3/25/2023      The case ref # is [id number].      Symone Felipe RN  Weekend   [address] [place] [address]  Office: [phone]  Fax: [phone]  [email or web address]

## 2023-03-25 NOTE — Clinical Note
Level of Care: Telemetry [5]   Admitting Physician: YANNI DUMONT [[id number]]   Attending Physician: YANNI DUMONT [[id number]]

## 2023-03-26 PROBLEM — N39.0 UTI (URINARY TRACT INFECTION): Status: ACTIVE | Noted: 2023-03-26

## 2023-03-26 LAB
ADV 40+41 DNA STL QL NAA+NON-PROBE: NOT DETECTED
ANION GAP SERPL CALCULATED.3IONS-SCNC: 8 MMOL/L (ref 5–15)
ASTRO TYP 1-8 RNA STL QL NAA+NON-PROBE: NOT DETECTED
BACTERIA BLD CULT: ABNORMAL
BOTTLE TYPE: ABNORMAL
BUN SERPL-MCNC: 21 MG/DL (ref 8–23)
BUN/CREAT SERPL: 20.8 (ref 7–25)
C CAYETANENSIS DNA STL QL NAA+NON-PROBE: NOT DETECTED
C COLI+JEJ+UPSA DNA STL QL NAA+NON-PROBE: NOT DETECTED
C DIFF GDH + TOXINS A+B STL QL IA.RAPID: NEGATIVE
C DIFF GDH + TOXINS A+B STL QL IA.RAPID: NEGATIVE
CALCIUM SPEC-SCNC: 8.6 MG/DL (ref 8.6–10.5)
CHLORIDE SERPL-SCNC: 102 MMOL/L (ref 98–107)
CO2 SERPL-SCNC: 29 MMOL/L (ref 22–29)
CREAT SERPL-MCNC: 1.01 MG/DL (ref 0.76–1.27)
CRYPTOSP DNA STL QL NAA+NON-PROBE: NOT DETECTED
DEPRECATED RDW RBC AUTO: 62.6 FL (ref 37–54)
E HISTOLYT DNA STL QL NAA+NON-PROBE: NOT DETECTED
EAEC PAA PLAS AGGR+AATA ST NAA+NON-PRB: NOT DETECTED
EC STX1+STX2 GENES STL QL NAA+NON-PROBE: NOT DETECTED
EGFRCR SERPLBLD CKD-EPI 2021: 76.6 ML/MIN/1.73
EPEC EAE GENE STL QL NAA+NON-PROBE: NOT DETECTED
ERYTHROCYTE [DISTWIDTH] IN BLOOD BY AUTOMATED COUNT: 21.2 % (ref 12.3–15.4)
ETEC LTA+ST1A+ST1B TOX ST NAA+NON-PROBE: NOT DETECTED
G LAMBLIA DNA STL QL NAA+NON-PROBE: NOT DETECTED
GLUCOSE BLDC GLUCOMTR-MCNC: 104 MG/DL (ref 70–105)
GLUCOSE BLDC GLUCOMTR-MCNC: 115 MG/DL (ref 70–105)
GLUCOSE BLDC GLUCOMTR-MCNC: 116 MG/DL (ref 70–105)
GLUCOSE BLDC GLUCOMTR-MCNC: 96 MG/DL (ref 70–105)
GLUCOSE SERPL-MCNC: 113 MG/DL (ref 65–99)
HCT VFR BLD AUTO: 34.9 % (ref 37.5–51)
HGB BLD-MCNC: 11 G/DL (ref 13–17.7)
MCH RBC QN AUTO: 25.4 PG (ref 26.6–33)
MCHC RBC AUTO-ENTMCNC: 31.6 G/DL (ref 31.5–35.7)
MCV RBC AUTO: 80.2 FL (ref 79–97)
NOROVIRUS GI+II RNA STL QL NAA+NON-PROBE: NOT DETECTED
P SHIGELLOIDES DNA STL QL NAA+NON-PROBE: NOT DETECTED
PLATELET # BLD AUTO: 197 10*3/MM3 (ref 140–450)
PMV BLD AUTO: 8.3 FL (ref 6–12)
POTASSIUM SERPL-SCNC: 4.1 MMOL/L (ref 3.5–5.2)
QT INTERVAL: 361 MS
RBC # BLD AUTO: 4.35 10*6/MM3 (ref 4.14–5.8)
RVA RNA STL QL NAA+NON-PROBE: NOT DETECTED
S ENT+BONG DNA STL QL NAA+NON-PROBE: NOT DETECTED
SAPO I+II+IV+V RNA STL QL NAA+NON-PROBE: NOT DETECTED
SHIGELLA SP+EIEC IPAH ST NAA+NON-PROBE: NOT DETECTED
SODIUM SERPL-SCNC: 139 MMOL/L (ref 136–145)
V CHOL+PARA+VUL DNA STL QL NAA+NON-PROBE: NOT DETECTED
V CHOLERAE DNA STL QL NAA+NON-PROBE: NOT DETECTED
WBC NRBC COR # BLD: 5.7 10*3/MM3 (ref 3.4–10.8)
Y ENTEROCOL DNA STL QL NAA+NON-PROBE: NOT DETECTED

## 2023-03-26 PROCEDURE — 93010 ELECTROCARDIOGRAM REPORT: CPT | Performed by: INTERNAL MEDICINE

## 2023-03-26 PROCEDURE — 25010000002 HYDRALAZINE PER 20 MG: Performed by: STUDENT IN AN ORGANIZED HEALTH CARE EDUCATION/TRAINING PROGRAM

## 2023-03-26 PROCEDURE — 85027 COMPLETE CBC AUTOMATED: CPT | Performed by: STUDENT IN AN ORGANIZED HEALTH CARE EDUCATION/TRAINING PROGRAM

## 2023-03-26 PROCEDURE — 87449 NOS EACH ORGANISM AG IA: CPT | Performed by: STUDENT IN AN ORGANIZED HEALTH CARE EDUCATION/TRAINING PROGRAM

## 2023-03-26 PROCEDURE — 99254 IP/OBS CNSLTJ NEW/EST MOD 60: CPT | Performed by: INTERNAL MEDICINE

## 2023-03-26 PROCEDURE — 80048 BASIC METABOLIC PNL TOTAL CA: CPT | Performed by: STUDENT IN AN ORGANIZED HEALTH CARE EDUCATION/TRAINING PROGRAM

## 2023-03-26 PROCEDURE — 87324 CLOSTRIDIUM AG IA: CPT | Performed by: STUDENT IN AN ORGANIZED HEALTH CARE EDUCATION/TRAINING PROGRAM

## 2023-03-26 PROCEDURE — 82962 GLUCOSE BLOOD TEST: CPT

## 2023-03-26 PROCEDURE — 84484 ASSAY OF TROPONIN QUANT: CPT | Performed by: PHYSICIAN ASSISTANT

## 2023-03-26 PROCEDURE — 25010000002 HYDRALAZINE PER 20 MG: Performed by: INTERNAL MEDICINE

## 2023-03-26 PROCEDURE — 36415 COLL VENOUS BLD VENIPUNCTURE: CPT | Performed by: STUDENT IN AN ORGANIZED HEALTH CARE EDUCATION/TRAINING PROGRAM

## 2023-03-26 PROCEDURE — 25010000002 PIPERACILLIN SOD-TAZOBACTAM PER 1 G: Performed by: STUDENT IN AN ORGANIZED HEALTH CARE EDUCATION/TRAINING PROGRAM

## 2023-03-26 PROCEDURE — 87507 IADNA-DNA/RNA PROBE TQ 12-25: CPT | Performed by: STUDENT IN AN ORGANIZED HEALTH CARE EDUCATION/TRAINING PROGRAM

## 2023-03-26 PROCEDURE — 93005 ELECTROCARDIOGRAM TRACING: CPT | Performed by: STUDENT IN AN ORGANIZED HEALTH CARE EDUCATION/TRAINING PROGRAM

## 2023-03-26 PROCEDURE — 25010000002 ENOXAPARIN PER 10 MG: Performed by: STUDENT IN AN ORGANIZED HEALTH CARE EDUCATION/TRAINING PROGRAM

## 2023-03-26 RX ORDER — ESCITALOPRAM OXALATE 10 MG/1
10 TABLET ORAL DAILY
Status: DISCONTINUED | OUTPATIENT
Start: 2023-03-26 | End: 2023-04-04 | Stop reason: HOSPADM

## 2023-03-26 RX ORDER — ALBUTEROL SULFATE 2.5 MG/3ML
2.5 SOLUTION RESPIRATORY (INHALATION) EVERY 4 HOURS PRN
Status: DISCONTINUED | OUTPATIENT
Start: 2023-03-26 | End: 2023-04-04 | Stop reason: HOSPADM

## 2023-03-26 RX ORDER — DEXTROSE MONOHYDRATE 25 G/50ML
25 INJECTION, SOLUTION INTRAVENOUS
Status: DISCONTINUED | OUTPATIENT
Start: 2023-03-26 | End: 2023-04-04 | Stop reason: HOSPADM

## 2023-03-26 RX ORDER — ONDANSETRON 2 MG/ML
4 INJECTION INTRAMUSCULAR; INTRAVENOUS EVERY 6 HOURS PRN
Status: DISCONTINUED | OUTPATIENT
Start: 2023-03-26 | End: 2023-04-04 | Stop reason: HOSPADM

## 2023-03-26 RX ORDER — ACETAMINOPHEN 650 MG/1
650 SUPPOSITORY RECTAL EVERY 4 HOURS PRN
Status: DISCONTINUED | OUTPATIENT
Start: 2023-03-26 | End: 2023-04-04 | Stop reason: HOSPADM

## 2023-03-26 RX ORDER — SODIUM CHLORIDE 0.9 % (FLUSH) 0.9 %
10 SYRINGE (ML) INJECTION EVERY 12 HOURS SCHEDULED
Status: DISCONTINUED | OUTPATIENT
Start: 2023-03-26 | End: 2023-04-04 | Stop reason: HOSPADM

## 2023-03-26 RX ORDER — ENOXAPARIN SODIUM 150 MG/ML
1 INJECTION SUBCUTANEOUS EVERY 12 HOURS
Status: DISCONTINUED | OUTPATIENT
Start: 2023-03-26 | End: 2023-03-31

## 2023-03-26 RX ORDER — SODIUM CHLORIDE 9 MG/ML
40 INJECTION, SOLUTION INTRAVENOUS AS NEEDED
Status: DISCONTINUED | OUTPATIENT
Start: 2023-03-26 | End: 2023-04-04 | Stop reason: HOSPADM

## 2023-03-26 RX ORDER — GABAPENTIN 300 MG/1
300 CAPSULE ORAL 3 TIMES DAILY
Status: DISCONTINUED | OUTPATIENT
Start: 2023-03-26 | End: 2023-04-04 | Stop reason: HOSPADM

## 2023-03-26 RX ORDER — SODIUM CHLORIDE 0.9 % (FLUSH) 0.9 %
10 SYRINGE (ML) INJECTION AS NEEDED
Status: DISCONTINUED | OUTPATIENT
Start: 2023-03-26 | End: 2023-04-04 | Stop reason: HOSPADM

## 2023-03-26 RX ORDER — FINASTERIDE 5 MG/1
5 TABLET, FILM COATED ORAL DAILY
Status: DISCONTINUED | OUTPATIENT
Start: 2023-03-26 | End: 2023-04-04 | Stop reason: HOSPADM

## 2023-03-26 RX ORDER — LABETALOL HYDROCHLORIDE 5 MG/ML
10 INJECTION, SOLUTION INTRAVENOUS
Status: DISCONTINUED | OUTPATIENT
Start: 2023-03-26 | End: 2023-03-26

## 2023-03-26 RX ORDER — NITROGLYCERIN 0.4 MG/1
0.4 TABLET SUBLINGUAL
Status: DISCONTINUED | OUTPATIENT
Start: 2023-03-26 | End: 2023-04-04 | Stop reason: HOSPADM

## 2023-03-26 RX ORDER — HYDRALAZINE HYDROCHLORIDE 20 MG/ML
10 INJECTION INTRAMUSCULAR; INTRAVENOUS ONCE
Status: COMPLETED | OUTPATIENT
Start: 2023-03-26 | End: 2023-03-26

## 2023-03-26 RX ORDER — TAMSULOSIN HYDROCHLORIDE 0.4 MG/1
0.4 CAPSULE ORAL DAILY
Status: DISCONTINUED | OUTPATIENT
Start: 2023-03-26 | End: 2023-04-04 | Stop reason: HOSPADM

## 2023-03-26 RX ORDER — AMLODIPINE BESYLATE 5 MG/1
10 TABLET ORAL
Status: DISCONTINUED | OUTPATIENT
Start: 2023-03-26 | End: 2023-04-04 | Stop reason: HOSPADM

## 2023-03-26 RX ORDER — CARVEDILOL 6.25 MG/1
12.5 TABLET ORAL 2 TIMES DAILY WITH MEALS
Status: DISCONTINUED | OUTPATIENT
Start: 2023-03-26 | End: 2023-03-31

## 2023-03-26 RX ORDER — SODIUM CHLORIDE, SODIUM LACTATE, POTASSIUM CHLORIDE, CALCIUM CHLORIDE 600; 310; 30; 20 MG/100ML; MG/100ML; MG/100ML; MG/100ML
50 INJECTION, SOLUTION INTRAVENOUS CONTINUOUS
Status: DISCONTINUED | OUTPATIENT
Start: 2023-03-26 | End: 2023-04-03

## 2023-03-26 RX ORDER — HYDRALAZINE HYDROCHLORIDE 20 MG/ML
5 INJECTION INTRAMUSCULAR; INTRAVENOUS EVERY 6 HOURS PRN
Status: DISCONTINUED | OUTPATIENT
Start: 2023-03-26 | End: 2023-03-30

## 2023-03-26 RX ORDER — ONDANSETRON 4 MG/1
4 TABLET, FILM COATED ORAL EVERY 6 HOURS PRN
Status: DISCONTINUED | OUTPATIENT
Start: 2023-03-26 | End: 2023-04-04 | Stop reason: HOSPADM

## 2023-03-26 RX ORDER — TRAMADOL HYDROCHLORIDE 50 MG/1
50 TABLET ORAL EVERY 6 HOURS PRN
Status: ON HOLD | COMMUNITY
End: 2023-04-04 | Stop reason: SDUPTHER

## 2023-03-26 RX ORDER — ATORVASTATIN CALCIUM 20 MG/1
20 TABLET, FILM COATED ORAL NIGHTLY
Status: DISCONTINUED | OUTPATIENT
Start: 2023-03-26 | End: 2023-04-04 | Stop reason: HOSPADM

## 2023-03-26 RX ORDER — OLANZAPINE 10 MG/2ML
1 INJECTION, POWDER, LYOPHILIZED, FOR SOLUTION INTRAMUSCULAR
Status: DISCONTINUED | OUTPATIENT
Start: 2023-03-26 | End: 2023-04-03

## 2023-03-26 RX ORDER — HYDRALAZINE HYDROCHLORIDE 25 MG/1
50 TABLET, FILM COATED ORAL 3 TIMES DAILY
Status: DISCONTINUED | OUTPATIENT
Start: 2023-03-26 | End: 2023-04-02

## 2023-03-26 RX ORDER — NICOTINE POLACRILEX 4 MG
15 LOZENGE BUCCAL
Status: DISCONTINUED | OUTPATIENT
Start: 2023-03-26 | End: 2023-04-04 | Stop reason: HOSPADM

## 2023-03-26 RX ORDER — INSULIN LISPRO 100 [IU]/ML
2-7 INJECTION, SOLUTION INTRAVENOUS; SUBCUTANEOUS
Status: DISCONTINUED | OUTPATIENT
Start: 2023-03-26 | End: 2023-04-04 | Stop reason: HOSPADM

## 2023-03-26 RX ADMIN — Medication 10 ML: at 22:16

## 2023-03-26 RX ADMIN — HYDRALAZINE HYDROCHLORIDE 5 MG: 20 INJECTION INTRAMUSCULAR; INTRAVENOUS at 06:39

## 2023-03-26 RX ADMIN — SODIUM CHLORIDE 15 MG/HR: 9 INJECTION, SOLUTION INTRAVENOUS at 20:13

## 2023-03-26 RX ADMIN — PIPERACILLIN AND TAZOBACTAM 4.5 G: 4; .5 INJECTION, POWDER, FOR SOLUTION INTRAVENOUS at 11:55

## 2023-03-26 RX ADMIN — PIPERACILLIN AND TAZOBACTAM 4.5 G: 4; .5 INJECTION, POWDER, FOR SOLUTION INTRAVENOUS at 03:46

## 2023-03-26 RX ADMIN — PIPERACILLIN AND TAZOBACTAM 4.5 G: 4; .5 INJECTION, POWDER, FOR SOLUTION INTRAVENOUS at 22:06

## 2023-03-26 RX ADMIN — NITROGLYCERIN 1 INCH: 20 OINTMENT TOPICAL at 23:05

## 2023-03-26 RX ADMIN — HYDRALAZINE HYDROCHLORIDE 10 MG: 20 INJECTION INTRAMUSCULAR; INTRAVENOUS at 15:30

## 2023-03-26 RX ADMIN — ENOXAPARIN SODIUM 135 MG: 150 INJECTION SUBCUTANEOUS at 03:37

## 2023-03-26 RX ADMIN — Medication 10 ML: at 15:30

## 2023-03-26 RX ADMIN — Medication 10 ML: at 01:27

## 2023-03-26 RX ADMIN — SODIUM CHLORIDE 5 MG/HR: 9 INJECTION, SOLUTION INTRAVENOUS at 16:24

## 2023-03-26 RX ADMIN — SODIUM CHLORIDE 15 MG/HR: 9 INJECTION, SOLUTION INTRAVENOUS at 22:53

## 2023-03-26 RX ADMIN — ENOXAPARIN SODIUM 135 MG: 150 INJECTION SUBCUTANEOUS at 15:30

## 2023-03-26 RX ADMIN — SODIUM CHLORIDE, POTASSIUM CHLORIDE, SODIUM LACTATE AND CALCIUM CHLORIDE 50 ML/HR: 600; 310; 30; 20 INJECTION, SOLUTION INTRAVENOUS at 01:27

## 2023-03-26 RX ADMIN — HYDRALAZINE HYDROCHLORIDE 5 MG: 20 INJECTION INTRAMUSCULAR; INTRAVENOUS at 12:20

## 2023-03-26 NOTE — PLAN OF CARE
Pt is a 76 yo male, new admit to ED via EMS from [facility], admitting dx of UTI, pt has hx of chronic UTI's, chronic Urinary retention with Motley catheter.  Pt alert to self, pt has hx of dementia.  Pt initiated on IV antibiotics as ordered, pt placed in C-Diff precautions, C-Diff stool sample obtained and pending.  Pt resting in bed, pt more alert and conversive in conversation at end of shift.  Will continue to monitor and observe.     Goal Outcome Evaluation:  Plan of Care Reviewed With: patient        Progress: no change      Problem: Adult Inpatient Plan of Care  Goal: Plan of Care Review  Outcome: Ongoing, Progressing  Flowsheets (Taken 3/26/2023 9806)  Progress: no change  Plan of Care Reviewed With: patient     Problem: Fall Injury Risk  Goal: Absence of Fall and Fall-Related Injury  Outcome: Ongoing, Progressing     Problem: Skin Injury Risk Increased  Goal: Skin Health and Integrity  Outcome: Ongoing, Progressing     Problem: UTI (Urinary Tract Infection)  Goal: Improved Infection Symptoms  Outcome: Ongoing, Progressing

## 2023-03-26 NOTE — PROGRESS NOTES
[facility] Medicine Services   Daily Progress Note    Patient Name: Shaji Junior  : 1945  MRN: [id number]  Primary Care Physician:  Naa Valverde MD  Date of admission: 3/25/2023  Date and Time of Service: 3/26/2023      Subjective      Chief Complaint: Patient admitted with altered mental status.    Patient Reports patient was awake but confused.    Review of Systems   Constitutional: Negative.   HENT: Negative.    Eyes: Negative.    Cardiovascular: Negative.    Respiratory: Negative.    Endocrine: Negative.    Musculoskeletal: Negative.    Gastrointestinal: Negative.          Objective      Vitals:   Temp:  [96.7 °F (35.9 °C)-100.7 °F (38.2 °C)] 98.9 °F (37.2 °C)  Heart Rate:  [] 96  Resp:  [13-19] 15  BP: (134-210)/() 176/85  Flow (L/min):  [2-3] 2    Physical Exam  Constitutional:       Appearance: Normal appearance.   HENT:      Head: Normocephalic and atraumatic.      Nose: Nose normal.   Cardiovascular:      Rate and Rhythm: Normal rate.      Heart sounds: Normal heart sounds.   Pulmonary:      Effort: Pulmonary effort is normal. No respiratory distress.      Breath sounds: Normal breath sounds. No stridor. No wheezing, rhonchi or rales.   Chest:      Chest wall: No tenderness.   Abdominal:      General: Abdomen is flat. There is no distension.      Palpations: Abdomen is soft. There is no mass.      Tenderness: There is no abdominal tenderness. There is no right CVA tenderness, left CVA tenderness, guarding or rebound.   Musculoskeletal:      Cervical back: Normal range of motion.   Skin:     General: Skin is warm and dry.   Neurological:      Mental Status: He is alert.      Comments: Patient is confused.  Responding to some questions appropriately.  Not in any distress.               Result Review    Result Review:  I have personally reviewed the results from the time of this admission to 3/26/2023 17:42 EDT and agree with these findings:  [x]  Laboratory  []   Microbiology  [x]  Radiology  []  EKG/Telemetry   []  Cardiology/Vascular   []  Pathology  []  Old records  []  Other:    Wounds (last 24 hours)     LDA Wound     Row Name 03/26/23 1711 03/26/23 1703 03/26/23 1659       Wound 02/15/23 2231 Bilateral coccyx Pressure Injury    Wound - Properties Group Placement Date: 02/15/23  -CP Placement Time: 2231  -CP Present on Hospital Admission: Y  -CP Side: Bilateral  -CP Location: coccyx  -CP Primary Wound Type: Pressure inj  -CP    Retired Wound - Properties Group Placement Date: 02/15/23  -CP Placement Time: 2231  -CP Present on Hospital Admission: Y  -CP Side: Bilateral  -CP Location: coccyx  -CP Primary Wound Type: Pressure inj  -CP    Retired Wound - Properties Group Date first assessed: 02/15/23  -CP Time first assessed: 2231  -CP Present on Hospital Admission: Y  -CP Side: Bilateral  -CP Location: coccyx  -CP Primary Wound Type: Pressure inj  -CP       Wound 03/10/23 0938 Right gluteal MASD (Moisture associated skin damage)    Wound - Properties Group Placement Date: 03/10/23  -HW Placement Time: 0938  -HW Present on Hospital Admission: Y  -HW Side: Right  -HW Location: gluteal  -HW Primary Wound Type: MASD  -HW    Retired Wound - Properties Group Placement Date: 03/10/23  -HW Placement Time: 0938  -HW Present on Hospital Admission: Y  -HW Side: Right  -HW Location: gluteal  -HW Primary Wound Type: MASD  -HW    Retired Wound - Properties Group Date first assessed: 03/10/23  -HW Time first assessed: 0938  -HW Present on Hospital Admission: Y  -HW Side: Right  -HW Location: gluteal  -HW Primary Wound Type: MASD  -HW       Wound 03/26/23 1653 Right anterior greater trochanter Blisters    Wound - Properties Group Placement Date: 03/26/23  -NE Placement Time: 1653  -NE Present on Hospital Admission: Y  -NE Side: Right  -NE Orientation: anterior  -NE Location: greater trochanter  -NE Primary Wound Type: Blisters  -NE    Wound Image -- -- Images linked: 1  -NE    Retired  Wound - Properties Group Placement Date: 03/26/23 -NE Placement Time: 1653 -NE Present on Hospital Admission: Y  -NE Side: Right  -NE Orientation: anterior  -NE Location: greater trochanter  -NE Primary Wound Type: Blisters  -NE    Retired Wound - Properties Group Date first assessed: 03/26/23 -NE Time first assessed: 1653 -NE Present on Hospital Admission: Y  -NE Side: Right  -NE Location: greater trochanter  -NE Primary Wound Type: Blisters  -NE       Wound 03/26/23 1654 Right gluteal Pressure Injury    Wound - Properties Group Placement Date: 03/26/23 -NE Placement Time: 1654 -NE Present on Hospital Admission: Y  -NE Side: Right  -NE Location: gluteal  -NE, STAGE 2  Primary Wound Type: Pressure inj  -NE    Retired Wound - Properties Group Placement Date: 03/26/23  -NE Placement Time: 1654 -NE Present on Hospital Admission: Y  -NE Side: Right  -NE Location: gluteal  -NE, STAGE 2  Primary Wound Type: Pressure inj  -NE    Retired Wound - Properties Group Date first assessed: 03/26/23  -NE Time first assessed: 1654 -NE Present on Hospital Admission: Y  -NE Side: Right  -NE Location: gluteal  -NE, STAGE 2  Primary Wound Type: Pressure inj  -NE       Wound 03/26/23 1702 Left anterior thigh Skin Tear    Wound - Properties Group Placement Date: 03/26/23 -NE Placement Time: 1702 -NE Present on Hospital Admission: Y  -NE Side: Left  -NE Orientation: anterior  -NE Location: thigh  -NE Primary Wound Type: Skin tear  -NE    Retired Wound - Properties Group Placement Date: 03/26/23 -NE Placement Time: 1702 -NE Present on Hospital Admission: Y  -NE Side: Left  -NE Orientation: anterior  -NE Location: thigh  -NE Primary Wound Type: Skin tear  -NE    Retired Wound - Properties Group Date first assessed: 03/26/23 -NE Time first assessed: 1702 -NE Present on Hospital Admission: Y  -NE Side: Left  -NE Location: thigh  -NE Primary Wound Type: Skin tear  -NE       Wound 03/10/23 0939 Right anterior thigh    Wound -  Properties Group Placement Date: 03/10/23  -HW Placement Time: 0939  -HW Present on Hospital Admission: Y  -HW Side: Right  -HW Orientation: anterior  -HW Location: thigh  -HW    Wound Image -- Images linked: 1  -NE --    Retired Wound - Properties Group Placement Date: 03/10/23  -HW Placement Time: 0939  -HW Present on Hospital Admission: Y  -HW Side: Right  -HW Orientation: anterior  -HW Location: thigh  -HW    Retired Wound - Properties Group Date first assessed: 03/10/23  -HW Time first assessed: 0939  -HW Present on Hospital Admission: Y  -HW Side: Right  -HW Location: thigh  -HW       Wound 03/26/23 1710 Left anterior greater trochanter    Wound - Properties Group Placement Date: 03/26/23  -NE Placement Time: 1710  -NE Side: Left  -NE Orientation: anterior  -NE Location: greater trochanter  -NE    Wound Image Images linked: 1  -NE -- --    Retired Wound - Properties Group Placement Date: 03/26/23  -NE Placement Time: 1710  -NE Side: Left  -NE Orientation: anterior  -NE Location: greater trochanter  -NE    Retired Wound - Properties Group Date first assessed: 03/26/23  -NE Time first assessed: 1710  -NE Side: Left  -NE Location: greater trochanter  -NE    Row Name 03/26/23 1658             Wound 02/15/23 2231 Bilateral coccyx Pressure Injury    Wound - Properties Group Placement Date: 02/15/23  -CP Placement Time: 2231  -CP Present on Hospital Admission: Y  -CP Side: Bilateral  -CP Location: coccyx  -CP Primary Wound Type: Pressure inj  -CP    Retired Wound - Properties Group Placement Date: 02/15/23  -CP Placement Time: 2231  -CP Present on Hospital Admission: Y  -CP Side: Bilateral  -CP Location: coccyx  -CP Primary Wound Type: Pressure inj  -CP    Retired Wound - Properties Group Date first assessed: 02/15/23  -CP Time first assessed: 2231  -CP Present on Hospital Admission: Y  -CP Side: Bilateral  -CP Location: coccyx  -CP Primary Wound Type: Pressure inj  -CP       Wound 03/10/23 0938 Right gluteal MASD  (Moisture associated skin damage)    Wound - Properties Group Placement Date: 03/10/23  -HW Placement Time: 0938 -HW Present on Hospital Admission: Y  -HW Side: Right  -HW Location: gluteal  -HW Primary Wound Type: MASD  -HW    Retired Wound - Properties Group Placement Date: 03/10/23  -HW Placement Time: 0938 -HW Present on Hospital Admission: Y  -HW Side: Right  -HW Location: gluteal  -HW Primary Wound Type: MASD  -HW    Retired Wound - Properties Group Date first assessed: 03/10/23  -HW Time first assessed: 0938 -HW Present on Hospital Admission: Y  -HW Side: Right  -HW Location: gluteal  -HW Primary Wound Type: MASD  -HW       Wound 03/26/23 1653 Right anterior greater trochanter Blisters    Wound - Properties Group Placement Date: 03/26/23  -NE Placement Time: 1653 -NE Present on Hospital Admission: Y  -NE Side: Right  -NE Orientation: anterior  -NE Location: greater trochanter  -NE Primary Wound Type: Blisters  -NE    Retired Wound - Properties Group Placement Date: 03/26/23  -NE Placement Time: 1653 -NE Present on Hospital Admission: Y  -NE Side: Right  -NE Orientation: anterior  -NE Location: greater trochanter  -NE Primary Wound Type: Blisters  -NE    Retired Wound - Properties Group Date first assessed: 03/26/23  -NE Time first assessed: 1653 -NE Present on Hospital Admission: Y  -NE Side: Right  -NE Location: greater trochanter  -NE Primary Wound Type: Blisters  -NE       Wound 03/26/23 1654 Right gluteal Pressure Injury    Wound - Properties Group Placement Date: 03/26/23  -NE Placement Time: 1654 -NE Present on Hospital Admission: Y  -NE Side: Right  -NE Location: gluteal  -NE, STAGE 2  Primary Wound Type: Pressure inj  -NE    Wound Image Images linked: 1  -NE      Retired Wound - Properties Group Placement Date: 03/26/23  -NE Placement Time: 1654 -NE Present on Hospital Admission: Y  -NE Side: Right  -NE Location: gluteal  -NE, STAGE 2  Primary Wound Type: Pressure inj  -NE    Retired Wound -  Properties Group Date first assessed: 03/26/23  -NE Time first assessed: 1654 -NE Present on Hospital Admission: Y  -NE Side: Right  -NE Location: gluteal  -NE, STAGE 2  Primary Wound Type: Pressure inj  -NE       Wound 03/10/23 0939 Right anterior thigh    Wound - Properties Group Placement Date: 03/10/23  -HW Placement Time: 0939 -HW Present on Hospital Admission: Y  -HW Side: Right  -HW Orientation: anterior  -HW Location: thigh  -HW    Retired Wound - Properties Group Placement Date: 03/10/23  -HW Placement Time: 0939 -HW Present on Hospital Admission: Y  -HW Side: Right  -HW Orientation: anterior  -HW Location: thigh  -HW    Retired Wound - Properties Group Date first assessed: 03/10/23  -HW Time first assessed: 0939 -HW Present on Hospital Admission: Y  -HW Side: Right  -HW Location: thigh  -HW          User Key  (r) = Recorded By, (t) = Taken By, (c) = Cosigned By    Initials Name Provider Type    Dina Nieves, RN Registered Nurse    Jordan Rao, RN Registered Nurse     Ignacia Calles RN Registered Nurse                  Assessment & Plan      Brief Patient Summary:  Shaji Junior is a 77 y.o. male who was admitted with altered mental status.  He has suspected UTI.  His blood pressure is uncontrolled.  He is on IV Cardene drip.  Cardiology consulted.      amLODIPine, 10 mg, Oral, Q24H  atorvastatin, 20 mg, Oral, Nightly  carvedilol, 12.5 mg, Oral, BID With Meals  enoxaparin, 1 mg/kg, Subcutaneous, Q12H  escitalopram, 10 mg, Oral, Daily  finasteride, 5 mg, Oral, Daily  gabapentin, 300 mg, Oral, TID  hydrALAZINE, 50 mg, Oral, TID  insulin lispro, 2-7 Units, Subcutaneous, TID With Meals  piperacillin-tazobactam, 4.5 g, Intravenous, Q8H  sodium chloride, 10 mL, Intravenous, Q12H  tamsulosin, 0.4 mg, Oral, Daily       lactated ringers, 50 mL/hr, Last Rate: 50 mL/hr (03/26/23 0127)  niCARdipine, 5-15 mg/hr, Last Rate: 5 mg/hr (03/26/23 1624)  Pharmacy Consult,   Pharmacy to Dose  enoxaparin (LOVENOX),   Pharmacy to Dose Zosyn,            Plan:   Plan:      #UTI    - US grossly positive for UTI    - hx of Klesiella and Proteus ESBL     - Zosyn, pharm to dose    - urine cultue    - blood cultures      #Acute Toxic Encephalopathy    - Suspect 2/2 UTI    - tx as above    - NPO     - CT head without acute mass, shift hemorrhage  -  -Follow-up cultures     -      Uncontrolled hypertension.    Patient on multiple antihypertensives.    Patient transferred to PCU.    On IV Cardene drip currently.    Cardiology eval     #A. Fib    - Hold home Eliquis, cover with therapeutic lovenox    - Resume home Coreg when can tolerate PO     #CKD    -Follow-up creatinine     #Anemia    -no active bleeding    -Follow-up hemoglobin        #HLD    -resume home statin     #BPH    -resume home flomax and finasteride    DVT prophylaxis:  Medical DVT prophylaxis orders are present.    CODE STATUS:    Code Status (Patient has no pulse and is not breathing): CPR (Attempt to Resuscitate)  Medical Interventions (Patient has pulse or is breathing): Full Support            Electronically signed by Rafael Packer MD, 03/26/23, 17:42 EDT.  [facility] Hospitalist [facility]

## 2023-03-26 NOTE — H&P
[facility] Medicine Services      Patient Name: Shaji Junior  : 1945  MRN: [id number]  Primary Care Physician:  Naa Valverde MD  Date of admission: 3/25/2023      Subjective      Chief Complaint: Altered mental status    History of Present Illness: Shaji Junior is a 77 y.o. male with multiple medical comorbidities who presented to [facility] on 3/25/2023 complaining of Altered mental status.  Of note, due to altered mental status, HPI obtained from available records and discussion with ED staff.  Presented from NH as found febrile and confused.  Has a history of UTI and similar presnetation.  Sent to [facility] for evaluation.    In the ED, vitals 38.2C, HR 70, RR 19, /97, 97% 2L NC.  Labs notable for glucose 104, hgb 11.7.  UA grossly positive for UTI.      ROS   Unable to obtain due to altered mental status    Personal History     Past Medical History:   Diagnosis Date   • Acute kidney failure (HCC)    • Acute respiratory failure with hypoxia (HCC)    • Anemia    • Benign prostatic hyperplasia    • Bilateral primary osteoarthritis of knee    • Cardiomegaly    • Cardiomyopathy (HCC)    • Cellulitis and abscess of left leg    • Cerebral infarction (HCC)    • Cerebrovascular disease    • Chronic kidney disease    • Dementia (HCC)    • Diabetes mellitus (HCC)    • Essential hypertension    • GERD (gastroesophageal reflux disease)    • Gout    • Heart failure (HCC)    • Hyperlipidemia    • Morbid obesity (HCC)    • Myocardial infarction (HCC)    • Obstructive sleep apnea    • Obstructive uropathy    • Paroxysmal atrial fibrillation (HCC)    • Peptic ulcer    • Peripheral vascular disease (HCC)    • Pulmonary edema    • Venous insufficiency        No past surgical history on file.    Family History: family history includes Diabetes in his father. Otherwise pertinent FHx was reviewed and not pertinent to current issue.    Social History:  reports that he has never smoked. He  does not have any smokeless tobacco history on file. He reports that he does not currently use alcohol. He reports that he does not use drugs.    Home Medications:  Prior to Admission Medications     Prescriptions Last Dose Informant Patient Reported? Taking?    acetaminophen (TYLENOL) 325 MG tablet   Yes No    Take 650 mg by mouth Every 4 (Four) Hours As Needed for Mild Pain .    albuterol sulfate  (90 Base) MCG/ACT inhaler   Yes No    Inhale 2 puffs Every 4 (Four) Hours As Needed (Cough).    apixaban (ELIQUIS) 5 MG tablet tablet   No No    Take 1 tablet by mouth Every 12 (Twelve) Hours. Indications: Atrial Fibrillation    carvedilol (COREG) 12.5 MG tablet   No No    Take 1 tablet by mouth 2 (Two) Times a Day With Meals.    cefuroxime (CEFTIN) 500 MG tablet   No No    Take 1 tablet by mouth 2 (Two) Times a Day.    docusate calcium (SURFAK) 240 MG capsule   Yes No    Take 240 mg by mouth Daily.    escitalopram (LEXAPRO) 10 MG tablet   Yes No    Take 10 mg by mouth Daily.    finasteride (PROSCAR) 5 MG tablet   Yes No    Take 5 mg by mouth Daily.    fosfomycin (MONUROL) 3 g pack   No No    Mix 1 packet in 3 to 4 oz water and take by mouth every 48 hours as directed.    furosemide (LASIX) 40 MG tablet   No No    Take 1 tablet by mouth 2 (Two) Times a Day.    gabapentin (NEURONTIN) 300 MG capsule   Yes No    Take 300 mg by mouth 3 (Three) Times a Day.    hydrALAZINE (APRESOLINE) 50 MG tablet   No No    Take 1 tablet by mouth 3 (Three) Times a Day.    Insulin Glargine (BASAGLAR KWIKPEN) 100 UNIT/ML injection pen   Yes No    Inject 10 Units under the skin into the appropriate area as directed Every Night.    isosorbide mononitrate (IMDUR) 60 MG 24 hr tablet   No No    Take 1 tablet by mouth 2 (Two) Times a Day.    losartan (COZAAR) 25 MG tablet   No No    Take 1 tablet by mouth Daily.    methocarbamol (ROBAXIN) 500 MG tablet   Yes No    Take 500 mg by mouth Every 12 (Twelve) Hours As Needed for Muscle Spasms.     nitroglycerin (NITROSTAT) 0.4 MG SL tablet   Yes No    Place 0.4 mg under the tongue Every 5 (Five) Minutes As Needed for Chest Pain. Take no more than 3 doses in 15 minutes.    polycarbophil (calcium polycarbophil) 625 MG tablet tablet   Yes No    Take 625 mg by mouth Daily.    Polyethylene Glycol 3350 powder   Yes No    Take 17 g by mouth Every Night.    saccharomyces boulardii (FLORASTOR) 250 MG capsule   Yes No    Take 250 mg by mouth 2 (Two) Times a Day.    simethicone (MYLICON,GAS-X) 180 MG capsule   Yes No    Take 180 mg by mouth Every 6 (Six) Hours As Needed for Flatulence.    simvastatin (ZOCOR) 40 MG tablet   Yes No    Take 40 mg by mouth every night at bedtime.    tamsulosin (FLOMAX) 0.4 MG capsule 24 hr capsule   Yes No    Take 1 capsule by mouth Daily.            Allergies:  No Known Allergies    Objective      Vitals:   Temp:  [100.7 °F (38.2 °C)] 100.7 °F (38.2 °C)  Heart Rate:  [] 70  Resp:  [19] 19  BP: (160-210)/() 163/97  Flow (L/min):  [2] 2    Physical Exam  Constitutional:       Comments: AAOx0, minimal response to sternal rub   HENT:      Head: Normocephalic and atraumatic.      Nose: Nose normal. No congestion.      Mouth/Throat:      Pharynx: Oropharynx is clear. No oropharyngeal exudate.   Eyes:      General: No scleral icterus.  Cardiovascular:      Rate and Rhythm: Normal rate and regular rhythm.      Heart sounds: No murmur heard.    No friction rub. No gallop.   Pulmonary:      Effort: No respiratory distress.      Breath sounds: No wheezing or rales.   Abdominal:      General: There is no distension.      Tenderness: There is no abdominal tenderness. There is no guarding.   Musculoskeletal:         General: No swelling or deformity.      Cervical back: Normal range of motion. No rigidity.      Right lower leg: No edema.      Left lower leg: No edema.   Skin:     Coloration: Skin is not jaundiced.      Findings: No bruising or lesion.   Neurological:      Mental Status: He  is disoriented.      Motor: Weakness present.          Result Review    Result Review:  I have personally reviewed the results from the time of this admission to 3/26/2023 00:26 EDT and agree with these findings:  [x]  Laboratory  []  Microbiology  [x]  Radiology  []  EKG/Telemetry   []  Cardiology/Vascular   []  Pathology  [x]  Old records  []  Other:        Assessment & Plan        Active Hospital Problems:  There are no active hospital problems to display for this patient.    Plan:     #UTI    - US grossly positive for UTI    - hx of Klesiella and Proteus ESBL     - Zosyn, pharm to dose    - rectal tylenol PRN    - urine cultue    - blood cultures      #Acute Toxic Encephalopathy    - Suspect 2/2 UTI    - tx as above    - Currently AAOx0, minimal response to sternal rub    - NPO     - LR @ 50/hr    - SLP    - CT head without acute mass, shift hemorrhage    #DM   - ISS    - resume home gabapentin    #HTN urgency    - Labetalol IV PRN with parameters    - hold Losartan to prevent nephrotoxicity    - resume home hyralazine    #A. Fib    - Hold home Eliquis, cover with therapeutic lovenox    - Resume home Coreg when can tolerate PO    #CKD    - Cr 1.0, stable    - hold home lasix    #Anemia    - hgb 11.7 on admission    - check ferritin and iron    - no overt bleeding, follow labs    #Obesity  #CAROL    - BMI 45.01    - weight loss encouraged    #HLD    -resume home statin    #BPH    -resume home flomax and finasteride      DVT prophylaxis: Lovenox    CODE STATUS:       Admission Status:  I believe this patient meets inpatient status.    I discussed the patient's findings and my recommendations with patient.    This patient has been examined wearing appropriate Personal Protective Equipment and discussed with PAtient. 03/26/23      Signature: Electronically signed by Sandip Campuzano DO, 03/26/23, 1:02 AM EDT.

## 2023-03-26 NOTE — NURSING NOTE
Patient was hypertensive, call and page sent to MD. Cardiology walked into the wrong room but saw the patients BP and assessed the patient and chart gave orders for STAT hydralazine, cardizem drip, echo and to transfer patient. A STAT call was placed to [facility].

## 2023-03-26 NOTE — CONSULTS
Referring Provider: Rafael Packer MD  Reason for Consultation:  Atrial fibrillation  Altered mental status    Patient Care Team:  Naa Valverde MD as PCP - General (Internal Medicine)    Chief complaint  Altered mental status    Subjective .     History of present illness:  Shaji Junior is a 77 y.o. male who presents with multiple medical problems was admitted to the hospital with history of altered mental status.  Patient's history is mostly obtained from the chart.  Patient has atrial fibrillation and urinary tract infection..  No associated aggravating or alleviating factors.    ROS      The patient has been without any chest discomfort, shortness of breath, palpitations, dizziness or syncope.  Denies having any headache, abdominal pain, nausea, vomiting, diarrhea, constipation, loss of weight or loss of appetite.  Denies having any excessive bruising, hematuria or blood in the stool.    Review of all systems negative except as indicated      History  Past Medical History:   Diagnosis Date   • Acute kidney failure (HCC)    • Acute respiratory failure with hypoxia (HCC)    • Anemia    • Benign prostatic hyperplasia    • Bilateral primary osteoarthritis of knee    • Cardiomegaly    • Cardiomyopathy (HCC)    • Cellulitis and abscess of left leg    • Cerebral infarction (HCC)    • Cerebrovascular disease    • Chronic kidney disease    • Dementia (HCC)    • Diabetes mellitus (HCC)    • Essential hypertension    • GERD (gastroesophageal reflux disease)    • Gout    • Heart failure (HCC)    • Hyperlipidemia    • Morbid obesity (HCC)    • Myocardial infarction (HCC)    • Obstructive sleep apnea    • Obstructive uropathy    • Paroxysmal atrial fibrillation (HCC)    • Peptic ulcer    • Peripheral vascular disease (HCC)    • Pulmonary edema    • Venous insufficiency        History reviewed. No pertinent surgical history.    Family History   Problem Relation Age of Onset   • Diabetes Father        Social History      Tobacco Use   • Smoking status: Never   Vaping Use   • Vaping Use: Never used   Substance Use Topics   • Alcohol use: Not Currently   • Drug use: Never        Medications Prior to Admission   Medication Sig Dispense Refill Last Dose   • acetaminophen (TYLENOL) 325 MG tablet Take 650 mg by mouth Every 4 (Four) Hours As Needed for Mild Pain .      • albuterol sulfate  (90 Base) MCG/ACT inhaler Inhale 2 puffs Every 4 (Four) Hours As Needed (Cough).      • apixaban (ELIQUIS) 5 MG tablet tablet Take 1 tablet by mouth Every 12 (Twelve) Hours. Indications: Atrial Fibrillation 60 tablet     • carvedilol (COREG) 12.5 MG tablet Take 1 tablet by mouth 2 (Two) Times a Day With Meals. 60 tablet 0    • docusate calcium (SURFAK) 240 MG capsule Take 240 mg by mouth Daily.      • escitalopram (LEXAPRO) 10 MG tablet Take 10 mg by mouth Daily.      • finasteride (PROSCAR) 5 MG tablet Take 5 mg by mouth Daily.      • furosemide (LASIX) 40 MG tablet Take 1 tablet by mouth 2 (Two) Times a Day. 60 tablet 0    • gabapentin (NEURONTIN) 300 MG capsule Take 300 mg by mouth 3 (Three) Times a Day.      • hydrALAZINE (APRESOLINE) 50 MG tablet Take 1 tablet by mouth 3 (Three) Times a Day. 90 tablet 0    • Insulin Glargine (BASAGLAR KWIKPEN) 100 UNIT/ML injection pen Inject 10 Units under the skin into the appropriate area as directed Every Night.      • isosorbide mononitrate (IMDUR) 60 MG 24 hr tablet Take 1 tablet by mouth 2 (Two) Times a Day. 60 tablet 0    • losartan (COZAAR) 25 MG tablet Take 1 tablet by mouth Daily. 30 tablet 0    • methocarbamol (ROBAXIN) 500 MG tablet Take 500 mg by mouth Every 12 (Twelve) Hours As Needed for Muscle Spasms.      • nitroglycerin (NITROSTAT) 0.4 MG SL tablet Place 0.4 mg under the tongue Every 5 (Five) Minutes As Needed for Chest Pain. Take no more than 3 doses in 15 minutes.      • polycarbophil (calcium polycarbophil) 625 MG tablet tablet Take 625 mg by mouth Daily.      • Polyethylene Glycol  3350 powder Take 17 g by mouth Every Night.      • saccharomyces boulardii (FLORASTOR) 250 MG capsule Take 250 mg by mouth 2 (Two) Times a Day.      • simethicone (MYLICON,GAS-X) 180 MG capsule Take 180 mg by mouth Every 6 (Six) Hours As Needed for Flatulence.      • simvastatin (ZOCOR) 40 MG tablet Take 40 mg by mouth every night at bedtime.      • tamsulosin (FLOMAX) 0.4 MG capsule 24 hr capsule Take 1 capsule by mouth Daily.      • traMADol (ULTRAM) 50 MG tablet Take 1 tablet by mouth Every 6 (Six) Hours As Needed for Moderate Pain.            Patient has no known allergies.    Scheduled Meds:amLODIPine, 10 mg, Oral, Q24H  atorvastatin, 20 mg, Oral, Nightly  carvedilol, 12.5 mg, Oral, BID With Meals  enoxaparin, 1 mg/kg, Subcutaneous, Q12H  escitalopram, 10 mg, Oral, Daily  finasteride, 5 mg, Oral, Daily  gabapentin, 300 mg, Oral, TID  hydrALAZINE, 50 mg, Oral, TID  insulin lispro, 2-7 Units, Subcutaneous, TID With Meals  piperacillin-tazobactam, 4.5 g, Intravenous, Q8H  sodium chloride, 10 mL, Intravenous, Q12H  tamsulosin, 0.4 mg, Oral, Daily      Continuous Infusions:lactated ringers, 50 mL/hr, Last Rate: 50 mL/hr (03/26/23 0127)  niCARdipine, 5-15 mg/hr, Last Rate: 15 mg/hr (03/26/23 1818)  Pharmacy Consult,   Pharmacy to Dose enoxaparin (LOVENOX),   Pharmacy to Dose Zosyn,       PRN Meds:.•  acetaminophen  •  albuterol  •  dextrose  •  dextrose  •  glucagon (human recombinant)  •  hydrALAZINE  •  nitroglycerin  •  ondansetron **OR** ondansetron  •  Pharmacy Consult  •  Pharmacy to Dose enoxaparin (LOVENOX)  •  Pharmacy to Dose Zosyn  •  [COMPLETED] Insert Peripheral IV **AND** sodium chloride  •  sodium chloride  •  sodium chloride    Objective     VITAL SIGNS  Vitals:    03/26/23 1816 03/26/23 1817 03/26/23 1824 03/26/23 1835   BP: (!) 185/101  154/100 160/87   BP Location:       Patient Position:       Pulse: 96 94 100 95   Resp:       Temp:       TempSrc:       SpO2: 96%  98%    Weight:       Height:      "      Flowsheet Rows    Flowsheet Row First Filed Value   Admission Height 177.8 cm (70\") Documented at 03/25/2023 2033   Admission Weight 142 kg (313 lb 11.4 oz) Documented at 03/25/2023 2033            Intake/Output Summary (Last 24 hours) at 3/26/2023 1919  Last data filed at 3/26/2023 0852  Gross per 24 hour   Intake 200 ml   Output 970 ml   Net -770 ml        TELEMETRY: Atrial fibrillation    Physical Exam:  The patient is alert, oriented and in no distress.  Vital signs as noted above.  Exogenous obesity.  Head and neck revealed no carotid bruits or jugular venous distention.  No thyromegaly or lymphadenopathy is present  Lungs clear.  No wheezing.  Breath sounds are normal bilaterally.  Heart normal first and second heart sounds. No murmur.  No precordial rub is present.  No gallop is present.  Abdomen soft and nontender.  No organomegaly is present.  Extremities with good peripheral pulses.  Chronic venous changes.  Skin warm and dry.  Musculoskeletal system is grossly normal  CNS grossly normal    Reviewed and updated.      Results Review:   I reviewed the patient's new clinical results.  Lab Results (last 24 hours)     Procedure Component Value Units Date/Time    POC Glucose Once [[id number]]  (Abnormal) Collected: 03/26/23 1806    Specimen: Blood Updated: 03/26/23 1807     Glucose 116 mg/dL      Comment: Serial Number: [id number]:  [id number]       POC Glucose Once [[id number]]  (Normal) Collected: 03/26/23 1113    Specimen: Blood Updated: 03/26/23 1115     Glucose 96 mg/dL      Comment: Serial Number: [id number]:  [id number]       Clostridioides difficile Toxin - Stool, Per Rectum [[id number]]  (Normal) Collected: 03/26/23 0332    Specimen: Stool from Per Rectum Updated: 03/26/23 0710    Narrative:      The following orders were created for panel order Clostridioides difficile Toxin - Stool, Per Rectum.  Procedure                               Abnormality         Status                   "   ---------                               -----------         ------                     Clostridioides difficile...[[id number]]  Normal              Final result                 Please view results for these tests on the individual orders.    Clostridioides difficile EIA - Stool, Per Rectum [[id number]]  (Normal) Collected: 03/26/23 0332    Specimen: Stool from Per Rectum Updated: 03/26/23 0710     C Diff GDH Ag Negative     C.diff Toxin Ag Negative    Narrative:      The result indicates the absence of toxigenic C.difficile from stool specimen.    POC Glucose Once [[id number]]  (Normal) Collected: 03/26/23 0657    Specimen: Blood Updated: 03/26/23 0658     Glucose 104 mg/dL      Comment: Serial Number: [id number]:  [id number]       Basic Metabolic Panel [[id number]]  (Abnormal) Collected: 03/26/23 0431    Specimen: Blood Updated: 03/26/23 0500     Glucose 113 mg/dL      BUN 21 mg/dL      Creatinine 1.01 mg/dL      Sodium 139 mmol/L      Potassium 4.1 mmol/L      Comment: Slight hemolysis detected by analyzer. Results may be affected.        Chloride 102 mmol/L      CO2 29.0 mmol/L      Calcium 8.6 mg/dL      BUN/Creatinine Ratio 20.8     Anion Gap 8.0 mmol/L      eGFR 76.6 mL/min/1.73     Narrative:      GFR Normal >60  Chronic Kidney Disease <60  Kidney Failure <15    The GFR formula is only valid for adults with stable renal function between ages 18 and 70.    Gastrointestinal Panel, PCR - Stool, Per Rectum [[id number]]  (Normal) Collected: 03/26/23 0332    Specimen: Stool from Per Rectum Updated: 03/26/23 0451     Campylobacter Not Detected     Plesiomonas shigelloides Not Detected     Salmonella Not Detected     Vibrio Not Detected     Vibrio cholerae Not Detected     Yersinia enterocolitica Not Detected     Enteroaggregative E. coli (EAEC) Not Detected     Enteropathogenic E. coli (EPEC) Not Detected     Enterotoxigenic E. coli (ETEC) lt/st Not Detected     Shiga-like toxin-producing E. coli (STEC) stx1/stx2  Not Detected     Shigella/Enteroinvasive E. coli (EIEC) Not Detected     Cryptosporidium Not Detected     Cyclospora cayetanensis Not Detected     Entamoeba histolytica Not Detected     Giardia lamblia Not Detected     Adenovirus F40/41 Not Detected     Astrovirus Not Detected     Norovirus GI/GII Not Detected     Rotavirus A Not Detected     Sapovirus (I, II, IV or V) Not Detected    Narrative:      If Aeromonas, Staphylococcus aureus or Bacillus cereus are suspected, please order [id number]: Stool Culture, Aeromonas, S aureus, B Cereus.    CBC (No Diff) [[id number]]  (Abnormal) Collected: 03/26/23 0431    Specimen: Blood Updated: 03/26/23 0440     WBC 5.70 10*3/mm3      RBC 4.35 10*6/mm3      Hemoglobin 11.0 g/dL      Hematocrit 34.9 %      MCV 80.2 fL      MCH 25.4 pg      MCHC 31.6 g/dL      RDW 21.2 %      RDW-SD 62.6 fl      MPV 8.3 fL      Platelets 197 10*3/mm3     [place] Draw [[id number]] Collected: 03/25/23 2111    Specimen: Blood Updated: 03/25/23 2215    Narrative:      The following orders were created for panel order [place] Draw.  Procedure                               Abnormality         Status                     ---------                               -----------         ------                     Green Top (Gel)[[id number]]                                  Final result               Lavender Top[[id number]]                                     Final result               Gold Top - SST[[id number]]                                   Final result               Light Blue Top[[id number]]                                   Final result                 Please view results for these tests on the individual orders.    Gold Top - SST [[id number]] Collected: 03/25/23 2111    Specimen: Blood Updated: 03/25/23 2215     Extra Tube Hold for add-ons.     Comment: Auto resulted.       Light Blue Top [[id number]] Collected: 03/25/23 2111    Specimen: Blood Updated: 03/25/23 2215     Extra Tube Hold for add-ons.     Comment:  "Auto resulted       Lavender Top [[id number]] Collected: 03/25/23 2111    Specimen: Blood Updated: 03/25/23 2215     Extra Tube hold for add-on     Comment: Auto resulted       Urinalysis, Microscopic Only - Indwelling Urethral Catheter [[id number]]  (Abnormal) Collected: 03/25/23 2128    Specimen: Urine from Indwelling Urethral Catheter Updated: 03/25/23 2208     RBC, UA 31-50 /HPF      WBC, UA Too Numerous to Count /HPF      Bacteria, UA 3+ /HPF      Squamous Epithelial Cells, UA 0-2 /HPF      Hyaline Casts, UA None Seen /LPF      Triple Phosphate Crystals, UA Moderate/2+ /HPF      Methodology Manual Light Microscopy    Green Top (Gel) [[id number]] Collected: 03/25/23 2111    Specimen: Blood Updated: 03/25/23 2203     Extra Tube HOLD    Procalcitonin [[id number]]  (Normal) Collected: 03/25/23 2111    Specimen: Blood Updated: 03/25/23 2157     Procalcitonin 0.07 ng/mL     Narrative:      As a Marker for Sepsis (Non-Neonates):    1. <0.5 ng/mL represents a low risk of severe sepsis and/or septic shock.  2. >2 ng/mL represents a high risk of severe sepsis and/or septic shock.    As a Marker for Lower Respiratory Tract Infections that require antibiotic therapy:    PCT on Admission    Antibiotic Therapy       6-12 Hrs later    >0.5                Strongly Recommended  >0.25 - <0.5        Recommended   0.1 - 0.25          Discouraged              Remeasure/reassess PCT  <0.1                Strongly Discouraged     Remeasure/reassess PCT    As 28 day mortality risk marker: \"Change in Procalcitonin Result\" (>80% or <=80%) if Day 0 (or Day 1) and Day 4 values are available. Refer to http://[email or web address]-[email or web address]    Change in PCT <=80%  A decrease of PCT levels below or equal to 80% defines a positive change in PCT test result representing a higher risk for 28-day all-cause mortality of patients diagnosed with severe sepsis for septic shock.    Change in PCT >80%  A decrease of PCT levels of more than 80% defines a " negative change in PCT result representing a lower risk for 28-day all-cause mortality of patients diagnosed with severe sepsis or septic shock.       Comprehensive Metabolic Panel [[id number]]  (Abnormal) Collected: 03/25/23 2111    Specimen: Blood Updated: 03/25/23 2155     Glucose 104 mg/dL      BUN 22 mg/dL      Creatinine 1.01 mg/dL      Sodium 136 mmol/L      Potassium 4.1 mmol/L      Chloride 100 mmol/L      CO2 28.0 mmol/L      Calcium 8.7 mg/dL      Total Protein 6.7 g/dL      Albumin 3.1 g/dL      ALT (SGPT) 9 U/L      AST (SGOT) 13 U/L      Alkaline Phosphatase 101 U/L      Total Bilirubin 0.4 mg/dL      Globulin 3.6 gm/dL      A/G Ratio 0.9 g/dL      BUN/Creatinine Ratio 21.8     Anion Gap 8.0 mmol/L      eGFR 76.6 mL/min/1.73     Narrative:      GFR Normal >60  Chronic Kidney Disease <60  Kidney Failure <15    The GFR formula is only valid for adults with stable renal function between ages 18 and 70.    Urine Culture - Urine, Indwelling Urethral Catheter [[id number]] Collected: 03/25/23 2128    Specimen: Urine from Indwelling Urethral Catheter Updated: 03/25/23 2148    Urinalysis With Microscopic If Indicated (No Culture) - Indwelling Urethral Catheter [[id number]]  (Abnormal) Collected: 03/25/23 2128    Specimen: Urine from Indwelling Urethral Catheter Updated: 03/25/23 2138     Color, UA Dark Yellow     Appearance, UA Cloudy     pH, UA 8.0     Specific Gravity, UA 1.015     Glucose, UA Negative     Ketones, UA Negative     Bilirubin, UA Negative     Blood, UA Large (3+)     Protein, UA >=300 mg/dL (3+)     Leuk Esterase, UA Large (3+)     Nitrite, UA Positive     Urobilinogen, UA 0.2 E.U./dL    Blood Gas, Arterial - [[id number]]  (Abnormal) Collected: 03/25/23 2117    Specimen: Arterial Blood Updated: 03/25/23 2123     Site Right Brachial     Kraig's Test Negative     pH, Arterial 7.370 pH units      pCO2, Arterial 55.6 mm Hg      pO2, Arterial 27.7 mm Hg      HCO3, Arterial 32.1 mmol/L      Base Excess,  Arterial 5.3 mmol/L      Comment: Serial Number: [id number]:  [id number]        O2 Saturation, Arterial 48.4 %      CO2 Content 33.8 mmol/L      Barometric Pressure for Blood Gas --     Comment: N/A        Modality Cannula     FIO2 28 %      Hemodilution No    CBC & Differential [[id number]]  (Abnormal) Collected: 03/25/23 2111    Specimen: Blood Updated: 03/25/23 2122    Narrative:      The following orders were created for panel order CBC & Differential.  Procedure                               Abnormality         Status                     ---------                               -----------         ------                     CBC Auto Differential[[id number]]        Abnormal            Final result                 Please view results for these tests on the individual orders.    CBC Auto Differential [[id number]]  (Abnormal) Collected: 03/25/23 2111    Specimen: Blood Updated: 03/25/23 2122     WBC 4.70 10*3/mm3      RBC 4.66 10*6/mm3      Hemoglobin 11.7 g/dL      Hematocrit 38.0 %      MCV 81.4 fL      MCH 25.0 pg      MCHC 30.7 g/dL      RDW 20.7 %      RDW-SD 59.1 fl      MPV 8.2 fL      Platelets 209 10*3/mm3      Neutrophil % 61.1 %      Lymphocyte % 24.3 %      Monocyte % 9.6 %      Eosinophil % 4.7 %      Basophil % 0.3 %      Neutrophils, Absolute 2.90 10*3/mm3      Lymphocytes, Absolute 1.10 10*3/mm3      Monocytes, Absolute 0.40 10*3/mm3      Eosinophils, Absolute 0.20 10*3/mm3      Basophils, Absolute 0.00 10*3/mm3      nRBC 0.0 /100 WBC     Blood Culture - Blood, Arm, Right [[id number]] Collected: 03/25/23 2111    Specimen: Blood from Arm, Right Updated: 03/25/23 2119    Blood Culture - Blood, Arm, Right [[id number]] Collected: 03/25/23 2111    Specimen: Blood from Arm, Right Updated: 03/25/23 2119    POC Lactate [[id number]]  (Normal) Collected: 03/25/23 2115    Specimen: Blood Updated: 03/25/23 2116     Lactate 1.0 mmol/L      Comment: Serial Number: [id number]:  [id number]             Imaging Results  (Last 24 Hours)     Procedure Component Value Units Date/Time    XR Chest 1 View [[id number]] Collected: 03/26/23 0719     Updated: 03/26/23 0722    Narrative:      XR CHEST 1 VW    Date of Exam: 3/25/2023 9:17 PM EDT    Indication: Altered mental status.    Comparison: 2/15/2023    Findings:  Marked cardiomegaly. Mediastinal contours within normal limits. There is pulmonary vascular congestion and interstitial pulmonary edema, overall similar compared to the prior exam.      Impression:      Impression:    1. Unchanged findings of mild to moderate CHF.    Electronically Signed: Luisito Hernández    3/26/2023 7:20 AM EDT    Workstation ID: [id number]    CT Head Without Contrast [[id number]] Collected: 03/25/23 2201     Updated: 03/26/23 0004    Narrative:      EXAMINATION: CT HEAD WITHOUT CONTRAST    DATE: 3/25/2023 11:27 PM     INDICATION: AMS; TAKES ELIQUIS;     COMPARISON: 2/15/2023.     TECHNIQUE:  Routine axial images through the head without contrast. Coronal reformations.    Low-dose CT acquisition technique included one or more of the following options: 1. Automated exposure control, 2. Adjustment of mA and/or KV according to patient's size and/or 3. Use of iterative reconstruction.    FINDINGS:      Intracranial contents:    Ventricular and cisternal spaces are prominent from volume loss. Moderate to severe periventricular and subcortical white matter hypodensities. Hypodensities in the basal ganglia, likely from old lacunar type infarcts. No dominant mass, midline shift,   hydrocephalus, extra axial fluid collection or acute hemorrhage.  No asymmetric hyperdensity of the proximal major cerebral arteries.    Craniocervical junction is patent.    Bones and extracranial soft tissues:    Mild mucosal thickening ethmoid air cells and maxillary sinuses. Retention cyst or polyp inferior right maxillary sinus. Otherwise, Visualized paranasal sinuses and mastoid air cells are clear.  Skull is intact. Visualized intraorbital  contents are   unremarkable. Parietal scalp soft tissue swelling. Calcifications distal internal carotid arteries and distal vertebral arteries.      Impression:      1. No acute intracranial process. Parietal scalp soft tissue swelling MRI is more sensitive for the detection of acute nonhemorrhagic infarct.  2. Moderate to severe changes small vessel ischemic disease of indeterminate age, presumably mostly chronic. Volume loss. Atherosclerosis.  3. Paranasal sinus disease.       Electronically signed by:  Baldev Pereira M.D.    3/25/2023 10:03 PM Mountain Time      LAB RESULTS (LAST 7 DAYS)    CBC  Results from last 7 days   Lab Units 03/26/23  0431 03/25/23 2111   WBC 10*3/mm3 5.70 4.70   RBC 10*6/mm3 4.35 4.66   HEMOGLOBIN g/dL 11.0* 11.7*   HEMATOCRIT % 34.9* 38.0   MCV fL 80.2 81.4   PLATELETS 10*3/mm3 197 209       BMP  Results from last 7 days   Lab Units 03/26/23 0431 03/25/23 2111   SODIUM mmol/L 139 136   POTASSIUM mmol/L 4.1 4.1   CHLORIDE mmol/L 102 100   CO2 mmol/L 29.0 28.0   BUN mg/dL 21 22   CREATININE mg/dL 1.01 1.01   GLUCOSE mg/dL 113* 104*       CMP   Results from last 7 days   Lab Units 03/26/23  0431 03/25/23 2111   SODIUM mmol/L 139 136   POTASSIUM mmol/L 4.1 4.1   CHLORIDE mmol/L 102 100   CO2 mmol/L 29.0 28.0   BUN mg/dL 21 22   CREATININE mg/dL 1.01 1.01   GLUCOSE mg/dL 113* 104*   ALBUMIN g/dL  --  3.1*   BILIRUBIN mg/dL  --  0.4   ALK PHOS U/L  --  101   AST (SGOT) U/L  --  13   ALT (SGPT) U/L  --  9         BNP        TROPONIN        CoAg        Creatinine Clearance  Estimated Creatinine Clearance: 86.1 mL/min (by C-G formula based on SCr of 1.01 mg/dL).    ABG  Results from last 7 days   Lab Units 03/25/23 2117   PH, ARTERIAL pH units 7.370   PCO2, ARTERIAL mm Hg 55.6*   PO2 ART mm Hg 27.7*   O2 SATURATION ART % 48.4*   BASE EXCESS ART mmol/L 5.3*       Radiology  CT Head Without Contrast    Result Date: 3/26/2023  1. No acute intracranial process. Parietal scalp soft tissue  swelling MRI is more sensitive for the detection of acute nonhemorrhagic infarct. 2. Moderate to severe changes small vessel ischemic disease of indeterminate age, presumably mostly chronic. Volume loss. Atherosclerosis. 3. Paranasal sinus disease.  Electronically signed by:  Baldev Pereira M.D.  3/25/2023 10:03 PM Mountain Time    XR Chest 1 View    Result Date: 3/26/2023  Impression: 1. Unchanged findings of mild to moderate CHF. Electronically Signed: Luisito Hernández  3/26/2023 7:20 AM EDT  Workstation ID: [id number]        EKG            I personally viewed and interpreted the patient's EKG/Telemetry data: Atrial fibrillation    ECHOCARDIOGRAM:    Results for orders placed during the hospital encounter of 02/15/23    Adult Transthoracic Echo Complete W/ Cont if Necessary Per Protocol    Interpretation Summary  •  Left ventricular systolic function is normal. Left ventricular ejection fraction appears to be 61 - 65%.  •  Left ventricular wall thickness is consistent with borderline concentric hypertrophy.  •  Left ventricular diastolic function was normal.  •  Estimated right ventricular systolic pressure from tricuspid regurgitation is mildly elevated (35-45 mmHg). Calculated right ventricular systolic pressure from tricuspid regurgitation is 40 mmHg.              Cardiolite (Tc-99m sestamibi) stress test      OTHER:     Assessment & Plan     Principal Problem:    UTI (urinary tract infection)    ]]]]]]]]]]]]]]]]]]]]]]]  Impression  ============  -Atrial fibrillation    - Altered mental status  Acute toxic encephalopathy    - Diabetes hypertension obstructive sleep apnea    - Anticoagulation-patient was on Eliquis.  Patient is on Lovenox and Eliquis is on hold.    - CKD 3    - Exogenous obesity    Patient had normal left ventricular function with ejection fraction of 60 to 65%.  EKG showed atrial fibrillation  Close cardiac monitoring.  Conservative cardiac treatment at this time.  Dr. Vargas primary cardiologist  will see the patient tomorrow.        Duarte Canales MD  03/26/23  19:19 EDT

## 2023-03-26 NOTE — CASE MANAGEMENT/SOCIAL WORK
Continued Stay Note  KAYODE Migue     Patient Name: Shaji Junior  MRN: [id number]  Today's Date: 3/26/2023    Admit Date: 3/25/2023    Plan: Needs CM assessment   Discharge Plan     Row Name 03/26/23 1004       Plan    Plan Needs CM assessment    Plan Comments Attempted to see pt in room in ED, he presents with AMS and unable to answer CM questions at this time. Attempted to call pt sister listed on EC,  left for call back.                          Park Bustillo RN

## 2023-03-26 NOTE — PLAN OF CARE
Goal Outcome Evaluation:   Orders received for swallow evaluation. ST attempted evaluation this date but pt refused to come up in the bed due to back pain and also refused any PO, even a sip of water. Pt is still with altered mental staus. ST will follow and complete swallow eval when pt more cooperative.

## 2023-03-26 NOTE — ED PROVIDER NOTES
Subjective   History of Present Illness  History is from the EMS and nursing home staff patient altered mental status    Chief complaint altered mental status unknown last normal patient has not been seen all day today    History of present illness this is a 77-year-old male who is a resident [place] who has a history of dementia and multiple health problems who his last known normal is unknown apparently by the nursing home staff or EMS.  He was found altered and confused and not his normal self.  Low-grade temperature.  EMS was called he was transported to hospital hemodynamically stable.  No family at the bedside there is no other history available from any brain the patient can provide any history as he is very confused.  No reported injury.        Review of Systems   Unable to perform ROS: Mental status change       Past Medical History:   Diagnosis Date   • Acute kidney failure (HCC)    • Acute respiratory failure with hypoxia (HCC)    • Anemia    • Benign prostatic hyperplasia    • Bilateral primary osteoarthritis of knee    • Cardiomegaly    • Cardiomyopathy (HCC)    • Cellulitis and abscess of left leg    • Cerebral infarction (HCC)    • Cerebrovascular disease    • Chronic kidney disease    • Dementia (HCC)    • Diabetes mellitus (HCC)    • Essential hypertension    • GERD (gastroesophageal reflux disease)    • Gout    • Heart failure (HCC)    • Hyperlipidemia    • Morbid obesity (HCC)    • Myocardial infarction (HCC)    • Obstructive sleep apnea    • Obstructive uropathy    • Paroxysmal atrial fibrillation (HCC)    • Peptic ulcer    • Peripheral vascular disease (HCC)    • Pulmonary edema    • Venous insufficiency        No Known Allergies    No past surgical history on file.    Family History   Problem Relation Age of Onset   • Diabetes Father        Social History     Socioeconomic History   • Marital status:    • Number of children: 0   Tobacco Use   • Smoking status: Never   Vaping Use   •  Vaping Use: Never used   Substance and Sexual Activity   • Alcohol use: Not Currently   • Drug use: Never   • Sexual activity: Defer     Prior to Admission medications    Medication Sig Start Date End Date Taking? Authorizing Provider   acetaminophen (TYLENOL) 325 MG tablet Take 650 mg by mouth Every 4 (Four) Hours As Needed for Mild Pain .    Michelle Esquivel MD   albuterol sulfate  (90 Base) MCG/ACT inhaler Inhale 2 puffs Every 4 (Four) Hours As Needed (Cough).    Michelle Esquivel MD   apixaban (ELIQUIS) 5 MG tablet tablet Take 1 tablet by mouth Every 12 (Twelve) Hours. Indications: Atrial Fibrillation 4/6/22   Ba Banuelos MD   carvedilol (COREG) 12.5 MG tablet Take 1 tablet by mouth 2 (Two) Times a Day With Meals. 9/19/22   Corinna Miranda DO   cefuroxime (CEFTIN) 500 MG tablet Take 1 tablet by mouth 2 (Two) Times a Day. 2/18/23   Christofer Solitario MD   docusate calcium (SURFAK) 240 MG capsule Take 240 mg by mouth Daily.    Michelle Esquivel MD   escitalopram (LEXAPRO) 10 MG tablet Take 10 mg by mouth Daily.    Michelle Esquivel MD   finasteride (PROSCAR) 5 MG tablet Take 5 mg by mouth Daily.    Michelle Esquivel MD   fosfomycin (MONUROL) 3 g pack Mix 1 packet in 3 to 4 oz water and take by mouth every 48 hours as directed. 3/10/23   Amarilis Wiggins APRN   furosemide (LASIX) 40 MG tablet Take 1 tablet by mouth 2 (Two) Times a Day. 9/19/22   Corinna Miranda DO   gabapentin (NEURONTIN) 300 MG capsule Take 300 mg by mouth 3 (Three) Times a Day.    Michelle Esquivel MD   hydrALAZINE (APRESOLINE) 50 MG tablet Take 1 tablet by mouth 3 (Three) Times a Day. 9/19/22   Corinna Miranda DO   Insulin Glargine (BASAGLAR KWIKPEN) 100 UNIT/ML injection pen Inject 10 Units under the skin into the appropriate area as directed Every Night.    Michelle Esquivel MD   isosorbide mononitrate (IMDUR) 60 MG 24 hr tablet Take 1 tablet by mouth 2 (Two) Times a Day. 9/19/22   Corinna Miranda, DO    losartan (COZAAR) 25 MG tablet Take 1 tablet by mouth Daily. 9/20/22   Corinna Miranda DO   methocarbamol (ROBAXIN) 500 MG tablet Take 500 mg by mouth Every 12 (Twelve) Hours As Needed for Muscle Spasms.    Michelle Esquivel MD   nitroglycerin (NITROSTAT) 0.4 MG SL tablet Place 0.4 mg under the tongue Every 5 (Five) Minutes As Needed for Chest Pain. Take no more than 3 doses in 15 minutes.    Michelle Esquivel MD   polycarbophil (calcium polycarbophil) 625 MG tablet tablet Take 625 mg by mouth Daily.    Michelle Esquivel MD   Polyethylene Glycol 3350 powder Take 17 g by mouth Every Night.    Michelle Esquivel MD   saccharomyces boulardii (FLORASTOR) 250 MG capsule Take 250 mg by mouth 2 (Two) Times a Day.    Michelle Esquivel MD   simethicone (MYLICON,GAS-X) 180 MG capsule Take 180 mg by mouth Every 6 (Six) Hours As Needed for Flatulence.    Michelle Esquivel MD   simvastatin (ZOCOR) 40 MG tablet Take 40 mg by mouth every night at bedtime.    Michelle Esquivel MD   tamsulosin (FLOMAX) 0.4 MG capsule 24 hr capsule Take 1 capsule by mouth Daily.    Michelle Esquivel MD           Objective   Physical Exam  Constitutional is a 77-year-old male who occasionally moans in a loud voice triage vital signs temperature of 100.7 rectally blood pressure initially was reported at the bedside of 200/110.  Sats at 97% on 2 L of oxygen heart rate in the 70s.  Respiratory about 24.  HEENT no obvious trauma extraocular muscles intact pupils equal reactive mouth is otherwise clear neck supple no adenopathy no meningeal signs.  Lungs clear no retraction no use of accessories heart regular without murmur abdomen soft without tenderness good bowel sounds no peritoneal findings or pulsatile masses  examination is normal male genitalia there is a Motley catheter in place does not appear to be draining well.  Buttock area there is no drainage or foul odor is a little bit of skin breakdown there is no  redness or signs of abscess or Ledy's gangrene or peritonitis extremities pulses equal upper lower extremities some bilateral edema no cords or Homans' sign no evidence of DVT.  Skin warm and dry without rashes or cellulitic changes.  Neurologic patient really unresponsive painful stimulus but he does move all 4 there is no obvious facial asymmetry he moves everything but does not follow commands but extremely confused  Procedures           ED Course      Results for orders placed or performed during the hospital encounter of 03/25/23   Comprehensive Metabolic Panel    Specimen: Blood   Result Value Ref Range    Glucose 104 (H) 65 - 99 mg/dL    BUN 22 8 - 23 mg/dL    Creatinine 1.01 0.76 - 1.27 mg/dL    Sodium 136 136 - 145 mmol/L    Potassium 4.1 3.5 - 5.2 mmol/L    Chloride 100 98 - 107 mmol/L    CO2 28.0 22.0 - 29.0 mmol/L    Calcium 8.7 8.6 - 10.5 mg/dL    Total Protein 6.7 6.0 - 8.5 g/dL    Albumin 3.1 (L) 3.5 - 5.2 g/dL    ALT (SGPT) 9 1 - 41 U/L    AST (SGOT) 13 1 - 40 U/L    Alkaline Phosphatase 101 39 - 117 U/L    Total Bilirubin 0.4 0.0 - 1.2 mg/dL    Globulin 3.6 gm/dL    A/G Ratio 0.9 g/dL    BUN/Creatinine Ratio 21.8 7.0 - 25.0    Anion Gap 8.0 5.0 - 15.0 mmol/L    eGFR 76.6 >60.0 mL/min/1.73   Urinalysis With Microscopic If Indicated (No Culture) - Indwelling Urethral Catheter    Specimen: Indwelling Urethral Catheter; Urine   Result Value Ref Range    Color, UA Dark Yellow (A) Yellow, Straw    Appearance, UA Cloudy (A) Clear    pH, UA 8.0 5.0 - 8.0    Specific Gravity, UA 1.015 1.005 - 1.030    Glucose, UA Negative Negative    Ketones, UA Negative Negative    Bilirubin, UA Negative Negative    Blood, UA Large (3+) (A) Negative    Protein, UA >=300 mg/dL (3+) (A) Negative    Leuk Esterase, UA Large (3+) (A) Negative    Nitrite, UA Positive (A) Negative    Urobilinogen, UA 0.2 E.U./dL 0.2 - 1.0 E.U./dL   Procalcitonin    Specimen: Blood   Result Value Ref Range    Procalcitonin 0.07 0.00 - 0.25  ng/mL   CBC Auto Differential    Specimen: Blood   Result Value Ref Range    WBC 4.70 3.40 - 10.80 10*3/mm3    RBC 4.66 4.14 - 5.80 10*6/mm3    Hemoglobin 11.7 (L) 13.0 - 17.7 g/dL    Hematocrit 38.0 37.5 - 51.0 %    MCV 81.4 79.0 - 97.0 fL    MCH 25.0 (L) 26.6 - 33.0 pg    MCHC 30.7 (L) 31.5 - 35.7 g/dL    RDW 20.7 (H) 12.3 - 15.4 %    RDW-SD 59.1 (H) 37.0 - 54.0 fl    MPV 8.2 6.0 - 12.0 fL    Platelets 209 140 - 450 10*3/mm3    Neutrophil % 61.1 42.7 - 76.0 %    Lymphocyte % 24.3 19.6 - 45.3 %    Monocyte % 9.6 5.0 - 12.0 %    Eosinophil % 4.7 0.3 - 6.2 %    Basophil % 0.3 0.0 - 1.5 %    Neutrophils, Absolute 2.90 1.70 - 7.00 10*3/mm3    Lymphocytes, Absolute 1.10 0.70 - 3.10 10*3/mm3    Monocytes, Absolute 0.40 0.10 - 0.90 10*3/mm3    Eosinophils, Absolute 0.20 0.00 - 0.40 10*3/mm3    Basophils, Absolute 0.00 0.00 - 0.20 10*3/mm3    nRBC 0.0 0.0 - 0.2 /100 WBC   Blood Gas, Arterial -    Specimen: Arterial Blood   Result Value Ref Range    Site Right Brachial     Kraig's Test Negative     pH, Arterial 7.370 7.350 - 7.450 pH units    pCO2, Arterial 55.6 (H) 35.0 - 48.0 mm Hg    pO2, Arterial 27.7 (C) 83.0 - 108.0 mm Hg    HCO3, Arterial 32.1 (H) 21.0 - 28.0 mmol/L    Base Excess, Arterial 5.3 (H) 0.0 - 3.0 mmol/L    O2 Saturation, Arterial 48.4 (L) 94.0 - 98.0 %    CO2 Content 33.8 (H) 22 - 29 mmol/L    Barometric Pressure for Blood Gas      Modality Cannula     FIO2 28 %    Hemodilution No    Urinalysis, Microscopic Only - Indwelling Urethral Catheter    Specimen: Indwelling Urethral Catheter; Urine   Result Value Ref Range    RBC, UA 31-50 (A) None Seen /HPF    WBC, UA Too Numerous to Count (A) None Seen /HPF    Bacteria, UA 3+ (A) None Seen /HPF    Squamous Epithelial Cells, UA 0-2 None Seen, 0-2 /HPF    Hyaline Casts, UA None Seen None Seen /LPF    Triple Phosphate Crystals, UA Moderate/2+ None Seen /HPF    Methodology Manual Light Microscopy    POC Lactate    Specimen: Blood   Result Value Ref Range    Lactate  1.0 0.3 - 2.0 mmol/L   ECG 12 Lead Altered Mental Status   Result Value Ref Range    QT Interval 361 ms   Green Top (Gel)   Result Value Ref Range    Extra Tube HOLD    Lavender Top   Result Value Ref Range    Extra Tube hold for add-on    Gold Top - SST   Result Value Ref Range    Extra Tube Hold for add-ons.    Light Blue Top   Result Value Ref Range    Extra Tube Hold for add-ons.      CT Head Without Contrast    Result Date: 3/26/2023  1. No acute intracranial process. Parietal scalp soft tissue swelling MRI is more sensitive for the detection of acute nonhemorrhagic infarct. 2. Moderate to severe changes small vessel ischemic disease of indeterminate age, presumably mostly chronic. Volume loss. Atherosclerosis. 3. Paranasal sinus disease.  Electronically signed by:  Baldev Pereira M.D.  3/25/2023 10:03 PM Mountain Time    Medications   sodium chloride 0.9 % flush 10 mL (has no administration in time range)   Pharmacy to Dose Zosyn (has no administration in time range)   labetalol (NORMODYNE,TRANDATE) injection 10 mg (has no administration in time range)   acetaminophen (TYLENOL) suppository 650 mg (has no administration in time range)   albuterol sulfate HFA (PROVENTIL HFA;VENTOLIN HFA;PROAIR HFA) inhaler 2 puff (has no administration in time range)   Pharmacy to Dose enoxaparin (LOVENOX) (has no administration in time range)   carvedilol (COREG) tablet 12.5 mg (has no administration in time range)   escitalopram (LEXAPRO) tablet 10 mg (has no administration in time range)   finasteride (PROSCAR) tablet 5 mg (has no administration in time range)   gabapentin (NEURONTIN) capsule 300 mg (has no administration in time range)   hydrALAZINE (APRESOLINE) tablet 50 mg (has no administration in time range)   tamsulosin (FLOMAX) 24 hr capsule 0.4 mg (has no administration in time range)   atorvastatin (LIPITOR) tablet 20 mg (has no administration in time range)   sodium chloride 0.9 % flush 10 mL (10 mL  Intravenous Given 3/26/23 0127)   sodium chloride 0.9 % flush 10 mL (has no administration in time range)   sodium chloride 0.9 % infusion 40 mL (has no administration in time range)   ondansetron (ZOFRAN) tablet 4 mg (has no administration in time range)     Or   ondansetron (ZOFRAN) injection 4 mg (has no administration in time range)   nitroglycerin (NITROSTAT) SL tablet 0.4 mg (has no administration in time range)   lactated ringers infusion (50 mL/hr Intravenous New Bag 3/26/23 0127)   dextrose (GLUTOSE) oral gel 15 g (has no administration in time range)   dextrose (D50W) (25 g/50 mL) IV injection 25 g (has no administration in time range)   glucagon (human recombinant) (GLUCAGEN DIAGNOSTIC) 1 mg in sterile water (preservative free) 1 mL injection (has no administration in time range)   insulin lispro (ADMELOG) injection 2-7 Units (has no administration in time range)   acetaminophen (TYLENOL) suppository 650 mg (650 mg Rectal Given 3/25/23 2133)   piperacillin-tazobactam (ZOSYN) IVPB 4.5 g in 100 mL NS (CD) (0 g Intravenous Stopped 3/26/23 0127)   labetalol (NORMODYNE,TRANDATE) injection 10 mg (10 mg Intravenous Given 3/25/23 2255)          EKG my interpretation atrial fibrillation 85 normal axis hypertrophy QTc of 437 normal EKG but unchanged from February 15, 2023     All records reviewed patient was admitted in the hospital back in September 1992 he had been admitted for UTI which was ESBL hypertension CHF and hypertension on a Cardene drip.  His urine was sensitive to Zosyn he was on IV Zosyn at that time.  Patient had a stress test and that September 2022 which was unremarkable as well and a 2D echo in February 17, 2023 with ejection fraction of 60%.                             Medical Decision Making  Medical decision making.  IV established monitor placed showed atrial fibrillation with controlled rate.  EKG obtained apparently by me showed atrial fibrillation 85 but this is unchanged from 1 February  15, 2023 he was given Tylenol per rectum for his fevers Labs obtained independently by me comprehensive metabolic profile was unremarkable procalcitonin and CBC unremarkable hemoglobin 11.7 patient's Motley catheter was changed immediately had 300 cc of purulent urine obtained.  He was nitrate positive leukocyte positive with 3+ bacteria and 2 miscount white cells that was cultured his lactate was 1 review his records show in September 2022 he had ESBL urinary tract infection sensitive to Zosyn and meropenem he was started on Zosyn here.  He underwent a head CT without on my review I see no acute hemorrhage or tumors or masses.  Radiology read does show some chronic change.  Chest x-ray obtained independent reviewed by me cardiomegaly but no change from x-ray obtained in February no acute pneumonia or pneumothorax or other acute findings patient repeat exam was resting comfortably his vital signs are stable blood pressure after 10 of labetalol was down to 160/97 he had been given labetalol 10 mg IV.  Temperature was coming down.  But still would not speak he would not respond to painful stimulus but he did move Laly was no focal findings sats are good at 98% on 2 L of oxygen heart rate of 70 atrial fibrillation on the monitor but controlled rate.  Respiratory rate of 18.  Good cap refill.  There is no palp cords or Homans' sign or evidence of DVT.  I do not see evidence suggest an acute stroke meningitis encephalitis DVT pulmonary embolism or dissection or acute intra-abdominal process based on the history physical clinical exam this does not complete list of all possibilities of altered mental status but I think it is in the past when his records reviewed he was in here in September for similar issue this is September 2022 when he had ESBL urinary tract infection he had hypertension on Cardene drip and CHF.  She had a stress test in September which was unremarkable.  Echocardiogram done February 17 which showed  ejection fraction of 65%.  No family at the bedside.  He is otherwise hemodynamically stable he will be admitted to hospital I talked to the hospitalist Dr. Dumont Case discussed stable otherwise unremarkable ER course.    Amount and/or Complexity of Data Reviewed  External Data Reviewed: radiology, ECG and notes.  Labs: ordered.  Radiology: ordered and independent interpretation performed. Decision-making details documented in ED Course.  ECG/medicine tests: ordered and independent interpretation performed. Decision-making details documented in ED Course.      Risk  Prescription drug management.          Final diagnoses:   Altered mental status, unspecified altered mental status type   Fever, unspecified fever cause   Urinary tract infection associated with indwelling urethral catheter, initial encounter (HCC)       ED Disposition  ED Disposition     ED Disposition   Decision to Admit    Condition   --    Comment   Level of Care: Telemetry [5]   Diagnosis: UTI (urinary tract infection) [222256]   Admitting Physician: YANNI DUMONT [[id number]]   Attending Physician: YANNI DUMONT [[id number]]   Certification: I Certify That Inpatient Hospital Services Are Medically Necessary For Greater Than 2 Midnights               No follow-up provider specified.       Medication List      No changes were made to your prescriptions during this visit.          Bob Rojas MD  03/26/23 0128

## 2023-03-27 ENCOUNTER — APPOINTMENT (OUTPATIENT)
Dept: CARDIOLOGY | Facility: HOSPITAL | Age: 78
DRG: 698 | End: 2023-03-27
Payer: OTHER GOVERNMENT

## 2023-03-27 LAB
ANION GAP SERPL CALCULATED.3IONS-SCNC: 12 MMOL/L (ref 5–15)
BUN SERPL-MCNC: 17 MG/DL (ref 8–23)
BUN/CREAT SERPL: 19.8 (ref 7–25)
CALCIUM SPEC-SCNC: 8.8 MG/DL (ref 8.6–10.5)
CHLORIDE SERPL-SCNC: 103 MMOL/L (ref 98–107)
CO2 SERPL-SCNC: 26 MMOL/L (ref 22–29)
CREAT SERPL-MCNC: 0.86 MG/DL (ref 0.76–1.27)
DEPRECATED RDW RBC AUTO: 61.7 FL (ref 37–54)
EGFRCR SERPLBLD CKD-EPI 2021: 89.2 ML/MIN/1.73
ERYTHROCYTE [DISTWIDTH] IN BLOOD BY AUTOMATED COUNT: 21 % (ref 12.3–15.4)
GEN 5 2HR TROPONIN T REFLEX: 74 NG/L
GLUCOSE BLDC GLUCOMTR-MCNC: 103 MG/DL (ref 70–105)
GLUCOSE BLDC GLUCOMTR-MCNC: 108 MG/DL (ref 70–105)
GLUCOSE BLDC GLUCOMTR-MCNC: 92 MG/DL (ref 70–105)
GLUCOSE SERPL-MCNC: 127 MG/DL (ref 65–99)
HCT VFR BLD AUTO: 34.4 % (ref 37.5–51)
HGB BLD-MCNC: 11 G/DL (ref 13–17.7)
MCH RBC QN AUTO: 25.6 PG (ref 26.6–33)
MCHC RBC AUTO-ENTMCNC: 31.9 G/DL (ref 31.5–35.7)
MCV RBC AUTO: 80.2 FL (ref 79–97)
PLATELET # BLD AUTO: 193 10*3/MM3 (ref 140–450)
PMV BLD AUTO: 8.2 FL (ref 6–12)
POTASSIUM SERPL-SCNC: 3.7 MMOL/L (ref 3.5–5.2)
RBC # BLD AUTO: 4.29 10*6/MM3 (ref 4.14–5.8)
SODIUM SERPL-SCNC: 141 MMOL/L (ref 136–145)
TROPONIN T DELTA: 1 NG/L
TROPONIN T SERPL HS-MCNC: 73 NG/L
WBC NRBC COR # BLD: 5.1 10*3/MM3 (ref 3.4–10.8)

## 2023-03-27 PROCEDURE — 92610 EVALUATE SWALLOWING FUNCTION: CPT

## 2023-03-27 PROCEDURE — 93306 TTE W/DOPPLER COMPLETE: CPT | Performed by: INTERNAL MEDICINE

## 2023-03-27 PROCEDURE — 82962 GLUCOSE BLOOD TEST: CPT

## 2023-03-27 PROCEDURE — 25010000002 PIPERACILLIN SOD-TAZOBACTAM PER 1 G: Performed by: STUDENT IN AN ORGANIZED HEALTH CARE EDUCATION/TRAINING PROGRAM

## 2023-03-27 PROCEDURE — 25010000002 ENOXAPARIN PER 10 MG: Performed by: STUDENT IN AN ORGANIZED HEALTH CARE EDUCATION/TRAINING PROGRAM

## 2023-03-27 PROCEDURE — 97162 PT EVAL MOD COMPLEX 30 MIN: CPT

## 2023-03-27 PROCEDURE — 93306 TTE W/DOPPLER COMPLETE: CPT

## 2023-03-27 PROCEDURE — 84484 ASSAY OF TROPONIN QUANT: CPT | Performed by: PHYSICIAN ASSISTANT

## 2023-03-27 PROCEDURE — 25010000002 HYDRALAZINE PER 20 MG: Performed by: STUDENT IN AN ORGANIZED HEALTH CARE EDUCATION/TRAINING PROGRAM

## 2023-03-27 RX ADMIN — SODIUM CHLORIDE, POTASSIUM CHLORIDE, SODIUM LACTATE AND CALCIUM CHLORIDE 50 ML/HR: 600; 310; 30; 20 INJECTION, SOLUTION INTRAVENOUS at 20:26

## 2023-03-27 RX ADMIN — HYDRALAZINE HYDROCHLORIDE 5 MG: 20 INJECTION INTRAMUSCULAR; INTRAVENOUS at 20:25

## 2023-03-27 RX ADMIN — SODIUM CHLORIDE 12.5 MG/HR: 9 INJECTION, SOLUTION INTRAVENOUS at 02:34

## 2023-03-27 RX ADMIN — Medication 10 ML: at 08:02

## 2023-03-27 RX ADMIN — NITROGLYCERIN 1 INCH: 20 OINTMENT TOPICAL at 12:29

## 2023-03-27 RX ADMIN — PIPERACILLIN AND TAZOBACTAM 4.5 G: 4; .5 INJECTION, POWDER, FOR SOLUTION INTRAVENOUS at 20:26

## 2023-03-27 RX ADMIN — ATORVASTATIN CALCIUM 20 MG: 20 TABLET, FILM COATED ORAL at 20:26

## 2023-03-27 RX ADMIN — NITROGLYCERIN 1 INCH: 20 OINTMENT TOPICAL at 17:19

## 2023-03-27 RX ADMIN — SODIUM CHLORIDE 10 MG/HR: 9 INJECTION, SOLUTION INTRAVENOUS at 05:17

## 2023-03-27 RX ADMIN — HYDRALAZINE HYDROCHLORIDE 50 MG: 25 TABLET, FILM COATED ORAL at 15:05

## 2023-03-27 RX ADMIN — GABAPENTIN 300 MG: 300 CAPSULE ORAL at 20:25

## 2023-03-27 RX ADMIN — PIPERACILLIN AND TAZOBACTAM 4.5 G: 4; .5 INJECTION, POWDER, FOR SOLUTION INTRAVENOUS at 04:13

## 2023-03-27 RX ADMIN — CARVEDILOL 12.5 MG: 6.25 TABLET, FILM COATED ORAL at 17:19

## 2023-03-27 RX ADMIN — ENOXAPARIN SODIUM 135 MG: 150 INJECTION SUBCUTANEOUS at 02:26

## 2023-03-27 RX ADMIN — ENOXAPARIN SODIUM 135 MG: 150 INJECTION SUBCUTANEOUS at 15:05

## 2023-03-27 RX ADMIN — GABAPENTIN 300 MG: 300 CAPSULE ORAL at 15:05

## 2023-03-27 RX ADMIN — HYDRALAZINE HYDROCHLORIDE 50 MG: 25 TABLET, FILM COATED ORAL at 20:26

## 2023-03-27 RX ADMIN — Medication 10 ML: at 20:26

## 2023-03-27 RX ADMIN — PIPERACILLIN AND TAZOBACTAM 4.5 G: 4; .5 INJECTION, POWDER, FOR SOLUTION INTRAVENOUS at 12:29

## 2023-03-27 NOTE — THERAPY EVALUATION
Patient Name: Shaji Junior  : 1945    MRN: [id number]                              Today's Date: 3/27/2023       Admit Date: 3/25/2023    Visit Dx:     ICD-10-CM ICD-9-CM   1. Altered mental status, unspecified altered mental status type  R41.82 780.97   2. Fever, unspecified fever cause  R50.9 780.60   3. Urinary tract infection associated with indwelling urethral catheter, initial encounter ([facility])  T83.511A 996.64    N39.0 599.0     Patient Active Problem List   Diagnosis   • CHF exacerbation ([facility])   • Cerebrovascular accident ([facility])   • Type 2 diabetes mellitus with diabetic nephropathy ([facility])   • Acute kidney failure ([facility])   • Congestive heart failure ([facility])   • Acute on chronic congestive heart failure, unspecified heart failure type ([facility])   • MAKI (acute kidney injury) ([facility])   • Atrial fibrillation ([facility])   • Chest pain   • Shortness of breath   • Chest pain, unspecified type   • Essential hypertension   • Acute UTI (urinary tract infection)   • Normocytic normochromic anemia   • CKD (chronic kidney disease), stage III ([facility])   • BPH without obstruction/lower urinary tract symptoms   • Altered mental status, unspecified altered mental status type   • Acute on chronic diastolic CHF (congestive heart failure) ([facility])   • Hypertensive urgency   • Elevated troponin   • Acute UTI (urinary tract infection)   • Morbid obesity ([facility])   • Dementia ([facility])   • UTI (urinary tract infection)     Past Medical History:   Diagnosis Date   • Acute kidney failure ([facility])    • Acute respiratory failure with hypoxia ([facility])    • Anemia    • Benign prostatic hyperplasia    • Bilateral primary osteoarthritis of knee    • Cardiomegaly    • Cardiomyopathy ([facility])    • Cellulitis and abscess of left leg    • Cerebral infarction ([facility])    • Cerebrovascular disease    • Chronic kidney disease    • Dementia ([facility])    • Diabetes mellitus ([facility])    • Essential hypertension    • GERD (gastroesophageal reflux disease)    • Gout    • Heart failure ([facility])     • Hyperlipidemia    • Morbid obesity (HCC)    • Myocardial infarction (HCC)    • Obstructive sleep apnea    • Obstructive uropathy    • Paroxysmal atrial fibrillation (HCC)    • Peptic ulcer    • Peripheral vascular disease (HCC)    • Pulmonary edema    • Venous insufficiency      History reviewed. No pertinent surgical history.   General Information     Row Name 03/27/23 1546          Physical Therapy Time and Intention    Document Type evaluation  -EL (r) MB (t) EL (c)     Mode of Treatment physical therapy  -EL (r) MB (t) EL (c)     Row Name 03/27/23 1546          General Information    Patient Profile Reviewed yes  -EL (r) MB (t) EL (c)     Prior Level of Function independent:;all household mobility;gait;transfer;bed mobility;ADL's  -EL (r) MB (t) EL (c)     Existing Precautions/Restrictions no known precautions/restrictions  -EL (r) MB (t) EL (c)     Barriers to Rehab cognitive status;hearing deficit  -EL (r) MB (t) EL (c)     Row Name 03/27/23 1546          Living Environment    People in Home alone  -EL (r) MB (t) EL (c)     Row Name 03/27/23 1546          Home Main Entrance    Number of Stairs, Main Entrance three  -EL (r) MB (t) EL (c)     Stair Railings, Main Entrance none  -EL (r) MB (t) EL (c)     Row Name 03/27/23 1546          Stairs Within Home, Primary    Number of Stairs, Within Home, Primary none  -EL (r) MB (t) EL (c)     Row Name 03/27/23 1546          Cognition    Orientation Status (Cognition) disoriented to;situation;time  -EL (r) MB (t) EL (c)     Row Name 03/27/23 1546          Safety Issues, Functional Mobility    Safety Issues Affecting Function (Mobility) ability to follow commands;awareness of need for assistance;insight into deficits/self-awareness  -EL (r) MB (t) EL (c)     Impairments Affecting Function (Mobility) balance;cognition;endurance/activity tolerance;motor control;pain;postural/trunk control;strength  -EL (r) MB (t) EL (c)           User Key  (r) = Recorded By, (t) = Taken  By, (c) = Cosigned By    Initials Name Provider Type    Jd Byrne, PT Physical Therapist    Kirk Quintana, PT Student PT Student               Mobility     Row Name 03/27/23 1549          Bed Mobility    Bed Mobility bed mobility (all) activities  -EL (r) MB (t) EL (c)     All Activities, [place] (Bed Mobility) maximum assist (25% patient effort);2 person assist  -EL (r) MB (t) EL (c)     Assistive Device (Bed Mobility) bed rails;head of bed elevated;draw sheet  -EL (r) MB (t) EL (c)     Comment, (Bed Mobility) max A x 2 for rolling L<>R and repositioning in bed. Unable to perform any other activities due to increased pain levels  -EL (r) MB (t) EL (c)     Row Name 03/27/23 1549          Transfers    Comment, (Transfers) not able to att with pain levels too high and cognitive status  -EL (r) MB (t) EL (c)     Row Name 03/27/23 1549          Gait/Stairs (Locomotion)    Comment, (Gait/Stairs) not able to att gait due to increased pain levels and impaired cognition  -EL (r) MB (t) EL (c)           User Key  (r) = Recorded By, (t) = Taken By, (c) = Cosigned By    Initials Name Provider Type    Jd Byrne, PT Physical Therapist    Kirk Quintana, PT Student PT Student               Obj/Interventions     Row Name 03/27/23 1552          Range of Motion Comprehensive    Comment, General Range of Motion significantly limited in supine secondary to pain  -EL (r) MB (t) EL (c)     Row Name 03/27/23 1552          Strength Comprehensive (MMT)    Comment, General Manual Muscle Testing (MMT) Assessment unable to assess due to increased pain levels  -EL (r) MB (t) EL (c)     Row Name 03/27/23 1552          Balance    Comment, Balance unable to perform sitting/standing balance this session, will reassess as appropriate  -EL (r) MB (t) EL (c)     Row Name 03/27/23 1552          Sensory Assessment (Somatosensory)    Sensory Assessment (Somatosensory) sensation intact  -EL (r) MB (t) EL (c)           User Key  (r) =  Recorded By, (t) = Taken By, (c) = Cosigned By    Initials Name Provider Type    Jd Byrne, PT Physical Therapist    Kirk Quintana, PT Student PT Student               Goals/Plan     Row Name 03/27/23 1554          Bed Mobility Goal 1 (PT)    Activity/Assistive Device (Bed Mobility Goal 1, PT) bed mobility activities, all  -EL (r) MB (t) EL (c)     [place] Level/Cues Needed (Bed Mobility Goal 1, PT) independent  -EL (r) MB (t) EL (c)     Time Frame (Bed Mobility Goal 1, PT) long term goal (LTG);2 weeks  -EL (r) MB (t) EL (c)     Row Name 03/27/23 1557          Transfer Goal 1 (PT)    Activity/Assistive Device (Transfer Goal 1, PT) transfers, all  -EL (r) MB (t) EL (c)     [place] Level/Cues Needed (Transfer Goal 1, PT) moderate assist (50-74% patient effort)  -EL (r) MB (t) EL (c)     Time Frame (Transfer Goal 1, PT) long term goal (LTG);2 weeks  -EL (r) MB (t) EL (c)     Row Name 03/27/23 1554          Gait Training Goal 1 (PT)    Activity/Assistive Device (Gait Training Goal 1, PT) gait (walking locomotion)  -EL (r) MB (t) EL (c)     [place] Level (Gait Training Goal 1, PT) moderate assist (50-74% patient effort)  -EL (r) MB (t) EL (c)     Distance (Gait Training Goal 1, PT) 10  -EL (r) MB (t) EL (c)     Time Frame (Gait Training Goal 1, PT) long term goal (LTG);2 weeks  -EL (r) MB (t) EL (c)     Row Name 03/27/23 1556          Therapy Assessment/Plan (PT)    Planned Therapy Interventions (PT) balance training;bed mobility training;gait training;home exercise program;patient/family education;strengthening;transfer training  -EL (r) MB (t) EL (c)           User Key  (r) = Recorded By, (t) = Taken By, (c) = Cosigned By    Initials Name Provider Type    Jd Byrne, PT Physical Therapist    Kirk Quintana, PT Student PT Student               Clinical Impression     Row Name 03/27/23 1553          Pain    Additional Documentation Pain Scale: FACES Pre/Post-Treatment (Group)  -NGUYỄN (r) MB (t) EL  (c)     Row Name 03/27/23 5134          Pain Scale: FACES Pre/Post-Treatment    Pain: FACES Scale, Pretreatment 8-->hurts whole lot  -EL (r) MB (t) EL (c)     Posttreatment Pain Rating 8-->hurts whole lot  -EL (r) MB (t) EL (c)     Pain Location - back  -EL (r) MB (t) EL (c)     Row Name 03/27/23 1605 03/27/23 8282       Plan of Care Review    Plan of Care Reviewed With -- patient  -EL (r) MB (t) EL (c)    Progress -- no change  -EL (r) MB (t) EL (c)    Outcome Evaluation Pt is a 76 y/o M admitted to [facility] on 3/26/23 with AMS, febrile, and confused. Pt has history of chronic UTIs and urinary retention. He was diagnosed in ED with UTI and acute toxic encephalopathy. Pt has a number of chronic co-morbidities, but has baseline of dementia. At baseline, pt is living alone at [facility]. He states he is IND with all mobility and ADLs, but difficult to confirm with AMS at the time of evaluation. He is disoriented to time and situation. Currently pt is able to complete rolling L<>R in bed max A x 2, but unable to sit EOB secondary to pain and cognitive status. Unable to describe pain, but states his back is most painful. RN notified and administered medication during session. Pt will benefit from skilled PT intervention during stay, and current recommendation is SNF at d/c pending progress.  -EL (r) MB (t) EL (c) --    Row Name 03/27/23 9799          Therapy Assessment/Plan (PT)    Rehab Potential (PT) fair, will monitor progress closely  -EL (r) MB (t) EL (c)     Criteria for Skilled Interventions Met (PT) yes  -EL (r) MB (t) EL (c)     Therapy Frequency (PT) 5 times/wk  -EL (r) MB (t) EL (c)     Predicted Duration of Therapy Intervention (PT) until d/c  -EL (r) MB (t) EL (c)     Row Name 03/27/23 0977          Vital Signs    O2 Delivery Pre Treatment room air  -EL (r) MB (t) EL (c)     O2 Delivery Intra Treatment room air  -EL (r) MB (t) EL (c)     O2 Delivery Post Treatment room air  -EL (r) MB (t) EL (c)      Pre Patient Position Supine  -EL (r) MB (t) EL (c)     Intra Patient Position Side Lying  -EL (r) MB (t) EL (c)     Post Patient Position Supine  -EL (r) MB (t) EL (c)     Row Name 03/27/23 1554          Positioning and Restraints    Pre-Treatment Position in bed  -EL (r) MB (t) EL (c)     Post Treatment Position bed  -EL (r) MB (t) EL (c)     In Bed notified nsg;supine;side lying left;call light within reach;encouraged to call for assist;exit alarm on  -EL (r) MB (t) EL (c)           User Key  (r) = Recorded By, (t) = Taken By, (c) = Cosigned By    Initials Name Provider Type    Jd Byrne, PT Physical Therapist    Kirk Quintana, PT Student PT Student               Outcome Measures     Row Name 03/27/23 7362          How much help from another person do you currently need...    Turning from your back to your side while in flat bed without using bedrails? 1  -EL (r) MB (t) EL (c)     Moving from lying on back to sitting on the side of a flat bed without bedrails? 1  -EL (r) MB (t) EL (c)     Moving to and from a bed to a chair (including a wheelchair)? 1  -EL (r) MB (t) EL (c)     Standing up from a chair using your arms (e.g., wheelchair, bedside chair)? 1  -EL (r) MB (t) EL (c)     Climbing 3-5 steps with a railing? 1  -EL (r) MB (t) EL (c)     To walk in hospital room? 1  -EL (r) MB (t) EL (c)     AM-PAC 6 Clicks Score (PT) 6  -EL (r) MB (t)     Highest level of mobility 2 --> Bed activities/dependent transfer  -EL (r) MB (t)     Row Name 03/27/23 1611          Functional Assessment    Outcome Measure Options AM-PAC 6 Clicks Basic Mobility (PT)  -EL (r) MB (t) EL (c)           User Key  (r) = Recorded By, (t) = Taken By, (c) = Cosigned By    Initials Name Provider Type    Jd Byrne, PT Physical Therapist    Kirk Quintana, PT Student PT Student                             Physical Therapy Education     Title: PT OT SLP Therapies (Done)     Topic: Physical Therapy (Done)     Point: Mobility training  (Done)     Learning Progress Summary           Patient Acceptance, E,TB, VU by MB at 3/27/2023 1555                   Point: Body mechanics (Done)     Learning Progress Summary           Patient Acceptance, E,TB, VU by MB at 3/27/2023 1555                               User Key     Initials Effective Dates Name Provider Type Discipline    MB 01/30/23 -  Kirk Garcia, PT Student PT Student PT              PT Recommendation and Plan  Planned Therapy Interventions (PT): balance training, bed mobility training, gait training, home exercise program, patient/family education, strengthening, transfer training  Plan of Care Reviewed With: patient  Progress: no change  Outcome Evaluation: Pt is a 76 y/o M admitted to [facility] on 3/26/23 with AMS, febrile, and confused. Pt has history of chronic UTIs and urinary retention. He was diagnosed in ED with UTI and acute toxic encephalopathy. Pt has a number of chronic co-morbidities, but has baseline of dementia. At baseline, pt is living alone at [facility]. He states he is IND with all mobility and ADLs, but difficult to confirm with AMS at the time of evaluation. He is disoriented to time and situation. Currently pt is able to complete rolling L<>R in bed max A x 2, but unable to sit EOB secondary to pain and cognitive status. Unable to describe pain, but states his back is most painful. RN notified and administered medication during session. Pt will benefit from skilled PT intervention during stay, and current recommendation is SNF at d/c pending progress.     Time Calculation:    PT Charges     Row Name 03/27/23 1557             Time Calculation    Start Time 1458  -EL (r) MB (t) EL (c)      Stop Time 1523  -EL (r) MB (t) EL (c)      Time Calculation (min) 25 min  -EL (r) MB (t)      PT Received On 03/27/23  -EL (r) MB (t) EL (c)      PT - Next Appointment 03/28/23  -EL (r) MB (t) EL (c)      PT Goal Re-Cert Due Date 04/10/23  -EL (r) MB (t) EL (c)         Time  Calculation- PT    Total Timed Code Minutes- PT 0 minute(s)  -NGUYỄN (r) MB (t) NGUYỄN (c)            User Key  (r) = Recorded By, (t) = Taken By, (c) = Cosigned By    Initials Name Provider Type    Jd Byrne, PT Physical Therapist    Kirk Quintana, PT Student PT Student              Therapy Charges for Today     Code Description Service Date Service Provider Modifiers Qty    [id number] HC PT EVAL MOD COMPLEXITY 4 3/27/2023 Kirk Garcia, PT Student GP 1          PT G-Codes  Outcome Measure Options: AM-PAC 6 Clicks Basic Mobility (PT)  AM-PAC 6 Clicks Score (PT): 6  PT Discharge Summary  Anticipated Discharge Disposition (PT): skilled nursing facility    Kirk Garcia PT Student  3/27/2023

## 2023-03-27 NOTE — CASE MANAGEMENT/SOCIAL WORK
Discharge Planning Assessment   [place]     Patient Name: Shaji Junior  MRN: [id number]  Today's Date: 3/27/2023    Admit Date: 3/25/2023    Plan: From [facility]. No precert or PASRR needed for return. Patient's sister not agreeable to return. Alternative choices pending.   Discharge Needs Assessment     Row Name 03/27/23 1535       Living Environment    People in Home facility resident    Current Living Arrangements extended care facility    Potentially Unsafe Housing Conditions none    Primary Care Provided by other (see comments)    Provides Primary Care For no one, unable/limited ability to care for self    Family Caregiver if Needed sibling(s)    Family Caregiver Names SisterKenzie Martinez    Quality of Family Relationships supportive    Able to Return to Prior Arrangements yes       Resource/Environmental Concerns    Resource/Environmental Concerns none    Transportation Concerns none       Transition Planning    Patient/Family Anticipates Transition to long-term care facility    Patient/Family Anticipated Services at Transition none    Transportation Anticipated health plan transportation       Discharge Needs Assessment    Readmission Within the Last 30 Days no previous admission in last 30 days    Equipment Currently Used at Home wheelchair    Concerns to be Addressed care coordination/care conferences;discharge planning    Anticipated Changes Related to Illness none    Equipment Needed After Discharge none    Discharge Facility/Level of Care Needs nursing facility, intermediate    Provided Post Acute Provider List? N/A               Discharge Plan     Row Name 03/27/23 1532       Plan    Plan From [facility]. No precert or PASRR needed for return. Patient's sister not agreeable to return. Alternative choices pending.    Patient/Family in Agreement with Plan yes    Plan Comments Due to patient’s status, CM contacted patient’s sister, Michelle ([phone]) to discuss dc planning and IMM letter. Confirmed  patient came in from [place]. [place] reported patient has been there for about a year. (April 2022 per chart review). She reported that due to lack of care, she was not sure that she wanted for patient to go back there. Reported that she had previously contacted a facility in [place] that she was interested in but could not remember the name. Reported that she was at the library and when she got home she would look at her notes. She reported that she would contact CM tomorrow morning, 3/28 to notify of new facility choices. CM added in [facility] and discussed with liaison Ainsley that patient could return to LTC, no precert needed. Email sent to email address [email or web address] per request.              Continued Care and Services - Admitted Since 3/25/2023     Destination     Service Provider Request Status Selected Services Address Phone Fax Patient Preferred    [facility] Pending - Request Sent N/A [address] [place] [address] [phone]                Demographic Summary     Row Name 03/27/23 1535       General Information    Admission Type inpatient    Required Notices Provided Important Message from Medicare    Referral Source admission list    Reason for Consult discharge planning    Preferred Language English       Contact Information    Permission Granted to Share Info With                Functional Status     Row Name 03/27/23 1535       Functional Status    Usual Activity Tolerance poor    Current Activity Tolerance poor       Functional Status, IADL    Medications completely dependent    Meal Preparation completely dependent    Housekeeping completely dependent    Laundry completely dependent    Shopping completely dependent              Phone communication or documentation only - no physical contact with patient or family.    Natacha Clancy RN     Office Phone: [phone]  Office Cell: [phone]

## 2023-03-27 NOTE — PLAN OF CARE
Goal Outcome Evaluation:  Plan of Care Reviewed With: patient        Progress: no change  Outcome Evaluation: Pt is a 78 y/o M admitted to [facility] on 3/26/23 with AMS, febrile, and confused. Pt has history of chronic UTIs and urinary retention. He was diagnosed in ED with UTI and acute toxic encephalopathy. Pt has a number of chronic co-morbidities, but has baseline of dementia. At baseline, pt is living alone at [facility]. He states he is IND with all mobility and ADLs, but difficult to confirm with AMS at the time of evaluation. He is disoriented to time and situation. Currently pt is able to complete rolling L<>R in bed max A x 2, but unable to sit EOB secondary to pain and cognitive status. Unable to describe pain, but states his back is most painful. RN notified and administered medication during session. Pt will benefit from skilled PT intervention during stay, and current recommendation is SNF at d/c pending progress.

## 2023-03-27 NOTE — PROGRESS NOTES
[facility] Medicine Services Daily Progress Note    Patient Name: Shaji Junior  : 1945  MRN: [id number]  Primary Care Physician:  Naa Valverde MD  Date of admission: 3/25/2023      Subjective      Chief Complaint: Abdominal pain      Patient Reports     3/27/2023: Admitted to PCU due to hypertensive urgency.  Off Cardene drip.  Can downgrade.  Complains of abdominal pain.  History of ESBL.  Does not walk.  Confusion getting better    Review of Systems   Unable to perform ROS: mental status change         Objective      Vitals:   Temp:  [97.9 °F (36.6 °C)-98.8 °F (37.1 °C)] 98.8 °F (37.1 °C)  Heart Rate:  [] 66  Resp:  [16-20] 16  BP: (125-165)/() 153/76  Flow (L/min):  [2] 2    Physical Exam  HENT:      Head: Normocephalic.      Mouth/Throat:      Mouth: Mucous membranes are moist.   Eyes:      Extraocular Movements: Extraocular movements intact.      Pupils: Pupils are equal, round, and reactive to light.   Cardiovascular:      Rate and Rhythm: Normal rate and regular rhythm.   Pulmonary:      Effort: Pulmonary effort is normal.   Abdominal:      General: Bowel sounds are normal.      Tenderness: There is no abdominal tenderness.      Comments: Obese abdomen.   Musculoskeletal:      Comments: Decreased range of motion hips   Skin:     General: Skin is warm.      Findings: No rash.   Neurological:      Mental Status: He is alert. He is confused.   Psychiatric:         Behavior: Behavior is cooperative.             Result Review    Result Review:  I have personally reviewed the results from the time of this admission to 3/27/2023 18:20 EDT and agree with these findings:  [x]  Laboratory  []  Microbiology  [x]  Radiology  []  EKG/Telemetry   []  Cardiology/Vascular   []  Pathology  [x]  Old records  []  Other:      Wounds (last 24 hours)     LDA Wound     Row Name 23 1737 23 1210 23 0802       [REMOVED] Wound 02/15/23 2231 Bilateral coccyx Pressure  Injury    Wound - Properties Group Placement Date: 02/15/23  -CP Placement Time: 2231  -CP Present on Hospital Admission: Y  -CP Side: Bilateral  -CP Location: coccyx  -CP Primary Wound Type: Pressure inj  -CP Removal Date: 03/27/23  -CL Removal Time: 1028  -CL    Retired Wound - Properties Group Placement Date: 02/15/23  -CP Placement Time: 2231  -CP Present on Hospital Admission: Y  -CP Side: Bilateral  -CP Location: coccyx  -CP Primary Wound Type: Pressure inj  -CP Removal Date: 03/27/23  -CL Removal Time: 1028  -CL    Retired Wound - Properties Group Date first assessed: 02/15/23  -CP Time first assessed: 2231  -CP Present on Hospital Admission: Y  -CP Side: Bilateral  -CP Location: coccyx  -CP Primary Wound Type: Pressure inj  -CP Resolution Date: 03/27/23  -CL Resolution Time: 1028  -CL       Wound 03/10/23 0938 Right gluteal MASD (Moisture associated skin damage)    Wound - Properties Group Placement Date: 03/10/23  -HW Placement Time: 0938  -HW Present on Hospital Admission: Y  -HW Side: Right  -HW Location: gluteal  -HW Primary Wound Type: MASD  -HW    Dressing Appearance -- -- open to air  -CL    Base pink;red  -MM pink;red  -CL pink;red  -CL    Drainage Amount -- none  -CL none  -CL    Care, Wound -- -- barrier applied  -CL    Retired Wound - Properties Group Placement Date: 03/10/23  -HW Placement Time: 0938  -HW Present on Hospital Admission: Y  -HW Side: Right  -HW Location: gluteal  -HW Primary Wound Type: MASD  -HW    Retired Wound - Properties Group Date first assessed: 03/10/23  -HW Time first assessed: 0938  -HW Present on Hospital Admission: Y  -HW Side: Right  -HW Location: gluteal  -HW Primary Wound Type: MASD  -HW       Wound 03/26/23 1653 Right anterior greater trochanter Blisters    Wound - Properties Group Placement Date: 03/26/23  -NE Placement Time: 1653 -NE Present on Hospital Admission: Y  -NE Side: Right  -NE Orientation: anterior  -NE Location: greater trochanter  -NE Primary Wound  Type: Blisters  -NE    Dressing Appearance -- -- open to air  -CL    Closure Open to air  -MM -- --    Base red  -MM -- --    Periwound dry;intact;pink  -MM -- --    Periwound Temperature warm  -MM -- --    Periwound Skin Turgor soft  -MM -- --    Drainage Characteristics/Odor -- serosanguineous  -CL serosanguineous  -CL    Drainage Amount -- scant  -CL scant  -CL    Retired Wound - Properties Group Placement Date: 03/26/23 -NE Placement Time: 1653 -NE Present on Hospital Admission: Y  -NE Side: Right  -NE Orientation: anterior  -NE Location: greater trochanter  -NE Primary Wound Type: Blisters  -NE    Retired Wound - Properties Group Date first assessed: 03/26/23 -NE Time first assessed: 1653 -NE Present on Hospital Admission: Y  -NE Side: Right  -NE Location: greater trochanter  -NE Primary Wound Type: Blisters  -NE       Wound 03/26/23 1654 Right gluteal Pressure Injury    Wound - Properties Group Placement Date: 03/26/23 -NE Placement Time: 1654 -NE Present on Hospital Admission: Y  -NE Side: Right  -NE Location: gluteal  -NE, STAGE 2  Primary Wound Type: Pressure inj  -NE    Dressing Appearance -- -- open to air  -CL    Base pink;red  -MM -- --    Periwound Temperature warm  -MM warm  -CL warm  -CL    Periwound Skin Turgor soft  -MM soft  -CL soft  -CL    Edges -- jagged  -CL jagged  -CL    Drainage Characteristics/Odor -- serosanguineous  -CL serosanguineous  -CL    Drainage Amount -- scant  -CL scant  -CL    Care, Wound -- -- barrier applied  -CL    Retired Wound - Properties Group Placement Date: 03/26/23  -NE Placement Time: 1654 -NE Present on Hospital Admission: Y  -NE Side: Right  -NE Location: gluteal  -NE, STAGE 2  Primary Wound Type: Pressure inj  -NE    Retired Wound - Properties Group Date first assessed: 03/26/23 -NE Time first assessed: 1654 -NE Present on Hospital Admission: Y  -NE Side: Right  -NE Location: gluteal  -NE, STAGE 2  Primary Wound Type: Pressure inj  -NE       Wound  03/26/23 1702 Left anterior thigh Skin Tear    Wound - Properties Group Placement Date: 03/26/23  -NE Placement Time: 1702 -NE Present on Hospital Admission: Y  -NE Side: Left  -NE Orientation: anterior  -NE Location: thigh  -NE Primary Wound Type: Skin tear  -NE    Dressing Appearance -- -- open to air  -CL    Closure Open to air  -MM -- --    Base pink  -MM -- --    Drainage Amount -- none  -CL none  -CL    Periwound Care -- -- dry periwound area maintained  -CL    Retired Wound - Properties Group Placement Date: 03/26/23  -NE Placement Time: 1702 -NE Present on Hospital Admission: Y  -NE Side: Left  -NE Orientation: anterior  -NE Location: thigh  -NE Primary Wound Type: Skin tear  -NE    Retired Wound - Properties Group Date first assessed: 03/26/23  -NE Time first assessed: 1702 -NE Present on Hospital Admission: Y  -NE Side: Left  -NE Location: thigh  -NE Primary Wound Type: Skin tear  -NE       [REMOVED] Wound 03/10/23 0939 Right anterior thigh    Wound - Properties Group Placement Date: 03/10/23  -HW Placement Time: 0939  -HW Present on Hospital Admission: Y  -HW Side: Right  -HW Orientation: anterior  -HW Location: thigh  -HW Removal Date: 03/27/23  -CL Removal Time: 1027  -CL    Retired Wound - Properties Group Placement Date: 03/10/23  -HW Placement Time: 0939  -HW Present on Hospital Admission: Y  -HW Side: Right  -HW Orientation: anterior  -HW Location: thigh  -HW Removal Date: 03/27/23  -CL Removal Time: 1027  -CL    Retired Wound - Properties Group Date first assessed: 03/10/23  -HW Time first assessed: 0939  -HW Present on Hospital Admission: Y  -HW Side: Right  -HW Location: thigh  -HW Resolution Date: 03/27/23  -CL Resolution Time: 1027  -CL       [REMOVED] Wound 03/26/23 1710 Left anterior greater trochanter    Wound - Properties Group Placement Date: 03/26/23  -NE Placement Time: 1710  -NE Side: Left  -NE Orientation: anterior  -NE Location: greater trochanter  -NE Removal Date: 03/27/23  -CL  Removal Time: 1026  -CL, scab  Wound Outcome: Healed  -CL    Retired Wound - Properties Group Placement Date: 03/26/23  -NE Placement Time: 1710  -NE Side: Left  -NE Orientation: anterior  -NE Location: greater trochanter  -NE Removal Date: 03/27/23  -CL Removal Time: 1026  -CL, scab  Wound Outcome: Healed  -CL    Retired Wound - Properties Group Date first assessed: 03/26/23  -NE Time first assessed: 1710  -NE Side: Left  -NE Location: greater trochanter  -NE Resolution Date: 03/27/23  -CL Resolution Time: 1026  -CL, scab  Wound Outcome: Healed  -CL    Row Name 03/27/23 0400 03/27/23 0000 03/26/23 2047       [REMOVED] Wound 02/15/23 2231 Bilateral coccyx Pressure Injury    Wound - Properties Group Placement Date: 02/15/23  -CP Placement Time: 2231  -CP Present on Hospital Admission: Y  -CP Side: Bilateral  -CP Location: coccyx  -CP Primary Wound Type: Pressure inj  -CP Removal Date: 03/27/23  -CL Removal Time: 1028  -CL    Retired Wound - Properties Group Placement Date: 02/15/23  -CP Placement Time: 2231  -CP Present on Hospital Admission: Y  -CP Side: Bilateral  -CP Location: coccyx  -CP Primary Wound Type: Pressure inj  -CP Removal Date: 03/27/23  -CL Removal Time: 1028  -CL    Retired Wound - Properties Group Date first assessed: 02/15/23  -CP Time first assessed: 2231  -CP Present on Hospital Admission: Y  -CP Side: Bilateral  -CP Location: coccyx  -CP Primary Wound Type: Pressure inj  -CP Resolution Date: 03/27/23  -CL Resolution Time: 1028  -CL       Wound 03/10/23 0938 Right gluteal MASD (Moisture associated skin damage)    Wound - Properties Group Placement Date: 03/10/23  -HW Placement Time: 0938  -HW Present on Hospital Admission: Y  -HW Side: Right  -HW Location: gluteal  -HW Primary Wound Type: MASD  -HW    Dressing Appearance -- open to air  -ML --    Dressing Care -- open to air  -ML --    Periwound Care -- dry periwound area maintained  -ML --    Retired Wound - Properties Group Placement Date:  03/10/23  -HW Placement Time: 0938 -HW Present on Hospital Admission: Y  -HW Side: Right  -HW Location: gluteal  -HW Primary Wound Type: MASD  -HW    Retired Wound - Properties Group Date first assessed: 03/10/23  -HW Time first assessed: 0938 -HW Present on Hospital Admission: Y  -HW Side: Right  -HW Location: gluteal  -HW Primary Wound Type: MASD  -HW       Wound 03/26/23 1653 Right anterior greater trochanter Blisters    Wound - Properties Group Placement Date: 03/26/23  -NE Placement Time: 1653 -NE Present on Hospital Admission: Y  -NE Side: Right  -NE Orientation: anterior  -NE Location: greater trochanter  -NE Primary Wound Type: Blisters  -NE    Closure Open to air  -ML Open to air  -ML --    Periwound dry;intact;pink  -ML dry;intact;pink  -ML --    Periwound Temperature warm  -ML warm  -ML --    Periwound Skin Turgor soft  -ML soft  -ML --    Periwound Care dry periwound area maintained  -ML dry periwound area maintained  -ML --    Retired Wound - Properties Group Placement Date: 03/26/23  -NE Placement Time: 1653 -NE Present on Hospital Admission: Y  -NE Side: Right  -NE Orientation: anterior  -NE Location: greater trochanter  -NE Primary Wound Type: Blisters  -NE    Retired Wound - Properties Group Date first assessed: 03/26/23  -NE Time first assessed: 1653 -NE Present on Hospital Admission: Y  -NE Side: Right  -NE Location: greater trochanter  -NE Primary Wound Type: Blisters  -NE       Wound 03/26/23 1654 Right gluteal Pressure Injury    Wound - Properties Group Placement Date: 03/26/23  -NE Placement Time: 1654 -NE Present on Hospital Admission: Y  -NE Side: Right  -NE Location: gluteal  -NE, STAGE 2  Primary Wound Type: Pressure inj  -NE    Pressure Injury Stage 2  -ML 2  -ML --    Dressing Appearance open to air  -ML open to air  -ML --    Periwound pink  -ML pink  -ML --    Periwound Temperature warm  -ML warm  -ML --    Periwound Skin Turgor soft  -ML soft  -ML --    Periwound Care dry  periwound area maintained  -ML dry periwound area maintained  -ML --    Retired Wound - Properties Group Placement Date: 03/26/23  -NE Placement Time: 1654 -NE Present on Hospital Admission: Y  -NE Side: Right  -NE Location: gluteal  -NE, STAGE 2  Primary Wound Type: Pressure inj  -NE    Retired Wound - Properties Group Date first assessed: 03/26/23  -NE Time first assessed: 1654 -NE Present on Hospital Admission: Y  -NE Side: Right  -NE Location: gluteal  -NE, STAGE 2  Primary Wound Type: Pressure inj  -NE       Wound 03/26/23 1702 Left anterior thigh Skin Tear    Wound - Properties Group Placement Date: 03/26/23  -NE Placement Time: 1702 -NE Present on Hospital Admission: Y  -NE Side: Left  -NE Orientation: anterior  -NE Location: thigh  -NE Primary Wound Type: Skin tear  -NE    Closure Open to air  -ML Open to air  -ML --    Base pink  -ML pink  -ML --    Periwound Care dry periwound area maintained  -ML dry periwound area maintained  -ML --    Retired Wound - Properties Group Placement Date: 03/26/23  -NE Placement Time: 1702 -NE Present on Hospital Admission: Y  -NE Side: Left  -NE Orientation: anterior  -NE Location: thigh  -NE Primary Wound Type: Skin tear  -NE    Retired Wound - Properties Group Date first assessed: 03/26/23  -NE Time first assessed: 1702 -NE Present on Hospital Admission: Y  -NE Side: Left  -NE Location: thigh  -NE Primary Wound Type: Skin tear  -NE       [REMOVED] Wound 03/10/23 0939 Right anterior thigh    Wound - Properties Group Placement Date: 03/10/23  -HW Placement Time: 0939  -HW Present on Hospital Admission: Y  -HW Side: Right  -HW Orientation: anterior  -HW Location: thigh  -HW Removal Date: 03/27/23  -CL Removal Time: 1027  -CL    Dressing Appearance -- open to air  -ML --    Base pink;clean;moist  -ML pink;clean;moist  -ML pink;clean;moist  -NE    Periwound intact;pink;warm  -ML intact;pink;warm  -ML intact;pink;warm  -NE    Periwound Temperature warm  -ML warm  -ML warm   -NE    Periwound Skin Turgor soft  -ML soft  -ML soft  -NE    Drainage Characteristics/Odor -- -- serous  -NE    Drainage Amount -- -- small  -NE    Dressing Care -- open to air  -ML --    Periwound Care dry periwound area maintained  -ML -- cleansed with pH balanced cleanser  -NE    Retired Wound - Properties Group Placement Date: 03/10/23  -HW Placement Time: 0939  -HW Present on Hospital Admission: Y  -HW Side: Right  -HW Orientation: anterior  -HW Location: thigh  -HW Removal Date: 03/27/23  -CL Removal Time: 1027  -CL    Retired Wound - Properties Group Date first assessed: 03/10/23  -HW Time first assessed: 0939  -HW Present on Hospital Admission: Y  -HW Side: Right  -HW Location: thigh  -HW Resolution Date: 03/27/23  -CL Resolution Time: 1027  -CL       [REMOVED] Wound 03/26/23 1710 Left anterior greater trochanter    Wound - Properties Group Placement Date: 03/26/23  -NE Placement Time: 1710  -NE Side: Left  -NE Orientation: anterior  -NE Location: greater trochanter  -NE Removal Date: 03/27/23  -CL Removal Time: 1026  -CL, scab  Wound Outcome: Healed  -CL    Closure Open to air  -ML Open to air  -ML --    Base clean;pink;blanchable  -ML clean;pink;blanchable  -ML --    Periwound pink;intact  -ML pink;intact  -ML --    Periwound Skin Turgor soft  -ML soft  -ML --    Periwound Care dry periwound area maintained  -ML dry periwound area maintained  -ML --    Retired Wound - Properties Group Placement Date: 03/26/23  -NE Placement Time: 1710  -NE Side: Left  -NE Orientation: anterior  -NE Location: greater trochanter  -NE Removal Date: 03/27/23  -CL Removal Time: 1026  -CL, scab  Wound Outcome: Healed  -CL    Retired Wound - Properties Group Date first assessed: 03/26/23  -NE Time first assessed: 1710  -NE Side: Left  -NE Location: greater trochanter  -NE Resolution Date: 03/27/23  -CL Resolution Time: 1026  -CL, scab  Wound Outcome: Healed  -CL    Row Name 03/26/23 2000             [REMOVED] Wound 02/15/23 2238  Bilateral coccyx Pressure Injury    Wound - Properties Group Placement Date: 02/15/23  -CP Placement Time: 2231  -CP Present on Hospital Admission: Y  -CP Side: Bilateral  -CP Location: coccyx  -CP Primary Wound Type: Pressure inj  -CP Removal Date: 03/27/23  -CL Removal Time: 1028  -CL    Retired Wound - Properties Group Placement Date: 02/15/23  -CP Placement Time: 2231  -CP Present on Hospital Admission: Y  -CP Side: Bilateral  -CP Location: coccyx  -CP Primary Wound Type: Pressure inj  -CP Removal Date: 03/27/23  -CL Removal Time: 1028  -CL    Retired Wound - Properties Group Date first assessed: 02/15/23  -CP Time first assessed: 2231  -CP Present on Hospital Admission: Y  -CP Side: Bilateral  -CP Location: coccyx  -CP Primary Wound Type: Pressure inj  -CP Resolution Date: 03/27/23  -CL Resolution Time: 1028  -CL       Wound 03/10/23 0938 Right gluteal MASD (Moisture associated skin damage)    Wound - Properties Group Placement Date: 03/10/23  -HW Placement Time: 0938  -HW Present on Hospital Admission: Y  -HW Side: Right  -HW Location: gluteal  -HW Primary Wound Type: MASD  -HW    Retired Wound - Properties Group Placement Date: 03/10/23  -HW Placement Time: 0938  -HW Present on Hospital Admission: Y  -HW Side: Right  -HW Location: gluteal  -HW Primary Wound Type: MASD  -HW    Retired Wound - Properties Group Date first assessed: 03/10/23  -HW Time first assessed: 0938  -HW Present on Hospital Admission: Y  -HW Side: Right  -HW Location: gluteal  -HW Primary Wound Type: MASD  -HW       Wound 03/26/23 1653 Right anterior greater trochanter Blisters    Wound - Properties Group Placement Date: 03/26/23  -NE Placement Time: 1653  -NE Present on Hospital Admission: Y  -NE Side: Right  -NE Orientation: anterior  -NE Location: greater trochanter  -NE Primary Wound Type: Blisters  -NE    Closure Open to air  -ML      Periwound dry;intact;pink  -ML      Periwound Temperature warm  -ML      Periwound Skin Turgor soft   -ML      Periwound Care dry periwound area maintained  -ML      Retired Wound - Properties Group Placement Date: 03/26/23 -NE Placement Time: 1653 -NE Present on Hospital Admission: Y  -NE Side: Right  -NE Orientation: anterior  -NE Location: greater trochanter  -NE Primary Wound Type: Blisters  -NE    Retired Wound - Properties Group Date first assessed: 03/26/23 -NE Time first assessed: 1653 -NE Present on Hospital Admission: Y  -NE Side: Right  -NE Location: greater trochanter  -NE Primary Wound Type: Blisters  -NE       Wound 03/26/23 1654 Right gluteal Pressure Injury    Wound - Properties Group Placement Date: 03/26/23 -NE Placement Time: 1654 -NE Present on Hospital Admission: Y  -NE Side: Right  -NE Location: gluteal  -NE, STAGE 2  Primary Wound Type: Pressure inj  -NE    Pressure Injury Stage 2  -ML      Dressing Appearance open to air  -ML      Periwound pink  -ML      Periwound Temperature warm  -ML      Periwound Skin Turgor soft  -ML      Periwound Care dry periwound area maintained  -ML      Retired Wound - Properties Group Placement Date: 03/26/23 -NE Placement Time: 1654 -NE Present on Hospital Admission: Y  -NE Side: Right  -NE Location: gluteal  -NE, STAGE 2  Primary Wound Type: Pressure inj  -NE    Retired Wound - Properties Group Date first assessed: 03/26/23 -NE Time first assessed: 1654 -NE Present on Hospital Admission: Y  -NE Side: Right  -NE Location: gluteal  -NE, STAGE 2  Primary Wound Type: Pressure inj  -NE       Wound 03/26/23 1702 Left anterior thigh Skin Tear    Wound - Properties Group Placement Date: 03/26/23 -NE Placement Time: 1702 -NE Present on Hospital Admission: Y  -NE Side: Left  -NE Orientation: anterior  -NE Location: thigh  -NE Primary Wound Type: Skin tear  -NE    Closure Open to air  -ML      Base pink  -ML      Periwound Care dry periwound area maintained  -ML      Retired Wound - Properties Group Placement Date: 03/26/23 -NE Placement Time: 1702 -NE  Present on Hospital Admission: Y  -NE Side: Left  -NE Orientation: anterior  -NE Location: thigh  -NE Primary Wound Type: Skin tear  -NE    Retired Wound - Properties Group Date first assessed: 03/26/23  -NE Time first assessed: 1702  -NE Present on Hospital Admission: Y  -NE Side: Left  -NE Location: thigh  -NE Primary Wound Type: Skin tear  -NE       [REMOVED] Wound 03/10/23 0939 Right anterior thigh    Wound - Properties Group Placement Date: 03/10/23  -HW Placement Time: 0939  -HW Present on Hospital Admission: Y  -HW Side: Right  -HW Orientation: anterior  -HW Location: thigh  -HW Removal Date: 03/27/23  -CL Removal Time: 1027  -CL    Dressing Appearance open to air  -ML      Base pink;clean;moist  -ML      Periwound intact;pink;warm  -ML      Periwound Temperature warm  -ML      Periwound Skin Turgor soft  -ML      Dressing Care open to air  -ML      Periwound Care dry periwound area maintained  -ML      Retired Wound - Properties Group Placement Date: 03/10/23  -HW Placement Time: 0939  -HW Present on Hospital Admission: Y  -HW Side: Right  -HW Orientation: anterior  -HW Location: thigh  -HW Removal Date: 03/27/23  -CL Removal Time: 1027  -CL    Retired Wound - Properties Group Date first assessed: 03/10/23  -HW Time first assessed: 0939  -HW Present on Hospital Admission: Y  -HW Side: Right  -HW Location: thigh  -HW Resolution Date: 03/27/23  -CL Resolution Time: 1027  -CL       [REMOVED] Wound 03/26/23 1710 Left anterior greater trochanter    Wound - Properties Group Placement Date: 03/26/23  -NE Placement Time: 1710  -NE Side: Left  -NE Orientation: anterior  -NE Location: greater trochanter  -NE Removal Date: 03/27/23  -CL Removal Time: 1026  -CL, scab  Wound Outcome: Healed  -CL    Closure Open to air  -ML      Base clean;pink;blanchable  -ML      Periwound pink;intact  -ML      Periwound Skin Turgor soft  -ML      Periwound Care dry periwound area maintained  -ML      Retired Wound - Properties Group  Placement Date: 03/26/23  -NE Placement Time: 1710  -NE Side: Left  -NE Orientation: anterior  -NE Location: greater trochanter  -NE Removal Date: 03/27/23  -CL Removal Time: 1026  -CL, scab  Wound Outcome: Healed  -CL    Retired Wound - Properties Group Date first assessed: 03/26/23  -NE Time first assessed: 1710  -NE Side: Left  -NE Location: greater trochanter  -NE Resolution Date: 03/27/23  -CL Resolution Time: 1026  -CL, scab  Wound Outcome: Healed  -CL          User Key  (r) = Recorded By, (t) = Taken By, (c) = Cosigned By    Initials Name Provider Type    Dina Nieves, RN Registered Nurse    Deana Ding RN Registered Nurse    Jordan Rao RN Registered Nurse    Ignacia Villarreal RN Registered Nurse    Mayela Mackenzie RN Registered Nurse    Fadumo Vazquez LPN Licensed Nurse                  Assessment & Plan      Brief Patient Summary:      amLODIPine, 10 mg, Oral, Q24H  atorvastatin, 20 mg, Oral, Nightly  carvedilol, 12.5 mg, Oral, BID With Meals  enoxaparin, 1 mg/kg, Subcutaneous, Q12H  escitalopram, 10 mg, Oral, Daily  finasteride, 5 mg, Oral, Daily  gabapentin, 300 mg, Oral, TID  hydrALAZINE, 50 mg, Oral, TID  insulin lispro, 2-7 Units, Subcutaneous, TID With Meals  nitroglycerin, 1 inch, Topical, Q6H  piperacillin-tazobactam, 4.5 g, Intravenous, Q8H  sodium chloride, 10 mL, Intravenous, Q12H  tamsulosin, 0.4 mg, Oral, Daily       lactated ringers, 50 mL/hr, Last Rate: 50 mL/hr (03/26/23 0127)  niCARdipine, 5-15 mg/hr, Last Rate: Stopped (03/27/23 0743)  Pharmacy Consult,   Pharmacy to Dose enoxaparin (LOVENOX),   Pharmacy to Dose Zosyn,          Active Hospital Problems:  Active Hospital Problems    Diagnosis    • **UTI (urinary tract infection)      Urinary tract infection  Hypertensive urgency  Acute toxic/metabolic encephalopathy  Atrial fibrillation  Normocytic anemia  Hyperlipidemia  BPH    Plan:  -Patient off Cardene drip  -Confusion getting better likely due to  UTI  -Nonambulatory at baseline  -Urine culture growing gram-negative bacilli  -CT head on admission ruled out acute intracranial pathology.      DVT prophylaxis:  Medical DVT prophylaxis orders are present.    CODE STATUS:    Code Status (Patient has no pulse and is not breathing): CPR (Attempt to Resuscitate)  Medical Interventions (Patient has pulse or is breathing): Full Support      Disposition:  I expect patient to be discharged 1-2 days.    This patient has been examined wearing appropriate Personal Protective Equipment and discussed with nursing. 03/27/23      Electronically signed by Will Browne DO, 03/27/23, 18:20 EDT.  [facility] Hospitalist Team

## 2023-03-27 NOTE — DISCHARGE PLACEMENT REQUEST
"Cyrus Junior (77 y.o. Male)     Date of Birth   1945    Social Security Number       Address   [address] [place] [address]    Home Phone   [phone]    MRN   [id number]       Cheondoism   None    Marital Status                               Admission Date   3/25/23    Admission Type   Emergency    Admitting Provider   Sandip Campuzano DO    Attending Provider   Will Browne DO    Department, Room/Bed   [facility], 2128/1       Discharge Date       Discharge Disposition       Discharge Destination                               Attending Provider: Will Browne DO    Allergies: No Known Allergies    Isolation: Contact   Infection: ESBL (04/01/22), ESBL Klebsiella (09/19/22)   Code Status: CPR    Ht: 177.8 cm (70\")   Wt: 139 kg (307 lb 8 oz)    Admission Cmt: None   Principal Problem: UTI (urinary tract infection) [N39.0]                 Active Insurance as of 3/25/2023     Primary Coverage     Payor Plan Insurance Group Employer/Plan Group    MISC MEDICARE REPLACEMENT MISC MCARE REPLACEMENT [id number]     Coverage Address Coverage Phone Number Coverage Fax Number Effective Dates    [phone]  2/1/2023 - None Entered    [facility] [address]       Subscriber Name Subscriber Birth Date Member ID       CYRUS JUNIOR 1945 [id number]           Secondary Coverage     Payor Plan Insurance Group Employer/Plan Group    [facility] DE[id number]       Payor Plan Address Payor Plan Phone Number Payor Plan Fax Number Effective Dates    [facility] OFFICE OF [facility] CARE [phone]  2/15/2023 - None Entered    [address] [address]       [facility] [address]       Subscriber Name Subscriber Birth Date Member ID       CYRUS JUNIOR 1945 [id number]           Tertiary Coverage     Payor Plan Insurance Group Employer/Plan Group    [facility] CCN OPTUM      Payor Plan Address Payor Plan Phone Number " Payor Plan Fax Number Effective Dates    [address] [phone]  2022 - None Entered    [facility] [id number]       Subscriber Name Subscriber Birth Date Member ID       CYRUS RAMIREZ 1945 [id number]           Other Coverage     Payor Plan Insurance Group Employer/Plan Group    [place]      Payor Plan Address Payor Plan Phone Number Payor Plan Fax Number Effective Dates    [address]   2022 - None Entered    [place] [address]       Subscriber Name Subscriber Birth Date Member ID       CYRUS RAMIREZ 1945 [id number]                 Emergency Contacts      (Rel.) Home Phone Work Phone Mobile Phone    RICHAR MONROE (Sister) [phone] -- --               History & Physical      Sandip Campuzano DO at 23 0026              [facility]      Patient Name: Cyrus Ramirez  : 1945  MRN: [id number]  Primary Care Physician:  Naa Valverde MD  Date of admission: 3/25/2023      Subjective       Chief Complaint: Altered mental status    History of Present Illness: Cyrus Ramirez is a 77 y.o. male with multiple medical comorbidities who presented to [facility] on 3/25/2023 complaining of Altered mental status.  Of note, due to altered mental status, HPI obtained from available records and discussion with ED staff.  Presented from NH as found febrile and confused.  Has a history of UTI and similar presnetation.  Sent to [facility] for evaluation.    In the ED, vitals 38.2C, HR 70, RR 19, /97, 97% 2L NC.  Labs notable for glucose 104, hgb 11.7.  UA grossly positive for UTI.      ROS   Unable to obtain due to altered mental status    Personal History     Past Medical History:   Diagnosis Date   • Acute kidney failure (HCC)    • Acute respiratory failure with hypoxia (HCC)    • Anemia    • Benign prostatic hyperplasia    • Bilateral primary osteoarthritis of knee    • Cardiomegaly    • Cardiomyopathy (HCC)    •  Cellulitis and abscess of left leg    • Cerebral infarction (HCC)    • Cerebrovascular disease    • Chronic kidney disease    • Dementia (HCC)    • Diabetes mellitus (HCC)    • Essential hypertension    • GERD (gastroesophageal reflux disease)    • Gout    • Heart failure (HCC)    • Hyperlipidemia    • Morbid obesity (HCC)    • Myocardial infarction (HCC)    • Obstructive sleep apnea    • Obstructive uropathy    • Paroxysmal atrial fibrillation (HCC)    • Peptic ulcer    • Peripheral vascular disease (HCC)    • Pulmonary edema    • Venous insufficiency        No past surgical history on file.    Family History: family history includes Diabetes in his father. Otherwise pertinent FHx was reviewed and not pertinent to current issue.    Social History:  reports that he has never smoked. He does not have any smokeless tobacco history on file. He reports that he does not currently use alcohol. He reports that he does not use drugs.    Home Medications:  Prior to Admission Medications     Prescriptions Last Dose Informant Patient Reported? Taking?    acetaminophen (TYLENOL) 325 MG tablet   Yes No    Take 650 mg by mouth Every 4 (Four) Hours As Needed for Mild Pain .    albuterol sulfate  (90 Base) MCG/ACT inhaler   Yes No    Inhale 2 puffs Every 4 (Four) Hours As Needed (Cough).    apixaban (ELIQUIS) 5 MG tablet tablet   No No    Take 1 tablet by mouth Every 12 (Twelve) Hours. Indications: Atrial Fibrillation    carvedilol (COREG) 12.5 MG tablet   No No    Take 1 tablet by mouth 2 (Two) Times a Day With Meals.    cefuroxime (CEFTIN) 500 MG tablet   No No    Take 1 tablet by mouth 2 (Two) Times a Day.    docusate calcium (SURFAK) 240 MG capsule   Yes No    Take 240 mg by mouth Daily.    escitalopram (LEXAPRO) 10 MG tablet   Yes No    Take 10 mg by mouth Daily.    finasteride (PROSCAR) 5 MG tablet   Yes No    Take 5 mg by mouth Daily.    fosfomycin (MONUROL) 3 g pack   No No    Mix 1 packet in 3 to 4 oz water and take  by mouth every 48 hours as directed.    furosemide (LASIX) 40 MG tablet   No No    Take 1 tablet by mouth 2 (Two) Times a Day.    gabapentin (NEURONTIN) 300 MG capsule   Yes No    Take 300 mg by mouth 3 (Three) Times a Day.    hydrALAZINE (APRESOLINE) 50 MG tablet   No No    Take 1 tablet by mouth 3 (Three) Times a Day.    Insulin Glargine (BASAGLAR KWIKPEN) 100 UNIT/ML injection pen   Yes No    Inject 10 Units under the skin into the appropriate area as directed Every Night.    isosorbide mononitrate (IMDUR) 60 MG 24 hr tablet   No No    Take 1 tablet by mouth 2 (Two) Times a Day.    losartan (COZAAR) 25 MG tablet   No No    Take 1 tablet by mouth Daily.    methocarbamol (ROBAXIN) 500 MG tablet   Yes No    Take 500 mg by mouth Every 12 (Twelve) Hours As Needed for Muscle Spasms.    nitroglycerin (NITROSTAT) 0.4 MG SL tablet   Yes No    Place 0.4 mg under the tongue Every 5 (Five) Minutes As Needed for Chest Pain. Take no more than 3 doses in 15 minutes.    polycarbophil (calcium polycarbophil) 625 MG tablet tablet   Yes No    Take 625 mg by mouth Daily.    Polyethylene Glycol 3350 powder   Yes No    Take 17 g by mouth Every Night.    saccharomyces boulardii (FLORASTOR) 250 MG capsule   Yes No    Take 250 mg by mouth 2 (Two) Times a Day.    simethicone (MYLICON,GAS-X) 180 MG capsule   Yes No    Take 180 mg by mouth Every 6 (Six) Hours As Needed for Flatulence.    simvastatin (ZOCOR) 40 MG tablet   Yes No    Take 40 mg by mouth every night at bedtime.    tamsulosin (FLOMAX) 0.4 MG capsule 24 hr capsule   Yes No    Take 1 capsule by mouth Daily.            Allergies:  No Known Allergies    Objective       Vitals:   Temp:  [100.7 °F (38.2 °C)] 100.7 °F (38.2 °C)  Heart Rate:  [] 70  Resp:  [19] 19  BP: (160-210)/() 163/97  Flow (L/min):  [2] 2    Physical Exam  Constitutional:       Comments: AAOx0, minimal response to sternal rub   HENT:      Head: Normocephalic and atraumatic.      Nose: Nose normal. No  congestion.      Mouth/Throat:      Pharynx: Oropharynx is clear. No oropharyngeal exudate.   Eyes:      General: No scleral icterus.  Cardiovascular:      Rate and Rhythm: Normal rate and regular rhythm.      Heart sounds: No murmur heard.    No friction rub. No gallop.   Pulmonary:      Effort: No respiratory distress.      Breath sounds: No wheezing or rales.   Abdominal:      General: There is no distension.      Tenderness: There is no abdominal tenderness. There is no guarding.   Musculoskeletal:         General: No swelling or deformity.      Cervical back: Normal range of motion. No rigidity.      Right lower leg: No edema.      Left lower leg: No edema.   Skin:     Coloration: Skin is not jaundiced.      Findings: No bruising or lesion.   Neurological:      Mental Status: He is disoriented.      Motor: Weakness present.          Result Review    Result Review:  I have personally reviewed the results from the time of this admission to 3/26/2023 00:26 EDT and agree with these findings:  [x]  Laboratory  []  Microbiology  [x]  Radiology  []  EKG/Telemetry   []  Cardiology/Vascular   []  Pathology  [x]  Old records  []  Other:        Assessment & Plan        Active Hospital Problems:  There are no active hospital problems to display for this patient.    Plan:     #UTI    - US grossly positive for UTI    - hx of Klesiella and Proteus ESBL     - Zosyn, pharm to dose    - rectal tylenol PRN    - urine cultue    - blood cultures      #Acute Toxic Encephalopathy    - Suspect 2/2 UTI    - tx as above    - Currently AAOx0, minimal response to sternal rub    - NPO     - LR @ 50/hr    - SLP    - CT head without acute mass, shift hemorrhage    #DM   - ISS    - resume home gabapentin    #HTN urgency    - Labetalol IV PRN with parameters    - hold Losartan to prevent nephrotoxicity    - resume home hyralazine    #A. Fib    - Hold home Eliquis, cover with therapeutic lovenox    - Resume home Coreg when can tolerate  PO    #CKD    - Cr 1.0, stable    - hold home lasix    #Anemia    - hgb 11.7 on admission    - check ferritin and iron    - no overt bleeding, follow labs    #Obesity  #CAROL    - BMI 45.01    - weight loss encouraged    #HLD    -resume home statin    #BPH    -resume home flomax and finasteride      DVT prophylaxis: Lovenox    CODE STATUS:       Admission Status:  I believe this patient meets inpatient status.    I discussed the patient's findings and my recommendations with patient.    This patient has been examined wearing appropriate Personal Protective Equipment and discussed with PAtient. 03/26/23      Signature: Electronically signed by Sandip Campuzano DO, 03/26/23, 1:02 AM EDT.      Electronically signed by Sandip Campuzano DO at 03/26/23 0108

## 2023-03-27 NOTE — PLAN OF CARE
Problem: Adult Inpatient Plan of Care  Goal: Plan of Care Review  Outcome: Ongoing, Progressing  Flowsheets (Taken 3/27/2023 1551)  Outcome Evaluation: Patient BP fluctuating today but have found when you hold patients hand when it is taking and tell him to relax the BP reads a lot more accurately. Patient is also very [place] and confused at baseline. VSS at this time and ST cleared patient for a diet so all patient's PO meds no longer have to be held. Patient very calm and cooperative this shift with no new complaints.  Goal: Patient-Specific Goal (Individualized)  Outcome: Ongoing, Progressing  Goal: Absence of Hospital-Acquired Illness or Injury  Outcome: Ongoing, Progressing  Intervention: Identify and Manage Fall Risk  Recent Flowsheet Documentation  Taken 3/27/2023 1210 by Deana Agee, RN  Safety Promotion/Fall Prevention:   safety round/check completed   room organization consistent   nonskid shoes/slippers when out of bed  Taken 3/27/2023 0802 by Deana Agee, RN  Safety Promotion/Fall Prevention:   nonskid shoes/slippers when out of bed   room organization consistent   safety round/check completed  Intervention: Prevent Infection  Recent Flowsheet Documentation  Taken 3/27/2023 0802 by Deana Agee, RN  Infection Prevention: single patient room provided  Goal: Optimal Comfort and Wellbeing  Outcome: Ongoing, Progressing  Intervention: Monitor Pain and Promote Comfort  Recent Flowsheet Documentation  Taken 3/27/2023 0802 by Deana Agee, RN  Pain Management Interventions:   quiet environment facilitated   relaxation techniques promoted  Goal: Readiness for Transition of Care  Outcome: Ongoing, Progressing   Goal Outcome Evaluation:              Outcome Evaluation: Patient BP fluctuating today but have found when you hold patients hand when it is taking and tell him to relax the BP reads a lot more accurately. Patient is also very [place] and confused at baseline. VSS at this time and ST cleared patient for a  diet so all patient's PO meds no longer have to be held. Patient very calm and cooperative this shift with no new complaints.

## 2023-03-27 NOTE — PLAN OF CARE
Goal Outcome Evaluation:      Pt arrived to PCU 1610. 3C nurse said all of the admission was done except for the skin check. Myself and Nicci PCU RN did a two person skin check and photographed and attached images to the avatar. Once Cardene drip was started blood pressure has been controled below 180 systolic. Pt is alert and is talk, oriented to self. Pt remains NPO. Vitals stable.     Skin:  Nicci RN and Rashawn RN did a two person skin check immediately after patient arrived on PCU.   Dry flaky skin on both lower extremities.   Scab on anterior right upper thigh  Left arm later bicep there appears to a stretch robert or an old scar.  Stage two pressure injury to the right buttock   Right upper anterior thigh three blister, one ruptured, two fluid filled  Skin tears, moisture related, in groin on both the right and left inner thighs.     Drip Titration:  Computer kept randomly shutting off in the middle of trying to chart my titration and record my pressures.  Cardene drip was started and titrated up every fifteen minutes when blood pressure went above 180 systolic, per orders on MAR. Blood pressure was not able to run when patient was lying on blood pressure cuff during the initial two person skin check and unknown to RN, nursing aids removed the blood pressure cuff while giving a bath while the blood pressure was cycling at 15 minute intervals. Blood pressure remained below 180 systolic on recheck on both occasions, no titration was necessary.

## 2023-03-27 NOTE — PLAN OF CARE
Goal Outcome Evaluation:  Plan of Care Reviewed With: patient        Progress: no change    Patient still on Cardene and has been titrated down to 7.5 mg/hr. Patient started to have chest pain around 2200, and topical nitro was applied. Patient has not complained of chest since then. Patient tends to tense up when being touched. It was difficult to move him when doing cath care. It was also difficult to turn him. CMU called to report the patient having a 3.5 sec run of Vtach at 0223. Dr. Canales was notified of the patients troponin level was 73, he mentioned that the patient has had elevated troponin and asked why I was calling for that. I told him that I was calling him to let him know, he said okay thank you and ended the call.  No new orders given.

## 2023-03-27 NOTE — THERAPY EVALUATION
Acute Care - Speech Language Pathology   Swallow Initial Evaluation  [place]     Patient Name: Shaji Junior  : 1945  MRN: [id number]  Today's Date: 3/27/2023               Admit Date: 3/25/2023    Visit Dx:     ICD-10-CM ICD-9-CM   1. Altered mental status, unspecified altered mental status type  R41.82 780.97   2. Fever, unspecified fever cause  R50.9 780.60   3. 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[facility])   • Acute kidney failure ([facility])   • Congestive heart failure ([facility])   • Acute on chronic congestive heart failure, unspecified heart failure type ([facility])   • MAKI (acute kidney injury) ([facility])   • Atrial fibrillation ([facility])   • Chest pain   • Shortness of breath   • Chest pain, unspecified type   • Essential hypertension   • Acute UTI (urinary tract infection)   • Normocytic normochromic anemia   • CKD (chronic kidney disease), stage III ([facility])   • BPH without obstruction/lower urinary tract symptoms   • Altered mental status, unspecified altered mental status type   • Acute on chronic diastolic CHF (congestive heart failure) ([facility])   • Hypertensive urgency   • Elevated troponin   • Acute UTI (urinary tract infection)   • Morbid obesity ([facility])   • Dementia ([facility])   • UTI (urinary tract infection)     Past Medical History:   Diagnosis Date   • Acute kidney failure ([facility])    • Acute respiratory failure with hypoxia ([facility])    • Anemia    • Benign prostatic hyperplasia    • Bilateral primary osteoarthritis of knee    • Cardiomegaly    • Cardiomyopathy ([facility])    • Cellulitis and abscess of left leg    • Cerebral infarction ([facility])    • Cerebrovascular disease    • Chronic kidney disease    • Dementia ([facility])    • Diabetes mellitus ([facility])    • Essential hypertension    • GERD  (gastroesophageal reflux disease)    • Gout    • Heart failure ([facility])    • Hyperlipidemia    • Morbid obesity ([facility])    • Myocardial infarction ([facility])    • Obstructive sleep apnea    • Obstructive uropathy    • Paroxysmal atrial fibrillation ([facility])    • Peptic ulcer    • Peripheral vascular disease ([facility])    • Pulmonary edema    • Venous insufficiency      History reviewed. No pertinent surgical history.    SLP Recommendation and Plan  SLP Swallowing Diagnosis: (P) swallow WFL/no suspected pharyngeal impairment (03/27/23 1000)  SLP Diet Recommendation: (P) puree, thin liquids (03/27/23 1000)  Recommended Precautions and Strategies: (P) upright posture during/after eating, small bites of food and sips of liquid (03/27/23 1000)  SLP Rec. for Method of Medication Administration: (P) meds crushed, with puree, as tolerated (03/27/23 1000)     Monitor for Signs of Aspiration: (P) notify SLP if any concerns (03/27/23 1000)  Recommended Diagnostics: (P) reassess via clinical swallow evaluation (03/27/23 1000)  Swallow Criteria for Skilled Therapeutic Interventions Met: (P) demonstrates skilled criteria (03/27/23 1000)     Rehab Potential/Prognosis, Swallowing: (P) good, to achieve stated therapy goals (03/27/23 1000)  Therapy Frequency (Swallow): (P) PRN (03/27/23 1000)  Predicted Duration Therapy Intervention (Days): (P) until discharge (03/27/23 1000)            SWALLOW EVALUATION (last 72 hours)     SLP Adult Swallow Evaluation     Row Name 03/27/23 1000          Document Type evaluation (P)   -AB    Subjective Information complains of (P)   pain, confused  -AB    Patient Observations agree to therapy (P)   -AB    Patient Effort good (P)   -AB    Comment Pt confused and disoriented when ST entered the room. Pt agreed to water and food trials upon initiation of evaluation. Pt moaning and in pain throughout bedside swallow evaluation. (P)   -AB    Symptoms Noted During/After Treatment other (see comments) (P)   Confusion and  pain remained constant throughout evaluation  -AB          Patient Profile Reviewed yes (P)   -AB    Pertinent History Of Current Problem Shaji Junior is a 77 y.o. male with multiple medical comorbidities who presented to [facility]23 complaining of Altered mental status.  Of note, due to altered mental status, HPI obtained from available records and discussion with ED staff.  Presented from NH as found febrile and confused.  Has a history of UTI and similar presnetation. Testing revealed Pt to have a UTI at this admission as well.    CT Head revealed no acute intracranial process  Chest XR revealed unchanged findings of mild to moderate CHF.    Pt was seen by ST at last admission and placed on regular and thin liquid diet. Pt seen this date by ST due to failed dysphagia screen in order to determine readiness for PO diet.(P)   -AB    Current Method of Nutrition NPO (P)   -AB    Precautions/Limitations, Vision WFL;for purposes of eval (P)   -AB    Precautions/Limitations, Hearing WFL;for purposes of eval (P)   -AB    Prior Level of Function-Swallowing no diet consistency restrictions (P)   -AB    Plans/Goals Discussed with patient (P)   -AB    Barriers to Rehab none identified (P)   -AB          Respiratory Support Currently in Use nasal cannula (P)   -AB    O2 Liters 2L (P)   -AB    Eating/Swallowing Skills fed by SLP (P)   -AB    Positioning During Eating upright 90 degree;upright in bed (P)   -AB    Utensils Used spoon;straw (P)   -AB    Consistencies Trialed thin liquids;ice chips;pureed (P)   -AB          Oral Prep Phase WFL (P)   -AB    Oral Transit WFL (P)   -AB    Oral Residue WFL (P)   -AB    Pharyngeal Phase no overt signs/symptoms of pharyngeal impairment (P)   -AB    Clinical Swallow Evaluation Summary Pt seen at bedside and in upright 90 degree position. Pt confused, disoriented, and verbalized pain when ST entered the room, symptoms remained constant throughout evaluation. Trials  consisted of x1 ice chip, x5 water via straw, and x5 of applesauce via spoon. Due to confusion, did not attempt further trials and consistencies. No s/s of aspiration. Recommend thin liquid and puree at this time, educated Pt on results and further goals for diet modification and Pt verbalized agreement and understanding. (P)   -AB          SLP Swallowing Diagnosis Needs further assessment at this time (P)   -AB    Functional Impact unclear (P)   -AB    Rehab Potential/Prognosis, Swallowing good, to achieve stated therapy goals (P)   -AB    Swallow Criteria for Skilled Therapeutic Interventions Met demonstrates skilled criteria (P)   -AB          Therapy Frequency (Swallow) PRN (P)   -AB    Predicted Duration Therapy Intervention (Days) until discharge (P)   -AB    SLP Diet Recommendation puree;thin liquids (P)   -AB    Recommended Diagnostics reassess via clinical swallow evaluation (P)   -AB    Recommended Precautions and Strategies upright posture during/after eating;small bites of food and sips of liquid (P)   -AB    Oral Care Recommendations Oral Care BID/PRN (P)   -AB    SLP Rec. for Method of Medication Administration meds crushed;with puree;as tolerated (P)   -AB    Monitor for Signs of Aspiration notify SLP if any concerns (P)   -AB          Swallow LTGs Swallow Long Term Goal (free text) (P)   -AB    Swallow STGs diet tolerance goal selection (SLP) (P)   -AB    Diet Tolerance Goal Selection (SLP) Swallow Short Term Goal 1;Swallow Short Term Goal 2 (P)   -AB          (LTG) Swallow Pt will demonstrate acceptance of least restrictive diet with no s/s of aspiration and use of safe swallow strategies (P)   -AB    [place] (Swallow Long Term Goal) independently (over 90% accuracy) (P)   -AB    Time Frame (Swallow Long Term Goal) by discharge (P)   -AB    Progress/Outcomes (Swallow Long Term Goal) new goal (P)   -AB          (STG) Swallow 1 Pt will participate in re-assessment via bedside swallow evaluation  (P)   -AB    [place] (Swallow Short Term Goal 1) with moderate cues (50-74% accuracy) (P)   -AB    Time Frame (Swallow Short Term Goal 1) 1 week (P)   -AB    Progress/Outcomes (Swallow Short Term Goal 1) new goal (P)   -AB          (STG) Swallow 2 Further goals to be added following hospital course at  discretion (P)   -AB    Time Frame (Swallow Short Term Goal 2) 1 week (P)   -AB    Progress/Outcomes (Swallow Short Term Goal 2) new goal (P)   -AB          User Key  (r) = Recorded By, (t) = Taken By, (c) = Cosigned By    Initials Name Effective Dates    Genoveva Mccormack, Speech Therapy Student 01/10/23 -                 EDUCATION  The patient has been educated in the following areas:   Oral Care/Hydration Modified Diet Instruction.     Patient was not wearing a face mask during this therapy encounter. Therapist used appropriate personal protective equipment including gown, mask and gloves.  Mask used was standard procedure mask. Appropriate PPE was worn during the entire therapy session. Hand hygiene was completed before and after therapy session. Patient is in enhanced droplet precautions.       Recommendations:  - Puree and thin liquid diet  - Upright for all PO   - Full feeds  -  will continue to follow for further recommendations as indicated               Genoveva Cristobal Speech Therapy Student  3/27/2023

## 2023-03-27 NOTE — NURSING NOTE
WOCN note:    77 yr old male admitted 3/25/23 with altered mental status. WOCN consult received for sacral pressure injury noted upon admission.     Patient presents as confused and was very difficult to turn. Required 4 staff members. He has a newly epithelialized healing pressure injury noted to his sacrum. There is Calazime zinc barrier paste in place.     Patient also noted to have intact and de-roofed blisters to his right anterior hip and medial upper thighs. He has an indwelling gardner catheter, TAPS, foam wedges and foam off loading heel boots in place.     Recommend to continue pressure injury prevention measures and skin care with Calazime to the sacrum and buttocks and Sure Prep barrier spray to the blisters. Will also order a bariatric Agiliti low air loss bed surface. We will continue to follow as needed.

## 2023-03-27 NOTE — PAYOR COMM NOTE
"[employer] PA form attached.    Inpatient order 3/25/23  Er admit from nursing home for treatment of altered mental status, uti and HTN urgency.   IV Cardene drip, IV Zosyn. Urine culture pending.   MD notes and clinical attached.   ========  AUTHORIZATION PENDING:   PLEASE FAX OR CALL DETERMINATION TO CONTACT BELOW:       THANK YOU,    JAYDA Mariee, RN  Utilization Review  [facility]  Phone: [phone]  Fax: [phone]      NPI: [id number]  Tax ID: [id number]          Cyrus Ramirez (77 y.o. Male)     Date of Birth   1945    Social Security Number       Address   [address] [place] [address] [place] [facility]    Home Phone   [phone]    MRN   [id number]       Religious   None    Marital Status                               Admission Date   3/25/23    Admission Type   Emergency    Admitting Provider   Sandip Campuzano DO    Attending Provider   Will Browne DO    Department, Room/Bed   [facility] PROGRESS CARE, 2128/1       Discharge Date       Discharge Disposition       Discharge Destination                               Attending Provider: Will Browne DO    Allergies: No Known Allergies    Isolation: Contact   Infection: ESBL (04/01/22), ESBL Klebsiella (09/19/22)   Code Status: CPR    Ht: 177.8 cm (70\")   Wt: 139 kg (307 lb 8 oz)    Admission Cmt: None   Principal Problem: UTI (urinary tract infection) [N39.0]                 Active Insurance as of 3/25/2023     Primary Coverage     Payor Plan Insurance Group Employer/Plan Group    MISC MEDICARE REPLACEMENT MISC MCARE REPLACEMENT [id number]     Coverage Address Coverage Phone Number Coverage Fax Number Effective Dates    [address] [address] [phone]  2/1/2023 - None Entered    [facility] [id number]       Subscriber Name Subscriber Birth Date Member ID       CYRUS RAMIREZ 1945 [id number]           Secondary Coverage     Payor Plan Insurance Group Employer/Plan Group    " [facility] DEPT [id number]       Payor Plan Address Payor Plan Phone Number Payor Plan Fax Number Effective Dates    [facility] OFFICE OF [facility] CARE [phone]  2/15/2023 - None Entered    [address] [address]       [facility] [address]       Subscriber Name Subscriber Birth Date Member ID       CYRUS RAMIREZ 1945 [id number]           Tertiary Coverage     Payor Plan Insurance Group Employer/Plan Group    [facility] CCN OPTUM      Payor Plan Address Payor Plan Phone Number Payor Plan Fax Number Effective Dates    [address] [phone]  2022 - None Entered    [facility] [id number]       Subscriber Name Subscriber Birth Date Member ID       CYRUS RAMIREZ 1945 [id number]           Other Coverage     Payor Plan Insurance Group Employer/Plan Group    [place]      Payor Plan Address Payor Plan Phone Number Payor Plan Fax Number Effective Dates    [address]   2022 - None Entered    [place] [address]       Subscriber Name Subscriber Birth Date Member ID       CYRUS RAMIREZ 1945 [id number]                 Emergency Contacts      (Rel.) Home Phone Work Phone Mobile Phone    RICHAR MONROE (Sister) [phone] -- --               History & Physical      Sandip Campuzano DO at 23 0026              [facility]      Patient Name: Cyrus Ramirez  : 1945  MRN: [id number]  Primary Care Physician:  Naa Valverde MD  Date of admission: 3/25/2023      Subjective       Chief Complaint: Altered mental status    History of Present Illness: Cyrus Ramirez is a 77 y.o. male with multiple medical comorbidities who presented to [facility] on 3/25/2023 complaining of Altered mental status.  Of note, due to altered mental status, HPI obtained from available records and discussion with ED staff.  Presented from NH as found febrile and confused.  Has a history of UTI and similar presnetation.  Sent to [facility]  for evaluation.    In the ED, vitals 38.2C, HR 70, RR 19, /97, 97% 2L NC.  Labs notable for glucose 104, hgb 11.7.  UA grossly positive for UTI.      ROS   Unable to obtain due to altered mental status    Personal History     Past Medical History:   Diagnosis Date   • Acute kidney failure (HCC)    • Acute respiratory failure with hypoxia (HCC)    • Anemia    • Benign prostatic hyperplasia    • Bilateral primary osteoarthritis of knee    • Cardiomegaly    • Cardiomyopathy (HCC)    • Cellulitis and abscess of left leg    • Cerebral infarction (HCC)    • Cerebrovascular disease    • Chronic kidney disease    • Dementia (HCC)    • Diabetes mellitus (HCC)    • Essential hypertension    • GERD (gastroesophageal reflux disease)    • Gout    • Heart failure (HCC)    • Hyperlipidemia    • Morbid obesity (HCC)    • Myocardial infarction (HCC)    • Obstructive sleep apnea    • Obstructive uropathy    • Paroxysmal atrial fibrillation (HCC)    • Peptic ulcer    • Peripheral vascular disease (HCC)    • Pulmonary edema    • Venous insufficiency        No past surgical history on file.    Family History: family history includes Diabetes in his father. Otherwise pertinent FHx was reviewed and not pertinent to current issue.    Social History:  reports that he has never smoked. He does not have any smokeless tobacco history on file. He reports that he does not currently use alcohol. He reports that he does not use drugs.    Home Medications:  Prior to Admission Medications     Prescriptions Last Dose Informant Patient Reported? Taking?    acetaminophen (TYLENOL) 325 MG tablet   Yes No    Take 650 mg by mouth Every 4 (Four) Hours As Needed for Mild Pain .    albuterol sulfate  (90 Base) MCG/ACT inhaler   Yes No    Inhale 2 puffs Every 4 (Four) Hours As Needed (Cough).    apixaban (ELIQUIS) 5 MG tablet tablet   No No    Take 1 tablet by mouth Every 12 (Twelve) Hours. Indications: Atrial Fibrillation    carvedilol (COREG)  12.5 MG tablet   No No    Take 1 tablet by mouth 2 (Two) Times a Day With Meals.    cefuroxime (CEFTIN) 500 MG tablet   No No    Take 1 tablet by mouth 2 (Two) Times a Day.    docusate calcium (SURFAK) 240 MG capsule   Yes No    Take 240 mg by mouth Daily.    escitalopram (LEXAPRO) 10 MG tablet   Yes No    Take 10 mg by mouth Daily.    finasteride (PROSCAR) 5 MG tablet   Yes No    Take 5 mg by mouth Daily.    fosfomycin (MONUROL) 3 g pack   No No    Mix 1 packet in 3 to 4 oz water and take by mouth every 48 hours as directed.    furosemide (LASIX) 40 MG tablet   No No    Take 1 tablet by mouth 2 (Two) Times a Day.    gabapentin (NEURONTIN) 300 MG capsule   Yes No    Take 300 mg by mouth 3 (Three) Times a Day.    hydrALAZINE (APRESOLINE) 50 MG tablet   No No    Take 1 tablet by mouth 3 (Three) Times a Day.    Insulin Glargine (BASAGLAR KWIKPEN) 100 UNIT/ML injection pen   Yes No    Inject 10 Units under the skin into the appropriate area as directed Every Night.    isosorbide mononitrate (IMDUR) 60 MG 24 hr tablet   No No    Take 1 tablet by mouth 2 (Two) Times a Day.    losartan (COZAAR) 25 MG tablet   No No    Take 1 tablet by mouth Daily.    methocarbamol (ROBAXIN) 500 MG tablet   Yes No    Take 500 mg by mouth Every 12 (Twelve) Hours As Needed for Muscle Spasms.    nitroglycerin (NITROSTAT) 0.4 MG SL tablet   Yes No    Place 0.4 mg under the tongue Every 5 (Five) Minutes As Needed for Chest Pain. Take no more than 3 doses in 15 minutes.    polycarbophil (calcium polycarbophil) 625 MG tablet tablet   Yes No    Take 625 mg by mouth Daily.    Polyethylene Glycol 3350 powder   Yes No    Take 17 g by mouth Every Night.    saccharomyces boulardii (FLORASTOR) 250 MG capsule   Yes No    Take 250 mg by mouth 2 (Two) Times a Day.    simethicone (MYLICON,GAS-X) 180 MG capsule   Yes No    Take 180 mg by mouth Every 6 (Six) Hours As Needed for Flatulence.    simvastatin (ZOCOR) 40 MG tablet   Yes No    Take 40 mg by mouth  every night at bedtime.    tamsulosin (FLOMAX) 0.4 MG capsule 24 hr capsule   Yes No    Take 1 capsule by mouth Daily.            Allergies:  No Known Allergies    Objective       Vitals:   Temp:  [100.7 °F (38.2 °C)] 100.7 °F (38.2 °C)  Heart Rate:  [] 70  Resp:  [19] 19  BP: (160-210)/() 163/97  Flow (L/min):  [2] 2    Physical Exam  Constitutional:       Comments: AAOx0, minimal response to sternal rub   HENT:      Head: Normocephalic and atraumatic.      Nose: Nose normal. No congestion.      Mouth/Throat:      Pharynx: Oropharynx is clear. No oropharyngeal exudate.   Eyes:      General: No scleral icterus.  Cardiovascular:      Rate and Rhythm: Normal rate and regular rhythm.      Heart sounds: No murmur heard.    No friction rub. No gallop.   Pulmonary:      Effort: No respiratory distress.      Breath sounds: No wheezing or rales.   Abdominal:      General: There is no distension.      Tenderness: There is no abdominal tenderness. There is no guarding.   Musculoskeletal:         General: No swelling or deformity.      Cervical back: Normal range of motion. No rigidity.      Right lower leg: No edema.      Left lower leg: No edema.   Skin:     Coloration: Skin is not jaundiced.      Findings: No bruising or lesion.   Neurological:      Mental Status: He is disoriented.      Motor: Weakness present.          Result Review    Result Review:  I have personally reviewed the results from the time of this admission to 3/26/2023 00:26 EDT and agree with these findings:  [x]  Laboratory  []  Microbiology  [x]  Radiology  []  EKG/Telemetry   []  Cardiology/Vascular   []  Pathology  [x]  Old records  []  Other:        Assessment & Plan        Active Hospital Problems:  There are no active hospital problems to display for this patient.    Plan:     #UTI    - US grossly positive for UTI    - hx of Klesiella and Proteus ESBL     - Zosyn, pharm to dose    - rectal tylenol PRN    - urine cultue    - blood  cultures      #Acute Toxic Encephalopathy    - Suspect 2/2 UTI    - tx as above    - Currently AAOx0, minimal response to sternal rub    - NPO     - LR @ 50/hr    - SLP    - CT head without acute mass, shift hemorrhage    #DM   - ISS    - resume home gabapentin    #HTN urgency    - Labetalol IV PRN with parameters    - hold Losartan to prevent nephrotoxicity    - resume home hyralazine    #A. Fib    - Hold home Eliquis, cover with therapeutic lovenox    - Resume home Coreg when can tolerate PO    #CKD    - Cr 1.0, stable    - hold home lasix    #Anemia    - hgb 11.7 on admission    - check ferritin and iron    - no overt bleeding, follow labs    #Obesity  #CAROL    - BMI 45.01    - weight loss encouraged    #HLD    -resume home statin    #BPH    -resume home flomax and finasteride      DVT prophylaxis: Lovenox    CODE STATUS:       Admission Status:  I believe this patient meets inpatient status.    I discussed the patient's findings and my recommendations with patient.    This patient has been examined wearing appropriate Personal Protective Equipment and discussed with PAtient. 03/26/23      Signature: Electronically signed by Sandip Campuzano DO, 03/26/23, 1:02 AM EDT.      Electronically signed by Sandip Campuzano DO at 03/26/23 0108          Emergency Department Notes      Bob Rojas MD at 03/25/23 0818          Subjective   History of Present Illness  History is from the EMS and nursing home staff patient altered mental status    Chief complaint altered mental status unknown last normal patient has not been seen all day today    History of present illness this is a 77-year-old male who is a resident [place] who has a history of dementia and multiple health problems who his last known normal is unknown apparently by the nursing home staff or EMS.  He was found altered and confused and not his normal self.  Low-grade temperature.  EMS was called he was transported to hospital hemodynamically stable.  No  family at the bedside there is no other history available from any brain the patient can provide any history as he is very confused.  No reported injury.        Review of Systems   Unable to perform ROS: Mental status change       Past Medical History:   Diagnosis Date   • Acute kidney failure (HCC)    • Acute respiratory failure with hypoxia (HCC)    • Anemia    • Benign prostatic hyperplasia    • Bilateral primary osteoarthritis of knee    • Cardiomegaly    • Cardiomyopathy (HCC)    • Cellulitis and abscess of left leg    • Cerebral infarction (HCC)    • Cerebrovascular disease    • Chronic kidney disease    • Dementia (HCC)    • Diabetes mellitus (HCC)    • Essential hypertension    • GERD (gastroesophageal reflux disease)    • Gout    • Heart failure (HCC)    • Hyperlipidemia    • Morbid obesity (HCC)    • Myocardial infarction (HCC)    • Obstructive sleep apnea    • Obstructive uropathy    • Paroxysmal atrial fibrillation (HCC)    • Peptic ulcer    • Peripheral vascular disease (HCC)    • Pulmonary edema    • Venous insufficiency        No Known Allergies    No past surgical history on file.    Family History   Problem Relation Age of Onset   • Diabetes Father        Social History     Socioeconomic History   • Marital status:    • Number of children: 0   Tobacco Use   • Smoking status: Never   Vaping Use   • Vaping Use: Never used   Substance and Sexual Activity   • Alcohol use: Not Currently   • Drug use: Never   • Sexual activity: Defer     Prior to Admission medications    Medication Sig Start Date End Date Taking? Authorizing Provider   acetaminophen (TYLENOL) 325 MG tablet Take 650 mg by mouth Every 4 (Four) Hours As Needed for Mild Pain .    ProviderMichelle MD   albuterol sulfate  (90 Base) MCG/ACT inhaler Inhale 2 puffs Every 4 (Four) Hours As Needed (Cough).    ProviderMichelle MD   apixaban (ELIQUIS) 5 MG tablet tablet Take 1 tablet by mouth Every 12 (Twelve) Hours.  Indications: Atrial Fibrillation 4/6/22   Ba Banuelos MD   carvedilol (COREG) 12.5 MG tablet Take 1 tablet by mouth 2 (Two) Times a Day With Meals. 9/19/22   Corinna Miranda DO   cefuroxime (CEFTIN) 500 MG tablet Take 1 tablet by mouth 2 (Two) Times a Day. 2/18/23   Christofer Solitario MD   docusate calcium (SURFAK) 240 MG capsule Take 240 mg by mouth Daily.    Michelle Esquivel MD   escitalopram (LEXAPRO) 10 MG tablet Take 10 mg by mouth Daily.    Michelle Esquivel MD   finasteride (PROSCAR) 5 MG tablet Take 5 mg by mouth Daily.    Michelle Esquivel MD   fosfomycin (MONUROL) 3 g pack Mix 1 packet in 3 to 4 oz water and take by mouth every 48 hours as directed. 3/10/23   Amarilis Wiggins APRN   furosemide (LASIX) 40 MG tablet Take 1 tablet by mouth 2 (Two) Times a Day. 9/19/22   Corinna Miranda DO   gabapentin (NEURONTIN) 300 MG capsule Take 300 mg by mouth 3 (Three) Times a Day.    Michelle Esquivel MD   hydrALAZINE (APRESOLINE) 50 MG tablet Take 1 tablet by mouth 3 (Three) Times a Day. 9/19/22   Corinna Miranda DO   Insulin Glargine (BASAGLAR KWIKPEN) 100 UNIT/ML injection pen Inject 10 Units under the skin into the appropriate area as directed Every Night.    Michelle Esquivel MD   isosorbide mononitrate (IMDUR) 60 MG 24 hr tablet Take 1 tablet by mouth 2 (Two) Times a Day. 9/19/22   Corinna Miranda DO   losartan (COZAAR) 25 MG tablet Take 1 tablet by mouth Daily. 9/20/22   Corinna Miranda DO   methocarbamol (ROBAXIN) 500 MG tablet Take 500 mg by mouth Every 12 (Twelve) Hours As Needed for Muscle Spasms.    Michelle Esquivel MD   nitroglycerin (NITROSTAT) 0.4 MG SL tablet Place 0.4 mg under the tongue Every 5 (Five) Minutes As Needed for Chest Pain. Take no more than 3 doses in 15 minutes.    Michelle Esquivel MD   polycarbophil (calcium polycarbophil) 625 MG tablet tablet Take 625 mg by mouth Daily.    Provider, MD Michelle   Polyethylene Glycol 3350 powder Take 17 g by mouth Every  Night.    Michelle Esquivel MD   saccharomyces boulardii (FLORASTOR) 250 MG capsule Take 250 mg by mouth 2 (Two) Times a Day.    Michelle Esquivel MD   simethicone (MYLICON,GAS-X) 180 MG capsule Take 180 mg by mouth Every 6 (Six) Hours As Needed for Flatulence.    Michelle Esquivel MD   simvastatin (ZOCOR) 40 MG tablet Take 40 mg by mouth every night at bedtime.    Michelle Esquivel MD   tamsulosin (FLOMAX) 0.4 MG capsule 24 hr capsule Take 1 capsule by mouth Daily.    Michelle Esquivel MD           Objective   Physical Exam  Constitutional is a 77-year-old male who occasionally moans in a loud voice triage vital signs temperature of 100.7 rectally blood pressure initially was reported at the bedside of 200/110.  Sats at 97% on 2 L of oxygen heart rate in the 70s.  Respiratory about 24.  HEENT no obvious trauma extraocular muscles intact pupils equal reactive mouth is otherwise clear neck supple no adenopathy no meningeal signs.  Lungs clear no retraction no use of accessories heart regular without murmur abdomen soft without tenderness good bowel sounds no peritoneal findings or pulsatile masses  examination is normal male genitalia there is a Motley catheter in place does not appear to be draining well.  Buttock area there is no drainage or foul odor is a little bit of skin breakdown there is no redness or signs of abscess or Ledy's gangrene or peritonitis extremities pulses equal upper lower extremities some bilateral edema no cords or Homans' sign no evidence of DVT.  Skin warm and dry without rashes or cellulitic changes.  Neurologic patient really unresponsive painful stimulus but he does move all 4 there is no obvious facial asymmetry he moves everything but does not follow commands but extremely confused  Procedures          ED Course      Results for orders placed or performed during the hospital encounter of 03/25/23   Comprehensive Metabolic Panel    Specimen: Blood   Result Value  Ref Range    Glucose 104 (H) 65 - 99 mg/dL    BUN 22 8 - 23 mg/dL    Creatinine 1.01 0.76 - 1.27 mg/dL    Sodium 136 136 - 145 mmol/L    Potassium 4.1 3.5 - 5.2 mmol/L    Chloride 100 98 - 107 mmol/L    CO2 28.0 22.0 - 29.0 mmol/L    Calcium 8.7 8.6 - 10.5 mg/dL    Total Protein 6.7 6.0 - 8.5 g/dL    Albumin 3.1 (L) 3.5 - 5.2 g/dL    ALT (SGPT) 9 1 - 41 U/L    AST (SGOT) 13 1 - 40 U/L    Alkaline Phosphatase 101 39 - 117 U/L    Total Bilirubin 0.4 0.0 - 1.2 mg/dL    Globulin 3.6 gm/dL    A/G Ratio 0.9 g/dL    BUN/Creatinine Ratio 21.8 7.0 - 25.0    Anion Gap 8.0 5.0 - 15.0 mmol/L    eGFR 76.6 >60.0 mL/min/1.73   Urinalysis With Microscopic If Indicated (No Culture) - Indwelling Urethral Catheter    Specimen: Indwelling Urethral Catheter; Urine   Result Value Ref Range    Color, UA Dark Yellow (A) Yellow, Straw    Appearance, UA Cloudy (A) Clear    pH, UA 8.0 5.0 - 8.0    Specific Gravity, UA 1.015 1.005 - 1.030    Glucose, UA Negative Negative    Ketones, UA Negative Negative    Bilirubin, UA Negative Negative    Blood, UA Large (3+) (A) Negative    Protein, UA >=300 mg/dL (3+) (A) Negative    Leuk Esterase, UA Large (3+) (A) Negative    Nitrite, UA Positive (A) Negative    Urobilinogen, UA 0.2 E.U./dL 0.2 - 1.0 E.U./dL   Procalcitonin    Specimen: Blood   Result Value Ref Range    Procalcitonin 0.07 0.00 - 0.25 ng/mL   CBC Auto Differential    Specimen: Blood   Result Value Ref Range    WBC 4.70 3.40 - 10.80 10*3/mm3    RBC 4.66 4.14 - 5.80 10*6/mm3    Hemoglobin 11.7 (L) 13.0 - 17.7 g/dL    Hematocrit 38.0 37.5 - 51.0 %    MCV 81.4 79.0 - 97.0 fL    MCH 25.0 (L) 26.6 - 33.0 pg    MCHC 30.7 (L) 31.5 - 35.7 g/dL    RDW 20.7 (H) 12.3 - 15.4 %    RDW-SD 59.1 (H) 37.0 - 54.0 fl    MPV 8.2 6.0 - 12.0 fL    Platelets 209 140 - 450 10*3/mm3    Neutrophil % 61.1 42.7 - 76.0 %    Lymphocyte % 24.3 19.6 - 45.3 %    Monocyte % 9.6 5.0 - 12.0 %    Eosinophil % 4.7 0.3 - 6.2 %    Basophil % 0.3 0.0 - 1.5 %    Neutrophils,  Absolute 2.90 1.70 - 7.00 10*3/mm3    Lymphocytes, Absolute 1.10 0.70 - 3.10 10*3/mm3    Monocytes, Absolute 0.40 0.10 - 0.90 10*3/mm3    Eosinophils, Absolute 0.20 0.00 - 0.40 10*3/mm3    Basophils, Absolute 0.00 0.00 - 0.20 10*3/mm3    nRBC 0.0 0.0 - 0.2 /100 WBC   Blood Gas, Arterial -    Specimen: Arterial Blood   Result Value Ref Range    Site Right Brachial     Kraig's Test Negative     pH, Arterial 7.370 7.350 - 7.450 pH units    pCO2, Arterial 55.6 (H) 35.0 - 48.0 mm Hg    pO2, Arterial 27.7 (C) 83.0 - 108.0 mm Hg    HCO3, Arterial 32.1 (H) 21.0 - 28.0 mmol/L    Base Excess, Arterial 5.3 (H) 0.0 - 3.0 mmol/L    O2 Saturation, Arterial 48.4 (L) 94.0 - 98.0 %    CO2 Content 33.8 (H) 22 - 29 mmol/L    Barometric Pressure for Blood Gas      Modality Cannula     FIO2 28 %    Hemodilution No    Urinalysis, Microscopic Only - Indwelling Urethral Catheter    Specimen: Indwelling Urethral Catheter; Urine   Result Value Ref Range    RBC, UA 31-50 (A) None Seen /HPF    WBC, UA Too Numerous to Count (A) None Seen /HPF    Bacteria, UA 3+ (A) None Seen /HPF    Squamous Epithelial Cells, UA 0-2 None Seen, 0-2 /HPF    Hyaline Casts, UA None Seen None Seen /LPF    Triple Phosphate Crystals, UA Moderate/2+ None Seen /HPF    Methodology Manual Light Microscopy    POC Lactate    Specimen: Blood   Result Value Ref Range    Lactate 1.0 0.3 - 2.0 mmol/L   ECG 12 Lead Altered Mental Status   Result Value Ref Range    QT Interval 361 ms   Green Top (Gel)   Result Value Ref Range    Extra Tube HOLD    Lavender Top   Result Value Ref Range    Extra Tube hold for add-on    Gold Top - SST   Result Value Ref Range    Extra Tube Hold for add-ons.    Light Blue Top   Result Value Ref Range    Extra Tube Hold for add-ons.      CT Head Without Contrast    Result Date: 3/26/2023  1. No acute intracranial process. Parietal scalp soft tissue swelling MRI is more sensitive for the detection of acute nonhemorrhagic infarct. 2. Moderate to severe  changes small vessel ischemic disease of indeterminate age, presumably mostly chronic. Volume loss. Atherosclerosis. 3. Paranasal sinus disease.  Electronically signed by:  Baldev Pereira M.D.  3/25/2023 10:03 PM Mountain Time    Medications   sodium chloride 0.9 % flush 10 mL (has no administration in time range)   Pharmacy to Dose Zosyn (has no administration in time range)   labetalol (NORMODYNE,TRANDATE) injection 10 mg (has no administration in time range)   acetaminophen (TYLENOL) suppository 650 mg (has no administration in time range)   albuterol sulfate HFA (PROVENTIL HFA;VENTOLIN HFA;PROAIR HFA) inhaler 2 puff (has no administration in time range)   Pharmacy to Dose enoxaparin (LOVENOX) (has no administration in time range)   carvedilol (COREG) tablet 12.5 mg (has no administration in time range)   escitalopram (LEXAPRO) tablet 10 mg (has no administration in time range)   finasteride (PROSCAR) tablet 5 mg (has no administration in time range)   gabapentin (NEURONTIN) capsule 300 mg (has no administration in time range)   hydrALAZINE (APRESOLINE) tablet 50 mg (has no administration in time range)   tamsulosin (FLOMAX) 24 hr capsule 0.4 mg (has no administration in time range)   atorvastatin (LIPITOR) tablet 20 mg (has no administration in time range)   sodium chloride 0.9 % flush 10 mL (10 mL Intravenous Given 3/26/23 0127)   sodium chloride 0.9 % flush 10 mL (has no administration in time range)   sodium chloride 0.9 % infusion 40 mL (has no administration in time range)   ondansetron (ZOFRAN) tablet 4 mg (has no administration in time range)     Or   ondansetron (ZOFRAN) injection 4 mg (has no administration in time range)   nitroglycerin (NITROSTAT) SL tablet 0.4 mg (has no administration in time range)   lactated ringers infusion (50 mL/hr Intravenous New Bag 3/26/23 0127)   dextrose (GLUTOSE) oral gel 15 g (has no administration in time range)   dextrose (D50W) (25 g/50 mL) IV injection 25 g  (has no administration in time range)   glucagon (human recombinant) (GLUCAGEN DIAGNOSTIC) 1 mg in sterile water (preservative free) 1 mL injection (has no administration in time range)   insulin lispro (ADMELOG) injection 2-7 Units (has no administration in time range)   acetaminophen (TYLENOL) suppository 650 mg (650 mg Rectal Given 3/25/23 2133)   piperacillin-tazobactam (ZOSYN) IVPB 4.5 g in 100 mL NS (CD) (0 g Intravenous Stopped 3/26/23 0127)   labetalol (NORMODYNE,TRANDATE) injection 10 mg (10 mg Intravenous Given 3/25/23 7675)          EKG my interpretation atrial fibrillation 85 normal axis hypertrophy QTc of 437 normal EKG but unchanged from February 15, 2023     All records reviewed patient was admitted in the hospital back in September 1992 he had been admitted for UTI which was ESBL hypertension CHF and hypertension on a Cardene drip.  His urine was sensitive to Zosyn he was on IV Zosyn at that time.  Patient had a stress test and that September 2022 which was unremarkable as well and a 2D echo in February 17, 2023 with ejection fraction of 60%.                             Medical Decision Making  Medical decision making.  IV established monitor placed showed atrial fibrillation with controlled rate.  EKG obtained apparently by me showed atrial fibrillation 85 but this is unchanged from 1 February 15, 2023 he was given Tylenol per rectum for his fevers Labs obtained independently by me comprehensive metabolic profile was unremarkable procalcitonin and CBC unremarkable hemoglobin 11.7 patient's Motley catheter was changed immediately had 300 cc of purulent urine obtained.  He was nitrate positive leukocyte positive with 3+ bacteria and 2 miscount white cells that was cultured his lactate was 1 review his records show in September 2022 he had ESBL urinary tract infection sensitive to Zosyn and meropenem he was started on Zosyn here.  He underwent a head CT without on my review I see no acute hemorrhage or  tumors or masses.  Radiology read does show some chronic change.  Chest x-ray obtained independent reviewed by me cardiomegaly but no change from x-ray obtained in February no acute pneumonia or pneumothorax or other acute findings patient repeat exam was resting comfortably his vital signs are stable blood pressure after 10 of labetalol was down to 160/97 he had been given labetalol 10 mg IV.  Temperature was coming down.  But still would not speak he would not respond to painful stimulus but he did move Laly was no focal findings sats are good at 98% on 2 L of oxygen heart rate of 70 atrial fibrillation on the monitor but controlled rate.  Respiratory rate of 18.  Good cap refill.  There is no palp cords or Homans' sign or evidence of DVT.  I do not see evidence suggest an acute stroke meningitis encephalitis DVT pulmonary embolism or dissection or acute intra-abdominal process based on the history physical clinical exam this does not complete list of all possibilities of altered mental status but I think it is in the past when his records reviewed he was in here in September for similar issue this is September 2022 when he had ESBL urinary tract infection he had hypertension on Cardene drip and CHF.  She had a stress test in September which was unremarkable.  Echocardiogram done February 17 which showed ejection fraction of 65%.  No family at the bedside.  He is otherwise hemodynamically stable he will be admitted to hospital I talked to the hospitalist Dr. Campuzano Case discussed stable otherwise unremarkable ER course.    Amount and/or Complexity of Data Reviewed  External Data Reviewed: radiology, ECG and notes.  Labs: ordered.  Radiology: ordered and independent interpretation performed. Decision-making details documented in ED Course.  ECG/medicine tests: ordered and independent interpretation performed. Decision-making details documented in ED Course.      Risk  Prescription drug management.          Final  diagnoses:   Altered mental status, unspecified altered mental status type   Fever, unspecified fever cause   Urinary tract infection associated with indwelling urethral catheter, initial encounter (HCC)       ED Disposition  ED Disposition     ED Disposition   Decision to Admit    Condition   --    Comment   Level of Care: Telemetry [5]   Diagnosis: UTI (urinary tract infection) [721251]   Admitting Physician: YANNI DUMONT [[id number]]   Attending Physician: YANNI DUMONT [[id number]]   Certification: I Certify That Inpatient Hospital Services Are Medically Necessary For Greater Than 2 Midnights               No follow-up provider specified.       Medication List      No changes were made to your prescriptions during this visit.          Bob Rojas MD  03/26/23 0128      Electronically signed by Bob Rojas MD at 03/26/23 0128       Vital Signs (last day)     Date/Time Temp Temp src Pulse Resp BP Patient Position SpO2    03/27/23 1000 -- -- -- -- 165/96 -- --    03/27/23 0957 98.8 (37.1) Axillary 81 16 165/96 Lying 96    03/27/23 0731 -- -- -- -- 138/74 -- --    03/27/23 0716 -- -- -- -- 143/78 -- --    03/27/23 0701 -- -- -- -- 132/78 -- --    03/27/23 0646 -- -- -- -- 129/77 -- --    03/27/23 0555 -- -- 70 -- 125/76 -- --    03/27/23 0517 -- -- 85 -- 136/75 -- --    03/27/23 0500 98.6 (37) Oral 81 20 146/89 Lying 97    03/27/23 0418 -- -- 79 -- 138/82 -- --    03/27/23 0234 -- -- 79 -- 156/96 -- --    03/27/23 0100 97.9 (36.6) Oral 85 16 136/84 Lying 97    03/27/23 0031 -- -- 82 -- 133/83 -- --    03/26/23 2305 -- -- 78 -- 152/89 -- --    03/26/23 2253 -- -- 89 -- 153/94 -- --    03/26/23 2212 -- -- 91 -- 151/86 -- --    03/26/23 2200 -- -- 87 -- 151/86 -- 91    03/26/23 2100 -- Oral 86 16 143/89 Lying 90    03/26/23 2013 -- -- 84 -- 148/90 -- --    03/26/23 1916 -- -- 90 -- 154/90 -- 92    03/26/23 1901 -- -- 82 -- 164/97 -- 95    03/26/23 1846 -- -- 90 -- 149/92 -- 95    03/26/23 1835 -- -- 95 -- 160/87  -- 97    03/26/23 1833 -- -- 89 -- 160/87 -- 96    03/26/23 1831 -- -- 90 -- 154/91 -- 95    03/26/23 1824 -- -- 100 -- 154/100 -- 98    03/26/23 1818 -- -- 104 -- 196/97 -- 96    03/26/23 1817 -- -- 94 -- 193/89 -- 95    03/26/23 1816 -- -- 96 -- 185/101 -- 96    03/26/23 1802 -- -- 98 -- 183/89 -- 99    03/26/23 1801 -- -- 96 -- 183/89 -- 99    03/26/23 1746 -- -- 95 -- 179/94 -- 97    03/26/23 1731 -- -- 107 -- 170/85 -- --    03/26/23 1728 -- -- -- -- 176/85 -- --    03/26/23 1706 -- -- 96 -- 156/98 -- 97    03/26/23 1646 -- -- 99 -- 155/96 -- 97    03/26/23 1639 -- -- 90 -- 153/102 -- 97    03/26/23 1630 -- -- 88 -- 170/102 -- 97    03/26/23 1627 -- -- 89 -- 191/111 -- 98    03/26/23 1624 -- -- 83 -- 191/111 -- 98    03/26/23 1621 -- -- 82 -- 183/103 -- 98    03/26/23 1618 -- -- 84 -- 185/110 -- 98    03/26/23 1610 98.9 (37.2) Oral 71 15 178/110 Lying 100    03/26/23 1609 -- -- 82 -- 178/110 -- 98    03/26/23 1547 -- -- -- -- 185/116 -- --    03/26/23 1532 -- -- -- -- 209/134 -- --    03/26/23 1457 -- -- -- -- 193/106 -- --    03/26/23 1437 98.2 (36.8) Axillary 80 13 177/126 Lying --    03/26/23 1304 -- -- 56 -- 176/106 -- 98    03/26/23 1212 -- -- 62 -- 179/116 -- 98    03/26/23 1100 -- -- 55 -- 180/97 -- 97    03/26/23 1002 -- -- 58 -- 169/111 -- 99    03/26/23 0925 -- -- 61 -- -- -- 98    03/26/23 08:31:37 -- -- 62 -- 179/93 Lying 98    03/26/23 0830 -- -- -- -- 179/93 -- --    03/26/23 0740 -- -- 62 -- -- -- 96    03/26/23 0630 97.9 (36.6) Oral 63 13 170/95 Lying 97    03/26/23 0415 -- -- -- -- 148/74 -- --    03/26/23 0345 96.7 (35.9) Rectal 56 15 162/101 Lying 98    03/26/23 0318 -- -- 56 -- 168/97 -- 98    03/26/23 0248 -- -- 63 -- 155/95 -- 98    03/26/23 0233 -- -- 72 -- 147/93 -- 98    03/26/23 0218 -- -- 71 -- 165/83 -- 98    03/26/23 0203 -- -- 55 -- 170/93 -- 98    03/26/23 0133 -- -- 63 -- 134/89 -- 98    03/26/23 0115 -- -- 74 -- 154/89 -- 97    03/26/23 0100 -- -- 71 -- 136/86 -- 97    03/26/23  0030 -- -- 70 -- 151/85 -- 97    03/26/23 0018 -- -- 70 -- 163/97 -- 97    03/26/23 0003 -- -- 77 -- 161/95 -- 97          Oxygen Therapy (last day)     Date/Time SpO2 Device (Oxygen Therapy) Flow (L/min) Oxygen Concentration (%) ETCO2 (mmHg)    03/27/23 0957 96 nasal cannula 2 -- --    03/27/23 0802 -- nasal cannula 2 -- --    03/27/23 0500 97 nasal cannula -- -- --    03/27/23 0100 97 nasal cannula 2 -- --    03/27/23 0000 -- nasal cannula 2 -- --    03/26/23 2200 91 -- -- -- --    03/26/23 2100 90 nasal cannula 2 -- --    03/26/23 2000 -- nasal cannula 2 -- --    03/26/23 1916 92 -- -- -- --    03/26/23 1901 95 -- -- -- --    03/26/23 1846 95 -- -- -- --    03/26/23 1835 97 -- -- -- --    03/26/23 1833 96 -- -- -- --    03/26/23 1831 95 -- -- -- --    03/26/23 1824 98 -- -- -- --    03/26/23 1818 96 -- -- -- --    03/26/23 1817 95 -- -- -- --    03/26/23 1816 96 -- -- -- --    03/26/23 1802 99 -- -- -- --    03/26/23 1801 99 -- -- -- --    03/26/23 1746 97 -- -- -- --    03/26/23 1706 97 -- -- -- --    03/26/23 1646 97 -- -- -- --    03/26/23 1639 97 -- -- -- --    03/26/23 1630 97 -- -- -- --    03/26/23 1627 98 -- -- -- --    03/26/23 1624 98 -- -- -- --    03/26/23 1621 98 -- -- -- --    03/26/23 1618 98 -- -- -- --    03/26/23 1610 100 nasal cannula 2 -- --    03/26/23 1609 98 nasal cannula 2 -- --    03/26/23 1437 -- nasal cannula 3 -- --    03/26/23 1304 98 -- -- -- --    03/26/23 1212 98 -- -- -- --    03/26/23 1100 97 -- -- -- --    03/26/23 1002 99 -- -- -- --    03/26/23 0925 98 -- -- -- --    03/26/23 08:31:37 98 -- -- -- --    03/26/23 0740 96 -- -- -- --    03/26/23 0630 97 nasal cannula 2 -- --    03/26/23 0400 -- nasal cannula 2 -- --    03/26/23 0345 98 nasal cannula 2 -- --    03/26/23 0318 98 -- -- -- --    03/26/23 0248 98 -- -- -- --    03/26/23 0233 98 -- -- -- --    03/26/23 0218 98 -- -- -- --    03/26/23 0203 98 -- -- -- --    03/26/23 0133 98 -- -- -- --    03/26/23 0115 97 -- -- -- --     03/26/23 0100 97 -- -- -- --    03/26/23 0030 97 -- -- -- --    03/26/23 0018 97 -- -- -- --    03/26/23 0003 97 -- -- -- --            Facility-Administered Medications as of 3/27/2023   Medication Dose Route Frequency Provider Last Rate Last Admin   • [COMPLETED] acetaminophen (TYLENOL) suppository 650 mg  650 mg Rectal Once Bob Rojas MD   650 mg at 03/25/23 2133   • acetaminophen (TYLENOL) suppository 650 mg  650 mg Rectal Q4H PRN Sandip Campuzano DO       • albuterol (PROVENTIL) nebulizer solution 0.083% 2.5 mg/3mL  2.5 mg Nebulization Q4H PRN Sandip Campuzano DO       • amLODIPine (NORVASC) tablet 10 mg  10 mg Oral Q24H Rafael Packer MD       • atorvastatin (LIPITOR) tablet 20 mg  20 mg Oral Nightly Sandip Campuzano DO       • carvedilol (COREG) tablet 12.5 mg  12.5 mg Oral BID With Meals Sandip Campuzano, DO       • dextrose (D50W) (25 g/50 mL) IV injection 25 g  25 g Intravenous Q15 Min PRN Sandip Campuzano DO       • dextrose (GLUTOSE) oral gel 15 g  15 g Oral Q15 Min PRN Sandip Campuzano DO       • Enoxaparin Sodium (LOVENOX) syringe 135 mg  1 mg/kg Subcutaneous Q12H Sandip Campuzano DO   135 mg at 03/27/23 0226   • escitalopram (LEXAPRO) tablet 10 mg  10 mg Oral Daily Sandip Campuzano DO       • finasteride (PROSCAR) tablet 5 mg  5 mg Oral Daily Sandip Campuzano DO       • gabapentin (NEURONTIN) capsule 300 mg  300 mg Oral TID Sandip Campuzano DO       • glucagon (human recombinant) (GLUCAGEN DIAGNOSTIC) 1 mg in sterile water (preservative free) 1 mL injection  1 mg Intramuscular Q15 Min PRN Sandip Campuzano DO       • [COMPLETED] hydrALAZINE (APRESOLINE) injection 10 mg  10 mg Intravenous Once Jules Torre MD   10 mg at 03/26/23 1530   • hydrALAZINE (APRESOLINE) injection 5 mg  5 mg Intravenous Q6H PRN Sandip Campuzano DO   5 mg at 03/26/23 1220   • hydrALAZINE (APRESOLINE) tablet 50 mg  50 mg Oral TID Sandip Campuzano DO       • insulin lispro (ADMELOG) injection 2-7 Units  2-7 Units  Subcutaneous TID With Meals Sandip Campuzano DO       • [COMPLETED] labetalol (NORMODYNE,TRANDATE) injection 10 mg  10 mg Intravenous Once Bob Rojas MD   10 mg at 03/25/23 2255   • lactated ringers infusion  50 mL/hr Intravenous Continuous Sandip Campuzano DO 50 mL/hr at 03/26/23 0127 50 mL/hr at 03/26/23 0127   • niCARdipine (CARDENE) 25mg in 250mL NS infusion  5-15 mg/hr Intravenous Titrated Jules Torre MD   Stopped at 03/27/23 0743   • nitroglycerin (NITROSTAT) ointment 1 inch  1 inch Topical Q6H Kevin Kovacs PA-C   1 inch at 03/27/23 1229   • nitroglycerin (NITROSTAT) SL tablet 0.4 mg  0.4 mg Sublingual Q5 Min PRN Sandip Campuzano DO       • ondansetron (ZOFRAN) tablet 4 mg  4 mg Oral Q6H PRN Sandip Campuzano DO        Or   • ondansetron (ZOFRAN) injection 4 mg  4 mg Intravenous Q6H PRN Sandip Campuzano DO       • Pharmacy Consult   Does not apply Continuous PRN Sandip Campuzano DO       • Pharmacy to Dose enoxaparin (LOVENOX)   Does not apply Continuous PRN Sandip Campuzano DO       • Pharmacy to Dose Zosyn   Does not apply Continuous PRN Sandip Campuzano DO       • [COMPLETED] piperacillin-tazobactam (ZOSYN) IVPB 4.5 g in 100 mL NS (CD)  4.5 g Intravenous Once Bob Rojas MD   Stopped at 03/26/23 0127   • piperacillin-tazobactam (ZOSYN) IVPB 4.5 g in 100 mL NS (CD)  4.5 g Intravenous Q8H Sandip Campuzano DO 0 mL/hr at 03/26/23 0852 4.5 g at 03/27/23 1229   • sodium chloride 0.9 % flush 10 mL  10 mL Intravenous PRN Sandip Campuzano DO       • sodium chloride 0.9 % flush 10 mL  10 mL Intravenous Q12H Sandip Campuzano DO   10 mL at 03/27/23 0802   • sodium chloride 0.9 % flush 10 mL  10 mL Intravenous PRN Sandip Campuzano,    10 mL at 03/26/23 1530   • sodium chloride 0.9 % infusion 40 mL  40 mL Intravenous PRN Sandip Campuzano DO       • tamsulosin (FLOMAX) 24 hr capsule 0.4 mg  0.4 mg Oral Daily Sandip Campuzano DO           Lab Results (last 24 hours)     Procedure Component  Value Units Date/Time    POC Glucose Once [[id number]]  (Abnormal) Collected: 03/27/23 1215    Specimen: Blood Updated: 03/27/23 1217     Glucose 108 mg/dL      Comment: Serial Number: [id number]:  [id number]       Urine Culture - Urine, Indwelling Urethral Catheter [[id number]]  (Abnormal) Collected: 03/25/23 2128    Specimen: Urine from Indwelling Urethral Catheter Updated: 03/27/23 0914     Urine Culture >100,000 CFU/mL Gram Negative Bacilli    Narrative:      Colonization of the urinary tract without infection is common. Treatment is discouraged unless the patient is symptomatic, pregnant, or undergoing an invasive urologic procedure.    POC Glucose Once [[id number]]  (Normal) Collected: 03/27/23 0740    Specimen: Blood Updated: 03/27/23 0743     Glucose 92 mg/dL      Comment: Serial Number: [id number]:  [id number]       High Sensitivity Troponin T 2Hr [[id number]]  (Abnormal) Collected: 03/27/23 0242    Specimen: Blood Updated: 03/27/23 0330     HS Troponin T 74 ng/L      Troponin T Delta 1 ng/L     Narrative:      High Sensitive Troponin T Reference Range:  <10.0 ng/L- Negative Female for AMI  <15.0 ng/L- Negative Male for AMI  >=10 - Abnormal Female indicating possible myocardial injury.  >=15 - Abnormal Male indicating possible myocardial injury.   Clinicians would have to utilize clinical acumen, EKG, Troponin, and serial changes to determine if it is an Acute Myocardial Infarction or myocardial injury due to an underlying chronic condition.         High Sensitivity Troponin T [[id number]]  (Abnormal) Collected: 03/26/23 2346    Specimen: Blood Updated: 03/27/23 0037     HS Troponin T 73 ng/L     Narrative:      High Sensitive Troponin T Reference Range:  <10.0 ng/L- Negative Female for AMI  <15.0 ng/L- Negative Male for AMI  >=10 - Abnormal Female indicating possible myocardial injury.  >=15 - Abnormal Male indicating possible myocardial injury.   Clinicians would have to utilize clinical acumen,  EKG, Troponin, and serial changes to determine if it is an Acute Myocardial Infarction or myocardial injury due to an underlying chronic condition.         Basic Metabolic Panel [[id number]]  (Abnormal) Collected: 03/26/23 2346    Specimen: Blood Updated: 03/27/23 0035     Glucose 127 mg/dL      BUN 17 mg/dL      Creatinine 0.86 mg/dL      Sodium 141 mmol/L      Potassium 3.7 mmol/L      Chloride 103 mmol/L      CO2 26.0 mmol/L      Calcium 8.8 mg/dL      BUN/Creatinine Ratio 19.8     Anion Gap 12.0 mmol/L      eGFR 89.2 mL/min/1.73     Narrative:      GFR Normal >60  Chronic Kidney Disease <60  Kidney Failure <15    The GFR formula is only valid for adults with stable renal function between ages 18 and 70.    CBC (No Diff) [[id number]]  (Abnormal) Collected: 03/26/23 2346    Specimen: Blood Updated: 03/27/23 0015     WBC 5.10 10*3/mm3      RBC 4.29 10*6/mm3      Hemoglobin 11.0 g/dL      Hematocrit 34.4 %      MCV 80.2 fL      MCH 25.6 pg      MCHC 31.9 g/dL      RDW 21.0 %      RDW-SD 61.7 fl      MPV 8.2 fL      Platelets 193 10*3/mm3     Blood Culture - Blood, Arm, Right [[id number]]  (Normal) Collected: 03/25/23 2111    Specimen: Blood from Arm, Right Updated: 03/26/23 2130     Blood Culture No growth at 24 hours    Narrative:      Less than seven (7) mL's of blood was collected.  Insufficient quantity may yield false negative results.    Blood Culture ID, PCR - Blood, Arm, Right [[id number]]  (Abnormal) Collected: 03/25/23 2111    Specimen: Blood from Arm, Right Updated: 03/26/23 2109     BCID, PCR Staph spp, not aureus or lugdunensis. Identification by BCID2 PCR.     BOTTLE TYPE Aerobic Bottle    POC Glucose Once [[id number]]  (Abnormal) Collected: 03/26/23 2005    Specimen: Blood Updated: 03/26/23 2006     Glucose 115 mg/dL      Comment: Serial Number: [id number]:  [id number]       POC Glucose Once [[id number]]  (Abnormal) Collected: 03/26/23 1806    Specimen: Blood Updated: 03/26/23 1807     Glucose 116  mg/dL      Comment: Serial Number: [id number]:  [id number]           Imaging Results (Last 72 Hours)     Procedure Component Value Units Date/Time    XR Chest 1 View [[id number]] Collected: 03/26/23 0719     Updated: 03/26/23 0722    Narrative:      XR CHEST 1 VW    Date of Exam: 3/25/2023 9:17 PM EDT    Indication: Altered mental status.    Comparison: 2/15/2023    Findings:  Marked cardiomegaly. Mediastinal contours within normal limits. There is pulmonary vascular congestion and interstitial pulmonary edema, overall similar compared to the prior exam.      Impression:      Impression:    1. Unchanged findings of mild to moderate CHF.    Electronically Signed: Luisito Hernández    3/26/2023 7:20 AM EDT    Workstation ID: [id number]    CT Head Without Contrast [[id number]] Collected: 03/25/23 2201     Updated: 03/26/23 0004    Narrative:      EXAMINATION: CT HEAD WITHOUT CONTRAST    DATE: 3/25/2023 11:27 PM     INDICATION: AMS; TAKES ELIQUIS;     COMPARISON: 2/15/2023.     TECHNIQUE:  Routine axial images through the head without contrast. Coronal reformations.    Low-dose CT acquisition technique included one or more of the following options: 1. Automated exposure control, 2. Adjustment of mA and/or KV according to patient's size and/or 3. Use of iterative reconstruction.    FINDINGS:      Intracranial contents:    Ventricular and cisternal spaces are prominent from volume loss. Moderate to severe periventricular and subcortical white matter hypodensities. Hypodensities in the basal ganglia, likely from old lacunar type infarcts. No dominant mass, midline shift,   hydrocephalus, extra axial fluid collection or acute hemorrhage.  No asymmetric hyperdensity of the proximal major cerebral arteries.    Craniocervical junction is patent.    Bones and extracranial soft tissues:    Mild mucosal thickening ethmoid air cells and maxillary sinuses. Retention cyst or polyp inferior right maxillary sinus. Otherwise, Visualized  paranasal sinuses and mastoid air cells are clear.  Skull is intact. Visualized intraorbital contents are   unremarkable. Parietal scalp soft tissue swelling. Calcifications distal internal carotid arteries and distal vertebral arteries.      Impression:      1. No acute intracranial process. Parietal scalp soft tissue swelling MRI is more sensitive for the detection of acute nonhemorrhagic infarct.  2. Moderate to severe changes small vessel ischemic disease of indeterminate age, presumably mostly chronic. Volume loss. Atherosclerosis.  3. Paranasal sinus disease.       Electronically signed by:  Baldev Pereira M.D.    3/25/2023 10:03 PM Mountain Time        ECG/EMG Results (last 72 hours)     Procedure Component Value Units Date/Time    ECG 12 Lead Altered Mental Status [[id number]] Collected: 03/25/23 2044     Updated: 03/26/23 1020     QT Interval 361 ms     Narrative:      HEART RATE= 85  bpm  RR Interval= 706  ms  MO Interval=   ms  P Horizontal Axis=   deg  P Front Axis=   deg  QRSD Interval= 85  ms  QT Interval= 361  ms  QRS Axis= 18  deg  T Wave Axis= 132  deg  - ABNORMAL ECG -  Atrial fibrillation  Nonspecific repol abnormality, diffuse leads  When compared with ECG of 15-Feb-2023 15:43:36,  Significant rate increase  Electronically Signed By: Bob Rojas ([facility]) 26-Mar-2023 10:20:08  Date and Time of Study: 2023-03-25 20:44:30    ECG 12 Lead Chest Pain [[id number]] Collected: 03/26/23 2249     Updated: 03/26/23 2250     QT Interval 388 ms     Narrative:      HEART RATE= 79  bpm  RR Interval= 756  ms  MO Interval=   ms  P Horizontal Axis=   deg  P Front Axis=   deg  QRSD Interval= 94  ms  QT Interval= 388  ms  QRS Axis= -22  deg  T Wave Axis= 93  deg  - ABNORMAL ECG -  Atrial fibrillation  Ventricular premature complex  When compared with ECG of 25-Mar-2023 20:44:30,  Significant repolarization change  Electronically Signed By:   Date and Time of Study: 2023-03-26 22:49:11    SCANNED - TELEMETRY    [[id number]] Resulted: 23     Updated: 23 0727    SCANNED - TELEMETRY   [[id number]] Resulted: 23     Updated: 23 0727    SCANNED - TELEMETRY   [[id number]] Resulted: 23     Updated: 23 0737    SCANNED - TELEMETRY   [[id number]] Resulted: 23     Updated: 23 0756    SCANNED - TELEMETRY   [[id number]] Resulted: 23     Updated: 23 0824    SCANNED - TELEMETRY   [[id number]] Resulted: 23     Updated: 23 1036    SCANNED - TELEMETRY   [[id number]] Resulted: 23     Updated: 23 1057    SCANNED - TELEMETRY   [[id number]] Resulted: 23     Updated: 23 1129    SCANNED - TELEMETRY   [[id number]] Resulted: 23     Updated: 23 1129    SCANNED - TELEMETRY   [[id number]] Resulted: 23     Updated: 23 1206             Physician Progress Notes (last 48 hours)      Rafael Packer MD at 23 1741              [facility] Medicine Services   Daily Progress Note    Patient Name: Shaji Junior  : 1945  MRN: [id number]  Primary Care Physician:  Naa Valverde MD  Date of admission: 3/25/2023  Date and Time of Service: 3/26/2023      Subjective       Chief Complaint: Patient admitted with altered mental status.    Patient Reports patient was awake but confused.    Review of Systems   Constitutional: Negative.   HENT: Negative.    Eyes: Negative.    Cardiovascular: Negative.    Respiratory: Negative.    Endocrine: Negative.    Musculoskeletal: Negative.    Gastrointestinal: Negative.          Objective       Vitals:   Temp:  [96.7 °F (35.9 °C)-100.7 °F (38.2 °C)] 98.9 °F (37.2 °C)  Heart Rate:  [] 96  Resp:  [13-19] 15  BP: (134-210)/() 176/85  Flow (L/min):  [2-3] 2    Physical Exam  Constitutional:       Appearance: Normal appearance.   HENT:      Head: Normocephalic and atraumatic.      Nose: Nose normal.   Cardiovascular:      Rate and Rhythm: Normal rate.      Heart sounds: Normal heart sounds.    Pulmonary:      Effort: Pulmonary effort is normal. No respiratory distress.      Breath sounds: Normal breath sounds. No stridor. No wheezing, rhonchi or rales.   Chest:      Chest wall: No tenderness.   Abdominal:      General: Abdomen is flat. There is no distension.      Palpations: Abdomen is soft. There is no mass.      Tenderness: There is no abdominal tenderness. There is no right CVA tenderness, left CVA tenderness, guarding or rebound.   Musculoskeletal:      Cervical back: Normal range of motion.   Skin:     General: Skin is warm and dry.   Neurological:      Mental Status: He is alert.      Comments: Patient is confused.  Responding to some questions appropriately.  Not in any distress.               Result Review    Result Review:  I have personally reviewed the results from the time of this admission to 3/26/2023 17:42 EDT and agree with these findings:  [x]  Laboratory  []  Microbiology  [x]  Radiology  []  EKG/Telemetry   []  Cardiology/Vascular   []  Pathology  []  Old records  []  Other:    Wounds (last 24 hours)     LDA Wound     Row Name 03/26/23 1711 03/26/23 1703 03/26/23 1659       Wound 02/15/23 2231 Bilateral coccyx Pressure Injury    Wound - Properties Group Placement Date: 02/15/23  -CP Placement Time: 2231  -CP Present on Hospital Admission: Y  -CP Side: Bilateral  -CP Location: coccyx  -CP Primary Wound Type: Pressure inj  -CP    Retired Wound - Properties Group Placement Date: 02/15/23  -CP Placement Time: 2231  -CP Present on Hospital Admission: Y  -CP Side: Bilateral  -CP Location: coccyx  -CP Primary Wound Type: Pressure inj  -CP    Retired Wound - Properties Group Date first assessed: 02/15/23  -CP Time first assessed: 2231  -CP Present on Hospital Admission: Y  -CP Side: Bilateral  -CP Location: coccyx  -CP Primary Wound Type: Pressure inj  -CP       Wound 03/10/23 0938 Right gluteal MASD (Moisture associated skin damage)    Wound - Properties Group Placement Date: 03/10/23  -  Placement Time: 0938 -HW Present on Hospital Admission: Y  -HW Side: Right  -HW Location: gluteal  -HW Primary Wound Type: MASD  -HW    Retired Wound - Properties Group Placement Date: 03/10/23  -HW Placement Time: 0938 -HW Present on Hospital Admission: Y  -HW Side: Right  -HW Location: gluteal  -HW Primary Wound Type: MASD  -HW    Retired Wound - Properties Group Date first assessed: 03/10/23  -HW Time first assessed: 0938 -HW Present on Hospital Admission: Y  -HW Side: Right  -HW Location: gluteal  -HW Primary Wound Type: MASD  -HW       Wound 03/26/23 1653 Right anterior greater trochanter Blisters    Wound - Properties Group Placement Date: 03/26/23  -NE Placement Time: 1653 -NE Present on Hospital Admission: Y  -NE Side: Right  -NE Orientation: anterior  -NE Location: greater trochanter  -NE Primary Wound Type: Blisters  -NE    Wound Image -- -- Images linked: 1  -NE    Retired Wound - Properties Group Placement Date: 03/26/23  -NE Placement Time: 1653 -NE Present on Hospital Admission: Y  -NE Side: Right  -NE Orientation: anterior  -NE Location: greater trochanter  -NE Primary Wound Type: Blisters  -NE    Retired Wound - Properties Group Date first assessed: 03/26/23  -NE Time first assessed: 1653 -NE Present on Hospital Admission: Y  -NE Side: Right  -NE Location: greater trochanter  -NE Primary Wound Type: Blisters  -NE       Wound 03/26/23 1654 Right gluteal Pressure Injury    Wound - Properties Group Placement Date: 03/26/23  -NE Placement Time: 1654 -NE Present on Hospital Admission: Y  -NE Side: Right  -NE Location: gluteal  -NE, STAGE 2  Primary Wound Type: Pressure inj  -NE    Retired Wound - Properties Group Placement Date: 03/26/23  -NE Placement Time: 1654 -NE Present on Hospital Admission: Y  -NE Side: Right  -NE Location: gluteal  -NE, STAGE 2  Primary Wound Type: Pressure inj  -NE    Retired Wound - Properties Group Date first assessed: 03/26/23  -NE Time first assessed: 1654 -NE  Present on Hospital Admission: Y  -NE Side: Right  -NE Location: gluteal  -NE, STAGE 2  Primary Wound Type: Pressure inj  -NE       Wound 03/26/23 1702 Left anterior thigh Skin Tear    Wound - Properties Group Placement Date: 03/26/23  -NE Placement Time: 1702 -NE Present on Hospital Admission: Y  -NE Side: Left  -NE Orientation: anterior  -NE Location: thigh  -NE Primary Wound Type: Skin tear  -NE    Retired Wound - Properties Group Placement Date: 03/26/23  -NE Placement Time: 1702 -NE Present on Hospital Admission: Y  -NE Side: Left  -NE Orientation: anterior  -NE Location: thigh  -NE Primary Wound Type: Skin tear  -NE    Retired Wound - Properties Group Date first assessed: 03/26/23  -NE Time first assessed: 1702 -NE Present on Hospital Admission: Y  -NE Side: Left  -NE Location: thigh  -NE Primary Wound Type: Skin tear  -NE       Wound 03/10/23 0939 Right anterior thigh    Wound - Properties Group Placement Date: 03/10/23  -HW Placement Time: 0939  -HW Present on Hospital Admission: Y  -HW Side: Right  -HW Orientation: anterior  -HW Location: thigh  -HW    Wound Image -- Images linked: 1  -NE --    Retired Wound - Properties Group Placement Date: 03/10/23  -HW Placement Time: 0939  -HW Present on Hospital Admission: Y  -HW Side: Right  -HW Orientation: anterior  -HW Location: thigh  -HW    Retired Wound - Properties Group Date first assessed: 03/10/23  -HW Time first assessed: 0939  -HW Present on Hospital Admission: Y  -HW Side: Right  -HW Location: thigh  -HW       Wound 03/26/23 1710 Left anterior greater trochanter    Wound - Properties Group Placement Date: 03/26/23  -NE Placement Time: 1710 -NE Side: Left  -NE Orientation: anterior  -NE Location: greater trochanter  -NE    Wound Image Images linked: 1  -NE -- --    Retired Wound - Properties Group Placement Date: 03/26/23  -NE Placement Time: 1710 -NE Side: Left  -NE Orientation: anterior  -NE Location: greater trochanter  -NE    Retired Wound -  Properties Group Date first assessed: 03/26/23  -NE Time first assessed: 1710  -NE Side: Left  -NE Location: greater trochanter  -NE    Row Name 03/26/23 1658             Wound 02/15/23 2231 Bilateral coccyx Pressure Injury    Wound - Properties Group Placement Date: 02/15/23  -CP Placement Time: 2231  -CP Present on Hospital Admission: Y  -CP Side: Bilateral  -CP Location: coccyx  -CP Primary Wound Type: Pressure inj  -CP    Retired Wound - Properties Group Placement Date: 02/15/23  -CP Placement Time: 2231  -CP Present on Hospital Admission: Y  -CP Side: Bilateral  -CP Location: coccyx  -CP Primary Wound Type: Pressure inj  -CP    Retired Wound - Properties Group Date first assessed: 02/15/23  -CP Time first assessed: 2231  -CP Present on Hospital Admission: Y  -CP Side: Bilateral  -CP Location: coccyx  -CP Primary Wound Type: Pressure inj  -CP       Wound 03/10/23 0938 Right gluteal MASD (Moisture associated skin damage)    Wound - Properties Group Placement Date: 03/10/23  -HW Placement Time: 0938  -HW Present on Hospital Admission: Y  -HW Side: Right  -HW Location: gluteal  -HW Primary Wound Type: MASD  -HW    Retired Wound - Properties Group Placement Date: 03/10/23  -HW Placement Time: 0938  -HW Present on Hospital Admission: Y  -HW Side: Right  -HW Location: gluteal  -HW Primary Wound Type: MASD  -HW    Retired Wound - Properties Group Date first assessed: 03/10/23  -HW Time first assessed: 0938  -HW Present on Hospital Admission: Y  -HW Side: Right  -HW Location: gluteal  -HW Primary Wound Type: MASD  -HW       Wound 03/26/23 1653 Right anterior greater trochanter Blisters    Wound - Properties Group Placement Date: 03/26/23  -NE Placement Time: 1653 -NE Present on Hospital Admission: Y  -NE Side: Right  -NE Orientation: anterior  -NE Location: greater trochanter  -NE Primary Wound Type: Blisters  -NE    Retired Wound - Properties Group Placement Date: 03/26/23  -NE Placement Time: 1653 -NE Present on  Hospital Admission: Y  -NE Side: Right  -NE Orientation: anterior  -NE Location: greater trochanter  -NE Primary Wound Type: Blisters  -NE    Retired Wound - Properties Group Date first assessed: 03/26/23  -NE Time first assessed: 1653 -NE Present on Hospital Admission: Y  -NE Side: Right  -NE Location: greater trochanter  -NE Primary Wound Type: Blisters  -NE       Wound 03/26/23 1654 Right gluteal Pressure Injury    Wound - Properties Group Placement Date: 03/26/23  -NE Placement Time: 1654 -NE Present on Hospital Admission: Y  -NE Side: Right  -NE Location: gluteal  -NE, STAGE 2  Primary Wound Type: Pressure inj  -NE    Wound Image Images linked: 1  -NE      Retired Wound - Properties Group Placement Date: 03/26/23  -NE Placement Time: 1654 -NE Present on Hospital Admission: Y  -NE Side: Right  -NE Location: gluteal  -NE, STAGE 2  Primary Wound Type: Pressure inj  -NE    Retired Wound - Properties Group Date first assessed: 03/26/23  -NE Time first assessed: 1654 -NE Present on Hospital Admission: Y  -NE Side: Right  -NE Location: gluteal  -NE, STAGE 2  Primary Wound Type: Pressure inj  -NE       Wound 03/10/23 0939 Right anterior thigh    Wound - Properties Group Placement Date: 03/10/23  -HW Placement Time: 0939  -HW Present on Hospital Admission: Y  -HW Side: Right  -HW Orientation: anterior  -HW Location: thigh  -HW    Retired Wound - Properties Group Placement Date: 03/10/23  -HW Placement Time: 0939  -HW Present on Hospital Admission: Y  -HW Side: Right  -HW Orientation: anterior  -HW Location: thigh  -HW    Retired Wound - Properties Group Date first assessed: 03/10/23  -HW Time first assessed: 0939  -HW Present on Hospital Admission: Y  -HW Side: Right  -HW Location: thigh  -HW          User Key  (r) = Recorded By, (t) = Taken By, (c) = Cosigned By    Initials Name Provider Type    Dina Nieves, RN Registered Nurse    Jordan Rao RN Registered Nurse    Ignacia Villarreal RN  Registered Nurse                  Assessment & Plan      Brief Patient Summary:  Shaji Junior is a 77 y.o. male who was admitted with altered mental status.  He has suspected UTI.  His blood pressure is uncontrolled.  He is on IV Cardene drip.  Cardiology consulted.      amLODIPine, 10 mg, Oral, Q24H  atorvastatin, 20 mg, Oral, Nightly  carvedilol, 12.5 mg, Oral, BID With Meals  enoxaparin, 1 mg/kg, Subcutaneous, Q12H  escitalopram, 10 mg, Oral, Daily  finasteride, 5 mg, Oral, Daily  gabapentin, 300 mg, Oral, TID  hydrALAZINE, 50 mg, Oral, TID  insulin lispro, 2-7 Units, Subcutaneous, TID With Meals  piperacillin-tazobactam, 4.5 g, Intravenous, Q8H  sodium chloride, 10 mL, Intravenous, Q12H  tamsulosin, 0.4 mg, Oral, Daily       lactated ringers, 50 mL/hr, Last Rate: 50 mL/hr (03/26/23 0127)  niCARdipine, 5-15 mg/hr, Last Rate: 5 mg/hr (03/26/23 1624)  Pharmacy Consult,   Pharmacy to Dose enoxaparin (LOVENOX),   Pharmacy to Dose Zosyn,            Plan:   Plan:      #UTI    - US grossly positive for UTI    - hx of Klesiella and Proteus ESBL     - Zosyn, pharm to dose    - urine cultue    - blood cultures      #Acute Toxic Encephalopathy    - Suspect 2/2 UTI    - tx as above    - NPO     - CT head without acute mass, shift hemorrhage  -  -Follow-up cultures     -      Uncontrolled hypertension.    Patient on multiple antihypertensives.    Patient transferred to PCU.    On IV Cardene drip currently.    Cardiology eval     #A. Fib    - Hold home Eliquis, cover with therapeutic lovenox    - Resume home Coreg when can tolerate PO     #CKD    -Follow-up creatinine     #Anemia    -no active bleeding    -Follow-up hemoglobin        #HLD    -resume home statin     #BPH    -resume home flomax and finasteride    DVT prophylaxis:  Medical DVT prophylaxis orders are present.    CODE STATUS:    Code Status (Patient has no pulse and is not breathing): CPR (Attempt to Resuscitate)  Medical Interventions (Patient has pulse or is  breathing): Full Support            Electronically signed by Rafael Packer MD, 03/26/23, 17:42 EDT.  [facility] Hospitalist Team             Electronically signed by Rafael Packer MD at 03/26/23 1751          Consult Notes (last 48 hours)      Duarte Canales MD at 03/26/23 1919            Referring Provider: Rafael Packer MD  Reason for Consultation:  Atrial fibrillation  Altered mental status    Patient Care Team:  Naa Valverde MD as PCP - General (Internal Medicine)    Chief complaint  Altered mental status    Subjective .     History of present illness:  Shaji Junior is a 77 y.o. male who presents with multiple medical problems was admitted to the hospital with history of altered mental status.  Patient's history is mostly obtained from the chart.  Patient has atrial fibrillation and urinary tract infection..  No associated aggravating or alleviating factors.    ROS      The patient has been without any chest discomfort, shortness of breath, palpitations, dizziness or syncope.  Denies having any headache, abdominal pain, nausea, vomiting, diarrhea, constipation, loss of weight or loss of appetite.  Denies having any excessive bruising, hematuria or blood in the stool.    Review of all systems negative except as indicated      History  Past Medical History:   Diagnosis Date   • Acute kidney failure (HCC)    • Acute respiratory failure with hypoxia (HCC)    • Anemia    • Benign prostatic hyperplasia    • Bilateral primary osteoarthritis of knee    • Cardiomegaly    • Cardiomyopathy (HCC)    • Cellulitis and abscess of left leg    • Cerebral infarction (HCC)    • Cerebrovascular disease    • Chronic kidney disease    • Dementia (HCC)    • Diabetes mellitus (HCC)    • Essential hypertension    • GERD (gastroesophageal reflux disease)    • Gout    • Heart failure (HCC)    • Hyperlipidemia    • Morbid obesity (HCC)    • Myocardial infarction (HCC)    • Obstructive sleep apnea    • Obstructive uropathy    •  Paroxysmal atrial fibrillation (HCC)    • Peptic ulcer    • Peripheral vascular disease (HCC)    • Pulmonary edema    • Venous insufficiency        History reviewed. No pertinent surgical history.    Family History   Problem Relation Age of Onset   • Diabetes Father        Social History     Tobacco Use   • Smoking status: Never   Vaping Use   • Vaping Use: Never used   Substance Use Topics   • Alcohol use: Not Currently   • Drug use: Never        Medications Prior to Admission   Medication Sig Dispense Refill Last Dose   • acetaminophen (TYLENOL) 325 MG tablet Take 650 mg by mouth Every 4 (Four) Hours As Needed for Mild Pain .      • albuterol sulfate  (90 Base) MCG/ACT inhaler Inhale 2 puffs Every 4 (Four) Hours As Needed (Cough).      • apixaban (ELIQUIS) 5 MG tablet tablet Take 1 tablet by mouth Every 12 (Twelve) Hours. Indications: Atrial Fibrillation 60 tablet     • carvedilol (COREG) 12.5 MG tablet Take 1 tablet by mouth 2 (Two) Times a Day With Meals. 60 tablet 0    • docusate calcium (SURFAK) 240 MG capsule Take 240 mg by mouth Daily.      • escitalopram (LEXAPRO) 10 MG tablet Take 10 mg by mouth Daily.      • finasteride (PROSCAR) 5 MG tablet Take 5 mg by mouth Daily.      • furosemide (LASIX) 40 MG tablet Take 1 tablet by mouth 2 (Two) Times a Day. 60 tablet 0    • gabapentin (NEURONTIN) 300 MG capsule Take 300 mg by mouth 3 (Three) Times a Day.      • hydrALAZINE (APRESOLINE) 50 MG tablet Take 1 tablet by mouth 3 (Three) Times a Day. 90 tablet 0    • Insulin Glargine (BASAGLAR KWIKPEN) 100 UNIT/ML injection pen Inject 10 Units under the skin into the appropriate area as directed Every Night.      • isosorbide mononitrate (IMDUR) 60 MG 24 hr tablet Take 1 tablet by mouth 2 (Two) Times a Day. 60 tablet 0    • losartan (COZAAR) 25 MG tablet Take 1 tablet by mouth Daily. 30 tablet 0    • methocarbamol (ROBAXIN) 500 MG tablet Take 500 mg by mouth Every 12 (Twelve) Hours As Needed for Muscle Spasms.       • nitroglycerin (NITROSTAT) 0.4 MG SL tablet Place 0.4 mg under the tongue Every 5 (Five) Minutes As Needed for Chest Pain. Take no more than 3 doses in 15 minutes.      • polycarbophil (calcium polycarbophil) 625 MG tablet tablet Take 625 mg by mouth Daily.      • Polyethylene Glycol 3350 powder Take 17 g by mouth Every Night.      • saccharomyces boulardii (FLORASTOR) 250 MG capsule Take 250 mg by mouth 2 (Two) Times a Day.      • simethicone (MYLICON,GAS-X) 180 MG capsule Take 180 mg by mouth Every 6 (Six) Hours As Needed for Flatulence.      • simvastatin (ZOCOR) 40 MG tablet Take 40 mg by mouth every night at bedtime.      • tamsulosin (FLOMAX) 0.4 MG capsule 24 hr capsule Take 1 capsule by mouth Daily.      • traMADol (ULTRAM) 50 MG tablet Take 1 tablet by mouth Every 6 (Six) Hours As Needed for Moderate Pain.            Patient has no known allergies.    Scheduled Meds:amLODIPine, 10 mg, Oral, Q24H  atorvastatin, 20 mg, Oral, Nightly  carvedilol, 12.5 mg, Oral, BID With Meals  enoxaparin, 1 mg/kg, Subcutaneous, Q12H  escitalopram, 10 mg, Oral, Daily  finasteride, 5 mg, Oral, Daily  gabapentin, 300 mg, Oral, TID  hydrALAZINE, 50 mg, Oral, TID  insulin lispro, 2-7 Units, Subcutaneous, TID With Meals  piperacillin-tazobactam, 4.5 g, Intravenous, Q8H  sodium chloride, 10 mL, Intravenous, Q12H  tamsulosin, 0.4 mg, Oral, Daily      Continuous Infusions:lactated ringers, 50 mL/hr, Last Rate: 50 mL/hr (03/26/23 0127)  niCARdipine, 5-15 mg/hr, Last Rate: 15 mg/hr (03/26/23 8331)  Pharmacy Consult,   Pharmacy to Dose enoxaparin (LOVENOX),   Pharmacy to Dose Zosyn,       PRN Meds:.•  acetaminophen  •  albuterol  •  dextrose  •  dextrose  •  glucagon (human recombinant)  •  hydrALAZINE  •  nitroglycerin  •  ondansetron **OR** ondansetron  •  Pharmacy Consult  •  Pharmacy to Dose enoxaparin (LOVENOX)  •  Pharmacy to Dose Zosyn  •  [COMPLETED] Insert Peripheral IV **AND** sodium chloride  •  sodium chloride  •  sodium  "chloride    Objective     VITAL SIGNS  Vitals:    03/26/23 1816 03/26/23 1817 03/26/23 1824 03/26/23 1835   BP: (!) 185/101  154/100 160/87   BP Location:       Patient Position:       Pulse: 96 94 100 95   Resp:       Temp:       TempSrc:       SpO2: 96%  98%    Weight:       Height:           Flowsheet Rows    Flowsheet Row First Filed Value   Admission Height 177.8 cm (70\") Documented at 03/25/2023 2033   Admission Weight 142 kg (313 lb 11.4 oz) Documented at 03/25/2023 2033            Intake/Output Summary (Last 24 hours) at 3/26/2023 1919  Last data filed at 3/26/2023 0852  Gross per 24 hour   Intake 200 ml   Output 970 ml   Net -770 ml        TELEMETRY: Atrial fibrillation    Physical Exam:  The patient is alert, oriented and in no distress.  Vital signs as noted above.  Exogenous obesity.  Head and neck revealed no carotid bruits or jugular venous distention.  No thyromegaly or lymphadenopathy is present  Lungs clear.  No wheezing.  Breath sounds are normal bilaterally.  Heart normal first and second heart sounds. No murmur.  No precordial rub is present.  No gallop is present.  Abdomen soft and nontender.  No organomegaly is present.  Extremities with good peripheral pulses.  Chronic venous changes.  Skin warm and dry.  Musculoskeletal system is grossly normal  CNS grossly normal    Reviewed and updated.      Results Review:   I reviewed the patient's new clinical results.  Lab Results (last 24 hours)     Procedure Component Value Units Date/Time    POC Glucose Once [[id number]]  (Abnormal) Collected: 03/26/23 1806    Specimen: Blood Updated: 03/26/23 1807     Glucose 116 mg/dL      Comment: Serial Number: [id number]:  [id number]       POC Glucose Once [[id number]]  (Normal) Collected: 03/26/23 1113    Specimen: Blood Updated: 03/26/23 1115     Glucose 96 mg/dL      Comment: Serial Number: [id number]:  [id number]       Clostridioides difficile Toxin - Stool, Per Rectum [[id number]]  (Normal) " Collected: 03/26/23 0332    Specimen: Stool from Per Rectum Updated: 03/26/23 0710    Narrative:      The following orders were created for panel order Clostridioides difficile Toxin - Stool, Per Rectum.  Procedure                               Abnormality         Status                     ---------                               -----------         ------                     Clostridioides difficile...[[id number]]  Normal              Final result                 Please view results for these tests on the individual orders.    Clostridioides difficile EIA - Stool, Per Rectum [[id number]]  (Normal) Collected: 03/26/23 0332    Specimen: Stool from Per Rectum Updated: 03/26/23 0710     C Diff GDH Ag Negative     C.diff Toxin Ag Negative    Narrative:      The result indicates the absence of toxigenic C.difficile from stool specimen.    POC Glucose Once [[id number]]  (Normal) Collected: 03/26/23 0657    Specimen: Blood Updated: 03/26/23 0658     Glucose 104 mg/dL      Comment: Serial Number: [id number]:  [id number]       Basic Metabolic Panel [[id number]]  (Abnormal) Collected: 03/26/23 0431    Specimen: Blood Updated: 03/26/23 0500     Glucose 113 mg/dL      BUN 21 mg/dL      Creatinine 1.01 mg/dL      Sodium 139 mmol/L      Potassium 4.1 mmol/L      Comment: Slight hemolysis detected by analyzer. Results may be affected.        Chloride 102 mmol/L      CO2 29.0 mmol/L      Calcium 8.6 mg/dL      BUN/Creatinine Ratio 20.8     Anion Gap 8.0 mmol/L      eGFR 76.6 mL/min/1.73     Narrative:      GFR Normal >60  Chronic Kidney Disease <60  Kidney Failure <15    The GFR formula is only valid for adults with stable renal function between ages 18 and 70.    Gastrointestinal Panel, PCR - Stool, Per Rectum [[id number]]  (Normal) Collected: 03/26/23 0332    Specimen: Stool from Per Rectum Updated: 03/26/23 0451     Campylobacter Not Detected     Plesiomonas shigelloides Not Detected     Salmonella Not Detected     Vibrio Not  Detected     Vibrio cholerae Not Detected     Yersinia enterocolitica Not Detected     Enteroaggregative E. coli (EAEC) Not Detected     Enteropathogenic E. coli (EPEC) Not Detected     Enterotoxigenic E. coli (ETEC) lt/st Not Detected     Shiga-like toxin-producing E. coli (STEC) stx1/stx2 Not Detected     Shigella/Enteroinvasive E. coli (EIEC) Not Detected     Cryptosporidium Not Detected     Cyclospora cayetanensis Not Detected     Entamoeba histolytica Not Detected     Giardia lamblia Not Detected     Adenovirus F40/41 Not Detected     Astrovirus Not Detected     Norovirus GI/GII Not Detected     Rotavirus A Not Detected     Sapovirus (I, II, IV or V) Not Detected    Narrative:      If Aeromonas, Staphylococcus aureus or Bacillus cereus are suspected, please order [id number]: Stool Culture, Aeromonas, S aureus, B Cereus.    CBC (No Diff) [[id number]]  (Abnormal) Collected: 03/26/23 0431    Specimen: Blood Updated: 03/26/23 0440     WBC 5.70 10*3/mm3      RBC 4.35 10*6/mm3      Hemoglobin 11.0 g/dL      Hematocrit 34.9 %      MCV 80.2 fL      MCH 25.4 pg      MCHC 31.6 g/dL      RDW 21.2 %      RDW-SD 62.6 fl      MPV 8.3 fL      Platelets 197 10*3/mm3     [place] Draw [[id number]] Collected: 03/25/23 2111    Specimen: Blood Updated: 03/25/23 2215    Narrative:      The following orders were created for panel order [place] Draw.  Procedure                               Abnormality         Status                     ---------                               -----------         ------                     Green Top (Gel)[[id number]]                                  Final result               Lavender Top[[id number]]                                     Final result               Gold Top - SST[[id number]]                                   Final result               Light Blue Top[[id number]]                                   Final result                 Please view results for these tests on the individual orders.    Gold Top -  "SST [[id number]] Collected: 03/25/23 2111    Specimen: Blood Updated: 03/25/23 2215     Extra Tube Hold for add-ons.     Comment: Auto resulted.       Light Blue Top [[id number]] Collected: 03/25/23 2111    Specimen: Blood Updated: 03/25/23 2215     Extra Tube Hold for add-ons.     Comment: Auto resulted       Lavender Top [[id number]] Collected: 03/25/23 2111    Specimen: Blood Updated: 03/25/23 2215     Extra Tube hold for add-on     Comment: Auto resulted       Urinalysis, Microscopic Only - Indwelling Urethral Catheter [[id number]]  (Abnormal) Collected: 03/25/23 2128    Specimen: Urine from Indwelling Urethral Catheter Updated: 03/25/23 2208     RBC, UA 31-50 /HPF      WBC, UA Too Numerous to Count /HPF      Bacteria, UA 3+ /HPF      Squamous Epithelial Cells, UA 0-2 /HPF      Hyaline Casts, UA None Seen /LPF      Triple Phosphate Crystals, UA Moderate/2+ /HPF      Methodology Manual Light Microscopy    Green Top (Gel) [[id number]] Collected: 03/25/23 2111    Specimen: Blood Updated: 03/25/23 2203     Extra Tube HOLD    Procalcitonin [[id number]]  (Normal) Collected: 03/25/23 2111    Specimen: Blood Updated: 03/25/23 2157     Procalcitonin 0.07 ng/mL     Narrative:      As a Marker for Sepsis (Non-Neonates):    1. <0.5 ng/mL represents a low risk of severe sepsis and/or septic shock.  2. >2 ng/mL represents a high risk of severe sepsis and/or septic shock.    As a Marker for Lower Respiratory Tract Infections that require antibiotic therapy:    PCT on Admission    Antibiotic Therapy       6-12 Hrs later    >0.5                Strongly Recommended  >0.25 - <0.5        Recommended   0.1 - 0.25          Discouraged              Remeasure/reassess PCT  <0.1                Strongly Discouraged     Remeasure/reassess PCT    As 28 day mortality risk marker: \"Change in Procalcitonin Result\" (>80% or <=80%) if Day 0 (or Day 1) and Day 4 values are available. Refer to http://www.[employer]-pct-calculator.com    Change in PCT " <=80%  A decrease of PCT levels below or equal to 80% defines a positive change in PCT test result representing a higher risk for 28-day all-cause mortality of patients diagnosed with severe sepsis for septic shock.    Change in PCT >80%  A decrease of PCT levels of more than 80% defines a negative change in PCT result representing a lower risk for 28-day all-cause mortality of patients diagnosed with severe sepsis or septic shock.       Comprehensive Metabolic Panel [[id number]]  (Abnormal) Collected: 03/25/23 2111    Specimen: Blood Updated: 03/25/23 2155     Glucose 104 mg/dL      BUN 22 mg/dL      Creatinine 1.01 mg/dL      Sodium 136 mmol/L      Potassium 4.1 mmol/L      Chloride 100 mmol/L      CO2 28.0 mmol/L      Calcium 8.7 mg/dL      Total Protein 6.7 g/dL      Albumin 3.1 g/dL      ALT (SGPT) 9 U/L      AST (SGOT) 13 U/L      Alkaline Phosphatase 101 U/L      Total Bilirubin 0.4 mg/dL      Globulin 3.6 gm/dL      A/G Ratio 0.9 g/dL      BUN/Creatinine Ratio 21.8     Anion Gap 8.0 mmol/L      eGFR 76.6 mL/min/1.73     Narrative:      GFR Normal >60  Chronic Kidney Disease <60  Kidney Failure <15    The GFR formula is only valid for adults with stable renal function between ages 18 and 70.    Urine Culture - Urine, Indwelling Urethral Catheter [[id number]] Collected: 03/25/23 2128    Specimen: Urine from Indwelling Urethral Catheter Updated: 03/25/23 2148    Urinalysis With Microscopic If Indicated (No Culture) - Indwelling Urethral Catheter [[id number]]  (Abnormal) Collected: 03/25/23 2128    Specimen: Urine from Indwelling Urethral Catheter Updated: 03/25/23 2138     Color, UA Dark Yellow     Appearance, UA Cloudy     pH, UA 8.0     Specific Gravity, UA 1.015     Glucose, UA Negative     Ketones, UA Negative     Bilirubin, UA Negative     Blood, UA Large (3+)     Protein, UA >=300 mg/dL (3+)     Leuk Esterase, UA Large (3+)     Nitrite, UA Positive     Urobilinogen, UA 0.2 E.U./dL    Blood Gas, Arterial -  [[id number]]  (Abnormal) Collected: 03/25/23 2117    Specimen: Arterial Blood Updated: 03/25/23 2123     Site Right Brachial     Kraig's Test Negative     pH, Arterial 7.370 pH units      pCO2, Arterial 55.6 mm Hg      pO2, Arterial 27.7 mm Hg      HCO3, Arterial 32.1 mmol/L      Base Excess, Arterial 5.3 mmol/L      Comment: Serial Number: [id number]:  [id number]        O2 Saturation, Arterial 48.4 %      CO2 Content 33.8 mmol/L      Barometric Pressure for Blood Gas --     Comment: N/A        Modality Cannula     FIO2 28 %      Hemodilution No    CBC & Differential [[id number]]  (Abnormal) Collected: 03/25/23 2111    Specimen: Blood Updated: 03/25/23 2122    Narrative:      The following orders were created for panel order CBC & Differential.  Procedure                               Abnormality         Status                     ---------                               -----------         ------                     CBC Auto Differential[[id number]]        Abnormal            Final result                 Please view results for these tests on the individual orders.    CBC Auto Differential [[id number]]  (Abnormal) Collected: 03/25/23 2111    Specimen: Blood Updated: 03/25/23 2122     WBC 4.70 10*3/mm3      RBC 4.66 10*6/mm3      Hemoglobin 11.7 g/dL      Hematocrit 38.0 %      MCV 81.4 fL      MCH 25.0 pg      MCHC 30.7 g/dL      RDW 20.7 %      RDW-SD 59.1 fl      MPV 8.2 fL      Platelets 209 10*3/mm3      Neutrophil % 61.1 %      Lymphocyte % 24.3 %      Monocyte % 9.6 %      Eosinophil % 4.7 %      Basophil % 0.3 %      Neutrophils, Absolute 2.90 10*3/mm3      Lymphocytes, Absolute 1.10 10*3/mm3      Monocytes, Absolute 0.40 10*3/mm3      Eosinophils, Absolute 0.20 10*3/mm3      Basophils, Absolute 0.00 10*3/mm3      nRBC 0.0 /100 WBC     Blood Culture - Blood, Arm, Right [[id number]] Collected: 03/25/23 2111    Specimen: Blood from Arm, Right Updated: 03/25/23 2119    Blood Culture - Blood, Arm, Right [[id number]]  Collected: 03/25/23 2111    Specimen: Blood from Arm, Right Updated: 03/25/23 2119    POC Lactate [[id number]]  (Normal) Collected: 03/25/23 2115    Specimen: Blood Updated: 03/25/23 2116     Lactate 1.0 mmol/L      Comment: Serial Number: [id number]:  [id number]             Imaging Results (Last 24 Hours)     Procedure Component Value Units Date/Time    XR Chest 1 View [[id number]] Collected: 03/26/23 0719     Updated: 03/26/23 0722    Narrative:      XR CHEST 1 VW    Date of Exam: 3/25/2023 9:17 PM EDT    Indication: Altered mental status.    Comparison: 2/15/2023    Findings:  Marked cardiomegaly. Mediastinal contours within normal limits. There is pulmonary vascular congestion and interstitial pulmonary edema, overall similar compared to the prior exam.      Impression:      Impression:    1. Unchanged findings of mild to moderate CHF.    Electronically Signed: Luisito Hernández    3/26/2023 7:20 AM EDT    Workstation ID: [id number]    CT Head Without Contrast [[id number]] Collected: 03/25/23 2201     Updated: 03/26/23 0004    Narrative:      EXAMINATION: CT HEAD WITHOUT CONTRAST    DATE: 3/25/2023 11:27 PM     INDICATION: AMS; TAKES ELIQUIS;     COMPARISON: 2/15/2023.     TECHNIQUE:  Routine axial images through the head without contrast. Coronal reformations.    Low-dose CT acquisition technique included one or more of the following options: 1. Automated exposure control, 2. Adjustment of mA and/or KV according to patient's size and/or 3. Use of iterative reconstruction.    FINDINGS:      Intracranial contents:    Ventricular and cisternal spaces are prominent from volume loss. Moderate to severe periventricular and subcortical white matter hypodensities. Hypodensities in the basal ganglia, likely from old lacunar type infarcts. No dominant mass, midline shift,   hydrocephalus, extra axial fluid collection or acute hemorrhage.  No asymmetric hyperdensity of the proximal major cerebral arteries.    Craniocervical  junction is patent.    Bones and extracranial soft tissues:    Mild mucosal thickening ethmoid air cells and maxillary sinuses. Retention cyst or polyp inferior right maxillary sinus. Otherwise, Visualized paranasal sinuses and mastoid air cells are clear.  Skull is intact. Visualized intraorbital contents are   unremarkable. Parietal scalp soft tissue swelling. Calcifications distal internal carotid arteries and distal vertebral arteries.      Impression:      1. No acute intracranial process. Parietal scalp soft tissue swelling MRI is more sensitive for the detection of acute nonhemorrhagic infarct.  2. Moderate to severe changes small vessel ischemic disease of indeterminate age, presumably mostly chronic. Volume loss. Atherosclerosis.  3. Paranasal sinus disease.       Electronically signed by:  Baldev Pereira M.D.    3/25/2023 10:03 PM Mountain Time      LAB RESULTS (LAST 7 DAYS)    CBC  Results from last 7 days   Lab Units 03/26/23  0431 03/25/23 2111   WBC 10*3/mm3 5.70 4.70   RBC 10*6/mm3 4.35 4.66   HEMOGLOBIN g/dL 11.0* 11.7*   HEMATOCRIT % 34.9* 38.0   MCV fL 80.2 81.4   PLATELETS 10*3/mm3 197 209       BMP  Results from last 7 days   Lab Units 03/26/23  0431 03/25/23  2111   SODIUM mmol/L 139 136   POTASSIUM mmol/L 4.1 4.1   CHLORIDE mmol/L 102 100   CO2 mmol/L 29.0 28.0   BUN mg/dL 21 22   CREATININE mg/dL 1.01 1.01   GLUCOSE mg/dL 113* 104*       CMP   Results from last 7 days   Lab Units 03/26/23  0431 03/25/23  2111   SODIUM mmol/L 139 136   POTASSIUM mmol/L 4.1 4.1   CHLORIDE mmol/L 102 100   CO2 mmol/L 29.0 28.0   BUN mg/dL 21 22   CREATININE mg/dL 1.01 1.01   GLUCOSE mg/dL 113* 104*   ALBUMIN g/dL  --  3.1*   BILIRUBIN mg/dL  --  0.4   ALK PHOS U/L  --  101   AST (SGOT) U/L  --  13   ALT (SGPT) U/L  --  9         BNP        TROPONIN        CoAg        Creatinine Clearance  Estimated Creatinine Clearance: 86.1 mL/min (by C-G formula based on SCr of 1.01 mg/dL).    ABG  Results from last 7  days   Lab Units 03/25/23 2117   PH, ARTERIAL pH units 7.370   PCO2, ARTERIAL mm Hg 55.6*   PO2 ART mm Hg 27.7*   O2 SATURATION ART % 48.4*   BASE EXCESS ART mmol/L 5.3*       Radiology  CT Head Without Contrast    Result Date: 3/26/2023  1. No acute intracranial process. Parietal scalp soft tissue swelling MRI is more sensitive for the detection of acute nonhemorrhagic infarct. 2. Moderate to severe changes small vessel ischemic disease of indeterminate age, presumably mostly chronic. Volume loss. Atherosclerosis. 3. Paranasal sinus disease.  Electronically signed by:  Baldev Pereira M.D.  3/25/2023 10:03 PM Mountain Time    XR Chest 1 View    Result Date: 3/26/2023  Impression: 1. Unchanged findings of mild to moderate CHF. Electronically Signed: Luisito Hernández  3/26/2023 7:20 AM EDT  Workstation ID: [id number]        EKG            I personally viewed and interpreted the patient's EKG/Telemetry data: Atrial fibrillation    ECHOCARDIOGRAM:    Results for orders placed during the hospital encounter of 02/15/23    Adult Transthoracic Echo Complete W/ Cont if Necessary Per Protocol    Interpretation Summary  •  Left ventricular systolic function is normal. Left ventricular ejection fraction appears to be 61 - 65%.  •  Left ventricular wall thickness is consistent with borderline concentric hypertrophy.  •  Left ventricular diastolic function was normal.  •  Estimated right ventricular systolic pressure from tricuspid regurgitation is mildly elevated (35-45 mmHg). Calculated right ventricular systolic pressure from tricuspid regurgitation is 40 mmHg.              Cardiolite (Tc-99m sestamibi) stress test      OTHER:     Assessment & Plan     Principal Problem:    UTI (urinary tract infection)    ]]]]]]]]]]]]]]]]]]]]]]]  Impression  ============  -Atrial fibrillation    - Altered mental status  Acute toxic encephalopathy    - Diabetes hypertension obstructive sleep apnea    - Anticoagulation-patient was on  Eliquis.  Patient is on Lovenox and Eliquis is on hold.    - CKD 3    - Exogenous obesity    Patient had normal left ventricular function with ejection fraction of 60 to 65%.  EKG showed atrial fibrillation  Close cardiac monitoring.  Conservative cardiac treatment at this time.  Dr. Vargas primary cardiologist will see the patient tomorrow.        Duarte Canales MD  03/26/23  19:19 EDT                      Electronically signed by Duarte Canales MD at 03/26/23 3531

## 2023-03-27 NOTE — PLAN OF CARE
Pt seen at bedside and in upright 90 degree position. Pt confused, disoriented, and verbalized pain when ST entered the room, symptoms remained constant throughout evaluation. Trials consisted of x1 ice chip, x5 water via straw, and x5 of applesauce via spoon. Due to confusion, did not attempt further trials and consistencies. No s/s of aspiration. Recommend thin liquid and puree at this time, educated Pt on results and further goals for diet modification and Pt verbalized agreement and understanding.    Recommendations:  - Puree and thin liquid diet  - Upright for all PO   - Full feeds  - ST will continue to follow for further recommendations as indicated    Patient was not wearing a face mask during this therapy encounter. Therapist used appropriate personal protective equipment including gown, mask and gloves.  Mask used was standard procedure mask. Appropriate PPE was worn during the entire therapy session. Hand hygiene was completed before and after therapy session. Patient is in enhanced droplet precautions.

## 2023-03-27 NOTE — NURSING NOTE
Patients lab troponin was 73. I called Dr. Canales at 0056 to inform him of the elevated troponin and also told him that the patient had been having chest pain and that topical Nitro was applied, an EKG was done. and that the patient was in Afib with PVCs. I also let him know that  I had called CHARMAINE Kovacs. Dr. Canales stated that the patients troponin has been elevated. I checked the history of the last troponin which was back in February. Dr. Canales did not give me any new orders in regards to the elevated troponin. Will continue to monitor.

## 2023-03-27 NOTE — NURSING NOTE
Patient seemed to be in pain, I asked if he was having chest pain and he said yes. Patient was in Afib with multifocal PVCs.  I tried to give sublingual nitro, but the patient was not opening his mouth. I spoke with CHARMAINE Kovacs and told him the above information. CHARMAINE Kovacs ordered topical nitroglycerin, which I applied to the patients chest. He ordered a stat troponin. Will continue to monitor

## 2023-03-28 LAB
ANION GAP SERPL CALCULATED.3IONS-SCNC: 11 MMOL/L (ref 5–15)
BACTERIA SPEC AEROBE CULT: ABNORMAL
BH CV ECHO MEAS - ACS: 1.77 CM
BH CV ECHO MEAS - AI P1/2T: 1667 MSEC
BH CV ECHO MEAS - AO MAX PG: 10 MMHG
BH CV ECHO MEAS - AO MEAN PG: 6.3 MMHG
BH CV ECHO MEAS - AO ROOT DIAM: 3.7 CM
BH CV ECHO MEAS - AO V2 MAX: 158.2 CM/SEC
BH CV ECHO MEAS - AO V2 VTI: 31.3 CM
BH CV ECHO MEAS - AVA(I,D): 2.03 CM2
BH CV ECHO MEAS - EDV(CUBED): 181.2 ML
BH CV ECHO MEAS - EDV(MOD-SP2): 99.1 ML
BH CV ECHO MEAS - EDV(MOD-SP4): 89.6 ML
BH CV ECHO MEAS - EF(MOD-BP): 60 %
BH CV ECHO MEAS - EF(MOD-SP2): 66.7 %
BH CV ECHO MEAS - EF(MOD-SP4): 49 %
BH CV ECHO MEAS - ESV(MOD-SP2): 33 ML
BH CV ECHO MEAS - ESV(MOD-SP4): 45.8 ML
BH CV ECHO MEAS - FS: 34.7 %
BH CV ECHO MEAS - IVS/LVPW: 0.99 CM
BH CV ECHO MEAS - IVSD: 1.22 CM
BH CV ECHO MEAS - LA DIMENSION: 4.8 CM
BH CV ECHO MEAS - LV DIASTOLIC VOL/BSA (35-75): 35.8 CM2
BH CV ECHO MEAS - LV MASS(C)D: 292.3 GRAMS
BH CV ECHO MEAS - LV MAX PG: 3.4 MMHG
BH CV ECHO MEAS - LV MEAN PG: 1.96 MMHG
BH CV ECHO MEAS - LV SYSTOLIC VOL/BSA (12-30): 18.3 CM2
BH CV ECHO MEAS - LV V1 MAX: 92 CM/SEC
BH CV ECHO MEAS - LV V1 VTI: 18.9 CM
BH CV ECHO MEAS - LVIDD: 5.7 CM
BH CV ECHO MEAS - LVIDS: 3.7 CM
BH CV ECHO MEAS - LVOT AREA: 3.4 CM2
BH CV ECHO MEAS - LVOT DIAM: 2.07 CM
BH CV ECHO MEAS - LVPWD: 1.23 CM
BH CV ECHO MEAS - MR MAX PG: 107.3 MMHG
BH CV ECHO MEAS - MR MAX VEL: 517.9 CM/SEC
BH CV ECHO MEAS - MV DEC TIME: 0.1 MSEC
BH CV ECHO MEAS - MV E MAX VEL: 108.5 CM/SEC
BH CV ECHO MEAS - MV MAX PG: 4.6 MMHG
BH CV ECHO MEAS - MV MEAN PG: 1.61 MMHG
BH CV ECHO MEAS - MV V2 VTI: 13.6 CM
BH CV ECHO MEAS - MVA(VTI): 4.7 CM2
BH CV ECHO MEAS - PA V2 MAX: 80.6 CM/SEC
BH CV ECHO MEAS - RAP SYSTOLE: 3 MMHG
BH CV ECHO MEAS - RVSP: 49.3 MMHG
BH CV ECHO MEAS - SI(MOD-SP2): 26.4 ML/M2
BH CV ECHO MEAS - SI(MOD-SP4): 17.5 ML/M2
BH CV ECHO MEAS - SV(LVOT): 63.7 ML
BH CV ECHO MEAS - SV(MOD-SP2): 66.1 ML
BH CV ECHO MEAS - SV(MOD-SP4): 43.9 ML
BH CV ECHO MEAS - TR MAX PG: 46.3 MMHG
BH CV ECHO MEAS - TR MAX VEL: 340.1 CM/SEC
BUN SERPL-MCNC: 10 MG/DL (ref 8–23)
BUN/CREAT SERPL: 12 (ref 7–25)
CALCIUM SPEC-SCNC: 9 MG/DL (ref 8.6–10.5)
CHLORIDE SERPL-SCNC: 103 MMOL/L (ref 98–107)
CO2 SERPL-SCNC: 29 MMOL/L (ref 22–29)
CREAT SERPL-MCNC: 0.83 MG/DL (ref 0.76–1.27)
DEPRECATED RDW RBC AUTO: 62.1 FL (ref 37–54)
EGFRCR SERPLBLD CKD-EPI 2021: 90.1 ML/MIN/1.73
ERYTHROCYTE [DISTWIDTH] IN BLOOD BY AUTOMATED COUNT: 20.8 % (ref 12.3–15.4)
GLUCOSE BLDC GLUCOMTR-MCNC: 103 MG/DL (ref 70–105)
GLUCOSE BLDC GLUCOMTR-MCNC: 125 MG/DL (ref 70–105)
GLUCOSE BLDC GLUCOMTR-MCNC: 141 MG/DL (ref 70–105)
GLUCOSE SERPL-MCNC: 111 MG/DL (ref 65–99)
GRAM STN SPEC: ABNORMAL
HCT VFR BLD AUTO: 36.9 % (ref 37.5–51)
HGB BLD-MCNC: 11.7 G/DL (ref 13–17.7)
ISOLATED FROM: ABNORMAL
MAXIMAL PREDICTED HEART RATE: 143 BPM
MCH RBC QN AUTO: 25.6 PG (ref 26.6–33)
MCHC RBC AUTO-ENTMCNC: 31.6 G/DL (ref 31.5–35.7)
MCV RBC AUTO: 80.9 FL (ref 79–97)
PLATELET # BLD AUTO: 215 10*3/MM3 (ref 140–450)
PMV BLD AUTO: 8.1 FL (ref 6–12)
POTASSIUM SERPL-SCNC: 3.4 MMOL/L (ref 3.5–5.2)
QT INTERVAL: 388 MS
RBC # BLD AUTO: 4.56 10*6/MM3 (ref 4.14–5.8)
SODIUM SERPL-SCNC: 143 MMOL/L (ref 136–145)
STRESS TARGET HR: 122 BPM
WBC NRBC COR # BLD: 3.4 10*3/MM3 (ref 3.4–10.8)

## 2023-03-28 PROCEDURE — 87102 FUNGUS ISOLATION CULTURE: CPT | Performed by: HOSPITALIST

## 2023-03-28 PROCEDURE — 25010000002 PIPERACILLIN SOD-TAZOBACTAM PER 1 G: Performed by: STUDENT IN AN ORGANIZED HEALTH CARE EDUCATION/TRAINING PROGRAM

## 2023-03-28 PROCEDURE — 85027 COMPLETE CBC AUTOMATED: CPT | Performed by: STUDENT IN AN ORGANIZED HEALTH CARE EDUCATION/TRAINING PROGRAM

## 2023-03-28 PROCEDURE — 36415 COLL VENOUS BLD VENIPUNCTURE: CPT | Performed by: STUDENT IN AN ORGANIZED HEALTH CARE EDUCATION/TRAINING PROGRAM

## 2023-03-28 PROCEDURE — 80048 BASIC METABOLIC PNL TOTAL CA: CPT | Performed by: STUDENT IN AN ORGANIZED HEALTH CARE EDUCATION/TRAINING PROGRAM

## 2023-03-28 PROCEDURE — 25010000002 ENOXAPARIN PER 10 MG: Performed by: STUDENT IN AN ORGANIZED HEALTH CARE EDUCATION/TRAINING PROGRAM

## 2023-03-28 PROCEDURE — 25010000002 HYDRALAZINE PER 20 MG: Performed by: STUDENT IN AN ORGANIZED HEALTH CARE EDUCATION/TRAINING PROGRAM

## 2023-03-28 PROCEDURE — 82962 GLUCOSE BLOOD TEST: CPT

## 2023-03-28 PROCEDURE — 92526 ORAL FUNCTION THERAPY: CPT

## 2023-03-28 RX ORDER — CLONIDINE HYDROCHLORIDE 0.1 MG/1
0.1 TABLET ORAL EVERY 12 HOURS SCHEDULED
Status: DISCONTINUED | OUTPATIENT
Start: 2023-03-28 | End: 2023-03-30

## 2023-03-28 RX ADMIN — HYDRALAZINE HYDROCHLORIDE 50 MG: 25 TABLET, FILM COATED ORAL at 09:42

## 2023-03-28 RX ADMIN — CLONIDINE HYDROCHLORIDE 0.1 MG: 0.1 TABLET ORAL at 09:43

## 2023-03-28 RX ADMIN — HYDRALAZINE HYDROCHLORIDE 5 MG: 20 INJECTION INTRAMUSCULAR; INTRAVENOUS at 04:54

## 2023-03-28 RX ADMIN — Medication 10 ML: at 20:24

## 2023-03-28 RX ADMIN — CLONIDINE HYDROCHLORIDE 0.1 MG: 0.1 TABLET ORAL at 20:24

## 2023-03-28 RX ADMIN — AMLODIPINE BESYLATE 10 MG: 5 TABLET ORAL at 09:39

## 2023-03-28 RX ADMIN — HYDRALAZINE HYDROCHLORIDE 50 MG: 25 TABLET, FILM COATED ORAL at 20:23

## 2023-03-28 RX ADMIN — ENOXAPARIN SODIUM 135 MG: 150 INJECTION SUBCUTANEOUS at 03:54

## 2023-03-28 RX ADMIN — CARVEDILOL 12.5 MG: 6.25 TABLET, FILM COATED ORAL at 09:39

## 2023-03-28 RX ADMIN — GABAPENTIN 300 MG: 300 CAPSULE ORAL at 18:01

## 2023-03-28 RX ADMIN — NITROGLYCERIN 1 INCH: 20 OINTMENT TOPICAL at 03:54

## 2023-03-28 RX ADMIN — PIPERACILLIN AND TAZOBACTAM 4.5 G: 4; .5 INJECTION, POWDER, FOR SOLUTION INTRAVENOUS at 19:51

## 2023-03-28 RX ADMIN — ATORVASTATIN CALCIUM 20 MG: 20 TABLET, FILM COATED ORAL at 20:24

## 2023-03-28 RX ADMIN — HYDRALAZINE HYDROCHLORIDE 50 MG: 25 TABLET, FILM COATED ORAL at 18:01

## 2023-03-28 RX ADMIN — NITROGLYCERIN 1 INCH: 20 OINTMENT TOPICAL at 12:10

## 2023-03-28 RX ADMIN — ESCITALOPRAM OXALATE 10 MG: 10 TABLET ORAL at 09:39

## 2023-03-28 RX ADMIN — GABAPENTIN 300 MG: 300 CAPSULE ORAL at 09:39

## 2023-03-28 RX ADMIN — Medication 10 ML: at 09:46

## 2023-03-28 RX ADMIN — GABAPENTIN 300 MG: 300 CAPSULE ORAL at 20:23

## 2023-03-28 RX ADMIN — NITROGLYCERIN 1 INCH: 20 OINTMENT TOPICAL at 18:01

## 2023-03-28 RX ADMIN — PIPERACILLIN AND TAZOBACTAM 4.5 G: 4; .5 INJECTION, POWDER, FOR SOLUTION INTRAVENOUS at 12:13

## 2023-03-28 RX ADMIN — FINASTERIDE 5 MG: 5 TABLET, FILM COATED ORAL at 09:39

## 2023-03-28 RX ADMIN — CARVEDILOL 12.5 MG: 6.25 TABLET, FILM COATED ORAL at 18:01

## 2023-03-28 RX ADMIN — SODIUM CHLORIDE, POTASSIUM CHLORIDE, SODIUM LACTATE AND CALCIUM CHLORIDE 50 ML/HR: 600; 310; 30; 20 INJECTION, SOLUTION INTRAVENOUS at 15:02

## 2023-03-28 RX ADMIN — PIPERACILLIN AND TAZOBACTAM 4.5 G: 4; .5 INJECTION, POWDER, FOR SOLUTION INTRAVENOUS at 03:54

## 2023-03-28 RX ADMIN — TAMSULOSIN HYDROCHLORIDE 0.4 MG: 0.4 CAPSULE ORAL at 09:39

## 2023-03-28 RX ADMIN — ENOXAPARIN SODIUM 135 MG: 150 INJECTION SUBCUTANEOUS at 18:01

## 2023-03-28 NOTE — THERAPY RE-EVALUATION
Acute Care - Speech Language Pathology   Swallow Re-Evaluation  [place]     Patient Name: Shaji Junior  : 1945  MRN: [id number]  Today's Date: 3/28/2023               Admit Date: 3/25/2023  Patient was not wearing a face mask during this therapy encounter. Therapist used appropriate personal protective equipment including mask, eye protection and gloves.  Mask used was standard procedure mask. Appropriate PPE was worn during the entire therapy session. Hand hygiene was completed before and after therapy session. Patient is not in enhanced droplet precautions.     Visit Dx:     ICD-10-CM ICD-9-CM   1. Altered mental status, unspecified altered mental status type  R41.82 780.97   2. Fever, unspecified fever cause  R50.9 780.60   3. 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[facility])   • Acute kidney failure ([facility])   • Congestive heart failure ([facility])   • Acute on chronic congestive heart failure, unspecified heart failure type ([facility])   • MAKI (acute kidney injury) ([facility])   • Atrial fibrillation ([facility])   • Chest pain   • Shortness of breath   • Chest pain, unspecified type   • Essential hypertension   • Acute UTI (urinary tract infection)   • Normocytic normochromic anemia   • CKD (chronic kidney disease), stage III ([facility])   • BPH without obstruction/lower urinary tract symptoms   • Altered mental status, unspecified altered mental status type   • Acute on chronic diastolic CHF (congestive heart failure) ([facility])   • Hypertensive urgency   • Elevated troponin   • Acute UTI (urinary tract infection)   • Morbid obesity ([facility])   • Dementia ([facility])   • UTI (urinary tract infection)     Past Medical History:   Diagnosis Date   • Acute kidney failure ([facility])    • Acute respiratory failure with  hypoxia ([facility])    • Anemia    • Benign prostatic hyperplasia    • Bilateral primary osteoarthritis of knee    • Cardiomegaly    • Cardiomyopathy ([facility])    • Cellulitis and abscess of left leg    • Cerebral infarction ([facility]CC)    • Cerebrovascular disease    • Chronic kidney disease    • Dementia ([facility]CC)    • Diabetes mellitus ([facility]CC)    • Essential hypertension    • GERD (gastroesophageal reflux disease)    • Gout    • Heart failure (HCC)    • Hyperlipidemia    • Morbid obesity ([facility]CC)    • Myocardial infarction ([facility]CC)    • Obstructive sleep apnea    • Obstructive uropathy    • Paroxysmal atrial fibrillation ([facility]CC)    • Peptic ulcer    • Peripheral vascular disease ([facility]CC)    • Pulmonary edema    • Venous insufficiency      History reviewed. No pertinent surgical history.       SWALLOW EVALUATION (last 72 hours)     SLP Adult Swallow Evaluation     Row Name 03/28/23 1500          Document Type re-evaluation  -CP    Subjective Information no complaints  -CP    Patient Observations alert;cooperative;agree to therapy  -CP    Patient Effort good  -CP    Comment Pt was seen for re-eval of swallow for current diet tolerance and possible diet upgrade. Pt was seen at lunch meal. He was much more alert today and able to follow commands and answer questions. Pt was brought upright in bed and fed by clinician but was also feeding himself after set-up. Pt was seen consuming puree meat, green beans, mashed potatoes, drinking soda by straw and was given trials of a Fig Ruiz for possible diet upgrade. Pt had timely oral transit of the puree food and no difficulty pulling liqudi from the straw. Mastication was slow but functional on the Fig ruiz. Pt had no pocketing or oral residual. Digital palpation suggests timely swallow. After the swallow, pt had clear vocal quality and no overt s/s of aspiration on any trial assessed. Pt appears to tolerate soft solids with slow but functional mastication. Pt continues to tolerate current diet rec of  puree and thin liquids. It is rec that pt's diet be upgraded to mechanical soft, continue thin liquids. Rationale for diet upgrade was explained to pt. he verbalized understanding. ST will follow to assure tolerance of diet and make further recs as indicated.  -CP          User Key  (r) = Recorded By, (t) = Taken By, (c) = Cosigned By    Initials Name Effective Dates    CP Bhavana Chou, SLP 06/16/21 -     LF Meryl Burton, SLP 06/16/21 -     AB Genoveva Cristobal, Speech Therapy Student 01/10/23 -                 EDUCATION  The patient has been educated in the following areas:   Dysphagia (Swallowing Impairment).        SLP GOALS     Row Name 03/28/23 1500 03/27/23 1000          (LTG) Swallow    (LTG) Swallow Pt will demonstrate acceptance of least restrictive diet with no s/s of aspiration and use of safe swallow strategies  -CP Pt will demonstrate acceptance of least restrictive diet with no s/s of aspiration and use of safe swallow strategies  -LF (r) AB (t) LF (c)     Burt (Swallow Long Term Goal) independently (over 90% accuracy)  -CP independently (over 90% accuracy)  -LF (r) AB (t) LF (c)     Time Frame (Swallow Long Term Goal) by discharge  -CP by discharge  -LF (r) AB (t) LF (c)     Progress/Outcomes (Swallow Long Term Goal) good progress toward goal  -CP new goal  -LF (r) AB (t) LF (c)     Comment (Swallow Long Term Goal) See abopve note, diet upgraded to mech soft  -CP --        (STG) Swallow 1    (STG) Swallow 1 Pt will participate in re-assessment via bedside swallow evaluation  -CP Pt will participate in re-assessment via bedside swallow evaluation  -LF (r) AB (t) LF (c)     Burt (Swallow Short Term Goal 1) with moderate cues (50-74% accuracy)  -CP with moderate cues (50-74% accuracy)  -LF (r) AB (t) LF (c)     Time Frame (Swallow Short Term Goal 1) 1 week  -CP 1 week  -LF (r) AB (t) LF (c)     Progress/Outcomes (Swallow Short Term Goal 1) good progress toward goal  -CP new goal   -LF (r) AB (t) LF (c)     Comment (Swallow Short Term Goal 1) See above re-eval  -CP --        (STG) Swallow 2    (STG) Swallow 2 Pt will participate in ongoing swallow assessment to include VFSS if indicated, and caregiver teaching.  -CP Further goals to be added following hospital course at [facility]  -LF (r) AB (t) LF (c)     Time Frame (Swallow Short Term Goal 2) 1 week  -CP 1 week  -LF (r) AB (t) LF (c)     Progress/Outcomes (Swallow Short Term Goal 2) goal ongoing  -CP new goal  -LF (r) AB (t) LF (c)     Comment (Swallow Short Term Goal 2) See above  -CP --           User Key  (r) = Recorded By, (t) = Taken By, (c) = Cosigned By    Initials Name Provider Type    CP Bhavana Chou, SLP Speech and Language Pathologist    Meryl Gustafson, SLP Speech and Language Pathologist    AB Genoveva Cristobal, Speech Therapy Student SLP Student                   Time Calculation:                TIMOTEO Mtz  3/28/2023

## 2023-03-28 NOTE — CONSULTS
Nutrition Services    Patient Name: Shaji Junior  YOB: 1945  MRN: [id number]  Admission date: 3/25/2023    Comment:    Addition of Magic Cups at lunch/dinner (Provides 580 kcals, 18 g protein if consumed).    PPE Documentation        PPE Worn By Provider mask and eye protection   PPE Worn By Patient  N/A     CLINICAL NUTRITION ASSESSMENT      Reason for Assessment 3/28: MST: 2     H&P      Past Medical History:   Diagnosis Date   • Acute kidney failure (HCC)    • Acute respiratory failure with hypoxia (HCC)    • Anemia    • Benign prostatic hyperplasia    • Bilateral primary osteoarthritis of knee    • Cardiomegaly    • Cardiomyopathy (HCC)    • Cellulitis and abscess of left leg    • Cerebral infarction (HCC)    • Cerebrovascular disease    • Chronic kidney disease    • Dementia (HCC)    • Diabetes mellitus (HCC)    • Essential hypertension    • GERD (gastroesophageal reflux disease)    • Gout    • Heart failure (HCC)    • Hyperlipidemia    • Morbid obesity (HCC)    • Myocardial infarction (HCC)    • Obstructive sleep apnea    • Obstructive uropathy    • Paroxysmal atrial fibrillation (HCC)    • Peptic ulcer    • Peripheral vascular disease (HCC)    • Pulmonary edema    • Venous insufficiency        History reviewed. No pertinent surgical history.     Current Problems   UTI  -abx    AMS  -suspect r/t UTI    Dysphagia  -SLP following       Encounter Information        Trending Narrative     3/28: Pt admitted to [facility] with AMS likely secondary to UTI. WOCN saw pt on 3/27 and noted blisters but no pressure injuries. SLP following pt and recommending pureed textures. RD mainly spoke with pt's brother as pt had difficulty participating in conversation with confusion. Pt's brother unaware of recent wt changes. Reasoning behind puree diet was explained and RD noted SLP would continue to follow pt. Pt did mention liking ice cream, RD will add magic cups to aid in wound healing. RD helped pt order lunch meal.  "NFPE completed and not consistent with nutrition diagnosis of malnutrition at this time using AND/ASPEN criteria.     Anthropometrics        Current Height, Weight Height: 177.8 cm (70\")  Weight: (!) 139 kg (307 lb) (03/27/23 1642)       Ideal Body Weight (IBW) 166#   Usual Body Weight (UBW) unknown       Trending Weight Hx     This admission: 3/28: 307# - scale             PTA: 4% wt loss x ~6 months    Wt Readings from Last 30 Encounters:   03/27/23 1642 (!) 139 kg (307 lb)   03/26/23 0345 (!) 139 kg (307 lb 8 oz)   03/25/23 2033 (!) 142 kg (313 lb 11.4 oz)   03/10/23 0634 (!) 141 kg (310 lb)   02/17/23 1016 136 kg (300 lb)   02/17/23 0306 (!) 136 kg (300 lb 11.3 oz)   02/15/23 2215 (!) 138 kg (303 lb 5.7 oz)   02/15/23 1417 (!) 149 kg (328 lb)   09/18/22 0324 (!) 149 kg (328 lb 7.8 oz)   09/17/22 0625 (!) 148 kg (326 lb 4.5 oz)   09/16/22 1913 (!) 145 kg (320 lb 1.7 oz)   09/16/22 0213 (!) 144 kg (318 lb)   08/16/22 0438 (!) 145 kg (318 lb 12.6 oz)   08/14/22 0828 136 kg (299 lb)   08/13/22 2308 (!) 145 kg (320 lb)   04/27/22 0145 (!) 145 kg (320 lb)   04/03/22 0500 (!) 162 kg (357 lb 2.3 oz)   04/02/22 0500 (!) 162 kg (357 lb 12.9 oz)   03/31/22 1645 123 kg (270 lb 1 oz)   03/31/22 1419 (!) 152 kg (335 lb)   03/29/22 0952 (!) 152 kg (335 lb)   03/29/22 0425 (!) 152 kg (335 lb 4.8 oz)   03/29/22 0100 118 kg (260 lb)   03/11/22 2154 118 kg (260 lb)      BMI kg/m2 Body mass index is 44.05 kg/m².       Labs        Pertinent Labs Reviewed, management per attending.   Results from last 7 days   Lab Units 03/28/23  0759 03/26/23  2346 03/26/23  0431 03/25/23  2111   SODIUM mmol/L 143 141 139 136   POTASSIUM mmol/L 3.4* 3.7 4.1 4.1   CHLORIDE mmol/L 103 103 102 100   CO2 mmol/L 29.0 26.0 29.0 28.0   BUN mg/dL 10 17 21 22   CREATININE mg/dL 0.83 0.86 1.01 1.01   CALCIUM mg/dL 9.0 8.8 8.6 8.7   BILIRUBIN mg/dL  --   --   --  0.4   ALK PHOS U/L  --   --   --  101   ALT (SGPT) U/L  --   --   --  9   AST (SGOT) U/L  --   --  "  --  13   GLUCOSE mg/dL 111* 127* 113* 104*     Results from last 7 days   Lab Units 03/28/23  0759   HEMOGLOBIN g/dL 11.7*   HEMATOCRIT % 36.9*     COVID19   Date Value Ref Range Status   02/15/2023 Not Detected Not Detected - Ref. Range Final     Lab Results   Component Value Date    HGBA1C 6.0 (H) 09/16/2022        Medications    Scheduled Medications amLODIPine, 10 mg, Oral, Q24H  atorvastatin, 20 mg, Oral, Nightly  carvedilol, 12.5 mg, Oral, BID With Meals  cloNIDine, 0.1 mg, Oral, Q12H  enoxaparin, 1 mg/kg, Subcutaneous, Q12H  escitalopram, 10 mg, Oral, Daily  finasteride, 5 mg, Oral, Daily  gabapentin, 300 mg, Oral, TID  hydrALAZINE, 50 mg, Oral, TID  insulin lispro, 2-7 Units, Subcutaneous, TID With Meals  nitroglycerin, 1 inch, Topical, Q6H  piperacillin-tazobactam, 4.5 g, Intravenous, Q8H  sodium chloride, 10 mL, Intravenous, Q12H  tamsulosin, 0.4 mg, Oral, Daily        Infusions lactated ringers, 50 mL/hr, Last Rate: 50 mL/hr (03/27/23 2026)  niCARdipine, 5-15 mg/hr, Last Rate: Stopped (03/27/23 0743)  Pharmacy Consult,   Pharmacy to Dose enoxaparin (LOVENOX),   Pharmacy to Dose Zosyn,         PRN Medications •  acetaminophen  •  albuterol  •  dextrose  •  dextrose  •  glucagon (human recombinant)  •  hydrALAZINE  •  nitroglycerin  •  ondansetron **OR** ondansetron  •  Pharmacy Consult  •  Pharmacy to Dose enoxaparin (LOVENOX)  •  Pharmacy to Dose Zosyn  •  [COMPLETED] Insert Peripheral IV **AND** sodium chloride  •  sodium chloride  •  sodium chloride     Physical Findings        Trending Physical   Appearance, NFPE 3/28: NFPE completed and not consistent with nutrition diagnosis of malnutrition at this time using AND/ASPEN criteria      --  Edema  3+ generalized     Bowel Function Last documented BM on 3/26     Tubes No feeding tube     Chewing/Swallowing SLP following, pureed/thin liquids     Skin Blisters noted, WOCN saw pt 3/27     --  Current Nutrition Orders & Evaluation of Intake       Oral  Nutrition     Food Allergies NKFA   Current PO Diet Diet: Regular/House Diet; Feeding Assistance - Nursing; Texture: Pureed (NDD 1); Fluid Consistency: Thin (IDDSI 0)   Supplement none   PO Evaluation     Trending % PO Intake 3/28: No PO intake recorded since diet started 3/27   --  Nutritional Risk Screening        NRS-2002 Score          Nutrition Diagnosis         Nutrition Dx Problem 1 Chewing/swallowing difficulty related to PMH as evidenced by the need for mechanically altered diet for safe PO intake.      Nutrition Dx Problem 2        Intervention Goal         Intervention Goal(s) PO intake > 75%, ONS acceptance     Nutrition Intervention        RD Action Continue current diet, addition of Magic Cups at lunch/dinner (Provides 580 kcals, 18 g protein if consumed)        Nutrition Prescription          Diet Prescription Regular puree/thin liquids   Supplement Prescription Addition of Magic Cups at lunch/dinner (Provides 580 kcals, 18 g protein if consumed)      --  Monitor/Evaluation        Monitor Per protocol, PO intake, Supplement intake, Pertinent labs, Weight, Skin status, GI status, Symptoms, POC/GOC, Swallow function       Electronically signed by:  Tanya Gonzalez RD  03/28/23 09:34 EDT

## 2023-03-28 NOTE — PROGRESS NOTES
[facility] Medicine Services Daily Progress Note    Patient Name: Shaji Junior  : 1945  MRN: [id number]  Primary Care Physician:  Naa Valverde MD  Date of admission: 3/25/2023      Subjective      Chief Complaint: Abdominal pain      Patient Reports     3/27/2023: Admitted to PCU due to hypertensive urgency.  Off Cardene drip.  Can downgrade.  Complains of abdominal pain.  History of ESBL.  Does not walk.  Confusion getting better    3/28/23: Chronic Motley catheter.  Awaiting urine culture sensitivities.    Review of Systems   Unable to perform ROS: mental status change         Objective      Vitals:   Temp:  [97.1 °F (36.2 °C)-97.5 °F (36.4 °C)] 97.3 °F (36.3 °C)  Heart Rate:  [62-75] 69  Resp:  [9-16] 16  BP: (119-182)/() 119/95    Physical Exam  HENT:      Head: Normocephalic.      Mouth/Throat:      Mouth: Mucous membranes are moist.   Eyes:      Extraocular Movements: Extraocular movements intact.      Pupils: Pupils are equal, round, and reactive to light.   Cardiovascular:      Rate and Rhythm: Normal rate and regular rhythm.   Pulmonary:      Effort: Pulmonary effort is normal.   Abdominal:      General: Bowel sounds are normal.      Tenderness: There is no abdominal tenderness.      Comments: Obese abdomen.   Musculoskeletal:      Comments: Decreased range of motion hips   Skin:     General: Skin is warm.      Findings: No rash.   Neurological:      Mental Status: He is alert. He is confused.   Psychiatric:         Behavior: Behavior is cooperative.             Result Review    Result Review:  I have personally reviewed the results from the time of this admission to 3/28/2023 17:21 EDT and agree with these findings:  [x]  Laboratory  []  Microbiology  [x]  Radiology  []  EKG/Telemetry   []  Cardiology/Vascular   []  Pathology  [x]  Old records  []  Other:      Wounds (last 24 hours)     LDA Wound     Row Name 23 0800 23 1737       Wound  03/10/23 0938 Right gluteal MASD (Moisture associated skin damage)    Wound - Properties Group Placement Date: 03/10/23  -HW Placement Time: 0938 -HW Present on Hospital Admission: Y  -HW Side: Right  -HW Location: gluteal  -HW Primary Wound Type: MASD  -HW    Dressing Appearance -- open to air  -CM --    Base pink;red  -BS -- pink;red  -MM    Drainage Amount none  -BS -- --    Retired Wound - Properties Group Placement Date: 03/10/23  -HW Placement Time: 0938 -HW Present on Hospital Admission: Y  -HW Side: Right  -HW Location: gluteal  -HW Primary Wound Type: MASD  -HW    Retired Wound - Properties Group Date first assessed: 03/10/23  -HW Time first assessed: 0938 -HW Present on Hospital Admission: Y  -HW Side: Right  -HW Location: gluteal  -HW Primary Wound Type: MASD  -HW       Wound 03/26/23 1653 Right anterior greater trochanter Blisters    Wound - Properties Group Placement Date: 03/26/23  -NE Placement Time: 1653 -NE Present on Hospital Admission: Y  -NE Side: Right  -NE Orientation: anterior  -NE Location: greater trochanter  -NE Primary Wound Type: Blisters  -NE    Dressing Appearance -- open to air  -CM --    Closure Open to air  -BS -- Open to air  -MM    Base red  -BS -- red  -MM    Periwound dry;intact;pink  -BS -- dry;intact;pink  -MM    Periwound Temperature warm  -BS -- warm  -MM    Periwound Skin Turgor soft  -BS -- soft  -MM    Drainage Characteristics/Odor serosanguineous  -BS -- --    Drainage Amount scant  -BS -- --    Retired Wound - Properties Group Placement Date: 03/26/23  -NE Placement Time: 1653 -NE Present on Hospital Admission: Y  -NE Side: Right  -NE Orientation: anterior  -NE Location: greater trochanter  -NE Primary Wound Type: Blisters  -NE    Retired Wound - Properties Group Date first assessed: 03/26/23  -NE Time first assessed: 1653 -NE Present on Hospital Admission: Y  -NE Side: Right  -NE Location: greater trochanter  -NE Primary Wound Type: Blisters  -NE       Wound  03/26/23 1654 Right gluteal Pressure Injury    Wound - Properties Group Placement Date: 03/26/23 -NE Placement Time: 1654 -NE Present on Hospital Admission: Y  -NE Side: Right  -NE Location: gluteal  -NE, STAGE 2  Primary Wound Type: Pressure inj  -NE    Dressing Appearance -- open to air  -CM --    Closure --  barrier cream applied  -BS -- --    Base pink;red  -BS -- pink;red  -MM    Periwound pink  -BS -- --    Periwound Temperature warm  -BS -- warm  -MM    Periwound Skin Turgor soft  -BS -- soft  -MM    Edges jagged  -BS -- --    Drainage Characteristics/Odor serosanguineous  -BS -- --    Drainage Amount scant  -BS -- --    Retired Wound - Properties Group Placement Date: 03/26/23 -NE Placement Time: 1654 -NE Present on Hospital Admission: Y  -NE Side: Right  -NE Location: gluteal  -NE, STAGE 2  Primary Wound Type: Pressure inj  -NE    Retired Wound - Properties Group Date first assessed: 03/26/23 -NE Time first assessed: 1654 -NE Present on Hospital Admission: Y  -NE Side: Right  -NE Location: gluteal  -NE, STAGE 2  Primary Wound Type: Pressure inj  -NE       Wound 03/26/23 1702 Left anterior thigh Skin Tear    Wound - Properties Group Placement Date: 03/26/23 -NE Placement Time: 1702 -NE Present on Hospital Admission: Y  -NE Side: Left  -NE Orientation: anterior  -NE Location: thigh  -NE Primary Wound Type: Skin tear  -NE    Dressing Appearance -- open to air  -CM --    Closure Open to air  -BS -- Open to air  -MM    Base pink  -BS -- pink  -MM    Drainage Amount none  -BS -- --    Retired Wound - Properties Group Placement Date: 03/26/23 -NE Placement Time: 1702 -NE Present on Hospital Admission: Y  -NE Side: Left  -NE Orientation: anterior  -NE Location: thigh  -NE Primary Wound Type: Skin tear  -NE    Retired Wound - Properties Group Date first assessed: 03/26/23 -NE Time first assessed: 1702 -NE Present on Hospital Admission: Y  -NE Side: Left  -NE Location: thigh  -NE Primary Wound Type: Skin  tear  -NE          User Key  (r) = Recorded By, (t) = Taken By, (c) = Cosigned By    Initials Name Provider Type    Jordan Rao, RN Registered Nurse    Ignacia Villarreal RN Registered Nurse    Nadeem Iyer, RISSA Licensed Nurse    Fadumo Vazquez, MARQUEZN Licensed Nurse    Caridad Posey, MARQUEZN Licensed Nurse                  Assessment & Plan      Brief Patient Summary:      amLODIPine, 10 mg, Oral, Q24H  atorvastatin, 20 mg, Oral, Nightly  carvedilol, 12.5 mg, Oral, BID With Meals  cloNIDine, 0.1 mg, Oral, Q12H  enoxaparin, 1 mg/kg, Subcutaneous, Q12H  escitalopram, 10 mg, Oral, Daily  finasteride, 5 mg, Oral, Daily  gabapentin, 300 mg, Oral, TID  hydrALAZINE, 50 mg, Oral, TID  insulin lispro, 2-7 Units, Subcutaneous, TID With Meals  nitroglycerin, 1 inch, Topical, Q6H  piperacillin-tazobactam, 4.5 g, Intravenous, Q8H  sodium chloride, 10 mL, Intravenous, Q12H  tamsulosin, 0.4 mg, Oral, Daily       lactated ringers, 50 mL/hr, Last Rate: 50 mL/hr (03/28/23 1502)  niCARdipine, 5-15 mg/hr, Last Rate: Stopped (03/27/23 0743)  Pharmacy Consult,   Pharmacy to Dose enoxaparin (LOVENOX),   Pharmacy to Dose Zosyn,          Active Hospital Problems:  Active Hospital Problems    Diagnosis    • **UTI (urinary tract infection)      Urinary tract infection    Hypertensive urgency  Acute toxic/metabolic encephalopathy  Atrial fibrillation  Normocytic anemia  Hyperlipidemia  BPH    Plan:  -Patient off Cardene drip and downgraded  -Confusion getting better likely due to UTI  -Nonambulatory at baseline  Urine culture with Proteus mirabilis  1 blood culture 3/25/2023--> coagulase-negative staph.  Contamination.  -CT head on admission ruled out acute intracranial pathology.      DVT prophylaxis:  Medical DVT prophylaxis orders are present.    CODE STATUS:    Code Status (Patient has no pulse and is not breathing): CPR (Attempt to Resuscitate)  Medical Interventions (Patient has pulse or is breathing): Full  Support      Disposition:  I expect patient to be discharged 1-2 days.    This patient has been examined wearing appropriate Personal Protective Equipment and discussed with nursing. 03/28/23      Electronically signed by Will Browne DO, 03/28/23, 17:21 EDT.  [facility] Hospitalist Team

## 2023-03-28 NOTE — PLAN OF CARE
Goal Outcome Evaluation:              Outcome Evaluation: Pt is disoriented to place, situation and time. Able to voice needs but is uncooperative with care at times. Very frustrated today. Pt is bed bound, q2 turn and incontinent. Pt was total feed but did feed himself some with speech. PT, speech and cardio are following. Pt is from [place], but family does not want pt to return, alternative options has been provided to the family. Pt is resting in bed with call bell within reach and bed alarm on.

## 2023-03-28 NOTE — CASE MANAGEMENT/SOCIAL WORK
Continued Stay Note   [facility]     Patient Name: Shaji Junior  MRN: [id number]  Today's Date: 3/28/2023    Admit Date: 3/25/2023    Plan: From [facility]. No precert required. PASRR per facility. Alternative placement at [facility] pending.   Discharge Plan     Row Name 03/28/23 1536       Plan    Plan From [facility]. No precert required. PASRR per facility. Alternative placement at [facility] pending.    Patient/Family in Agreement with Plan yes    Plan Comments CM contacted patient's sister Michelle regarding alternative nursing home placements per previous CM. Sister has been in contact with [facility] on [address] and is interested in placement there. Obtained contacts for [facility] and [facility]. Added to Epic basket. Also discussed patient's brother Giovanni visiting with patient here and if he could be added as a contact. Sister was agreeable to adding him but wanted all updates/information to go through her. CM inquired if patient had legal documents appointing sister as healthcare rep, etc.  confirms there is paperwork and will be emailing copy to CM. Spoke to Amrik at [facility] but unable to confirm if they could accept pt at this time. Notified them that referral had been sent in [facility]. Explained to sister Michelle that physician anticipates discharge tomorrow and pt may need to return to LTC facility [facility] until sister was able to secure placement elsewhere.  reports she plans to file an appeal if that were to take place. Will f/u tomorrow 3/29.              Phone communication or documentation only - no physical contact with patient or family.      Megan Naegele, RN      Office Phone: [phone]  Office Cell: [phone]

## 2023-03-28 NOTE — CASE MANAGEMENT/SOCIAL WORK
Social Work Assessment  [facility]     Patient Name: Shaji Junior  MRN: [id number]  Today's Date: 3/28/2023    Admit Date: 3/25/2023       Discharge Plan     Row Name 03/28/23 1735       Plan    Plan Comments LSW met with patient and his brother at bedside. Completed the LACE screen and there are no concerns/issues at this time.    Row Name 03/28/23 1534       Plan    Plan From [facility]. No precert required. PASRR per facility. Alternative placement at [facility] pending.    Patient/Family in Agreement with Plan yes    Plan Comments CM contacted patient's sister Michelle regarding alternative nursing home placements per previous CM. Sister has been in contact with [facility] on [address] and is interested in placement there. Obtained contacts for [facility] and [facility]. Added to Epic basket. Also discussed patient's brother Giovanni visiting with patient here and if he could be added as a contact. Sister was agreeable to adding him but wanted all updates/information to go through her. CM inquired if patient had legal documents appointing sister as healthcare rep, etc. Sister confirms there is paperwork and will be emailing copy to CM. Spoke to Amrik at [facility] but unable to confirm if they could accept pt at this time. Notified them that referral had been sent in [facility]. Explained to sister Michelle that physician anticipates discharge tomorrow and pt may need to return to LTC facility [facility] until sister was able to secure placement elsewhere. Sister reports she plans to file an appeal if that were to take place. Will f/u tomorrow 3/29.                 Psychosocial     Row Name 03/28/23 1726       Values/Beliefs    Spiritual, Cultural Beliefs, [employer] Practices, Values that Affect Care no       Behavior WDL    Behavior WDL interactions    Interactions eye contact intermittent;other (see comments)  Patient was some what confused, however his brother was at bedside and able to answer LACE questions.        Emotion Mood WDL    Emotion/Mood/Affect WDL WDL       Mental Health    Little Interest or Pleasure in Doing Things 0-->not at all    Feeling Down, Depressed or Hopeless 0-->not at all    Do you feel stress - tense, restless, nervous, or anxious, or unable to sleep at night because your mind is troubled all the time - these days? Not at all       Speech WDL    Speech WDL WDL       Perceptual State WDL    Perceptual State WDL WDL       Thought Process WDL    Thought Process WDL thought process;judgment and insight;thought content    Judgment and Insight insight not appropriate to situation  Patient very suspicious of why they kept taking his vitals and labs. Concerned we are trying to keep him here.    Thought Content perseveration    Thought Process illogical       Intellectual Performance WDL    Intellectual Performance WDL intellectual performance    Intellectual Performance unable to understand explanation/reasoning  Patient confused and kept referencing back to what was being done at [facility] when LSW was asking about the facility he came from.    Level of Consciousness Confusion       Coping/Stress    Major Change/Loss/Stressor medical condition/diagnosis    Patient Personal Strengths strong support system    Sources of Support sibling(s)    Techniques to Rindge with Loss/Stress/Change diversional activities    Reaction to Health Status unable to assess    Understanding of Condition and Treatment unable to assess    Coping/Stress Comments Patient's support system includes his siblings. He reported he enjoys watching television.       Developmental Stage (Eriksson's)    Developmental Stage Stage 8 (65 years-death/Late Adulthood) Integrity vs. Despair       C-SSRS (Recent)    Q1 Wished to be Dead (Past Month) no    Q2 Suicidal Thoughts (Past Month) no    Q6 Suicide Behavior (Lifetime) no       Violence Risk    Feels Like Hurting Others no    Previous Attempt to Harm Others no               Abuse/Neglect     Row Name  03/28/23 1732       Personal Safety    Feels Unsafe at Home or Work/School no    Feels Threatened by Someone no    Does Anyone Try to Keep You From Having Contact with Others or Doing Things Outside Your Home? no    Physical Signs of Abuse Present no               Legal     Row Name 03/28/23 1732       Financial Resource Strain    How hard is it for you to pay for the very basics like food, housing, medical care, and heating? Not hard       Financial/Legal    Source of Income social security    Finance Comments Patient is from [facility] and all of his needs are being met by the facility.               Substance Abuse     Row Name 03/28/23 1732       Substance Use    Substance Use Status never used       AUDIT-C (Alcohol Use Disorders ID Test)    Q1: How often do you have a drink containing alcohol? Never    Q2: How many drinks containing alcohol do you have on a typical day when you are drinking? None    Q3: How often do you have six or more drinks on one occasion? Never    Audit-C Score 0                  Met with patient in room wearing PPE: mask.    Maintained distance greater than six feet and spent less than 15 minutes in the room.      SID Chen, LSW    Phone: [phone]  Cell: [phone]  Fax: [phone]  [email or web address]

## 2023-03-28 NOTE — PLAN OF CARE
Goal Outcome Evaluation:   Pt was seen for re-eval of swallow for current diet tolerance and possible diet upgrade. Pt was seen at lunch meal. He was much more alert today and able to follow commands and answer questions. Pt was brought upright in bed and fed by clinician but was also feeding himself after set-up. Pt was seen consuming puree meat, green beans, mashed potatoes, drinking soda by straw and was given trials of a Fig Ruiz for possible diet upgrade. Pt had timely oral transit of the puree food and no difficulty pulling liqudi from the straw. Mastication was slow but functional on the Fig ruiz. Pt had no pocketing or oral residual. Digital palpation suggests timely swallow. After the swallow, pt had clear vocal quality and no overt s/s of aspiration on any trial assessed.     Pt appears to tolerate soft solids with slow but functional mastication. Pt continues to tolerate current diet rec of puree and thin liquids. It is rec that pt's diet be upgraded to mechanical soft, continue thin liquids. Rationale for diet upgrade was explained to pt. he verbalized understanding. ST will follow to assure tolerance of diet and make further recs as indicated.

## 2023-03-28 NOTE — PLAN OF CARE
Goal Outcome Evaluation:              Outcome Evaluation: Pt continues without issues. F/C continues to drain.  Meds whole in applesauce. IV ATB continues. Will continue to monitor

## 2023-03-29 PROBLEM — I16.0 HYPERTENSIVE URGENCY: Status: RESOLVED | Noted: 2023-02-15 | Resolved: 2023-03-29

## 2023-03-29 PROBLEM — G93.41 ACUTE METABOLIC ENCEPHALOPATHY: Status: ACTIVE | Noted: 2023-03-29

## 2023-03-29 LAB
BACTERIA SPEC AEROBE CULT: ABNORMAL
GLUCOSE BLDC GLUCOMTR-MCNC: 114 MG/DL (ref 70–105)
GLUCOSE BLDC GLUCOMTR-MCNC: 118 MG/DL (ref 70–105)
GLUCOSE BLDC GLUCOMTR-MCNC: 90 MG/DL (ref 70–105)

## 2023-03-29 PROCEDURE — 36410 VNPNXR 3YR/> PHY/QHP DX/THER: CPT

## 2023-03-29 PROCEDURE — 25010000002 ERTAPENEM PER 500 MG: Performed by: HOSPITALIST

## 2023-03-29 PROCEDURE — 25010000002 ENOXAPARIN PER 10 MG: Performed by: STUDENT IN AN ORGANIZED HEALTH CARE EDUCATION/TRAINING PROGRAM

## 2023-03-29 PROCEDURE — 25010000002 PIPERACILLIN SOD-TAZOBACTAM PER 1 G: Performed by: STUDENT IN AN ORGANIZED HEALTH CARE EDUCATION/TRAINING PROGRAM

## 2023-03-29 PROCEDURE — C1751 CATH, INF, PER/CENT/MIDLINE: HCPCS

## 2023-03-29 PROCEDURE — 97530 THERAPEUTIC ACTIVITIES: CPT

## 2023-03-29 PROCEDURE — 82962 GLUCOSE BLOOD TEST: CPT

## 2023-03-29 PROCEDURE — 92526 ORAL FUNCTION THERAPY: CPT

## 2023-03-29 RX ORDER — LIDOCAINE HYDROCHLORIDE 10 MG/ML
20 INJECTION, SOLUTION INFILTRATION; PERINEURAL ONCE
Status: DISCONTINUED | OUTPATIENT
Start: 2023-03-29 | End: 2023-04-04 | Stop reason: HOSPADM

## 2023-03-29 RX ORDER — SODIUM CHLORIDE 9 MG/ML
40 INJECTION, SOLUTION INTRAVENOUS AS NEEDED
Status: DISCONTINUED | OUTPATIENT
Start: 2023-03-29 | End: 2023-04-04 | Stop reason: HOSPADM

## 2023-03-29 RX ORDER — SODIUM CHLORIDE 0.9 % (FLUSH) 0.9 %
10 SYRINGE (ML) INJECTION AS NEEDED
Status: DISCONTINUED | OUTPATIENT
Start: 2023-03-29 | End: 2023-04-04 | Stop reason: HOSPADM

## 2023-03-29 RX ORDER — SODIUM CHLORIDE 0.9 % (FLUSH) 0.9 %
10 SYRINGE (ML) INJECTION EVERY 12 HOURS SCHEDULED
Status: DISCONTINUED | OUTPATIENT
Start: 2023-03-29 | End: 2023-04-04 | Stop reason: HOSPADM

## 2023-03-29 RX ADMIN — Medication 10 ML: at 08:52

## 2023-03-29 RX ADMIN — NITROGLYCERIN 1 INCH: 20 OINTMENT TOPICAL at 00:15

## 2023-03-29 RX ADMIN — CLONIDINE HYDROCHLORIDE 0.1 MG: 0.1 TABLET ORAL at 08:53

## 2023-03-29 RX ADMIN — ENOXAPARIN SODIUM 135 MG: 150 INJECTION SUBCUTANEOUS at 03:15

## 2023-03-29 RX ADMIN — GABAPENTIN 300 MG: 300 CAPSULE ORAL at 08:53

## 2023-03-29 RX ADMIN — HYDRALAZINE HYDROCHLORIDE 50 MG: 25 TABLET, FILM COATED ORAL at 08:53

## 2023-03-29 RX ADMIN — CARVEDILOL 12.5 MG: 6.25 TABLET, FILM COATED ORAL at 08:53

## 2023-03-29 RX ADMIN — TAMSULOSIN HYDROCHLORIDE 0.4 MG: 0.4 CAPSULE ORAL at 08:53

## 2023-03-29 RX ADMIN — NITROGLYCERIN 1 INCH: 20 OINTMENT TOPICAL at 05:35

## 2023-03-29 RX ADMIN — SODIUM CHLORIDE, POTASSIUM CHLORIDE, SODIUM LACTATE AND CALCIUM CHLORIDE 50 ML/HR: 600; 310; 30; 20 INJECTION, SOLUTION INTRAVENOUS at 12:34

## 2023-03-29 RX ADMIN — NITROGLYCERIN 1 INCH: 20 OINTMENT TOPICAL at 12:34

## 2023-03-29 RX ADMIN — Medication 10 ML: at 20:05

## 2023-03-29 RX ADMIN — HYDRALAZINE HYDROCHLORIDE 50 MG: 25 TABLET, FILM COATED ORAL at 16:46

## 2023-03-29 RX ADMIN — Medication 10 ML: at 20:01

## 2023-03-29 RX ADMIN — GABAPENTIN 300 MG: 300 CAPSULE ORAL at 16:47

## 2023-03-29 RX ADMIN — CLONIDINE HYDROCHLORIDE 0.1 MG: 0.1 TABLET ORAL at 20:01

## 2023-03-29 RX ADMIN — HYDRALAZINE HYDROCHLORIDE 50 MG: 25 TABLET, FILM COATED ORAL at 20:01

## 2023-03-29 RX ADMIN — FINASTERIDE 5 MG: 5 TABLET, FILM COATED ORAL at 08:53

## 2023-03-29 RX ADMIN — AMLODIPINE BESYLATE 10 MG: 5 TABLET ORAL at 08:53

## 2023-03-29 RX ADMIN — ESCITALOPRAM OXALATE 10 MG: 10 TABLET ORAL at 08:53

## 2023-03-29 RX ADMIN — PIPERACILLIN AND TAZOBACTAM 4.5 G: 4; .5 INJECTION, POWDER, FOR SOLUTION INTRAVENOUS at 04:36

## 2023-03-29 RX ADMIN — NITROGLYCERIN 1 INCH: 20 OINTMENT TOPICAL at 17:04

## 2023-03-29 RX ADMIN — ERTAPENEM SODIUM 1 G: 1 INJECTION, POWDER, LYOPHILIZED, FOR SOLUTION INTRAMUSCULAR; INTRAVENOUS at 12:34

## 2023-03-29 RX ADMIN — ENOXAPARIN SODIUM 135 MG: 150 INJECTION SUBCUTANEOUS at 16:46

## 2023-03-29 RX ADMIN — CARVEDILOL 12.5 MG: 6.25 TABLET, FILM COATED ORAL at 17:02

## 2023-03-29 NOTE — PAYOR COMM NOTE
"CLINICAL UPDATE 03/29/2023:            Cyrus Junior (77 y.o. Male) [birth year]  AUTH # [id number]          AUTHORIZATION PENDING:   PLEASE CALL OR FAX DETERMINATION TO CONTACT BELOW. THANK YOU.        Leonela Hurtado RN MSN  /UR  [facility]  [phone] office  [phone] fax  [email or web address]    [facility]  NPI: [id number]  Tax: [id number]              Cyrus Junior (77 y.o. Male)     Date of Birth   1945    Social Security Number       Address   [address] [place] [address] [place] [facility]    Home Phone   [phone]    MRN   [id number]       Muslim   None    Marital Status                               Admission Date   3/25/23    Admission Type   Emergency    Admitting Provider   Will Browne DO    Attending Provider   Will Browne DO    Department, Room/Bed   [facility] 3C MEDICAL INPATIENT, 367/1       Discharge Date       Discharge Disposition       Discharge Destination                               Attending Provider: Will Browne DO    Allergies: No Known Allergies    Isolation: Contact   Infection: ESBL (04/01/22), ESBL Klebsiella (09/19/22), Adelina Auris (rule out) (03/28/23)   Code Status: CPR    Ht: 177.8 cm (70\")   Wt: 139 kg (307 lb)    Admission Cmt: None   Principal Problem: UTI (urinary tract infection) [N39.0]                 Active Insurance as of 3/25/2023     Primary Coverage     Payor Plan Insurance Group Employer/Plan Group    MISC MEDICARE REPLACEMENT MISC MCARE REPLACEMENT [id number]     Coverage Address Coverage Phone Number Coverage Fax Number Effective Dates    [address] [address] [phone]  2/1/2023 - None Entered    [place] [address]       Subscriber Name Subscriber Birth Date Member ID       CYRUS JUNIOR 1945 [id number]           Secondary Coverage     Payor Plan Insurance Group Employer/Plan Group    [facility] [place] DEPT [id number]       " Payor Plan Address Payor Plan Phone Number Payor Plan Fax Number Effective Dates    [facility] OFFICE OF COMMUNITY CARE [phone]  2/15/2023 - None Entered    [address] [address]       [facility] [address]       Subscriber Name Subscriber Birth Date Member ID       CYRUS RAMIREZ 1945 [id number]           Tertiary Coverage     Payor Plan Insurance Group Employer/Plan Group    [facility] [place] CCN OPTUM      Payor Plan Address Payor Plan Phone Number Payor Plan Fax Number Effective Dates    [address] [phone]  2022 - None Entered    [facility] [address]       Subscriber Name Subscriber Birth Date Member ID       CYRUS RAMIREZ 1945 [id number]           Other Coverage     Payor Plan Insurance Group Employer/Plan Group    [place]      Payor Plan Address Payor Plan Phone Number Payor Plan Fax Number Effective Dates    [address]   2022 - None Entered    [place] [address]       Subscriber Name Subscriber Birth Date Member ID       CYRUS RAMIREZ 1945 [id number]                 Emergency Contacts      (Rel.) Home Phone Work Phone Mobile Phone    RICHAR MONROE (Sister) [phone] -- --    JAMESCAMERON CARVAJAL (Brother) -- -- [phone]               Physician Progress Notes (last 24 hours)      Will Browne DO at [address] [place] [address] Medicine Services Daily Progress Note    Patient Name: Cyrus Ramirez  : 1945  MRN: [id number]  Primary Care Physician:  Naa Vavlerde MD  Date of admission: 3/25/2023      Subjective       Chief Complaint: Abdominal pain      Patient Reports     3/27/2023: Admitted to PCU due to hypertensive urgency.  Off Cardene drip.  Can downgrade.  Complains of abdominal pain.  History of ESBL.  Does not walk.  Confusion getting better    3/28/23: Chronic Motley catheter.  Awaiting urine culture sensitivities.    Review of Systems   Unable to perform ROS: mental status change          Objective       Vitals:   Temp:  [97.1 °F (36.2 °C)-97.5 °F (36.4 °C)] 97.3 °F (36.3 °C)  Heart Rate:  [62-75] 69  Resp:  [9-16] 16  BP: (119-182)/() 119/95    Physical Exam  HENT:      Head: Normocephalic.      Mouth/Throat:      Mouth: Mucous membranes are moist.   Eyes:      Extraocular Movements: Extraocular movements intact.      Pupils: Pupils are equal, round, and reactive to light.   Cardiovascular:      Rate and Rhythm: Normal rate and regular rhythm.   Pulmonary:      Effort: Pulmonary effort is normal.   Abdominal:      General: Bowel sounds are normal.      Tenderness: There is no abdominal tenderness.      Comments: Obese abdomen.   Musculoskeletal:      Comments: Decreased range of motion hips   Skin:     General: Skin is warm.      Findings: No rash.   Neurological:      Mental Status: He is alert. He is confused.   Psychiatric:         Behavior: Behavior is cooperative.             Result Review    Result Review:  I have personally reviewed the results from the time of this admission to 3/28/2023 17:21 EDT and agree with these findings:  [x]  Laboratory  []  Microbiology  [x]  Radiology  []  EKG/Telemetry   []  Cardiology/Vascular   []  Pathology  [x]  Old records  []  Other:      Wounds (last 24 hours)     LDA Wound     Row Name 03/28/23 0800 03/27/23 2005 03/27/23 1737       Wound 03/10/23 0938 Right gluteal MASD (Moisture associated skin damage)    Wound - Properties Group Placement Date: 03/10/23  -HW Placement Time: 0938 -HW Present on Hospital Admission: Y  -HW Side: Right  -HW Location: gluteal  -HW Primary Wound Type: MASD  -HW    Dressing Appearance -- open to air  -CM --    Base pink;red  -BS -- pink;red  -MM    Drainage Amount none  -BS -- --    Retired Wound - Properties Group Placement Date: 03/10/23  -HW Placement Time: 0938 -HW Present on Hospital Admission: Y  -HW Side: Right  -HW Location: gluteal  -HW Primary Wound Type: MASD  -HW    Retired Wound - Properties Group  Date first assessed: 03/10/23  -HW Time first assessed: 0938 -HW Present on Hospital Admission: Y  -HW Side: Right  -HW Location: gluteal  -HW Primary Wound Type: MASD  -HW       Wound 03/26/23 1653 Right anterior greater trochanter Blisters    Wound - Properties Group Placement Date: 03/26/23  -NE Placement Time: 1653 -NE Present on Hospital Admission: Y  -NE Side: Right  -NE Orientation: anterior  -NE Location: greater trochanter  -NE Primary Wound Type: Blisters  -NE    Dressing Appearance -- open to air  -CM --    Closure Open to air  -BS -- Open to air  -MM    Base red  -BS -- red  -MM    Periwound dry;intact;pink  -BS -- dry;intact;pink  -MM    Periwound Temperature warm  -BS -- warm  -MM    Periwound Skin Turgor soft  -BS -- soft  -MM    Drainage Characteristics/Odor serosanguineous  -BS -- --    Drainage Amount scant  -BS -- --    Retired Wound - Properties Group Placement Date: 03/26/23  -NE Placement Time: 1653 -NE Present on Hospital Admission: Y  -NE Side: Right  -NE Orientation: anterior  -NE Location: greater trochanter  -NE Primary Wound Type: Blisters  -NE    Retired Wound - Properties Group Date first assessed: 03/26/23  -NE Time first assessed: 1653 -NE Present on Hospital Admission: Y  -NE Side: Right  -NE Location: greater trochanter  -NE Primary Wound Type: Blisters  -NE       Wound 03/26/23 1654 Right gluteal Pressure Injury    Wound - Properties Group Placement Date: 03/26/23  -NE Placement Time: 1654 -NE Present on Hospital Admission: Y  -NE Side: Right  -NE Location: gluteal  -NE, STAGE 2  Primary Wound Type: Pressure inj  -NE    Dressing Appearance -- open to air  -CM --    Closure --  barrier cream applied  -BS -- --    Base pink;red  -BS -- pink;red  -MM    Periwound pink  -BS -- --    Periwound Temperature warm  -BS -- warm  -MM    Periwound Skin Turgor soft  -BS -- soft  -MM    Edges jagged  -BS -- --    Drainage Characteristics/Odor serosanguineous  -BS -- --    Drainage Amount  scant  -BS -- --    Retired Wound - Properties Group Placement Date: 03/26/23 -NE Placement Time: 1654 -NE Present on Hospital Admission: Y  -NE Side: Right  -NE Location: gluteal  -NE, STAGE 2  Primary Wound Type: Pressure inj  -NE    Retired Wound - Properties Group Date first assessed: 03/26/23 -NE Time first assessed: 1654 -NE Present on Hospital Admission: Y  -NE Side: Right  -NE Location: gluteal  -NE, STAGE 2  Primary Wound Type: Pressure inj  -NE       Wound 03/26/23 1702 Left anterior thigh Skin Tear    Wound - Properties Group Placement Date: 03/26/23 -NE Placement Time: 1702 -NE Present on Hospital Admission: Y  -NE Side: Left  -NE Orientation: anterior  -NE Location: thigh  -NE Primary Wound Type: Skin tear  -NE    Dressing Appearance -- open to air  -CM --    Closure Open to air  -BS -- Open to air  -MM    Base pink  -BS -- pink  -MM    Drainage Amount none  -BS -- --    Retired Wound - Properties Group Placement Date: 03/26/23 -NE Placement Time: 1702 -NE Present on Hospital Admission: Y  -NE Side: Left  -NE Orientation: anterior  -NE Location: thigh  -NE Primary Wound Type: Skin tear  -NE    Retired Wound - Properties Group Date first assessed: 03/26/23 -NE Time first assessed: 1702 -NE Present on Hospital Admission: Y  -NE Side: Left  -NE Location: thigh  -NE Primary Wound Type: Skin tear  -NE          User Key  (r) = Recorded By, (t) = Taken By, (c) = Cosigned By    Initials Name Provider Type    Jordan Rao, RN Registered Nurse    Ignacia Villarreal RN Registered Nurse    Nadeem Iyer LPN Licensed Nurse    Fadumo Vazquez LPN Licensed Nurse    Caridad Posey LPN Licensed Nurse                  Assessment & Plan      Brief Patient Summary:      amLODIPine, 10 mg, Oral, Q24H  atorvastatin, 20 mg, Oral, Nightly  carvedilol, 12.5 mg, Oral, BID With Meals  cloNIDine, 0.1 mg, Oral, Q12H  enoxaparin, 1 mg/kg, Subcutaneous, Q12H  escitalopram, 10 mg, Oral, Daily  finasteride,  5 mg, Oral, Daily  gabapentin, 300 mg, Oral, TID  hydrALAZINE, 50 mg, Oral, TID  insulin lispro, 2-7 Units, Subcutaneous, TID With Meals  nitroglycerin, 1 inch, Topical, Q6H  piperacillin-tazobactam, 4.5 g, Intravenous, Q8H  sodium chloride, 10 mL, Intravenous, Q12H  tamsulosin, 0.4 mg, Oral, Daily       lactated ringers, 50 mL/hr, Last Rate: 50 mL/hr (03/28/23 1502)  niCARdipine, 5-15 mg/hr, Last Rate: Stopped (03/27/23 0743)  Pharmacy Consult,   Pharmacy to Dose enoxaparin (LOVENOX),   Pharmacy to Dose Zosyn,          Active Hospital Problems:  Active Hospital Problems    Diagnosis    • **UTI (urinary tract infection)      Urinary tract infection    Hypertensive urgency  Acute toxic/metabolic encephalopathy  Atrial fibrillation  Normocytic anemia  Hyperlipidemia  BPH    Plan:  -Patient off Cardene drip and downgraded  -Confusion getting better likely due to UTI  -Nonambulatory at baseline  Urine culture with Proteus mirabilis  1 blood culture 3/25/2023--> coagulase-negative staph.  Contamination.  -CT head on admission ruled out acute intracranial pathology.      DVT prophylaxis:  Medical DVT prophylaxis orders are present.    CODE STATUS:    Code Status (Patient has no pulse and is not breathing): CPR (Attempt to Resuscitate)  Medical Interventions (Patient has pulse or is breathing): Full Support      Disposition:  I expect patient to be discharged 1-2 days.    This patient has been examined wearing appropriate Personal Protective Equipment and discussed with nursing. 03/28/23      Electronically signed by Will Browne DO, 03/28/23, 17:21 EDT.  [facility], operated by [facility] Hospitalist Team             Electronically signed by Will Browne DO at 03/28/23 9387

## 2023-03-29 NOTE — THERAPY TREATMENT NOTE
"Subjective: Pt agreeable to therapeutic plan of care. Pt expressed wanting to sit eob with therapy.    Objective:     Bed mobility - Mod-A, Max-A and Assist x 2  Transfers - Max-A and Assist x 2 (pt unable to come to full stand)  Ambulation - N/A or Not attempted.    Therapeutic Exercise - 10 Reps Bilaterally LE AAROM unsupported sitting / EOB  AP, LAQ, Marches    Vitals: WNL    Pain: Pt expressed pain at blister location on R hip  Intervention for pain: RN notified    Education: Provided education on the importance of mobility in the acute care setting, Verbal/Tactile Cues, Transfer Training and Energy conservation strategies    Assessment: Shaji Junior presents with functional mobility impairments which indicate the need for skilled intervention. Pt displayed improvement in bed mobility on this date. Pt able to \"walk\" BLE off bed to initiate supine to sitting eob. Pt required max Ax2 supine <> sitting eob with cuing for proper technique. Pt sat eob ~15 minutes requiring CGA to maintain sitting balance. Pt attempted two STS with Max A x2 with the inability to display trunk extension. Pt continues to be limited by decreased activity tolerance and functional strength. Tolerating session today without incident. Will continue to follow and progress as tolerated.     Plan/Recommendations:   Moderate Intensity Therapy recommended post-acute care. This is recommended as therapy feels the patient would require 3-4 days per week and wouldn't tolerate \"3 hour daily\" rehab intensity. SNF would be the preferred choice. If the patient does not agree to SNF, arrange HH or OP depending on home bound status. If patient is medically complex, consider LTACH.. Pt requires no DME at discharge.     Pt desires Skilled Rehab placement at discharge. Pt cooperative; agreeable to therapeutic recommendations and plan of care.         Basic Mobility 6-click:  Rollin = Total, A lot = 2, A little = 3; 4 = None  Supine>Sit:   1 = " Total, A lot = 2, A little = 3; 4 = None   Sit>Stand with arms:  1 = Total, A lot = 2, A little = 3; 4 = None  Bed>Chair:   1 = Total, A lot = 2, A little = 3; 4 = None  Ambulate in room:  1 = Total, A lot = 2, A little = 3; 4 = None  3-5 Steps with railin = Total, A lot = 2, A little = 3; 4 = None  Score: 8    Modified Wagner: N/A = No pre-op stroke/TIA    Post-Tx Position: Supine with HOB Elevated, Alarms activated and Call light and personal items within reach  PPE: gloves, surgical mask and gown

## 2023-03-29 NOTE — THERAPY TREATMENT NOTE
Acute Care - Speech Language Pathology   Swallow Treatment Note KAYODE Camarena     Patient Name: Shaji Junior  : 1945  MRN: [id number]  Today's Date: 3/29/2023               Admit Date: 3/25/2023  Patient was not wearing a face mask during this therapy encounter. Therapist used appropriate personal protective equipment including gown, mask and gloves.  Mask used was standard procedure mask. Appropriate PPE was worn during the entire therapy session. Hand hygiene was completed before and after therapy session. Patient is not in enhanced droplet precautions.             Visit Dx:     ICD-10-CM ICD-9-CM   1. Altered mental status, unspecified altered mental status type  R41.82 780.97   2. Fever, unspecified fever cause  R50.9 780.60   3. Urinary tract infection associated with indwelling urethral catheter, initial encounter ([facility])  T83.511A 996.64    N39.0 599.0     Patient Active Problem List   Diagnosis   • CHF exacerbation ([facility])   • Cerebrovascular accident ([facility])   • Type 2 diabetes mellitus with diabetic nephropathy ([facility])   • Acute kidney failure ([facility])   • Congestive heart failure ([facility])   • Acute on chronic congestive heart failure, unspecified heart failure type ([facility])   • MAKI (acute kidney injury) ([facility])   • Atrial fibrillation ([facility])   • Chest pain   • Shortness of breath   • Chest pain, unspecified type   • Essential hypertension   • Acute UTI (urinary tract infection)   • Normocytic normochromic anemia   • CKD (chronic kidney disease), stage III ([facility])   • BPH without obstruction/lower urinary tract symptoms   • Altered mental status, unspecified altered mental status type   • Acute on chronic diastolic CHF (congestive heart failure) ([facility])   • Hypertensive urgency   • Elevated troponin   • Acute UTI (urinary tract infection)   • Morbid obesity ([facility])   • Dementia ([facility])   • UTI (urinary tract infection)     Past Medical History:   Diagnosis Date   • Acute kidney failure ([facility])    • Acute respiratory failure with  hypoxia (HCC)    • Anemia    • Benign prostatic hyperplasia    • Bilateral primary osteoarthritis of knee    • Cardiomegaly    • Cardiomyopathy (HCC)    • Cellulitis and abscess of left leg    • Cerebral infarction (HCC)    • Cerebrovascular disease    • Chronic kidney disease    • Dementia (HCC)    • Diabetes mellitus (HCC)    • Essential hypertension    • GERD (gastroesophageal reflux disease)    • Gout    • Heart failure (HCC)    • Hyperlipidemia    • Morbid obesity (HCC)    • Myocardial infarction (HCC)    • Obstructive sleep apnea    • Obstructive uropathy    • Paroxysmal atrial fibrillation (HCC)    • Peptic ulcer    • Peripheral vascular disease (HCC)    • Pulmonary edema    • Venous insufficiency      History reviewed. No pertinent surgical history.    SLP Recommendation and Plan       SWALLOW EVALUATION (last 72 hours)           EDUCATION  The patient has been educated in the following areas:   Dysphagia (Swallowing Impairment) Oral Care/Hydration.        SLP GOALS     Row Name 03/29/23 1500          (LTG) Swallow Pt will demonstrate acceptance of least restrictive diet with no s/s of aspiration and use of safe swallow strategies  -CB    [place] (Swallow Long Term Goal) independently (over 90% accuracy)  -CB    Time Frame (Swallow Long Term Goal) by discharge  -CB    Progress/Outcomes (Swallow Long Term Goal) good progress toward goal  -CB    Comment (Swallow Long Term Goal) Patient was seen for DT/meal at lunch. Tray was sitting on side of bed upon entering the room. Patient had not initiated feeding self. . Patient required ST to feed in order to initiate eating meal while ST was present. ST set up tray and proceeded to feed patient accordingly. Patient was receptive for the assistance. Patient currently receive a mechanical ground diet. Patient was sitting upright in bed prior to meal Patient continues to be lethargic and require arousing several times during meal. Patient tolerated mechanical soft  without any difficulty with swallowing. Patient demonstrated adequate rotary chewing. Patient clears oral cavity effectively between meals. Vocal quality remained clear throughout meal. No s/s of aspiration was evident. Will advance diet further as confusion decreases. ST will continue to follow to assure safety and tolerance with  recommended diet as well as establish least restrictive diet as cognition improves.  -CB          (STG) Swallow 1 Pt will participate in re-assessment via bedside swallow evaluation  -CB    [place] (Swallow Short Term Goal 1) with moderate cues (50-74% accuracy)  -CB    Time Frame (Swallow Short Term Goal 1) 1 week  -CB    Progress/Outcomes (Swallow Short Term Goal 1) good progress toward goal  -CB    Comment (Swallow Short Term Goal 1) --          (STG) Swallow 2 --    Time Frame (Swallow Short Term Goal 2) 1 week  -CB    Progress/Outcomes (Swallow Short Term Goal 2) goal ongoing  -CB    Comment (Swallow Short Term Goal 2) --          User Key  (r) = Recorded By, (t) = Taken By, (c) = Cosigned By    Initials Name Provider Type                Shanon Preciado, SLP Speech and Language Pathologist                         Time Calculation:                TIMOTEO Parekh  3/29/2023

## 2023-03-29 NOTE — DISCHARGE SUMMARY
[facility] Medicine Services  DISCHARGE SUMMARY    Patient Name: Shaji Junior  : 1945  MRN: [id number]    Date of Admission: 3/25/2023     Date of Discharge:  3/29/2023    Discharge diagnosis: Urinary tract infection, acute metabolic encephalopathy, hypertensive urgency        Primary Care Physician: Naa Valverde MD      Presenting Problem:   UTI (urinary tract infection) [N39.0]  Fever, unspecified fever cause [R50.9]  Altered mental status, unspecified altered mental status type [R41.82]  Urinary tract infection associated with indwelling urethral catheter, initial encounter (HCC) [T83.511A, N39.0]    Active and Resolved Hospital Problems:  Active Hospital Problems    Diagnosis POA   • **UTI (urinary tract infection) [N39.0] Yes     Priority: High   • Acute metabolic encephalopathy [G93.41] Yes     Priority: High   • Morbid obesity (HCC) [E66.01] Yes     Priority: Medium   • Dementia (HCC) [F03.90] Yes     Priority: Medium   • Atrial fibrillation (HCC) [I48.91] Yes     Priority: Medium   • Cerebrovascular accident (HCC) [I63.9] Yes     Priority: Medium   • 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     The patient presented to the emergency room on 3/25/2023 for evaluation of confusion and fevers.    The patient was diagnosed with acute metabolic encephalopathy due to urinary tract infection in the ED and admitted to the hospital service.  He was seen by cardiologist for history of atrial fibrillation with normal LVEF 60-65%.  Urine cultures grew ESBL Proteus mirabilis.  He will be discharged back to [facility] and  will need to complete ertapenem.          DISCHARGE Follow Up Recommendations for labs and diagnostics:      Reasons For Change In Medications and Indications for New Medications:      Day of Discharge     Vital Signs:  Temp:  [97.5 °F (36.4 °C)-98.3 °F (36.8 °C)] 98.3 °F (36.8 °C)  Heart Rate:  [44-66] 57  Resp:  [14-18] 16  BP: (114-164)/(65-93) 157/93    Physical Exam:  Physical Exam  HENT:      Head: Normocephalic.      Nose: Nose normal.   Eyes:      Extraocular Movements: Extraocular movements intact.      Pupils: Pupils are equal, round, and reactive to light.   Cardiovascular:      Rate and Rhythm: Normal rate and regular rhythm.   Pulmonary:      Effort: Pulmonary effort is normal.   Abdominal:      General: Bowel sounds are normal.   Genitourinary:     Comments: Motley catheter.  Musculoskeletal:      Cervical back: Normal range of motion.      Comments: Decreased range of motion hips.   Skin:     General: Skin is warm.   Neurological:      Mental Status: He is alert. Mental status is at baseline.   Psychiatric:         Mood and Affect: Mood normal.                Pertinent  and/or Most Recent Results     LAB RESULTS:      Lab 03/28/23  0759 03/26/23 2346 03/26/23 0431 03/25/23 2115 03/25/23 2111   WBC 3.40 5.10 5.70  --  4.70   HEMOGLOBIN 11.7* 11.0* 11.0*  --  11.7*   HEMATOCRIT 36.9* 34.4* 34.9*  --  38.0   PLATELETS 215 193 197  --  209   NEUTROS ABS  --   --   --   --  2.90   LYMPHS ABS  --   --   --   --  1.10   MONOS ABS  --   --   --   --  0.40   EOS ABS  --   --   --   --  0.20   MCV 80.9 80.2 80.2  --  81.4   PROCALCITONIN  --   --   --   --  0.07   LACTATE  --   --   --  1.0  --          Lab 03/28/23  0759 03/26/23 2346 03/26/23 0431 03/25/23 2111   SODIUM 143 141 139 136   POTASSIUM 3.4* 3.7 4.1 4.1   CHLORIDE 103 103 102 100   CO2 29.0 26.0 29.0 28.0   ANION GAP 11.0 12.0 8.0 8.0   BUN 10 17 21 22   CREATININE 0.83 0.86 1.01 1.01   EGFR 90.1 89.2 76.6 76.6   GLUCOSE 111* 127* 113* 104*    CALCIUM 9.0 8.8 8.6 8.7         Lab 03/25/23  2111   TOTAL PROTEIN 6.7   ALBUMIN 3.1*   GLOBULIN 3.6   ALT (SGPT) 9   AST (SGOT) 13   BILIRUBIN 0.4   ALK PHOS 101         Lab 03/27/23  0242 03/26/23  2346   HSTROP T 74* 73*                 Lab 03/25/23  2117   PH, ARTERIAL 7.370   PCO2, ARTERIAL 55.6*   PO2 ART 27.7*   O2 SATURATION ART 48.4*   FIO2 28   HCO3 ART 32.1*   BASE EXCESS ART 5.3*     Brief Urine Lab Results  (Last result in the past 365 days)      Color   Clarity   Blood   Leuk Est   Nitrite   Protein   CREAT   Urine HCG        03/25/23 2128 Dark Yellow   Cloudy   Large (3+)   Large (3+)   Positive   >=300 mg/dL (3+)               Microbiology Results (last 10 days)     Procedure Component Value - Date/Time    CANDIDA AURIS SCREEN - Swab, Axilla Right, Axilla Left and Groin [[id number]]  (Normal) Collected: 03/28/23 1118    Lab Status: Preliminary result Specimen: Swab from Axilla Right, Axilla Left and Groin Updated: 03/29/23 1130     Candida Auris Screen Culture No Candida auris isolated at 24 hours    Clostridioides difficile Toxin - Stool, Per Rectum [[id number]]  (Normal) Collected: 03/26/23 0332    Lab Status: Final result Specimen: Stool from Per Rectum Updated: 03/26/23 0710    Narrative:      The following orders were created for panel order Clostridioides difficile Toxin - Stool, Per Rectum.  Procedure                               Abnormality         Status                     ---------                               -----------         ------                     Clostridioides difficile...[[id number]]  Normal              Final result                 Please view results for these tests on the individual orders.    Gastrointestinal Panel, PCR - Stool, Per Rectum [[id number]]  (Normal) Collected: 03/26/23 0332    Lab Status: Final result Specimen: Stool from Per Rectum Updated: 03/26/23 0451     Campylobacter Not Detected     Plesiomonas shigelloides Not Detected     Salmonella Not Detected      Vibrio Not Detected     Vibrio cholerae Not Detected     Yersinia enterocolitica Not Detected     Enteroaggregative E. coli (EAEC) Not Detected     Enteropathogenic E. coli (EPEC) Not Detected     Enterotoxigenic E. coli (ETEC) lt/st Not Detected     Shiga-like toxin-producing E. coli (STEC) stx1/stx2 Not Detected     Shigella/Enteroinvasive E. coli (EIEC) Not Detected     Cryptosporidium Not Detected     Cyclospora cayetanensis Not Detected     Entamoeba histolytica Not Detected     Giardia lamblia Not Detected     Adenovirus F40/41 Not Detected     Astrovirus Not Detected     Norovirus GI/GII Not Detected     Rotavirus A Not Detected     Sapovirus (I, II, IV or V) Not Detected    Narrative:      If Aeromonas, Staphylococcus aureus or Bacillus cereus are suspected, please order [id number]: Stool Culture, Aeromonas, S aureus, B Cereus.    Clostridioides difficile EIA - Stool, Per Rectum [[id number]]  (Normal) Collected: 03/26/23 0332    Lab Status: Final result Specimen: Stool from Per Rectum Updated: 03/26/23 0710     C Diff GDH Ag Negative     C.diff Toxin Ag Negative    Narrative:      The result indicates the absence of toxigenic C.difficile from stool specimen.    Urine Culture - Urine, Indwelling Urethral Catheter [[id number]]  (Abnormal)  (Susceptibility) Collected: 03/25/23 2128    Lab Status: Final result Specimen: Urine from Indwelling Urethral Catheter Updated: 03/29/23 0834     Urine Culture >100,000 CFU/mL Proteus mirabilis ESBL     Comment:   Consider infectious disease consult.  Susceptibility results may not correlate to clinical outcomes.       Narrative:      ESBL confirmation in progress   Colonization of the urinary tract without infection is common. Treatment is discouraged unless the patient is symptomatic, pregnant, or undergoing an invasive urologic procedure.  Recent outcomes data supports the use of pip/tazo in the treatment of susceptible ESBL infections for uncomplicated UTI. Consider use of  pip/tazo as a carbapenem-sparing regimen in applicable patients.    Susceptibility      Proteus mirabilis ESBL      GAYLA      Amikacin Susceptible      Ertapenem Susceptible      Gentamicin Resistant     Levofloxacin Resistant     Meropenem Susceptible      Nitrofurantoin Resistant     Piperacillin + Tazobactam Susceptible      Tobramycin Resistant     Trimethoprim + Sulfamethoxazole Resistant                          Blood Culture - Blood, Arm, Right [[id number]]  (Abnormal) Collected: 03/25/23 2111    Lab Status: Final result Specimen: Blood from Arm, Right Updated: 03/28/23 0800     Blood Culture Staphylococcus, coagulase negative     Isolated from Aerobic Bottle     Gram Stain Aerobic Bottle Gram positive cocci in clusters    Narrative:      Probable contaminant requires clinical correlation, susceptibility not performed unless requested by physician.      Less than seven (7) mL's of blood was collected.  Insufficient quantity may yield false negative results.    Blood Culture - Blood, Arm, Right [[id number]]  (Normal) Collected: 03/25/23 2111    Lab Status: Preliminary result Specimen: Blood from Arm, Right Updated: 03/28/23 2131     Blood Culture No growth at 3 days    Narrative:      Less than seven (7) mL's of blood was collected.  Insufficient quantity may yield false negative results.    Blood Culture ID, PCR - Blood, Arm, Right [[id number]]  (Abnormal) Collected: 03/25/23 2111    Lab Status: Final result Specimen: Blood from Arm, Right Updated: 03/26/23 2109     BCID, PCR Staph spp, not aureus or lugdunensis. Identification by BCID2 PCR.     BOTTLE TYPE Aerobic Bottle          CT Head Without Contrast    Result Date: 3/26/2023  Impression: 1. No acute intracranial process. Parietal scalp soft tissue swelling MRI is more sensitive for the detection of acute nonhemorrhagic infarct. 2. Moderate to severe changes small vessel ischemic disease of indeterminate age, presumably mostly chronic. Volume loss.  Atherosclerosis. 3. Paranasal sinus disease.  Electronically signed by:  Baldev Pereira M.D.  3/25/2023 10:03 PM Mountain Time    XR Chest 1 View    Result Date: 3/26/2023  Impression: Impression: 1. Unchanged findings of mild to moderate CHF. Electronically Signed: Luisito Hernández  3/26/2023 7:20 AM EDT  Workstation ID: [id number]      Results for orders placed during the hospital encounter of 03/29/22    Duplex Venous Lower Extremity - Bilateral CAR    Interpretation Summary  · Normal bilateral lower extremity venous duplex scan.      Results for orders placed during the hospital encounter of 03/29/22    Duplex Venous Lower Extremity - Bilateral CAR    Interpretation Summary  · Normal bilateral lower extremity venous duplex scan.      Results for orders placed during the hospital encounter of 03/25/23    Adult Transthoracic Echo Complete w/ Color, Spectral and Contrast if Necessary Per Protocol    Interpretation Summary  •  Left ventricular systolic function is normal. Left ventricular ejection fraction appears to be 56 - 60%.  •  Left ventricular diastolic function is consistent with (grade II w/high LAP) pseudonormalization.  •  Left atrial volume is severely increased.  •  The right atrial cavity is moderate to severely  dilated.  •  Abnormal mitral valve structure consistent with dilated annulus.  •  Estimated right ventricular systolic pressure from tricuspid regurgitation is moderately elevated (45-55 mmHg).  •  Moderate pulmonary hypertension is present.      Labs Pending at Discharge:  Pending Labs     Order Current Status    Blood Culture - Blood, Arm, Right Preliminary result    CANDIDA AURIS SCREEN - Swab, Axilla Right, Axilla Left and Groin Preliminary result          Procedures Performed           Consults:   Consults     Date and Time Order Name Status Description    3/26/2023  4:14 PM Inpatient Cardiology Consult      3/26/2023 12:41 AM Hospitalist (on-call MD unless specified)               Discharge Details        Discharge Medications      New Medications      Instructions Start Date   ertapenem  Commonly known as: INVanz   1 g, Intravenous, Every 24 Hours         Continue These Medications      Instructions Start Date   acetaminophen 325 MG tablet  Commonly known as: TYLENOL   650 mg, Oral, Every 4 Hours PRN      albuterol sulfate  (90 Base) MCG/ACT inhaler  Commonly known as: PROVENTIL HFA;VENTOLIN HFA;PROAIR HFA   2 puffs, Inhalation, Every 4 Hours PRN      apixaban 5 MG tablet tablet  Commonly known as: ELIQUIS   5 mg, Oral, Every 12 Hours Scheduled      BASAGLAR KWIKPEN 100 UNIT/ML injection pen   10 Units, Subcutaneous, Nightly      carvedilol 12.5 MG tablet  Commonly known as: COREG   12.5 mg, Oral, 2 Times Daily With Meals      docusate calcium 240 MG capsule  Commonly known as: SURFAK   240 mg, Oral, Daily      escitalopram 10 MG tablet  Commonly known as: LEXAPRO   10 mg, Oral, Daily      finasteride 5 MG tablet  Commonly known as: PROSCAR   5 mg, Oral, Daily      furosemide 40 MG tablet  Commonly known as: LASIX   40 mg, Oral, 2 Times Daily      gabapentin 300 MG capsule  Commonly known as: NEURONTIN   300 mg, Oral, 3 Times Daily      hydrALAZINE 50 MG tablet  Commonly known as: APRESOLINE   50 mg, Oral, 3 Times Daily      isosorbide mononitrate 60 MG 24 hr tablet  Commonly known as: IMDUR   60 mg, Oral, 2 Times Daily (Nitrates)      losartan 25 MG tablet  Commonly known as: COZAAR   25 mg, Oral, Every 24 Hours Scheduled      methocarbamol 500 MG tablet  Commonly known as: ROBAXIN   500 mg, Oral, Every 12 Hours PRN      nitroglycerin 0.4 MG SL tablet  Commonly known as: NITROSTAT   0.4 mg, Sublingual, Every 5 Minutes PRN, Take no more than 3 doses in 15 minutes.      polycarbophil 625 MG tablet tablet   625 mg, Oral, Daily      Polyethylene Glycol 3350 powder   17 g, Oral, Nightly      saccharomyces boulardii 250 MG capsule  Commonly known as: FLORASTOR   250 mg, Oral, 2  Times Daily      simethicone 180 MG capsule  Commonly known as: MYLICON,GAS-X   180 mg, Oral, Every 6 Hours PRN      simvastatin 40 MG tablet  Commonly known as: ZOCOR   40 mg, Oral, Every Night at Bedtime      tamsulosin 0.4 MG capsule 24 hr capsule  Commonly known as: FLOMAX   1 capsule, Oral, Daily      traMADol 50 MG tablet  Commonly known as: ULTRAM   50 mg, Oral, Every 6 Hours PRN             No Known Allergies      Discharge Disposition:   Skilled Nursing Facility (DC - External)    Diet:  Hospital:  Diet Order   Procedures   • Diet: Regular/House Diet; Feeding Assistance - Nursing; Texture: Mechanical Ground (NDD 2); Fluid Consistency: Thin (IDDSI 0)         Discharge Activity: As tolerated      Discharge Condition: Hemodynamically stable      CODE STATUS:  Code Status and Medical Interventions:   Ordered at: 03/26/23 0107     Code Status (Patient has no pulse and is not breathing):    CPR (Attempt to Resuscitate)     Medical Interventions (Patient has pulse or is breathing):    Full Support         No future appointments.    Additional Instructions for the Follow-ups that You Need to Schedule     Discharge Follow-up with PCP   As directed       Currently Documented PCP:    Naa Valverde MD    PCP Phone Number:    [phone]     Follow Up Details: 2 weeks               Time spent on Discharge including face to face service:  45 minutes    This patient has been examined wearing appropriate Personal Protective Equipment and discussed with nursing. 03/29/23      Signature: Electronically signed by Will Browne DO, 03/29/23, 4:49 PM EDT.

## 2023-03-29 NOTE — PROGRESS NOTES
[facility] Daily Progress Note    Patient Name: Shaji Junior  : 1945  MRN: [id number]  Primary Care Physician:  Naa Valverde MD  Date of admission: 3/25/2023      Subjective      Chief Complaint: Abdominal pain      Patient Reports     3/27/2023: Admitted to PCU due to hypertensive urgency.  Off Cardene drip.  Can downgrade.  Complains of abdominal pain.  History of ESBL.  Does not walk.  Confusion getting better    3/28/23: Chronic Motley catheter.  Awaiting urine culture sensitivities.    3/29/23: ESBL UTI.  Will order ertapenem.  DC back to [place].    Review of Systems   Unable to perform ROS: mental status change         Objective      Vitals:   Temp:  [97.5 °F (36.4 °C)-98.3 °F (36.8 °C)] 98.3 °F (36.8 °C)  Heart Rate:  [44-66] 57  Resp:  [14-18] 16  BP: (114-164)/(65-93) 157/93    Physical Exam  HENT:      Head: Normocephalic.      Mouth/Throat:      Mouth: Mucous membranes are moist.   Eyes:      Extraocular Movements: Extraocular movements intact.      Pupils: Pupils are equal, round, and reactive to light.   Cardiovascular:      Rate and Rhythm: Normal rate and regular rhythm.   Pulmonary:      Effort: Pulmonary effort is normal.   Abdominal:      General: Bowel sounds are normal.      Tenderness: There is no abdominal tenderness.      Comments: Obese abdomen.   Musculoskeletal:      Comments: Decreased range of motion hips   Skin:     General: Skin is warm.      Findings: No rash.   Neurological:      Mental Status: He is alert. He is confused.   Psychiatric:         Behavior: Behavior is cooperative.             Result Review    Result Review:  I have personally reviewed the results from the time of this admission to 3/29/2023 17:07 EDT and agree with these findings:  [x]  Laboratory  []  Microbiology  [x]  Radiology  []  EKG/Telemetry   []  Cardiology/Vascular   []  Pathology  [x]  Old records  []  Other:      Wounds (last 24 hours)     LDA Wound      Row Name 03/29/23 0800 03/28/23 2010          Wound 03/10/23 0938 Right gluteal MASD (Moisture associated skin damage)    Wound - Properties Group Placement Date: 03/10/23  -HW Placement Time: 0938 -HW Present on Hospital Admission: Y  -HW Side: Right  -HW Location: gluteal  -HW Primary Wound Type: MASD  -HW    Dressing Appearance open to air  -BS open to air  -LH     Base pink;red  -BS red;pink  -LH     Drainage Amount none  -BS none  -LH     Care, Wound barrier applied  -BS barrier applied  -LH     Dressing Care open to air  -BS open to air  -LH     Periwound Care dry periwound area maintained  -BS --     Retired Wound - Properties Group Placement Date: 03/10/23  -HW Placement Time: 0938 -HW Present on Hospital Admission: Y  -HW Side: Right  -HW Location: gluteal  -HW Primary Wound Type: MASD  -HW    Retired Wound - Properties Group Date first assessed: 03/10/23  -HW Time first assessed: 0938 -HW Present on Hospital Admission: Y  -HW Side: Right  -HW Location: gluteal  -HW Primary Wound Type: MASD  -HW       Wound 03/26/23 1653 Right anterior greater trochanter Blisters    Wound - Properties Group Placement Date: 03/26/23  -NE Placement Time: 1653 -NE Present on Hospital Admission: Y  -NE Side: Right  -NE Orientation: anterior  -NE Location: greater trochanter  -NE Primary Wound Type: Blisters  -NE    Dressing Appearance -- open to air  -LH     Closure Open to air  -BS Open to air  -LH     Base red  -BS red  -LH     Periwound dry;intact;pink  -BS dry;redness;intact  -LH     Periwound Temperature warm  -BS warm  -LH     Periwound Skin Turgor soft  -BS soft  -LH     Drainage Characteristics/Odor serosanguineous  -BS --     Drainage Amount scant  -BS none  -LH     Care, Wound -- soap and water  -LH     Periwound Care dry periwound area maintained  -BS --     Retired Wound - Properties Group Placement Date: 03/26/23  -NE Placement Time: 1653 -NE Present on Hospital Admission: Y  -NE Side: Right  -NE  Orientation: anterior  -NE Location: greater trochanter  -NE Primary Wound Type: Blisters  -NE    Retired Wound - Properties Group Date first assessed: 03/26/23  -NE Time first assessed: 1653 -NE Present on Hospital Admission: Y  -NE Side: Right  -NE Location: greater trochanter  -NE Primary Wound Type: Blisters  -NE       Wound 03/26/23 1654 Right gluteal Pressure Injury    Wound - Properties Group Placement Date: 03/26/23 -NE Placement Time: 1654 -NE Present on Hospital Admission: Y  -NE Side: Right  -NE Location: gluteal  -NE, STAGE 2  Primary Wound Type: Pressure inj  -NE    Pressure Injury Stage -- 2  -LH     Dressing Appearance -- open to air  -LH     Closure --  barrier cream applied  -BS --     Base pink;red  -BS pink;red  -LH     Periwound -- pink  -LH     Periwound Temperature -- warm  -LH     Periwound Skin Turgor -- soft  -LH     Edges -- irregular  -LH     Drainage Characteristics/Odor serosanguineous  -BS --     Drainage Amount scant  -BS none  -LH     Care, Wound -- barrier applied;cleansed with;soap and water  -LH     Periwound Care dry periwound area maintained  -BS --     Retired Wound - Properties Group Placement Date: 03/26/23  -NE Placement Time: 1654 -NE Present on Hospital Admission: Y  -NE Side: Right  -NE Location: gluteal  -NE, STAGE 2  Primary Wound Type: Pressure inj  -NE    Retired Wound - Properties Group Date first assessed: 03/26/23  -NE Time first assessed: 1654 -NE Present on Hospital Admission: Y  -NE Side: Right  -NE Location: gluteal  -NE, STAGE 2  Primary Wound Type: Pressure inj  -NE       Wound 03/26/23 1702 Left anterior thigh Skin Tear    Wound - Properties Group Placement Date: 03/26/23 -NE Placement Time: 1702 -NE Present on Hospital Admission: Y  -NE Side: Left  -NE Orientation: anterior  -NE Location: thigh  -NE Primary Wound Type: Skin tear  -NE    Dressing Appearance open to air  -BS open to air  -LH     Closure Open to air  -BS Open to air  -LH     Base pink   -BS pink  -LH     Drainage Amount none  -BS none  -LH     Care, Wound cleansed with;sterile normal saline  -BS --     Periwound Care dry periwound area maintained  -BS --     Retired Wound - Properties Group Placement Date: 03/26/23  -NE Placement Time: 1702 -NE Present on Hospital Admission: Y  -NE Side: Left  -NE Orientation: anterior  -NE Location: thigh  -NE Primary Wound Type: Skin tear  -NE    Retired Wound - Properties Group Date first assessed: 03/26/23  -NE Time first assessed: 1702 -NE Present on Hospital Admission: Y  -NE Side: Left  -NE Location: thigh  -NE Primary Wound Type: Skin tear  -NE          User Key  (r) = Recorded By, (t) = Taken By, (c) = Cosigned By    Initials Name Provider Type    NE Jordan Jacobson, RN Registered Nurse     Ignacia Calles RN Registered Nurse    Anny Valencia RN Registered Nurse    Caridad Posey LPN Licensed Nurse                  Assessment & Plan      Brief Patient Summary:      amLODIPine, 10 mg, Oral, Q24H  atorvastatin, 20 mg, Oral, Nightly  carvedilol, 12.5 mg, Oral, BID With Meals  cloNIDine, 0.1 mg, Oral, Q12H  enoxaparin, 1 mg/kg, Subcutaneous, Q12H  ertapenem, 1 g, Intravenous, Q24H  escitalopram, 10 mg, Oral, Daily  finasteride, 5 mg, Oral, Daily  gabapentin, 300 mg, Oral, TID  hydrALAZINE, 50 mg, Oral, TID  insulin lispro, 2-7 Units, Subcutaneous, TID With Meals  lidocaine, 20 mL, Subcutaneous, Once  nitroglycerin, 1 inch, Topical, Q6H  sodium chloride, 10 mL, Intravenous, Q12H  sodium chloride, 10 mL, Intravenous, Q12H  tamsulosin, 0.4 mg, Oral, Daily       lactated ringers, 50 mL/hr, Last Rate: 50 mL/hr (03/29/23 1234)  niCARdipine, 5-15 mg/hr, Last Rate: Stopped (03/27/23 0743)  Pharmacy Consult,   Pharmacy to Dose enoxaparin (LOVENOX),          Active Hospital Problems:  Active Hospital Problems    Diagnosis    • **UTI (urinary tract infection)    • Acute metabolic encephalopathy    • Morbid obesity (HCC)    • Dementia (HCC)    • Atrial  fibrillation (HCC)    • Cerebrovascular accident (HCC)    • Type 2 diabetes mellitus with diabetic nephropathy (HCC)      Urinary tract infection    Hypertensive urgency  Acute toxic/metabolic encephalopathy  Atrial fibrillation  Normocytic anemia  Hyperlipidemia  BPH    Plan:  -Patient off Cardene drip and downgraded  -Confusion getting better likely due to UTI  -Nonambulatory at baseline  Urine culture with Proteus mirabilis  1 blood culture 3/25/2023--> coagulase-negative staph.  Contamination.  -CT head on admission ruled out acute intracranial pathology.      DVT prophylaxis:  Medical DVT prophylaxis orders are present.    CODE STATUS:    Code Status (Patient has no pulse and is not breathing): CPR (Attempt to Resuscitate)  Medical Interventions (Patient has pulse or is breathing): Full Support      Disposition:  I expect patient to be discharged 1-2 days.    This patient has been examined wearing appropriate Personal Protective Equipment and discussed with nursing. 03/29/23      Electronically signed by Will Browne DO, 03/29/23, 17:07 EDT.  [facility] Hospitalist Team

## 2023-03-29 NOTE — PLAN OF CARE
Goal Outcome Evaluation:   Pt being cooperative and compliant.  Pt allow staff to reposition him and complete a bed bath.  Pt denies any pain at present time and heel protector on may feet.  Pt less confused and oriented to person, and place.  Pt confused to time and situation.  Will continue to monitor pt status.

## 2023-03-29 NOTE — PLAN OF CARE
Goal Outcome Evaluation:              Outcome Evaluation: Pt is disoriented to place, situation and time. Able to voice needs but is uncooperative with care at times. Pt is bed bound, q2 turn and incontinent of bowel. Pt has a chronic F/C. Cath care complete. Pt was total feed but did feed himself some with speech.  Pt is from [place], but family does not want pt to return, and appealed d/c today. CM is discussing options with family.  Pt had mid-line place in R upper arm. Pt is resting in bed with call bell within reach and bed alarm on.

## 2023-03-29 NOTE — PLAN OF CARE
"Goal Outcome Evaluation:      Shaji Junior presents with functional mobility impairments which indicate the need for skilled intervention. Pt displayed improvement in bed mobility on this date. Pt able to \"walk\" BLE off bed to initiate supine to sitting eob. Pt required max Ax2 supine <> sitting eob with cuing for proper technique. Pt sat eob ~15 minutes requiring CGA to maintain sitting balance. Pt attempted two STS with Max A x2 with the inability to display trunk extension. Pt continues to be limited by decreased activity tolerance and functional strength. Tolerating session today without incident. Will continue to follow and progress as tolerated.            "

## 2023-03-29 NOTE — CONSULTS
picc team consult:  18g power injectable midline placed to rue cephalic vein per md order. Pt to receive discharge antibiotics. Midline ready for use. Pt tolerated procedure well without complication. rn aware

## 2023-03-29 NOTE — CASE MANAGEMENT/SOCIAL WORK
Continued Stay Note   [facility]     Patient Name: Shaji Junior  MRN: [id number]  Today's Date: 3/29/2023    Admit Date: 3/25/2023    Plan: Return to [facility]. No precert required. PASRR per facility. Pending appeal.   Discharge Plan     Row Name 03/29/23 1543       Plan    Plan Return to [facility]. No precert required. PASRR per facility. Pending appeal.    Patient/Family in Agreement with Plan yes    Plan Comments CM contacted patient's sister Michelle. Neither she nor  have heard anything back from [facility]. Notified her that physician has ordered discharge. Sister does not want pt to return to facility, so wants to appeal discharge. Emailed copy of IMM letter to her email and informed her how to call for an appeal. Asked that she contact CM once she has completed the appeal phone call. Updated nursing.              Phone communication or documentation only - no physical contact with patient or family.      Megan Naegele, RN      Office Phone: [phone]  Office Cell: [phone]

## 2023-03-30 LAB
BACTERIA SPEC AEROBE CULT: NORMAL
GLUCOSE BLDC GLUCOMTR-MCNC: 109 MG/DL (ref 70–105)
GLUCOSE BLDC GLUCOMTR-MCNC: 140 MG/DL (ref 70–105)

## 2023-03-30 PROCEDURE — 25010000002 HYDRALAZINE PER 20 MG: Performed by: STUDENT IN AN ORGANIZED HEALTH CARE EDUCATION/TRAINING PROGRAM

## 2023-03-30 PROCEDURE — 83735 ASSAY OF MAGNESIUM: CPT | Performed by: HOSPITALIST

## 2023-03-30 PROCEDURE — 25010000002 ENOXAPARIN PER 10 MG: Performed by: STUDENT IN AN ORGANIZED HEALTH CARE EDUCATION/TRAINING PROGRAM

## 2023-03-30 PROCEDURE — 25010000002 ERTAPENEM PER 500 MG: Performed by: HOSPITALIST

## 2023-03-30 PROCEDURE — 82962 GLUCOSE BLOOD TEST: CPT

## 2023-03-30 PROCEDURE — 80048 BASIC METABOLIC PNL TOTAL CA: CPT | Performed by: HOSPITALIST

## 2023-03-30 PROCEDURE — 25010000002 HYDRALAZINE PER 20 MG: Performed by: HOSPITALIST

## 2023-03-30 RX ORDER — CLONIDINE HYDROCHLORIDE 0.1 MG/1
0.2 TABLET ORAL EVERY 12 HOURS SCHEDULED
Status: DISCONTINUED | OUTPATIENT
Start: 2023-03-30 | End: 2023-03-31

## 2023-03-30 RX ORDER — HYDRALAZINE HYDROCHLORIDE 20 MG/ML
10 INJECTION INTRAMUSCULAR; INTRAVENOUS EVERY 6 HOURS PRN
Status: DISCONTINUED | OUTPATIENT
Start: 2023-03-30 | End: 2023-04-04 | Stop reason: HOSPADM

## 2023-03-30 RX ORDER — ACETAMINOPHEN 325 MG/1
650 TABLET ORAL EVERY 6 HOURS PRN
Status: DISCONTINUED | OUTPATIENT
Start: 2023-03-30 | End: 2023-04-04 | Stop reason: HOSPADM

## 2023-03-30 RX ORDER — LABETALOL HYDROCHLORIDE 5 MG/ML
10 INJECTION, SOLUTION INTRAVENOUS EVERY 6 HOURS PRN
Status: DISCONTINUED | OUTPATIENT
Start: 2023-03-30 | End: 2023-04-04 | Stop reason: HOSPADM

## 2023-03-30 RX ADMIN — Medication 10 ML: at 08:32

## 2023-03-30 RX ADMIN — HYDRALAZINE HYDROCHLORIDE 5 MG: 20 INJECTION INTRAMUSCULAR; INTRAVENOUS at 15:01

## 2023-03-30 RX ADMIN — ENOXAPARIN SODIUM 135 MG: 150 INJECTION SUBCUTANEOUS at 14:14

## 2023-03-30 RX ADMIN — Medication 10 ML: at 21:45

## 2023-03-30 RX ADMIN — ENOXAPARIN SODIUM 135 MG: 150 INJECTION SUBCUTANEOUS at 03:47

## 2023-03-30 RX ADMIN — Medication 10 ML: at 08:34

## 2023-03-30 RX ADMIN — NITROGLYCERIN 1 INCH: 20 OINTMENT TOPICAL at 05:13

## 2023-03-30 RX ADMIN — CLONIDINE HYDROCHLORIDE 0.2 MG: 0.1 TABLET ORAL at 20:30

## 2023-03-30 RX ADMIN — LABETALOL HYDROCHLORIDE 10 MG: 5 INJECTION, SOLUTION INTRAVENOUS at 23:46

## 2023-03-30 RX ADMIN — ERTAPENEM SODIUM 1 G: 1 INJECTION, POWDER, LYOPHILIZED, FOR SOLUTION INTRAMUSCULAR; INTRAVENOUS at 12:30

## 2023-03-30 RX ADMIN — NITROGLYCERIN 1 INCH: 20 OINTMENT TOPICAL at 00:18

## 2023-03-30 RX ADMIN — NITROGLYCERIN 1 INCH: 20 OINTMENT TOPICAL at 12:31

## 2023-03-30 RX ADMIN — HYDRALAZINE HYDROCHLORIDE 10 MG: 20 INJECTION INTRAMUSCULAR; INTRAVENOUS at 20:30

## 2023-03-30 RX ADMIN — LABETALOL HYDROCHLORIDE 10 MG: 5 INJECTION, SOLUTION INTRAVENOUS at 18:29

## 2023-03-30 RX ADMIN — CARVEDILOL 12.5 MG: 6.25 TABLET, FILM COATED ORAL at 16:46

## 2023-03-30 RX ADMIN — NITROGLYCERIN 1 INCH: 20 OINTMENT TOPICAL at 23:07

## 2023-03-30 RX ADMIN — ATORVASTATIN CALCIUM 20 MG: 20 TABLET, FILM COATED ORAL at 20:30

## 2023-03-30 RX ADMIN — GABAPENTIN 300 MG: 300 CAPSULE ORAL at 20:30

## 2023-03-30 RX ADMIN — NITROGLYCERIN 1 INCH: 20 OINTMENT TOPICAL at 18:23

## 2023-03-30 NOTE — CASE MANAGEMENT/SOCIAL WORK
Case Management/Social Work    Patient Name:  Shaji Junior  YOB: 1945  MRN: [id number]  Admit Date:  3/25/2023        Patient has active discharge appeal with [facility] and will remain inhouse until determination has been received from [facility].      Records sent to [facility] as requested.  There is a copy of appeal records bundle in media if patient requests copy of records sent as per patient rights.    Appeal information and confirmation of records upload follow.              Electronically signed by:  Ben Mcdaniel CMA  03/30/23 12:19 EDT     Ben Mcdaniel  Case Management Associate  [address] [address]  P: [phone]  F: [phone]

## 2023-03-30 NOTE — CASE MANAGEMENT/SOCIAL WORK
"Continued Stay Note  [facility]     Patient Name: Shaji Junior  MRN: [id number]  Today's Date: 3/30/2023    Admit Date: 3/25/2023    Plan: Return to [facility], no precert required. PASRR per facility. Discharge appeal pending.   Discharge Plan     Row Name 03/30/23 1547       Plan    Plan Return to [facility], no precert required. PASRR per facility. Discharge appeal pending.    Patient/Family in Agreement with Plan yes    Plan Comments CM received email from patient's sister Michelle which included document stating she is patient's guardian. Printed form and faxed to HIM stat and placed hard copy on chart. CM called Michelle and asked if she had called [facility] for discharge appeal. She called  back and reported she had not yet but would then. Michelle called  back and provided case # [id number]. Completed \"Detailed Notice of Discharge\" form and emailed copy to Michelle. Awaiting decision from [facility].              Phone communication or documentation only - no physical contact with patient or family.      Megan Naegele, RN      Office Phone: [phone]  Office Cell: [phone]    "

## 2023-03-30 NOTE — SIGNIFICANT NOTE
Patient's blood pressure remains elevated 150s over 80s, patient refusing his blood pressure medication currently.  Patient has had ongoing confusion currently being treated for UTI and underlying dementia.

## 2023-03-30 NOTE — NURSING NOTE
Patient is refusing AM labs. Patient educated on importance of lab monitoring while in the hospital to monitor various health conditions and consequences of refusing and continues to refuse. Patient also refusing to be turned. x1 turn has been completed at this time during this shift but patient refuses to be turned on 0000 round.

## 2023-03-30 NOTE — PLAN OF CARE
Goal Outcome Evaluation: No change    Patient A&OX2, uncooperative with cares at times. Denies pain. Urine has been dark yellow, clear. Patient had discharge orders but ultimately did not d/c r/t family wishes to find new facility to send patient too. Patient left resting quietly in bed. Bed locked, low, clwr, fall precautions in place, turned left with pillow support in place

## 2023-03-30 NOTE — NURSING NOTE
Call placed to on call provider Nino MONTANO notifying him of HS medication refusal by patient. Notified that this nurse attempted x3 to administer the medications, had another nurse attempt to administer the medications, and educated the patient on importance of taking the medication and risk of refusal and continues to refuse. Stated understanding. NNO.

## 2023-03-30 NOTE — CASE MANAGEMENT/SOCIAL WORK
[facility] and will remain inhouse until determination has been received from [facility].      Will send records as requested when gathering and additional documents have been completed.            Sandra Mulligan  Utilization Review Coordinator  [address]  [address]  Ph: [phone]  Fx: [phone]

## 2023-03-30 NOTE — PLAN OF CARE
Problem: Adult Inpatient Plan of Care  Goal: Plan of Care Review  Outcome: Ongoing, Not Progressing  Flowsheets (Taken 3/30/2023 1335)  Progress: no change  Plan of Care Reviewed With: patient  Goal: Patient-Specific Goal (Individualized)  Outcome: Ongoing, Not Progressing  Goal: Absence of Hospital-Acquired Illness or Injury  Outcome: Ongoing, Not Progressing  Intervention: Identify and Manage Fall Risk  Recent Flowsheet Documentation  Taken 3/30/2023 1228 by Isamar Begum LPN  Safety Promotion/Fall Prevention:   assistive device/personal items within reach   clutter free environment maintained   fall prevention program maintained   nonskid shoes/slippers when out of bed   room organization consistent   safety round/check completed  Taken 3/30/2023 1055 by Isamar Begum LPN  Safety Promotion/Fall Prevention:   assistive device/personal items within reach   clutter free environment maintained   fall prevention program maintained   nonskid shoes/slippers when out of bed   room organization consistent   safety round/check completed  Taken 3/30/2023 0805 by Isamar Begum LPN  Safety Promotion/Fall Prevention:   assistive device/personal items within reach   clutter free environment maintained   fall prevention program maintained   nonskid shoes/slippers when out of bed   room organization consistent   safety round/check completed  Intervention: Prevent Skin Injury  Recent Flowsheet Documentation  Taken 3/30/2023 0805 by Isamar Begum LPN  Body Position: supine  Intervention: Prevent and Manage VTE (Venous Thromboembolism) Risk  Recent Flowsheet Documentation  Taken 3/30/2023 0805 by Isamar Begum LPN  Activity Management:   activity adjusted per tolerance   bedrest  VTE Prevention/Management: sequential compression devices off  Range of Motion:   active ROM (range of motion) encouraged   ROM (range of motion) performed  Intervention: Prevent Infection  Recent Flowsheet Documentation  Taken 3/30/2023 1228 by  Isamar Begum LPN  Infection Prevention:   environmental surveillance performed   hand hygiene promoted   personal protective equipment utilized   single patient room provided  Taken 3/30/2023 1055 by Isamar Begum LPN  Infection Prevention:   environmental surveillance performed   hand hygiene promoted   rest/sleep promoted   single patient room provided   personal protective equipment utilized  Taken 3/30/2023 0805 by Isamar Begum LPN  Infection Prevention:   environmental surveillance performed   hand hygiene promoted   personal protective equipment utilized   rest/sleep promoted   single patient room provided  Goal: Optimal Comfort and Wellbeing  Outcome: Ongoing, Not Progressing  Intervention: Provide Person-Centered Care  Recent Flowsheet Documentation  Taken 3/30/2023 0805 by Isamar Begum LPN  Trust Relationship/Rapport:   care explained   choices provided   questions answered  Goal: Readiness for Transition of Care  Outcome: Ongoing, Not Progressing     Problem: Fall Injury Risk  Goal: Absence of Fall and Fall-Related Injury  Outcome: Ongoing, Not Progressing  Intervention: Identify and Manage Contributors  Recent Flowsheet Documentation  Taken 3/30/2023 1228 by Isamar Begum LPN  Medication Review/Management:   medications reviewed   high-risk medications identified  Taken 3/30/2023 1055 by Isamar Begum LPN  Medication Review/Management:   medications reviewed   high-risk medications identified  Taken 3/30/2023 0805 by Isamar Begum LPN  Medication Review/Management:   medications reviewed   high-risk medications identified  Self-Care Promotion: BADL personal objects within reach  Intervention: Promote Injury-Free Environment  Recent Flowsheet Documentation  Taken 3/30/2023 1228 by Isamar Begum LPN  Safety Promotion/Fall Prevention:   assistive device/personal items within reach   clutter free environment maintained   fall prevention program maintained   nonskid shoes/slippers when out  of bed   room organization consistent   safety round/check completed  Taken 3/30/2023 1055 by Isamar Begum LPN  Safety Promotion/Fall Prevention:   assistive device/personal items within reach   clutter free environment maintained   fall prevention program maintained   nonskid shoes/slippers when out of bed   room organization consistent   safety round/check completed  Taken 3/30/2023 0805 by Isamar Begum LPN  Safety Promotion/Fall Prevention:   assistive device/personal items within reach   clutter free environment maintained   fall prevention program maintained   nonskid shoes/slippers when out of bed   room organization consistent   safety round/check completed     Problem: Skin Injury Risk Increased  Goal: Skin Health and Integrity  Outcome: Ongoing, Not Progressing  Intervention: Optimize Skin Protection  Recent Flowsheet Documentation  Taken 3/30/2023 0805 by Isamar Begum LPN  Pressure Reduction Techniques:   frequent weight shift encouraged   weight shift assistance provided  Head of Bed (HOB) Positioning: HOB at 30-45 degrees  Pressure Reduction Devices: specialty bed utilized     Problem: UTI (Urinary Tract Infection)  Goal: Improved Infection Symptoms  Outcome: Ongoing, Not Progressing   Goal Outcome Evaluation:  Plan of Care Reviewed With: patient        Progress: no change   Alert and oriented x 3. Able to verbalize needs and wants. Continues to refuse to take PO medication, offered crushed in pudding/applesauce, whole in pudding/applesauce and whole with a drink-refused all methods. Education given as to potential complications r/t noncompliance. Continues to receive IV Invanz, no s/s of adverse/allergic reaction noted. Currently receiving LR at 50 ml/hr and tolerating well. Continues to be followed by cardiology. No s/s of hyper/hypoglycemia noted. Refused to allow staff to perform noon accucheck, education given-refused x 3. Refused to allow staff to perform turn in bed, education given.  Currently in bed, awake. Call bell in reach. No c/o pain/discomfort/SOB noted. Does not require O2 therapy at this time. Discharge appealed by family, they do no want patient to return to [place].

## 2023-03-30 NOTE — PROGRESS NOTES
[facility] Daily Progress Note    Patient Name: Shaji Junior  : 1945  MRN: [id number]  Primary Care Physician:  Naa Valverde MD  Date of admission: 3/25/2023      Subjective      Chief Complaint: Abdominal pain      Patient Reports     3/27/2023: Admitted to PCU due to hypertensive urgency.  Off Cardene drip.  Can downgrade.  Complains of abdominal pain.  History of ESBL.  Does not walk.  Confusion getting better    3/28/23: Chronic Motley catheter.  Awaiting urine culture sensitivities.    3/29/23: ESBL UTI.  Will order ertapenem.  Family does not want him to go back to [place].    3/30/23: Patient refusing meds.  Hemodynamically stable.    Review of Systems   Unable to perform ROS: mental status change         Objective      Vitals:   Temp:  [97.5 °F (36.4 °C)-98.4 °F (36.9 °C)] 97.8 °F (36.6 °C)  Heart Rate:  [52-66] 62  Resp:  [16] 16  BP: (153-189)/(77-95) 183/91  Flow (L/min):  [2] 2    Physical Exam  HENT:      Head: Normocephalic.      Mouth/Throat:      Mouth: Mucous membranes are moist.   Eyes:      Extraocular Movements: Extraocular movements intact.      Pupils: Pupils are equal, round, and reactive to light.   Cardiovascular:      Rate and Rhythm: Normal rate and regular rhythm.   Pulmonary:      Effort: Pulmonary effort is normal.   Abdominal:      General: Bowel sounds are normal.      Tenderness: There is no abdominal tenderness.      Comments: Obese abdomen.   Musculoskeletal:      Comments: Decreased range of motion hips   Skin:     General: Skin is warm.      Findings: No rash.   Neurological:      Mental Status: He is alert. He is confused.   Psychiatric:         Behavior: Behavior is cooperative.             Result Review    Result Review:  I have personally reviewed the results from the time of this admission to 3/30/2023 14:21 EDT and agree with these findings:  [x]  Laboratory  []  Microbiology  [x]  Radiology  []  EKG/Telemetry   []   Cardiology/Vascular   []  Pathology  [x]  Old records  []  Other:      Wounds (last 24 hours)     LDA Wound     Row Name 03/29/23 2000             Wound 03/10/23 0938 Right gluteal MASD (Moisture associated skin damage)    Wound - Properties Group Placement Date: 03/10/23  -HW Placement Time: 0938 -HW Present on Hospital Admission: Y  -HW Side: Right  -HW Location: gluteal  -HW Primary Wound Type: MASD  -HW    Base pink;red  -KM      Drainage Amount none  -KM      Retired Wound - Properties Group Placement Date: 03/10/23  -HW Placement Time: 0938 -HW Present on Hospital Admission: Y  -HW Side: Right  -HW Location: gluteal  -HW Primary Wound Type: MASD  -HW    Retired Wound - Properties Group Date first assessed: 03/10/23  -HW Time first assessed: 0938 -HW Present on Hospital Admission: Y  -HW Side: Right  -HW Location: gluteal  -HW Primary Wound Type: MASD  -HW       Wound 03/26/23 1653 Right anterior greater trochanter Blisters    Wound - Properties Group Placement Date: 03/26/23  -NE Placement Time: 1653 -NE Present on Hospital Admission: Y  -NE Side: Right  -NE Orientation: anterior  -NE Location: greater trochanter  -NE Primary Wound Type: Blisters  -NE    Closure Open to air  -KM      Base red  -KM      Periwound dry;intact;pink  -KM      Periwound Temperature warm  -KM      Periwound Skin Turgor soft  -KM      Drainage Characteristics/Odor serosanguineous  -KM      Drainage Amount scant  -KM      Care, Wound soap and water  -KM      Periwound Care dry periwound area maintained  -KM      Retired Wound - Properties Group Placement Date: 03/26/23  -NE Placement Time: 1653 -NE Present on Hospital Admission: Y  -NE Side: Right  -NE Orientation: anterior  -NE Location: greater trochanter  -NE Primary Wound Type: Blisters  -NE    Retired Wound - Properties Group Date first assessed: 03/26/23  -NE Time first assessed: 1653 -NE Present on Hospital Admission: Y  -NE Side: Right  -NE Location: greater trochanter   -NE Primary Wound Type: Blisters  -NE       Wound 03/26/23 1654 Right gluteal Pressure Injury    Wound - Properties Group Placement Date: 03/26/23 -NE Placement Time: 1654 -NE Present on Hospital Admission: Y  -NE Side: Right  -NE Location: gluteal  -NE, STAGE 2  Primary Wound Type: Pressure inj  -NE    Closure --  barrier cream applied  -KM      Base pink;red  -KM      Periwound pink  -KM      Periwound Temperature warm  -KM      Periwound Skin Turgor soft  -KM      Edges irregular  -KM      Drainage Characteristics/Odor serosanguineous  -KM      Drainage Amount scant  -KM      Care, Wound barrier applied;cleansed with;soap and water  -KM      Periwound Care dry periwound area maintained  -KM      Retired Wound - Properties Group Placement Date: 03/26/23 -NE Placement Time: 1654 -NE Present on Hospital Admission: Y  -NE Side: Right  -NE Location: gluteal  -NE, STAGE 2  Primary Wound Type: Pressure inj  -NE    Retired Wound - Properties Group Date first assessed: 03/26/23 -NE Time first assessed: 1654 -NE Present on Hospital Admission: Y  -NE Side: Right  -NE Location: gluteal  -NE, STAGE 2  Primary Wound Type: Pressure inj  -NE       Wound 03/26/23 1702 Left anterior thigh Skin Tear    Wound - Properties Group Placement Date: 03/26/23 -NE Placement Time: 1702 -NE Present on Hospital Admission: Y  -NE Side: Left  -NE Orientation: anterior  -NE Location: thigh  -NE Primary Wound Type: Skin tear  -NE    Closure Open to air  -KM      Base pink  -KM      Drainage Amount none  -KM      Retired Wound - Properties Group Placement Date: 03/26/23 -NE Placement Time: 1702 -NE Present on Hospital Admission: Y  -NE Side: Left  -NE Orientation: anterior  -NE Location: thigh  -NE Primary Wound Type: Skin tear  -NE    Retired Wound - Properties Group Date first assessed: 03/26/23 -NE Time first assessed: 1702 -NE Present on Hospital Admission: Y  -NE Side: Left  -NE Location: thigh  -NE Primary Wound Type: Skin tear   -NE          User Key  (r) = Recorded By, (t) = Taken By, (c) = Cosigned By    Initials Name Provider Type    NE Jordan Jacobson, RN Registered Nurse    Ignacia Villarreal RN Registered Nurse    Mesha Sanchez LPN Licensed Nurse                  Assessment & Plan      Brief Patient Summary:      amLODIPine, 10 mg, Oral, Q24H  atorvastatin, 20 mg, Oral, Nightly  carvedilol, 12.5 mg, Oral, BID With Meals  cloNIDine, 0.1 mg, Oral, Q12H  enoxaparin, 1 mg/kg, Subcutaneous, Q12H  ertapenem, 1 g, Intravenous, Q24H  escitalopram, 10 mg, Oral, Daily  finasteride, 5 mg, Oral, Daily  gabapentin, 300 mg, Oral, TID  hydrALAZINE, 50 mg, Oral, TID  insulin lispro, 2-7 Units, Subcutaneous, TID With Meals  lidocaine, 20 mL, Subcutaneous, Once  nitroglycerin, 1 inch, Topical, Q6H  sodium chloride, 10 mL, Intravenous, Q12H  sodium chloride, 10 mL, Intravenous, Q12H  tamsulosin, 0.4 mg, Oral, Daily       lactated ringers, 50 mL/hr, Last Rate: 50 mL/hr (03/29/23 1234)  niCARdipine, 5-15 mg/hr, Last Rate: Stopped (03/27/23 0743)  Pharmacy Consult,   Pharmacy to Dose enoxaparin (LOVENOX),          Active Hospital Problems:  Active Hospital Problems    Diagnosis    • **UTI (urinary tract infection)    • Acute metabolic encephalopathy    • Morbid obesity (HCC)    • Dementia (HCC)    • Atrial fibrillation (HCC)    • Cerebrovascular accident (HCC)    • Type 2 diabetes mellitus with diabetic nephropathy (HCC)      Urinary tract infection  Hypertensive urgency  Acute toxic/metabolic encephalopathy  Atrial fibrillation  Normocytic anemia  Hyperlipidemia  BPH    Plan:  -Patient off Cardene drip and downgraded from PCU  -Confusion getting better likely due to UTI  -Nonambulatory at baseline  Urine culture with ESBL Proteus mirabilis and started on ertapenem  1 blood culture 3/25/2023--> coagulase-negative staph.  Contamination.  -CT head on admission ruled out acute intracranial pathology.  -Family would like the patient to go to a new  facility.  Awaiting pre-CERT.  -Patient refuses medication sometimes.      DVT prophylaxis:  Medical DVT prophylaxis orders are present.    CODE STATUS:    Code Status (Patient has no pulse and is not breathing): CPR (Attempt to Resuscitate)  Medical Interventions (Patient has pulse or is breathing): Full Support      Disposition:  I expect patient to be discharged 1-2 days.    This patient has been examined wearing appropriate Personal Protective Equipment and discussed with nursing. 03/30/23      Electronically signed by Will Browne DO, 03/30/23, 14:21 EDT.  [facility] Hospitalist Team

## 2023-03-31 LAB
ANION GAP SERPL CALCULATED.3IONS-SCNC: 8 MMOL/L (ref 5–15)
BUN SERPL-MCNC: 13 MG/DL (ref 8–23)
BUN/CREAT SERPL: 15.5 (ref 7–25)
CALCIUM SPEC-SCNC: 8.1 MG/DL (ref 8.6–10.5)
CHLORIDE SERPL-SCNC: 104 MMOL/L (ref 98–107)
CO2 SERPL-SCNC: 25 MMOL/L (ref 22–29)
CREAT SERPL-MCNC: 0.84 MG/DL (ref 0.76–1.27)
EGFRCR SERPLBLD CKD-EPI 2021: 89.8 ML/MIN/1.73
GLUCOSE BLDC GLUCOMTR-MCNC: 103 MG/DL (ref 70–105)
GLUCOSE BLDC GLUCOMTR-MCNC: 113 MG/DL (ref 70–105)
GLUCOSE SERPL-MCNC: 107 MG/DL (ref 65–99)
MAGNESIUM SERPL-MCNC: 1.9 MG/DL (ref 1.6–2.4)
POTASSIUM SERPL-SCNC: 3.8 MMOL/L (ref 3.5–5.2)
QT INTERVAL: 517 MS
SODIUM SERPL-SCNC: 137 MMOL/L (ref 136–145)

## 2023-03-31 PROCEDURE — 82962 GLUCOSE BLOOD TEST: CPT

## 2023-03-31 PROCEDURE — 93005 ELECTROCARDIOGRAM TRACING: CPT | Performed by: HOSPITALIST

## 2023-03-31 PROCEDURE — 25010000002 HYDRALAZINE PER 20 MG: Performed by: HOSPITALIST

## 2023-03-31 PROCEDURE — 99233 SBSQ HOSP IP/OBS HIGH 50: CPT | Performed by: INTERNAL MEDICINE

## 2023-03-31 PROCEDURE — 93010 ELECTROCARDIOGRAM REPORT: CPT | Performed by: INTERNAL MEDICINE

## 2023-03-31 PROCEDURE — 25010000002 ERTAPENEM PER 500 MG: Performed by: HOSPITALIST

## 2023-03-31 PROCEDURE — 25010000002 ENOXAPARIN PER 10 MG: Performed by: STUDENT IN AN ORGANIZED HEALTH CARE EDUCATION/TRAINING PROGRAM

## 2023-03-31 PROCEDURE — 25010000002 ENOXAPARIN PER 10 MG: Performed by: HOSPITALIST

## 2023-03-31 RX ORDER — ENOXAPARIN SODIUM 150 MG/ML
1 INJECTION SUBCUTANEOUS EVERY 12 HOURS
Status: DISCONTINUED | OUTPATIENT
Start: 2023-03-31 | End: 2023-04-04 | Stop reason: HOSPADM

## 2023-03-31 RX ORDER — OXYCODONE HYDROCHLORIDE 5 MG/1
5 TABLET ORAL ONCE
Status: COMPLETED | OUTPATIENT
Start: 2023-03-31 | End: 2023-03-31

## 2023-03-31 RX ADMIN — AMLODIPINE BESYLATE 10 MG: 5 TABLET ORAL at 08:48

## 2023-03-31 RX ADMIN — HYDRALAZINE HYDROCHLORIDE 50 MG: 25 TABLET, FILM COATED ORAL at 20:44

## 2023-03-31 RX ADMIN — HYDRALAZINE HYDROCHLORIDE 50 MG: 25 TABLET, FILM COATED ORAL at 08:47

## 2023-03-31 RX ADMIN — Medication 10 ML: at 20:45

## 2023-03-31 RX ADMIN — FINASTERIDE 5 MG: 5 TABLET, FILM COATED ORAL at 08:47

## 2023-03-31 RX ADMIN — Medication 10 ML: at 09:30

## 2023-03-31 RX ADMIN — SODIUM CHLORIDE, POTASSIUM CHLORIDE, SODIUM LACTATE AND CALCIUM CHLORIDE 50 ML/HR: 600; 310; 30; 20 INJECTION, SOLUTION INTRAVENOUS at 20:50

## 2023-03-31 RX ADMIN — SODIUM CHLORIDE, POTASSIUM CHLORIDE, SODIUM LACTATE AND CALCIUM CHLORIDE 50 ML/HR: 600; 310; 30; 20 INJECTION, SOLUTION INTRAVENOUS at 05:25

## 2023-03-31 RX ADMIN — GABAPENTIN 300 MG: 300 CAPSULE ORAL at 08:48

## 2023-03-31 RX ADMIN — ESCITALOPRAM OXALATE 10 MG: 10 TABLET ORAL at 08:48

## 2023-03-31 RX ADMIN — ATORVASTATIN CALCIUM 20 MG: 20 TABLET, FILM COATED ORAL at 20:44

## 2023-03-31 RX ADMIN — NITROGLYCERIN 1 INCH: 20 OINTMENT TOPICAL at 05:23

## 2023-03-31 RX ADMIN — ENOXAPARIN SODIUM 135 MG: 150 INJECTION SUBCUTANEOUS at 20:45

## 2023-03-31 RX ADMIN — OXYCODONE HYDROCHLORIDE 5 MG: 5 TABLET ORAL at 11:22

## 2023-03-31 RX ADMIN — APIXABAN 5 MG: 5 TABLET, FILM COATED ORAL at 09:30

## 2023-03-31 RX ADMIN — TAMSULOSIN HYDROCHLORIDE 0.4 MG: 0.4 CAPSULE ORAL at 08:48

## 2023-03-31 RX ADMIN — ACETAMINOPHEN 650 MG: 325 TABLET, FILM COATED ORAL at 08:51

## 2023-03-31 RX ADMIN — CLONIDINE HYDROCHLORIDE 0.2 MG: 0.1 TABLET ORAL at 08:48

## 2023-03-31 RX ADMIN — ERTAPENEM SODIUM 1 G: 1 INJECTION, POWDER, LYOPHILIZED, FOR SOLUTION INTRAMUSCULAR; INTRAVENOUS at 11:18

## 2023-03-31 RX ADMIN — GABAPENTIN 300 MG: 300 CAPSULE ORAL at 20:45

## 2023-03-31 RX ADMIN — HYDRALAZINE HYDROCHLORIDE 10 MG: 20 INJECTION INTRAMUSCULAR; INTRAVENOUS at 22:03

## 2023-03-31 NOTE — CASE MANAGEMENT/SOCIAL WORK
[facility] determination received - reviewer agrees with termination of services (patient lost appeal). Notified JM Atkinson and saved determination to media.    Sandra Mulligan  Utilization Review Coordinator  [address]  [address]  Ph: [phone]  Fx: [phone]

## 2023-03-31 NOTE — CASE MANAGEMENT/SOCIAL WORK
Continued Stay Note   [facility]     Patient Name: Shaji Junior  MRN: [id number]  Today's Date: 3/31/2023    Admit Date: 3/25/2023    Plan: Return to [facility], no precert required. PASRR per facility. Discharge appeal denied 3/31.   Discharge Plan     Row Name 03/31/23 1620       Plan    Plan Return to [facility], no precert required. PASRR per facility. Discharge appeal denied 3/31.    Plan Comments [facility] CM updated nursing and Dr. Renee by secure chat; however, patient having bradycardia. Cardio re-consulted with plans to transfer to PCU. Discharge cancelled.              Phone communication or documentation only - no physical contact with patient or family.      Megan Naegele, RN      Office Phone: [phone]  Office Cell: [phone]

## 2023-03-31 NOTE — PROGRESS NOTES
CARDIOLOGY FOLLOW-UP PROGRESS NOTE      Reason for follow-up:  Atrial fibrillation with slow heart rate     Attending: Will Browne,*      Subjective .   Patient is drowsy and difficult to communicate with.  He is a poor historian but awakens when aroused    Review of system  Unable to perform due to mental state    Pertinent items are noted in HPI, all other systems reviewed and negative  Allergies: Patient has no known allergies.    Scheduled Meds:amLODIPine, 10 mg, Oral, Q24H  apixaban, 5 mg, Oral, Q12H  atorvastatin, 20 mg, Oral, Nightly  carvedilol, 12.5 mg, Oral, BID With Meals  ertapenem, 1 g, Intravenous, Q24H  escitalopram, 10 mg, Oral, Daily  finasteride, 5 mg, Oral, Daily  gabapentin, 300 mg, Oral, TID  hydrALAZINE, 50 mg, Oral, TID  insulin lispro, 2-7 Units, Subcutaneous, TID With Meals  lidocaine, 20 mL, Subcutaneous, Once  nitroglycerin, 1 inch, Topical, Q6H  sodium chloride, 10 mL, Intravenous, Q12H  sodium chloride, 10 mL, Intravenous, Q12H  tamsulosin, 0.4 mg, Oral, Daily        Continuous Infusions:lactated ringers, 50 mL/hr, Last Rate: 50 mL/hr (03/31/23 0525)        PRN Meds:.•  acetaminophen  •  acetaminophen  •  albuterol  •  dextrose  •  dextrose  •  glucagon (human recombinant)  •  hydrALAZINE  •  labetalol  •  nitroglycerin  •  ondansetron **OR** ondansetron  •  [COMPLETED] Insert Peripheral IV **AND** sodium chloride  •  sodium chloride  •  sodium chloride  •  sodium chloride  •  sodium chloride    Objective     VITAL SIGNS  Patient Vitals for the past 24 hrs:   BP Temp Temp src Pulse Resp SpO2 Weight   03/31/23 1309 -- -- -- (!) 40 -- -- --   03/31/23 1218 -- -- -- (!) 46 -- -- --   03/31/23 1158 147/82 -- -- (!) 44 -- 96 % --   03/31/23 0939 162/87 -- -- 54 -- -- --   03/31/23 0855 -- -- -- (!) 48 -- -- --   03/31/23 0851 -- -- -- 52 -- -- --   03/31/23 0847 (!) 200/115 -- -- 58 -- -- --   03/31/23 0840 (!) 200/115 -- -- 60 -- 94 % --   03/31/23 0523 (!) 162/105 -- -- 75 -- --  "--   03/31/23 0442 -- -- -- -- 15 -- (!) 138 kg (303 lb 14.4 oz)   03/30/23 2346 (!) 162/105 97.5 °F (36.4 °C) Oral 74 17 97 % --   03/30/23 2307 (!) 189/86 -- -- 75 -- -- --   03/30/23 2038 (!) 188/88 -- -- -- 14 -- --   03/30/23 2030 (!) 212/105 -- -- 73 -- -- --   03/30/23 2014 -- 98.4 °F (36.9 °C) Oral -- -- -- --   03/30/23 1759 175/95 -- -- 72 -- 97 % --   03/30/23 1634 (!) 187/113 -- -- -- -- -- --   03/30/23 1627 -- 98 °F (36.7 °C) Oral 72 20 94 % --        Flowsheet Rows    Flowsheet Row First Filed Value   Admission Height 177.8 cm (70\") Documented at 03/25/2023 2033   Admission Weight 142 kg (313 lb 11.4 oz) Documented at 03/25/2023 2033          Body mass index is 43.61 kg/m².      Intake/Output Summary (Last 24 hours) at 3/31/2023 1527  Last data filed at 3/31/2023 1449  Gross per 24 hour   Intake 840 ml   Output 2450 ml   Net -1610 ml        TELEMETRY:   Telemetry fibrillation with slow ventricular response    Physical Exam:  Morbidly obese and ill-appearing  Vital signs as noted above.  Head and neck revealed no carotid bruits or jugular venous distention.  No thyromegaly or lymph adenopathy is present  Diminished breath sounds in the lungs  Slow heart rate, irregularly irregular.No murmur.  No precordial rub is present.  No gallop is present.  Abdomen soft and nontender.  No organomegaly is present.  Extremities with good peripheral pulses without any pedal edema.  Skin warm and dry.  Musculoskeletal system is grossly normal  CNS exam deferred as the patient is lethargic    Results Review:   I reviewed the patient's new clinical results.    CBC    Results from last 7 days   Lab Units 03/28/23  0759 03/26/23  2346 03/26/23  0431 03/25/23  2111   WBC 10*3/mm3 3.40 5.10 5.70 4.70   HEMOGLOBIN g/dL 11.7* 11.0* 11.0* 11.7*   PLATELETS 10*3/mm3 215 193 197 209     BMP   Results from last 7 days   Lab Units 03/30/23  2251 03/28/23  0759 03/26/23  2346 03/26/23  0431 03/25/23  2111   SODIUM mmol/L 137 143 " 141 139 136   POTASSIUM mmol/L 3.8 3.4* 3.7 4.1 4.1   CHLORIDE mmol/L 104 103 103 102 100   CO2 mmol/L 25.0 29.0 26.0 29.0 28.0   BUN mg/dL 13 10 17 21 22   CREATININE mg/dL 0.84 0.83 0.86 1.01 1.01   GLUCOSE mg/dL 107* 111* 127* 113* 104*   MAGNESIUM mg/dL 1.9  --   --   --   --      Cr Clearance Estimated Creatinine Clearance: 103.1 mL/min (by C-G formula based on SCr of 0.84 mg/dL).  Coag     HbA1C   Lab Results   Component Value Date    HGBA1C 6.0 (H) 09/16/2022    HGBA1C 6.9 (H) 08/14/2022    HGBA1C 6.4 (H) 03/31/2022     Blood Glucose   Glucose   Date/Time Value Ref Range Status   03/31/2023 0906 103 70 - 105 mg/dL Final     Comment:     Serial Number: [id number]:  [id number]   03/30/2023 1639 109 (H) 70 - 105 mg/dL Final     Comment:     Serial Number: [id number]:  [id number]   03/30/2023 0727 140 (H) 70 - 105 mg/dL Final     Comment:     Serial Number: [id number]:  [id number]   03/29/2023 1637 118 (H) 70 - 105 mg/dL Final     Comment:     Serial Number: [id number]:  [id number]   03/29/2023 1221 114 (H) 70 - 105 mg/dL Final     Comment:     Serial Number: [id number]:  [id number]   03/29/2023 0725 90 70 - 105 mg/dL Final     Comment:     Serial Number: [id number]:  [id number]   03/28/2023 1751 141 (H) 70 - 105 mg/dL Final     Comment:     Serial Number: [id number]:  [id number]     Infection   Results from last 7 days   Lab Units 03/25/23 2128 03/25/23  2111   BLOODCX   --  No growth at 5 days  Staphylococcus, coagulase negative*   URINECX  >100,000 CFU/mL Proteus mirabilis ESBL*  --    BCIDPCR   --  Staph spp, not aureus or lugdunensis. Identification by BCID2 PCR.*   PROCALCITONIN ng/mL  --  0.07     CMP   Results from last 7 days   Lab Units 03/30/23  2251 03/28/23  0759 03/26/23  2346 03/26/23  0431 03/25/23  2111   SODIUM mmol/L 137 143 141 139 136   POTASSIUM mmol/L 3.8 3.4* 3.7 4.1 4.1   CHLORIDE mmol/L 104 103 103 102 100   CO2 mmol/L 25.0 29.0 26.0 29.0 28.0    BUN mg/dL 13 10 17 21 22   CREATININE mg/dL 0.84 0.83 0.86 1.01 1.01   GLUCOSE mg/dL 107* 111* 127* 113* 104*   ALBUMIN g/dL  --   --   --   --  3.1*   BILIRUBIN mg/dL  --   --   --   --  0.4   ALK PHOS U/L  --   --   --   --  101   AST (SGOT) U/L  --   --   --   --  13   ALT (SGPT) U/L  --   --   --   --  9     ABG    Results from last 7 days   Lab Units 03/25/23  2117   PH, ARTERIAL pH units 7.370   PCO2, ARTERIAL mm Hg 55.6*   PO2 ART mm Hg 27.7*   O2 SATURATION ART % 48.4*   BASE EXCESS ART mmol/L 5.3*     UA    Results from last 7 days   Lab Units 03/25/23  2128   NITRITE UA  Positive*   WBC UA /HPF Too Numerous to Count*   BACTERIA UA /HPF 3+*   SQUAM EPITHEL UA /HPF 0-2   URINECX  >100,000 CFU/mL Proteus mirabilis ESBL*     BEBETO  No results found for: POCMETH, POCAMPHET, POCBARBITUR, POCBENZO, POCCOCAINE, POCOPIATES, POCOXYCODO, POCPHENCYC, POCPROPOXY, POCTHC, POCTRICYC  Lysis Labs   Results from last 7 days   Lab Units 03/30/23  2251 03/28/23  0759 03/26/23  2346 03/26/23  0431 03/25/23  2111   HEMOGLOBIN g/dL  --  11.7* 11.0* 11.0* 11.7*   PLATELETS 10*3/mm3  --  215 193 197 209   CREATININE mg/dL 0.84 0.83 0.86 1.01 1.01     Radiology(recent) No radiology results for the last day    Imaging Results (Last 24 Hours)     ** No results found for the last 24 hours. **          Results from last 7 days   Lab Units 03/27/23  0242   HSTROP T ng/L 74*       EKG                    I personally viewed and interpreted the patient's EKG/Telemetry data:        ECHOCARDIOGRAM:     Results for orders placed during the hospital encounter of 03/25/23    Adult Transthoracic Echo Complete w/ Color, Spectral and Contrast if Necessary Per Protocol    Interpretation Summary  •  Left ventricular systolic function is normal. Left ventricular ejection fraction appears to be 56 - 60%.  •  Left ventricular diastolic function is consistent with (grade II w/high LAP) pseudonormalization.  •  Left atrial volume is severely  increased.  •  The right atrial cavity is moderate to severely  dilated.  •  Abnormal mitral valve structure consistent with dilated annulus.  •  Estimated right ventricular systolic pressure from tricuspid regurgitation is moderately elevated (45-55 mmHg).  •  Moderate pulmonary hypertension is present.        STRESS MYOVIEW:  9/2022    Interpretation Summary    • Diaphragmatic attenuation artifact is present.  • Left ventricular ejection fraction is hyperdynamic (Calculated EF > 70%). .  • Myocardial perfusion imaging indicates a normal myocardial perfusion study with no evidence of ischemia.  • Impressions are consistent with a low risk study.  • This is normal Cardiolite imaging stress test with no evidence of ischemia or myocardial infarction. Left ventricle size and function is normal on gated SPECT imaging. No wall motion abnormality was noted. Clinical correlation recommended. Further recommendation as per ordering physician..  • Findings consistent with an indeterminate ECG stress test.      CARDIAC CATHETERIZATION:      OTHER:         Assessment & Plan            UTI (urinary tract infection)    Cerebrovascular accident (HCC)    Type 2 diabetes mellitus with diabetic nephropathy (HCC)    Atrial fibrillation (HCC)    Morbid obesity (HCC)    Dementia (HCC)    Acute metabolic encephalopathy        ASSESSMENT AND PLAN    Atrial fibrillation with slow ventricular response  Mental status changes  Heart failure with preserved ejection fraction  Hypertension  Diabetes  UTI with ESBL  Chronic kidney disease  Anemia  Pulmonary hypertension    77-year-old obese and ill-appearing man.    A fib/SSS  He has atrial fibrillation with slow ventricular response.  We will discontinue carvedilol, last dose was 5 PM 3/30/2022.  Clonidine will also be discontinued.  Hold off on Eliquis and start heparin drip/Lovenox.  Transfer to PCU for closer monitoring.  If heart rate continues to drop we will start dopamine drip.  Patient  currently denies any chest pain or shortness of breath.  He also denies lightheadedness, syncope or dizziness.  May have underlying sick sinus syndrome and required a permanent pacemaker  Discussed care with sister Michelle who agrees with possibility of a permanent pacemaker placement.  Close telemetry monitoring off beta-blocker    HFpEF  Echo shows EF of 56 to 60%, grade 2 diastolic dysfunction, moderate pulmonary hypertension.  Lasix as needed  Renal function is normal    Uncontrolled hypertension  We will add amlodipine and hydralazine for hypertension control as systolic blood pressure is nearly 200 mmHg.    Hyperlipidemia  Continue high intensity statin, most recent LDL less than 40.    Diabetes  Continue insulin therapy for diabetes, most recent A1c is 6    Complicated UTI  Altered mental state likely secondary to ESBL UTI, continue ertapenem    BPH  On tamsulosin and finasteride for BPH.

## 2023-03-31 NOTE — PLAN OF CARE
Goal Outcome Evaluation:      Pt arrived to unit from 3C, heart rate varies from 36-40's in Afib. Pt alert and oriented x3 and cooperate with care. Pictures taken of wounds, see chart. No complaints at this time.    Problem: Adult Inpatient Plan of Care  Goal: Plan of Care Review  Outcome: Ongoing, Not Progressing

## 2023-03-31 NOTE — PROGRESS NOTES
Nutrition Services    Patient Name: Shaji Junior  YOB: 1945  MRN: [id number]  Admission date: 3/25/2023    PPE Documentation        PPE Worn By Provider Did not enter room for this encounter   PPE Worn By Patient  N/A     PROGRESS NOTE      Encounter Information: Progress note to check on PO intakes. Pt has been eating well at recent meals -- will continue PO diet and supplements as tolerated.        PO Diet: Diet: Regular/House Diet; Feeding Assistance - Nursing; Texture: Mechanical Ground (NDD 2); Fluid Consistency: Thin (IDDSI 0)   PO Supplements: Magic Cups at lunch/dinner (Provides 580 kcals, 18 g protein if consumed)    PO Intake:  % intake at all recent meals        Current nutrition support:    Nutrition support review:        Labs (reviewed below): Mild hyperglycemia - monitor        GI Function:  Stool Output  Stool Amount: small (03/26/23 0630)          Nutrition Intervention Updates: Continue current diet and ONS as tolerated.        Results from last 7 days   Lab Units 03/30/23 2251 03/28/23  0759 03/26/23  2346 03/26/23  0431 03/25/23  2111   SODIUM mmol/L 137 143 141   < > 136   POTASSIUM mmol/L 3.8 3.4* 3.7   < > 4.1   CHLORIDE mmol/L 104 103 103   < > 100   CO2 mmol/L 25.0 29.0 26.0   < > 28.0   BUN mg/dL 13 10 17   < > 22   CREATININE mg/dL 0.84 0.83 0.86   < > 1.01   CALCIUM mg/dL 8.1* 9.0 8.8   < > 8.7   BILIRUBIN mg/dL  --   --   --   --  0.4   ALK PHOS U/L  --   --   --   --  101   ALT (SGPT) U/L  --   --   --   --  9   AST (SGOT) U/L  --   --   --   --  13   GLUCOSE mg/dL 107* 111* 127*   < > 104*    < > = values in this interval not displayed.     Results from last 7 days   Lab Units 03/30/23 2251 03/28/23  0759   MAGNESIUM mg/dL 1.9  --    HEMOGLOBIN g/dL  --  11.7*   HEMATOCRIT %  --  36.9*     COVID19   Date Value Ref Range Status   02/15/2023 Not Detected Not Detected - Ref. Range Final     Lab Results   Component Value Date    HGBA1C 6.0 (H) 09/16/2022 RD to  follow up per protocol.    Electronically signed by:  Sara Cowan RD  03/31/23 17:49 EDT

## 2023-03-31 NOTE — PROGRESS NOTES
[facility] Medicine Services Daily Progress Note    Patient Name: Shaji Junior  : 1945  MRN: [id number]  Primary Care Physician:  Naa Valverde MD  Date of admission: 3/25/2023      Subjective      Chief Complaint: Abdominal pain      Patient Reports     3/27/2023: Admitted to PCU due to hypertensive urgency.  Off Cardene drip.  Can downgrade.  Complains of abdominal pain.  History of ESBL.  Does not walk.  Confusion getting better    3/28/23: Chronic Motley catheter.  Awaiting urine culture sensitivities.    3/29/23: ESBL UTI.  Will order ertapenem.  Family does not want him to go back to [facility].    3/30/23: Patient refusing meds.  Hemodynamically stable.    3/31/23: Complained of left knee pain ordered Roxicodone.  Patient bradycardic.  Coreg held.  Reconsulted cardiology.  We will transfer to PCU.  Patient will be transferred to [facility] due to bradycardia.  Appreciate cardiology input.  Discontinued clonidine    Review of Systems   Unable to perform ROS: mental status change         Objective      Vitals:   Temp:  [97.5 °F (36.4 °C)-98.4 °F (36.9 °C)] 97.5 °F (36.4 °C)  Heart Rate:  [40-75] 43  Resp:  [14-20] 17  BP: (147-212)/() 149/81    Physical Exam  HENT:      Head: Normocephalic.      Mouth/Throat:      Mouth: Mucous membranes are moist.   Eyes:      Extraocular Movements: Extraocular movements intact.      Pupils: Pupils are equal, round, and reactive to light.   Cardiovascular:      Rate and Rhythm: Normal rate and regular rhythm.   Pulmonary:      Effort: Pulmonary effort is normal.   Abdominal:      General: Bowel sounds are normal.      Tenderness: There is no abdominal tenderness.      Comments: Obese abdomen.   Musculoskeletal:      Comments: Decreased range of motion hips   Skin:     General: Skin is warm.      Findings: No rash.   Neurological:      Mental Status: He is alert. He is confused.   Psychiatric:         Behavior: Behavior is cooperative.              Result Review    Result Review:  I have personally reviewed the results from the time of this admission to 3/31/2023 16:11 EDT and agree with these findings:  [x]  Laboratory  []  Microbiology  [x]  Radiology  []  EKG/Telemetry   []  Cardiology/Vascular   []  Pathology  [x]  Old records  []  Other:      Wounds (last 24 hours)     LDA Wound     Row Name 03/31/23 0810 03/30/23 2044          Wound 03/10/23 0938 Right gluteal MASD (Moisture associated skin damage)    Wound - Properties Group Placement Date: 03/10/23  -HW Placement Time: 0938  -HW Present on Hospital Admission: Y  -HW Side: Right  -HW Location: gluteal  -HW Primary Wound Type: MASD  -HW    Dressing Appearance other (see comments)  refusing assessment at this time  -JT --     Closure -- Open to air  -KM     Base -- pink;red  -KM     Drainage Amount -- none  -KM     Periwound Care -- dry periwound area maintained  -KM     Retired Wound - Properties Group Placement Date: 03/10/23  -HW Placement Time: 0938 -HW Present on Hospital Admission: Y  -HW Side: Right  -HW Location: gluteal  -HW Primary Wound Type: MASD  -HW    Retired Wound - Properties Group Date first assessed: 03/10/23  -HW Time first assessed: 0938  -HW Present on Hospital Admission: Y  -HW Side: Right  -HW Location: gluteal  -HW Primary Wound Type: MASD  -HW       Wound 03/26/23 1653 Right anterior greater trochanter Blisters    Wound - Properties Group Placement Date: 03/26/23  -NE Placement Time: 1653 -NE Present on Hospital Admission: Y  -NE Side: Right  -NE Orientation: anterior  -NE Location: greater trochanter  -NE Primary Wound Type: Blisters  -NE    Dressing Appearance other (see comments)  pt refusing skin assessment  -JT --     Closure -- Open to air  -KM     Base -- red  -KM     Periwound -- dry;intact;pink  -KM     Periwound Temperature -- warm  -KM     Periwound Skin Turgor -- soft  -KM     Drainage Characteristics/Odor -- serosanguineous  -KM     Drainage Amount -- scant   -KM     Care, Wound -- cleansed with;soap and water  -KM     Periwound Care -- dry periwound area maintained  -KM     Retired Wound - Properties Group Placement Date: 03/26/23  -NE Placement Time: 1653 -NE Present on Hospital Admission: Y  -NE Side: Right  -NE Orientation: anterior  -NE Location: greater trochanter  -NE Primary Wound Type: Blisters  -NE    Retired Wound - Properties Group Date first assessed: 03/26/23  -NE Time first assessed: 1653 -NE Present on Hospital Admission: Y  -NE Side: Right  -NE Location: greater trochanter  -NE Primary Wound Type: Blisters  -NE       Wound 03/26/23 1654 Right gluteal Pressure Injury    Wound - Properties Group Placement Date: 03/26/23 -NE Placement Time: 1654 -NE Present on Hospital Admission: Y  -NE Side: Right  -NE Location: gluteal  -NE, STAGE 2  Primary Wound Type: Pressure inj  -NE    Dressing Appearance other (see comments)  pt refusing at this time  -JT --     Closure -- --  barrier cream applied  -KM     Base -- pink;red  -KM     Periwound -- pink  -KM     Periwound Temperature -- warm  -KM     Periwound Skin Turgor -- soft  -KM     Edges -- irregular  -KM     Drainage Characteristics/Odor -- serosanguineous  -KM     Drainage Amount -- scant  -KM     Care, Wound -- cleansed with;soap and water  -KM     Dressing Care -- open to air  -KM     Periwound Care -- dry periwound area maintained  -KM     Retired Wound - Properties Group Placement Date: 03/26/23  -NE Placement Time: 1654 -NE Present on Hospital Admission: Y  -NE Side: Right  -NE Location: gluteal  -NE, STAGE 2  Primary Wound Type: Pressure inj  -NE    Retired Wound - Properties Group Date first assessed: 03/26/23  -NE Time first assessed: 1654 -NE Present on Hospital Admission: Y  -NE Side: Right  -NE Location: gluteal  -NE, STAGE 2  Primary Wound Type: Pressure inj  -NE       Wound 03/26/23 1702 Left anterior thigh Skin Tear    Wound - Properties Group Placement Date: 03/26/23  -NE Placement Time:  1702 -NE Present on Hospital Admission: Y  -NE Side: Left  -NE Orientation: anterior  -NE Location: thigh  -NE Primary Wound Type: Skin tear  -NE    Dressing Appearance open to air  -JT --     Closure Open to air  -JT Open to air  -KM     Base pink  -JT pink  -KM     Drainage Amount -- none  -KM     Periwound Care -- dry periwound area maintained  -KM     Retired Wound - Properties Group Placement Date: 03/26/23 -NE Placement Time: 1702 -NE Present on Hospital Admission: Y  -NE Side: Left  -NE Orientation: anterior  -NE Location: thigh  -NE Primary Wound Type: Skin tear  -NE    Retired Wound - Properties Group Date first assessed: 03/26/23  -NE Time first assessed: 1702 -NE Present on Hospital Admission: Y  -NE Side: Left  -NE Location: thigh  -NE Primary Wound Type: Skin tear  -NE          User Key  (r) = Recorded By, (t) = Taken By, (c) = Cosigned By    Initials Name Provider Type    Rosa Cota, RN Registered Nurse    NE Jordan Jacobson, RN Registered Nurse     Ignacia Calles RN Registered Nurse     Mesha Horta LPN Licensed Nurse                  Assessment & Plan      Brief Patient Summary:      amLODIPine, 10 mg, Oral, Q24H  atorvastatin, 20 mg, Oral, Nightly  enoxaparin, 1 mg/kg, Subcutaneous, Q12H  ertapenem, 1 g, Intravenous, Q24H  escitalopram, 10 mg, Oral, Daily  finasteride, 5 mg, Oral, Daily  gabapentin, 300 mg, Oral, TID  hydrALAZINE, 50 mg, Oral, TID  insulin lispro, 2-7 Units, Subcutaneous, TID With Meals  lidocaine, 20 mL, Subcutaneous, Once  nitroglycerin, 1 inch, Topical, Q6H  sodium chloride, 10 mL, Intravenous, Q12H  sodium chloride, 10 mL, Intravenous, Q12H  tamsulosin, 0.4 mg, Oral, Daily       lactated ringers, 50 mL/hr, Last Rate: 50 mL/hr (03/31/23 0551)  Pharmacy to Dose enoxaparin (LOVENOX),          Active Hospital Problems:  Active Hospital Problems    Diagnosis    • **UTI (urinary tract infection)    • Acute metabolic encephalopathy    • Morbid obesity (HCC)    •  Dementia (HCC)    • Atrial fibrillation (HCC)    • Cerebrovascular accident (HCC)    • Type 2 diabetes mellitus with diabetic nephropathy (HCC)      Urinary tract infection  Hypertensive urgency  Acute toxic/metabolic encephalopathy  Atrial fibrillation  Normocytic anemia  Hyperlipidemia  BPH    Plan:  -Patient off Cardene drip and downgraded from PCU  -Confusion getting better likely due to UTI  -Nonambulatory at baseline  Urine culture with ESBL Proteus mirabilis and started on ertapenem  1 blood culture 3/25/2023--> coagulase-negative staph.  Contamination.  -CT head on admission ruled out acute intracranial pathology.  -Family would like the patient to go to a new facility.  Awaiting pre-CERT.  -Patient refuses medication sometimes.      DVT prophylaxis:  Medical DVT prophylaxis orders are present.    CODE STATUS:    Code Status (Patient has no pulse and is not breathing): CPR (Attempt to Resuscitate)  Medical Interventions (Patient has pulse or is breathing): Full Support      Disposition:  I expect patient to be discharged 1-2 days.    This patient has been examined wearing appropriate Personal Protective Equipment and discussed with nursing. 03/31/23      Electronically signed by Will Browne DO, 03/31/23, 16:11 EDT.  [facility] Hospitalist Team

## 2023-03-31 NOTE — NURSING NOTE
Patient refused blood pressure collection and reposition x2 attempts by x2  Different staff members including this nurse and patient care tech. Education provided on vital sign monitoring and repositioning importance and risks of refusal and patient continued to decline.

## 2023-03-31 NOTE — PLAN OF CARE
Goal Outcome Evaluation: No change    Pt A&Ox2, uncooperative with cares at times. Continues to refuse medication at times including PO and subq this shift. Multiple attempts to reapproach with education provided regarding importance of medication therapy and risks of refusal but overall uneffective at improving compliance. Currently receiving LR at 50ml/hour. Tolerating well. Patient with C/O bilateral leg pain. Repositioned in bed but patient refusing tylenol despite previously asking for it when presented to take. Patient currently receiving in house care while discharge appeal is reviewed and future placement is determined. Patient left resting quietly in bed. Bed locked, low, clwr, fall precautions in place, bed alarm activated and functioning appropriately.

## 2023-03-31 NOTE — PLAN OF CARE
Goal Outcome Evaluation:           Progress: no change  Outcome Evaluation: Patient with complaints of pain on shift- see MAR. LR infusing. EKG obtained. Pt HR has been extreme catrachito in 30s-40s. Cardiology consulted. Discharge appeal declined. Motley remains in place. Pt refusing turns and blood sugars this afternoon. Will monitor.

## 2023-03-31 NOTE — SIGNIFICANT NOTE
"Attempted to initiate PT session this date.  Pt refused secondary to not feeling well.  Declined PT suggestion to attempt later this pm \"Try back some other day\".   "

## 2023-04-01 LAB
DEPRECATED RDW RBC AUTO: 62.1 FL (ref 37–54)
ERYTHROCYTE [DISTWIDTH] IN BLOOD BY AUTOMATED COUNT: 21 % (ref 12.3–15.4)
GLUCOSE BLDC GLUCOMTR-MCNC: 144 MG/DL (ref 70–105)
HCT VFR BLD AUTO: 34.1 % (ref 37.5–51)
HGB BLD-MCNC: 10.9 G/DL (ref 13–17.7)
MCH RBC QN AUTO: 25.8 PG (ref 26.6–33)
MCHC RBC AUTO-ENTMCNC: 32.1 G/DL (ref 31.5–35.7)
MCV RBC AUTO: 80.3 FL (ref 79–97)
PHOSPHATE SERPL-MCNC: 3.6 MG/DL (ref 2.5–4.5)
PLATELET # BLD AUTO: 213 10*3/MM3 (ref 140–450)
PMV BLD AUTO: 7.9 FL (ref 6–12)
RBC # BLD AUTO: 4.25 10*6/MM3 (ref 4.14–5.8)
WBC NRBC COR # BLD: 4.2 10*3/MM3 (ref 3.4–10.8)

## 2023-04-01 PROCEDURE — 36415 COLL VENOUS BLD VENIPUNCTURE: CPT | Performed by: STUDENT IN AN ORGANIZED HEALTH CARE EDUCATION/TRAINING PROGRAM

## 2023-04-01 PROCEDURE — 93010 ELECTROCARDIOGRAM REPORT: CPT | Performed by: INTERNAL MEDICINE

## 2023-04-01 PROCEDURE — 99233 SBSQ HOSP IP/OBS HIGH 50: CPT | Performed by: INTERNAL MEDICINE

## 2023-04-01 PROCEDURE — 25010000002 FUROSEMIDE PER 20 MG: Performed by: NURSE PRACTITIONER

## 2023-04-01 PROCEDURE — 25010000002 HYDRALAZINE PER 20 MG: Performed by: HOSPITALIST

## 2023-04-01 PROCEDURE — 25010000002 ERTAPENEM PER 500 MG: Performed by: HOSPITALIST

## 2023-04-01 PROCEDURE — 93005 ELECTROCARDIOGRAM TRACING: CPT | Performed by: HOSPITALIST

## 2023-04-01 PROCEDURE — 84100 ASSAY OF PHOSPHORUS: CPT | Performed by: HOSPITALIST

## 2023-04-01 PROCEDURE — 82962 GLUCOSE BLOOD TEST: CPT

## 2023-04-01 PROCEDURE — 25010000002 ENOXAPARIN PER 10 MG: Performed by: HOSPITALIST

## 2023-04-01 PROCEDURE — 85027 COMPLETE CBC AUTOMATED: CPT | Performed by: STUDENT IN AN ORGANIZED HEALTH CARE EDUCATION/TRAINING PROGRAM

## 2023-04-01 RX ORDER — HYDRALAZINE HYDROCHLORIDE 20 MG/ML
10 INJECTION INTRAMUSCULAR; INTRAVENOUS ONCE
Status: COMPLETED | OUTPATIENT
Start: 2023-04-01 | End: 2023-04-01

## 2023-04-01 RX ORDER — FUROSEMIDE 10 MG/ML
40 INJECTION INTRAMUSCULAR; INTRAVENOUS ONCE
Status: COMPLETED | OUTPATIENT
Start: 2023-04-01 | End: 2023-04-01

## 2023-04-01 RX ORDER — CHLORTHALIDONE 25 MG/1
25 TABLET ORAL DAILY
Status: DISCONTINUED | OUTPATIENT
Start: 2023-04-01 | End: 2023-04-04 | Stop reason: HOSPADM

## 2023-04-01 RX ORDER — LISINOPRIL 5 MG/1
10 TABLET ORAL
Status: DISCONTINUED | OUTPATIENT
Start: 2023-04-01 | End: 2023-04-02

## 2023-04-01 RX ADMIN — HYDRALAZINE HYDROCHLORIDE 10 MG: 20 INJECTION INTRAMUSCULAR; INTRAVENOUS at 17:41

## 2023-04-01 RX ADMIN — HYDRALAZINE HYDROCHLORIDE 10 MG: 20 INJECTION INTRAMUSCULAR; INTRAVENOUS at 21:55

## 2023-04-01 RX ADMIN — ATORVASTATIN CALCIUM 20 MG: 20 TABLET, FILM COATED ORAL at 19:27

## 2023-04-01 RX ADMIN — HYDRALAZINE HYDROCHLORIDE 50 MG: 25 TABLET, FILM COATED ORAL at 15:25

## 2023-04-01 RX ADMIN — GABAPENTIN 300 MG: 300 CAPSULE ORAL at 15:24

## 2023-04-01 RX ADMIN — HYDRALAZINE HYDROCHLORIDE 10 MG: 20 INJECTION INTRAMUSCULAR; INTRAVENOUS at 11:08

## 2023-04-01 RX ADMIN — NITROGLYCERIN 1 INCH: 20 OINTMENT TOPICAL at 18:29

## 2023-04-01 RX ADMIN — Medication 10 ML: at 19:28

## 2023-04-01 RX ADMIN — NITROGLYCERIN 1 INCH: 20 OINTMENT TOPICAL at 11:19

## 2023-04-01 RX ADMIN — ERTAPENEM SODIUM 1 G: 1 INJECTION, POWDER, LYOPHILIZED, FOR SOLUTION INTRAMUSCULAR; INTRAVENOUS at 12:28

## 2023-04-01 RX ADMIN — GABAPENTIN 300 MG: 300 CAPSULE ORAL at 19:26

## 2023-04-01 RX ADMIN — ENOXAPARIN SODIUM 135 MG: 150 INJECTION SUBCUTANEOUS at 19:27

## 2023-04-01 RX ADMIN — HYDRALAZINE HYDROCHLORIDE 50 MG: 25 TABLET, FILM COATED ORAL at 19:27

## 2023-04-01 RX ADMIN — FUROSEMIDE 40 MG: 10 INJECTION, SOLUTION INTRAMUSCULAR; INTRAVENOUS at 19:58

## 2023-04-01 RX ADMIN — HYDRALAZINE HYDROCHLORIDE 10 MG: 20 INJECTION INTRAMUSCULAR; INTRAVENOUS at 04:10

## 2023-04-01 NOTE — DISCHARGE PLACEMENT REQUEST
"Cyrus Junior (77 y.o. Male)     Date of Birth   1945    Social Security Number       Address   [address] [place] [address] [place] [address]    Home Phone   [phone]    MRN   [id number]       Hoahaoism   None    Marital Status                               Admission Date   3/25/23    Admission Type   Emergency    Admitting Provider   Will Browne DO    Attending Provider   Will Browne DO    Department, Room/Bed   [facility], 2114/1       Discharge Date       Discharge Disposition       Discharge Destination                               Attending Provider: Will Browne DO    Allergies: No Known Allergies    Isolation: Contact   Infection: ESBL (04/01/22), ESBL Klebsiella (09/19/22), Adelina Auris (rule out) (03/28/23)   Code Status: CPR    Ht: 177.8 cm (70\")   Wt: 133 kg (293 lb 3.4 oz)    Admission Cmt: None   Principal Problem: UTI (urinary tract infection) [N39.0]                 Active Insurance as of 3/25/2023     Primary Coverage     Payor Plan Insurance Group Employer/Plan Group    MISC MEDICARE REPLACEMENT MISC MCARE REPLACEMENT [id number]     Coverage Address Coverage Phone Number Coverage Fax Number Effective Dates    [id number] [phone]  2/1/2023 - None Entered    [facility] [address]       Subscriber Name Subscriber Birth Date Member ID       CYRUS JUNIOR 1945 [id number]           Secondary Coverage     Payor Plan Insurance Group Employer/Plan Group    [facility] DE[id number]       Payor Plan Address Payor Plan Phone Number Payor Plan Fax Number Effective Dates    [facility] OFFICE OF COMMUNITY CARE [phone]  2/15/2023 - None Entered    [address] [address]       [facility] [address]       Subscriber Name Subscriber Birth Date Member ID       CYRUS JUNIOR 1945 [id number]           Tertiary Coverage     Payor Plan Insurance Group Employer/Plan Group    [facility] CCN OPTUM      " Payor Plan Address Payor Plan Phone Number Payor Plan Fax Number Effective Dates    [address] [phone]  2022 - None Entered    [facility] [address]       Subscriber Name Subscriber Birth Date Member ID       CYRUS RAMIREZ 1945 [id number]           Other Coverage     Payor Plan Insurance Group Employer/Plan Group    [place]      Payor Plan Address Payor Plan Phone Number Payor Plan Fax Number Effective Dates    [address]   2022 - None Entered    [place] [address]       Subscriber Name Subscriber Birth Date Member ID       CYRUS RAMIREZ 1945 [id number]                 Emergency Contacts      (Rel.) Home Phone Work Phone Mobile Phone    RICHAR MONROE (Sister) [phone] -- --    CAMERON RAMIREZ (Brother) -- -- [phone]               Physician Progress Notes (last 24 hours)      Will Browne DO at [address] [place] [address] Medicine Services Daily Progress Note    Patient Name: Cyrus Ramirez  : 1945  MRN: [id number]  Primary Care Physician:  Naa Valverde MD  Date of admission: 3/25/2023      Subjective       Chief Complaint: Abdominal pain      Patient Reports     3/27/2023: Admitted to PCU due to hypertensive urgency.  Off Cardene drip.  Can downgrade.  Complains of abdominal pain.  History of ESBL.  Does not walk.  Confusion getting better    3/28/23: Chronic Motley catheter.  Awaiting urine culture sensitivities.    3/29/23: ESBL UTI.  Will order ertapenem.  Family does not want him to go back to [facility].    3/30/23: Patient refusing meds.  Hemodynamically stable.    3/31/23: Complained of left knee pain ordered Roxicodone.  Patient bradycardic.  Coreg held.  Reconsulted cardiology.  We will transfer to PCU.  Patient will be transferred to [facility] due to bradycardia.  Appreciate cardiology input.  Discontinued clonidine    Review of Systems   Unable to perform ROS: mental status change          Objective       Vitals:   Temp:  [97.5 °F (36.4 °C)-98.4 °F (36.9 °C)] 97.5 °F (36.4 °C)  Heart Rate:  [40-75] 43  Resp:  [14-20] 17  BP: (147-212)/() 149/81    Physical Exam  HENT:      Head: Normocephalic.      Mouth/Throat:      Mouth: Mucous membranes are moist.   Eyes:      Extraocular Movements: Extraocular movements intact.      Pupils: Pupils are equal, round, and reactive to light.   Cardiovascular:      Rate and Rhythm: Normal rate and regular rhythm.   Pulmonary:      Effort: Pulmonary effort is normal.   Abdominal:      General: Bowel sounds are normal.      Tenderness: There is no abdominal tenderness.      Comments: Obese abdomen.   Musculoskeletal:      Comments: Decreased range of motion hips   Skin:     General: Skin is warm.      Findings: No rash.   Neurological:      Mental Status: He is alert. He is confused.   Psychiatric:         Behavior: Behavior is cooperative.             Result Review    Result Review:  I have personally reviewed the results from the time of this admission to 3/31/2023 16:11 EDT and agree with these findings:  [x]  Laboratory  []  Microbiology  [x]  Radiology  []  EKG/Telemetry   []  Cardiology/Vascular   []  Pathology  [x]  Old records  []  Other:      Wounds (last 24 hours)     LDA Wound     Row Name 03/31/23 0810 03/30/23 2044          Wound 03/10/23 0938 Right gluteal MASD (Moisture associated skin damage)    Wound - Properties Group Placement Date: 03/10/23  -HW Placement Time: 0938 -HW Present on Hospital Admission: Y  -HW Side: Right  -HW Location: gluteal  -HW Primary Wound Type: MASD  -HW    Dressing Appearance other (see comments)  refusing assessment at this time  -JT --     Closure -- Open to air  -KM     Base -- pink;red  -KM     Drainage Amount -- none  -KM     Periwound Care -- dry periwound area maintained  -KM     Retired Wound - Properties Group Placement Date: 03/10/23  -HW Placement Time: 0938 -HW Present on Hospital Admission: Y  -HW  Side: Right  -HW Location: gluteal  -HW Primary Wound Type: MASD  -HW    Retired Wound - Properties Group Date first assessed: 03/10/23  -HW Time first assessed: 0938 -HW Present on Hospital Admission: Y  -HW Side: Right  -HW Location: gluteal  -HW Primary Wound Type: MASD  -HW       Wound 03/26/23 1653 Right anterior greater trochanter Blisters    Wound - Properties Group Placement Date: 03/26/23  -NE Placement Time: 1653 -NE Present on Hospital Admission: Y  -NE Side: Right  -NE Orientation: anterior  -NE Location: greater trochanter  -NE Primary Wound Type: Blisters  -NE    Dressing Appearance other (see comments)  pt refusing skin assessment  -JT --     Closure -- Open to air  -KM     Base -- red  -KM     Periwound -- dry;intact;pink  -KM     Periwound Temperature -- warm  -KM     Periwound Skin Turgor -- soft  -KM     Drainage Characteristics/Odor -- serosanguineous  -KM     Drainage Amount -- scant  -KM     Care, Wound -- cleansed with;soap and water  -KM     Periwound Care -- dry periwound area maintained  -KM     Retired Wound - Properties Group Placement Date: 03/26/23  -NE Placement Time: 1653 -NE Present on Hospital Admission: Y  -NE Side: Right  -NE Orientation: anterior  -NE Location: greater trochanter  -NE Primary Wound Type: Blisters  -NE    Retired Wound - Properties Group Date first assessed: 03/26/23  -NE Time first assessed: 1653 -NE Present on Hospital Admission: Y  -NE Side: Right  -NE Location: greater trochanter  -NE Primary Wound Type: Blisters  -NE       Wound 03/26/23 1654 Right gluteal Pressure Injury    Wound - Properties Group Placement Date: 03/26/23  -NE Placement Time: 1654 -NE Present on Hospital Admission: Y  -NE Side: Right  -NE Location: gluteal  -NE, STAGE 2  Primary Wound Type: Pressure inj  -NE    Dressing Appearance other (see comments)  pt refusing at this time  -JT --     Closure -- --  barrier cream applied  -KM     Base -- pink;red  -KM     Periwound -- pink  -KM      Periwound Temperature -- warm  -KM     Periwound Skin Turgor -- soft  -KM     Edges -- irregular  -KM     Drainage Characteristics/Odor -- serosanguineous  -KM     Drainage Amount -- scant  -KM     Care, Wound -- cleansed with;soap and water  -KM     Dressing Care -- open to air  -KM     Periwound Care -- dry periwound area maintained  -KM     Retired Wound - Properties Group Placement Date: 03/26/23 -NE Placement Time: 1654 -NE Present on Hospital Admission: Y  -NE Side: Right  -NE Location: gluteal  -NE, STAGE 2  Primary Wound Type: Pressure inj  -NE    Retired Wound - Properties Group Date first assessed: 03/26/23  -NE Time first assessed: 1654 -NE Present on Hospital Admission: Y  -NE Side: Right  -NE Location: gluteal  -NE, STAGE 2  Primary Wound Type: Pressure inj  -NE       Wound 03/26/23 1702 Left anterior thigh Skin Tear    Wound - Properties Group Placement Date: 03/26/23 -NE Placement Time: 1702 -NE Present on Hospital Admission: Y  -NE Side: Left  -NE Orientation: anterior  -NE Location: thigh  -NE Primary Wound Type: Skin tear  -NE    Dressing Appearance open to air  -JT --     Closure Open to air  -JT Open to air  -KM     Base pink  -JT pink  -KM     Drainage Amount -- none  -KM     Periwound Care -- dry periwound area maintained  -KM     Retired Wound - Properties Group Placement Date: 03/26/23  -NE Placement Time: 1702 -NE Present on Hospital Admission: Y  -NE Side: Left  -NE Orientation: anterior  -NE Location: thigh  -NE Primary Wound Type: Skin tear  -NE    Retired Wound - Properties Group Date first assessed: 03/26/23  -NE Time first assessed: 1702 -NE Present on Hospital Admission: Y  -NE Side: Left  -NE Location: thigh  -NE Primary Wound Type: Skin tear  -NE          User Key  (r) = Recorded By, (t) = Taken By, (c) = Cosigned By    Initials Name Provider Type    Rosa Cota, RN Registered Nurse    Jordan Rao RN Registered Nurse    Ignacia Villarreal RN Registered  Nurse    Mesha Sanchez LPN Licensed Nurse                  Assessment & Plan      Brief Patient Summary:      amLODIPine, 10 mg, Oral, Q24H  atorvastatin, 20 mg, Oral, Nightly  enoxaparin, 1 mg/kg, Subcutaneous, Q12H  ertapenem, 1 g, Intravenous, Q24H  escitalopram, 10 mg, Oral, Daily  finasteride, 5 mg, Oral, Daily  gabapentin, 300 mg, Oral, TID  hydrALAZINE, 50 mg, Oral, TID  insulin lispro, 2-7 Units, Subcutaneous, TID With Meals  lidocaine, 20 mL, Subcutaneous, Once  nitroglycerin, 1 inch, Topical, Q6H  sodium chloride, 10 mL, Intravenous, Q12H  sodium chloride, 10 mL, Intravenous, Q12H  tamsulosin, 0.4 mg, Oral, Daily       lactated ringers, 50 mL/hr, Last Rate: 50 mL/hr (03/31/23 0588)  Pharmacy to Dose enoxaparin (LOVENOX),          Active Hospital Problems:  Active Hospital Problems    Diagnosis    • **UTI (urinary tract infection)    • Acute metabolic encephalopathy    • Morbid obesity (HCC)    • Dementia (HCC)    • Atrial fibrillation (HCC)    • Cerebrovascular accident (HCC)    • Type 2 diabetes mellitus with diabetic nephropathy (HCC)      Urinary tract infection  Hypertensive urgency  Acute toxic/metabolic encephalopathy  Atrial fibrillation  Normocytic anemia  Hyperlipidemia  BPH    Plan:  -Patient off Cardene drip and downgraded from PCU  -Confusion getting better likely due to UTI  -Nonambulatory at baseline  Urine culture with ESBL Proteus mirabilis and started on ertapenem  1 blood culture 3/25/2023--> coagulase-negative staph.  Contamination.  -CT head on admission ruled out acute intracranial pathology.  -Family would like the patient to go to a new facility.  Awaiting pre-CERT.  -Patient refuses medication sometimes.      DVT prophylaxis:  Medical DVT prophylaxis orders are present.    CODE STATUS:    Code Status (Patient has no pulse and is not breathing): CPR (Attempt to Resuscitate)  Medical Interventions (Patient has pulse or is breathing): Full Support      Disposition:  I expect  patient to be discharged 1-2 days.    This patient has been examined wearing appropriate Personal Protective Equipment and discussed with nursing. 03/31/23      Electronically signed by Will Browne DO, 03/31/23, 16:11 EDT.  [facility] Hospitalist Team             Electronically signed by Will Browne DO at 03/31/23 1615     Frank Foster MD at 03/31/23 1527          CARDIOLOGY FOLLOW-UP PROGRESS NOTE      Reason for follow-up:  Atrial fibrillation with slow heart rate     Attending: Will Browne,*      Subjective .   Patient is drowsy and difficult to communicate with.  He is a poor historian but awakens when aroused    Review of system  Unable to perform due to mental state    Pertinent items are noted in HPI, all other systems reviewed and negative  Allergies: Patient has no known allergies.    Scheduled Meds:amLODIPine, 10 mg, Oral, Q24H  apixaban, 5 mg, Oral, Q12H  atorvastatin, 20 mg, Oral, Nightly  carvedilol, 12.5 mg, Oral, BID With Meals  ertapenem, 1 g, Intravenous, Q24H  escitalopram, 10 mg, Oral, Daily  finasteride, 5 mg, Oral, Daily  gabapentin, 300 mg, Oral, TID  hydrALAZINE, 50 mg, Oral, TID  insulin lispro, 2-7 Units, Subcutaneous, TID With Meals  lidocaine, 20 mL, Subcutaneous, Once  nitroglycerin, 1 inch, Topical, Q6H  sodium chloride, 10 mL, Intravenous, Q12H  sodium chloride, 10 mL, Intravenous, Q12H  tamsulosin, 0.4 mg, Oral, Daily        Continuous Infusions:lactated ringers, 50 mL/hr, Last Rate: 50 mL/hr (03/31/23 0525)        PRN Meds:.•  acetaminophen  •  acetaminophen  •  albuterol  •  dextrose  •  dextrose  •  glucagon (human recombinant)  •  hydrALAZINE  •  labetalol  •  nitroglycerin  •  ondansetron **OR** ondansetron  •  [COMPLETED] Insert Peripheral IV **AND** sodium chloride  •  sodium chloride  •  sodium chloride  •  sodium chloride  •  sodium chloride    Objective     VITAL SIGNS  Patient Vitals for the past 24 hrs:   BP Temp Temp src Pulse Resp  "SpO2 Weight   03/31/23 1309 -- -- -- (!) 40 -- -- --   03/31/23 1218 -- -- -- (!) 46 -- -- --   03/31/23 1158 147/82 -- -- (!) 44 -- 96 % --   03/31/23 0939 162/87 -- -- 54 -- -- --   03/31/23 0855 -- -- -- (!) 48 -- -- --   03/31/23 0851 -- -- -- 52 -- -- --   03/31/23 0847 (!) 200/115 -- -- 58 -- -- --   03/31/23 0840 (!) 200/115 -- -- 60 -- 94 % --   03/31/23 0523 (!) 162/105 -- -- 75 -- -- --   03/31/23 0442 -- -- -- -- 15 -- (!) 138 kg (303 lb 14.4 oz)   03/30/23 2346 (!) 162/105 97.5 °F (36.4 °C) Oral 74 17 97 % --   03/30/23 2307 (!) 189/86 -- -- 75 -- -- --   03/30/23 2038 (!) 188/88 -- -- -- 14 -- --   03/30/23 2030 (!) 212/105 -- -- 73 -- -- --   03/30/23 2014 -- 98.4 °F (36.9 °C) Oral -- -- -- --   03/30/23 1759 175/95 -- -- 72 -- 97 % --   03/30/23 1634 (!) 187/113 -- -- -- -- -- --   03/30/23 1627 -- 98 °F (36.7 °C) Oral 72 20 94 % --        Flowsheet Rows    Flowsheet Row First Filed Value   Admission Height 177.8 cm (70\") Documented at 03/25/2023 2033   Admission Weight 142 kg (313 lb 11.4 oz) Documented at 03/25/2023 2033          Body mass index is 43.61 kg/m².      Intake/Output Summary (Last 24 hours) at 3/31/2023 1527  Last data filed at 3/31/2023 1449  Gross per 24 hour   Intake 840 ml   Output 2450 ml   Net -1610 ml        TELEMETRY:   Telemetry fibrillation with slow ventricular response    Physical Exam:  Morbidly obese and ill-appearing  Vital signs as noted above.  Head and neck revealed no carotid bruits or jugular venous distention.  No thyromegaly or lymph adenopathy is present  Diminished breath sounds in the lungs  Slow heart rate, irregularly irregular.No murmur.  No precordial rub is present.  No gallop is present.  Abdomen soft and nontender.  No organomegaly is present.  Extremities with good peripheral pulses without any pedal edema.  Skin warm and dry.  Musculoskeletal system is grossly normal  CNS exam deferred as the patient is lethargic    Results Review:   I reviewed the " patient's new clinical results.    CBC    Results from last 7 days   Lab Units 03/28/23  0759 03/26/23  2346 03/26/23  0431 03/25/23  2111   WBC 10*3/mm3 3.40 5.10 5.70 4.70   HEMOGLOBIN g/dL 11.7* 11.0* 11.0* 11.7*   PLATELETS 10*3/mm3 215 193 197 209     BMP   Results from last 7 days   Lab Units 03/30/23  2251 03/28/23  0759 03/26/23  2346 03/26/23  0431 03/25/23  2111   SODIUM mmol/L 137 143 141 139 136   POTASSIUM mmol/L 3.8 3.4* 3.7 4.1 4.1   CHLORIDE mmol/L 104 103 103 102 100   CO2 mmol/L 25.0 29.0 26.0 29.0 28.0   BUN mg/dL 13 10 17 21 22   CREATININE mg/dL 0.84 0.83 0.86 1.01 1.01   GLUCOSE mg/dL 107* 111* 127* 113* 104*   MAGNESIUM mg/dL 1.9  --   --   --   --      Cr Clearance Estimated Creatinine Clearance: 103.1 mL/min (by C-G formula based on SCr of 0.84 mg/dL).  Coag     HbA1C   Lab Results   Component Value Date    HGBA1C 6.0 (H) 09/16/2022    HGBA1C 6.9 (H) 08/14/2022    HGBA1C 6.4 (H) 03/31/2022     Blood Glucose   Glucose   Date/Time Value Ref Range Status   03/31/2023 0906 103 70 - 105 mg/dL Final     Comment:     Serial Number: [id number]:  [id number]   03/30/2023 1639 109 (H) 70 - 105 mg/dL Final     Comment:     Serial Number: [id number]:  [id number]   03/30/2023 0727 140 (H) 70 - 105 mg/dL Final     Comment:     Serial Number: [id number]:  [id number]   03/29/2023 1637 118 (H) 70 - 105 mg/dL Final     Comment:     Serial Number: [id number]:  [id number]   03/29/2023 1221 114 (H) 70 - 105 mg/dL Final     Comment:     Serial Number: [id number]:  [id number]   03/29/2023 0725 90 70 - 105 mg/dL Final     Comment:     Serial Number: [id number]:  [id number]   03/28/2023 1751 141 (H) 70 - 105 mg/dL Final     Comment:     Serial Number: [id number]:  [id number]     Infection   Results from last 7 days   Lab Units 03/25/23 2128 03/25/23  2111   BLOODCX   --  No growth at 5 days  Staphylococcus, coagulase negative*   URINECX  >100,000 CFU/mL Proteus mirabilis  ESBL*  --    BCIDPCR   --  Staph spp, not aureus or lugdunensis. Identification by BCID2 PCR.*   PROCALCITONIN ng/mL  --  0.07     CMP   Results from last 7 days   Lab Units 03/30/23 2251 03/28/23 0759 03/26/23 2346 03/26/23 0431 03/25/23 2111   SODIUM mmol/L 137 143 141 139 136   POTASSIUM mmol/L 3.8 3.4* 3.7 4.1 4.1   CHLORIDE mmol/L 104 103 103 102 100   CO2 mmol/L 25.0 29.0 26.0 29.0 28.0   BUN mg/dL 13 10 17 21 22   CREATININE mg/dL 0.84 0.83 0.86 1.01 1.01   GLUCOSE mg/dL 107* 111* 127* 113* 104*   ALBUMIN g/dL  --   --   --   --  3.1*   BILIRUBIN mg/dL  --   --   --   --  0.4   ALK PHOS U/L  --   --   --   --  101   AST (SGOT) U/L  --   --   --   --  13   ALT (SGPT) U/L  --   --   --   --  9     ABG    Results from last 7 days   Lab Units 03/25/23 2117   PH, ARTERIAL pH units 7.370   PCO2, ARTERIAL mm Hg 55.6*   PO2 ART mm Hg 27.7*   O2 SATURATION ART % 48.4*   BASE EXCESS ART mmol/L 5.3*     UA    Results from last 7 days   Lab Units 03/25/23 2128   NITRITE UA  Positive*   WBC UA /HPF Too Numerous to Count*   BACTERIA UA /HPF 3+*   SQUAM EPITHEL UA /HPF 0-2   URINECX  >100,000 CFU/mL Proteus mirabilis ESBL*     BEBETO  No results found for: POCMETH, POCAMPHET, POCBARBITUR, POCBENZO, POCCOCAINE, POCOPIATES, POCOXYCODO, POCPHENCYC, POCPROPOXY, POCTHC, POCTRICYC  Lysis Labs   Results from last 7 days   Lab Units 03/30/23 2251 03/28/23 0759 03/26/23 2346 03/26/23 0431 03/25/23 2111   HEMOGLOBIN g/dL  --  11.7* 11.0* 11.0* 11.7*   PLATELETS 10*3/mm3  --  215 193 197 209   CREATININE mg/dL 0.84 0.83 0.86 1.01 1.01     Radiology(recent) No radiology results for the last day    Imaging Results (Last 24 Hours)     ** No results found for the last 24 hours. **          Results from last 7 days   Lab Units 03/27/23  0242   HSTROP T ng/L 74*       EKG                    I personally viewed and interpreted the patient's EKG/Telemetry data:        ECHOCARDIOGRAM:     Results for orders placed during the hospital  encounter of 03/25/23    Adult Transthoracic Echo Complete w/ Color, Spectral and Contrast if Necessary Per Protocol    Interpretation Summary  •  Left ventricular systolic function is normal. Left ventricular ejection fraction appears to be 56 - 60%.  •  Left ventricular diastolic function is consistent with (grade II w/high LAP) pseudonormalization.  •  Left atrial volume is severely increased.  •  The right atrial cavity is moderate to severely  dilated.  •  Abnormal mitral valve structure consistent with dilated annulus.  •  Estimated right ventricular systolic pressure from tricuspid regurgitation is moderately elevated (45-55 mmHg).  •  Moderate pulmonary hypertension is present.        STRESS MYOVIEW:  9/2022    Interpretation Summary    • Diaphragmatic attenuation artifact is present.  • Left ventricular ejection fraction is hyperdynamic (Calculated EF > 70%). .  • Myocardial perfusion imaging indicates a normal myocardial perfusion study with no evidence of ischemia.  • Impressions are consistent with a low risk study.  • This is normal Cardiolite imaging stress test with no evidence of ischemia or myocardial infarction. Left ventricle size and function is normal on gated SPECT imaging. No wall motion abnormality was noted. Clinical correlation recommended. Further recommendation as per ordering physician..  • Findings consistent with an indeterminate ECG stress test.      CARDIAC CATHETERIZATION:      OTHER:         Assessment & Plan            UTI (urinary tract infection)    Cerebrovascular accident (HCC)    Type 2 diabetes mellitus with diabetic nephropathy (HCC)    Atrial fibrillation (HCC)    Morbid obesity (HCC)    Dementia (HCC)    Acute metabolic encephalopathy        ASSESSMENT AND PLAN    Atrial fibrillation with slow ventricular response  Mental status changes  Heart failure with preserved ejection fraction  Hypertension  Diabetes  UTI with ESBL  Chronic kidney disease  Anemia  Pulmonary  hypertension    77-year-old obese and ill-appearing man.    A fib/SSS  He has atrial fibrillation with slow ventricular response.  We will discontinue carvedilol, last dose was 5 PM 3/30/2022.  Clonidine will also be discontinued.  Hold off on Eliquis and start heparin drip/Lovenox.  Transfer to PCU for closer monitoring.  If heart rate continues to drop we will start dopamine drip.  Patient currently denies any chest pain or shortness of breath.  He also denies lightheadedness, syncope or dizziness.  May have underlying sick sinus syndrome and required a permanent pacemaker  Discussed care with sister Michelle who agrees with possibility of a permanent pacemaker placement.  Close telemetry monitoring off beta-blocker    HFpEF  Echo shows EF of 56 to 60%, grade 2 diastolic dysfunction, moderate pulmonary hypertension.  Lasix as needed  Renal function is normal    Uncontrolled hypertension  We will add amlodipine and hydralazine for hypertension control as systolic blood pressure is nearly 200 mmHg.    Hyperlipidemia  Continue high intensity statin, most recent LDL less than 40.    Diabetes  Continue insulin therapy for diabetes, most recent A1c is 6    Complicated UTI  Altered mental state likely secondary to ESBL UTI, continue ertapenem    BPH  On tamsulosin and finasteride for BPH.        Electronically signed by Frank Foster MD at 03/31/23 9634         Patient Name: Shaji Junior                      MRN: [id number]  Today's Date: 4/1/2023                       Admit Date: 3/25/2023     Plan: Return to [facility], no precert required. PASRR per facility. Discharge appeal denied 3/31.         Discharge Plan      Row Name 04/01/23 0575           Plan     Plan Comments Discharge cancelled due to patient status change. Another IMM will be issued when patient is ready to discharge. Update was faxed to [facility].                 Phone communication or documentation only - no physical contact with patient or  family.     Symone Felipe [place] [address]  Office: [phone]  Fax: [phone]  [email or web address]

## 2023-04-01 NOTE — NURSING NOTE
"Pt allowed this nurse to administer IV hydralazine and finally let me run his IV antibiotics. At this time pt BP is  201/114 and pt refuses to let me retake his BP or treat it. I educated pt that this was a dangerously high pressure and could cause a stroke. Pt stated, \"I'll take that chance\". Notified provider.  "

## 2023-04-01 NOTE — NURSING NOTE
Called hospitalist about the patient's high Systolic pressures in the 170s and 180s. No new orders just continue to monitor.

## 2023-04-01 NOTE — PROGRESS NOTES
[facility] Medicine Services Daily Progress Note    Patient Name: Shaji Junior  : 1945  MRN: [id number]  Primary Care Physician:  Naa Valverde MD  Date of admission: 3/25/2023      Subjective      Chief Complaint: Abdominal pain      Patient Reports     3/27/2023: Admitted to PCU due to hypertensive urgency.  Off Cardene drip.  Can downgrade.  Complains of abdominal pain.  History of ESBL.  Does not walk.  Confusion getting better    3/28/23: Chronic Motley catheter.  Awaiting urine culture sensitivities.    3/29/23: ESBL UTI.  Will order ertapenem.  Family does not want him to go back to [place].    3/30/23: Patient refusing meds.  Hemodynamically stable.    3/31/23: Complained of left knee pain ordered Roxicodone.  Patient bradycardic.  Coreg held.  Reconsulted cardiology.  We will transfer to PCU.  Patient will be transferred to [place] due to bradycardia.  Appreciate cardiology input.  Discontinued clonidine    23: Transferred to PCU yesterday for bradycardia.  Clonidine was discontinued.  High blood pressure but the patient is refusing medications.  Nitroglycerin patch placed.  Complains of headache.    Review of Systems   Unable to perform ROS: mental status change         Objective      Vitals:   Temp:  [96.9 °F (36.1 °C)-98.8 °F (37.1 °C)] 97.5 °F (36.4 °C)  Heart Rate:  [39-71] 67  Resp:  [13-20] 19  BP: (100-192)/() 192/116    Physical Exam  HENT:      Head: Normocephalic.      Mouth/Throat:      Mouth: Mucous membranes are moist.   Eyes:      Extraocular Movements: Extraocular movements intact.      Pupils: Pupils are equal, round, and reactive to light.   Cardiovascular:      Rate and Rhythm: Normal rate and regular rhythm.   Pulmonary:      Effort: Pulmonary effort is normal.   Abdominal:      General: Bowel sounds are normal.      Tenderness: There is no abdominal tenderness.      Comments: Obese abdomen.   Musculoskeletal:      Comments: Decreased range  of motion hips   Skin:     General: Skin is warm.      Findings: No rash.   Neurological:      Mental Status: He is alert. He is confused.   Psychiatric:         Behavior: Behavior is cooperative.             Result Review    Result Review:  I have personally reviewed the results from the time of this admission to 4/1/2023 13:16 EDT and agree with these findings:  [x]  Laboratory  []  Microbiology  [x]  Radiology  []  EKG/Telemetry   []  Cardiology/Vascular   []  Pathology  [x]  Old records  []  Other:      Wounds (last 24 hours)     LDA Wound     Row Name 04/01/23 0412 04/01/23 0012 03/31/23 2012       Wound 03/10/23 0938 Right gluteal MASD (Moisture associated skin damage)    Wound - Properties Group Placement Date: 03/10/23  -HW Placement Time: 0938 -HW Present on Hospital Admission: Y  -HW Side: Right  -HW Location: gluteal  -HW Primary Wound Type: MASD  -HW    Closure Open to air  -DC Open to air  -DC Open to air  -DC    Base pink  -DC pink  -DC pink  -DC    Drainage Amount none  -DC none  -DC none  -DC    Care, Wound cleansed with;sterile normal saline  -DC cleansed with;sterile normal saline  -DC cleansed with;sterile normal saline  -DC    Dressing Care open to air  -DC open to air  -DC open to air  -DC    Retired Wound - Properties Group Placement Date: 03/10/23  -HW Placement Time: 0938 -HW Present on Hospital Admission: Y  -HW Side: Right  -HW Location: gluteal  -HW Primary Wound Type: MASD  -HW    Retired Wound - Properties Group Date first assessed: 03/10/23  -HW Time first assessed: 0938  -HW Present on Hospital Admission: Y  -HW Side: Right  -HW Location: gluteal  -HW Primary Wound Type: MASD  -HW       Wound 03/26/23 1653 Right anterior greater trochanter Blisters    Wound - Properties Group Placement Date: 03/26/23  -NE Placement Time: 1653  -NE Present on Hospital Admission: Y  -NE Side: Right  -NE Orientation: anterior  -NE Location: greater trochanter  -NE Primary Wound Type: Blisters  -NE     Pressure Injury Stage 2  -DC 2  -DC 2  -DC    Dressing Appearance open to air  -DC open to air  -DC open to air  -DC    Closure Open to air  -DC Open to air  -DC Open to air  -DC    Base red;moist  -DC red;moist  -DC red;moist  -DC    Periwound dry  -DC dry  -DC dry  -DC    Drainage Characteristics/Odor serosanguineous  -DC serosanguineous  -DC serosanguineous  -DC    Drainage Amount scant  -DC scant  -DC scant  -DC    Care, Wound cleansed with;sterile normal saline  -DC cleansed with;sterile normal saline  -DC soap and water;sterile normal saline  -DC    Dressing Care skin barrier agent applied  -DC skin barrier agent applied  -DC skin barrier agent applied  -DC    Retired Wound - Properties Group Placement Date: 03/26/23  -NE Placement Time: 1653 -NE Present on Hospital Admission: Y  -NE Side: Right  -NE Orientation: anterior  -NE Location: greater trochanter  -NE Primary Wound Type: Blisters  -NE    Retired Wound - Properties Group Date first assessed: 03/26/23  -NE Time first assessed: 1653 -NE Present on Hospital Admission: Y  -NE Side: Right  -NE Location: greater trochanter  -NE Primary Wound Type: Blisters  -NE       Wound 03/26/23 1654 Right gluteal Pressure Injury    Wound - Properties Group Placement Date: 03/26/23  -NE Placement Time: 1654 -NE Present on Hospital Admission: Y  -NE Side: Right  -NE Location: gluteal  -NE, STAGE 2  Primary Wound Type: Pressure inj  -NE    Pressure Injury Stage 2  -DC 2  -DC 2  -DC    Dressing Appearance open to air  -DC open to air  -DC open to air  -DC    Closure Open to air  -DC Open to air  -DC Open to air  -DC    Base pink;red  -DC pink;red  -DC pink;red  -DC    Periwound pink  -DC pink  -DC pink  -DC    Periwound Temperature warm  -DC warm  -DC warm  -DC    Drainage Characteristics/Odor serosanguineous  -DC serosanguineous  -DC serosanguineous  -DC    Drainage Amount scant  -DC scant  -DC scant  -DC    Care, Wound cleansed with;sterile normal saline  -DC cleansed  with;sterile normal saline  -DC cleansed with;sterile normal saline  -DC    Dressing Care open to air  -DC open to air  -DC open to air  -DC    Periwound Care barrier ointment applied  -DC barrier ointment applied  -DC barrier ointment applied  -DC    Retired Wound - Properties Group Placement Date: 03/26/23  -NE Placement Time: 1654 -NE Present on Hospital Admission: Y  -NE Side: Right  -NE Location: gluteal  -NE, STAGE 2  Primary Wound Type: Pressure inj  -NE    Retired Wound - Properties Group Date first assessed: 03/26/23  -NE Time first assessed: 1654 -NE Present on Hospital Admission: Y  -NE Side: Right  -NE Location: gluteal  -NE, STAGE 2  Primary Wound Type: Pressure inj  -NE       Wound 03/26/23 1702 Left anterior thigh Skin Tear    Wound - Properties Group Placement Date: 03/26/23  -NE Placement Time: 1702 -NE Present on Hospital Admission: Y  -NE Side: Left  -NE Orientation: anterior  -NE Location: thigh  -NE Primary Wound Type: Skin tear  -NE    Dressing Appearance open to air  -DC open to air  -DC open to air  -DC    Closure Open to air  looks healed  -DC Open to air  looks healed  -DC Open to air  looks healed  -DC    Retired Wound - Properties Group Placement Date: 03/26/23  -NE Placement Time: 1702 -NE Present on Hospital Admission: Y  -NE Side: Left  -NE Orientation: anterior  -NE Location: thigh  -NE Primary Wound Type: Skin tear  -NE    Retired Wound - Properties Group Date first assessed: 03/26/23  -NE Time first assessed: 1702 -NE Present on Hospital Admission: Y  -NE Side: Left  -NE Location: thigh  -NE Primary Wound Type: Skin tear  -NE       Wound 03/31/23 1723 Left posterior    Wound - Properties Group Placement Date: 03/31/23  -BM Placement Time: 1723  -BM Side: Left  -BM Orientation: posterior  -BM    Pressure Injury Stage DTPI  -DC DTPI  -DC DTPI  -DC    Dressing Appearance open to air  -DC open to air  -DC open to air  -DC    Periwound intact  -DC intact  -DC intact  -DC    Retired  Wound - Properties Group Placement Date: 03/31/23  -BM Placement Time: 1723  -BM Side: Left  -BM Orientation: posterior  -BM    Retired Wound - Properties Group Date first assessed: 03/31/23  -BM Time first assessed: 1723  -BM Side: Left  -BM    Row Name 03/31/23 1724 03/31/23 1723 03/31/23 1720       Wound 03/10/23 0938 Right gluteal MASD (Moisture associated skin damage)    Wound - Properties Group Placement Date: 03/10/23  -HW Placement Time: 0938  -HW Present on Hospital Admission: Y  -HW Side: Right  -HW Location: gluteal  -HW Primary Wound Type: MASD  -HW    Retired Wound - Properties Group Placement Date: 03/10/23  -HW Placement Time: 0938  -HW Present on Hospital Admission: Y  -HW Side: Right  -HW Location: gluteal  -HW Primary Wound Type: MASD  -HW    Retired Wound - Properties Group Date first assessed: 03/10/23  -HW Time first assessed: 0938  -HW Present on Hospital Admission: Y  -HW Side: Right  -HW Location: gluteal  -HW Primary Wound Type: MASD  -HW       Wound 03/26/23 1653 Right anterior greater trochanter Blisters    Wound - Properties Group Placement Date: 03/26/23  -NE Placement Time: 1653 -NE Present on Hospital Admission: Y  -NE Side: Right  -NE Orientation: anterior  -NE Location: greater trochanter  -NE Primary Wound Type: Blisters  -NE    Wound Image Images linked: 1  -BM -- --    Retired Wound - Properties Group Placement Date: 03/26/23  -NE Placement Time: 1653 -NE Present on Hospital Admission: Y  -NE Side: Right  -NE Orientation: anterior  -NE Location: greater trochanter  -NE Primary Wound Type: Blisters  -NE    Retired Wound - Properties Group Date first assessed: 03/26/23  -NE Time first assessed: 1653  -NE Present on Hospital Admission: Y  -NE Side: Right  -NE Location: greater trochanter  -NE Primary Wound Type: Blisters  -NE       Wound 03/26/23 1654 Right gluteal Pressure Injury    Wound - Properties Group Placement Date: 03/26/23  -NE Placement Time: 1654  -NE Present on  Hospital Admission: Y  -NE Side: Right  -NE Location: gluteal  -NE, STAGE 2  Primary Wound Type: Pressure inj  -NE    Wound Image -- -- Images linked: 1  -BM    Retired Wound - Properties Group Placement Date: 03/26/23  -NE Placement Time: 1654  -NE Present on Hospital Admission: Y  -NE Side: Right  -NE Location: gluteal  -NE, STAGE 2  Primary Wound Type: Pressure inj  -NE    Retired Wound - Properties Group Date first assessed: 03/26/23  -NE Time first assessed: 1654  -NE Present on Hospital Admission: Y  -NE Side: Right  -NE Location: gluteal  -NE, STAGE 2  Primary Wound Type: Pressure inj  -NE       Wound 03/26/23 1702 Left anterior thigh Skin Tear    Wound - Properties Group Placement Date: 03/26/23  -NE Placement Time: 1702  -NE Present on Hospital Admission: Y  -NE Side: Left  -NE Orientation: anterior  -NE Location: thigh  -NE Primary Wound Type: Skin tear  -NE    Retired Wound - Properties Group Placement Date: 03/26/23  -NE Placement Time: 1702  -NE Present on Hospital Admission: Y  -NE Side: Left  -NE Orientation: anterior  -NE Location: thigh  -NE Primary Wound Type: Skin tear  -NE    Retired Wound - Properties Group Date first assessed: 03/26/23  -NE Time first assessed: 1702  -NE Present on Hospital Admission: Y  -NE Side: Left  -NE Location: thigh  -NE Primary Wound Type: Skin tear  -NE       Wound 03/31/23 1723 Left posterior    Wound - Properties Group Placement Date: 03/31/23  -BM Placement Time: 1723  -BM Side: Left  -BM Orientation: posterior  -BM    Wound Image -- Images linked: 1  -BM --    Retired Wound - Properties Group Placement Date: 03/31/23  -BM Placement Time: 1723  -BM Side: Left  -BM Orientation: posterior  -BM    Retired Wound - Properties Group Date first assessed: 03/31/23  -BM Time first assessed: 1723  -BM Side: Left  -BM    Row Name 03/31/23 1715             Wound 03/10/23 0938 Right gluteal MASD (Moisture associated skin damage)    Wound - Properties Group Placement Date:  03/10/23  -HW Placement Time: 0938 -HW Present on Hospital Admission: Y  -HW Side: Right  -HW Location: gluteal  -HW Primary Wound Type: MASD  -HW    Closure Open to air  -MS      Base pink  -MS      Dressing Care open to air  -MS      Retired Wound - Properties Group Placement Date: 03/10/23  -HW Placement Time: 0938 -HW Present on Hospital Admission: Y  -HW Side: Right  -HW Location: gluteal  -HW Primary Wound Type: MASD  -HW    Retired Wound - Properties Group Date first assessed: 03/10/23  -HW Time first assessed: 0938 -HW Present on Hospital Admission: Y  -HW Side: Right  -HW Location: gluteal  -HW Primary Wound Type: MASD  -HW       Wound 03/26/23 1653 Right anterior greater trochanter Blisters    Wound - Properties Group Placement Date: 03/26/23  -NE Placement Time: 1653 -NE Present on Hospital Admission: Y  -NE Side: Right  -NE Orientation: anterior  -NE Location: greater trochanter  -NE Primary Wound Type: Blisters  -NE    Pressure Injury Stage 2  -MS      Dressing Appearance open to air  -MS      Closure Open to air  -MS      Base red;moist  -MS      Periwound dry  -MS      Drainage Characteristics/Odor serosanguineous  -MS      Drainage Amount scant  -MS      Retired Wound - Properties Group Placement Date: 03/26/23  -NE Placement Time: 1653 -NE Present on Hospital Admission: Y  -NE Side: Right  -NE Orientation: anterior  -NE Location: greater trochanter  -NE Primary Wound Type: Blisters  -NE    Retired Wound - Properties Group Date first assessed: 03/26/23  -NE Time first assessed: 1653 -NE Present on Hospital Admission: Y  -NE Side: Right  -NE Location: greater trochanter  -NE Primary Wound Type: Blisters  -NE       Wound 03/26/23 1654 Right gluteal Pressure Injury    Wound - Properties Group Placement Date: 03/26/23  -NE Placement Time: 1654 -NE Present on Hospital Admission: Y  -NE Side: Right  -NE Location: gluteal  -NE, STAGE 2  Primary Wound Type: Pressure inj  -NE    Pressure Injury Stage 2   -MS      Dressing Appearance open to air  -MS      Closure Open to air  -MS      Periwound pink  -MS      Drainage Characteristics/Odor serosanguineous  -MS      Drainage Amount scant  -MS      Dressing Care open to air  -MS      Retired Wound - Properties Group Placement Date: 03/26/23  -NE Placement Time: 1654 -NE Present on Hospital Admission: Y  -NE Side: Right  -NE Location: gluteal  -NE, STAGE 2  Primary Wound Type: Pressure inj  -NE    Retired Wound - Properties Group Date first assessed: 03/26/23  -NE Time first assessed: 1654 -NE Present on Hospital Admission: Y  -NE Side: Right  -NE Location: gluteal  -NE, STAGE 2  Primary Wound Type: Pressure inj  -NE       Wound 03/26/23 1702 Left anterior thigh Skin Tear    Wound - Properties Group Placement Date: 03/26/23  -NE Placement Time: 1702 -NE Present on Hospital Admission: Y  -NE Side: Left  -NE Orientation: anterior  -NE Location: thigh  -NE Primary Wound Type: Skin tear  -NE    Closure Open to air;Other (Comment)  healed  -MS      Retired Wound - Properties Group Placement Date: 03/26/23  -NE Placement Time: 1702 -NE Present on Hospital Admission: Y  -NE Side: Left  -NE Orientation: anterior  -NE Location: thigh  -NE Primary Wound Type: Skin tear  -NE    Retired Wound - Properties Group Date first assessed: 03/26/23  -NE Time first assessed: 1702 -NE Present on Hospital Admission: Y  -NE Side: Left  -NE Location: thigh  -NE Primary Wound Type: Skin tear  -NE       Wound 03/31/23 1723 Left posterior    Wound - Properties Group Placement Date: 03/31/23  -BM Placement Time: 1723  -BM Side: Left  -BM Orientation: posterior  -BM    Pressure Injury Stage DTPI  -MS      Dressing Appearance open to air  -MS      Periwound intact  -MS      Care, Wound ryan boot  -MS      Retired Wound - Properties Group Placement Date: 03/31/23  -BM Placement Time: 1723  -BM Side: Left  -BM Orientation: posterior  -BM    Retired Wound - Properties Group Date first assessed:  03/31/23  - Time first assessed: 1723  -BM Side: Left  -          User Key  (r) = Recorded By, (t) = Taken By, (c) = Cosigned By    Initials Name Provider Type    Cody Marinelli LPN Licensed Nurse    Christy Joseph, RN Registered Nurse    Jordan Rao, RN Registered Nurse    Ignacia Villarreal RN Registered Nurse    Magnolia Sawant RN Registered Nurse                  Assessment & Plan      Brief Patient Summary:       The patient is a 77-year-old female with history of type 2 diabetes mellitus, atrial fibrillation,  CVA, chronic Motley catheter and recurrent UTI.     The patient was recently discharged from the hospital on 2/18/2023 after treatment for complicated ESBL UTI, metabolic encephalopathy and hypertensive urgency.     The patient presented to the ED from [facility] on 3/25/2023 for evaluation of confusion and fevers.     The patient was diagnosed with acute metabolic encephalopathy due to urinary tract infection  and admitted to the hospitalist  service.  He was seen by cardiologist for history of atrial fibrillation with normal LVEF 60-65%.  Urine cultures grew ESBL Proteus mirabilis and was started on ertapenem.    amLODIPine, 10 mg, Oral, Q24H  atorvastatin, 20 mg, Oral, Nightly  chlorthalidone, 25 mg, Oral, Daily  enoxaparin, 1 mg/kg, Subcutaneous, Q12H  ertapenem, 1 g, Intravenous, Q24H  escitalopram, 10 mg, Oral, Daily  finasteride, 5 mg, Oral, Daily  gabapentin, 300 mg, Oral, TID  hydrALAZINE, 50 mg, Oral, TID  insulin lispro, 2-7 Units, Subcutaneous, TID With Meals  lidocaine, 20 mL, Subcutaneous, Once  lisinopril, 10 mg, Oral, Q24H  nitroglycerin, 1 inch, Topical, Q6H  sodium chloride, 10 mL, Intravenous, Q12H  sodium chloride, 10 mL, Intravenous, Q12H  tamsulosin, 0.4 mg, Oral, Daily       lactated ringers, 50 mL/hr, Last Rate: Stopped (04/01/23 0417)  Pharmacy to Dose enoxaparin (LOVENOX),          Active Hospital Problems:  Active Hospital Problems     Diagnosis    • **UTI (urinary tract infection)    • Acute metabolic encephalopathy    • Morbid obesity    • Dementia    • Atrial fibrillation    • Cerebrovascular accident    • Type 2 diabetes mellitus with diabetic nephropathy      ESBL Proteus urinary tract infection (POA)  Hypertensive urgency (POA)  Acute toxic/metabolic encephalopathy  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  Contamination.  -CT head on admission ruled out acute intracranial pathology.  -Family would like the patient to go to a new facility.  Awaiting pre-CERT.  -Patient refuses medication sometimes.  -Transfer to PCU 3/31/2023 due to bradycardia.  Clonidine discontinued.  Coreg held.  Appreciate cardiology input  -consult psychiatry      DVT prophylaxis:  Medical DVT prophylaxis orders are present.    CODE STATUS:    Code Status (Patient has no pulse and is not breathing): CPR (Attempt to Resuscitate)  Medical Interventions (Patient has pulse or is breathing): Full Support      Disposition:  I expect patient to be discharged 1-2 days.    This patient has been examined wearing appropriate Personal Protective Equipment and discussed with nursing. 04/01/23      Electronically signed by Will Browne DO, 04/01/23, 13:16 EDT.  [facility], [place], [facility]

## 2023-04-01 NOTE — NURSING NOTE
Pt has refused meds all day. Pt did agree to a dose of IV hydralazine and a nitro patch, will not take anything else. Charge, primary and cardiology aware.

## 2023-04-01 NOTE — PLAN OF CARE
Goal Outcome Evaluation:              Outcome Evaluation: The patient's HR continued to run low over night. His BP was also elevated for the majority of the night. The patient recieved two doses of IV hydralazine over night for his high BP. That treatment brought his BP down slightly into the 150s, but his BP went back up to the 160/170s and up to 190. The patient was asymptomatic.  Called the hospitalist and Cardiology, no new orders given. Will continue to monitor.

## 2023-04-01 NOTE — NURSING NOTE
The patient received his meds early because of refusal to take them all day and part of last night. Also called hospitalist for another treatment for his extremely high BP.

## 2023-04-01 NOTE — NURSING NOTE
Called again about the patient's BP. His systolic pressures continues to jump up to 190s and hanging around in 160s and 170s. No new orders given per Dr. Kovacs. Continue to monitor.

## 2023-04-01 NOTE — CASE MANAGEMENT/SOCIAL WORK
Continued Stay Note  [facility]     Patient Name: Shaji Junior  MRN: [id number]  Today's Date: 4/1/2023    Admit Date: 3/25/2023    Plan: Return to [facility], no precert required. PASRR per facility. Discharge appeal denied 3/31.   Discharge Plan     Row Name 04/01/23 1056       Plan    Plan Comments Discharge cancelled due to patient status change. Another IMM will be issued when patient is ready to discharge. Update was faxed to [facility].              Phone communication or documentation only - no physical contact with patient or family.    Symone [place] [address]  Office: [phone]  Fax: [phone]  [email or web address]

## 2023-04-01 NOTE — PROGRESS NOTES
Referring Provider: Will Browne,*    Reason for follow-up: bradycardia, Afib  CARDIOLOGY FOLLOW-UP PROGRESS NOTE      Reason for follow-up:  Atrial fibrillation with slow heart rate     Attending: Will Browne,*      Subjective .   Remains lethargic and difficult to arouse.  Refusing all medications including IV medications today.  Nurse mentions he is oriented on repeated questioning.    Review of system  Unable to perform due to mental state    Pertinent items are noted in HPI, all other systems reviewed and negative  Allergies: Patient has no known allergies.    Scheduled Meds:amLODIPine, 10 mg, Oral, Q24H  atorvastatin, 20 mg, Oral, Nightly  enoxaparin, 1 mg/kg, Subcutaneous, Q12H  ertapenem, 1 g, Intravenous, Q24H  escitalopram, 10 mg, Oral, Daily  finasteride, 5 mg, Oral, Daily  gabapentin, 300 mg, Oral, TID  hydrALAZINE, 50 mg, Oral, TID  insulin lispro, 2-7 Units, Subcutaneous, TID With Meals  lidocaine, 20 mL, Subcutaneous, Once  nitroglycerin, 1 inch, Topical, Q6H  sodium chloride, 10 mL, Intravenous, Q12H  sodium chloride, 10 mL, Intravenous, Q12H  tamsulosin, 0.4 mg, Oral, Daily        Continuous Infusions:lactated ringers, 50 mL/hr, Last Rate: Stopped (04/01/23 0417)  Pharmacy to Dose enoxaparin (LOVENOX),         PRN Meds:.•  acetaminophen  •  acetaminophen  •  albuterol  •  dextrose  •  dextrose  •  glucagon (human recombinant)  •  hydrALAZINE  •  labetalol  •  nitroglycerin  •  ondansetron **OR** ondansetron  •  Pharmacy to Dose enoxaparin (LOVENOX)  •  [COMPLETED] Insert Peripheral IV **AND** sodium chloride  •  sodium chloride  •  sodium chloride  •  sodium chloride  •  sodium chloride    Objective     VITAL SIGNS  Patient Vitals for the past 24 hrs:   BP Temp Temp src Pulse Resp SpO2 Weight   04/01/23 0846 -- 97.5 °F (36.4 °C) Oral 64 19 90 % --   04/01/23 0512 100/79 98.8 °F (37.1 °C) Oral 65 19 96 % --   04/01/23 0412 -- -- -- -- -- -- 133 kg (293 lb 3.4 oz)   04/01/23  "0410 (!) 189/67 -- -- 67 -- -- --   04/01/23 0301 173/86 -- -- 71 -- 94 % --   04/01/23 0300 178/79 -- -- 60 -- 96 % --   04/01/23 0205 168/95 -- -- 64 -- 93 % --   04/01/23 0200 (!) 190/78 -- -- 64 -- 96 % --   04/01/23 0110 167/85 -- -- 68 -- 95 % --   04/01/23 0100 167/86 98.3 °F (36.8 °C) Oral 59 17 99 % 133 kg (293 lb 3.4 oz)   04/01/23 0026 162/75 -- -- 56 -- -- --   04/01/23 0010 162/75 -- -- 50 -- 96 % --   04/01/23 0000 (!) 190/89 -- -- 58 -- 98 % --   03/31/23 2320 169/74 -- -- 54 -- 98 % --   03/31/23 2315 177/57 -- -- 51 -- 93 % --   03/31/23 2300 161/73 -- -- 62 -- 94 % --   03/31/23 2222 153/90 -- -- 57 -- 96 % --   03/31/23 2221 170/68 -- -- 55 -- 100 % --   03/31/23 2219 (!) 188/72 -- -- 50 -- 100 % --   03/31/23 2215 (!) 181/86 -- -- (!) 46 -- 98 % --   03/31/23 2211 (!) 181/86 98.1 °F (36.7 °C) Oral (!) 48 20 94 % --   03/31/23 2203 178/78 -- -- (!) 48 -- -- --   03/31/23 2155 178/78 -- -- (!) 49 -- 98 % --   03/31/23 2150 (!) 184/77 -- -- 52 -- 100 % --   03/31/23 2045 167/77 -- -- (!) 39 -- 100 % --   03/31/23 2044 167/77 -- -- 50 -- -- --   03/31/23 1716 167/81 96.9 °F (36.1 °C) Oral (!) 48 13 94 % --   03/31/23 1558 149/81 -- -- (!) 43 17 -- --   03/31/23 1309 -- -- -- (!) 40 -- -- --   03/31/23 1218 -- -- -- (!) 46 -- -- --   03/31/23 1158 147/82 -- -- (!) 44 -- 96 % --        Flowsheet Rows    Flowsheet Row First Filed Value   Admission Height 177.8 cm (70\") Documented at 03/25/2023 2033   Admission Weight 142 kg (313 lb 11.4 oz) Documented at 03/25/2023 2033          Body mass index is 42.07 kg/m².      Intake/Output Summary (Last 24 hours) at 4/1/2023 1044  Last data filed at 4/1/2023 1012  Gross per 24 hour   Intake 1840 ml   Output 550 ml   Net 1290 ml        TELEMETRY:   Atrial fibrillation with controlled ventricular response.  Occasional 1 to 2-second pauses.    Physical Exam:  Morbidly obese and ill-appearing, lethargic  Vital signs as noted above.  Head and neck revealed no carotid " bruits or jugular venous distention.  No thyromegaly or lymph adenopathy is present  Diminished breath sounds in the lungs  Slow heart rate, irregularly irregular.No murmur.  No precordial rub is present.  No gallop is present.  Abdomen soft and nontender.  No organomegaly is present.  Extremities with good peripheral pulses without any pedal edema.  Skin warm and dry.  Musculoskeletal system is grossly normal  CNS exam deferred as the patient is lethargic    Results Review:   I reviewed the patient's new clinical results.    CBC    Results from last 7 days   Lab Units 03/28/23  0759 03/26/23  2346 03/26/23  0431 03/25/23  2111   WBC 10*3/mm3 3.40 5.10 5.70 4.70   HEMOGLOBIN g/dL 11.7* 11.0* 11.0* 11.7*   PLATELETS 10*3/mm3 215 193 197 209     BMP   Results from last 7 days   Lab Units 03/30/23  2251 03/28/23 0759 03/26/23  2346 03/26/23  0431 03/25/23  2111   SODIUM mmol/L 137 143 141 139 136   POTASSIUM mmol/L 3.8 3.4* 3.7 4.1 4.1   CHLORIDE mmol/L 104 103 103 102 100   CO2 mmol/L 25.0 29.0 26.0 29.0 28.0   BUN mg/dL 13 10 17 21 22   CREATININE mg/dL 0.84 0.83 0.86 1.01 1.01   GLUCOSE mg/dL 107* 111* 127* 113* 104*   MAGNESIUM mg/dL 1.9  --   --   --   --      Cr Clearance Estimated Creatinine Clearance: 101 mL/min (by C-G formula based on SCr of 0.84 mg/dL).  Coag     HbA1C   Lab Results   Component Value Date    HGBA1C 6.0 (H) 09/16/2022    HGBA1C 6.9 (H) 08/14/2022    HGBA1C 6.4 (H) 03/31/2022     Blood Glucose   Glucose   Date/Time Value Ref Range Status   03/31/2023 1728 113 (H) 70 - 105 mg/dL Final     Comment:     Serial Number: [id number]:  [id number]   03/31/2023 0906 103 70 - 105 mg/dL Final     Comment:     Serial Number: [id number]:  [id number]   03/30/2023 1639 109 (H) 70 - 105 mg/dL Final     Comment:     Serial Number: [id number]:  [id number]   03/30/2023 0727 140 (H) 70 - 105 mg/dL Final     Comment:     Serial Number: [id number]:  [id number]   03/29/2023 1637 118 (H) 70 -  105 mg/dL Final     Comment:     Serial Number: [id number]:  [id number]   03/29/2023 1221 114 (H) 70 - 105 mg/dL Final     Comment:     Serial Number: [id number]:  [id number]     Infection   Results from last 7 days   Lab Units 03/25/23 2128 03/25/23 2111   BLOODCX   --  No growth at 5 days  Staphylococcus, coagulase negative*   URINECX  >100,000 CFU/mL Proteus mirabilis ESBL*  --    BCIDPCR   --  Staph spp, not aureus or lugdunensis. Identification by BCID2 PCR.*   PROCALCITONIN ng/mL  --  0.07     CMP   Results from last 7 days   Lab Units 03/30/23  2251 03/28/23  0759 03/26/23  2346 03/26/23  0431 03/25/23 2111   SODIUM mmol/L 137 143 141 139 136   POTASSIUM mmol/L 3.8 3.4* 3.7 4.1 4.1   CHLORIDE mmol/L 104 103 103 102 100   CO2 mmol/L 25.0 29.0 26.0 29.0 28.0   BUN mg/dL 13 10 17 21 22   CREATININE mg/dL 0.84 0.83 0.86 1.01 1.01   GLUCOSE mg/dL 107* 111* 127* 113* 104*   ALBUMIN g/dL  --   --   --   --  3.1*   BILIRUBIN mg/dL  --   --   --   --  0.4   ALK PHOS U/L  --   --   --   --  101   AST (SGOT) U/L  --   --   --   --  13   ALT (SGPT) U/L  --   --   --   --  9     ABG    Results from last 7 days   Lab Units 03/25/23 2117   PH, ARTERIAL pH units 7.370   PCO2, ARTERIAL mm Hg 55.6*   PO2 ART mm Hg 27.7*   O2 SATURATION ART % 48.4*   BASE EXCESS ART mmol/L 5.3*     UA    Results from last 7 days   Lab Units 03/25/23 2128   NITRITE UA  Positive*   WBC UA /HPF Too Numerous to Count*   BACTERIA UA /HPF 3+*   SQUAM EPITHEL UA /HPF 0-2   URINECX  >100,000 CFU/mL Proteus mirabilis ESBL*     BEBETO  No results found for: POCMETH, POCAMPHET, POCBARBITUR, POCBENZO, POCCOCAINE, POCOPIATES, POCOXYCODO, POCPHENCYC, POCPROPOXY, POCTHC, POCTRICYC  Lysis Labs   Results from last 7 days   Lab Units 03/30/23  2251 03/28/23  0759 03/26/23  2346 03/26/23  0431 03/25/23  2111   HEMOGLOBIN g/dL  --  11.7* 11.0* 11.0* 11.7*   PLATELETS 10*3/mm3  --  215 193 197 209   CREATININE mg/dL 0.84 0.83 0.86 1.01 1.01      Radiology(recent) No radiology results for the last day    Imaging Results (Last 24 Hours)     ** No results found for the last 24 hours. **          Results from last 7 days   Lab Units 03/27/23  0242   HSTROP T ng/L 74*       EKG                    I personally viewed and interpreted the patient's EKG/Telemetry data:        ECHOCARDIOGRAM:     Results for orders placed during the hospital encounter of 03/25/23    Adult Transthoracic Echo Complete w/ Color, Spectral and Contrast if Necessary Per Protocol    Interpretation Summary  •  Left ventricular systolic function is normal. Left ventricular ejection fraction appears to be 56 - 60%.  •  Left ventricular diastolic function is consistent with (grade II w/high LAP) pseudonormalization.  •  Left atrial volume is severely increased.  •  The right atrial cavity is moderate to severely  dilated.  •  Abnormal mitral valve structure consistent with dilated annulus.  •  Estimated right ventricular systolic pressure from tricuspid regurgitation is moderately elevated (45-55 mmHg).  •  Moderate pulmonary hypertension is present.        STRESS MYOVIEW:  9/2022    Interpretation Summary    • Diaphragmatic attenuation artifact is present.  • Left ventricular ejection fraction is hyperdynamic (Calculated EF > 70%). .  • Myocardial perfusion imaging indicates a normal myocardial perfusion study with no evidence of ischemia.  • Impressions are consistent with a low risk study.  • This is normal Cardiolite imaging stress test with no evidence of ischemia or myocardial infarction. Left ventricle size and function is normal on gated SPECT imaging. No wall motion abnormality was noted. Clinical correlation recommended. Further recommendation as per ordering physician..  • Findings consistent with an indeterminate ECG stress test.      CARDIAC CATHETERIZATION:      OTHER:         Assessment & Plan            UTI (urinary tract infection)    Cerebrovascular accident    Type 2  diabetes mellitus with diabetic nephropathy    Atrial fibrillation    Morbid obesity    Dementia    Acute metabolic encephalopathy        ASSESSMENT AND PLAN    Atrial fibrillation with slow ventricular response  Mental status changes  Heart failure with preserved ejection fraction  Hypertension  Diabetes  UTI with ESBL  Chronic kidney disease  Anemia  Pulmonary hypertension    77-year-old obese and ill-appearing man.    A fib/SSS  He has atrial fibrillation with slow ventricular response.  Coreg and clonidine discontinued  Hold off on Eliquis and start Lovenox  Patient currently denies any chest pain or shortness of breath.  He also denies lightheadedness, syncope or dizziness.  May have underlying sick sinus syndrome and required a permanent pacemaker  Currently refusing all medications  Close telemetry monitoring off beta-blocker    HFpEF  Echo shows EF of 56 to 60%, grade 2 diastolic dysfunction, moderate pulmonary hypertension.  Lasix as needed  Renal function is normal    Uncontrolled hypertension  Currently on amlodipine and hydralazine  Add lisinopril and chlorthalidone  Refusing medications of blood pressure remains uncontrolled    Hyperlipidemia  Continue high intensity statin, most recent LDL less than 40.    Diabetes  Continue insulin therapy for diabetes, most recent A1c is 6    Complicated UTI  Altered mental state likely secondary to ESBL UTI, continue ertapenem    BPH  On tamsulosin and finasteride for BPH.      Recommend psychiatry consult to assess for competence given his refusal for all treatments currently      Magui Best MD  04/01/23  10:42 EDT

## 2023-04-01 NOTE — SIGNIFICANT NOTE
04/01/23 1502   OTHER   Discipline physical therapy assistant   Rehab Time/Intention   Session Not Performed other (see comments)  (RN asked to hold PT due to pt has been agitated and refusing everything)   Recommendation   PT - Next Appointment 04/03/23

## 2023-04-02 LAB
ANION GAP SERPL CALCULATED.3IONS-SCNC: 8 MMOL/L (ref 5–15)
BACTERIA ISLT: NORMAL
BASOPHILS # BLD AUTO: 0 10*3/MM3 (ref 0–0.2)
BASOPHILS NFR BLD AUTO: 0.5 % (ref 0–1.5)
BUN SERPL-MCNC: 10 MG/DL (ref 8–23)
BUN/CREAT SERPL: 11.8 (ref 7–25)
CALCIUM SPEC-SCNC: 8.6 MG/DL (ref 8.6–10.5)
CHLORIDE SERPL-SCNC: 101 MMOL/L (ref 98–107)
CO2 SERPL-SCNC: 28 MMOL/L (ref 22–29)
CREAT SERPL-MCNC: 0.85 MG/DL (ref 0.76–1.27)
DEPRECATED RDW RBC AUTO: 63.9 FL (ref 37–54)
EGFRCR SERPLBLD CKD-EPI 2021: 89.5 ML/MIN/1.73
EOSINOPHIL # BLD AUTO: 0.2 10*3/MM3 (ref 0–0.4)
EOSINOPHIL NFR BLD AUTO: 4.6 % (ref 0.3–6.2)
ERYTHROCYTE [DISTWIDTH] IN BLOOD BY AUTOMATED COUNT: 21.5 % (ref 12.3–15.4)
GLUCOSE BLDC GLUCOMTR-MCNC: 123 MG/DL (ref 70–105)
GLUCOSE BLDC GLUCOMTR-MCNC: 129 MG/DL (ref 70–105)
GLUCOSE BLDC GLUCOMTR-MCNC: 152 MG/DL (ref 70–105)
GLUCOSE SERPL-MCNC: 135 MG/DL (ref 65–99)
HCT VFR BLD AUTO: 35.4 % (ref 37.5–51)
HGB BLD-MCNC: 11.2 G/DL (ref 13–17.7)
LYMPHOCYTES # BLD AUTO: 1.6 10*3/MM3 (ref 0.7–3.1)
LYMPHOCYTES NFR BLD AUTO: 36.4 % (ref 19.6–45.3)
MAGNESIUM SERPL-MCNC: 1.8 MG/DL (ref 1.6–2.4)
MCH RBC QN AUTO: 25.3 PG (ref 26.6–33)
MCHC RBC AUTO-ENTMCNC: 31.6 G/DL (ref 31.5–35.7)
MCV RBC AUTO: 80.2 FL (ref 79–97)
MONOCYTES # BLD AUTO: 0.4 10*3/MM3 (ref 0.1–0.9)
MONOCYTES NFR BLD AUTO: 9.5 % (ref 5–12)
NEUTROPHILS NFR BLD AUTO: 2.2 10*3/MM3 (ref 1.7–7)
NEUTROPHILS NFR BLD AUTO: 49 % (ref 42.7–76)
NRBC BLD AUTO-RTO: 0.1 /100 WBC (ref 0–0.2)
PHOSPHATE SERPL-MCNC: 3.3 MG/DL (ref 2.5–4.5)
PLATELET # BLD AUTO: 238 10*3/MM3 (ref 140–450)
PMV BLD AUTO: 8.1 FL (ref 6–12)
POTASSIUM SERPL-SCNC: 4.1 MMOL/L (ref 3.5–5.2)
QT INTERVAL: 429 MS
RBC # BLD AUTO: 4.41 10*6/MM3 (ref 4.14–5.8)
SODIUM SERPL-SCNC: 137 MMOL/L (ref 136–145)
WBC NRBC COR # BLD: 4.5 10*3/MM3 (ref 3.4–10.8)

## 2023-04-02 PROCEDURE — 84100 ASSAY OF PHOSPHORUS: CPT | Performed by: HOSPITALIST

## 2023-04-02 PROCEDURE — 83735 ASSAY OF MAGNESIUM: CPT | Performed by: HOSPITALIST

## 2023-04-02 PROCEDURE — 25010000002 ENOXAPARIN PER 10 MG: Performed by: HOSPITALIST

## 2023-04-02 PROCEDURE — 99233 SBSQ HOSP IP/OBS HIGH 50: CPT | Performed by: INTERNAL MEDICINE

## 2023-04-02 PROCEDURE — 99221 1ST HOSP IP/OBS SF/LOW 40: CPT | Performed by: PSYCHIATRY & NEUROLOGY

## 2023-04-02 PROCEDURE — 25010000002 HYDRALAZINE PER 20 MG: Performed by: HOSPITALIST

## 2023-04-02 PROCEDURE — 85025 COMPLETE CBC W/AUTO DIFF WBC: CPT | Performed by: HOSPITALIST

## 2023-04-02 PROCEDURE — 82962 GLUCOSE BLOOD TEST: CPT

## 2023-04-02 PROCEDURE — 80048 BASIC METABOLIC PNL TOTAL CA: CPT | Performed by: HOSPITALIST

## 2023-04-02 PROCEDURE — 25010000002 ERTAPENEM PER 500 MG: Performed by: HOSPITALIST

## 2023-04-02 RX ORDER — LISINOPRIL 20 MG/1
40 TABLET ORAL
Status: DISCONTINUED | OUTPATIENT
Start: 2023-04-03 | End: 2023-04-03

## 2023-04-02 RX ORDER — HYDRALAZINE HYDROCHLORIDE 25 MG/1
100 TABLET, FILM COATED ORAL 3 TIMES DAILY
Status: DISCONTINUED | OUTPATIENT
Start: 2023-04-03 | End: 2023-04-03

## 2023-04-02 RX ORDER — HYDRALAZINE HYDROCHLORIDE 25 MG/1
50 TABLET, FILM COATED ORAL ONCE
Status: DISCONTINUED | OUTPATIENT
Start: 2023-04-02 | End: 2023-04-03

## 2023-04-02 RX ORDER — CYCLOBENZAPRINE HCL 10 MG
10 TABLET ORAL 3 TIMES DAILY PRN
Status: DISCONTINUED | OUTPATIENT
Start: 2023-04-02 | End: 2023-04-04 | Stop reason: HOSPADM

## 2023-04-02 RX ADMIN — NITROGLYCERIN 1 INCH: 20 OINTMENT TOPICAL at 14:27

## 2023-04-02 RX ADMIN — NITROGLYCERIN 1 INCH: 20 OINTMENT TOPICAL at 18:46

## 2023-04-02 RX ADMIN — ACETAMINOPHEN 650 MG: 325 TABLET, FILM COATED ORAL at 05:11

## 2023-04-02 RX ADMIN — ERTAPENEM SODIUM 1 G: 1 INJECTION, POWDER, LYOPHILIZED, FOR SOLUTION INTRAMUSCULAR; INTRAVENOUS at 13:23

## 2023-04-02 RX ADMIN — LISINOPRIL 10 MG: 5 TABLET ORAL at 09:44

## 2023-04-02 RX ADMIN — HYDRALAZINE HYDROCHLORIDE 50 MG: 25 TABLET, FILM COATED ORAL at 09:43

## 2023-04-02 RX ADMIN — GABAPENTIN 300 MG: 300 CAPSULE ORAL at 09:44

## 2023-04-02 RX ADMIN — SODIUM CHLORIDE 7.5 MG/HR: 9 INJECTION, SOLUTION INTRAVENOUS at 18:46

## 2023-04-02 RX ADMIN — SODIUM CHLORIDE 10 MG/HR: 9 INJECTION, SOLUTION INTRAVENOUS at 10:12

## 2023-04-02 RX ADMIN — ESCITALOPRAM OXALATE 10 MG: 10 TABLET ORAL at 09:44

## 2023-04-02 RX ADMIN — Medication 10 ML: at 21:58

## 2023-04-02 RX ADMIN — SODIUM CHLORIDE 10 MG/HR: 9 INJECTION, SOLUTION INTRAVENOUS at 02:58

## 2023-04-02 RX ADMIN — SODIUM CHLORIDE 12.5 MG/HR: 9 INJECTION, SOLUTION INTRAVENOUS at 05:29

## 2023-04-02 RX ADMIN — GABAPENTIN 300 MG: 300 CAPSULE ORAL at 18:48

## 2023-04-02 RX ADMIN — FINASTERIDE 5 MG: 5 TABLET, FILM COATED ORAL at 09:44

## 2023-04-02 RX ADMIN — TAMSULOSIN HYDROCHLORIDE 0.4 MG: 0.4 CAPSULE ORAL at 09:43

## 2023-04-02 RX ADMIN — HYDRALAZINE HYDROCHLORIDE 50 MG: 25 TABLET, FILM COATED ORAL at 18:48

## 2023-04-02 RX ADMIN — HYDRALAZINE HYDROCHLORIDE 10 MG: 20 INJECTION INTRAMUSCULAR; INTRAVENOUS at 18:49

## 2023-04-02 RX ADMIN — CHLORTHALIDONE 25 MG: 25 TABLET ORAL at 09:44

## 2023-04-02 RX ADMIN — AMLODIPINE BESYLATE 10 MG: 5 TABLET ORAL at 09:43

## 2023-04-02 RX ADMIN — CYCLOBENZAPRINE 10 MG: 10 TABLET, FILM COATED ORAL at 09:44

## 2023-04-02 RX ADMIN — SODIUM CHLORIDE 5 MG/HR: 9 INJECTION, SOLUTION INTRAVENOUS at 00:06

## 2023-04-02 RX ADMIN — SODIUM CHLORIDE 7.5 MG/HR: 9 INJECTION, SOLUTION INTRAVENOUS at 22:32

## 2023-04-02 RX ADMIN — ENOXAPARIN SODIUM 135 MG: 150 INJECTION SUBCUTANEOUS at 09:44

## 2023-04-02 RX ADMIN — SODIUM CHLORIDE 10 MG/HR: 9 INJECTION, SOLUTION INTRAVENOUS at 14:32

## 2023-04-02 NOTE — PLAN OF CARE
Problem: Adult Inpatient Plan of Care  Goal: Optimal Comfort and Wellbeing  Outcome: Ongoing, Not Progressing  Intervention: Monitor Pain and Promote Comfort  Intervention: Provide Person-Centered Care     Problem: Fall Injury Risk  Goal: Absence of Fall and Fall-Related Injury  Intervention: Promote Injury-Free Environment     Problem: Skin Injury Risk Increased  Goal: Skin Health and Integrity  Outcome: Ongoing, Not Progressing  Intervention: Optimize Skin Protection   Goal Outcome Evaluation:  Plan of Care Reviewed With: patient        Progress: declining Pt has had BP in the hypertensive crisis range all shift. Pt refuses most all meds. Refuses BG testing and insulin.This nurse kept trying all shift to entice pt to take meds, especially BP meds. Psych consult ordered to evaluate.Pt on RA.

## 2023-04-02 NOTE — PROGRESS NOTES
Referring Provider: Will Browne,*    Reason for follow-up: bradycardia, Afib  CARDIOLOGY FOLLOW-UP PROGRESS NOTE      Reason for follow-up:  Atrial fibrillation with slow heart rate     Attending: Will Browne,*      Subjective .   -Cardene drip overnight due to elevated blood pressures and patient refusing oral meds.  According to nurse he did take his medications this morning.  Blood pressure remains uncontrolled.    Review of system  Unable to perform due to mental state    Pertinent items are noted in HPI, all other systems reviewed and negative  Allergies: Patient has no known allergies.    Scheduled Meds:amLODIPine, 10 mg, Oral, Q24H  atorvastatin, 20 mg, Oral, Nightly  chlorthalidone, 25 mg, Oral, Daily  enoxaparin, 1 mg/kg, Subcutaneous, Q12H  ertapenem, 1 g, Intravenous, Q24H  escitalopram, 10 mg, Oral, Daily  finasteride, 5 mg, Oral, Daily  gabapentin, 300 mg, Oral, TID  hydrALAZINE, 50 mg, Oral, TID  insulin lispro, 2-7 Units, Subcutaneous, TID With Meals  lidocaine, 20 mL, Subcutaneous, Once  lisinopril, 10 mg, Oral, Q24H  nitroglycerin, 1 inch, Topical, Q6H  sodium chloride, 10 mL, Intravenous, Q12H  sodium chloride, 10 mL, Intravenous, Q12H  tamsulosin, 0.4 mg, Oral, Daily        Continuous Infusions:lactated ringers, 50 mL/hr, Last Rate: Stopped (04/01/23 0417)  niCARdipine, 5-15 mg/hr, Last Rate: 7.5 mg/hr (04/02/23 0537)  Pharmacy to Dose enoxaparin (LOVENOX),         PRN Meds:.•  acetaminophen  •  acetaminophen  •  albuterol  •  cyclobenzaprine  •  dextrose  •  dextrose  •  glucagon (human recombinant)  •  hydrALAZINE  •  labetalol  •  nitroglycerin  •  ondansetron **OR** ondansetron  •  Pharmacy to Dose enoxaparin (LOVENOX)  •  [COMPLETED] Insert Peripheral IV **AND** sodium chloride  •  sodium chloride  •  sodium chloride  •  sodium chloride  •  sodium chloride    Objective     VITAL SIGNS  Patient Vitals for the past 24 hrs:   BP Temp Temp src Pulse Resp SpO2 Height Weight  "  04/02/23 0943 168/93 -- -- 60 -- -- -- --   04/02/23 0529 156/99 -- -- 68 -- -- -- --   04/02/23 0500 156/99 98 °F (36.7 °C) Oral 67 17 93 % 177.8 cm (70\") 133 kg (293 lb 3.4 oz)   04/02/23 0332 161/86 -- -- 67 -- -- -- --   04/02/23 0230 151/84 -- -- 63 -- 95 % -- --   04/02/23 0225 156/88 -- -- 77 -- 92 % -- --   04/02/23 0220 161/91 -- -- 73 -- 93 % -- --   04/02/23 0219 155/96 -- -- 69 -- -- -- --   04/02/23 0215 148/85 98.6 °F (37 °C) Oral 69 18 94 % -- --   04/02/23 0200 155/96 -- -- 68 -- 93 % -- --   04/02/23 0130 143/88 -- -- 66 -- 92 % -- --   04/02/23 0100 156/86 -- -- 73 -- 93 % -- --   04/02/23 0043 (!) 168/103 -- -- 66 -- -- -- --   04/02/23 0030 168/92 -- -- 70 -- 92 % -- --   04/02/23 0027 163/99 -- -- 77 -- (!) 75 % -- --   04/02/23 0006 (!) 174/113 -- -- 70 -- -- -- --   04/02/23 0000 (!) 174/113 -- -- 68 -- 98 % -- --   04/01/23 2320 (!) 175/114 -- -- 69 -- -- -- --   04/01/23 2301 (!) 170/105 -- -- 62 -- 100 % -- --   04/01/23 2200 178/93 -- -- 73 20 100 % -- --   04/01/23 2155 178/93 -- -- 62 -- -- -- --   04/01/23 2154 178/93 -- -- 71 -- 100 % -- --   04/01/23 2055 (!) 193/103 -- -- 82 -- 100 % -- --   04/01/23 2035 (!) 181/107 -- -- 63 -- 98 % -- --   04/01/23 2033 (!) 206/101 -- -- 77 -- 94 % -- --   04/01/23 2025 (!) 173/106 -- -- 70 -- 100 % -- --   04/01/23 2015 (!) 180/109 -- -- 81 -- 100 % -- --   04/01/23 2000 (!) 202/124 -- -- 82 -- 100 % -- --   04/01/23 1936 (!) 199/109 -- -- 70 -- -- -- --   04/01/23 1920 (!) 200/112 -- -- 77 -- 100 % -- --   04/01/23 1900 (!) 192/111 -- -- 84 -- 96 % -- --   04/01/23 1829 176/100 -- -- 77 -- -- -- --   04/01/23 1800 (!) 201/114 -- -- 79 -- 97 % -- --   04/01/23 1741 (!) 195/115 -- -- 70 -- -- -- --   04/01/23 1656 (!) 186/130 97.9 °F (36.6 °C) Oral 75 16 -- -- --   04/01/23 1525 (!) 195/126 -- -- 73 -- -- -- --   04/01/23 1332 (!) 156/101 98.3 °F (36.8 °C) Oral 74 16 -- -- --   04/01/23 1108 (!) 192/116 -- -- 67 -- -- -- --        Flowsheet Rows  " "  Flowsheet Row First Filed Value   Admission Height 177.8 cm (70\") Documented at 03/25/2023 2033   Admission Weight 142 kg (313 lb 11.4 oz) Documented at 03/25/2023 2033          Body mass index is 42.07 kg/m².      Intake/Output Summary (Last 24 hours) at 4/2/2023 1011  Last data filed at 4/2/2023 0759  Gross per 24 hour   Intake 640 ml   Output 4700 ml   Net -4060 ml        TELEMETRY:   Atrial fibrillation with controlled ventricular response.  Occasional 1 to 2-second pauses.    Physical Exam:  Morbidly obese and ill-appearing, lethargic  Vital signs as noted above.  Head and neck revealed no carotid bruits or jugular venous distention.  No thyromegaly or lymph adenopathy is present  Diminished breath sounds in the lungs  Slow heart rate, irregularly irregular.No murmur.  No precordial rub is present.  No gallop is present.  Abdomen soft and nontender.  No organomegaly is present.  Extremities with good peripheral pulses without any pedal edema.  Skin warm and dry.  Musculoskeletal system is grossly normal  CNS exam deferred as the patient is lethargic    Results Review:   I reviewed the patient's new clinical results.    CBC    Results from last 7 days   Lab Units 04/01/23  2315 03/28/23  0759 03/26/23  2346   WBC 10*3/mm3 4.20 3.40 5.10   HEMOGLOBIN g/dL 10.9* 11.7* 11.0*   PLATELETS 10*3/mm3 213 215 193     BMP   Results from last 7 days   Lab Units 04/01/23  1528 03/30/23  2251 03/28/23  0759 03/26/23  2346   SODIUM mmol/L  --  137 143 141   POTASSIUM mmol/L  --  3.8 3.4* 3.7   CHLORIDE mmol/L  --  104 103 103   CO2 mmol/L  --  25.0 29.0 26.0   BUN mg/dL  --  13 10 17   CREATININE mg/dL  --  0.84 0.83 0.86   GLUCOSE mg/dL  --  107* 111* 127*   MAGNESIUM mg/dL  --  1.9  --   --    PHOSPHORUS mg/dL 3.6  --   --   --      Cr Clearance Estimated Creatinine Clearance: 101 mL/min (by C-G formula based on SCr of 0.84 mg/dL).  Coag     HbA1C   Lab Results   Component Value Date    HGBA1C 6.0 (H) 09/16/2022    HGBA1C " 6.9 (H) 08/14/2022    HGBA1C 6.4 (H) 03/31/2022     Blood Glucose   Glucose   Date/Time Value Ref Range Status   04/02/2023 0705 123 (H) 70 - 105 mg/dL Final     Comment:     Serial Number: [id number]:  [id number]   04/01/2023 1933 144 (H) 70 - 105 mg/dL Final     Comment:     Serial Number: [id number]:  [id number]   03/31/2023 1728 113 (H) 70 - 105 mg/dL Final     Comment:     Serial Number: [id number]:  [id number]   03/31/2023 0906 103 70 - 105 mg/dL Final     Comment:     Serial Number: [id number]:  [id number]   03/30/2023 1639 109 (H) 70 - 105 mg/dL Final     Comment:     Serial Number: [id number]:  [id number]     Infection       CMP   Results from last 7 days   Lab Units 03/30/23 2251 03/28/23  0759 03/26/23  2346   SODIUM mmol/L 137 143 141   POTASSIUM mmol/L 3.8 3.4* 3.7   CHLORIDE mmol/L 104 103 103   CO2 mmol/L 25.0 29.0 26.0   BUN mg/dL 13 10 17   CREATININE mg/dL 0.84 0.83 0.86   GLUCOSE mg/dL 107* 111* 127*     ABG        UA        BEBETO  No results found for: POCMETH, POCAMPHET, POCBARBITUR, POCBENZO, POCCOCAINE, POCOPIATES, POCOXYCODO, POCPHENCYC, POCPROPOXY, POCTHC, POCTRICYC  Lysis Labs   Results from last 7 days   Lab Units 04/01/23  2315 03/30/23 2251 03/28/23  0759 03/26/23  2346   HEMOGLOBIN g/dL 10.9*  --  11.7* 11.0*   PLATELETS 10*3/mm3 213  --  215 193   CREATININE mg/dL  --  0.84 0.83 0.86     Radiology(recent) No radiology results for the last day    Imaging Results (Last 24 Hours)     ** No results found for the last 24 hours. **          Results from last 7 days   Lab Units 03/27/23  0242   HSTROP T ng/L 74*       EKG                    I personally viewed and interpreted the patient's EKG/Telemetry data:        ECHOCARDIOGRAM:     Results for orders placed during the hospital encounter of 03/25/23    Adult Transthoracic Echo Complete w/ Color, Spectral and Contrast if Necessary Per Protocol    Interpretation Summary  •  Left ventricular systolic function is  normal. Left ventricular ejection fraction appears to be 56 - 60%.  •  Left ventricular diastolic function is consistent with (grade II w/high LAP) pseudonormalization.  •  Left atrial volume is severely increased.  •  The right atrial cavity is moderate to severely  dilated.  •  Abnormal mitral valve structure consistent with dilated annulus.  •  Estimated right ventricular systolic pressure from tricuspid regurgitation is moderately elevated (45-55 mmHg).  •  Moderate pulmonary hypertension is present.        STRESS MYOVIEW:  9/2022    Interpretation Summary    • Diaphragmatic attenuation artifact is present.  • Left ventricular ejection fraction is hyperdynamic (Calculated EF > 70%). .  • Myocardial perfusion imaging indicates a normal myocardial perfusion study with no evidence of ischemia.  • Impressions are consistent with a low risk study.  • This is normal Cardiolite imaging stress test with no evidence of ischemia or myocardial infarction. Left ventricle size and function is normal on gated SPECT imaging. No wall motion abnormality was noted. Clinical correlation recommended. Further recommendation as per ordering physician..  • Findings consistent with an indeterminate ECG stress test.      CARDIAC CATHETERIZATION:      OTHER:         Assessment & Plan            UTI (urinary tract infection)    Cerebrovascular accident    Type 2 diabetes mellitus with diabetic nephropathy    Atrial fibrillation    Morbid obesity    Dementia    Acute metabolic encephalopathy        ASSESSMENT AND PLAN    Atrial fibrillation with slow ventricular response  Mental status changes  Heart failure with preserved ejection fraction  Hypertension  Diabetes  UTI with ESBL  Chronic kidney disease  Anemia  Pulmonary hypertension    77-year-old obese and ill-appearing man.    A fib/SSS  He has atrial fibrillation with slow ventricular response.  Coreg and clonidine discontinued and heart rate has remained above 60  Hold off on Eliquis  and continue Lovenox  Patient currently denies any chest pain or shortness of breath.  He also denies lightheadedness, syncope or dizziness.  May have underlying sick sinus syndrome and required a permanent pacemaker  Currently refusing all medications  Close telemetry monitoring off beta-blocker    HFpEF  Echo shows EF of 56 to 60%, grade 2 diastolic dysfunction, moderate pulmonary hypertension.  Lasix as needed  Renal function is normal    Uncontrolled hypertension  Started on Cardene drip given that he is refusing all of his oral medications  Increase hydralazine and lisinopril  Wean off Cardene drip as tolerated    Hyperlipidemia  Continue high intensity statin, most recent LDL less than 40.    Diabetes  Continue insulin therapy for diabetes, most recent A1c is 6    Complicated UTI  Altered mental state likely secondary to ESBL UTI, continue ertapenem    BPH  On tamsulosin and finasteride for BPH.          Magui Best MD  04/02/23  10:11 EDT

## 2023-04-02 NOTE — PLAN OF CARE
Goal Outcome Evaluation:              Outcome Evaluation: The patient's family was called because of his refusal to take his meds. The patient finally took his meds. He did refuse Q2 turns. He was educated about why he needed to be turned every couple of hours. The HCP was called again about the patient's malignant HTN. A dose of hydralazine was ordered followed by IV lasix. Those treatments did not work. A Cardene drip was then ordered. His BP is currently down in the 150s. HR has been staying above 60. He did have complaints of a headache, Tylenol was given.

## 2023-04-02 NOTE — PROGRESS NOTES
[facility] Daily Progress Note    Patient Name: Shaji Junior  : 1945  MRN: [id number]  Primary Care Physician:  Naa Valverde MD  Date of admission: 3/25/2023      Subjective      Chief Complaint: Abdominal pain      Patient Reports     3/27/2023: Admitted to PCU due to hypertensive urgency.  Off Cardene drip.  Can downgrade.  Complains of abdominal pain.  History of ESBL.  Does not walk.  Confusion getting better    3/28/23: Chronic Motley catheter.  Awaiting urine culture sensitivities.    3/29/23: ESBL UTI.  Will order ertapenem.  Family does not want him to go back to [place].    3/30/23: Patient refusing meds.  Hemodynamically stable.    3/31/23: Complained of left knee pain ordered Roxicodone.  Patient bradycardic.  Coreg held.  Reconsulted cardiology.  We will transfer to PCU.  Patient will be transferred to [place] due to bradycardia.  Appreciate cardiology input.  Discontinued clonidine    23: Transferred to PCU yesterday for bradycardia.  Clonidine was discontinued.  High blood pressure but the patient is refusing medications.  Nitroglycerin patch placed.  Complains of headache.  Spoke to the patient's sister and brother in the afternoon regarding the patient's refusal of medications.  The patient's brother said he will come and talk to the patient.  Sister, MELISSA Martinez lives in [place].     23: Complains of right neck pain.  Ordered Flexeril.  Encourage patient to eat breakfast and take his medications.  Heart rate in the 60s.    Review of Systems   Unable to perform ROS: mental status change         Objective      Vitals:   Temp:  [97.9 °F (36.6 °C)-98.6 °F (37 °C)] 98.2 °F (36.8 °C)  Heart Rate:  [60-84] 79  Resp:  [16-20] 19  BP: (143-206)/() 175/99    Physical Exam  HENT:      Head: Normocephalic.      Mouth/Throat:      Mouth: Mucous membranes are moist.   Eyes:      Extraocular Movements: Extraocular movements intact.      Pupils:  Pupils are equal, round, and reactive to light.   Cardiovascular:      Rate and Rhythm: Normal rate and regular rhythm.   Pulmonary:      Effort: Pulmonary effort is normal.   Abdominal:      General: Bowel sounds are normal.      Tenderness: There is no abdominal tenderness.      Comments: Obese abdomen.   Musculoskeletal:      Comments: Decreased range of motion hips   Skin:     General: Skin is warm.      Findings: No rash.   Neurological:      Mental Status: He is alert. He is confused.   Psychiatric:         Behavior: Behavior is cooperative.             Result Review    Result Review:  I have personally reviewed the results from the time of this admission to 4/2/2023 13:07 EDT and agree with these findings:  [x]  Laboratory  []  Microbiology  [x]  Radiology  []  EKG/Telemetry   []  Cardiology/Vascular   []  Pathology  [x]  Old records  []  Other:      Wounds (last 24 hours)     LDA Wound     Row Name 04/02/23 0410 04/02/23 0010 04/01/23 2010       Wound 03/10/23 0938 Right gluteal MASD (Moisture associated skin damage)    Wound - Properties Group Placement Date: 03/10/23  - Placement Time: 0938 -HW Present on Hospital Admission: Y  -HW Side: Right  -HW Location: gluteal  -HW Primary Wound Type: MASD  -HW    Dressing Appearance open to air  -DC open to air  -DC open to air  -DC    Closure Open to air  -DC Open to air  -DC Open to air  -DC    Base pink  -DC pink  -DC pink  -DC    Drainage Amount none  -DC none  -DC none  -DC    Care, Wound cleansed with;sterile normal saline  -DC cleansed with;sterile normal saline  -DC cleansed with;sterile normal saline  -DC    Dressing Care open to air  -DC open to air  -DC open to air  -DC    Retired Wound - Properties Group Placement Date: 03/10/23  - Placement Time: 0938 -HW Present on Hospital Admission: Y  -HW Side: Right  -HW Location: gluteal  -HW Primary Wound Type: MASD  -HW    Retired Wound - Properties Group Date first assessed: 03/10/23  - Time first  assessed: 0938  -HW Present on Hospital Admission: Y  -HW Side: Right  -HW Location: gluteal  -HW Primary Wound Type: MASD  -HW       Wound 03/26/23 1653 Right anterior greater trochanter Blisters    Wound - Properties Group Placement Date: 03/26/23  -NE Placement Time: 1653 -NE Present on Hospital Admission: Y  -NE Side: Right  -NE Orientation: anterior  -NE Location: greater trochanter  -NE Primary Wound Type: Blisters  -NE    Pressure Injury Stage 2  -DC 2  -DC 2  -DC    Dressing Appearance open to air  -DC open to air  -DC open to air  -DC    Closure Open to air  -DC Open to air  -DC Open to air  -DC    Base moist;red  -DC moist;red  -DC moist;red  -DC    Periwound dry  -DC dry  -DC dry  -DC    Periwound Temperature warm  -DC warm  -DC warm  -DC    Periwound Skin Turgor soft  -DC soft  -DC soft  -DC    Drainage Characteristics/Odor serosanguineous  -DC serosanguineous  -DC serosanguineous  -DC    Drainage Amount scant  -DC scant  -DC scant  -DC    Care, Wound cleansed with;sterile normal saline  -DC cleansed with;sterile normal saline  -DC cleansed with;sterile normal saline  -DC    Dressing Care skin barrier agent applied  -DC skin barrier agent applied  -DC skin barrier agent applied  -DC    Retired Wound - Properties Group Placement Date: 03/26/23  -NE Placement Time: 1653 -NE Present on Hospital Admission: Y  -NE Side: Right  -NE Orientation: anterior  -NE Location: greater trochanter  -NE Primary Wound Type: Blisters  -NE    Retired Wound - Properties Group Date first assessed: 03/26/23  -NE Time first assessed: 1653 -NE Present on Hospital Admission: Y  -NE Side: Right  -NE Location: greater trochanter  -NE Primary Wound Type: Blisters  -NE       Wound 03/26/23 1654 Right gluteal Pressure Injury    Wound - Properties Group Placement Date: 03/26/23  -NE Placement Time: 1654 -NE Present on Hospital Admission: Y  -NE Side: Right  -NE Location: gluteal  -NE, STAGE 2  Primary Wound Type: Pressure inj  -NE     Pressure Injury Stage 2  -DC 2  -DC 2  -DC    Dressing Appearance open to air  -DC open to air  -DC open to air  -DC    Closure Open to air  -DC Open to air  -DC Open to air  -DC    Base pink;red  -DC pink;red  -DC pink;red  -DC    Periwound pink  -DC pink  -DC pink  -DC    Periwound Temperature warm  -DC warm  -DC warm  -DC    Drainage Characteristics/Odor serosanguineous  -DC serosanguineous  -DC serosanguineous  -DC    Drainage Amount scant  -DC scant  -DC scant  -DC    Care, Wound cleansed with;sterile normal saline  -DC cleansed with;sterile normal saline  -DC cleansed with;sterile normal saline  -DC    Dressing Care open to air  -DC open to air  -DC open to air  -DC    Periwound Care barrier ointment applied  -DC barrier ointment applied  -DC barrier ointment applied  -DC    Retired Wound - Properties Group Placement Date: 03/26/23  -NE Placement Time: 1654 -NE Present on Hospital Admission: Y  -NE Side: Right  -NE Location: gluteal  -NE, STAGE 2  Primary Wound Type: Pressure inj  -NE    Retired Wound - Properties Group Date first assessed: 03/26/23  -NE Time first assessed: 1654 -NE Present on Hospital Admission: Y  -NE Side: Right  -NE Location: gluteal  -NE, STAGE 2  Primary Wound Type: Pressure inj  -NE       Wound 03/26/23 1702 Left anterior thigh Skin Tear    Wound - Properties Group Placement Date: 03/26/23  -NE Placement Time: 1702 -NE Present on Hospital Admission: Y  -NE Side: Left  -NE Orientation: anterior  -NE Location: thigh  -NE Primary Wound Type: Skin tear  -NE    Dressing Appearance open to air  -DC open to air  -DC open to air  -DC    Closure Open to air  look healed  -DC Open to air  look healed  -DC Open to air  look healed  -DC    Base pink  -DC pink  -DC --    Drainage Amount none  -DC none  -DC none  -DC    Care, Wound cleansed with;sterile normal saline  -DC cleansed with;sterile normal saline  -DC cleansed with;sterile normal saline  -DC    Retired Wound - Properties Group  Placement Date: 03/26/23  -NE Placement Time: 1702 -NE Present on Hospital Admission: Y  -NE Side: Left  -NE Orientation: anterior  -NE Location: thigh  -NE Primary Wound Type: Skin tear  -NE    Retired Wound - Properties Group Date first assessed: 03/26/23  -NE Time first assessed: 1702 -NE Present on Hospital Admission: Y  -NE Side: Left  -NE Location: thigh  -NE Primary Wound Type: Skin tear  -NE       Wound 03/31/23 1723 Left posterior    Wound - Properties Group Placement Date: 03/31/23  -BM Placement Time: 1723  -BM Side: Left  -BM Orientation: posterior  -BM    Pressure Injury Stage DTPI  -DC DTPI  -DC DTPI  -DC    Dressing Appearance open to air  -DC open to air  -DC open to air  -DC    Periwound intact  -DC intact  -DC intact  -DC    Retired Wound - Properties Group Placement Date: 03/31/23  -BM Placement Time: 1723  -BM Side: Left  -BM Orientation: posterior  -BM    Retired Wound - Properties Group Date first assessed: 03/31/23  -BM Time first assessed: 1723 -BM Side: Left  -BM          User Key  (r) = Recorded By, (t) = Taken By, (c) = Cosigned By    Initials Name Provider Type    Cody Marinelli, MARQUEZN Licensed Nurse    Christy Joseph, RN Registered Nurse    Jordan Rao, RN Registered Nurse     Ignacia Calles, RN Registered Nurse                  Assessment & Plan      Brief Patient Summary:       The patient is a 77-year-old female with history of type 2 diabetes mellitus, atrial fibrillation,  CVA, chronic Motley catheter and recurrent UTI.     The patient was recently discharged from the hospital on 2/18/2023 after treatment for complicated ESBL UTI, metabolic encephalopathy and hypertensive urgency.     The patient presented to the ED from [facility] on 3/25/2023 for evaluation of confusion and fevers.     The patient was diagnosed with acute metabolic encephalopathy due to urinary tract infection  and admitted to the hospitalist  service.  He was seen by  cardiologist for history of atrial fibrillation with normal LVEF 60-65%.  Urine cultures grew ESBL Proteus mirabilis and was started on ertapenem.    The patient was transferred to PCU on 4/1/2023 for bradycardia.  Clonidine was then discontinued and cardiology was consulted.  He continues to refuse medications thus has elevated BP.  Nitro patch has been used.  The patient has a bed at [facility] but bradycardia has to resolved before being accepted there.    amLODIPine, 10 mg, Oral, Q24H  atorvastatin, 20 mg, Oral, Nightly  chlorthalidone, 25 mg, Oral, Daily  enoxaparin, 1 mg/kg, Subcutaneous, Q12H  ertapenem, 1 g, Intravenous, Q24H  escitalopram, 10 mg, Oral, Daily  finasteride, 5 mg, Oral, Daily  gabapentin, 300 mg, Oral, TID  hydrALAZINE, 50 mg, Oral, TID  insulin lispro, 2-7 Units, Subcutaneous, TID With Meals  lidocaine, 20 mL, Subcutaneous, Once  lisinopril, 10 mg, Oral, Q24H  nitroglycerin, 1 inch, Topical, Q6H  sodium chloride, 10 mL, Intravenous, Q12H  sodium chloride, 10 mL, Intravenous, Q12H  tamsulosin, 0.4 mg, Oral, Daily       lactated ringers, 50 mL/hr, Last Rate: Stopped (04/01/23 0417)  niCARdipine, 5-15 mg/hr, Last Rate: 10 mg/hr (04/02/23 1012)  Pharmacy to Dose enoxaparin (LOVENOX),          Active Hospital Problems:  Active Hospital Problems    Diagnosis    • **UTI (urinary tract infection)    • Acute metabolic encephalopathy    • Morbid obesity    • Dementia    • Atrial fibrillation    • Cerebrovascular accident    • Type 2 diabetes mellitus with diabetic nephropathy      ESBL Proteus urinary tract infection (POA)  Hypertensive urgency (POA)  Acute toxic/metabolic encephalopathy  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  Contamination.  -CT head on admission ruled out acute intracranial pathology.  -Family would like the patient to go to a new facility.  Awaiting pre-CERT.  -Patient refuses medication sometimes.  -Transfer to PCU 3/31/2023 due to bradycardia.  Clonidine discontinued.  Coreg held.  Appreciate cardiology input  -consult psychiatry due to refusal of home medications      DVT prophylaxis:  Medical DVT prophylaxis orders are present.    CODE STATUS:    Code Status (Patient has no pulse and is not breathing): CPR (Attempt to Resuscitate)  Medical Interventions (Patient has pulse or is breathing): Full Support      Disposition:  I expect patient to be discharged 1-2 days.    This patient has been examined wearing appropriate Personal Protective Equipment and discussed with nursing. 04/02/23      Electronically signed by Will Browne DO, 04/02/23, 13:07 EDT.  [facility] Hospitalist Team

## 2023-04-02 NOTE — CONSULTS
"  Referring Provider: Dr Alonso  Reason for Consultation: refusing tx      Chief complaint neck pain     Subjective .     History of present illness:  The patient is a 77 y.o. male who was admitted secondary to altered mental status. PMH: dementia, cardiomyopathy, DM, HTN, cerebral infarction, obesity. Psych consult was requested by Dr Renee 2ry to refusal of tx.  The pt was very poor historian 2ry to cognitive deficits and also sedation after he took muscle relaxant for neck pain. He knew his name, , he knew he was in the hospital, did know his age (he told he was 21 yo)  or todays date. The pt was very slow to respond. He took his  meds today but he was refusing meds for BP yesterday, he was explained he could have a stroke and he stated \"I would accept that \" (from nursing report)    Past psych hx: dementia       Review of Systems   Review of systems could not be obtained due to   patient confusion. patient sedation status.    History    Past Medical History:   Diagnosis Date   • Acute kidney failure    • Acute respiratory failure with hypoxia    • Anemia    • Benign prostatic hyperplasia    • Bilateral primary osteoarthritis of knee    • Cardiomegaly    • Cardiomyopathy    • Cellulitis and abscess of left leg    • Cerebral infarction    • Cerebrovascular disease    • Chronic kidney disease    • Dementia    • Diabetes mellitus    • Essential hypertension    • GERD (gastroesophageal reflux disease)    • Gout    • Heart failure    • Hyperlipidemia    • Morbid obesity    • Myocardial infarction    • Obstructive sleep apnea    • Obstructive uropathy    • Paroxysmal atrial fibrillation    • Peptic ulcer    • Peripheral vascular disease    • Pulmonary edema    • Venous insufficiency           Family History   Problem Relation Age of Onset   • Diabetes Father         Social History     Tobacco Use   • Smoking status: Never   Vaping Use   • Vaping Use: Never used   Substance Use Topics   • Alcohol use: Not " Currently   • Drug use: Never          Medications Prior to Admission   Medication Sig Dispense Refill Last Dose   • acetaminophen (TYLENOL) 325 MG tablet Take 650 mg by mouth Every 4 (Four) Hours As Needed for Mild Pain .      • albuterol sulfate  (90 Base) MCG/ACT inhaler Inhale 2 puffs Every 4 (Four) Hours As Needed (Cough).      • apixaban (ELIQUIS) 5 MG tablet tablet Take 1 tablet by mouth Every 12 (Twelve) Hours. Indications: Atrial Fibrillation 60 tablet     • carvedilol (COREG) 12.5 MG tablet Take 1 tablet by mouth 2 (Two) Times a Day With Meals. 60 tablet 0    • docusate calcium (SURFAK) 240 MG capsule Take 240 mg by mouth Daily.      • escitalopram (LEXAPRO) 10 MG tablet Take 10 mg by mouth Daily.      • finasteride (PROSCAR) 5 MG tablet Take 5 mg by mouth Daily.      • furosemide (LASIX) 40 MG tablet Take 1 tablet by mouth 2 (Two) Times a Day. 60 tablet 0    • gabapentin (NEURONTIN) 300 MG capsule Take 300 mg by mouth 3 (Three) Times a Day.      • hydrALAZINE (APRESOLINE) 50 MG tablet Take 1 tablet by mouth 3 (Three) Times a Day. 90 tablet 0    • Insulin Glargine (BASAGLAR KWIKPEN) 100 UNIT/ML injection pen Inject 10 Units under the skin into the appropriate area as directed Every Night.      • isosorbide mononitrate (IMDUR) 60 MG 24 hr tablet Take 1 tablet by mouth 2 (Two) Times a Day. 60 tablet 0    • losartan (COZAAR) 25 MG tablet Take 1 tablet by mouth Daily. 30 tablet 0    • methocarbamol (ROBAXIN) 500 MG tablet Take 500 mg by mouth Every 12 (Twelve) Hours As Needed for Muscle Spasms.      • nitroglycerin (NITROSTAT) 0.4 MG SL tablet Place 0.4 mg under the tongue Every 5 (Five) Minutes As Needed for Chest Pain. Take no more than 3 doses in 15 minutes.      • polycarbophil (calcium polycarbophil) 625 MG tablet tablet Take 625 mg by mouth Daily.      • Polyethylene Glycol 3350 powder Take 17 g by mouth Every Night.      • saccharomyces boulardii (FLORASTOR) 250 MG capsule Take 250 mg by mouth 2  "(Two) Times a Day.      • simethicone (MYLICON,GAS-X) 180 MG capsule Take 180 mg by mouth Every 6 (Six) Hours As Needed for Flatulence.      • simvastatin (ZOCOR) 40 MG tablet Take 40 mg by mouth every night at bedtime.      • tamsulosin (FLOMAX) 0.4 MG capsule 24 hr capsule Take 1 capsule by mouth Daily.      • traMADol (ULTRAM) 50 MG tablet Take 1 tablet by mouth Every 6 (Six) Hours As Needed for Moderate Pain.           Scheduled Meds:  amLODIPine, 10 mg, Oral, Q24H  atorvastatin, 20 mg, Oral, Nightly  chlorthalidone, 25 mg, Oral, Daily  enoxaparin, 1 mg/kg, Subcutaneous, Q12H  ertapenem, 1 g, Intravenous, Q24H  escitalopram, 10 mg, Oral, Daily  finasteride, 5 mg, Oral, Daily  gabapentin, 300 mg, Oral, TID  hydrALAZINE, 50 mg, Oral, TID  insulin lispro, 2-7 Units, Subcutaneous, TID With Meals  lidocaine, 20 mL, Subcutaneous, Once  lisinopril, 10 mg, Oral, Q24H  nitroglycerin, 1 inch, Topical, Q6H  sodium chloride, 10 mL, Intravenous, Q12H  sodium chloride, 10 mL, Intravenous, Q12H  tamsulosin, 0.4 mg, Oral, Daily         Continuous Infusions:  lactated ringers, 50 mL/hr, Last Rate: Stopped (04/01/23 0417)  niCARdipine, 5-15 mg/hr, Last Rate: 10 mg/hr (04/02/23 1012)  Pharmacy to Dose enoxaparin (LOVENOX),         PRN Meds:  •  acetaminophen  •  acetaminophen  •  albuterol  •  cyclobenzaprine  •  dextrose  •  dextrose  •  glucagon (human recombinant)  •  hydrALAZINE  •  labetalol  •  nitroglycerin  •  ondansetron **OR** ondansetron  •  Pharmacy to Dose enoxaparin (LOVENOX)  •  [COMPLETED] Insert Peripheral IV **AND** sodium chloride  •  sodium chloride  •  sodium chloride  •  sodium chloride  •  sodium chloride      Allergies:  Patient has no known allergies.      Objective     Vital Signs   /99 (BP Location: Left arm, Patient Position: Lying)   Pulse 79   Temp 98.2 °F (36.8 °C) (Oral)   Resp 19   Ht 177.8 cm (70\")   Wt 133 kg (293 lb 3.4 oz)   SpO2 91%   BMI 42.07 kg/m²     Physical " Exam:    Musculoskeletal:   Muscle strength and tone: decreased   Abnormal Movements: none   Gait: unable to assess, the pt was in bed      General Appearance:    In NAD, + psychomotor retardation       Mental Status Exam:   Hygiene:   fair  Cooperation:  Cooperative  Eye Contact:  Poor  Behavior and Psychomotor Activity: Slow  Speech:  slow   Mood: tired   Affect:  Restricted  Thought Process:  poverty of thoughts   Associations: Loose  Thought Content:  Normal  Language: appropriate   Suicidal Ideations:  None  Homicidal:  None  Hallucinations:  None  Delusion:  None  Orientation:  To person and Situation  Memory:  Deficits  Concentration and computation: poor   Attention span: very short   Fund of knowledge: limited   Reliability:  poor  Insight:  Poor  Judgement:  Impaired  Impulse Control:  Fair      Medications and allergies reviewed      Lab Results   Component Value Date    GLUCOSE 135 (H) 04/02/2023    CALCIUM 8.6 04/02/2023     04/02/2023    K 4.1 04/02/2023    CO2 28.0 04/02/2023     04/02/2023    BUN 10 04/02/2023    CREATININE 0.85 04/02/2023    EGFRIFAFRI 67 05/19/2021    EGFRIFNONA 55 (L) 05/19/2021    BCR 11.8 04/02/2023    ANIONGAP 8.0 04/02/2023       Last Urine Toxicity     LAST URINE TOXICITY RESULTS Latest Ref Rng & Units 9/17/2022 9/17/2022    CREATININE UR mg/dL 49.7 48.6          No results found for: PHENYTOIN, PHENOBARB, VALPROATE, CBMZ    Lab Results   Component Value Date     04/02/2023    BUN 10 04/02/2023    CREATININE 0.85 04/02/2023    TSH 2.220 02/15/2023    WBC 4.50 04/02/2023       Brief Urine Lab Results  (Last result in the past 365 days)      Color   Clarity   Blood   Leuk Est   Nitrite   Protein   CREAT   Urine HCG        03/25/23 2128 Dark Yellow   Cloudy   Large (3+)   Large (3+)   Positive   >=300 mg/dL (3+)                 Assessment & Plan       UTI (urinary tract infection)    Cerebrovascular accident    Type 2 diabetes mellitus with diabetic  nephropathy    Atrial fibrillation    Morbid obesity    Dementia    Acute metabolic encephalopathy          Assessment: Delirium due to medical condition (UTI)   Dementia without behavioral disturbances   Treatment Plan: the pt presented with cognitive deficits 2ry to underlying dementia and superimposed delirium due to UTI   At present time the pt is unable to make decisions about his health, however, those finding might be affected by sedation from meds  Will re-evaluate the pt tomorrow when he is alert and awake   Treatment Plan discussed with: Patient and nursing     I discussed the patients findings and my recommendations with patient and nursing staff    I have reviewed and approved the behavioral health treatment plans and problem list. Yes  Thank you for the consult   Referring MD has access to consult report and progress notes in EMR       This document has been electronically signed by Belkis Morley MD  April 2, 2023 13:50 EDT    Part of this note may be an electronic transcription/translation of spoken language to printed text using the Dragon Dictation System.

## 2023-04-03 LAB
ANION GAP SERPL CALCULATED.3IONS-SCNC: 7 MMOL/L (ref 5–15)
BASOPHILS # BLD AUTO: 0 10*3/MM3 (ref 0–0.2)
BASOPHILS NFR BLD AUTO: 0.5 % (ref 0–1.5)
BUN SERPL-MCNC: 9 MG/DL (ref 8–23)
BUN/CREAT SERPL: 11.3 (ref 7–25)
CALCIUM SPEC-SCNC: 8.7 MG/DL (ref 8.6–10.5)
CHLORIDE SERPL-SCNC: 105 MMOL/L (ref 98–107)
CO2 SERPL-SCNC: 28 MMOL/L (ref 22–29)
CREAT SERPL-MCNC: 0.8 MG/DL (ref 0.76–1.27)
DEPRECATED RDW RBC AUTO: 62.1 FL (ref 37–54)
EGFRCR SERPLBLD CKD-EPI 2021: 91.2 ML/MIN/1.73
EOSINOPHIL # BLD AUTO: 0.2 10*3/MM3 (ref 0–0.4)
EOSINOPHIL NFR BLD AUTO: 4.7 % (ref 0.3–6.2)
ERYTHROCYTE [DISTWIDTH] IN BLOOD BY AUTOMATED COUNT: 21.1 % (ref 12.3–15.4)
GLUCOSE BLDC GLUCOMTR-MCNC: 105 MG/DL (ref 70–105)
GLUCOSE BLDC GLUCOMTR-MCNC: 114 MG/DL (ref 70–105)
GLUCOSE BLDC GLUCOMTR-MCNC: 126 MG/DL (ref 70–105)
GLUCOSE BLDC GLUCOMTR-MCNC: 98 MG/DL (ref 70–105)
GLUCOSE SERPL-MCNC: 125 MG/DL (ref 65–99)
HCT VFR BLD AUTO: 33.7 % (ref 37.5–51)
HGB BLD-MCNC: 10.9 G/DL (ref 13–17.7)
LYMPHOCYTES # BLD AUTO: 1.6 10*3/MM3 (ref 0.7–3.1)
LYMPHOCYTES NFR BLD AUTO: 35.3 % (ref 19.6–45.3)
MAGNESIUM SERPL-MCNC: 1.9 MG/DL (ref 1.6–2.4)
MCH RBC QN AUTO: 25.9 PG (ref 26.6–33)
MCHC RBC AUTO-ENTMCNC: 32.3 G/DL (ref 31.5–35.7)
MCV RBC AUTO: 80.2 FL (ref 79–97)
MONOCYTES # BLD AUTO: 0.5 10*3/MM3 (ref 0.1–0.9)
MONOCYTES NFR BLD AUTO: 11.1 % (ref 5–12)
NEUTROPHILS NFR BLD AUTO: 2.1 10*3/MM3 (ref 1.7–7)
NEUTROPHILS NFR BLD AUTO: 48.4 % (ref 42.7–76)
NRBC BLD AUTO-RTO: 0.1 /100 WBC (ref 0–0.2)
PHOSPHATE SERPL-MCNC: 3.5 MG/DL (ref 2.5–4.5)
PLATELET # BLD AUTO: 228 10*3/MM3 (ref 140–450)
PMV BLD AUTO: 7.9 FL (ref 6–12)
POTASSIUM SERPL-SCNC: 4 MMOL/L (ref 3.5–5.2)
RBC # BLD AUTO: 4.2 10*6/MM3 (ref 4.14–5.8)
SODIUM SERPL-SCNC: 140 MMOL/L (ref 136–145)
WBC NRBC COR # BLD: 4.4 10*3/MM3 (ref 3.4–10.8)

## 2023-04-03 PROCEDURE — 84100 ASSAY OF PHOSPHORUS: CPT | Performed by: HOSPITALIST

## 2023-04-03 PROCEDURE — 25010000002 ERTAPENEM PER 500 MG: Performed by: HOSPITALIST

## 2023-04-03 PROCEDURE — 25010000002 ENOXAPARIN PER 10 MG: Performed by: HOSPITALIST

## 2023-04-03 PROCEDURE — 99233 SBSQ HOSP IP/OBS HIGH 50: CPT | Performed by: INTERNAL MEDICINE

## 2023-04-03 PROCEDURE — 83735 ASSAY OF MAGNESIUM: CPT | Performed by: HOSPITALIST

## 2023-04-03 PROCEDURE — 85025 COMPLETE CBC W/AUTO DIFF WBC: CPT | Performed by: HOSPITALIST

## 2023-04-03 PROCEDURE — 80048 BASIC METABOLIC PNL TOTAL CA: CPT | Performed by: HOSPITALIST

## 2023-04-03 PROCEDURE — 82962 GLUCOSE BLOOD TEST: CPT

## 2023-04-03 PROCEDURE — 99231 SBSQ HOSP IP/OBS SF/LOW 25: CPT

## 2023-04-03 RX ORDER — HYDRALAZINE HYDROCHLORIDE 25 MG/1
50 TABLET, FILM COATED ORAL 3 TIMES DAILY
Status: DISCONTINUED | OUTPATIENT
Start: 2023-04-03 | End: 2023-04-04 | Stop reason: HOSPADM

## 2023-04-03 RX ORDER — IBUPROFEN 600 MG/1
1 TABLET ORAL
Status: DISCONTINUED | OUTPATIENT
Start: 2023-04-03 | End: 2023-04-04 | Stop reason: HOSPADM

## 2023-04-03 RX ORDER — LOSARTAN POTASSIUM 25 MG/1
25 TABLET ORAL
Status: DISCONTINUED | OUTPATIENT
Start: 2023-04-03 | End: 2023-04-04 | Stop reason: HOSPADM

## 2023-04-03 RX ORDER — ISOSORBIDE MONONITRATE 60 MG/1
60 TABLET, EXTENDED RELEASE ORAL
Status: DISCONTINUED | OUTPATIENT
Start: 2023-04-03 | End: 2023-04-04 | Stop reason: HOSPADM

## 2023-04-03 RX ADMIN — Medication 10 ML: at 20:45

## 2023-04-03 RX ADMIN — HYDRALAZINE HYDROCHLORIDE 50 MG: 25 TABLET, FILM COATED ORAL at 10:19

## 2023-04-03 RX ADMIN — FINASTERIDE 5 MG: 5 TABLET, FILM COATED ORAL at 10:20

## 2023-04-03 RX ADMIN — ESCITALOPRAM OXALATE 10 MG: 10 TABLET ORAL at 10:19

## 2023-04-03 RX ADMIN — HYDRALAZINE HYDROCHLORIDE 50 MG: 25 TABLET, FILM COATED ORAL at 16:58

## 2023-04-03 RX ADMIN — TAMSULOSIN HYDROCHLORIDE 0.4 MG: 0.4 CAPSULE ORAL at 10:19

## 2023-04-03 RX ADMIN — NITROGLYCERIN 1 INCH: 20 OINTMENT TOPICAL at 00:08

## 2023-04-03 RX ADMIN — ENOXAPARIN SODIUM 135 MG: 150 INJECTION SUBCUTANEOUS at 20:49

## 2023-04-03 RX ADMIN — SODIUM CHLORIDE 5 MG/HR: 9 INJECTION, SOLUTION INTRAVENOUS at 07:00

## 2023-04-03 RX ADMIN — AMLODIPINE BESYLATE 10 MG: 5 TABLET ORAL at 10:19

## 2023-04-03 RX ADMIN — Medication 10 ML: at 10:02

## 2023-04-03 RX ADMIN — ISOSORBIDE MONONITRATE 60 MG: 60 TABLET, EXTENDED RELEASE ORAL at 10:20

## 2023-04-03 RX ADMIN — CHLORTHALIDONE 25 MG: 25 TABLET ORAL at 10:19

## 2023-04-03 RX ADMIN — ENOXAPARIN SODIUM 135 MG: 150 INJECTION SUBCUTANEOUS at 10:20

## 2023-04-03 RX ADMIN — SODIUM CHLORIDE 5 MG/HR: 9 INJECTION, SOLUTION INTRAVENOUS at 02:09

## 2023-04-03 RX ADMIN — GABAPENTIN 300 MG: 300 CAPSULE ORAL at 16:58

## 2023-04-03 RX ADMIN — HYDRALAZINE HYDROCHLORIDE 50 MG: 25 TABLET, FILM COATED ORAL at 20:40

## 2023-04-03 RX ADMIN — ERTAPENEM SODIUM 1 G: 1 INJECTION, POWDER, LYOPHILIZED, FOR SOLUTION INTRAMUSCULAR; INTRAVENOUS at 10:19

## 2023-04-03 RX ADMIN — GABAPENTIN 300 MG: 300 CAPSULE ORAL at 10:19

## 2023-04-03 RX ADMIN — LOSARTAN POTASSIUM 25 MG: 25 TABLET, FILM COATED ORAL at 10:20

## 2023-04-03 RX ADMIN — NITROGLYCERIN 1 INCH: 20 OINTMENT TOPICAL at 05:28

## 2023-04-03 NOTE — CASE MANAGEMENT/SOCIAL WORK
Continued Stay Note  KAYODE Cama[facility]     Patient Name: Shaji Junior  MRN: [id number]  Today's Date: 4/3/2023    Admit Date: 3/25/2023    Plan: Return to [facility], no precert required. PASRR per facility. Discharge appeal denied 3/31.   Discharge Plan     Row Name 04/03/23 1605       Plan    Plan Comments DC Barriers: ID consulted, CT abdomen pelvis ordered, IV Invanz, gardner catheter ordered to be changed.              Phone communication or documentation only - no physical contact with patient or family.    Natacha Clancy RN     Office Phone: [phone]  Office Cell: [phone]

## 2023-04-03 NOTE — PAYOR COMM NOTE
"RE:  [id number]    4/3/23- CLINICAL UPDATE  ================================    UTILIZATION REVIEW  DRAKE VARGAS RN   PH:   FAX: [phone]    [facility]  NPI# [id number]  TID # [id number]  ================================    4/3/23 STILL ON CARDENE GTT- TITRATING  PATIENT CONFUSED      Cyrus Junior (77 y.o. Male)     Date of Birth   1945    Social Security Number       Address   [address] [place] [address] [place] [address]    Home Phone   [phone]    MRN   [id number]       Yazidi   None    Marital Status                               Admission Date   3/25/23    Admission Type   Emergency    Admitting Provider       Attending Provider   Isaac Rhoades MD    Department, Room/Bed   [facility] PROGRESS CARE, 2114/1       Discharge Date       Discharge Disposition       Discharge Destination                               Attending Provider: Isaac Rhoades MD    Allergies: No Known Allergies    Isolation: Contact   Infection: ESBL (04/01/22), ESBL Klebsiella (09/19/22), Adelina Auris (rule out) (03/28/23)   Code Status: CPR    Ht: 177.8 cm (70\")   Wt: 134 kg (294 lb 12.1 oz)    Admission Cmt: None   Principal Problem: UTI (urinary tract infection) [N39.0]                 Active Insurance as of 3/25/2023     Primary Coverage     Payor Plan Insurance Group Employer/Plan Group    MISC MEDICARE REPLACEMENT MISC MCARE REPLACEMENT [id number]     Coverage Address Coverage Phone Number Coverage Fax Number Effective Dates    [address] [address] [phone]  2/1/2023 - None Entered    [facility] [id number]       Subscriber Name Subscriber Birth Date Member ID       CYRUS JUINOR 1945 [id number]           Secondary Coverage     Payor Plan Insurance Group Employer/Plan Group    [facility] DE[id number]       Payor Plan Address Payor Plan Phone Number Payor Plan Fax Number Effective Dates    [facility] OFFICE OF COMMUNITY CARE [phone]  2/15/2023 " - None Entered    [address] [address]       [facility] [address]       Subscriber Name Subscriber Birth Date Member ID       CYRUS RAMIREZ 1945 [id number]           Tertiary Coverage     Payor Plan Insurance Group Employer/Plan Group    [facility] OPTUM      Payor Plan Address Payor Plan Phone Number Payor Plan Fax Number Effective Dates    [address] [phone]  1/1/2022 - None Entered    [facility] [id number]       Subscriber Name Subscriber Birth Date Member ID       JAMESCYRUS 1945 [id number]           Other Coverage     Payor Plan Insurance Group Employer/Plan Group    [place]      Payor Plan Address Payor Plan Phone Number Payor Plan Fax Number Effective Dates    [address]   1/1/2022 - None Entered    [place] [address]       Subscriber Name Subscriber Birth Date Member ID       JAMESCYRUS 1945 [id number]                 Emergency Contacts      (Rel.) Home Phone Work Phone Mobile Phone    RICHAR MONROE (Sister) [phone] -- --    CAMERON RAMIREZ (Brother) -- -- [phone]              Vital Signs (last 2 days)     Date/Time Temp Temp src Pulse Resp BP Patient Position SpO2    04/03/23 0528 -- -- 74 -- 141/92 -- --    04/03/23 0527 98.7 (37.1) Axillary  67 16 141/92 Lying 94    Temp src: pt refused oral temp at 04/03/23 0527 04/03/23 0430 -- -- 67 -- 145/85 -- 93    04/03/23 0400 -- -- 68 -- 135/87 -- 93    04/03/23 0330 -- -- 64 -- 143/92 -- 95    04/03/23 0300 -- -- 67 -- 139/88 -- 93    04/03/23 0230 -- -- 56 -- 143/80 -- 93    04/03/23 0209 -- -- 67 -- 136/83 -- --    04/03/23 0200 -- -- 66 -- 136/83 -- 93    04/03/23 0130 -- -- 79 -- 158/92 -- 93    04/03/23 0124 99.1 (37.3) Axillary  77 19 150/80 Lying 93    Temp src: pt refused an oral temp to be taken at 04/03/23 0124 04/03/23 0120 -- -- 70 -- 150/80 -- 92    04/03/23 0100 -- -- 68 -- 144/88 -- 90    04/03/23 0030 -- -- 80 -- 137/85 -- 93    04/03/23 0010 -- -- 65 --  142/80 -- 91    04/02/23 2330 -- -- 69 -- 142/85 -- 94    04/02/23 2300 -- -- 70 -- 145/83 -- 91    04/02/23 2232 -- -- 66 -- 161/81 -- --    04/02/23 2230 -- -- 82 -- 161/81 -- 94    04/02/23 2200 -- -- 70 -- 150/83 -- 91    04/02/23 2151 -- -- 78 -- 141/82 -- --    04/02/23 2130 -- -- 75 -- 141/82 -- 91    04/02/23 2121 99.2 (37.3) Oral 71 21 149/88 Lying 90    04/02/23 2100 -- -- 82 -- 182/93 -- 90    04/02/23 2030 -- -- 78 -- 167/93 -- 91    04/02/23 2000 -- -- 87 -- 179/100 -- 91    04/02/23 1930 -- -- 83 -- 171/99 -- 91    04/02/23 1900 -- -- 82 -- 185/93 -- 93    04/02/23 1846 -- -- 80 -- 189/103 -- --    04/02/23 1740 98.1 (36.7) Axillary 74 21 181/115  Lying 92    BP: nurse notified at 04/02/23 1740    04/02/23 1446 98.3 (36.8) Axillary 76 21 177/107  Lying 92    BP: nurse notified at 04/02/23 1446    04/02/23 1432 -- -- 65 -- 199/112 -- --    04/02/23 1037 98.2 (36.8) Oral 79 19 175/99 Lying 91    04/02/23 1012 -- -- 70 -- 184/116 -- --    04/02/23 0943 -- -- 60 -- 168/93 -- --    04/02/23 0529 -- -- 68 -- 156/99 -- --    04/02/23 0500 98 (36.7) Oral 67 17 156/99 -- 93    04/02/23 0332 -- -- 67 -- 161/86 -- --    04/02/23 0230 -- -- 63 -- 151/84 -- 95    04/02/23 0225 -- -- 77 -- 156/88 -- 92    04/02/23 0220 -- -- 73 -- 161/91 -- 93    04/02/23 0219 -- -- 69 -- 155/96 -- --    04/02/23 0215 98.6 (37) Oral 69 18 148/85 Lying 94    04/02/23 0200 -- -- 68 -- 155/96 -- 93    04/02/23 0130 -- -- 66 -- 143/88 -- 92    04/02/23 0100 -- -- 73 -- 156/86 -- 93    04/02/23 0043 -- -- 66 -- 168/103 -- --    04/02/23 0030 -- -- 70 -- 168/92 -- 92    04/02/23 0027 -- -- 77 -- 163/99 -- 75    04/02/23 0006 -- -- 70 -- 174/113 -- --    04/02/23 0000 -- -- 68 -- 174/113 -- 98    04/01/23 2320 -- -- 69 -- 175/114 -- --    04/01/23 2301 -- -- 62 -- 170/105 -- 100    04/01/23 2200 -- -- 73 20 178/93 -- 100    04/01/23 2155 -- -- 62 -- 178/93 -- --    04/01/23 2154 -- -- 71 -- 178/93 -- 100    04/01/23 2055 -- -- 82 --  193/103 -- 100    04/01/23 2035 -- -- 63 -- 181/107 -- 98    04/01/23 2033 -- -- 77 -- 206/101 -- 94    04/01/23 2025 -- -- 70 -- 173/106 -- 100    04/01/23 2015 -- -- 81 -- 180/109 -- 100    04/01/23 2000 -- -- 82 -- 202/124 -- 100    04/01/23 1936 -- -- 70 -- 199/109 -- --    04/01/23 1920 -- -- 77 -- 200/112 -- 100    04/01/23 1900 -- -- 84 -- 192/111 -- 96    04/01/23 1829 -- -- 77 -- 176/100 -- --    04/01/23 1800 -- -- 79 -- 201/114 -- 97    04/01/23 1741 -- -- 70 -- 195/115 -- --    04/01/23 1656 97.9 (36.6) Oral 75 16 186/130 Lying --    04/01/23 1525 -- -- 73 -- 195/126 -- --    04/01/23 1332 98.3 (36.8) Oral 74 16 156/101 Lying --    04/01/23 1108 -- -- 67 -- 192/116 -- --    04/01/23 0846 97.5 (36.4) Oral 64 19 -- Lying 90    04/01/23 0512 98.8 (37.1) Oral 65 19 100/79 Lying 96    04/01/23 0410 -- -- 67 -- 189/67 -- --    04/01/23 0301 -- -- 71 -- 173/86 -- 94    04/01/23 0300 -- -- 60 -- 178/79 -- 96    04/01/23 0205 -- -- 64 -- 168/95 -- 93    04/01/23 0200 -- -- 64 -- 190/78 -- 96    04/01/23 0110 -- -- 68 -- 167/85 -- 95    04/01/23 0100 98.3 (36.8) Oral 59 17 167/86 Lying 99    04/01/23 0026 -- -- 56 -- 162/75 -- --    04/01/23 0010 -- -- 50 -- 162/75 -- 96    04/01/23 0000 -- -- 58 -- 190/89 -- 98           Physician Progress Notes (last 48 hours)      Frank Foster MD at 04/03/23 0741          Referring Provider: Isaac Rhoades MD    Reason for follow-up: Bradycardia, atrial fibrillation, uncontrolled hypertension     Patient Care Team:  Naa Valverde MD as PCP - General (Internal Medicine)      SUBJECTIVE  Patient is laying in bed and says that he is dying.  He does not have appetite and does not want to eat breakfast.     ROS  Review of all systems negative except as indicated.    Since I have last seen, the patient has been without any chest discomfort, shortness of breath, palpitations, dizziness or syncope.  Denies having any headache, abdominal pain, nausea, vomiting, diarrhea,  constipation, loss of weight or loss of appetite.  Denies having any excessive bruising, hematuria or blood in the stool.  ROS      Personal History:    Past Medical History:   Diagnosis Date   • Acute kidney failure    • Acute respiratory failure with hypoxia    • Anemia    • Benign prostatic hyperplasia    • Bilateral primary osteoarthritis of knee    • Cardiomegaly    • Cardiomyopathy    • Cellulitis and abscess of left leg    • Cerebral infarction    • Cerebrovascular disease    • Chronic kidney disease    • Dementia    • Diabetes mellitus    • Essential hypertension    • GERD (gastroesophageal reflux disease)    • Gout    • Heart failure    • Hyperlipidemia    • Morbid obesity    • Myocardial infarction    • Obstructive sleep apnea    • Obstructive uropathy    • Paroxysmal atrial fibrillation    • Peptic ulcer    • Peripheral vascular disease    • Pulmonary edema    • Venous insufficiency        History reviewed. No pertinent surgical history.    Family History   Problem Relation Age of Onset   • Diabetes Father        Social History     Tobacco Use   • Smoking status: Never   Vaping Use   • Vaping Use: Never used   Substance Use Topics   • Alcohol use: Not Currently   • Drug use: Never        Medications Prior to Admission   Medication Sig Dispense Refill Last Dose   • acetaminophen (TYLENOL) 325 MG tablet Take 650 mg by mouth Every 4 (Four) Hours As Needed for Mild Pain .      • albuterol sulfate  (90 Base) MCG/ACT inhaler Inhale 2 puffs Every 4 (Four) Hours As Needed (Cough).      • apixaban (ELIQUIS) 5 MG tablet tablet Take 1 tablet by mouth Every 12 (Twelve) Hours. Indications: Atrial Fibrillation 60 tablet     • carvedilol (COREG) 12.5 MG tablet Take 1 tablet by mouth 2 (Two) Times a Day With Meals. 60 tablet 0    • docusate calcium (SURFAK) 240 MG capsule Take 240 mg by mouth Daily.      • escitalopram (LEXAPRO) 10 MG tablet Take 10 mg by mouth Daily.      • finasteride (PROSCAR) 5 MG tablet Take  5 mg by mouth Daily.      • furosemide (LASIX) 40 MG tablet Take 1 tablet by mouth 2 (Two) Times a Day. 60 tablet 0    • gabapentin (NEURONTIN) 300 MG capsule Take 300 mg by mouth 3 (Three) Times a Day.      • hydrALAZINE (APRESOLINE) 50 MG tablet Take 1 tablet by mouth 3 (Three) Times a Day. 90 tablet 0    • Insulin Glargine (BASAGLAR KWIKPEN) 100 UNIT/ML injection pen Inject 10 Units under the skin into the appropriate area as directed Every Night.      • isosorbide mononitrate (IMDUR) 60 MG 24 hr tablet Take 1 tablet by mouth 2 (Two) Times a Day. 60 tablet 0    • losartan (COZAAR) 25 MG tablet Take 1 tablet by mouth Daily. 30 tablet 0    • methocarbamol (ROBAXIN) 500 MG tablet Take 500 mg by mouth Every 12 (Twelve) Hours As Needed for Muscle Spasms.      • nitroglycerin (NITROSTAT) 0.4 MG SL tablet Place 0.4 mg under the tongue Every 5 (Five) Minutes As Needed for Chest Pain. Take no more than 3 doses in 15 minutes.      • polycarbophil (calcium polycarbophil) 625 MG tablet tablet Take 625 mg by mouth Daily.      • Polyethylene Glycol 3350 powder Take 17 g by mouth Every Night.      • saccharomyces boulardii (FLORASTOR) 250 MG capsule Take 250 mg by mouth 2 (Two) Times a Day.      • simethicone (MYLICON,GAS-X) 180 MG capsule Take 180 mg by mouth Every 6 (Six) Hours As Needed for Flatulence.      • simvastatin (ZOCOR) 40 MG tablet Take 40 mg by mouth every night at bedtime.      • tamsulosin (FLOMAX) 0.4 MG capsule 24 hr capsule Take 1 capsule by mouth Daily.      • traMADol (ULTRAM) 50 MG tablet Take 1 tablet by mouth Every 6 (Six) Hours As Needed for Moderate Pain.          Allergies:  Patient has no known allergies.    Scheduled Meds:amLODIPine, 10 mg, Oral, Q24H  atorvastatin, 20 mg, Oral, Nightly  chlorthalidone, 25 mg, Oral, Daily  enoxaparin, 1 mg/kg, Subcutaneous, Q12H  ertapenem, 1 g, Intravenous, Q24H  escitalopram, 10 mg, Oral, Daily  finasteride, 5 mg, Oral, Daily  gabapentin, 300 mg, Oral,  "TID  hydrALAZINE, 50 mg, Oral, TID  insulin lispro, 2-7 Units, Subcutaneous, TID With Meals  isosorbide mononitrate, 60 mg, Oral, Q24H  lidocaine, 20 mL, Subcutaneous, Once  losartan, 25 mg, Oral, Q24H  sodium chloride, 10 mL, Intravenous, Q12H  sodium chloride, 10 mL, Intravenous, Q12H  tamsulosin, 0.4 mg, Oral, Daily      Continuous Infusions:lactated ringers, 50 mL/hr, Last Rate: Stopped (04/01/23 0417)  Pharmacy to Dose enoxaparin (LOVENOX),       PRN Meds:.•  acetaminophen  •  acetaminophen  •  albuterol  •  cyclobenzaprine  •  dextrose  •  dextrose  •  glucagon (human recombinant)  •  hydrALAZINE  •  labetalol  •  nitroglycerin  •  ondansetron **OR** ondansetron  •  Pharmacy to Dose enoxaparin (LOVENOX)  •  [COMPLETED] Insert Peripheral IV **AND** sodium chloride  •  sodium chloride  •  sodium chloride  •  sodium chloride  •  sodium chloride      OBJECTIVE    Vital Signs  Vitals:    04/03/23 0400 04/03/23 0430 04/03/23 0527 04/03/23 0528   BP: 135/87 145/85 141/92 141/92   BP Location:   Left arm    Patient Position:   Lying    Pulse: 68 67 67 74   Resp:   16    Temp:   98.7 °F (37.1 °C)    TempSrc:   Axillary    SpO2: 93% 93% 94%    Weight:   134 kg (294 lb 12.1 oz)    Height:           Flowsheet Rows    Flowsheet Row First Filed Value   Admission Height 177.8 cm (70\") Documented at 03/25/2023 2033   Admission Weight 142 kg (313 lb 11.4 oz) Documented at 03/25/2023 2033            Intake/Output Summary (Last 24 hours) at 4/3/2023 1036  Last data filed at 4/3/2023 0527  Gross per 24 hour   Intake --   Output 3275 ml   Net -3275 ml          Telemetry: Atrial fibrillation with controlled heart rate    Physical Exam:  The patient is lethargic and ill-appearing  Vital signs as noted above.  Head and neck revealed no carotid bruits or jugular venous distention.  No thyromegaly or lymphadenopathy is present  Lungs clear.  No wheezing.  Breath sounds are normal bilaterally.  Irregularly irregular.  No murmur. No " precordial rub is present.  No gallop is present.  Abdomen soft and nontender.  No organomegaly is present.  Extremities with good peripheral pulses without any pedal edema.  Skin warm and dry.  Musculoskeletal system is grossly normal.  CNS grossly normal.       Results Review:  I have personally reviewed the results from the time of this admission to 4/3/2023 10:36 EDT and agree with these findings:  []  Laboratory  []  Microbiology  []  Radiology  []  EKG/Telemetry   []  Cardiology/Vascular   []  Pathology  []  Old records  []  Other:    Most notable findings include:    Lab Results (last 24 hours)     Procedure Component Value Units Date/Time    POC Glucose Once [[id number]]  (Abnormal) Collected: 04/03/23 0715    Specimen: Blood Updated: 04/03/23 0717     Glucose 114 mg/dL      Comment: Serial Number: [id number]:  [id number]       Basic Metabolic Panel [[id number]]  (Abnormal) Collected: 04/03/23 0347    Specimen: Blood Updated: 04/03/23 0448     Glucose 125 mg/dL      BUN 9 mg/dL      Creatinine 0.80 mg/dL      Sodium 140 mmol/L      Potassium 4.0 mmol/L      Comment: Slight hemolysis detected by analyzer. Results may be affected.        Chloride 105 mmol/L      CO2 28.0 mmol/L      Calcium 8.7 mg/dL      BUN/Creatinine Ratio 11.3     Anion Gap 7.0 mmol/L      eGFR 91.2 mL/min/1.73     Narrative:      GFR Normal >60  Chronic Kidney Disease <60  Kidney Failure <15    The GFR formula is only valid for adults with stable renal function between ages 18 and 70.    Phosphorus [[id number]]  (Normal) Collected: 04/03/23 0347    Specimen: Blood Updated: 04/03/23 0448     Phosphorus 3.5 mg/dL     Magnesium [[id number]]  (Normal) Collected: 04/03/23 0347    Specimen: Blood Updated: 04/03/23 0448     Magnesium 1.9 mg/dL     CBC & Differential [[id number]]  (Abnormal) Collected: 04/03/23 0347    Specimen: Blood Updated: 04/03/23 0415    Narrative:      The following orders were created for panel order CBC &  Differential.  Procedure                               Abnormality         Status                     ---------                               -----------         ------                     CBC Auto Differential[[id number]]        Abnormal            Final result                 Please view results for these tests on the individual orders.    CBC Auto Differential [[id number]]  (Abnormal) Collected: 04/03/23 0347    Specimen: Blood Updated: 04/03/23 0415     WBC 4.40 10*3/mm3      RBC 4.20 10*6/mm3      Hemoglobin 10.9 g/dL      Hematocrit 33.7 %      MCV 80.2 fL      MCH 25.9 pg      MCHC 32.3 g/dL      RDW 21.1 %      RDW-SD 62.1 fl      MPV 7.9 fL      Platelets 228 10*3/mm3      Neutrophil % 48.4 %      Lymphocyte % 35.3 %      Monocyte % 11.1 %      Eosinophil % 4.7 %      Basophil % 0.5 %      Neutrophils, Absolute 2.10 10*3/mm3      Lymphocytes, Absolute 1.60 10*3/mm3      Monocytes, Absolute 0.50 10*3/mm3      Eosinophils, Absolute 0.20 10*3/mm3      Basophils, Absolute 0.00 10*3/mm3      nRBC 0.1 /100 WBC     POC Glucose Once [[id number]]  (Abnormal) Collected: 04/02/23 1902    Specimen: Blood Updated: 04/02/23 1903     Glucose 152 mg/dL      Comment: Serial Number: [id number]:  [id number]       Basic Metabolic Panel [[id number]]  (Abnormal) Collected: 04/02/23 1119    Specimen: Blood Updated: 04/02/23 1225     Glucose 135 mg/dL      BUN 10 mg/dL      Creatinine 0.85 mg/dL      Sodium 137 mmol/L      Potassium 4.1 mmol/L      Chloride 101 mmol/L      CO2 28.0 mmol/L      Calcium 8.6 mg/dL      BUN/Creatinine Ratio 11.8     Anion Gap 8.0 mmol/L      eGFR 89.5 mL/min/1.73     Narrative:      GFR Normal >60  Chronic Kidney Disease <60  Kidney Failure <15    The GFR formula is only valid for adults with stable renal function between ages 18 and 70.    Phosphorus [[id number]]  (Normal) Collected: 04/02/23 1119    Specimen: Blood Updated: 04/02/23 1225     Phosphorus 3.3 mg/dL     Magnesium [[id number]]  (Normal)  Collected: 04/02/23 1119    Specimen: Blood Updated: 04/02/23 1225     Magnesium 1.8 mg/dL     CBC & Differential [[id number]]  (Abnormal) Collected: 04/02/23 1119    Specimen: Blood Updated: 04/02/23 1206    Narrative:      The following orders were created for panel order CBC & Differential.  Procedure                               Abnormality         Status                     ---------                               -----------         ------                     CBC Auto Differential[[id number]]        Abnormal            Final result                 Please view results for these tests on the individual orders.    CBC Auto Differential [[id number]]  (Abnormal) Collected: 04/02/23 1119    Specimen: Blood Updated: 04/02/23 1206     WBC 4.50 10*3/mm3      RBC 4.41 10*6/mm3      Hemoglobin 11.2 g/dL      Hematocrit 35.4 %      MCV 80.2 fL      MCH 25.3 pg      MCHC 31.6 g/dL      RDW 21.5 %      RDW-SD 63.9 fl      MPV 8.1 fL      Platelets 238 10*3/mm3      Neutrophil % 49.0 %      Lymphocyte % 36.4 %      Monocyte % 9.5 %      Eosinophil % 4.6 %      Basophil % 0.5 %      Neutrophils, Absolute 2.20 10*3/mm3      Lymphocytes, Absolute 1.60 10*3/mm3      Monocytes, Absolute 0.40 10*3/mm3      Eosinophils, Absolute 0.20 10*3/mm3      Basophils, Absolute 0.00 10*3/mm3      nRBC 0.1 /100 WBC     CANDIDA AURIS SCREEN - Swab, Axilla Right, Axilla Left and Groin [[id number]]  (Normal) Collected: 03/28/23 1118    Specimen: Swab from Axilla Right, Axilla Left and Groin Updated: 04/02/23 1130     Candida Auris Screen Culture No Candida auris isolated at 5 days    POC Glucose Once [[id number]]  (Abnormal) Collected: 04/02/23 1121    Specimen: Blood Updated: 04/02/23 1123     Glucose 129 mg/dL      Comment: Serial Number: [id number]:  [id number]             Imaging Results (Last 24 Hours)     ** No results found for the last 24 hours. **          LAB RESULTS (LAST 7 DAYS)    CBC  Results from last 7 days   Lab Units  04/03/23 0347 04/02/23  1119 04/01/23  2315 03/28/23  0759   WBC 10*3/mm3 4.40 4.50 4.20 3.40   RBC 10*6/mm3 4.20 4.41 4.25 4.56   HEMOGLOBIN g/dL 10.9* 11.2* 10.9* 11.7*   HEMATOCRIT % 33.7* 35.4* 34.1* 36.9*   MCV fL 80.2 80.2 80.3 80.9   PLATELETS 10*3/mm3 228 238 213 215       BMP  Results from last 7 days   Lab Units 04/03/23  0347 04/02/23  1119 04/01/23  1528 03/30/23  2251 03/28/23  0759   SODIUM mmol/L 140 137  --  137 143   POTASSIUM mmol/L 4.0 4.1  --  3.8 3.4*   CHLORIDE mmol/L 105 101  --  104 103   CO2 mmol/L 28.0 28.0  --  25.0 29.0   BUN mg/dL 9 10  --  13 10   CREATININE mg/dL 0.80 0.85  --  0.84 0.83   GLUCOSE mg/dL 125* 135*  --  107* 111*   MAGNESIUM mg/dL 1.9 1.8  --  1.9  --    PHOSPHORUS mg/dL 3.5 3.3 3.6  --   --        CMP   Results from last 7 days   Lab Units 04/03/23 0347 04/02/23  1119 03/30/23 2251 03/28/23  0759   SODIUM mmol/L 140 137 137 143   POTASSIUM mmol/L 4.0 4.1 3.8 3.4*   CHLORIDE mmol/L 105 101 104 103   CO2 mmol/L 28.0 28.0 25.0 29.0   BUN mg/dL 9 10 13 10   CREATININE mg/dL 0.80 0.85 0.84 0.83   GLUCOSE mg/dL 125* 135* 107* 111*       BNP        TROPONIN        CoAg        Creatinine Clearance  Estimated Creatinine Clearance: 106.5 mL/min (by C-G formula based on SCr of 0.8 mg/dL).    ABG        Radiology  No radiology results for the last day      EKG  I personally viewed and interpreted the patient's EKG/Telemetry data:  ECG 12 Lead Rhythm Change   Final Result   HEART RATE= 65  bpm   RR Interval= 917  ms   NJ Interval=   ms   P Horizontal Axis=   deg   P Front Axis=   deg   QRSD Interval= 91  ms   QT Interval= 429  ms   QRS Axis= -7  deg   T Wave Axis= 44  deg   - ABNORMAL ECG -   Atrial fibrillation   When compared with ECG of 31-Mar-2023 12:49:30,   Nonspecific significant change   Electronically Signed By: Fran Bliss (JOSELIN) 02-Apr-2023 18:34:56   Date and Time of Study: 2023-04-01 15:36:50      ECG 12 Lead Bradycardia   Final Result   HEART RATE= 45  bpm   RR  Interval= 1327  ms   PA Interval=   ms   P Horizontal Axis=   deg   P Front Axis=   deg   QRSD Interval= 94  ms   QT Interval= 517  ms   QRS Axis= -29  deg   T Wave Axis=   deg   - ABNORMAL ECG -   Atrial fibrillation   Ventricular premature complex   Inferior infarct, old   When compared with ECG of 26-Mar-2023 22:49:11,   Significant rate decrease   New or worsened ischemia or infarction   Electronically Signed By: Fran Bliss ([facility]) 31-Mar-2023 18:40:02   Date and Time of Study: 2023-03-31 12:49:30      ECG 12 Lead Chest Pain   Final Result   HEART RATE= 79  bpm   RR Interval= 756  ms   PA Interval=   ms   P Horizontal Axis=   deg   P Front Axis=   deg   QRSD Interval= 94  ms   QT Interval= 388  ms   QRS Axis= -22  deg   T Wave Axis= 93  deg   - ABNORMAL ECG -   Atrial fibrillation   Ventricular premature complex   When compared with ECG of 25-Mar-2023 20:44:30,   NO SIGNIFICANT CHANGE FROM PREVIOUS ECG   Electronically Signed By: Duarte Canales ([facility]) 28-Mar-2023 19:04:25   Date and Time of Study: 2023-03-26 22:49:11      ECG 12 Lead Altered Mental Status   Final Result   HEART RATE= 85  bpm   RR Interval= 706  ms   PA Interval=   ms   P Horizontal Axis=   deg   P Front Axis=   deg   QRSD Interval= 85  ms   QT Interval= 361  ms   QRS Axis= 18  deg   T Wave Axis= 132  deg   - ABNORMAL ECG -   Atrial fibrillation   Nonspecific repol abnormality, diffuse leads   When compared with ECG of 15-Feb-2023 15:43:36,   Significant rate increase   Electronically Signed By: Bob Rojas ([facility]) 26-Mar-2023 10:20:08   Date and Time of Study: 2023-03-25 20:44:30      SCANNED - TELEMETRY     Final Result      SCANNED - TELEMETRY     Final Result      SCANNED - TELEMETRY     Final Result      SCANNED - TELEMETRY     Final Result      SCANNED - TELEMETRY     Final Result      SCANNED - TELEMETRY     Final Result      SCANNED - TELEMETRY     Final Result      SCANNED - TELEMETRY     Final Result      SCANNED - TELEMETRY      Final Result      SCANNED - TELEMETRY     Final Result      SCANNED - TELEMETRY     Final Result      SCANNED - TELEMETRY     Final Result      SCANNED - TELEMETRY     Final Result      SCANNED - TELEMETRY     Final Result      SCANNED - TELEMETRY     Final Result      SCANNED - TELEMETRY     Final Result      SCANNED - TELEMETRY     Final Result      SCANNED - TELEMETRY     Final Result      SCANNED - TELEMETRY     Final Result      SCANNED - TELEMETRY     Final Result      SCANNED - TELEMETRY     Final Result      SCANNED - TELEMETRY     Final Result      SCANNED - TELEMETRY     Final Result      SCANNED - TELEMETRY     Final Result      SCANNED - TELEMETRY     Final Result      SCANNED - TELEMETRY     Final Result      SCANNED - TELEMETRY     Final Result      SCANNED - TELEMETRY     Final Result      SCANNED - TELEMETRY     Final Result      SCANNED - TELEMETRY     Final Result      SCANNED - TELEMETRY     Final Result      SCANNED - TELEMETRY     Final Result      SCANNED - TELEMETRY     Final Result      SCANNED - TELEMETRY     Final Result      SCANNED - TELEMETRY     Final Result      SCANNED - TELEMETRY     Final Result      SCANNED - TELEMETRY     Final Result      ECG 12 Lead Rhythm Change    (Results Pending)         Echocardiogram:    Results for orders placed during the hospital encounter of 03/25/23    Adult Transthoracic Echo Complete w/ Color, Spectral and Contrast if Necessary Per Protocol    Interpretation Summary  •  Left ventricular systolic function is normal. Left ventricular ejection fraction appears to be 56 - 60%.  •  Left ventricular diastolic function is consistent with (grade II w/high LAP) pseudonormalization.  •  Left atrial volume is severely increased.  •  The right atrial cavity is moderate to severely  dilated.  •  Abnormal mitral valve structure consistent with dilated annulus.  •  Estimated right ventricular systolic pressure from tricuspid regurgitation is moderately elevated  (45-55 mmHg).  •  Moderate pulmonary hypertension is present.        Stress Test:  Results for orders placed during the hospital encounter of 09/16/22    Stress Test With Myocardial Perfusion One Day    Interpretation Summary  · Diaphragmatic attenuation artifact is present.  · Left ventricular ejection fraction is hyperdynamic (Calculated EF > 70%). .  · Myocardial perfusion imaging indicates a normal myocardial perfusion study with no evidence of ischemia.  · Impressions are consistent with a low risk study.  · This is normal Cardiolite imaging stress test with no evidence of ischemia or myocardial infarction. Left ventricle size and function is normal on gated SPECT imaging. No wall motion abnormality was noted. Clinical correlation recommended. Further recommendation as per ordering physician..  · Findings consistent with an indeterminate ECG stress test.         Cardiac Catheterization:  No results found for this or any previous visit.         Other:         ASSESSMENT & PLAN:    Principal Problem:    UTI (urinary tract infection)  Active Problems:    Cerebrovascular accident    Type 2 diabetes mellitus with diabetic nephropathy    Atrial fibrillation    Morbid obesity    Dementia    Acute metabolic encephalopathy       A fib/SSS  He has atrial fibrillation with slow ventricular response.  Stopped Coreg and clonidine due to bradycardia  Lovenox for anticoagulation  No further episodes of bradycardia since stopping Coreg or clonidine.  Continue telemetry monitoring     HFpEF  Echo shows EF of 56 to 60%, grade 2 diastolic dysfunction, moderate pulmonary hypertension.  Lasix as needed  Renal function is normal     Uncontrolled hypertension  We will stop Cardene drip.  Start chlorthalidone, losartan 25 mg, Imdur 60 mg and hydralazine 50mg 3 times daily     Hyperlipidemia  Continue high intensity statin, most recent LDL less than 40.     Diabetes  Continue insulin therapy for diabetes, most recent A1c is  6     Complicated UTI  Altered mental state likely secondary to ESBL UTI, continue ertapenem     BPH  On tamsulosin and finasteride for BPH.    Frank Foster MD  23  10:36 EDT                Electronically signed by Frank Foster MD at 23 1045     Will Browne DO at 23 1307              [facility] Medicine Services Daily Progress Note    Patient Name: Shaji Junior  : 1945  MRN: [id number]  Primary Care Physician:  Naa Valverde MD  Date of admission: 3/25/2023      Subjective       Chief Complaint: Abdominal pain      Patient Reports     3/27/2023: Admitted to PCU due to hypertensive urgency.  Off Cardene drip.  Can downgrade.  Complains of abdominal pain.  History of ESBL.  Does not walk.  Confusion getting better    3/28/23: Chronic Motley catheter.  Awaiting urine culture sensitivities.    3/29/23: ESBL UTI.  Will order ertapenem.  Family does not want him to go back to [facility].    3/30/23: Patient refusing meds.  Hemodynamically stable.    3/31/23: Complained of left knee pain ordered Roxicodone.  Patient bradycardic.  Coreg held.  Reconsulted cardiology.  We will transfer to PCU.  Patient will be transferred to [facility] due to bradycardia.  Appreciate cardiology input.  Discontinued clonidine    23: Transferred to PCU yesterday for bradycardia.  Clonidine was discontinued.  High blood pressure but the patient is refusing medications.  Nitroglycerin patch placed.  Complains of headache.  Spoke to the patient's sister and brother in the afternoon regarding the patient's refusal of medications.  The patient's brother said he will come and talk to the patient.  Sister, MELISSA Martinez lives in [place].     23: Complains of right neck pain.  Ordered Flexeril.  Encourage patient to eat breakfast and take his medications.  Heart rate in the 60s.    Review of Systems   Unable to perform ROS: mental status change         Objective       Vitals:   Temp:   [97.9 °F (36.6 °C)-98.6 °F (37 °C)] 98.2 °F (36.8 °C)  Heart Rate:  [60-84] 79  Resp:  [16-20] 19  BP: (143-206)/() 175/99    Physical Exam  HENT:      Head: Normocephalic.      Mouth/Throat:      Mouth: Mucous membranes are moist.   Eyes:      Extraocular Movements: Extraocular movements intact.      Pupils: Pupils are equal, round, and reactive to light.   Cardiovascular:      Rate and Rhythm: Normal rate and regular rhythm.   Pulmonary:      Effort: Pulmonary effort is normal.   Abdominal:      General: Bowel sounds are normal.      Tenderness: There is no abdominal tenderness.      Comments: Obese abdomen.   Musculoskeletal:      Comments: Decreased range of motion hips   Skin:     General: Skin is warm.      Findings: No rash.   Neurological:      Mental Status: He is alert. He is confused.   Psychiatric:         Behavior: Behavior is cooperative.             Result Review    Result Review:  I have personally reviewed the results from the time of this admission to 4/2/2023 13:07 EDT and agree with these findings:  [x]  Laboratory  []  Microbiology  [x]  Radiology  []  EKG/Telemetry   []  Cardiology/Vascular   []  Pathology  [x]  Old records  []  Other:      Wounds (last 24 hours)     LDA Wound     Row Name 04/02/23 0410 04/02/23 0010 04/01/23 2010       Wound 03/10/23 0938 Right gluteal MASD (Moisture associated skin damage)    Wound - Properties Group Placement Date: 03/10/23  -HW Placement Time: 0938  -HW Present on Hospital Admission: Y  -HW Side: Right  -HW Location: gluteal  -HW Primary Wound Type: MASD  -HW    Dressing Appearance open to air  -DC open to air  -DC open to air  -DC    Closure Open to air  -DC Open to air  -DC Open to air  -DC    Base pink  -DC pink  -DC pink  -DC    Drainage Amount none  -DC none  -DC none  -DC    Care, Wound cleansed with;sterile normal saline  -DC cleansed with;sterile normal saline  -DC cleansed with;sterile normal saline  -DC    Dressing Care open to air  -DC open  to air  -DC open to air  -DC    Retired Wound - Properties Group Placement Date: 03/10/23  -HW Placement Time: 0938 -HW Present on Hospital Admission: Y  -HW Side: Right  -HW Location: gluteal  -HW Primary Wound Type: MASD  -HW    Retired Wound - Properties Group Date first assessed: 03/10/23  -HW Time first assessed: 0938 -HW Present on Hospital Admission: Y  -HW Side: Right  -HW Location: gluteal  -HW Primary Wound Type: MASD  -HW       Wound 03/26/23 1653 Right anterior greater trochanter Blisters    Wound - Properties Group Placement Date: 03/26/23  -NE Placement Time: 1653 -NE Present on Hospital Admission: Y  -NE Side: Right  -NE Orientation: anterior  -NE Location: greater trochanter  -NE Primary Wound Type: Blisters  -NE    Pressure Injury Stage 2  -DC 2  -DC 2  -DC    Dressing Appearance open to air  -DC open to air  -DC open to air  -DC    Closure Open to air  -DC Open to air  -DC Open to air  -DC    Base moist;red  -DC moist;red  -DC moist;red  -DC    Periwound dry  -DC dry  -DC dry  -DC    Periwound Temperature warm  -DC warm  -DC warm  -DC    Periwound Skin Turgor soft  -DC soft  -DC soft  -DC    Drainage Characteristics/Odor serosanguineous  -DC serosanguineous  -DC serosanguineous  -DC    Drainage Amount scant  -DC scant  -DC scant  -DC    Care, Wound cleansed with;sterile normal saline  -DC cleansed with;sterile normal saline  -DC cleansed with;sterile normal saline  -DC    Dressing Care skin barrier agent applied  -DC skin barrier agent applied  -DC skin barrier agent applied  -DC    Retired Wound - Properties Group Placement Date: 03/26/23  -NE Placement Time: 1653 -NE Present on Hospital Admission: Y  -NE Side: Right  -NE Orientation: anterior  -NE Location: greater trochanter  -NE Primary Wound Type: Blisters  -NE    Retired Wound - Properties Group Date first assessed: 03/26/23  -NE Time first assessed: 1653 -NE Present on Hospital Admission: Y  -NE Side: Right  -NE Location: greater  trochanter  -NE Primary Wound Type: Blisters  -NE       Wound 03/26/23 1654 Right gluteal Pressure Injury    Wound - Properties Group Placement Date: 03/26/23 -NE Placement Time: 1654 -NE Present on Hospital Admission: Y  -NE Side: Right  -NE Location: gluteal  -NE, STAGE 2  Primary Wound Type: Pressure inj  -NE    Pressure Injury Stage 2  -DC 2  -DC 2  -DC    Dressing Appearance open to air  -DC open to air  -DC open to air  -DC    Closure Open to air  -DC Open to air  -DC Open to air  -DC    Base pink;red  -DC pink;red  -DC pink;red  -DC    Periwound pink  -DC pink  -DC pink  -DC    Periwound Temperature warm  -DC warm  -DC warm  -DC    Drainage Characteristics/Odor serosanguineous  -DC serosanguineous  -DC serosanguineous  -DC    Drainage Amount scant  -DC scant  -DC scant  -DC    Care, Wound cleansed with;sterile normal saline  -DC cleansed with;sterile normal saline  -DC cleansed with;sterile normal saline  -DC    Dressing Care open to air  -DC open to air  -DC open to air  -DC    Periwound Care barrier ointment applied  -DC barrier ointment applied  -DC barrier ointment applied  -DC    Retired Wound - Properties Group Placement Date: 03/26/23 -NE Placement Time: 1654 -NE Present on Hospital Admission: Y  -NE Side: Right  -NE Location: gluteal  -NE, STAGE 2  Primary Wound Type: Pressure inj  -NE    Retired Wound - Properties Group Date first assessed: 03/26/23 -NE Time first assessed: 1654 -NE Present on Hospital Admission: Y  -NE Side: Right  -NE Location: gluteal  -NE, STAGE 2  Primary Wound Type: Pressure inj  -NE       Wound 03/26/23 1702 Left anterior thigh Skin Tear    Wound - Properties Group Placement Date: 03/26/23 -NE Placement Time: 1702 -NE Present on Hospital Admission: Y  -NE Side: Left  -NE Orientation: anterior  -NE Location: thigh  -NE Primary Wound Type: Skin tear  -NE    Dressing Appearance open to air  -DC open to air  -DC open to air  -DC    Closure Open to air  look healed  -DC  Open to air  look healed  -DC Open to air  look healed  -DC    Base pink  -DC pink  -DC --    Drainage Amount none  -DC none  -DC none  -DC    Care, Wound cleansed with;sterile normal saline  -DC cleansed with;sterile normal saline  -DC cleansed with;sterile normal saline  -DC    Retired Wound - Properties Group Placement Date: 03/26/23  -NE Placement Time: 1702 -NE Present on Hospital Admission: Y  -NE Side: Left  -NE Orientation: anterior  -NE Location: thigh  -NE Primary Wound Type: Skin tear  -NE    Retired Wound - Properties Group Date first assessed: 03/26/23  -NE Time first assessed: 1702  -NE Present on Hospital Admission: Y  -NE Side: Left  -NE Location: thigh  -NE Primary Wound Type: Skin tear  -NE       Wound 03/31/23 1723 Left posterior    Wound - Properties Group Placement Date: 03/31/23  -BM Placement Time: 1723  -BM Side: Left  -BM Orientation: posterior  -BM    Pressure Injury Stage DTPI  -DC DTPI  -DC DTPI  -DC    Dressing Appearance open to air  -DC open to air  -DC open to air  -DC    Periwound intact  -DC intact  -DC intact  -DC    Retired Wound - Properties Group Placement Date: 03/31/23  -BM Placement Time: 1723  -BM Side: Left  -BM Orientation: posterior  -BM    Retired Wound - Properties Group Date first assessed: 03/31/23  -BM Time first assessed: 1723  -BM Side: Left  -BM          User Key  (r) = Recorded By, (t) = Taken By, (c) = Cosigned By    Initials Name Provider Type    DC Cody Simons LPN Licensed Nurse    Christy Joseph, RN Registered Nurse    Jordan Rao, RN Registered Nurse     Ignacia Calles RN Registered Nurse                  Assessment & Plan      Brief Patient Summary:       The patient is a 77-year-old female with history of type 2 diabetes mellitus, atrial fibrillation,  CVA, chronic Motley catheter and recurrent UTI.     The patient was recently discharged from the hospital on 2/18/2023 after treatment for complicated ESBL UTI, metabolic  encephalopathy and hypertensive urgency.     The patient presented to the ED from [facility] on 3/25/2023 for evaluation of confusion and fevers.     The patient was diagnosed with acute metabolic encephalopathy due to urinary tract infection  and admitted to the hospitalist  service.  He was seen by cardiologist for history of atrial fibrillation with normal LVEF 60-65%.  Urine cultures grew ESBL Proteus mirabilis and was started on ertapenem.    The patient was transferred to PCU on 4/1/2023 for bradycardia.  Clonidine was then discontinued and cardiology was consulted.  He continues to refuse medications thus has elevated BP.  Nitro patch has been used.  The patient has a bed at [facility] but bradycardia has to resolved before being accepted there.    amLODIPine, 10 mg, Oral, Q24H  atorvastatin, 20 mg, Oral, Nightly  chlorthalidone, 25 mg, Oral, Daily  enoxaparin, 1 mg/kg, Subcutaneous, Q12H  ertapenem, 1 g, Intravenous, Q24H  escitalopram, 10 mg, Oral, Daily  finasteride, 5 mg, Oral, Daily  gabapentin, 300 mg, Oral, TID  hydrALAZINE, 50 mg, Oral, TID  insulin lispro, 2-7 Units, Subcutaneous, TID With Meals  lidocaine, 20 mL, Subcutaneous, Once  lisinopril, 10 mg, Oral, Q24H  nitroglycerin, 1 inch, Topical, Q6H  sodium chloride, 10 mL, Intravenous, Q12H  sodium chloride, 10 mL, Intravenous, Q12H  tamsulosin, 0.4 mg, Oral, Daily       lactated ringers, 50 mL/hr, Last Rate: Stopped (04/01/23 0859)  niCARdipine, 5-15 mg/hr, Last Rate: 10 mg/hr (04/02/23 1012)  Pharmacy to Dose enoxaparin (LOVENOX),          Active Hospital Problems:  Active Hospital Problems    Diagnosis    • **UTI (urinary tract infection)    • Acute metabolic encephalopathy    • Morbid obesity    • Dementia    • Atrial fibrillation    • Cerebrovascular accident    • Type 2 diabetes mellitus with diabetic nephropathy      ESBL Proteus urinary tract infection (POA)  Hypertensive urgency (POA)  Acute toxic/metabolic encephalopathy  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  Contamination.  -CT head on admission ruled out acute intracranial pathology.  -Family would like the patient to go to a new facility.  Awaiting pre-CERT.  -Patient refuses medication sometimes.  -Transfer to PCU 3/31/2023 due to bradycardia.  Clonidine discontinued.  Coreg held.  Appreciate cardiology input  -consult psychiatry due to refusal of home medications      DVT prophylaxis:  Medical DVT prophylaxis orders are present.    CODE STATUS:    Code Status (Patient has no pulse and is not breathing): CPR (Attempt to Resuscitate)  Medical Interventions (Patient has pulse or is breathing): Full Support      Disposition:  I expect patient to be discharged 1-2 days.    This patient has been examined wearing appropriate Personal Protective Equipment and discussed with nursing. 04/02/23      Electronically signed by Will Browne DO, 04/02/23, 13:07 EDT.  [facility] Hospitalist Team             Electronically signed by Will Browne DO at 04/02/23 1311     Magui Best MD at 04/02/23 1011          Referring Provider: Will Browne,*    Reason for follow-up: bradycardia, Afib  CARDIOLOGY FOLLOW-UP PROGRESS NOTE      Reason for follow-up:  Atrial fibrillation with slow heart rate     Attending: Will Browne,*      Subjective .   -Cardene drip overnight due to elevated blood pressures and patient refusing oral meds.  According to nurse he did take his medications this morning.  Blood pressure remains uncontrolled.    Review of  "system  Unable to perform due to mental state    Pertinent items are noted in HPI, all other systems reviewed and negative  Allergies: Patient has no known allergies.    Scheduled Meds:amLODIPine, 10 mg, Oral, Q24H  atorvastatin, 20 mg, Oral, Nightly  chlorthalidone, 25 mg, Oral, Daily  enoxaparin, 1 mg/kg, Subcutaneous, Q12H  ertapenem, 1 g, Intravenous, Q24H  escitalopram, 10 mg, Oral, Daily  finasteride, 5 mg, Oral, Daily  gabapentin, 300 mg, Oral, TID  hydrALAZINE, 50 mg, Oral, TID  insulin lispro, 2-7 Units, Subcutaneous, TID With Meals  lidocaine, 20 mL, Subcutaneous, Once  lisinopril, 10 mg, Oral, Q24H  nitroglycerin, 1 inch, Topical, Q6H  sodium chloride, 10 mL, Intravenous, Q12H  sodium chloride, 10 mL, Intravenous, Q12H  tamsulosin, 0.4 mg, Oral, Daily        Continuous Infusions:lactated ringers, 50 mL/hr, Last Rate: Stopped (04/01/23 0417)  niCARdipine, 5-15 mg/hr, Last Rate: 7.5 mg/hr (04/02/23 0537)  Pharmacy to Dose enoxaparin (LOVENOX),         PRN Meds:.•  acetaminophen  •  acetaminophen  •  albuterol  •  cyclobenzaprine  •  dextrose  •  dextrose  •  glucagon (human recombinant)  •  hydrALAZINE  •  labetalol  •  nitroglycerin  •  ondansetron **OR** ondansetron  •  Pharmacy to Dose enoxaparin (LOVENOX)  •  [COMPLETED] Insert Peripheral IV **AND** sodium chloride  •  sodium chloride  •  sodium chloride  •  sodium chloride  •  sodium chloride    Objective     VITAL SIGNS  Patient Vitals for the past 24 hrs:   BP Temp Temp src Pulse Resp SpO2 Height Weight   04/02/23 0943 168/93 -- -- 60 -- -- -- --   04/02/23 0529 156/99 -- -- 68 -- -- -- --   04/02/23 0500 156/99 98 °F (36.7 °C) Oral 67 17 93 % 177.8 cm (70\") 133 kg (293 lb 3.4 oz)   04/02/23 0332 161/86 -- -- 67 -- -- -- --   04/02/23 0230 151/84 -- -- 63 -- 95 % -- --   04/02/23 0225 156/88 -- -- 77 -- 92 % -- --   04/02/23 0220 161/91 -- -- 73 -- 93 % -- --   04/02/23 0219 155/96 -- -- 69 -- -- -- --   04/02/23 0215 148/85 98.6 °F (37 °C) Oral 69 " "18 94 % -- --   04/02/23 0200 155/96 -- -- 68 -- 93 % -- --   04/02/23 0130 143/88 -- -- 66 -- 92 % -- --   04/02/23 0100 156/86 -- -- 73 -- 93 % -- --   04/02/23 0043 (!) 168/103 -- -- 66 -- -- -- --   04/02/23 0030 168/92 -- -- 70 -- 92 % -- --   04/02/23 0027 163/99 -- -- 77 -- (!) 75 % -- --   04/02/23 0006 (!) 174/113 -- -- 70 -- -- -- --   04/02/23 0000 (!) 174/113 -- -- 68 -- 98 % -- --   04/01/23 2320 (!) 175/114 -- -- 69 -- -- -- --   04/01/23 2301 (!) 170/105 -- -- 62 -- 100 % -- --   04/01/23 2200 178/93 -- -- 73 20 100 % -- --   04/01/23 2155 178/93 -- -- 62 -- -- -- --   04/01/23 2154 178/93 -- -- 71 -- 100 % -- --   04/01/23 2055 (!) 193/103 -- -- 82 -- 100 % -- --   04/01/23 2035 (!) 181/107 -- -- 63 -- 98 % -- --   04/01/23 2033 (!) 206/101 -- -- 77 -- 94 % -- --   04/01/23 2025 (!) 173/106 -- -- 70 -- 100 % -- --   04/01/23 2015 (!) 180/109 -- -- 81 -- 100 % -- --   04/01/23 2000 (!) 202/124 -- -- 82 -- 100 % -- --   04/01/23 1936 (!) 199/109 -- -- 70 -- -- -- --   04/01/23 1920 (!) 200/112 -- -- 77 -- 100 % -- --   04/01/23 1900 (!) 192/111 -- -- 84 -- 96 % -- --   04/01/23 1829 176/100 -- -- 77 -- -- -- --   04/01/23 1800 (!) 201/114 -- -- 79 -- 97 % -- --   04/01/23 1741 (!) 195/115 -- -- 70 -- -- -- --   04/01/23 1656 (!) 186/130 97.9 °F (36.6 °C) Oral 75 16 -- -- --   04/01/23 1525 (!) 195/126 -- -- 73 -- -- -- --   04/01/23 1332 (!) 156/101 98.3 °F (36.8 °C) Oral 74 16 -- -- --   04/01/23 1108 (!) 192/116 -- -- 67 -- -- -- --        Flowsheet Rows    Flowsheet Row First Filed Value   Admission Height 177.8 cm (70\") Documented at 03/25/2023 2033   Admission Weight 142 kg (313 lb 11.4 oz) Documented at 03/25/2023 2033          Body mass index is 42.07 kg/m².      Intake/Output Summary (Last 24 hours) at 4/2/2023 1011  Last data filed at 4/2/2023 0759  Gross per 24 hour   Intake 640 ml   Output 4700 ml   Net -4060 ml        TELEMETRY:   Atrial fibrillation with controlled ventricular response.  " Occasional 1 to 2-second pauses.    Physical Exam:  Morbidly obese and ill-appearing, lethargic  Vital signs as noted above.  Head and neck revealed no carotid bruits or jugular venous distention.  No thyromegaly or lymph adenopathy is present  Diminished breath sounds in the lungs  Slow heart rate, irregularly irregular.No murmur.  No precordial rub is present.  No gallop is present.  Abdomen soft and nontender.  No organomegaly is present.  Extremities with good peripheral pulses without any pedal edema.  Skin warm and dry.  Musculoskeletal system is grossly normal  CNS exam deferred as the patient is lethargic    Results Review:   I reviewed the patient's new clinical results.    CBC    Results from last 7 days   Lab Units 04/01/23  2315 03/28/23  0759 03/26/23  2346   WBC 10*3/mm3 4.20 3.40 5.10   HEMOGLOBIN g/dL 10.9* 11.7* 11.0*   PLATELETS 10*3/mm3 213 215 193     BMP   Results from last 7 days   Lab Units 04/01/23  1528 03/30/23  2251 03/28/23  0759 03/26/23  2346   SODIUM mmol/L  --  137 143 141   POTASSIUM mmol/L  --  3.8 3.4* 3.7   CHLORIDE mmol/L  --  104 103 103   CO2 mmol/L  --  25.0 29.0 26.0   BUN mg/dL  --  13 10 17   CREATININE mg/dL  --  0.84 0.83 0.86   GLUCOSE mg/dL  --  107* 111* 127*   MAGNESIUM mg/dL  --  1.9  --   --    PHOSPHORUS mg/dL 3.6  --   --   --      Cr Clearance Estimated Creatinine Clearance: 101 mL/min (by C-G formula based on SCr of 0.84 mg/dL).  Coag     HbA1C   Lab Results   Component Value Date    HGBA1C 6.0 (H) 09/16/2022    HGBA1C 6.9 (H) 08/14/2022    HGBA1C 6.4 (H) 03/31/2022     Blood Glucose   Glucose   Date/Time Value Ref Range Status   04/02/2023 0705 123 (H) 70 - 105 mg/dL Final     Comment:     Serial Number: [id number]:  [id number]   04/01/2023 1933 144 (H) 70 - 105 mg/dL Final     Comment:     Serial Number: [id number]:  [id number]   03/31/2023 1728 113 (H) 70 - 105 mg/dL Final     Comment:     Serial Number: [id number]:  [id number]   03/31/2023  0906 103 70 - 105 mg/dL Final     Comment:     Serial Number: [id number]:  [id number]   03/30/2023 1639 109 (H) 70 - 105 mg/dL Final     Comment:     Serial Number: [id number]:  [id number]     Infection       CMP   Results from last 7 days   Lab Units 03/30/23  2251 03/28/23  0759 03/26/23  2346   SODIUM mmol/L 137 143 141   POTASSIUM mmol/L 3.8 3.4* 3.7   CHLORIDE mmol/L 104 103 103   CO2 mmol/L 25.0 29.0 26.0   BUN mg/dL 13 10 17   CREATININE mg/dL 0.84 0.83 0.86   GLUCOSE mg/dL 107* 111* 127*     ABG        UA        BEBETO  No results found for: POCMETH, POCAMPHET, POCBARBITUR, POCBENZO, POCCOCAINE, POCOPIATES, POCOXYCODO, POCPHENCYC, POCPROPOXY, POCTHC, POCTRICYC  Lysis Labs   Results from last 7 days   Lab Units 04/01/23  2315 03/30/23 2251 03/28/23  0759 03/26/23  2346   HEMOGLOBIN g/dL 10.9*  --  11.7* 11.0*   PLATELETS 10*3/mm3 213  --  215 193   CREATININE mg/dL  --  0.84 0.83 0.86     Radiology(recent) No radiology results for the last day    Imaging Results (Last 24 Hours)     ** No results found for the last 24 hours. **          Results from last 7 days   Lab Units 03/27/23  0242   HSTROP T ng/L 74*       EKG                    I personally viewed and interpreted the patient's EKG/Telemetry data:        ECHOCARDIOGRAM:     Results for orders placed during the hospital encounter of 03/25/23    Adult Transthoracic Echo Complete w/ Color, Spectral and Contrast if Necessary Per Protocol    Interpretation Summary  •  Left ventricular systolic function is normal. Left ventricular ejection fraction appears to be 56 - 60%.  •  Left ventricular diastolic function is consistent with (grade II w/high LAP) pseudonormalization.  •  Left atrial volume is severely increased.  •  The right atrial cavity is moderate to severely  dilated.  •  Abnormal mitral valve structure consistent with dilated annulus.  •  Estimated right ventricular systolic pressure from tricuspid regurgitation is moderately elevated  (45-55 mmHg).  •  Moderate pulmonary hypertension is present.        STRESS MYOVIEW:  9/2022    Interpretation Summary    • Diaphragmatic attenuation artifact is present.  • Left ventricular ejection fraction is hyperdynamic (Calculated EF > 70%). .  • Myocardial perfusion imaging indicates a normal myocardial perfusion study with no evidence of ischemia.  • Impressions are consistent with a low risk study.  • This is normal Cardiolite imaging stress test with no evidence of ischemia or myocardial infarction. Left ventricle size and function is normal on gated SPECT imaging. No wall motion abnormality was noted. Clinical correlation recommended. Further recommendation as per ordering physician..  • Findings consistent with an indeterminate ECG stress test.      CARDIAC CATHETERIZATION:      OTHER:         Assessment & Plan            UTI (urinary tract infection)    Cerebrovascular accident    Type 2 diabetes mellitus with diabetic nephropathy    Atrial fibrillation    Morbid obesity    Dementia    Acute metabolic encephalopathy        ASSESSMENT AND PLAN    Atrial fibrillation with slow ventricular response  Mental status changes  Heart failure with preserved ejection fraction  Hypertension  Diabetes  UTI with ESBL  Chronic kidney disease  Anemia  Pulmonary hypertension    77-year-old obese and ill-appearing man.    A fib/SSS  He has atrial fibrillation with slow ventricular response.  Coreg and clonidine discontinued and heart rate has remained above 60  Hold off on Eliquis and continue Lovenox  Patient currently denies any chest pain or shortness of breath.  He also denies lightheadedness, syncope or dizziness.  May have underlying sick sinus syndrome and required a permanent pacemaker  Currently refusing all medications  Close telemetry monitoring off beta-blocker    HFpEF  Echo shows EF of 56 to 60%, grade 2 diastolic dysfunction, moderate pulmonary hypertension.  Lasix as needed  Renal function is  "normal    Uncontrolled hypertension  Started on Cardene drip given that he is refusing all of his oral medications  Increase hydralazine and lisinopril  Wean off Cardene drip as tolerated    Hyperlipidemia  Continue high intensity statin, most recent LDL less than 40.    Diabetes  Continue insulin therapy for diabetes, most recent A1c is 6    Complicated UTI  Altered mental state likely secondary to ESBL UTI, continue ertapenem    BPH  On tamsulosin and finasteride for BPH.          Magui Best MD  23  10:11 EDT                Electronically signed by Magui Best MD at 23 1846          Consult Notes (last 48 hours)      Belkis Morley MD at 23 1350      Consult Orders    1. Inpatient Psychiatrist Consult [[id number]] ordered by Will Browne DO at 23 1352                 Referring Provider: Dr Alonso  Reason for Consultation: refusing tx      Chief complaint neck pain     Subjective .     History of present illness:  The patient is a 77 y.o. male who was admitted secondary to altered mental status. PMH: dementia, cardiomyopathy, DM, HTN, cerebral infarction, obesity. Psych consult was requested by Dr Renee 2ry to refusal of tx.  The pt was very poor historian 2ry to cognitive deficits and also sedation after he took muscle relaxant for neck pain. He knew his name, , he knew he was in the hospital, did know his age (he told he was 19 yo)  or todays date. The pt was very slow to respond. He took his  meds today but he was refusing meds for BP yesterday, he was explained he could have a stroke and he stated \"I would accept that \" (from nursing report)    Past psych hx: dementia       Review of Systems   Review of systems could not be obtained due to   patient confusion. patient sedation status.    History    Past Medical History:   Diagnosis Date   • Acute kidney failure    • Acute respiratory failure with hypoxia    • Anemia    • Benign prostatic hyperplasia  "   • Bilateral primary osteoarthritis of knee    • Cardiomegaly    • Cardiomyopathy    • Cellulitis and abscess of left leg    • Cerebral infarction    • Cerebrovascular disease    • Chronic kidney disease    • Dementia    • Diabetes mellitus    • Essential hypertension    • GERD (gastroesophageal reflux disease)    • Gout    • Heart failure    • Hyperlipidemia    • Morbid obesity    • Myocardial infarction    • Obstructive sleep apnea    • Obstructive uropathy    • Paroxysmal atrial fibrillation    • Peptic ulcer    • Peripheral vascular disease    • Pulmonary edema    • Venous insufficiency           Family History   Problem Relation Age of Onset   • Diabetes Father         Social History     Tobacco Use   • Smoking status: Never   Vaping Use   • Vaping Use: Never used   Substance Use Topics   • Alcohol use: Not Currently   • Drug use: Never          Medications Prior to Admission   Medication Sig Dispense Refill Last Dose   • acetaminophen (TYLENOL) 325 MG tablet Take 650 mg by mouth Every 4 (Four) Hours As Needed for Mild Pain .      • albuterol sulfate  (90 Base) MCG/ACT inhaler Inhale 2 puffs Every 4 (Four) Hours As Needed (Cough).      • apixaban (ELIQUIS) 5 MG tablet tablet Take 1 tablet by mouth Every 12 (Twelve) Hours. Indications: Atrial Fibrillation 60 tablet     • carvedilol (COREG) 12.5 MG tablet Take 1 tablet by mouth 2 (Two) Times a Day With Meals. 60 tablet 0    • docusate calcium (SURFAK) 240 MG capsule Take 240 mg by mouth Daily.      • escitalopram (LEXAPRO) 10 MG tablet Take 10 mg by mouth Daily.      • finasteride (PROSCAR) 5 MG tablet Take 5 mg by mouth Daily.      • furosemide (LASIX) 40 MG tablet Take 1 tablet by mouth 2 (Two) Times a Day. 60 tablet 0    • gabapentin (NEURONTIN) 300 MG capsule Take 300 mg by mouth 3 (Three) Times a Day.      • hydrALAZINE (APRESOLINE) 50 MG tablet Take 1 tablet by mouth 3 (Three) Times a Day. 90 tablet 0    • Insulin Glargine (BASAGLAR KWIKPEN) 100  UNIT/ML injection pen Inject 10 Units under the skin into the appropriate area as directed Every Night.      • isosorbide mononitrate (IMDUR) 60 MG 24 hr tablet Take 1 tablet by mouth 2 (Two) Times a Day. 60 tablet 0    • losartan (COZAAR) 25 MG tablet Take 1 tablet by mouth Daily. 30 tablet 0    • methocarbamol (ROBAXIN) 500 MG tablet Take 500 mg by mouth Every 12 (Twelve) Hours As Needed for Muscle Spasms.      • nitroglycerin (NITROSTAT) 0.4 MG SL tablet Place 0.4 mg under the tongue Every 5 (Five) Minutes As Needed for Chest Pain. Take no more than 3 doses in 15 minutes.      • polycarbophil (calcium polycarbophil) 625 MG tablet tablet Take 625 mg by mouth Daily.      • Polyethylene Glycol 3350 powder Take 17 g by mouth Every Night.      • saccharomyces boulardii (FLORASTOR) 250 MG capsule Take 250 mg by mouth 2 (Two) Times a Day.      • simethicone (MYLICON,GAS-X) 180 MG capsule Take 180 mg by mouth Every 6 (Six) Hours As Needed for Flatulence.      • simvastatin (ZOCOR) 40 MG tablet Take 40 mg by mouth every night at bedtime.      • tamsulosin (FLOMAX) 0.4 MG capsule 24 hr capsule Take 1 capsule by mouth Daily.      • traMADol (ULTRAM) 50 MG tablet Take 1 tablet by mouth Every 6 (Six) Hours As Needed for Moderate Pain.           Scheduled Meds:  amLODIPine, 10 mg, Oral, Q24H  atorvastatin, 20 mg, Oral, Nightly  chlorthalidone, 25 mg, Oral, Daily  enoxaparin, 1 mg/kg, Subcutaneous, Q12H  ertapenem, 1 g, Intravenous, Q24H  escitalopram, 10 mg, Oral, Daily  finasteride, 5 mg, Oral, Daily  gabapentin, 300 mg, Oral, TID  hydrALAZINE, 50 mg, Oral, TID  insulin lispro, 2-7 Units, Subcutaneous, TID With Meals  lidocaine, 20 mL, Subcutaneous, Once  lisinopril, 10 mg, Oral, Q24H  nitroglycerin, 1 inch, Topical, Q6H  sodium chloride, 10 mL, Intravenous, Q12H  sodium chloride, 10 mL, Intravenous, Q12H  tamsulosin, 0.4 mg, Oral, Daily         Continuous Infusions:  lactated ringers, 50 mL/hr, Last Rate: Stopped (04/01/23  "0417)  niCARdipine, 5-15 mg/hr, Last Rate: 10 mg/hr (04/02/23 1012)  Pharmacy to Dose enoxaparin (LOVENOX),         PRN Meds:  •  acetaminophen  •  acetaminophen  •  albuterol  •  cyclobenzaprine  •  dextrose  •  dextrose  •  glucagon (human recombinant)  •  hydrALAZINE  •  labetalol  •  nitroglycerin  •  ondansetron **OR** ondansetron  •  Pharmacy to Dose enoxaparin (LOVENOX)  •  [COMPLETED] Insert Peripheral IV **AND** sodium chloride  •  sodium chloride  •  sodium chloride  •  sodium chloride  •  sodium chloride      Allergies:  Patient has no known allergies.      Objective     Vital Signs   /99 (BP Location: Left arm, Patient Position: Lying)   Pulse 79   Temp 98.2 °F (36.8 °C) (Oral)   Resp 19   Ht 177.8 cm (70\")   Wt 133 kg (293 lb 3.4 oz)   SpO2 91%   BMI 42.07 kg/m²     Physical Exam:    Musculoskeletal:   Muscle strength and tone: decreased   Abnormal Movements: none   Gait: unable to assess, the pt was in bed      General Appearance:    In NAD, + psychomotor retardation       Mental Status Exam:   Hygiene:   fair  Cooperation:  Cooperative  Eye Contact:  Poor  Behavior and Psychomotor Activity: Slow  Speech:  slow   Mood: tired   Affect:  Restricted  Thought Process:  poverty of thoughts   Associations: Loose  Thought Content:  Normal  Language: appropriate   Suicidal Ideations:  None  Homicidal:  None  Hallucinations:  None  Delusion:  None  Orientation:  To person and Situation  Memory:  Deficits  Concentration and computation: poor   Attention span: very short   Fund of knowledge: limited   Reliability:  poor  Insight:  Poor  Judgement:  Impaired  Impulse Control:  Fair      Medications and allergies reviewed      Lab Results   Component Value Date    GLUCOSE 135 (H) 04/02/2023    CALCIUM 8.6 04/02/2023     04/02/2023    K 4.1 04/02/2023    CO2 28.0 04/02/2023     04/02/2023    BUN 10 04/02/2023    CREATININE 0.85 04/02/2023    EGFRIFAFRI 67 05/19/2021    EGFRIFNONA 55 (L) " 05/19/2021    BCR 11.8 04/02/2023    ANIONGAP 8.0 04/02/2023       Last Urine Toxicity     LAST URINE TOXICITY RESULTS Latest Ref Rng & Units 9/17/2022 9/17/2022    CREATININE UR mg/dL 49.7 48.6          No results found for: PHENYTOIN, PHENOBARB, VALPROATE, CBMZ    Lab Results   Component Value Date     04/02/2023    BUN 10 04/02/2023    CREATININE 0.85 04/02/2023    TSH 2.220 02/15/2023    WBC 4.50 04/02/2023       Brief Urine Lab Results  (Last result in the past 365 days)      Color   Clarity   Blood   Leuk Est   Nitrite   Protein   CREAT   Urine HCG        03/25/23 2128 Dark Yellow   Cloudy   Large (3+)   Large (3+)   Positive   >=300 mg/dL (3+)                 Assessment & Plan       UTI (urinary tract infection)    Cerebrovascular accident    Type 2 diabetes mellitus with diabetic nephropathy    Atrial fibrillation    Morbid obesity    Dementia    Acute metabolic encephalopathy          Assessment: Delirium due to medical condition (UTI)   Dementia without behavioral disturbances   Treatment Plan: the pt presented with cognitive deficits 2ry to underlying dementia and superimposed delirium due to UTI   At present time the pt is unable to make decisions about his health, however, those finding might be affected by sedation from meds  Will re-evaluate the pt tomorrow when he is alert and awake   Treatment Plan discussed with: Patient and nursing     I discussed the patients findings and my recommendations with patient and nursing staff    I have reviewed and approved the behavioral health treatment plans and problem list. Yes  Thank you for the consult   Referring MD has access to consult report and progress notes in EMR       This document has been electronically signed by Belkis Morley MD  April 2, 2023 13:50 EDT    Part of this note may be an electronic transcription/translation of spoken language to printed text using the Dragon Dictation System.        Electronically signed by Belkis Morley  MD at 04/02/23 7162

## 2023-04-03 NOTE — PLAN OF CARE
Goal Outcome Evaluation:      Pt has continued to refuse all medications except for his nitro paste. Tried reeducating patient on the importance of taking his hydralazine pt continually states that the nursing staff has changed his medicines to make him take more and he will not do it. Was able to titrate cardene gtt down to 5 with blood pressures maintaining within a normal range for the most part; unless patient is turned and agitated with staff. Pt tried to refuse turns and labs but reminded him that his family wanted him to do these things so he can get better. Systolic blood pressures have remain less than 160. Plan currently for patient to return to [facility] when appropriate; however, patient family wishes for patient to no return there at this time. Pt continues to remain on room air with no distress noted. VSS at this time with call light remaining within reach.

## 2023-04-03 NOTE — PROGRESS NOTES
Referring Provider: Isaac Rhoades MD    Reason for follow-up: Bradycardia, atrial fibrillation, uncontrolled hypertension     Patient Care Team:  Naa Valverde MD as PCP - General (Internal Medicine)      SUBJECTIVE  Patient is laying in bed and says that he is dying.  He does not have appetite and does not want to eat breakfast.     ROS  Review of all systems negative except as indicated.    Since I have last seen, the patient has been without any chest discomfort, shortness of breath, palpitations, dizziness or syncope.  Denies having any headache, abdominal pain, nausea, vomiting, diarrhea, constipation, loss of weight or loss of appetite.  Denies having any excessive bruising, hematuria or blood in the stool.  ROS      Personal History:    Past Medical History:   Diagnosis Date   • Acute kidney failure    • Acute respiratory failure with hypoxia    • Anemia    • Benign prostatic hyperplasia    • Bilateral primary osteoarthritis of knee    • Cardiomegaly    • Cardiomyopathy    • Cellulitis and abscess of left leg    • Cerebral infarction    • Cerebrovascular disease    • Chronic kidney disease    • Dementia    • Diabetes mellitus    • Essential hypertension    • GERD (gastroesophageal reflux disease)    • Gout    • Heart failure    • Hyperlipidemia    • Morbid obesity    • Myocardial infarction    • Obstructive sleep apnea    • Obstructive uropathy    • Paroxysmal atrial fibrillation    • Peptic ulcer    • Peripheral vascular disease    • Pulmonary edema    • Venous insufficiency        History reviewed. No pertinent surgical history.    Family History   Problem Relation Age of Onset   • Diabetes Father        Social History     Tobacco Use   • Smoking status: Never   Vaping Use   • Vaping Use: Never used   Substance Use Topics   • Alcohol use: Not Currently   • Drug use: Never        Medications Prior to Admission   Medication Sig Dispense Refill Last Dose   • acetaminophen (TYLENOL) 325 MG tablet Take  650 mg by mouth Every 4 (Four) Hours As Needed for Mild Pain .      • albuterol sulfate  (90 Base) MCG/ACT inhaler Inhale 2 puffs Every 4 (Four) Hours As Needed (Cough).      • apixaban (ELIQUIS) 5 MG tablet tablet Take 1 tablet by mouth Every 12 (Twelve) Hours. Indications: Atrial Fibrillation 60 tablet     • carvedilol (COREG) 12.5 MG tablet Take 1 tablet by mouth 2 (Two) Times a Day With Meals. 60 tablet 0    • docusate calcium (SURFAK) 240 MG capsule Take 240 mg by mouth Daily.      • escitalopram (LEXAPRO) 10 MG tablet Take 10 mg by mouth Daily.      • finasteride (PROSCAR) 5 MG tablet Take 5 mg by mouth Daily.      • furosemide (LASIX) 40 MG tablet Take 1 tablet by mouth 2 (Two) Times a Day. 60 tablet 0    • gabapentin (NEURONTIN) 300 MG capsule Take 300 mg by mouth 3 (Three) Times a Day.      • hydrALAZINE (APRESOLINE) 50 MG tablet Take 1 tablet by mouth 3 (Three) Times a Day. 90 tablet 0    • Insulin Glargine (BASAGLAR KWIKPEN) 100 UNIT/ML injection pen Inject 10 Units under the skin into the appropriate area as directed Every Night.      • isosorbide mononitrate (IMDUR) 60 MG 24 hr tablet Take 1 tablet by mouth 2 (Two) Times a Day. 60 tablet 0    • losartan (COZAAR) 25 MG tablet Take 1 tablet by mouth Daily. 30 tablet 0    • methocarbamol (ROBAXIN) 500 MG tablet Take 500 mg by mouth Every 12 (Twelve) Hours As Needed for Muscle Spasms.      • nitroglycerin (NITROSTAT) 0.4 MG SL tablet Place 0.4 mg under the tongue Every 5 (Five) Minutes As Needed for Chest Pain. Take no more than 3 doses in 15 minutes.      • polycarbophil (calcium polycarbophil) 625 MG tablet tablet Take 625 mg by mouth Daily.      • Polyethylene Glycol 3350 powder Take 17 g by mouth Every Night.      • saccharomyces boulardii (FLORASTOR) 250 MG capsule Take 250 mg by mouth 2 (Two) Times a Day.      • simethicone (MYLICON,GAS-X) 180 MG capsule Take 180 mg by mouth Every 6 (Six) Hours As Needed for Flatulence.      • simvastatin  "(ZOCOR) 40 MG tablet Take 40 mg by mouth every night at bedtime.      • tamsulosin (FLOMAX) 0.4 MG capsule 24 hr capsule Take 1 capsule by mouth Daily.      • traMADol (ULTRAM) 50 MG tablet Take 1 tablet by mouth Every 6 (Six) Hours As Needed for Moderate Pain.          Allergies:  Patient has no known allergies.    Scheduled Meds:amLODIPine, 10 mg, Oral, Q24H  atorvastatin, 20 mg, Oral, Nightly  chlorthalidone, 25 mg, Oral, Daily  enoxaparin, 1 mg/kg, Subcutaneous, Q12H  ertapenem, 1 g, Intravenous, Q24H  escitalopram, 10 mg, Oral, Daily  finasteride, 5 mg, Oral, Daily  gabapentin, 300 mg, Oral, TID  hydrALAZINE, 50 mg, Oral, TID  insulin lispro, 2-7 Units, Subcutaneous, TID With Meals  isosorbide mononitrate, 60 mg, Oral, Q24H  lidocaine, 20 mL, Subcutaneous, Once  losartan, 25 mg, Oral, Q24H  sodium chloride, 10 mL, Intravenous, Q12H  sodium chloride, 10 mL, Intravenous, Q12H  tamsulosin, 0.4 mg, Oral, Daily      Continuous Infusions:lactated ringers, 50 mL/hr, Last Rate: Stopped (04/01/23 0417)  Pharmacy to Dose enoxaparin (LOVENOX),       PRN Meds:.•  acetaminophen  •  acetaminophen  •  albuterol  •  cyclobenzaprine  •  dextrose  •  dextrose  •  glucagon (human recombinant)  •  hydrALAZINE  •  labetalol  •  nitroglycerin  •  ondansetron **OR** ondansetron  •  Pharmacy to Dose enoxaparin (LOVENOX)  •  [COMPLETED] Insert Peripheral IV **AND** sodium chloride  •  sodium chloride  •  sodium chloride  •  sodium chloride  •  sodium chloride      OBJECTIVE    Vital Signs  Vitals:    04/03/23 0400 04/03/23 0430 04/03/23 0527 04/03/23 0528   BP: 135/87 145/85 141/92 141/92   BP Location:   Left arm    Patient Position:   Lying    Pulse: 68 67 67 74   Resp:   16    Temp:   98.7 °F (37.1 °C)    TempSrc:   Axillary    SpO2: 93% 93% 94%    Weight:   134 kg (294 lb 12.1 oz)    Height:           Flowsheet Rows    Flowsheet Row First Filed Value   Admission Height 177.8 cm (70\") Documented at 03/25/2023 2033   Admission Weight " 142 kg (313 lb 11.4 oz) Documented at 03/25/2023 2033            Intake/Output Summary (Last 24 hours) at 4/3/2023 1036  Last data filed at 4/3/2023 0527  Gross per 24 hour   Intake --   Output 3275 ml   Net -3275 ml          Telemetry: Atrial fibrillation with controlled heart rate    Physical Exam:  The patient is lethargic and ill-appearing  Vital signs as noted above.  Head and neck revealed no carotid bruits or jugular venous distention.  No thyromegaly or lymphadenopathy is present  Lungs clear.  No wheezing.  Breath sounds are normal bilaterally.  Irregularly irregular.  No murmur. No precordial rub is present.  No gallop is present.  Abdomen soft and nontender.  No organomegaly is present.  Extremities with good peripheral pulses without any pedal edema.  Skin warm and dry.  Musculoskeletal system is grossly normal.  CNS grossly normal.       Results Review:  I have personally reviewed the results from the time of this admission to 4/3/2023 10:36 EDT and agree with these findings:  []  Laboratory  []  Microbiology  []  Radiology  []  EKG/Telemetry   []  Cardiology/Vascular   []  Pathology  []  Old records  []  Other:    Most notable findings include:    Lab Results (last 24 hours)     Procedure Component Value Units Date/Time    POC Glucose Once [[id number]]  (Abnormal) Collected: 04/03/23 0715    Specimen: Blood Updated: 04/03/23 0717     Glucose 114 mg/dL      Comment: Serial Number: [id number]:  [id number]       Basic Metabolic Panel [[id number]]  (Abnormal) Collected: 04/03/23 0347    Specimen: Blood Updated: 04/03/23 0448     Glucose 125 mg/dL      BUN 9 mg/dL      Creatinine 0.80 mg/dL      Sodium 140 mmol/L      Potassium 4.0 mmol/L      Comment: Slight hemolysis detected by analyzer. Results may be affected.        Chloride 105 mmol/L      CO2 28.0 mmol/L      Calcium 8.7 mg/dL      BUN/Creatinine Ratio 11.3     Anion Gap 7.0 mmol/L      eGFR 91.2 mL/min/1.73     Narrative:      GFR Normal  >60  Chronic Kidney Disease <60  Kidney Failure <15    The GFR formula is only valid for adults with stable renal function between ages 18 and 70.    Phosphorus [[id number]]  (Normal) Collected: 04/03/23 0347    Specimen: Blood Updated: 04/03/23 0448     Phosphorus 3.5 mg/dL     Magnesium [[id number]]  (Normal) Collected: 04/03/23 0347    Specimen: Blood Updated: 04/03/23 0448     Magnesium 1.9 mg/dL     CBC & Differential [[id number]]  (Abnormal) Collected: 04/03/23 0347    Specimen: Blood Updated: 04/03/23 0415    Narrative:      The following orders were created for panel order CBC & Differential.  Procedure                               Abnormality         Status                     ---------                               -----------         ------                     CBC Auto Differential[[id number]]        Abnormal            Final result                 Please view results for these tests on the individual orders.    CBC Auto Differential [[id number]]  (Abnormal) Collected: 04/03/23 0347    Specimen: Blood Updated: 04/03/23 0415     WBC 4.40 10*3/mm3      RBC 4.20 10*6/mm3      Hemoglobin 10.9 g/dL      Hematocrit 33.7 %      MCV 80.2 fL      MCH 25.9 pg      MCHC 32.3 g/dL      RDW 21.1 %      RDW-SD 62.1 fl      MPV 7.9 fL      Platelets 228 10*3/mm3      Neutrophil % 48.4 %      Lymphocyte % 35.3 %      Monocyte % 11.1 %      Eosinophil % 4.7 %      Basophil % 0.5 %      Neutrophils, Absolute 2.10 10*3/mm3      Lymphocytes, Absolute 1.60 10*3/mm3      Monocytes, Absolute 0.50 10*3/mm3      Eosinophils, Absolute 0.20 10*3/mm3      Basophils, Absolute 0.00 10*3/mm3      nRBC 0.1 /100 WBC     POC Glucose Once [[id number]]  (Abnormal) Collected: 04/02/23 1902    Specimen: Blood Updated: 04/02/23 1903     Glucose 152 mg/dL      Comment: Serial Number: [id number]:  [id number]       Basic Metabolic Panel [[id number]]  (Abnormal) Collected: 04/02/23 1119    Specimen: Blood Updated: 04/02/23 1225     Glucose 135  mg/dL      BUN 10 mg/dL      Creatinine 0.85 mg/dL      Sodium 137 mmol/L      Potassium 4.1 mmol/L      Chloride 101 mmol/L      CO2 28.0 mmol/L      Calcium 8.6 mg/dL      BUN/Creatinine Ratio 11.8     Anion Gap 8.0 mmol/L      eGFR 89.5 mL/min/1.73     Narrative:      GFR Normal >60  Chronic Kidney Disease <60  Kidney Failure <15    The GFR formula is only valid for adults with stable renal function between ages 18 and 70.    Phosphorus [[id number]]  (Normal) Collected: 04/02/23 1119    Specimen: Blood Updated: 04/02/23 1225     Phosphorus 3.3 mg/dL     Magnesium [[id number]]  (Normal) Collected: 04/02/23 1119    Specimen: Blood Updated: 04/02/23 1225     Magnesium 1.8 mg/dL     CBC & Differential [[id number]]  (Abnormal) Collected: 04/02/23 1119    Specimen: Blood Updated: 04/02/23 1206    Narrative:      The following orders were created for panel order CBC & Differential.  Procedure                               Abnormality         Status                     ---------                               -----------         ------                     CBC Auto Differential[[id number]]        Abnormal            Final result                 Please view results for these tests on the individual orders.    CBC Auto Differential [[id number]]  (Abnormal) Collected: 04/02/23 1119    Specimen: Blood Updated: 04/02/23 1206     WBC 4.50 10*3/mm3      RBC 4.41 10*6/mm3      Hemoglobin 11.2 g/dL      Hematocrit 35.4 %      MCV 80.2 fL      MCH 25.3 pg      MCHC 31.6 g/dL      RDW 21.5 %      RDW-SD 63.9 fl      MPV 8.1 fL      Platelets 238 10*3/mm3      Neutrophil % 49.0 %      Lymphocyte % 36.4 %      Monocyte % 9.5 %      Eosinophil % 4.6 %      Basophil % 0.5 %      Neutrophils, Absolute 2.20 10*3/mm3      Lymphocytes, Absolute 1.60 10*3/mm3      Monocytes, Absolute 0.40 10*3/mm3      Eosinophils, Absolute 0.20 10*3/mm3      Basophils, Absolute 0.00 10*3/mm3      nRBC 0.1 /100 WBC     CANDIDA AURIS SCREEN - Swab, Axilla Right,  Axilla Left and Groin [[id number]]  (Normal) Collected: 03/28/23 1118    Specimen: Swab from Axilla Right, Axilla Left and Groin Updated: 04/02/23 1130     Candida Auris Screen Culture No Candida auris isolated at 5 days    POC Glucose Once [[id number]]  (Abnormal) Collected: 04/02/23 1121    Specimen: Blood Updated: 04/02/23 1123     Glucose 129 mg/dL      Comment: Serial Number: [id number]:  [id number]             Imaging Results (Last 24 Hours)     ** No results found for the last 24 hours. **          LAB RESULTS (LAST 7 DAYS)    CBC  Results from last 7 days   Lab Units 04/03/23 0347 04/02/23 1119 04/01/23  2315 03/28/23  0759   WBC 10*3/mm3 4.40 4.50 4.20 3.40   RBC 10*6/mm3 4.20 4.41 4.25 4.56   HEMOGLOBIN g/dL 10.9* 11.2* 10.9* 11.7*   HEMATOCRIT % 33.7* 35.4* 34.1* 36.9*   MCV fL 80.2 80.2 80.3 80.9   PLATELETS 10*3/mm3 228 238 213 215       BMP  Results from last 7 days   Lab Units 04/03/23 0347 04/02/23 1119 04/01/23  1528 03/30/23 2251 03/28/23  0759   SODIUM mmol/L 140 137  --  137 143   POTASSIUM mmol/L 4.0 4.1  --  3.8 3.4*   CHLORIDE mmol/L 105 101  --  104 103   CO2 mmol/L 28.0 28.0  --  25.0 29.0   BUN mg/dL 9 10  --  13 10   CREATININE mg/dL 0.80 0.85  --  0.84 0.83   GLUCOSE mg/dL 125* 135*  --  107* 111*   MAGNESIUM mg/dL 1.9 1.8  --  1.9  --    PHOSPHORUS mg/dL 3.5 3.3 3.6  --   --        CMP   Results from last 7 days   Lab Units 04/03/23 0347 04/02/23 1119 03/30/23 2251 03/28/23  0759   SODIUM mmol/L 140 137 137 143   POTASSIUM mmol/L 4.0 4.1 3.8 3.4*   CHLORIDE mmol/L 105 101 104 103   CO2 mmol/L 28.0 28.0 25.0 29.0   BUN mg/dL 9 10 13 10   CREATININE mg/dL 0.80 0.85 0.84 0.83   GLUCOSE mg/dL 125* 135* 107* 111*       BNP        TROPONIN        CoAg        Creatinine Clearance  Estimated Creatinine Clearance: 106.5 mL/min (by C-G formula based on SCr of 0.8 mg/dL).    ABG        Radiology  No radiology results for the last day      EKG  I personally viewed and interpreted the  patient's EKG/Telemetry data:  ECG 12 Lead Rhythm Change   Final Result   HEART RATE= 65  bpm   RR Interval= 917  ms   ME Interval=   ms   P Horizontal Axis=   deg   P Front Axis=   deg   QRSD Interval= 91  ms   QT Interval= 429  ms   QRS Axis= -7  deg   T Wave Axis= 44  deg   - ABNORMAL ECG -   Atrial fibrillation   When compared with ECG of 31-Mar-2023 12:49:30,   Nonspecific significant change   Electronically Signed By: Fran Bliss ([facility]) 02-Apr-2023 18:34:56   Date and Time of Study: 2023-04-01 15:36:50      ECG 12 Lead Bradycardia   Final Result   HEART RATE= 45  bpm   RR Interval= 1327  ms   ME Interval=   ms   P Horizontal Axis=   deg   P Front Axis=   deg   QRSD Interval= 94  ms   QT Interval= 517  ms   QRS Axis= -29  deg   T Wave Axis=   deg   - ABNORMAL ECG -   Atrial fibrillation   Ventricular premature complex   Inferior infarct, old   When compared with ECG of 26-Mar-2023 22:49:11,   Significant rate decrease   New or worsened ischemia or infarction   Electronically Signed By: Fran Bliss ([facility]) 31-Mar-2023 18:40:02   Date and Time of Study: 2023-03-31 12:49:30      ECG 12 Lead Chest Pain   Final Result   HEART RATE= 79  bpm   RR Interval= 756  ms   ME Interval=   ms   P Horizontal Axis=   deg   P Front Axis=   deg   QRSD Interval= 94  ms   QT Interval= 388  ms   QRS Axis= -22  deg   T Wave Axis= 93  deg   - ABNORMAL ECG -   Atrial fibrillation   Ventricular premature complex   When compared with ECG of 25-Mar-2023 20:44:30,   NO SIGNIFICANT CHANGE FROM PREVIOUS ECG   Electronically Signed By: Duarte Canales ([facility]) 28-Mar-2023 19:04:25   Date and Time of Study: 2023-03-26 22:49:11      ECG 12 Lead Altered Mental Status   Final Result   HEART RATE= 85  bpm   RR Interval= 706  ms   ME Interval=   ms   P Horizontal Axis=   deg   P Front Axis=   deg   QRSD Interval= 85  ms   QT Interval= 361  ms   QRS Axis= 18  deg   T Wave Axis= 132  deg   - ABNORMAL ECG -   Atrial fibrillation   Nonspecific  repol abnormality, diffuse leads   When compared with ECG of 15-Feb-2023 15:43:36,   Significant rate increase   Electronically Signed By: Bob Rojas ([facility]) 26-Mar-2023 10:20:08   Date and Time of Study: 2023-03-25 20:44:30      SCANNED - TELEMETRY     Final Result      SCANNED - TELEMETRY     Final Result      SCANNED - TELEMETRY     Final Result      SCANNED - TELEMETRY     Final Result      SCANNED - TELEMETRY     Final Result      SCANNED - TELEMETRY     Final Result      SCANNED - TELEMETRY     Final Result      SCANNED - TELEMETRY     Final Result      SCANNED - TELEMETRY     Final Result      SCANNED - TELEMETRY     Final Result      SCANNED - TELEMETRY     Final Result      SCANNED - TELEMETRY     Final Result      SCANNED - TELEMETRY     Final Result      SCANNED - TELEMETRY     Final Result      SCANNED - TELEMETRY     Final Result      SCANNED - TELEMETRY     Final Result      SCANNED - TELEMETRY     Final Result      SCANNED - TELEMETRY     Final Result      SCANNED - TELEMETRY     Final Result      SCANNED - TELEMETRY     Final Result      SCANNED - TELEMETRY     Final Result      SCANNED - TELEMETRY     Final Result      SCANNED - TELEMETRY     Final Result      SCANNED - TELEMETRY     Final Result      SCANNED - TELEMETRY     Final Result      SCANNED - TELEMETRY     Final Result      SCANNED - TELEMETRY     Final Result      SCANNED - TELEMETRY     Final Result      SCANNED - TELEMETRY     Final Result      SCANNED - TELEMETRY     Final Result      SCANNED - TELEMETRY     Final Result      SCANNED - TELEMETRY     Final Result      SCANNED - TELEMETRY     Final Result      SCANNED - TELEMETRY     Final Result      SCANNED - TELEMETRY     Final Result      SCANNED - TELEMETRY     Final Result      SCANNED - TELEMETRY     Final Result      ECG 12 Lead Rhythm Change    (Results Pending)         Echocardiogram:    Results for orders placed during the hospital encounter of 03/25/23    Adult Transthoracic  Echo Complete w/ Color, Spectral and Contrast if Necessary Per Protocol    Interpretation Summary  •  Left ventricular systolic function is normal. Left ventricular ejection fraction appears to be 56 - 60%.  •  Left ventricular diastolic function is consistent with (grade II w/high LAP) pseudonormalization.  •  Left atrial volume is severely increased.  •  The right atrial cavity is moderate to severely  dilated.  •  Abnormal mitral valve structure consistent with dilated annulus.  •  Estimated right ventricular systolic pressure from tricuspid regurgitation is moderately elevated (45-55 mmHg).  •  Moderate pulmonary hypertension is present.        Stress Test:  Results for orders placed during the hospital encounter of 09/16/22    Stress Test With Myocardial Perfusion One Day    Interpretation Summary  · Diaphragmatic attenuation artifact is present.  · Left ventricular ejection fraction is hyperdynamic (Calculated EF > 70%). .  · Myocardial perfusion imaging indicates a normal myocardial perfusion study with no evidence of ischemia.  · Impressions are consistent with a low risk study.  · This is normal Cardiolite imaging stress test with no evidence of ischemia or myocardial infarction. Left ventricle size and function is normal on gated SPECT imaging. No wall motion abnormality was noted. Clinical correlation recommended. Further recommendation as per ordering physician..  · Findings consistent with an indeterminate ECG stress test.         Cardiac Catheterization:  No results found for this or any previous visit.         Other:         ASSESSMENT & PLAN:    Principal Problem:    UTI (urinary tract infection)  Active Problems:    Cerebrovascular accident    Type 2 diabetes mellitus with diabetic nephropathy    Atrial fibrillation    Morbid obesity    Dementia    Acute metabolic encephalopathy       A fib/SSS  He has atrial fibrillation with slow ventricular response.  Stopped Coreg and clonidine due to  bradycardia  Lovenox for anticoagulation  No further episodes of bradycardia since stopping Coreg or clonidine.  Continue telemetry monitoring     HFpEF  Echo shows EF of 56 to 60%, grade 2 diastolic dysfunction, moderate pulmonary hypertension.  Lasix as needed  Renal function is normal     Uncontrolled hypertension  We will stop Cardene drip.  Start chlorthalidone, losartan 25 mg, Imdur 60 mg and hydralazine 50mg 3 times daily     Hyperlipidemia  Continue high intensity statin, most recent LDL less than 40.     Diabetes  Continue insulin therapy for diabetes, most recent A1c is 6     Complicated UTI  Altered mental state likely secondary to ESBL UTI, continue ertapenem     BPH  On tamsulosin and finasteride for BPH.    Frank Foster MD  04/03/23  10:36 EDT

## 2023-04-03 NOTE — PLAN OF CARE
Goal Outcome Evaluation:  Plan of Care Reviewed With: patient        Progress: improving   Pt is making a little progress. Pt agreed to take his medication today. Pt BP was still elevated today even on the Cardene gtt. Pt c/o stiff neck. Heat pack applied. Pt in A-fib, on RA.

## 2023-04-03 NOTE — PROGRESS NOTES
"  Chief complaint: Refusing treatment    Subjective .     History of present illness:  The patient is a 77 y.o. male who was admitted secondary to altered mental status. PMH: dementia, cardiomyopathy, DM, HTN, cerebral infarction, obesity. Psych consult was requested by Dr Renee 2ry to refusal of tx.  The pt was very poor historian 2ry to cognitive deficits and also sedation after he took muscle relaxant for neck pain. He knew his name, , he knew he was in the hospital, did know his age (he told he was 21 yo)  or todays date. The pt was very slow to respond. He took his  meds today but he was refusing meds for BP yesterday, he was explained he could have a stroke and he stated \"I would accept that \" (from nursing report)    Past psych hx: dementia.    4/3/23: On exam today, the patient's primary nurse stated he is doing ok today. He has not been agitated. He did take his oral medications for her today. On interview, the patient is lethargic, but he was able to tell me his name. He was disoriented to the place and time.       Review of Systems   Review of systems could not be obtained due to   patient confusion. patient sedation status.    The following portions of the patient's history were reviewed and updated as appropriate: allergies, current medications, past family history, past medical history, past social history, past surgical history and problem list.    History     Medications Prior to Admission   Medication Sig Dispense Refill Last Dose   • acetaminophen (TYLENOL) 325 MG tablet Take 650 mg by mouth Every 4 (Four) Hours As Needed for Mild Pain .      • albuterol sulfate  (90 Base) MCG/ACT inhaler Inhale 2 puffs Every 4 (Four) Hours As Needed (Cough).      • apixaban (ELIQUIS) 5 MG tablet tablet Take 1 tablet by mouth Every 12 (Twelve) Hours. Indications: Atrial Fibrillation 60 tablet     • carvedilol (COREG) 12.5 MG tablet Take 1 tablet by mouth 2 (Two) Times a Day With Meals. 60 tablet 0    • " docusate calcium (SURFAK) 240 MG capsule Take 240 mg by mouth Daily.      • escitalopram (LEXAPRO) 10 MG tablet Take 10 mg by mouth Daily.      • finasteride (PROSCAR) 5 MG tablet Take 5 mg by mouth Daily.      • furosemide (LASIX) 40 MG tablet Take 1 tablet by mouth 2 (Two) Times a Day. 60 tablet 0    • gabapentin (NEURONTIN) 300 MG capsule Take 300 mg by mouth 3 (Three) Times a Day.      • hydrALAZINE (APRESOLINE) 50 MG tablet Take 1 tablet by mouth 3 (Three) Times a Day. 90 tablet 0    • Insulin Glargine (BASAGLAR KWIKPEN) 100 UNIT/ML injection pen Inject 10 Units under the skin into the appropriate area as directed Every Night.      • isosorbide mononitrate (IMDUR) 60 MG 24 hr tablet Take 1 tablet by mouth 2 (Two) Times a Day. 60 tablet 0    • losartan (COZAAR) 25 MG tablet Take 1 tablet by mouth Daily. 30 tablet 0    • methocarbamol (ROBAXIN) 500 MG tablet Take 500 mg by mouth Every 12 (Twelve) Hours As Needed for Muscle Spasms.      • nitroglycerin (NITROSTAT) 0.4 MG SL tablet Place 0.4 mg under the tongue Every 5 (Five) Minutes As Needed for Chest Pain. Take no more than 3 doses in 15 minutes.      • polycarbophil (calcium polycarbophil) 625 MG tablet tablet Take 625 mg by mouth Daily.      • Polyethylene Glycol 3350 powder Take 17 g by mouth Every Night.      • saccharomyces boulardii (FLORASTOR) 250 MG capsule Take 250 mg by mouth 2 (Two) Times a Day.      • simethicone (MYLICON,GAS-X) 180 MG capsule Take 180 mg by mouth Every 6 (Six) Hours As Needed for Flatulence.      • simvastatin (ZOCOR) 40 MG tablet Take 40 mg by mouth every night at bedtime.      • tamsulosin (FLOMAX) 0.4 MG capsule 24 hr capsule Take 1 capsule by mouth Daily.      • traMADol (ULTRAM) 50 MG tablet Take 1 tablet by mouth Every 6 (Six) Hours As Needed for Moderate Pain.           Scheduled Meds:  amLODIPine, 10 mg, Oral, Q24H  atorvastatin, 20 mg, Oral, Nightly  chlorthalidone, 25 mg, Oral, Daily  enoxaparin, 1 mg/kg, Subcutaneous,  "Q12H  ertapenem, 1 g, Intravenous, Q24H  escitalopram, 10 mg, Oral, Daily  finasteride, 5 mg, Oral, Daily  gabapentin, 300 mg, Oral, TID  hydrALAZINE, 50 mg, Oral, TID  insulin lispro, 2-7 Units, Subcutaneous, TID With Meals  isosorbide mononitrate, 60 mg, Oral, Q24H  lidocaine, 20 mL, Subcutaneous, Once  losartan, 25 mg, Oral, Q24H  sodium chloride, 10 mL, Intravenous, Q12H  sodium chloride, 10 mL, Intravenous, Q12H  tamsulosin, 0.4 mg, Oral, Daily         Continuous Infusions:  Pharmacy to Dose enoxaparin (LOVENOX),         PRN Meds:  •  acetaminophen  •  acetaminophen  •  albuterol  •  cyclobenzaprine  •  dextrose  •  dextrose  •  glucagon (human recombinant)  •  hydrALAZINE  •  labetalol  •  nitroglycerin  •  ondansetron **OR** ondansetron  •  Pharmacy to Dose enoxaparin (LOVENOX)  •  [COMPLETED] Insert Peripheral IV **AND** sodium chloride  •  sodium chloride  •  sodium chloride  •  sodium chloride  •  sodium chloride      Allergies:  Patient has no known allergies.      Objective     Vital Signs   /90   Pulse 69   Temp 98.8 °F (37.1 °C) (Oral)   Resp 18   Ht 177.8 cm (70\")   Wt 134 kg (294 lb 12.1 oz)   SpO2 95%   BMI 42.29 kg/m²     Physical Exam:    Musculoskeletal:   Muscle strength and tone: decreased  Abnormal Movements: None  Gait: CONSUELO, patient in bed     General Appearance:    In NAD, positive psychomotor retardation     Mental Status Exam:   Hygiene:   fair  Cooperation:  Cooperative  Eye Contact:  Poor  Behavior and Psychomotor Activity: Slow  Speech:  slow   Mood: tired   Affect:  Restricted  Thought Process:  poverty of thoughts   Associations: Loose  Thought Content:  Normal  Language: appropriate   Suicidal Ideations:  None  Homicidal:  None  Hallucinations:  None  Delusion:  None  Orientation:  To person and Situation  Memory:  Deficits  Concentration and computation: poor   Attention span: very short   Fund of knowledge: limited   Reliability:  poor  Insight:  Poor  Judgement:  " Impaired  Impulse Control:  Fair    Medications and allergies reviewed     MSE from 4/2/23 reviewed and accepted with necessary changes.     Result Review:  I have personally reviewed the results from the time of this admission to 4/3/2023 17:01 EDT and agree with these findings:  [x]  Laboratory  []  Microbiology  []  Radiology  []  EKG/Telemetry   []  Cardiology/Vascular   []  Pathology  []  Old records  []  Other:      Assessment & Plan       UTI (urinary tract infection)    Cerebrovascular accident    Type 2 diabetes mellitus with diabetic nephropathy    Atrial fibrillation    Morbid obesity    Dementia    Acute metabolic encephalopathy    Assessment: delirium due to medical condition (UTI)  Dementia without behavioral disturbance  Treatment Plan: The patient presented with cognitive deficits secondary to underlying dementia and superimposed delirium due to UTI .    At present time the pt is unable to make decisions about his health, however, those finding might be affected by sedation from meds    Patient more compliant with care today, but still confused.     Continue supportive treatment.     Will continue to follow.     Treatment Plan discussed with: Patient    I discussed the patients findings and my recommendations with patient and nursing staff    I have reviewed and approved the behavioral health treatment plans and problem list. Yes     Referring MD has access to consult report and progress notes in EMR     MELISA Brooks  04/03/23  17:01 EDT

## 2023-04-03 NOTE — CONSULTS
Infectious Diseases Consult Note    Referring Provider: Isaac Rhoades MD    Reason for Consultation: Antibiotic management    Patient Care Team:  Naa Valverde MD as PCP - General (Internal Medicine)    Chief complaint cute mental status change, fever at admission    Subjective     The patient has been afebrile for the last 24 hours.  The patient is on room air, hemodynamically stable, and is tolerating antimicrobial therapy.    History of present illness:      This is a 77-year-old male who presents to the hospital on 3/25/2023 from his extended care facility due to mental status change, fever and hypotension.  Patient was found to be in a hyper tensive crisis and has been off and on Cardene.  Apparently has been refusing some medications.  Has a history of dementia but is normally alert and oriented x3.  Has a chronic indwelling Motley catheter due to urinary retention.  No specific complaints at this time    Review of Systems   Review of Systems   Constitutional: Negative.    HENT: Negative.    Eyes: Negative.    Respiratory: Negative.    Cardiovascular: Negative.    Gastrointestinal: Negative.    Endocrine: Negative.    Genitourinary: Negative.    Musculoskeletal: Negative.    Skin: Negative.    Neurological: Negative.    Psychiatric/Behavioral: Negative.    All other systems reviewed and are negative.      Medications  Medications Prior to Admission   Medication Sig Dispense Refill Last Dose   • acetaminophen (TYLENOL) 325 MG tablet Take 650 mg by mouth Every 4 (Four) Hours As Needed for Mild Pain .      • albuterol sulfate  (90 Base) MCG/ACT inhaler Inhale 2 puffs Every 4 (Four) Hours As Needed (Cough).      • apixaban (ELIQUIS) 5 MG tablet tablet Take 1 tablet by mouth Every 12 (Twelve) Hours. Indications: Atrial Fibrillation 60 tablet     • carvedilol (COREG) 12.5 MG tablet Take 1 tablet by mouth 2 (Two) Times a Day With Meals. 60 tablet 0    • docusate calcium (SURFAK) 240 MG capsule Take 240  mg by mouth Daily.      • escitalopram (LEXAPRO) 10 MG tablet Take 10 mg by mouth Daily.      • finasteride (PROSCAR) 5 MG tablet Take 5 mg by mouth Daily.      • furosemide (LASIX) 40 MG tablet Take 1 tablet by mouth 2 (Two) Times a Day. 60 tablet 0    • gabapentin (NEURONTIN) 300 MG capsule Take 300 mg by mouth 3 (Three) Times a Day.      • hydrALAZINE (APRESOLINE) 50 MG tablet Take 1 tablet by mouth 3 (Three) Times a Day. 90 tablet 0    • Insulin Glargine (BASAGLAR KWIKPEN) 100 UNIT/ML injection pen Inject 10 Units under the skin into the appropriate area as directed Every Night.      • isosorbide mononitrate (IMDUR) 60 MG 24 hr tablet Take 1 tablet by mouth 2 (Two) Times a Day. 60 tablet 0    • losartan (COZAAR) 25 MG tablet Take 1 tablet by mouth Daily. 30 tablet 0    • methocarbamol (ROBAXIN) 500 MG tablet Take 500 mg by mouth Every 12 (Twelve) Hours As Needed for Muscle Spasms.      • nitroglycerin (NITROSTAT) 0.4 MG SL tablet Place 0.4 mg under the tongue Every 5 (Five) Minutes As Needed for Chest Pain. Take no more than 3 doses in 15 minutes.      • polycarbophil (calcium polycarbophil) 625 MG tablet tablet Take 625 mg by mouth Daily.      • Polyethylene Glycol 3350 powder Take 17 g by mouth Every Night.      • saccharomyces boulardii (FLORASTOR) 250 MG capsule Take 250 mg by mouth 2 (Two) Times a Day.      • simethicone (MYLICON,GAS-X) 180 MG capsule Take 180 mg by mouth Every 6 (Six) Hours As Needed for Flatulence.      • simvastatin (ZOCOR) 40 MG tablet Take 40 mg by mouth every night at bedtime.      • tamsulosin (FLOMAX) 0.4 MG capsule 24 hr capsule Take 1 capsule by mouth Daily.      • traMADol (ULTRAM) 50 MG tablet Take 1 tablet by mouth Every 6 (Six) Hours As Needed for Moderate Pain.          History  Past Medical History:   Diagnosis Date   • Acute kidney failure    • Acute respiratory failure with hypoxia    • Anemia    • Benign prostatic hyperplasia    • Bilateral primary osteoarthritis of knee     • Cardiomegaly    • Cardiomyopathy    • Cellulitis and abscess of left leg    • Cerebral infarction    • Cerebrovascular disease    • Chronic kidney disease    • Dementia    • Diabetes mellitus    • Essential hypertension    • GERD (gastroesophageal reflux disease)    • Gout    • Heart failure    • Hyperlipidemia    • Morbid obesity    • Myocardial infarction    • Obstructive sleep apnea    • Obstructive uropathy    • Paroxysmal atrial fibrillation    • Peptic ulcer    • Peripheral vascular disease    • Pulmonary edema    • Venous insufficiency      History reviewed. No pertinent surgical history.    Family History  Family History   Problem Relation Age of Onset   • Diabetes Father        Social History   reports that he has never smoked. He does not have any smokeless tobacco history on file. He reports that he does not currently use alcohol. He reports that he does not use drugs.    Allergies  Patient has no known allergies.    Objective     Vital Signs   Vital Signs (last 24 hours)       04/02 0700  04/03 0659 04/03 0700  04/03 1529   Most Recent      Temp (°F) 98.1 -  99.2      98.8     98.8 (37.1) 04/03 1417    Heart Rate 56 -  87      69     69 04/03 1417    Resp 16 -  21      18     18 04/03 1417    /87 -  199/112      144/90     144/90 04/03 1417    SpO2 (%) 90 -  95      95     95 04/03 1417          Physical Exam:  Physical Exam  Vitals and nursing note reviewed.   Constitutional:       General: He is not in acute distress.     Appearance: Normal appearance. He is well-developed and normal weight. He is not diaphoretic.   HENT:      Head: Normocephalic and atraumatic.   Eyes:      Conjunctiva/sclera: Conjunctivae normal.      Pupils: Pupils are equal, round, and reactive to light.   Cardiovascular:      Rate and Rhythm: Normal rate and regular rhythm.      Heart sounds: Normal heart sounds, S1 normal and S2 normal.   Pulmonary:      Effort: Pulmonary effort is normal. No respiratory distress.       Breath sounds: Normal breath sounds. No stridor. No wheezing or rales.   Abdominal:      General: Bowel sounds are normal. There is no distension.      Palpations: Abdomen is soft. There is no mass.      Tenderness: There is no abdominal tenderness. There is no guarding.   Genitourinary:     Comments: Motley catheter  Musculoskeletal:         General: No deformity. Normal range of motion.      Cervical back: Neck supple.   Skin:     General: Skin is warm and dry.      Coloration: Skin is not pale.      Findings: No erythema or rash.   Neurological:      Mental Status: He is alert and oriented to person, place, and time.      Cranial Nerves: No cranial nerve deficit.         Microbiology  Microbiology Results (last 10 days)     Procedure Component Value - Date/Time    CANDIDA AURIS SCREEN - Swab, Axilla Right, Axilla Left and Groin [[id number]]  (Normal) Collected: 03/28/23 1118    Lab Status: Final result Specimen: Swab from Axilla Right, Axilla Left and Groin Updated: 04/02/23 1130     Candida Auris Screen Culture No Candida auris isolated at 5 days    Clostridioides difficile Toxin - Stool, Per Rectum [[id number]]  (Normal) Collected: 03/26/23 0332    Lab Status: Final result Specimen: Stool from Per Rectum Updated: 03/26/23 0710    Narrative:      The following orders were created for panel order Clostridioides difficile Toxin - Stool, Per Rectum.  Procedure                               Abnormality         Status                     ---------                               -----------         ------                     Clostridioides difficile...[[id number]]  Normal              Final result                 Please view results for these tests on the individual orders.    Gastrointestinal Panel, PCR - Stool, Per Rectum [[id number]]  (Normal) Collected: 03/26/23 0332    Lab Status: Final result Specimen: Stool from Per Rectum Updated: 03/26/23 0451     Campylobacter Not Detected     Plesiomonas shigelloides Not Detected      Salmonella Not Detected     Vibrio Not Detected     Vibrio cholerae Not Detected     Yersinia enterocolitica Not Detected     Enteroaggregative E. coli (EAEC) Not Detected     Enteropathogenic E. coli (EPEC) Not Detected     Enterotoxigenic E. coli (ETEC) lt/st Not Detected     Shiga-like toxin-producing E. coli (STEC) stx1/stx2 Not Detected     Shigella/Enteroinvasive E. coli (EIEC) Not Detected     Cryptosporidium Not Detected     Cyclospora cayetanensis Not Detected     Entamoeba histolytica Not Detected     Giardia lamblia Not Detected     Adenovirus F40/41 Not Detected     Astrovirus Not Detected     Norovirus GI/GII Not Detected     Rotavirus A Not Detected     Sapovirus (I, II, IV or V) Not Detected    Narrative:      If Aeromonas, Staphylococcus aureus or Bacillus cereus are suspected, please order [id number]: Stool Culture, Aeromonas, S aureus, B Cereus.    Clostridioides difficile EIA - Stool, Per Rectum [[id number]]  (Normal) Collected: 03/26/23 0332    Lab Status: Final result Specimen: Stool from Per Rectum Updated: 03/26/23 0710     C Diff GDH Ag Negative     C.diff Toxin Ag Negative    Narrative:      The result indicates the absence of toxigenic C.difficile from stool specimen.    Urine Culture - Urine, Indwelling Urethral Catheter [[id number]]  (Abnormal)  (Susceptibility) Collected: 03/25/23 2128    Lab Status: Final result Specimen: Urine from Indwelling Urethral Catheter Updated: 03/29/23 0834     Urine Culture >100,000 CFU/mL Proteus mirabilis ESBL     Comment:   Consider infectious disease consult.  Susceptibility results may not correlate to clinical outcomes.       Narrative:      ESBL confirmation in progress   Colonization of the urinary tract without infection is common. Treatment is discouraged unless the patient is symptomatic, pregnant, or undergoing an invasive urologic procedure.  Recent outcomes data supports the use of pip/tazo in the treatment of susceptible ESBL infections for  uncomplicated UTI. Consider use of pip/tazo as a carbapenem-sparing regimen in applicable patients.    Susceptibility      Proteus mirabilis ESBL      GAYLA      Amikacin Susceptible      Ertapenem Susceptible      Gentamicin Resistant     Levofloxacin Resistant     Meropenem Susceptible      Nitrofurantoin Resistant     Piperacillin + Tazobactam Susceptible      Tobramycin Resistant     Trimethoprim + Sulfamethoxazole Resistant                          Blood Culture - Blood, Arm, Right [[id number]]  (Abnormal) Collected: 03/25/23 2111    Lab Status: Final result Specimen: Blood from Arm, Right Updated: 03/28/23 0800     Blood Culture Staphylococcus, coagulase negative     Isolated from Aerobic Bottle     Gram Stain Aerobic Bottle Gram positive cocci in clusters    Narrative:      Probable contaminant requires clinical correlation, susceptibility not performed unless requested by physician.      Less than seven (7) mL's of blood was collected.  Insufficient quantity may yield false negative results.    Blood Culture - Blood, Arm, Right [[id number]]  (Normal) Collected: 03/25/23 2111    Lab Status: Final result Specimen: Blood from Arm, Right Updated: 03/30/23 2131     Blood Culture No growth at 5 days    Narrative:      Less than seven (7) mL's of blood was collected.  Insufficient quantity may yield false negative results.    Blood Culture ID, PCR - Blood, Arm, Right [[id number]]  (Abnormal) Collected: 03/25/23 2111    Lab Status: Final result Specimen: Blood from Arm, Right Updated: 03/26/23 2109     BCID, PCR Staph spp, not aureus or lugdunensis. Identification by BCID2 PCR.     BOTTLE TYPE Aerobic Bottle          Laboratory  Results from last 7 days   Lab Units 04/03/23  0347   WBC 10*3/mm3 4.40   HEMOGLOBIN g/dL 10.9*   HEMATOCRIT % 33.7*   PLATELETS 10*3/mm3 228     Results from last 7 days   Lab Units 04/03/23  0347   SODIUM mmol/L 140   POTASSIUM mmol/L 4.0   CHLORIDE mmol/L 105   CO2 mmol/L 28.0   BUN mg/dL 9    CREATININE mg/dL 0.80   GLUCOSE mg/dL 125*   CALCIUM mg/dL 8.7     Results from last 7 days   Lab Units 04/03/23  0347   SODIUM mmol/L 140   POTASSIUM mmol/L 4.0   CHLORIDE mmol/L 105   CO2 mmol/L 28.0   BUN mg/dL 9   CREATININE mg/dL 0.80   GLUCOSE mg/dL 125*   CALCIUM mg/dL 8.7                   Radiology  Imaging Results (Last 72 Hours)     ** No results found for the last 72 hours. **          Cardiology      Results Review:  I have reviewed all clinical data, test, lab, and imaging results.       Schedule Meds  amLODIPine, 10 mg, Oral, Q24H  atorvastatin, 20 mg, Oral, Nightly  chlorthalidone, 25 mg, Oral, Daily  enoxaparin, 1 mg/kg, Subcutaneous, Q12H  ertapenem, 1 g, Intravenous, Q24H  escitalopram, 10 mg, Oral, Daily  finasteride, 5 mg, Oral, Daily  gabapentin, 300 mg, Oral, TID  hydrALAZINE, 50 mg, Oral, TID  insulin lispro, 2-7 Units, Subcutaneous, TID With Meals  isosorbide mononitrate, 60 mg, Oral, Q24H  lidocaine, 20 mL, Subcutaneous, Once  losartan, 25 mg, Oral, Q24H  sodium chloride, 10 mL, Intravenous, Q12H  sodium chloride, 10 mL, Intravenous, Q12H  tamsulosin, 0.4 mg, Oral, Daily        Infusion Meds  Pharmacy to Dose enoxaparin (LOVENOX),         PRN Meds  •  acetaminophen  •  acetaminophen  •  albuterol  •  cyclobenzaprine  •  dextrose  •  dextrose  •  glucagon (human recombinant)  •  hydrALAZINE  •  labetalol  •  nitroglycerin  •  ondansetron **OR** ondansetron  •  Pharmacy to Dose enoxaparin (LOVENOX)  •  [COMPLETED] Insert Peripheral IV **AND** sodium chloride  •  sodium chloride  •  sodium chloride  •  sodium chloride  •  sodium chloride      Assessment & Plan       Assessment    ESBL  E. coli UTI.  Patient has a chronic indwelling Motley catheter for urinary retention.  Does not appear that the Motley catheters been changed out this admission    1 out of 2 blood cultures from admission were positive for coag negative Staphylococcus.  This is likely contamination.  Patient did not come  in with a line or port and does not have a history of heart valve surgery    Hypertensive crisis-currently has been somewhat noncompliant with his medications this admission    Admitted for acute mental status change-appears to be back to his mental baseline.    Dementia    Chronic kidney disease    Type 2 diabetes    Plan    Continue IV Invanz 1 g every 24 hours-we will plan on 7 days of treatment if the CT is negative  CT stone protocol  Case discussed with RN-Motley catheter will need to be changed today  Continue supportive care  ANTONIETTA.maribel Hernández, APRN  04/03/23  15:29 EDT    Note is dictated utilizing voice recognition software/Dragon

## 2023-04-03 NOTE — SIGNIFICANT NOTE
04/03/23 1250   OTHER   Discipline physical therapist   Rehab Time/Intention   Session Not Performed patient/family declined treatment  (pt is confused, declined therapy this date.  Pt with minimal participation in therapy since initial eval, if continues to refuse to participate with d/c from therapy services next visit.)   Recommendation   PT - Next Appointment 04/04/23

## 2023-04-03 NOTE — PROGRESS NOTES
[facility] Medicine Services Daily Progress Note     Patient Name: Shaji Junior  : 1945  MRN: [id number]  Primary Care Physician:  Naa Valverde MD  Date of admission: 3/25/2023        Subjective       Chief Complaint: Abdominal pain        Patient Reports      3/27/2023: Admitted to PCU due to hypertensive urgency.  Off Cardene drip.  Can downgrade.  Complains of abdominal pain.  History of ESBL.  Does not walk.  Confusion getting better     3/28/23: Chronic Motley catheter.  Awaiting urine culture sensitivities.     3/29/23: ESBL UTI.  Will order ertapenem.  Family does not want him to go back to [place].     3/30/23: Patient refusing meds.  Hemodynamically stable.     3/31/23: Complained of left knee pain ordered Roxicodone.  Patient bradycardic.  Coreg held.  Reconsulted cardiology.  We will transfer to PCU.  Patient will be transferred to [place] due to bradycardia.  Appreciate cardiology input.  Discontinued clonidine     23: Transferred to PCU yesterday for bradycardia.  Clonidine was discontinued.  High blood pressure but the patient is refusing medications.  Nitroglycerin patch placed.  Complains of headache.  Spoke to the patient's sister and brother in the afternoon regarding the patient's refusal of medications.  The patient's brother said he will come and talk to the patient.  Sister, MELISSA Martinez lives in [place].      23: Complains of right neck pain.  Ordered Flexeril.  Encourage patient to eat breakfast and take his medications.  Heart rate in the 60s.    4/3/2023; first time seeing the patient, extensive chart notes reviewed patient is lying in the bed, alert awake oriented x1-2 pleasantly confused, no family at bedside, spoke with the bedside RN     Review of Systems   Unable to perform ROS: mental status change            Objective       Vitals:   Temp:  [98.1 °F (36.7 °C)-99.2 °F (37.3 °C)] 98.8 °F (37.1 °C)  Heart Rate:  [56-87] 69  Resp:  [16-21]  18  BP: (135-189)/() 144/90     Physical Exam  HENT:      Head: Normocephalic.      Mouth/Throat:      Mouth: Mucous membranes are moist.   Eyes:      Extraocular Movements: Extraocular movements intact.      Pupils: Pupils are equal, round, and reactive to light.   Cardiovascular:      Rate and Rhythm: Normal rate and regular rhythm.   Pulmonary:      Effort: Pulmonary effort is normal.   Abdominal:      General: Bowel sounds are normal.      Tenderness: There is no abdominal tenderness.      Comments: Obese abdomen.     Skin:     General: Skin is warm.      Findings: No rash.   Neurological:      Mental Status: He is alert. He is confused.   Psychiatric:         Behavior: Behavior is cooperative.                          Result Review    Result Review:  I have personally reviewed the results from the time of this admission to 4/2/2023 13:07 EDT and agree with these findings:  [x]?  Laboratory  []?  Microbiology  [x]?  Radiology  []?  EKG/Telemetry   []?  Cardiology/Vascular   []?  Pathology  [x]?  Old records  []?  Other:     Lab Results   Component Value Date    GLUCOSE 125 (H) 04/03/2023    CALCIUM 8.7 04/03/2023     04/03/2023    K 4.0 04/03/2023    CO2 28.0 04/03/2023     04/03/2023    BUN 9 04/03/2023    CREATININE 0.80 04/03/2023    EGFR 91.2 04/03/2023    BCR 11.3 04/03/2023    ANIONGAP 7.0 04/03/2023     Lab Results   Component Value Date    WBC 4.40 04/03/2023    HGB 10.9 (L) 04/03/2023    HCT 33.7 (L) 04/03/2023    MCV 80.2 04/03/2023     04/03/2023   No radiology results for the last 7 days         Wounds (last 24 hours)             LDA Wound      Row Name 04/02/23 0410 04/02/23 0010 04/01/23 2010             Wound 03/10/23 0938 Right gluteal MASD (Moisture associated skin damage)     Wound - Properties Group Placement Date: 03/10/23  -HW Placement Time: 0938  -HW Present on Hospital Admission: Y  -HW Side: Right  -HW Location: gluteal  -HW Primary Wound Type: MASD  -HW      Dressing Appearance open to air  -DC open to air  -DC open to air  -DC     Closure Open to air  -DC Open to air  -DC Open to air  -DC     Base pink  -DC pink  -DC pink  -DC     Drainage Amount none  -DC none  -DC none  -DC     Care, Wound cleansed with;sterile normal saline  -DC cleansed with;sterile normal saline  -DC cleansed with;sterile normal saline  -DC     Dressing Care open to air  -DC open to air  -DC open to air  -DC     Retired Wound - Properties Group Placement Date: 03/10/23  -HW Placement Time: 0938 -HW Present on Hospital Admission: Y  -HW Side: Right  -HW Location: gluteal  -HW Primary Wound Type: MASD  -HW     Retired Wound - Properties Group Date first assessed: 03/10/23  -HW Time first assessed: 0938 -HW Present on Hospital Admission: Y  -HW Side: Right  -HW Location: gluteal  -HW Primary Wound Type: MASD  -HW             Wound 03/26/23 1653 Right anterior greater trochanter Blisters     Wound - Properties Group Placement Date: 03/26/23  -NE Placement Time: 1653 -NE Present on Hospital Admission: Y  -NE Side: Right  -NE Orientation: anterior  -NE Location: greater trochanter  -NE Primary Wound Type: Blisters  -NE     Pressure Injury Stage 2  -DC 2  -DC 2  -DC     Dressing Appearance open to air  -DC open to air  -DC open to air  -DC     Closure Open to air  -DC Open to air  -DC Open to air  -DC     Base moist;red  -DC moist;red  -DC moist;red  -DC     Periwound dry  -DC dry  -DC dry  -DC     Periwound Temperature warm  -DC warm  -DC warm  -DC     Periwound Skin Turgor soft  -DC soft  -DC soft  -DC     Drainage Characteristics/Odor serosanguineous  -DC serosanguineous  -DC serosanguineous  -DC     Drainage Amount scant  -DC scant  -DC scant  -DC     Care, Wound cleansed with;sterile normal saline  -DC cleansed with;sterile normal saline  -DC cleansed with;sterile normal saline  -DC     Dressing Care skin barrier agent applied  -DC skin barrier agent applied  -DC skin barrier agent applied  -DC      Retired Wound - Properties Group Placement Date: 03/26/23 -NE Placement Time: 1653 -NE Present on Hospital Admission: Y  -NE Side: Right  -NE Orientation: anterior  -NE Location: greater trochanter  -NE Primary Wound Type: Blisters  -NE     Retired Wound - Properties Group Date first assessed: 03/26/23 -NE Time first assessed: 1653 -NE Present on Hospital Admission: Y  -NE Side: Right  -NE Location: greater trochanter  -NE Primary Wound Type: Blisters  -NE             Wound 03/26/23 1654 Right gluteal Pressure Injury     Wound - Properties Group Placement Date: 03/26/23 -NE Placement Time: 1654 -NE Present on Hospital Admission: Y  -NE Side: Right  -NE Location: gluteal  -NE, STAGE 2  Primary Wound Type: Pressure inj  -NE     Pressure Injury Stage 2  -DC 2  -DC 2  -DC     Dressing Appearance open to air  -DC open to air  -DC open to air  -DC     Closure Open to air  -DC Open to air  -DC Open to air  -DC     Base pink;red  -DC pink;red  -DC pink;red  -DC     Periwound pink  -DC pink  -DC pink  -DC     Periwound Temperature warm  -DC warm  -DC warm  -DC     Drainage Characteristics/Odor serosanguineous  -DC serosanguineous  -DC serosanguineous  -DC     Drainage Amount scant  -DC scant  -DC scant  -DC     Care, Wound cleansed with;sterile normal saline  -DC cleansed with;sterile normal saline  -DC cleansed with;sterile normal saline  -DC     Dressing Care open to air  -DC open to air  -DC open to air  -DC     Periwound Care barrier ointment applied  -DC barrier ointment applied  -DC barrier ointment applied  -DC     Retired Wound - Properties Group Placement Date: 03/26/23 -NE Placement Time: 1654 -NE Present on Hospital Admission: Y  -NE Side: Right  -NE Location: gluteal  -NE, STAGE 2  Primary Wound Type: Pressure inj  -NE     Retired Wound - Properties Group Date first assessed: 03/26/23 -NE Time first assessed: 1654 -NE Present on Hospital Admission: Y  -NE Side: Right  -NE Location: gluteal  -NE,  STAGE 2  Primary Wound Type: Pressure inj  -NE             Wound 03/26/23 1702 Left anterior thigh Skin Tear     Wound - Properties Group Placement Date: 03/26/23  -NE Placement Time: 1702 -NE Present on Hospital Admission: Y  -NE Side: Left  -NE Orientation: anterior  -NE Location: thigh  -NE Primary Wound Type: Skin tear  -NE     Dressing Appearance open to air  -DC open to air  -DC open to air  -DC     Closure Open to air  look healed  -DC Open to air  look healed  -DC Open to air  look healed  -DC     Base pink  -DC pink  -DC --     Drainage Amount none  -DC none  -DC none  -DC     Care, Wound cleansed with;sterile normal saline  -DC cleansed with;sterile normal saline  -DC cleansed with;sterile normal saline  -DC     Retired Wound - Properties Group Placement Date: 03/26/23  -NE Placement Time: 1702 -NE Present on Hospital Admission: Y  -NE Side: Left  -NE Orientation: anterior  -NE Location: thigh  -NE Primary Wound Type: Skin tear  -NE     Retired Wound - Properties Group Date first assessed: 03/26/23  -NE Time first assessed: 1702 -NE Present on Hospital Admission: Y  -NE Side: Left  -NE Location: thigh  -NE Primary Wound Type: Skin tear  -NE             Wound 03/31/23 1723 Left posterior     Wound - Properties Group Placement Date: 03/31/23  -BM Placement Time: 1723  -BM Side: Left  -BM Orientation: posterior  -BM     Pressure Injury Stage DTPI  -DC DTPI  -DC DTPI  -DC     Dressing Appearance open to air  -DC open to air  -DC open to air  -DC     Periwound intact  -DC intact  -DC intact  -DC     Retired Wound - Properties Group Placement Date: 03/31/23  -BM Placement Time: 1723  -BM Side: Left  -BM Orientation: posterior  -BM     Retired Wound - Properties Group Date first assessed: 03/31/23  -BM Time first assessed: 1723  -BM Side: Left  -BM                   User Key  (r) = Recorded By, (t) = Taken By, (c) = Cosigned By     Initials Name Provider Type     Cody Marinelli LPN Licensed Nurse      Christy Joseph, RN Registered Nurse     Jordan Rao, RN Registered Nurse      Ignacia Calles RN Registered Nurse                       Assessment & Plan       Brief Patient Summary:        The patient is a 77-year-old female with history of type 2 diabetes mellitus, atrial fibrillation,  CVA, chronic Motley catheter and recurrent UTI.      The patient was recently discharged from the hospital on 2/18/2023 after treatment for complicated ESBL UTI, metabolic encephalopathy and hypertensive urgency.     The patient presented to the ED from [facility] on [facility]/2023 for evaluation of confusion and fevers.     The patient was diagnosed with acute metabolic encephalopathy due to urinary tract infection  and admitted to the hospitalist  service.  He was seen by cardiologist for history of atrial fibrillation with normal LVEF 60-65%.  Urine cultures grew ESBL Proteus mirabilis and was started on ertapenem.     The patient was transferred to PCU on 4/1/2023 for bradycardia.  Clonidine was then discontinued and cardiology was consulted.  He continues to refuse medications thus has elevated BP.  Nitro patch has been used.  The patient has a bed at [facility] but bradycardia has to resolved before being accepted there.     amLODIPine, 10 mg, Oral, Q24H  atorvastatin, 20 mg, Oral, Nightly  chlorthalidone, 25 mg, Oral, Daily  enoxaparin, 1 mg/kg, Subcutaneous, Q12H  ertapenem, 1 g, Intravenous, Q24H  escitalopram, 10 mg, Oral, Daily  finasteride, 5 mg, Oral, Daily  gabapentin, 300 mg, Oral, TID  hydrALAZINE, 50 mg, Oral, TID  insulin lispro, 2-7 Units, Subcutaneous, TID With Meals  lidocaine, 20 mL, Subcutaneous, Once  lisinopril, 10 mg, Oral, Q24H  nitroglycerin, 1 inch, Topical, Q6H  sodium chloride, 10 mL, Intravenous, Q12H  sodium chloride, 10 mL, Intravenous, Q12H  tamsulosin, 0.4 mg, Oral, Daily        lactated ringers, 50 mL/hr, Last Rate: Stopped (04/01/23 0417)  niCARdipine, 5-15 mg/hr, Last Rate:  10 mg/hr (04/02/23 1012)  Pharmacy to Dose enoxaparin (LOVENOX),            Active Hospital Problems:       Active Hospital Problems     Diagnosis     • **UTI (urinary tract infection)     • Acute metabolic encephalopathy     • Morbid obesity     • Dementia     • Atrial fibrillation     • Cerebrovascular accident     • Type 2 diabetes mellitus with diabetic nephropathy        ESBL Proteus urinary tract infection (POA) complicated UTI;  Hypertensive urgency (POA)  Acute toxic/metabolic encephalopathy  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  Contamination.  Consult ID.  -CT head on admission ruled out acute intracranial pathology.  -Family would like the patient to go to a new facility.  Awaiting pre-CERT.  -Patient refuses medication sometimes.  -A-fib/SSS; with slow ventricular response stop Coreg and clonidine due to bradycardia, Lovenox for anticoagulation, no more bradycardia after stopping Coreg and clonidine continue to monitor on telemetry.          DVT prophylaxis:  Medical DVT prophylaxis orders are present.     CODE STATUS:    Code Status (Patient has no pulse and is not breathing): CPR (Attempt to Resuscitate)  Medical Interventions (Patient has pulse or is breathing): Full Support        Disposition:  I expect patient to be discharged in 2 to 3 days.     This patient has been examined wearing appropriate Personal Protective Equipment and discussed with nursing.    Electronically signed by Isaac Rhoades MD, 04/03/23, 4:03 PM EDT.

## 2023-04-04 ENCOUNTER — APPOINTMENT (OUTPATIENT)
Dept: CT IMAGING | Facility: HOSPITAL | Age: 78
DRG: 698 | End: 2023-04-04
Payer: OTHER GOVERNMENT

## 2023-04-04 VITALS
WEIGHT: 290.79 LBS | RESPIRATION RATE: 16 BRPM | OXYGEN SATURATION: 97 % | TEMPERATURE: 97.8 F | SYSTOLIC BLOOD PRESSURE: 145 MMHG | BODY MASS INDEX: 41.63 KG/M2 | HEART RATE: 61 BPM | DIASTOLIC BLOOD PRESSURE: 83 MMHG | HEIGHT: 70 IN

## 2023-04-04 LAB
ANION GAP SERPL CALCULATED.3IONS-SCNC: 8 MMOL/L (ref 5–15)
BASOPHILS # BLD AUTO: 0 10*3/MM3 (ref 0–0.2)
BASOPHILS NFR BLD AUTO: 0.7 % (ref 0–1.5)
BUN SERPL-MCNC: 12 MG/DL (ref 8–23)
BUN/CREAT SERPL: 13.2 (ref 7–25)
CALCIUM SPEC-SCNC: 8.8 MG/DL (ref 8.6–10.5)
CHLORIDE SERPL-SCNC: 102 MMOL/L (ref 98–107)
CO2 SERPL-SCNC: 27 MMOL/L (ref 22–29)
CREAT SERPL-MCNC: 0.91 MG/DL (ref 0.76–1.27)
DEPRECATED RDW RBC AUTO: 59.9 FL (ref 37–54)
EGFRCR SERPLBLD CKD-EPI 2021: 86.8 ML/MIN/1.73
EOSINOPHIL # BLD AUTO: 0.3 10*3/MM3 (ref 0–0.4)
EOSINOPHIL NFR BLD AUTO: 8.4 % (ref 0.3–6.2)
ERYTHROCYTE [DISTWIDTH] IN BLOOD BY AUTOMATED COUNT: 21.1 % (ref 12.3–15.4)
GLUCOSE BLDC GLUCOMTR-MCNC: 140 MG/DL (ref 70–105)
GLUCOSE BLDC GLUCOMTR-MCNC: 99 MG/DL (ref 70–105)
GLUCOSE SERPL-MCNC: 115 MG/DL (ref 65–99)
HCT VFR BLD AUTO: 36 % (ref 37.5–51)
HGB BLD-MCNC: 11 G/DL (ref 13–17.7)
LYMPHOCYTES # BLD AUTO: 1.4 10*3/MM3 (ref 0.7–3.1)
LYMPHOCYTES NFR BLD AUTO: 40.1 % (ref 19.6–45.3)
MCH RBC QN AUTO: 25 PG (ref 26.6–33)
MCHC RBC AUTO-ENTMCNC: 30.5 G/DL (ref 31.5–35.7)
MCV RBC AUTO: 82 FL (ref 79–97)
MONOCYTES # BLD AUTO: 0.5 10*3/MM3 (ref 0.1–0.9)
MONOCYTES NFR BLD AUTO: 13.6 % (ref 5–12)
NEUTROPHILS NFR BLD AUTO: 1.3 10*3/MM3 (ref 1.7–7)
NEUTROPHILS NFR BLD AUTO: 37.2 % (ref 42.7–76)
NRBC BLD AUTO-RTO: 0 /100 WBC (ref 0–0.2)
PLATELET # BLD AUTO: 238 10*3/MM3 (ref 140–450)
PMV BLD AUTO: 7.6 FL (ref 6–12)
POTASSIUM SERPL-SCNC: 3.6 MMOL/L (ref 3.5–5.2)
RBC # BLD AUTO: 4.39 10*6/MM3 (ref 4.14–5.8)
SODIUM SERPL-SCNC: 137 MMOL/L (ref 136–145)
WBC NRBC COR # BLD: 3.5 10*3/MM3 (ref 3.4–10.8)

## 2023-04-04 PROCEDURE — 25010000002 ENOXAPARIN PER 10 MG: Performed by: HOSPITALIST

## 2023-04-04 PROCEDURE — 82962 GLUCOSE BLOOD TEST: CPT

## 2023-04-04 PROCEDURE — 92526 ORAL FUNCTION THERAPY: CPT

## 2023-04-04 PROCEDURE — 85025 COMPLETE CBC W/AUTO DIFF WBC: CPT | Performed by: NURSE PRACTITIONER

## 2023-04-04 PROCEDURE — 25010000002 ERTAPENEM PER 500 MG: Performed by: HOSPITALIST

## 2023-04-04 PROCEDURE — 99232 SBSQ HOSP IP/OBS MODERATE 35: CPT | Performed by: INTERNAL MEDICINE

## 2023-04-04 PROCEDURE — 74176 CT ABD & PELVIS W/O CONTRAST: CPT

## 2023-04-04 PROCEDURE — 80048 BASIC METABOLIC PNL TOTAL CA: CPT | Performed by: NURSE PRACTITIONER

## 2023-04-04 RX ORDER — TRAMADOL HYDROCHLORIDE 50 MG/1
50 TABLET ORAL EVERY 6 HOURS PRN
Qty: 12 TABLET | Refills: 0 | Status: SHIPPED | OUTPATIENT
Start: 2023-04-04

## 2023-04-04 RX ORDER — CHLORTHALIDONE 25 MG/1
25 TABLET ORAL DAILY
Qty: 30 TABLET | Refills: 0 | Status: SHIPPED | OUTPATIENT
Start: 2023-04-05 | End: 2023-05-05

## 2023-04-04 RX ORDER — AMLODIPINE BESYLATE 10 MG/1
10 TABLET ORAL
Qty: 30 TABLET | Refills: 0 | Status: SHIPPED | OUTPATIENT
Start: 2023-04-05 | End: 2023-05-05

## 2023-04-04 RX ORDER — NALOXONE HYDROCHLORIDE 4 MG/.1ML
SPRAY NASAL
Qty: 2 EACH | Refills: 0 | Status: SHIPPED | OUTPATIENT
Start: 2023-04-04

## 2023-04-04 RX ADMIN — LOSARTAN POTASSIUM 25 MG: 25 TABLET, FILM COATED ORAL at 09:45

## 2023-04-04 RX ADMIN — HYDRALAZINE HYDROCHLORIDE 50 MG: 25 TABLET, FILM COATED ORAL at 09:44

## 2023-04-04 RX ADMIN — ISOSORBIDE MONONITRATE 60 MG: 60 TABLET, EXTENDED RELEASE ORAL at 09:44

## 2023-04-04 RX ADMIN — ESCITALOPRAM OXALATE 10 MG: 10 TABLET ORAL at 09:45

## 2023-04-04 RX ADMIN — ERTAPENEM SODIUM 1 G: 1 INJECTION, POWDER, LYOPHILIZED, FOR SOLUTION INTRAMUSCULAR; INTRAVENOUS at 10:50

## 2023-04-04 RX ADMIN — FINASTERIDE 5 MG: 5 TABLET, FILM COATED ORAL at 09:45

## 2023-04-04 RX ADMIN — Medication 10 ML: at 10:37

## 2023-04-04 RX ADMIN — TAMSULOSIN HYDROCHLORIDE 0.4 MG: 0.4 CAPSULE ORAL at 09:45

## 2023-04-04 RX ADMIN — GABAPENTIN 300 MG: 300 CAPSULE ORAL at 09:44

## 2023-04-04 RX ADMIN — ENOXAPARIN SODIUM 135 MG: 150 INJECTION SUBCUTANEOUS at 09:44

## 2023-04-04 RX ADMIN — AMLODIPINE BESYLATE 10 MG: 5 TABLET ORAL at 09:44

## 2023-04-04 RX ADMIN — CHLORTHALIDONE 25 MG: 25 TABLET ORAL at 09:45

## 2023-04-04 NOTE — PLAN OF CARE
Goal Outcome Evaluation:               Pt seen for meal assessment and PO trials for possible upgrade. Pt repositioned to be sitting upright for PO. Pt accepts feeding assistance from PO and is responsive to max cues to bring head/chin forward and down/not hyperextend neck. Pt consumes bites of jello, peaches, fig ruiz, regular solid crackers and sips of thin liquid via straw. Oral transit WFL for all consistencies and pt adequately clears oral cavity between bites. No overt clinical s/s of aspiration demonstrated at anytime.     Recommend upgrade to regular consistency diet, continue full feed. ST to continue to follow for diet tolerance w/further recommendations as indicated.

## 2023-04-04 NOTE — THERAPY TREATMENT NOTE
Acute Care - Speech Language Pathology   Swallow Treatment Note KAYODE Migue     Patient Name: Shaji Junior  : 1945  MRN: [id number]  Today's Date: 2023               Admit Date: 3/25/2023    Visit Dx:     ICD-10-CM ICD-9-CM   1. Altered mental status, unspecified altered mental status type  R41.82 780.97   2. Fever, unspecified fever cause  R50.9 780.60   3. Urinary tract infection associated with indwelling urethral catheter, initial encounter  T83.511A 996.64    N39.0 599.0   4. Urinary tract infection without hematuria, site unspecified  N39.0 599.0     Patient Active Problem List   Diagnosis   • CHF exacerbation   • Cerebrovascular accident   • Type 2 diabetes mellitus with diabetic nephropathy   • Acute kidney failure   • Congestive heart failure   • Acute on chronic congestive heart failure, unspecified heart failure type   • MAKI (acute kidney injury)   • Atrial fibrillation   • Chest pain   • Shortness of breath   • Chest pain, unspecified type   • Essential hypertension   • Acute UTI (urinary tract infection)   • Normocytic normochromic anemia   • CKD (chronic kidney disease), stage III   • BPH without obstruction/lower urinary tract symptoms   • Altered mental status, unspecified altered mental status type   • Acute on chronic diastolic CHF (congestive heart failure)   • Elevated troponin   • Acute UTI (urinary tract infection)   • Morbid obesity   • Dementia   • UTI (urinary tract infection)   • Acute metabolic encephalopathy     Past Medical History:   Diagnosis Date   • Acute kidney failure    • Acute respiratory failure with hypoxia    • Anemia    • Benign prostatic hyperplasia    • Bilateral primary osteoarthritis of knee    • Cardiomegaly    • Cardiomyopathy    • Cellulitis and abscess of left leg    • Cerebral infarction    • Cerebrovascular disease    • Chronic kidney disease    • Dementia    • Diabetes mellitus    • Essential hypertension    • GERD (gastroesophageal reflux disease)    •  Gout    • Heart failure    • Hyperlipidemia    • Morbid obesity    • Myocardial infarction    • Obstructive sleep apnea    • Obstructive uropathy    • Paroxysmal atrial fibrillation    • Peptic ulcer    • Peripheral vascular disease    • Pulmonary edema    • Venous insufficiency      History reviewed. No pertinent surgical history.    SLP Recommendation and Plan  SLP Swallowing Diagnosis: functional oral phase, functional pharyngeal phase, swallow WFL/no suspected pharyngeal impairment (04/04/23 1200)  SLP Diet Recommendation: regular textures, thin liquids (04/04/23 1200)  Recommended Precautions and Strategies: upright posture during/after eating, small bites of food and sips of liquid (04/04/23 1200)  SLP Rec. for Method of Medication Administration: meds crushed, with puree, as tolerated (04/04/23 1200)     Monitor for Signs of Aspiration: notify SLP if any concerns (04/04/23 1200)  Recommended Diagnostics: reassess via clinical swallow evaluation (04/04/23 1200)  Swallow Criteria for Skilled Therapeutic Interventions Met: demonstrates skilled criteria (04/04/23 1200)     Rehab Potential/Prognosis, Swallowing: good, to achieve stated therapy goals (04/04/23 1200)  Therapy Frequency (Swallow): PRN (04/04/23 1200)  Predicted Duration Therapy Intervention (Days): until discharge (04/04/23 1200)                                               SWALLOW EVALUATION (last 72 hours)     SLP Adult Swallow Evaluation     Row Name 04/04/23 1200          Document Type re-evaluation;therapy note (daily note)  -EC    Subjective Information no complaints  -EC    Patient Observations alert;cooperative;agree to therapy  -EC    Patient Effort good  -EC    Comment Pt seen for meal assessment and PO trials for possible upgrade. Pt repositioned to be sitting upright for PO. Pt accepts feeding assistance from PO and is responsive to max cues to bring head/chin forward and down/not hyperextend neck. Pt consumes bites of jello, peaches,  fig ruiz, regular solid crackers and sips of thin liquid via straw. Oral transit WFL for all consistencies and pt adequately clears oral cavity between bites. No overt clinical s/s of aspiration demonstrated at anytime. Recommend upgrade to regular consistency diet, continue full feed. ST to continue to follow for diet tolerance w/further recommendations as indicated.  -EC    Symptoms Noted During/After Treatment none  -EC          Patient Profile Reviewed yes  -EC          SLP Swallowing Diagnosis functional oral phase;functional pharyngeal phase;swallow WFL/no suspected pharyngeal impairment  -EC    Functional Impact no impact on function  -EC    Rehab Potential/Prognosis, Swallowing good, to achieve stated therapy goals  -EC    Swallow Criteria for Skilled Therapeutic Interventions Met demonstrates skilled criteria  -EC          Care Plan Review care plan/treatment goals reviewed;patient/other agree to care plan  -EC          Therapy Frequency (Swallow) PRN  -EC    Predicted Duration Therapy Intervention (Days) until discharge  -EC    SLP Diet Recommendation regular textures;thin liquids  -EC    Recommended Diagnostics reassess via clinical swallow evaluation  -EC    Recommended Precautions and Strategies upright posture during/after eating;small bites of food and sips of liquid  -EC    Oral Care Recommendations Oral Care BID/PRN  -EC    SLP Rec. for Method of Medication Administration meds crushed;with puree;as tolerated  -EC    Monitor for Signs of Aspiration notify SLP if any concerns  -EC          Swallow LTGs Swallow Long Term Goal (free text)  -EC    Swallow STGs diet tolerance goal selection (SLP)  -EC    Diet Tolerance Goal Selection (SLP) Swallow Short Term Goal 1;Swallow Short Term Goal 2  -EC          (LTG) Swallow Pt will demonstrate acceptance of least restrictive diet with no s/s of aspiration and use of safe swallow strategies  -EC    Caroline (Swallow Long Term Goal) independently (over 90%  accuracy)  -EC    Time Frame (Swallow Long Term Goal) by discharge  -EC    Progress/Outcomes (Swallow Long Term Goal) good progress toward goal  -EC          (STG) Swallow 1 Pt will participate in re-assessment via bedside swallow evaluation  -EC    Kay (Swallow Short Term Goal 1) with moderate cues (50-74% accuracy)  -EC    Time Frame (Swallow Short Term Goal 1) 1 week  -EC    Progress/Outcomes (Swallow Short Term Goal 1) goal met  -EC          (STG) Swallow 2 Pt will participate in ongoing swallow assessment to include VFSS if indicated, and caregiver teaching.  -EC    Time Frame (Swallow Short Term Goal 2) 1 week  -EC    Progress/Outcomes (Swallow Short Term Goal 2) goal met  -EC          User Key  (r) = Recorded By, (t) = Taken By, (c) = Cosigned By    Initials Name Effective Dates    EC Leonela Schwarz 06/16/21 -                 EDUCATION  The patient has been educated in the following areas:   Dysphagia (Swallowing Impairment).        SLP GOALS     Row Name 04/04/23 1200             (LTG) Swallow    (LTG) Swallow Pt will demonstrate acceptance of least restrictive diet with no s/s of aspiration and use of safe swallow strategies  -EC      Kay (Swallow Long Term Goal) independently (over 90% accuracy)  -EC      Time Frame (Swallow Long Term Goal) by discharge  -EC      Progress/Outcomes (Swallow Long Term Goal) good progress toward goal  -EC         (STG) Swallow 1    (STG) Swallow 1 Pt will participate in re-assessment via bedside swallow evaluation  -EC      Kay (Swallow Short Term Goal 1) with moderate cues (50-74% accuracy)  -EC      Time Frame (Swallow Short Term Goal 1) 1 week  -EC      Progress/Outcomes (Swallow Short Term Goal 1) goal met  -EC         (STG) Swallow 2    (STG) Swallow 2 Pt will participate in ongoing swallow assessment to include VFSS if indicated, and caregiver teaching.  -EC      Time Frame (Swallow Short Term Goal 2) 1 week  -EC      Progress/Outcomes  (Swallow Short Term Goal 2) goal met  -EC            User Key  (r) = Recorded By, (t) = Taken By, (c) = Cosigned By    Initials Name Provider Type    Leonela Collado Speech and Language Pathologist                   Time Calculation:                Leonela Schwarz  4/4/2023

## 2023-04-04 NOTE — CONSULTS
Nutrition Services    Patient Name: Shaji Junior  YOB: 1945  MRN: [id number]  Admission date: 3/25/2023    Comment:    Continue current diet with ONS.    PPE Documentation        PPE Worn By Provider Did not enter room this encounter   PPE Worn By Patient  N/A     CLINICAL NUTRITION ASSESSMENT      Reason for Assessment Follow up per protocol  3/28: MST: 2     H&P      Past Medical History:   Diagnosis Date   • Acute kidney failure    • Acute respiratory failure with hypoxia    • Anemia    • Benign prostatic hyperplasia    • Bilateral primary osteoarthritis of knee    • Cardiomegaly    • Cardiomyopathy    • Cellulitis and abscess of left leg    • Cerebral infarction    • Cerebrovascular disease    • Chronic kidney disease    • Dementia    • Diabetes mellitus    • Essential hypertension    • GERD (gastroesophageal reflux disease)    • Gout    • Heart failure    • Hyperlipidemia    • Morbid obesity    • Myocardial infarction    • Obstructive sleep apnea    • Obstructive uropathy    • Paroxysmal atrial fibrillation    • Peptic ulcer    • Peripheral vascular disease    • Pulmonary edema    • Venous insufficiency        History reviewed. No pertinent surgical history.     Current Problems   UTI  -abx    AMS  -suspect r/t UTI    Dysphagia  -SLP following       Encounter Information        Trending Narrative     4/4: Pt continues to be confused. Psychiatry following. Noted plan for discharge today.    3/28: Pt admitted to [facility] with AMS likely secondary to UTI. WOCN saw pt on 3/27 and noted blisters but no pressure injuries. SLP following pt and recommending pureed textures. RD mainly spoke with pt's brother as pt had difficulty participating in conversation with confusion. Pt's brother unaware of recent wt changes. Reasoning behind puree diet was explained and RD noted SLP would continue to follow pt. Pt did mention liking ice cream, RD will add magic cups to aid in wound healing. RD helped pt order lunch  "meal. NFPE completed and not consistent with nutrition diagnosis of malnutrition at this time using AND/ASPEN criteria.     Anthropometrics        Current Height, Weight Height: 177.8 cm (70\")  Weight: 132 kg (290 lb 12.6 oz) (04/04/23 0552)       Ideal Body Weight (IBW) 166#   Usual Body Weight (UBW) unknown       Trending Weight Hx     This admission: 4/4: 290# - scale  3/28: 307# - scale             PTA: 4/4: ~9% wt loss x 6.5 months  3/28: 4% wt loss x ~6 months    Wt Readings from Last 30 Encounters:   04/04/23 0552 132 kg (290 lb 12.6 oz)   04/03/23 0527 134 kg (294 lb 12.1 oz)   04/02/23 0500 133 kg (293 lb 3.4 oz)   04/01/23 0412 133 kg (293 lb 3.4 oz)   04/01/23 0100 133 kg (293 lb 3.4 oz)   03/31/23 0442 (!) 138 kg (303 lb 14.4 oz)   03/30/23 0401 (!) 138 kg (305 lb 1.6 oz)   03/27/23 1642 (!) 139 kg (307 lb)   03/26/23 0345 (!) 139 kg (307 lb 8 oz)   03/25/23 2033 (!) 142 kg (313 lb 11.4 oz)   03/10/23 0634 (!) 141 kg (310 lb)   02/17/23 1016 136 kg (300 lb)   02/17/23 0306 (!) 136 kg (300 lb 11.3 oz)   02/15/23 2215 (!) 138 kg (303 lb 5.7 oz)   02/15/23 1417 (!) 149 kg (328 lb)   09/18/22 0324 (!) 149 kg (328 lb 7.8 oz)   09/17/22 0625 (!) 148 kg (326 lb 4.5 oz)   09/16/22 1913 (!) 145 kg (320 lb 1.7 oz)   09/16/22 0213 (!) 144 kg (318 lb)   08/16/22 0438 (!) 145 kg (318 lb 12.6 oz)   08/14/22 0828 136 kg (299 lb)   08/13/22 2308 (!) 145 kg (320 lb)   04/27/22 0145 (!) 145 kg (320 lb)   04/03/22 0500 (!) 162 kg (357 lb 2.3 oz)   04/02/22 0500 (!) 162 kg (357 lb 12.9 oz)   03/31/22 1645 123 kg (270 lb 1 oz)   03/31/22 1419 (!) 152 kg (335 lb)   03/29/22 0952 (!) 152 kg (335 lb)   03/29/22 0425 (!) 152 kg (335 lb 4.8 oz)   03/29/22 0100 118 kg (260 lb)   03/11/22 2154 118 kg (260 lb)      BMI kg/m2 Body mass index is 41.72 kg/m².       Labs        Pertinent Labs Reviewed, management per attending.   Results from last 7 days   Lab Units 04/04/23  0209 04/03/23  0347 04/02/23  1119   SODIUM mmol/L 137 140 " 137   POTASSIUM mmol/L 3.6 4.0 4.1   CHLORIDE mmol/L 102 105 101   CO2 mmol/L 27.0 28.0 28.0   BUN mg/dL 12 9 10   CREATININE mg/dL 0.91 0.80 0.85   CALCIUM mg/dL 8.8 8.7 8.6   GLUCOSE mg/dL 115* 125* 135*     Results from last 7 days   Lab Units 04/04/23  0209 04/03/23  0347 04/02/23  1119 04/01/23  1528 03/30/23  2251   MAGNESIUM mg/dL  --  1.9 1.8  --  1.9   PHOSPHORUS mg/dL  --  3.5 3.3   < >  --    HEMOGLOBIN g/dL 11.0* 10.9* 11.2*   < >  --    HEMATOCRIT % 36.0* 33.7* 35.4*   < >  --     < > = values in this interval not displayed.     COVID19   Date Value Ref Range Status   02/15/2023 Not Detected Not Detected - Ref. Range Final     Lab Results   Component Value Date    HGBA1C 6.0 (H) 09/16/2022        Medications    Scheduled Medications amLODIPine, 10 mg, Oral, Q24H  atorvastatin, 20 mg, Oral, Nightly  chlorthalidone, 25 mg, Oral, Daily  enoxaparin, 1 mg/kg, Subcutaneous, Q12H  ertapenem, 1 g, Intravenous, Q24H  escitalopram, 10 mg, Oral, Daily  finasteride, 5 mg, Oral, Daily  gabapentin, 300 mg, Oral, TID  hydrALAZINE, 50 mg, Oral, TID  insulin lispro, 2-7 Units, Subcutaneous, TID With Meals  isosorbide mononitrate, 60 mg, Oral, Q24H  lidocaine, 20 mL, Subcutaneous, Once  losartan, 25 mg, Oral, Q24H  sodium chloride, 10 mL, Intravenous, Q12H  sodium chloride, 10 mL, Intravenous, Q12H  tamsulosin, 0.4 mg, Oral, Daily        Infusions Pharmacy to Dose enoxaparin (LOVENOX),         PRN Medications •  acetaminophen  •  acetaminophen  •  albuterol  •  cyclobenzaprine  •  dextrose  •  dextrose  •  glucagon (human recombinant)  •  hydrALAZINE  •  labetalol  •  nitroglycerin  •  ondansetron **OR** ondansetron  •  Pharmacy to Dose enoxaparin (LOVENOX)  •  [COMPLETED] Insert Peripheral IV **AND** sodium chloride  •  sodium chloride  •  sodium chloride  •  sodium chloride  •  sodium chloride     Physical Findings        Trending Physical   Appearance, NFPE 4/4: did not enter room this encounter, plan for  discharge    3/28: NFPE completed and not consistent with nutrition diagnosis of malnutrition at this time using AND/ASPEN criteria      --  Edema  2+ generalized, L hand     Bowel Function Last documented BM on 4/3     Tubes No feeding tube     Chewing/Swallowing SLP following, mechanical ground/thin liquids     Skin Blisters noted, WOCN saw pt 3/27     --  Current Nutrition Orders & Evaluation of Intake       Oral Nutrition     Food Allergies NKFA   Current PO Diet Diet: Regular/House Diet; Feeding Assistance - Nursing; Texture: Mechanical Ground (NDD 2); Fluid Consistency: Thin (IDDSI 0)   Supplement none   PO Evaluation     Trending % PO Intake 4/4: pt eating variably (%), pt recent refused a meal  3/28: No PO intake recorded since diet started 3/27   --  Nutritional Risk Screening        NRS-2002 Score          Nutrition Diagnosis         Nutrition Dx Problem 1 Chewing/swallowing difficulty related to PMH as evidenced by the need for mechanically altered diet for safe PO intake.      Nutrition Dx Problem 2        Intervention Goal         Intervention Goal(s) PO intake > 75%, ONS acceptance     Nutrition Intervention        RD Action Continue current diet and ONS       Nutrition Prescription          Diet Prescription Regular mechanical ground/thin liquids   Supplement Prescription Magic Cups at lunch/dinner (Provides 580 kcals, 18 g protein if consumed)      --  Monitor/Evaluation        Monitor Per protocol, PO intake, Supplement intake, Pertinent labs, Weight, Skin status, GI status, Symptoms, POC/GOC, Swallow function       Electronically signed by:  Tanya Gonzalez RD  04/04/23 09:19 EDT

## 2023-04-04 NOTE — PAYOR COMM NOTE
"Discharge notification for case# [id number]    Clinical updates were sent on 3/29 & 4/3/23  -------------  AUTHORIZATION PENDING FOR DATES 3/29-4/4/23:   PLEASE FAX OR CALL DETERMINATION TO CONTACT BELOW:       THANK YOU,    JAYDA Mariee, RN  Utilization Review  [facility]  Phone: [phone]  Fax: [phone]      NPI: [id number]  Tax ID: [id number]          Cyrus Junior (77 y.o. Male)     Date of Birth   1945    Social Security Number       Address   [address] [place] [facility]    Home Phone   [phone]    MRN   [id number]       Islam   None    Marital Status                               Admission Date   3/25/23    Admission Type   Emergency    Admitting Provider       Attending Provider       Department, Room/Bed   [facility] PROGRESS CARE, 2114/1       Discharge Date   4/4/2023    Discharge Disposition   Skilled Nursing Facility (DC - External)    Discharge Destination                               Attending Provider: (none)   Allergies: No Known Allergies    Isolation: Contact   Infection: ESBL (04/01/22), ESBL Klebsiella (09/19/22), Adelina Auris (rule out) (03/28/23)   Code Status: CPR    Ht: 177.8 cm (70\")   Wt: 132 kg (290 lb 12.6 oz)    Admission Cmt: None   Principal Problem: UTI (urinary tract infection) [N39.0]                 Active Insurance as of 3/25/2023     Primary Coverage     Payor Plan Insurance Group Employer/Plan Group    MISC MEDICARE REPLACEMENT MISC MCARE REPLACEMENT [id number]     Coverage Address Coverage Phone Number Coverage Fax Number Effective Dates    [phone]  2/1/2023 - None Entered    [place] [id number]       Subscriber Name Subscriber Birth Date Member ID       CYRUS JUNIOR 1945 [id number]           Secondary Coverage     Payor Plan Insurance Group Employer/Plan Group    [facility] DEPT [id number]       Payor Plan Address Payor Plan Phone Number Payor " Plan Fax Number Effective Dates    [facility] OFFICE OF COMMUNITY CARE [phone]  2/15/2023 - None Entered    [address] [address]       [place] [address]       Subscriber Name Subscriber Birth Date Member ID       CYRUS RAMIREZ 1945 [id number]           Tertiary Coverage     Payor Plan Insurance Group Employer/Plan Group    [facility]UM      Payor Plan Address Payor Plan Phone Number Payor Plan Fax Number Effective Dates    [address] [phone]  2022 - None Entered    Health system [id number]       Subscriber Name Subscriber Birth Date Member ID       CYRUS RAMIREZ 1945 [id number]           Other Coverage     Payor Plan Insurance Group Employer/Plan Group    [place]      Payor Plan Address Payor Plan Phone Number Payor Plan Fax Number Effective Dates    [address]   2022 - None Entered    [place] [address]       Subscriber Name Subscriber Birth Date Member ID       CYRUS RAMIREZ 1945 [id number]                 Emergency Contacts      (Rel.) Home Phone Work Phone Mobile Phone    RICHAR MONROE (Sister) [phone] -- --    CAMERON RAMIREZ (Brother) -- -- [phone]               Discharge Summary      Will Browne DO at [address] [place] [address] Medicine Services  DISCHARGE SUMMARY    Patient Name: Cyrus Ramirez  : 1945  MRN: [id number]    Date of Admission: 3/25/2023     Date of Discharge:  2023    Discharge diagnosis: Urinary tract infection, acute metabolic encephalopathy, hypertensive urgency        Primary Care Physician: Naa Valverde MD      Presenting Problem:   UTI (urinary tract infection) [N39.0]  Fever, unspecified fever cause [R50.9]  Altered mental status, unspecified altered mental status type [R41.82]  Urinary tract infection associated with indwelling urethral catheter, initial encounter ([facility]) [T83.511A, N39.0]    Active and Resolved Hospital  Problems:  Active Hospital Problems    Diagnosis POA   • **UTI (urinary tract infection) [N39.0] Yes     Priority: High   • Acute metabolic encephalopathy [G93.41] Yes     Priority: High   • Morbid obesity (HCC) [E66.01] Yes     Priority: Medium   • Dementia (HCC) [F03.90] Yes     Priority: Medium   • Atrial fibrillation (HCC) [I48.91] Yes     Priority: Medium   • Cerebrovascular accident (HCC) [I63.9] Yes     Priority: Medium   • 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     The patient presented to the emergency room on 3/25/2023 for evaluation of confusion and fevers.    The patient was diagnosed with acute metabolic encephalopathy due to urinary tract infection in the ED and admitted to the hospital service.  He was seen by cardiologist for history of atrial fibrillation with normal LVEF 60-65%.  Urine cultures grew ESBL Proteus mirabilis.  He will be discharged back to [facility] and will need to complete ertapenem.          DISCHARGE Follow Up Recommendations for labs and diagnostics:      Reasons For Change In Medications and Indications for New Medications:      Day of Discharge     Vital Signs:  Temp:  [97.5 °F (36.4 °C)-98.3 °F (36.8 °C)] 98.3 °F (36.8 °C)  Heart Rate:  [44-66] 57  Resp:  [14-18] 16  BP: (114-164)/(65-93) 157/93    Physical Exam:  Physical Exam  HENT:      Head: Normocephalic.      Nose: Nose normal.   Eyes:      Extraocular Movements: Extraocular movements intact.      Pupils: Pupils are equal, round, and reactive to light.   Cardiovascular:      Rate and Rhythm: Normal rate and regular rhythm.   Pulmonary:      Effort: Pulmonary  effort is normal.   Abdominal:      General: Bowel sounds are normal.   Genitourinary:     Comments: Motley catheter.  Musculoskeletal:      Cervical back: Normal range of motion.      Comments: Decreased range of motion hips.   Skin:     General: Skin is warm.   Neurological:      Mental Status: He is alert. Mental status is at baseline.   Psychiatric:         Mood and Affect: Mood normal.                Pertinent  and/or Most Recent Results     LAB RESULTS:      Lab 03/28/23  0759 03/26/23  2346 03/26/23 0431 03/25/23 2115 03/25/23 2111   WBC 3.40 5.10 5.70  --  4.70   HEMOGLOBIN 11.7* 11.0* 11.0*  --  11.7*   HEMATOCRIT 36.9* 34.4* 34.9*  --  38.0   PLATELETS 215 193 197  --  209   NEUTROS ABS  --   --   --   --  2.90   LYMPHS ABS  --   --   --   --  1.10   MONOS ABS  --   --   --   --  0.40   EOS ABS  --   --   --   --  0.20   MCV 80.9 80.2 80.2  --  81.4   PROCALCITONIN  --   --   --   --  0.07   LACTATE  --   --   --  1.0  --          Lab 03/28/23  0759 03/26/23 2346 03/26/23 0431 03/25/23 2111   SODIUM 143 141 139 136   POTASSIUM 3.4* 3.7 4.1 4.1   CHLORIDE 103 103 102 100   CO2 29.0 26.0 29.0 28.0   ANION GAP 11.0 12.0 8.0 8.0   BUN 10 17 21 22   CREATININE 0.83 0.86 1.01 1.01   EGFR 90.1 89.2 76.6 76.6   GLUCOSE 111* 127* 113* 104*   CALCIUM 9.0 8.8 8.6 8.7         Lab 03/25/23 2111   TOTAL PROTEIN 6.7   ALBUMIN 3.1*   GLOBULIN 3.6   ALT (SGPT) 9   AST (SGOT) 13   BILIRUBIN 0.4   ALK PHOS 101         Lab 03/27/23  0242 03/26/23 2346   HSTROP T 74* 73*                 Lab 03/25/23 2117   PH, ARTERIAL 7.370   PCO2, ARTERIAL 55.6*   PO2 ART 27.7*   O2 SATURATION ART 48.4*   FIO2 28   HCO3 ART 32.1*   BASE EXCESS ART 5.3*     Brief Urine Lab Results  (Last result in the past 365 days)      Color   Clarity   Blood   Leuk Est   Nitrite   Protein   CREAT   Urine HCG        03/25/23 2128 Dark Yellow   Cloudy   Large (3+)   Large (3+)   Positive   >=300 mg/dL (3+)               Microbiology Results (last 10  days)     Procedure Component Value - Date/Time    CANDIDA AURIS SCREEN - Swab, Axilla Right, Axilla Left and Groin [[id number]]  (Normal) Collected: 03/28/23 1118    Lab Status: Preliminary result Specimen: Swab from Axilla Right, Axilla Left and Groin Updated: 03/29/23 1130     Candida Auris Screen Culture No Candida auris isolated at 24 hours    Clostridioides difficile Toxin - Stool, Per Rectum [[id number]]  (Normal) Collected: 03/26/23 0332    Lab Status: Final result Specimen: Stool from Per Rectum Updated: 03/26/23 0710    Narrative:      The following orders were created for panel order Clostridioides difficile Toxin - Stool, Per Rectum.  Procedure                               Abnormality         Status                     ---------                               -----------         ------                     Clostridioides difficile...[[id number]]  Normal              Final result                 Please view results for these tests on the individual orders.    Gastrointestinal Panel, PCR - Stool, Per Rectum [[id number]]  (Normal) Collected: 03/26/23 0332    Lab Status: Final result Specimen: Stool from Per Rectum Updated: 03/26/23 0451     Campylobacter Not Detected     Plesiomonas shigelloides Not Detected     Salmonella Not Detected     Vibrio Not Detected     Vibrio cholerae Not Detected     Yersinia enterocolitica Not Detected     Enteroaggregative E. coli (EAEC) Not Detected     Enteropathogenic E. coli (EPEC) Not Detected     Enterotoxigenic E. coli (ETEC) lt/st Not Detected     Shiga-like toxin-producing E. coli (STEC) stx1/stx2 Not Detected     Shigella/Enteroinvasive E. coli (EIEC) Not Detected     Cryptosporidium Not Detected     Cyclospora cayetanensis Not Detected     Entamoeba histolytica Not Detected     Giardia lamblia Not Detected     Adenovirus F40/41 Not Detected     Astrovirus Not Detected     Norovirus GI/GII Not Detected     Rotavirus A Not Detected     Sapovirus (I, II, IV or V) Not  Detected    Narrative:      If Aeromonas, Staphylococcus aureus or Bacillus cereus are suspected, please order [id number]: Stool Culture, Aeromonas, S aureus, B Cereus.    Clostridioides difficile EIA - Stool, Per Rectum [[id number]]  (Normal) Collected: 03/26/23 0332    Lab Status: Final result Specimen: Stool from Per Rectum Updated: 03/26/23 0710     C Diff GDH Ag Negative     C.diff Toxin Ag Negative    Narrative:      The result indicates the absence of toxigenic C.difficile from stool specimen.    Urine Culture - Urine, Indwelling Urethral Catheter [[id number]]  (Abnormal)  (Susceptibility) Collected: 03/25/23 2128    Lab Status: Final result Specimen: Urine from Indwelling Urethral Catheter Updated: 03/29/23 0834     Urine Culture >100,000 CFU/mL Proteus mirabilis ESBL     Comment:   Consider infectious disease consult.  Susceptibility results may not correlate to clinical outcomes.       Narrative:      ESBL confirmation in progress   Colonization of the urinary tract without infection is common. Treatment is discouraged unless the patient is symptomatic, pregnant, or undergoing an invasive urologic procedure.  Recent outcomes data supports the use of pip/tazo in the treatment of susceptible ESBL infections for uncomplicated UTI. Consider use of pip/tazo as a carbapenem-sparing regimen in applicable patients.    Susceptibility      Proteus mirabilis ESBL      GAYLA      Amikacin Susceptible      Ertapenem Susceptible      Gentamicin Resistant     Levofloxacin Resistant     Meropenem Susceptible      Nitrofurantoin Resistant     Piperacillin + Tazobactam Susceptible      Tobramycin Resistant     Trimethoprim + Sulfamethoxazole Resistant                          Blood Culture - Blood, Arm, Right [[id number]]  (Abnormal) Collected: 03/25/23 2111    Lab Status: Final result Specimen: Blood from Arm, Right Updated: 03/28/23 0800     Blood Culture Staphylococcus, coagulase negative     Isolated from Aerobic Bottle      Gram Stain Aerobic Bottle Gram positive cocci in clusters    Narrative:      Probable contaminant requires clinical correlation, susceptibility not performed unless requested by physician.      Less than seven (7) mL's of blood was collected.  Insufficient quantity may yield false negative results.    Blood Culture - Blood, Arm, Right [[id number]]  (Normal) Collected: 03/25/23 2111    Lab Status: Preliminary result Specimen: Blood from Arm, Right Updated: 03/28/23 2131     Blood Culture No growth at 3 days    Narrative:      Less than seven (7) mL's of blood was collected.  Insufficient quantity may yield false negative results.    Blood Culture ID, PCR - Blood, Arm, Right [[id number]]  (Abnormal) Collected: 03/25/23 2111    Lab Status: Final result Specimen: Blood from Arm, Right Updated: 03/26/23 2109     BCID, PCR Staph spp, not aureus or lugdunensis. Identification by BCID2 PCR.     BOTTLE TYPE Aerobic Bottle          CT Head Without Contrast    Result Date: 3/26/2023  Impression: 1. No acute intracranial process. Parietal scalp soft tissue swelling MRI is more sensitive for the detection of acute nonhemorrhagic infarct. 2. Moderate to severe changes small vessel ischemic disease of indeterminate age, presumably mostly chronic. Volume loss. Atherosclerosis. 3. Paranasal sinus disease.  Electronically signed by:  Baldev Pereira M.D.  3/25/2023 10:03 PM Mountain Time    XR Chest 1 View    Result Date: 3/26/2023  Impression: Impression: 1. Unchanged findings of mild to moderate CHF. Electronically Signed: Luisito Hernández  3/26/2023 7:20 AM EDT  Workstation ID: [id number]      Results for orders placed during the hospital encounter of 03/29/22    Duplex Venous Lower Extremity - Bilateral CAR    Interpretation Summary  · Normal bilateral lower extremity venous duplex scan.      Results for orders placed during the hospital encounter of 03/29/22    Duplex Venous Lower Extremity - Bilateral CAR    Interpretation  Summary  · Normal bilateral lower extremity venous duplex scan.      Results for orders placed during the hospital encounter of 03/25/23    Adult Transthoracic Echo Complete w/ Color, Spectral and Contrast if Necessary Per Protocol    Interpretation Summary  •  Left ventricular systolic function is normal. Left ventricular ejection fraction appears to be 56 - 60%.  •  Left ventricular diastolic function is consistent with (grade II w/high LAP) pseudonormalization.  •  Left atrial volume is severely increased.  •  The right atrial cavity is moderate to severely  dilated.  •  Abnormal mitral valve structure consistent with dilated annulus.  •  Estimated right ventricular systolic pressure from tricuspid regurgitation is moderately elevated (45-55 mmHg).  •  Moderate pulmonary hypertension is present.      Labs Pending at Discharge:  Pending Labs     Order Current Status    Blood Culture - Blood, Arm, Right Preliminary result    CANDIDA AURIS SCREEN - Swab, Axilla Right, Axilla Left and Groin Preliminary result          Procedures Performed           Consults:   Consults     Date and Time Order Name Status Description    3/26/2023  4:14 PM Inpatient Cardiology Consult      3/26/2023 12:41 AM Hospitalist (on-call MD unless specified)              Discharge Details        Discharge Medications      New Medications      Instructions Start Date   ertapenem  Commonly known as: INVanz   1 g, Intravenous, Every 24 Hours         Continue These Medications      Instructions Start Date   acetaminophen 325 MG tablet  Commonly known as: TYLENOL   650 mg, Oral, Every 4 Hours PRN      albuterol sulfate  (90 Base) MCG/ACT inhaler  Commonly known as: PROVENTIL HFA;VENTOLIN HFA;PROAIR HFA   2 puffs, Inhalation, Every 4 Hours PRN      apixaban 5 MG tablet tablet  Commonly known as: ELIQUIS   5 mg, Oral, Every 12 Hours Scheduled      BASAGLAR KWIKPEN 100 UNIT/ML injection pen   10 Units, Subcutaneous, Nightly      carvedilol 12.5  MG tablet  Commonly known as: COREG   12.5 mg, Oral, 2 Times Daily With Meals      docusate calcium 240 MG capsule  Commonly known as: SURFAK   240 mg, Oral, Daily      escitalopram 10 MG tablet  Commonly known as: LEXAPRO   10 mg, Oral, Daily      finasteride 5 MG tablet  Commonly known as: PROSCAR   5 mg, Oral, Daily      furosemide 40 MG tablet  Commonly known as: LASIX   40 mg, Oral, 2 Times Daily      gabapentin 300 MG capsule  Commonly known as: NEURONTIN   300 mg, Oral, 3 Times Daily      hydrALAZINE 50 MG tablet  Commonly known as: APRESOLINE   50 mg, Oral, 3 Times Daily      isosorbide mononitrate 60 MG 24 hr tablet  Commonly known as: IMDUR   60 mg, Oral, 2 Times Daily (Nitrates)      losartan 25 MG tablet  Commonly known as: COZAAR   25 mg, Oral, Every 24 Hours Scheduled      methocarbamol 500 MG tablet  Commonly known as: ROBAXIN   500 mg, Oral, Every 12 Hours PRN      nitroglycerin 0.4 MG SL tablet  Commonly known as: NITROSTAT   0.4 mg, Sublingual, Every 5 Minutes PRN, Take no more than 3 doses in 15 minutes.      polycarbophil 625 MG tablet tablet   625 mg, Oral, Daily      Polyethylene Glycol 3350 powder   17 g, Oral, Nightly      saccharomyces boulardii 250 MG capsule  Commonly known as: FLORASTOR   250 mg, Oral, 2 Times Daily      simethicone 180 MG capsule  Commonly known as: MYLICON,GAS-X   180 mg, Oral, Every 6 Hours PRN      simvastatin 40 MG tablet  Commonly known as: ZOCOR   40 mg, Oral, Every Night at Bedtime      tamsulosin 0.4 MG capsule 24 hr capsule  Commonly known as: FLOMAX   1 capsule, Oral, Daily      traMADol 50 MG tablet  Commonly known as: ULTRAM   50 mg, Oral, Every 6 Hours PRN             No Known Allergies      Discharge Disposition:   Skilled Nursing Facility (DC - External)    Diet:  Hospital:  Diet Order   Procedures   • Diet: Regular/House Diet; Feeding Assistance - Nursing; Texture: Mechanical Ground (NDD 2); Fluid Consistency: Thin (IDDSI 0)         Discharge Activity: As  tolerated      Discharge Condition: Hemodynamically stable      CODE STATUS:  Code Status and Medical Interventions:   Ordered at: 03/26/23 0107     Code Status (Patient has no pulse and is not breathing):    CPR (Attempt to Resuscitate)     Medical Interventions (Patient has pulse or is breathing):    Full Support         No future appointments.    Additional Instructions for the Follow-ups that You Need to Schedule     Discharge Follow-up with PCP   As directed       Currently Documented PCP:    Naa Valverde MD    PCP Phone Number:    [phone]     Follow Up Details: 2 weeks               Time spent on Discharge including face to face service:  45 minutes    This patient has been examined wearing appropriate Personal Protective Equipment and discussed with nursing. 03/29/23      Signature: Electronically signed by Will Browne DO, 03/29/23, 4:49 PM EDT.      Electronically signed by Will Browne DO at 03/29/23 1250

## 2023-04-04 NOTE — CASE MANAGEMENT/SOCIAL WORK
Continued Stay Note   [facility]     Patient Name: Shaji Junior  MRN: [id number]  Today's Date: 4/4/2023    Admit Date: 3/25/2023    Plan: DC PLAN; Return to [facility]. No precert required. PASRR per facility. Discharge appeal denied 3/31        Discharge Plan     Row Name 04/04/23 1015       Plan    Plan DC PLAN; Return to [facility]. No precert required. PASRR per facility. Discharge appeal denied 3/31    Patient/Family in Agreement with Plan yes    Plan Comments DC BARRIERS: IV Invanz for 7 days, Motley Catheter, CT abdomen shows possible Cystitis. Pending ID input.    Received message from bedside nurse states DC orders are in. Will need EMS transport due to history of Stroke and being bed ridden. Stage 2 Wound on coccyx. EMS necessity form completed. Called and spoke with Tuan at [phone]. Who states cannot be here to get patient until 2-3pm. Will call if going to be sooner. Anay updated, bedside nurse to call report. Family updated.                      Expected Discharge Date and Time     Expected Discharge Date Expected Discharge Time    Apr 5, 2023           China Campbell RN     Office phone: [phone]  Cell phone: [phone]

## 2023-04-04 NOTE — PROGRESS NOTES
Referring Provider: Isaac Rhoades MD    Reason for follow-up: Bradycardia, atrial fibrillation, uncontrolled hypertension     Patient Care Team:  Naa Valverde MD as PCP - General (Internal Medicine)      SUBJECTIVE  Patient is laying in bed, continues to decline taking oral medications, is pleasantly confused     ROS  Review of all systems negative except as indicated.    Since I have last seen, the patient has been without any chest discomfort, shortness of breath, palpitations, dizziness or syncope.  Denies having any headache, abdominal pain, nausea, vomiting, diarrhea, constipation, loss of weight or loss of appetite.  Denies having any excessive bruising, hematuria or blood in the stool.  ROS      Personal History:    Past Medical History:   Diagnosis Date   • Acute kidney failure    • Acute respiratory failure with hypoxia    • Anemia    • Benign prostatic hyperplasia    • Bilateral primary osteoarthritis of knee    • Cardiomegaly    • Cardiomyopathy    • Cellulitis and abscess of left leg    • Cerebral infarction    • Cerebrovascular disease    • Chronic kidney disease    • Dementia    • Diabetes mellitus    • Essential hypertension    • GERD (gastroesophageal reflux disease)    • Gout    • Heart failure    • Hyperlipidemia    • Morbid obesity    • Myocardial infarction    • Obstructive sleep apnea    • Obstructive uropathy    • Paroxysmal atrial fibrillation    • Peptic ulcer    • Peripheral vascular disease    • Pulmonary edema    • Venous insufficiency        History reviewed. No pertinent surgical history.    Family History   Problem Relation Age of Onset   • Diabetes Father        Social History     Tobacco Use   • Smoking status: Never   Vaping Use   • Vaping Use: Never used   Substance Use Topics   • Alcohol use: Not Currently   • Drug use: Never        Medications Prior to Admission   Medication Sig Dispense Refill Last Dose   • acetaminophen (TYLENOL) 325 MG tablet Take 650 mg by mouth Every  4 (Four) Hours As Needed for Mild Pain .      • albuterol sulfate  (90 Base) MCG/ACT inhaler Inhale 2 puffs Every 4 (Four) Hours As Needed (Cough).      • apixaban (ELIQUIS) 5 MG tablet tablet Take 1 tablet by mouth Every 12 (Twelve) Hours. Indications: Atrial Fibrillation 60 tablet     • carvedilol (COREG) 12.5 MG tablet Take 1 tablet by mouth 2 (Two) Times a Day With Meals. 60 tablet 0    • docusate calcium (SURFAK) 240 MG capsule Take 240 mg by mouth Daily.      • escitalopram (LEXAPRO) 10 MG tablet Take 10 mg by mouth Daily.      • finasteride (PROSCAR) 5 MG tablet Take 5 mg by mouth Daily.      • furosemide (LASIX) 40 MG tablet Take 1 tablet by mouth 2 (Two) Times a Day. 60 tablet 0    • gabapentin (NEURONTIN) 300 MG capsule Take 300 mg by mouth 3 (Three) Times a Day.      • hydrALAZINE (APRESOLINE) 50 MG tablet Take 1 tablet by mouth 3 (Three) Times a Day. 90 tablet 0    • Insulin Glargine (BASAGLAR KWIKPEN) 100 UNIT/ML injection pen Inject 10 Units under the skin into the appropriate area as directed Every Night.      • isosorbide mononitrate (IMDUR) 60 MG 24 hr tablet Take 1 tablet by mouth 2 (Two) Times a Day. 60 tablet 0    • losartan (COZAAR) 25 MG tablet Take 1 tablet by mouth Daily. 30 tablet 0    • methocarbamol (ROBAXIN) 500 MG tablet Take 500 mg by mouth Every 12 (Twelve) Hours As Needed for Muscle Spasms.      • nitroglycerin (NITROSTAT) 0.4 MG SL tablet Place 0.4 mg under the tongue Every 5 (Five) Minutes As Needed for Chest Pain. Take no more than 3 doses in 15 minutes.      • polycarbophil (calcium polycarbophil) 625 MG tablet tablet Take 625 mg by mouth Daily.      • Polyethylene Glycol 3350 powder Take 17 g by mouth Every Night.      • saccharomyces boulardii (FLORASTOR) 250 MG capsule Take 250 mg by mouth 2 (Two) Times a Day.      • simethicone (MYLICON,GAS-X) 180 MG capsule Take 180 mg by mouth Every 6 (Six) Hours As Needed for Flatulence.      • simvastatin (ZOCOR) 40 MG tablet Take  "40 mg by mouth every night at bedtime.      • tamsulosin (FLOMAX) 0.4 MG capsule 24 hr capsule Take 1 capsule by mouth Daily.      • traMADol (ULTRAM) 50 MG tablet Take 1 tablet by mouth Every 6 (Six) Hours As Needed for Moderate Pain.          Allergies:  Patient has no known allergies.    Scheduled Meds:amLODIPine, 10 mg, Oral, Q24H  atorvastatin, 20 mg, Oral, Nightly  chlorthalidone, 25 mg, Oral, Daily  enoxaparin, 1 mg/kg, Subcutaneous, Q12H  ertapenem, 1 g, Intravenous, Q24H  escitalopram, 10 mg, Oral, Daily  finasteride, 5 mg, Oral, Daily  gabapentin, 300 mg, Oral, TID  hydrALAZINE, 50 mg, Oral, TID  insulin lispro, 2-7 Units, Subcutaneous, TID With Meals  isosorbide mononitrate, 60 mg, Oral, Q24H  lidocaine, 20 mL, Subcutaneous, Once  losartan, 25 mg, Oral, Q24H  sodium chloride, 10 mL, Intravenous, Q12H  sodium chloride, 10 mL, Intravenous, Q12H  tamsulosin, 0.4 mg, Oral, Daily      Continuous Infusions:Pharmacy to Dose enoxaparin (LOVENOX),       PRN Meds:.•  acetaminophen  •  acetaminophen  •  albuterol  •  cyclobenzaprine  •  dextrose  •  dextrose  •  glucagon (human recombinant)  •  hydrALAZINE  •  labetalol  •  nitroglycerin  •  ondansetron **OR** ondansetron  •  Pharmacy to Dose enoxaparin (LOVENOX)  •  [COMPLETED] Insert Peripheral IV **AND** sodium chloride  •  sodium chloride  •  sodium chloride  •  sodium chloride  •  sodium chloride      OBJECTIVE    Vital Signs  Vitals:    04/03/23 2040 04/03/23 2102 04/04/23 0117 04/04/23 0552   BP: 143/99 143/99 153/87 156/83   BP Location:  Left arm Left arm Left arm   Patient Position:  Lying Lying Lying   Pulse: 65 63 68 68   Resp:  12 13 19   Temp:  97.8 °F (36.6 °C) 97.6 °F (36.4 °C) 97.9 °F (36.6 °C)   TempSrc:  Oral Axillary Oral   SpO2:  97% 97% 96%   Weight:    132 kg (290 lb 12.6 oz)   Height:           Flowsheet Rows    Flowsheet Row First Filed Value   Admission Height 177.8 cm (70\") Documented at 03/25/2023 2033   Admission Weight 142 kg (313 lb " 11.4 oz) Documented at 03/25/2023 2033            Intake/Output Summary (Last 24 hours) at 4/4/2023 0824  Last data filed at 4/4/2023 0552  Gross per 24 hour   Intake 360 ml   Output 3775 ml   Net -3415 ml          Telemetry: Atrial fibrillation with controlled heart rate    Physical Exam:  The patient is alert, oriented and in no distress.  Vital signs as noted above.  Head and neck revealed no carotid bruits or jugular venous distention.  No thyromegaly or lymphadenopathy is present  Lungs clear.  No wheezing.  Breath sounds are normal bilaterally.  Heart normal first and second heart sounds.  No murmur. No precordial rub is present.  No gallop is present.  Abdomen soft and nontender.  No organomegaly is present.  Extremities with good peripheral pulses without any pedal edema.  Skin warm and dry.  Musculoskeletal system is grossly normal.  CNS grossly normal.       Results Review:  I have personally reviewed the results from the time of this admission to 4/4/2023 08:24 EDT and agree with these findings:  []  Laboratory  []  Microbiology  []  Radiology  []  EKG/Telemetry   []  Cardiology/Vascular   []  Pathology  []  Old records  []  Other:    Most notable findings include:    Lab Results (last 24 hours)     Procedure Component Value Units Date/Time    POC Glucose Once [[id number]]  (Normal) Collected: 04/04/23 0713    Specimen: Blood Updated: 04/04/23 0714     Glucose 99 mg/dL      Comment: Serial Number: [id number]:  [id number]       Basic Metabolic Panel [[id number]]  (Abnormal) Collected: 04/04/23 0209    Specimen: Blood Updated: 04/04/23 0238     Glucose 115 mg/dL      BUN 12 mg/dL      Creatinine 0.91 mg/dL      Sodium 137 mmol/L      Potassium 3.6 mmol/L      Chloride 102 mmol/L      CO2 27.0 mmol/L      Calcium 8.8 mg/dL      BUN/Creatinine Ratio 13.2     Anion Gap 8.0 mmol/L      eGFR 86.8 mL/min/1.73     Narrative:      GFR Normal >60  Chronic Kidney Disease <60  Kidney Failure <15    The GFR formula  is only valid for adults with stable renal function between ages 18 and 70.    CBC & Differential [[id number]]  (Abnormal) Collected: 04/04/23 0209    Specimen: Blood Updated: 04/04/23 0219    Narrative:      The following orders were created for panel order CBC & Differential.  Procedure                               Abnormality         Status                     ---------                               -----------         ------                     CBC Auto Differential[[id number]]        Abnormal            Final result                 Please view results for these tests on the individual orders.    CBC Auto Differential [[id number]]  (Abnormal) Collected: 04/04/23 0209    Specimen: Blood Updated: 04/04/23 0219     WBC 3.50 10*3/mm3      RBC 4.39 10*6/mm3      Hemoglobin 11.0 g/dL      Hematocrit 36.0 %      MCV 82.0 fL      MCH 25.0 pg      MCHC 30.5 g/dL      RDW 21.1 %      RDW-SD 59.9 fl      MPV 7.6 fL      Platelets 238 10*3/mm3      Neutrophil % 37.2 %      Lymphocyte % 40.1 %      Monocyte % 13.6 %      Eosinophil % 8.4 %      Basophil % 0.7 %      Neutrophils, Absolute 1.30 10*3/mm3      Lymphocytes, Absolute 1.40 10*3/mm3      Monocytes, Absolute 0.50 10*3/mm3      Eosinophils, Absolute 0.30 10*3/mm3      Basophils, Absolute 0.00 10*3/mm3      nRBC 0.0 /100 WBC     POC Glucose Once [[id number]]  (Normal) Collected: 04/03/23 2008    Specimen: Blood Updated: 04/03/23 2010     Glucose 98 mg/dL      Comment: Serial Number: [id number]:  [id number]       POC Glucose Once [[id number]]  (Normal) Collected: 04/03/23 1614    Specimen: Blood Updated: 04/03/23 1617     Glucose 105 mg/dL      Comment: Serial Number: [id number]:  [id number]       POC Glucose Once [[id number]]  (Abnormal) Collected: 04/03/23 1141    Specimen: Blood Updated: 04/03/23 1142     Glucose 126 mg/dL      Comment: Serial Number: [id number]:  [id number]             Imaging Results (Last 24 Hours)     ** No results found for the last  24 hours. **          LAB RESULTS (LAST 7 DAYS)    CBC  Results from last 7 days   Lab Units 04/04/23  0209 04/03/23  0347 04/02/23  1119 04/01/23  2315   WBC 10*3/mm3 3.50 4.40 4.50 4.20   RBC 10*6/mm3 4.39 4.20 4.41 4.25   HEMOGLOBIN g/dL 11.0* 10.9* 11.2* 10.9*   HEMATOCRIT % 36.0* 33.7* 35.4* 34.1*   MCV fL 82.0 80.2 80.2 80.3   PLATELETS 10*3/mm3 238 228 238 213       BMP  Results from last 7 days   Lab Units 04/04/23 0209 04/03/23 0347 04/02/23 1119 04/01/23  1528 03/30/23  2251   SODIUM mmol/L 137 140 137  --  137   POTASSIUM mmol/L 3.6 4.0 4.1  --  3.8   CHLORIDE mmol/L 102 105 101  --  104   CO2 mmol/L 27.0 28.0 28.0  --  25.0   BUN mg/dL 12 9 10  --  13   CREATININE mg/dL 0.91 0.80 0.85  --  0.84   GLUCOSE mg/dL 115* 125* 135*  --  107*   MAGNESIUM mg/dL  --  1.9 1.8  --  1.9   PHOSPHORUS mg/dL  --  3.5 3.3 3.6  --        CMP   Results from last 7 days   Lab Units 04/04/23 0209 04/03/23 0347 04/02/23  1119 03/30/23  2251   SODIUM mmol/L 137 140 137 137   POTASSIUM mmol/L 3.6 4.0 4.1 3.8   CHLORIDE mmol/L 102 105 101 104   CO2 mmol/L 27.0 28.0 28.0 25.0   BUN mg/dL 12 9 10 13   CREATININE mg/dL 0.91 0.80 0.85 0.84   GLUCOSE mg/dL 115* 125* 135* 107*       BNP        TROPONIN        CoAg        Creatinine Clearance  Estimated Creatinine Clearance: 92.9 mL/min (by C-G formula based on SCr of 0.91 mg/dL).    ABG        Radiology  No radiology results for the last day      EKG  I personally viewed and interpreted the patient's EKG/Telemetry data:  ECG 12 Lead Rhythm Change   Final Result   HEART RATE= 65  bpm   RR Interval= 917  ms   WY Interval=   ms   P Horizontal Axis=   deg   P Front Axis=   deg   QRSD Interval= 91  ms   QT Interval= 429  ms   QRS Axis= -7  deg   T Wave Axis= 44  deg   - ABNORMAL ECG -   Atrial fibrillation   When compared with ECG of 31-Mar-2023 12:49:30,   Nonspecific significant change   Electronically Signed By: Fran Bliss (JOSELIN) 02-Apr-2023 18:34:56   Date and Time of Study:  2023-04-01 15:36:50      ECG 12 Lead Bradycardia   Final Result   HEART RATE= 45  bpm   RR Interval= 1327  ms   NH Interval=   ms   P Horizontal Axis=   deg   P Front Axis=   deg   QRSD Interval= 94  ms   QT Interval= 517  ms   QRS Axis= -29  deg   T Wave Axis=   deg   - ABNORMAL ECG -   Atrial fibrillation   Ventricular premature complex   Inferior infarct, old   When compared with ECG of 26-Mar-2023 22:49:11,   Significant rate decrease   New or worsened ischemia or infarction   Electronically Signed By: Fran Bliss ([facility]) 31-Mar-2023 18:40:02   Date and Time of Study: 2023-03-31 12:49:30      ECG 12 Lead Chest Pain   Final Result   HEART RATE= 79  bpm   RR Interval= 756  ms   NH Interval=   ms   P Horizontal Axis=   deg   P Front Axis=   deg   QRSD Interval= 94  ms   QT Interval= 388  ms   QRS Axis= -22  deg   T Wave Axis= 93  deg   - ABNORMAL ECG -   Atrial fibrillation   Ventricular premature complex   When compared with ECG of 25-Mar-2023 20:44:30,   NO SIGNIFICANT CHANGE FROM PREVIOUS ECG   Electronically Signed By: Duarte Canales ([facility]) 28-Mar-2023 19:04:25   Date and Time of Study: 2023-03-26 22:49:11      ECG 12 Lead Altered Mental Status   Final Result   HEART RATE= 85  bpm   RR Interval= 706  ms   NH Interval=   ms   P Horizontal Axis=   deg   P Front Axis=   deg   QRSD Interval= 85  ms   QT Interval= 361  ms   QRS Axis= 18  deg   T Wave Axis= 132  deg   - ABNORMAL ECG -   Atrial fibrillation   Nonspecific repol abnormality, diffuse leads   When compared with ECG of 15-Feb-2023 15:43:36,   Significant rate increase   Electronically Signed By: Bob Rojas ([facility]) 26-Mar-2023 10:20:08   Date and Time of Study: 2023-03-25 20:44:30      SCANNED - TELEMETRY     Final Result      SCANNED - TELEMETRY     Final Result      SCANNED - TELEMETRY     Final Result      SCANNED - TELEMETRY     Final Result      SCANNED - TELEMETRY     Final Result      SCANNED - TELEMETRY     Final Result      SCANNED - TELEMETRY      Final Result      SCANNED - TELEMETRY     Final Result      SCANNED - TELEMETRY     Final Result      SCANNED - TELEMETRY     Final Result      SCANNED - TELEMETRY     Final Result      SCANNED - TELEMETRY     Final Result      SCANNED - TELEMETRY     Final Result      SCANNED - TELEMETRY     Final Result      SCANNED - TELEMETRY     Final Result      SCANNED - TELEMETRY     Final Result      SCANNED - TELEMETRY     Final Result      SCANNED - TELEMETRY     Final Result      SCANNED - TELEMETRY     Final Result      SCANNED - TELEMETRY     Final Result      SCANNED - TELEMETRY     Final Result      SCANNED - TELEMETRY     Final Result      SCANNED - TELEMETRY     Final Result      SCANNED - TELEMETRY     Final Result      SCANNED - TELEMETRY     Final Result      SCANNED - TELEMETRY     Final Result      SCANNED - TELEMETRY     Final Result      SCANNED - TELEMETRY     Final Result      SCANNED - TELEMETRY     Final Result      SCANNED - TELEMETRY     Final Result      SCANNED - TELEMETRY     Final Result      SCANNED - TELEMETRY     Final Result      SCANNED - TELEMETRY     Final Result      SCANNED - TELEMETRY     Final Result      SCANNED - TELEMETRY     Final Result      SCANNED - TELEMETRY     Final Result      SCANNED - TELEMETRY     Final Result      SCANNED - TELEMETRY     Final Result      SCANNED - TELEMETRY     Final Result      SCANNED - TELEMETRY     Final Result      SCANNED - TELEMETRY     Final Result      SCANNED - TELEMETRY     Final Result      SCANNED - TELEMETRY     Final Result      SCANNED - TELEMETRY     Final Result      SCANNED - TELEMETRY     Final Result      SCANNED - TELEMETRY     Final Result      SCANNED - TELEMETRY     Final Result      SCANNED - TELEMETRY     Final Result      SCANNED - TELEMETRY     Final Result      SCANNED - TELEMETRY     Final Result      SCANNED - TELEMETRY     Final Result      ECG 12 Lead Rhythm Change    (Results Pending)         Echocardiogram:     Results for orders placed during the hospital encounter of 03/25/23    Adult Transthoracic Echo Complete w/ Color, Spectral and Contrast if Necessary Per Protocol    Interpretation Summary  •  Left ventricular systolic function is normal. Left ventricular ejection fraction appears to be 56 - 60%.  •  Left ventricular diastolic function is consistent with (grade II w/high LAP) pseudonormalization.  •  Left atrial volume is severely increased.  •  The right atrial cavity is moderate to severely  dilated.  •  Abnormal mitral valve structure consistent with dilated annulus.  •  Estimated right ventricular systolic pressure from tricuspid regurgitation is moderately elevated (45-55 mmHg).  •  Moderate pulmonary hypertension is present.        Stress Test:  Results for orders placed during the hospital encounter of 09/16/22    Stress Test With Myocardial Perfusion One Day    Interpretation Summary  · Diaphragmatic attenuation artifact is present.  · Left ventricular ejection fraction is hyperdynamic (Calculated EF > 70%). .  · Myocardial perfusion imaging indicates a normal myocardial perfusion study with no evidence of ischemia.  · Impressions are consistent with a low risk study.  · This is normal Cardiolite imaging stress test with no evidence of ischemia or myocardial infarction. Left ventricle size and function is normal on gated SPECT imaging. No wall motion abnormality was noted. Clinical correlation recommended. Further recommendation as per ordering physician..  · Findings consistent with an indeterminate ECG stress test.         Cardiac Catheterization:  No results found for this or any previous visit.         Other:         ASSESSMENT & PLAN:    Principal Problem:    UTI (urinary tract infection)  Active Problems:    Cerebrovascular accident    Type 2 diabetes mellitus with diabetic nephropathy    Atrial fibrillation    Morbid obesity    Dementia    Acute metabolic encephalopathy    A fib/SSS  He has atrial  fibrillation with slow ventricular response.  Stopped Coreg and clonidine due to bradycardia  Lovenox for anticoagulation  No further episodes of bradycardia since stopping Coreg or clonidine.  Heart rate is now better controlled  He will be transferred to rehab today     HFpEF  Echo shows EF of 56 to 60%, grade 2 diastolic dysfunction, moderate pulmonary hypertension.  Lasix as needed  Renal function is normal     Uncontrolled hypertension  Cardene drip has been stopped  Started chlorthalidone, losartan 25 mg, Imdur 60 mg and hydralazine 50mg 3 times daily  Added amlodipine  Blood pressure is now well controlled  Can uptitrate hydralazine if needed     Hyperlipidemia  Continue high intensity statin, most recent LDL less than 40.     Diabetes  Continue insulin therapy for diabetes, most recent A1c is 6     Complicated UTI  Altered mental state likely secondary to ESBL UTI, continue ertapenem     BPH  On tamsulosin and finasteride for BPH.      Frank Foster MD  04/04/23  08:24 EDT

## 2023-04-04 NOTE — PLAN OF CARE
Goal Outcome Evaluation:      Pt has slept through the night with no complaints noted except for pt telling staff that he wasn't taking his medicines because we are just adding more pills for him to take; was able to stress the importance of taking at least the hydralazine due to increased blood pressure which patient replied oh I won't have a stroke that doesn't happen. Pt was finally agreeable to taking it but continued to refuse any other medications by mouth. Pt was initially trying to refuse having his gardner catheter changed but was agreeable once the importance was stressed; was able to change midline dressing as well due to the integrity being compromised and putting patient at risk. VSS at this time with no new issues noted and call light remains within reach.

## 2023-04-04 NOTE — DISCHARGE SUMMARY
[facility] Medicine Services   Discharge summary     Patient Name: Shaji Junior  : 1945  MRN: [id number]  Primary Care Physician:  Naa Valverde MD  Date of admission: 3/25/2023  Date of discharge; 2023        Subjective       Chief Complaint: Abdominal pain        Patient Reports      3/27/2023: Admitted to PCU due to hypertensive urgency.  Off Cardene drip.  Can downgrade.  Complains of abdominal pain.  History of ESBL.  Does not walk.  Confusion getting better     3/28/23: Chronic Motley catheter.  Awaiting urine culture sensitivities.     3/29/23: ESBL UTI.  Will order ertapenem.  Family does not want him to go back to [place].     3/30/23: Patient refusing meds.  Hemodynamically stable.     3/31/23: Complained of left knee pain ordered Roxicodone.  Patient bradycardic.  Coreg held.  Reconsulted cardiology.  We will transfer to PCU.  Patient will be transferred to [place] due to bradycardia.  Appreciate cardiology input.  Discontinued clonidine     23: Transferred to PCU yesterday for bradycardia.  Clonidine was discontinued.  High blood pressure but the patient is refusing medications.  Nitroglycerin patch placed.  Complains of headache.  Spoke to the patient's sister and brother in the afternoon regarding the patient's refusal of medications.  The patient's brother said he will come and talk to the patient.  Sister, MELISSA Martinez lives in [place].      23: Complains of right neck pain.  Ordered Flexeril.  Encourage patient to eat breakfast and take his medications.  Heart rate in the 60s.    4/3/2023; first time seeing the patient, extensive chart notes reviewed patient is lying in the bed, alert awake oriented x1-2 pleasantly confused, no family at bedside, spoke with the bedside RN     Review of Systems   Unable to perform ROS: mental status change            Objective       Vitals:   Temp:  [97.5 °F (36.4 °C)-98.8 °F (37.1 °C)] 97.8 °F (36.6 °C)  Heart Rate:   [58-76] 61  Resp:  [12-19] 16  BP: (143-159)/(78-99) 145/83     Physical Exam  HENT:      Head: Normocephalic.      Mouth/Throat:      Mouth: Mucous membranes are moist.   Eyes:      Extraocular Movements: Extraocular movements intact.      Pupils: Pupils are equal, round, and reactive to light.   Cardiovascular:      Rate and Rhythm: Normal rate and regular rhythm.   Pulmonary:      Effort: Pulmonary effort is normal.   Abdominal:      General: Bowel sounds are normal.      Tenderness: There is no abdominal tenderness.      Comments: Obese abdomen.     Skin:     General: Skin is warm.      Findings: No rash.   Neurological:      Mental Status: He is alert. He is confused.   Psychiatric:         Behavior: Behavior is cooperative.                          Result Review    Result Review:  I have personally reviewed the results from the time of this admission to 4/2/2023 13:07 EDT and agree with these findings:  [x]  Laboratory  []  Microbiology  [x]  Radiology  []  EKG/Telemetry   []  Cardiology/Vascular   []  Pathology  [x]  Old records  []  Other:     Lab Results   Component Value Date    GLUCOSE 115 (H) 04/04/2023    CALCIUM 8.8 04/04/2023     04/04/2023    K 3.6 04/04/2023    CO2 27.0 04/04/2023     04/04/2023    BUN 12 04/04/2023    CREATININE 0.91 04/04/2023    EGFR 86.8 04/04/2023    BCR 13.2 04/04/2023    ANIONGAP 8.0 04/04/2023     Lab Results   Component Value Date    WBC 3.50 04/04/2023    HGB 11.0 (L) 04/04/2023    HCT 36.0 (L) 04/04/2023    MCV 82.0 04/04/2023     04/04/2023   CT Abdomen Pelvis Stone Protocol    Result Date: 4/4/2023  Impression: 1.No hydronephrosis or urinary tract calculi are identified. 2.Indwelling Motley catheter. Moderate bladder wall thickening with wall indistinctness. Findings are suspicious for cystitis. Please correlate with urinalysis. 3.Additional findings as detailed above. Electronically Signed: Tutu Nathan  4/4/2023 9:35 AM EDT  Workstation ID:  [id number]           Wounds (last 24 hours)             LDA Wound      Row Name 04/02/23 0410 04/02/23 0010 04/01/23 2010             Wound 03/10/23 0938 Right gluteal MASD (Moisture associated skin damage)     Wound - Properties Group Placement Date: 03/10/23  - Placement Time: 0938 -HW Present on Hospital Admission: Y  -HW Side: Right  -HW Location: gluteal  -HW Primary Wound Type: MASD  -HW     Dressing Appearance open to air  -DC open to air  -DC open to air  -DC     Closure Open to air  -DC Open to air  -DC Open to air  -DC     Base pink  -DC pink  -DC pink  -DC     Drainage Amount none  -DC none  -DC none  -DC     Care, Wound cleansed with;sterile normal saline  -DC cleansed with;sterile normal saline  -DC cleansed with;sterile normal saline  -DC     Dressing Care open to air  -DC open to air  -DC open to air  -DC     Retired Wound - Properties Group Placement Date: 03/10/23  -HW Placement Time: 0938 -HW Present on Hospital Admission: Y  -HW Side: Right  -HW Location: gluteal  -HW Primary Wound Type: MASD  -HW     Retired Wound - Properties Group Date first assessed: 03/10/23  -HW Time first assessed: 0938  -HW Present on Hospital Admission: Y  -HW Side: Right  -HW Location: gluteal  -HW Primary Wound Type: MASD  -HW             Wound 03/26/23 1653 Right anterior greater trochanter Blisters     Wound - Properties Group Placement Date: 03/26/23  -NE Placement Time: 1653 -NE Present on Hospital Admission: Y  -NE Side: Right  -NE Orientation: anterior  -NE Location: greater trochanter  -NE Primary Wound Type: Blisters  -NE     Pressure Injury Stage 2  -DC 2  -DC 2  -DC     Dressing Appearance open to air  -DC open to air  -DC open to air  -DC     Closure Open to air  -DC Open to air  -DC Open to air  -DC     Base moist;red  -DC moist;red  -DC moist;red  -DC     Periwound dry  -DC dry  -DC dry  -DC     Periwound Temperature warm  -DC warm  -DC warm  -DC     Periwound Skin Turgor soft  -DC soft  -DC soft  -DC      Drainage Characteristics/Odor serosanguineous  -DC serosanguineous  -DC serosanguineous  -DC     Drainage Amount scant  -DC scant  -DC scant  -DC     Care, Wound cleansed with;sterile normal saline  -DC cleansed with;sterile normal saline  -DC cleansed with;sterile normal saline  -DC     Dressing Care skin barrier agent applied  -DC skin barrier agent applied  -DC skin barrier agent applied  -DC     Retired Wound - Properties Group Placement Date: 03/26/23  -NE Placement Time: 1653 -NE Present on Hospital Admission: Y  -NE Side: Right  -NE Orientation: anterior  -NE Location: greater trochanter  -NE Primary Wound Type: Blisters  -NE     Retired Wound - Properties Group Date first assessed: 03/26/23  -NE Time first assessed: 1653 -NE Present on Hospital Admission: Y  -NE Side: Right  -NE Location: greater trochanter  -NE Primary Wound Type: Blisters  -NE             Wound 03/26/23 1654 Right gluteal Pressure Injury     Wound - Properties Group Placement Date: 03/26/23  -NE Placement Time: 1654 -NE Present on Hospital Admission: Y  -NE Side: Right  -NE Location: gluteal  -NE, STAGE 2  Primary Wound Type: Pressure inj  -NE     Pressure Injury Stage 2  -DC 2  -DC 2  -DC     Dressing Appearance open to air  -DC open to air  -DC open to air  -DC     Closure Open to air  -DC Open to air  -DC Open to air  -DC     Base pink;red  -DC pink;red  -DC pink;red  -DC     Periwound pink  -DC pink  -DC pink  -DC     Periwound Temperature warm  -DC warm  -DC warm  -DC     Drainage Characteristics/Odor serosanguineous  -DC serosanguineous  -DC serosanguineous  -DC     Drainage Amount scant  -DC scant  -DC scant  -DC     Care, Wound cleansed with;sterile normal saline  -DC cleansed with;sterile normal saline  -DC cleansed with;sterile normal saline  -DC     Dressing Care open to air  -DC open to air  -DC open to air  -DC     Periwound Care barrier ointment applied  -DC barrier ointment applied  -DC barrier ointment applied  -DC      Retired Wound - Properties Group Placement Date: 03/26/23  -NE Placement Time: 1654  -NE Present on Hospital Admission: Y  -NE Side: Right  -NE Location: gluteal  -NE, STAGE 2  Primary Wound Type: Pressure inj  -NE     Retired Wound - Properties Group Date first assessed: 03/26/23  -NE Time first assessed: 1654  -NE Present on Hospital Admission: Y  -NE Side: Right  -NE Location: gluteal  -NE, STAGE 2  Primary Wound Type: Pressure inj  -NE             Wound 03/26/23 1702 Left anterior thigh Skin Tear     Wound - Properties Group Placement Date: 03/26/23  -NE Placement Time: 1702 -NE Present on Hospital Admission: Y  -NE Side: Left  -NE Orientation: anterior  -NE Location: thigh  -NE Primary Wound Type: Skin tear  -NE     Dressing Appearance open to air  -DC open to air  -DC open to air  -DC     Closure Open to air  look healed  -DC Open to air  look healed  -DC Open to air  look healed  -DC     Base pink  -DC pink  -DC --     Drainage Amount none  -DC none  -DC none  -DC     Care, Wound cleansed with;sterile normal saline  -DC cleansed with;sterile normal saline  -DC cleansed with;sterile normal saline  -DC     Retired Wound - Properties Group Placement Date: 03/26/23  -NE Placement Time: 1702 -NE Present on Hospital Admission: Y  -NE Side: Left  -NE Orientation: anterior  -NE Location: thigh  -NE Primary Wound Type: Skin tear  -NE     Retired Wound - Properties Group Date first assessed: 03/26/23  -NE Time first assessed: 1702 -NE Present on Hospital Admission: Y  -NE Side: Left  -NE Location: thigh  -NE Primary Wound Type: Skin tear  -NE             Wound 03/31/23 1723 Left posterior     Wound - Properties Group Placement Date: 03/31/23  -BM Placement Time: 1723  -BM Side: Left  -BM Orientation: posterior  -BM     Pressure Injury Stage DTPI  -DC DTPI  -DC DTPI  -DC     Dressing Appearance open to air  -DC open to air  -DC open to air  -DC     Periwound intact  -DC intact  -DC intact  -DC     Retired Wound -  Properties Group Placement Date: 03/31/23  -BM Placement Time: 1723 -BM Side: Left  -BM Orientation: posterior  -BM     Retired Wound - Properties Group Date first assessed: 03/31/23  -BM Time first assessed: 1723  -BM Side: Left  -BM                   User Key  (r) = Recorded By, (t) = Taken By, (c) = Cosigned By     Initials Name Provider Type     Cody Marinelli LPN Licensed Nurse     Christy Joseph, RN Registered Nurse     Jordan Rao, RN Registered Nurse     Ignacia Villarreal RN Registered Nurse                       Assessment & Plan       Brief Patient Summary:        The patient is a 77-year-old female with history of type 2 diabetes mellitus, atrial fibrillation,  CVA, chronic Motley catheter and recurrent UTI.      The patient was recently discharged from the hospital on 2/18/2023 after treatment for complicated ESBL UTI, metabolic encephalopathy and hypertensive urgency.     The patient presented to the ED from [facility] on 3/25/2023 for evaluation of confusion and fevers.     The patient was diagnosed with acute metabolic encephalopathy due to urinary tract infection  and admitted to the hospitalist  service.  He was seen by cardiologist for history of atrial fibrillation with normal LVEF 60-65%.  Urine cultures grew ESBL Proteus mirabilis and was started on ertapenem.     The patient was transferred to PCU on 4/1/2023 for bradycardia.  Clonidine was then discontinued and cardiology was consulted.  He continues to refuse medications thus has elevated BP.  Nitro patch has been used.  The patient has a bed at [facility] but bradycardia has to resolved before being accepted there.     amLODIPine, 10 mg, Oral, Q24H  atorvastatin, 20 mg, Oral, Nightly  chlorthalidone, 25 mg, Oral, Daily  enoxaparin, 1 mg/kg, Subcutaneous, Q12H  ertapenem, 1 g, Intravenous, Q24H  escitalopram, 10 mg, Oral, Daily  finasteride, 5 mg, Oral, Daily  gabapentin, 300 mg, Oral, TID  hydrALAZINE, 50  mg, Oral, TID  insulin lispro, 2-7 Units, Subcutaneous, TID With Meals  lidocaine, 20 mL, Subcutaneous, Once  lisinopril, 10 mg, Oral, Q24H  nitroglycerin, 1 inch, Topical, Q6H  sodium chloride, 10 mL, Intravenous, Q12H  sodium chloride, 10 mL, Intravenous, Q12H  tamsulosin, 0.4 mg, Oral, Daily        lactated ringers, 50 mL/hr, Last Rate: Stopped (04/01/23 0417)  niCARdipine, 5-15 mg/hr, Last Rate: 10 mg/hr (04/02/23 1012)  Pharmacy to Dose enoxaparin (LOVENOX),            Active Hospital Problems:       Active Hospital Problems     Diagnosis      **UTI (urinary tract infection)      Acute metabolic encephalopathy      Morbid obesity      Dementia      Atrial fibrillation      Cerebrovascular accident      Type 2 diabetes mellitus with diabetic nephropathy        ESBL Proteus urinary tract infection (POA) complicated/catheter associated UTI;  Hypertensive urgency (POA)  Acute toxic/metabolic encephalopathy  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  Contamination.  Consult ID appreciated  Repeat CT of the abdomen and pelvis no hydronephrosis.  -CT head on admission ruled out acute intracranial pathology.  -Patient refuses medication sometimes.  -A-fib/SSS; with slow ventricular response stopped here Coreg and clonidine due to bradycardia, Lovenox for anticoagulation, no more bradycardia after stopping Coreg and clonidine continue to monitor on telemetry.          DVT prophylaxis:  Medical DVT prophylaxis orders are present.     CODE STATUS:    Code Status (Patient has no pulse and is not breathing): CPR  (Attempt to Resuscitate)  Medical Interventions (Patient has pulse or is breathing): Full Support        Disposition:   Discharge to rehab.     This patient has been examined wearing appropriate Personal Protective Equipment and discussed with nursing.     Your medication list        START taking these medications        Instructions Last Dose Given Next Dose Due   amLODIPine 10 MG tablet  Commonly known as: NORVASC  Start taking on: April 5, 2023      Take 1 tablet by mouth Daily for 30 days.       chlorthalidone 25 MG tablet  Commonly known as: HYGROTON  Start taking on: April 5, 2023      Take 1 tablet by mouth Daily for 30 days.       naloxone 4 MG/0.1ML nasal spray  Commonly known as: NARCAN      CALL 911. Do not prime. Spray into nostril upon signs of opioid overdose. May repeat in 2 to 3 minutes in opposite nostril if no or minimal breathing and responsiveness, then as needed (if doses are available) every 2 to 3 minutes.              CONTINUE taking these medications        Instructions Last Dose Given Next Dose Due   acetaminophen 325 MG tablet  Commonly known as: TYLENOL      Take 650 mg by mouth Every 4 (Four) Hours As Needed for Mild Pain .       albuterol sulfate  (90 Base) MCG/ACT inhaler  Commonly known as: PROVENTIL HFA;VENTOLIN HFA;PROAIR HFA      Inhale 2 puffs Every 4 (Four) Hours As Needed (Cough).       apixaban 5 MG tablet tablet  Commonly known as: ELIQUIS      Take 1 tablet by mouth Every 12 (Twelve) Hours. Indications: Atrial Fibrillation       BASAGLAR KWIKPEN 100 UNIT/ML injection pen      Inject 10 Units under the skin into the appropriate area as directed Every Night.       docusate calcium 240 MG capsule  Commonly known as: SURFAK      Take 240 mg by mouth Daily.       escitalopram 10 MG tablet  Commonly known as: LEXAPRO      Take 10 mg by mouth Daily.       finasteride 5 MG tablet  Commonly known as: PROSCAR      Take 5 mg by mouth Daily.       gabapentin 300 MG  capsule  Commonly known as: NEURONTIN      Take 300 mg by mouth 3 (Three) Times a Day.       hydrALAZINE 50 MG tablet  Commonly known as: APRESOLINE      Take 1 tablet by mouth 3 (Three) Times a Day.       isosorbide mononitrate 60 MG 24 hr tablet  Commonly known as: IMDUR      Take 1 tablet by mouth 2 (Two) Times a Day.       losartan 25 MG tablet  Commonly known as: COZAAR      Take 1 tablet by mouth Daily.       methocarbamol 500 MG tablet  Commonly known as: ROBAXIN      Take 500 mg by mouth Every 12 (Twelve) Hours As Needed for Muscle Spasms.       nitroglycerin 0.4 MG SL tablet  Commonly known as: NITROSTAT      Place 0.4 mg under the tongue Every 5 (Five) Minutes As Needed for Chest Pain. Take no more than 3 doses in 15 minutes.       polycarbophil 625 MG tablet tablet      Take 625 mg by mouth Daily.       Polyethylene Glycol 3350 powder      Take 17 g by mouth Every Night.       saccharomyces boulardii 250 MG capsule  Commonly known as: FLORASTOR      Take 250 mg by mouth 2 (Two) Times a Day.       simethicone 180 MG capsule  Commonly known as: MYLICON,GAS-X      Take 180 mg by mouth Every 6 (Six) Hours As Needed for Flatulence.       simvastatin 40 MG tablet  Commonly known as: ZOCOR      Take 40 mg by mouth every night at bedtime.       tamsulosin 0.4 MG capsule 24 hr capsule  Commonly known as: FLOMAX      Take 1 capsule by mouth Daily.       traMADol 50 MG tablet  Commonly known as: ULTRAM      Take 1 tablet by mouth Every 6 (Six) Hours As Needed for Moderate Pain.              STOP taking these medications      carvedilol 12.5 MG tablet  Commonly known as: COREG        furosemide 40 MG tablet  Commonly known as: LASIX                  Where to Get Your Medications        These medications were sent to [facility] [place], [facility] - [facility] [place] - [address] [facility] - [phone] [facility] [phone]   [address] [facility] [address] [facility] [place] [address]      Phone: [phone]   amLODIPine 10 MG  tablet  chlorthalidone 25 MG tablet  naloxone 4 MG/0.1ML nasal spray  traMADol 50 MG tablet           DISCHARGE Follow Up Recommendations for labs and diagnostics; PCP, cardiology  Discharge condition; stable  Discharge diet; regular diet  Discharge activity; as per PT OT  Discharge total time; more than 35 minutes.      Electronically signed by Isaac Rhoades MD, 04/04/23, 5:47 PM EDT.

## 2023-04-05 NOTE — CASE MANAGEMENT/SOCIAL WORK
Case Management Discharge Note      Final Note: [facility]        Selected Continued Care - Discharged on 4/4/2023 Admission date: 3/25/2023 - Discharge disposition: Skilled Nursing Facility (DC - External)    Destination Coordination complete.    Service Provider Selected Services Address Phone Fax Patient Preferred    [facility] [address] [facility] [place] [address] [phone]                   Transportation Services  Ambulance: [facility]    Final Discharge Disposition Code: 04 - intermediate care facility